# Patient Record
Sex: MALE | Race: WHITE | NOT HISPANIC OR LATINO | Employment: OTHER | URBAN - METROPOLITAN AREA
[De-identification: names, ages, dates, MRNs, and addresses within clinical notes are randomized per-mention and may not be internally consistent; named-entity substitution may affect disease eponyms.]

---

## 2018-01-10 NOTE — MISCELLANEOUS
History of Present Illness  COPD Hospital Discharge Initial Follow-Up Call: There was no answer/no voicemail available  The patient is being contacted for follow-up after hospitalization  The date of discharge is 3/24/16  Signatures   Electronically signed by :  RT Ailyn; Mar 25 2016 12:28PM EST                       (Author)

## 2018-05-08 ENCOUNTER — APPOINTMENT (INPATIENT)
Dept: RADIOLOGY | Facility: HOSPITAL | Age: 68
DRG: 208 | End: 2018-05-08
Payer: MEDICARE

## 2018-05-08 ENCOUNTER — HOSPITAL ENCOUNTER (INPATIENT)
Facility: HOSPITAL | Age: 68
LOS: 3 days | Discharge: HOME | DRG: 208 | End: 2018-05-11
Attending: EMERGENCY MEDICINE | Admitting: EMERGENCY MEDICINE
Payer: MEDICARE

## 2018-05-08 ENCOUNTER — APPOINTMENT (EMERGENCY)
Dept: RADIOLOGY | Facility: HOSPITAL | Age: 68
DRG: 208 | End: 2018-05-08
Payer: MEDICARE

## 2018-05-08 DIAGNOSIS — T78.3XXA ANGIOEDEMA: Primary | ICD-10-CM

## 2018-05-08 PROBLEM — R22.0 SEVERE TONGUE SWELLING: Status: ACTIVE | Noted: 2018-05-08

## 2018-05-08 PROBLEM — J44.9 COPD (CHRONIC OBSTRUCTIVE PULMONARY DISEASE) (HCC): Status: ACTIVE | Noted: 2018-05-08

## 2018-05-08 PROBLEM — R22.0 RIGHT FACIAL SWELLING: Status: ACTIVE | Noted: 2018-05-08

## 2018-05-08 PROBLEM — R00.1 BRADYCARDIA: Status: ACTIVE | Noted: 2018-05-08

## 2018-05-08 PROBLEM — J96.91 RESPIRATORY FAILURE WITH HYPOXIA (HCC): Status: ACTIVE | Noted: 2018-05-08

## 2018-05-08 LAB
ALBUMIN SERPL BCP-MCNC: 3.3 G/DL (ref 3.5–5)
ALP SERPL-CCNC: 128 U/L (ref 46–116)
ALT SERPL W P-5'-P-CCNC: 21 U/L (ref 12–78)
ANION GAP SERPL CALCULATED.3IONS-SCNC: 8 MMOL/L (ref 4–13)
APTT PPP: 28 SECONDS (ref 23–35)
AST SERPL W P-5'-P-CCNC: 22 U/L (ref 5–45)
ATRIAL RATE: 52 BPM
ATRIAL RATE: 88 BPM
ATRIAL RATE: 88 BPM
BASOPHILS # BLD AUTO: 0.01 THOUSANDS/ΜL (ref 0–0.1)
BASOPHILS NFR BLD AUTO: 0 % (ref 0–1)
BILIRUB SERPL-MCNC: 0.72 MG/DL (ref 0.2–1)
BUN SERPL-MCNC: 18 MG/DL (ref 5–25)
C3 SERPL-MCNC: 136 MG/DL (ref 90–180)
C4 SERPL-MCNC: 43 MG/DL (ref 10–40)
CALCIUM SERPL-MCNC: 8.4 MG/DL (ref 8.3–10.1)
CHLORIDE SERPL-SCNC: 106 MMOL/L (ref 100–108)
CO2 SERPL-SCNC: 24 MMOL/L (ref 21–32)
CREAT SERPL-MCNC: 0.99 MG/DL (ref 0.6–1.3)
CRP SERPL QL: 6.2 MG/L
EOSINOPHIL # BLD AUTO: 0.15 THOUSAND/ΜL (ref 0–0.61)
EOSINOPHIL NFR BLD AUTO: 2 % (ref 0–6)
ERYTHROCYTE [DISTWIDTH] IN BLOOD BY AUTOMATED COUNT: 13.6 % (ref 11.6–15.1)
ERYTHROCYTE [SEDIMENTATION RATE] IN BLOOD: 11 MM/HOUR (ref 0–10)
GFR SERPL CREATININE-BSD FRML MDRD: 77 ML/MIN/1.73SQ M
GLUCOSE SERPL-MCNC: 118 MG/DL (ref 65–140)
HCT VFR BLD AUTO: 48.2 % (ref 36.5–49.3)
HGB BLD-MCNC: 16.1 G/DL (ref 12–17)
INR PPP: 1.03 (ref 0.86–1.16)
LACTATE SERPL-SCNC: 2.3 MMOL/L (ref 0.5–2)
LYMPHOCYTES # BLD AUTO: 3.64 THOUSANDS/ΜL (ref 0.6–4.47)
LYMPHOCYTES NFR BLD AUTO: 37 % (ref 14–44)
MCH RBC QN AUTO: 31.7 PG (ref 26.8–34.3)
MCHC RBC AUTO-ENTMCNC: 33.4 G/DL (ref 31.4–37.4)
MCV RBC AUTO: 95 FL (ref 82–98)
MONOCYTES # BLD AUTO: 1.08 THOUSAND/ΜL (ref 0.17–1.22)
MONOCYTES NFR BLD AUTO: 11 % (ref 4–12)
NEUTROPHILS # BLD AUTO: 4.91 THOUSANDS/ΜL (ref 1.85–7.62)
NEUTS SEG NFR BLD AUTO: 50 % (ref 43–75)
NRBC BLD AUTO-RTO: 0 /100 WBCS
NT-PROBNP SERPL-MCNC: 168 PG/ML
P AXIS: 72 DEGREES
P AXIS: 76 DEGREES
P AXIS: 79 DEGREES
PLATELET # BLD AUTO: 302 THOUSANDS/UL (ref 149–390)
PMV BLD AUTO: 10.2 FL (ref 8.9–12.7)
POTASSIUM SERPL-SCNC: 3.8 MMOL/L (ref 3.5–5.3)
PR INTERVAL: 158 MS
PR INTERVAL: 164 MS
PR INTERVAL: 183 MS
PROT SERPL-MCNC: 7.3 G/DL (ref 6.4–8.2)
PROTHROMBIN TIME: 13.5 SECONDS (ref 12.1–14.4)
QRS AXIS: -15 DEGREES
QRS AXIS: 82 DEGREES
QRS AXIS: 95 DEGREES
QRSD INTERVAL: 102 MS
QRSD INTERVAL: 108 MS
QRSD INTERVAL: 113 MS
QT INTERVAL: 366 MS
QT INTERVAL: 400 MS
QT INTERVAL: 454 MS
QTC INTERVAL: 423 MS
QTC INTERVAL: 442 MS
QTC INTERVAL: 484 MS
RBC # BLD AUTO: 5.08 MILLION/UL (ref 3.88–5.62)
SODIUM SERPL-SCNC: 138 MMOL/L (ref 136–145)
T WAVE AXIS: 40 DEGREES
T WAVE AXIS: 64 DEGREES
T WAVE AXIS: 75 DEGREES
TROPONIN I SERPL-MCNC: <0.02 NG/ML
VENTRICULAR RATE: 52 BPM
VENTRICULAR RATE: 88 BPM
VENTRICULAR RATE: 88 BPM
WBC # BLD AUTO: 9.81 THOUSAND/UL (ref 4.31–10.16)

## 2018-05-08 PROCEDURE — 36415 COLL VENOUS BLD VENIPUNCTURE: CPT | Performed by: EMERGENCY MEDICINE

## 2018-05-08 PROCEDURE — 80053 COMPREHEN METABOLIC PANEL: CPT | Performed by: EMERGENCY MEDICINE

## 2018-05-08 PROCEDURE — 86160 COMPLEMENT ANTIGEN: CPT | Performed by: EMERGENCY MEDICINE

## 2018-05-08 PROCEDURE — 71045 X-RAY EXAM CHEST 1 VIEW: CPT

## 2018-05-08 PROCEDURE — 70491 CT SOFT TISSUE NECK W/DYE: CPT

## 2018-05-08 PROCEDURE — 86140 C-REACTIVE PROTEIN: CPT | Performed by: EMERGENCY MEDICINE

## 2018-05-08 PROCEDURE — 94760 N-INVAS EAR/PLS OXIMETRY 1: CPT

## 2018-05-08 PROCEDURE — 96374 THER/PROPH/DIAG INJ IV PUSH: CPT

## 2018-05-08 PROCEDURE — 85652 RBC SED RATE AUTOMATED: CPT | Performed by: EMERGENCY MEDICINE

## 2018-05-08 PROCEDURE — 0BH17EZ INSERTION OF ENDOTRACHEAL AIRWAY INTO TRACHEA, VIA NATURAL OR ARTIFICIAL OPENING: ICD-10-PCS | Performed by: INTERNAL MEDICINE

## 2018-05-08 PROCEDURE — 93010 ELECTROCARDIOGRAM REPORT: CPT | Performed by: INTERNAL MEDICINE

## 2018-05-08 PROCEDURE — 3E033XZ INTRODUCTION OF VASOPRESSOR INTO PERIPHERAL VEIN, PERCUTANEOUS APPROACH: ICD-10-PCS | Performed by: INTERNAL MEDICINE

## 2018-05-08 PROCEDURE — 83605 ASSAY OF LACTIC ACID: CPT | Performed by: EMERGENCY MEDICINE

## 2018-05-08 PROCEDURE — 85610 PROTHROMBIN TIME: CPT | Performed by: EMERGENCY MEDICINE

## 2018-05-08 PROCEDURE — 93005 ELECTROCARDIOGRAM TRACING: CPT

## 2018-05-08 PROCEDURE — C9113 INJ PANTOPRAZOLE SODIUM, VIA: HCPCS | Performed by: EMERGENCY MEDICINE

## 2018-05-08 PROCEDURE — 83880 ASSAY OF NATRIURETIC PEPTIDE: CPT | Performed by: EMERGENCY MEDICINE

## 2018-05-08 PROCEDURE — 87040 BLOOD CULTURE FOR BACTERIA: CPT | Performed by: EMERGENCY MEDICINE

## 2018-05-08 PROCEDURE — 99291 CRITICAL CARE FIRST HOUR: CPT

## 2018-05-08 PROCEDURE — 85025 COMPLETE CBC W/AUTO DIFF WBC: CPT | Performed by: EMERGENCY MEDICINE

## 2018-05-08 PROCEDURE — 85730 THROMBOPLASTIN TIME PARTIAL: CPT | Performed by: EMERGENCY MEDICINE

## 2018-05-08 PROCEDURE — 94002 VENT MGMT INPAT INIT DAY: CPT

## 2018-05-08 PROCEDURE — 84484 ASSAY OF TROPONIN QUANT: CPT | Performed by: EMERGENCY MEDICINE

## 2018-05-08 PROCEDURE — 5A1945Z RESPIRATORY VENTILATION, 24-96 CONSECUTIVE HOURS: ICD-10-PCS | Performed by: INTERNAL MEDICINE

## 2018-05-08 PROCEDURE — 99223 1ST HOSP IP/OBS HIGH 75: CPT | Performed by: EMERGENCY MEDICINE

## 2018-05-08 RX ORDER — PROPOFOL 10 MG/ML
5-50 INJECTION, EMULSION INTRAVENOUS
Status: DISCONTINUED | OUTPATIENT
Start: 2018-05-08 | End: 2018-05-08

## 2018-05-08 RX ORDER — HEPARIN SODIUM 5000 [USP'U]/ML
7500 INJECTION, SOLUTION INTRAVENOUS; SUBCUTANEOUS EVERY 8 HOURS SCHEDULED
Status: DISCONTINUED | OUTPATIENT
Start: 2018-05-08 | End: 2018-05-08

## 2018-05-08 RX ORDER — ALBUTEROL SULFATE 2.5 MG/3ML
2.5 SOLUTION RESPIRATORY (INHALATION) EVERY 4 HOURS PRN
Status: DISCONTINUED | OUTPATIENT
Start: 2018-05-08 | End: 2018-05-09

## 2018-05-08 RX ORDER — PROPOFOL 10 MG/ML
50 INJECTION, EMULSION INTRAVENOUS ONCE
Status: COMPLETED | OUTPATIENT
Start: 2018-05-08 | End: 2018-05-08

## 2018-05-08 RX ORDER — PROPOFOL 10 MG/ML
INJECTION, EMULSION INTRAVENOUS
Status: COMPLETED
Start: 2018-05-08 | End: 2018-05-08

## 2018-05-08 RX ORDER — LORAZEPAM 2 MG/ML
1 INJECTION INTRAMUSCULAR ONCE
Status: COMPLETED | OUTPATIENT
Start: 2018-05-08 | End: 2018-05-08

## 2018-05-08 RX ORDER — CHLORHEXIDINE GLUCONATE 0.12 MG/ML
15 RINSE ORAL EVERY 12 HOURS SCHEDULED
Status: DISCONTINUED | OUTPATIENT
Start: 2018-05-08 | End: 2018-05-08 | Stop reason: SDUPTHER

## 2018-05-08 RX ORDER — PROPOFOL 10 MG/ML
45 INJECTION, EMULSION INTRAVENOUS ONCE
Status: COMPLETED | OUTPATIENT
Start: 2018-05-08 | End: 2018-05-08

## 2018-05-08 RX ORDER — FENTANYL CITRATE 50 UG/ML
INJECTION, SOLUTION INTRAMUSCULAR; INTRAVENOUS
Status: COMPLETED
Start: 2018-05-08 | End: 2018-05-08

## 2018-05-08 RX ORDER — FENTANYL CITRATE 50 UG/ML
100 INJECTION, SOLUTION INTRAMUSCULAR; INTRAVENOUS ONCE
Status: COMPLETED | OUTPATIENT
Start: 2018-05-08 | End: 2018-05-08

## 2018-05-08 RX ORDER — FAMOTIDINE 40 MG/5ML
20 POWDER, FOR SUSPENSION ORAL DAILY
Status: DISCONTINUED | OUTPATIENT
Start: 2018-05-08 | End: 2018-05-08

## 2018-05-08 RX ORDER — HEPARIN SODIUM 5000 [USP'U]/ML
5000 INJECTION, SOLUTION INTRAVENOUS; SUBCUTANEOUS EVERY 8 HOURS SCHEDULED
Status: DISCONTINUED | OUTPATIENT
Start: 2018-05-08 | End: 2018-05-11 | Stop reason: HOSPADM

## 2018-05-08 RX ORDER — SODIUM CHLORIDE, SODIUM GLUCONATE, SODIUM ACETATE, POTASSIUM CHLORIDE, MAGNESIUM CHLORIDE, SODIUM PHOSPHATE, DIBASIC, AND POTASSIUM PHOSPHATE .53; .5; .37; .037; .03; .012; .00082 G/100ML; G/100ML; G/100ML; G/100ML; G/100ML; G/100ML; G/100ML
500 INJECTION, SOLUTION INTRAVENOUS ONCE
Status: COMPLETED | OUTPATIENT
Start: 2018-05-08 | End: 2018-05-08

## 2018-05-08 RX ORDER — SODIUM CHLORIDE, SODIUM GLUCONATE, SODIUM ACETATE, POTASSIUM CHLORIDE, MAGNESIUM CHLORIDE, SODIUM PHOSPHATE, DIBASIC, AND POTASSIUM PHOSPHATE .53; .5; .37; .037; .03; .012; .00082 G/100ML; G/100ML; G/100ML; G/100ML; G/100ML; G/100ML; G/100ML
75 INJECTION, SOLUTION INTRAVENOUS CONTINUOUS
Status: DISCONTINUED | OUTPATIENT
Start: 2018-05-08 | End: 2018-05-09

## 2018-05-08 RX ORDER — KETAMINE HYDROCHLORIDE 50 MG/ML
INJECTION, SOLUTION, CONCENTRATE INTRAMUSCULAR; INTRAVENOUS
Status: DISCONTINUED
Start: 2018-05-08 | End: 2018-05-08 | Stop reason: WASHOUT

## 2018-05-08 RX ORDER — PANTOPRAZOLE SODIUM 40 MG/1
40 INJECTION, POWDER, FOR SOLUTION INTRAVENOUS DAILY
Status: DISCONTINUED | OUTPATIENT
Start: 2018-05-08 | End: 2018-05-08

## 2018-05-08 RX ORDER — FAMOTIDINE 40 MG/5ML
20 POWDER, FOR SUSPENSION ORAL 2 TIMES DAILY
Status: DISCONTINUED | OUTPATIENT
Start: 2018-05-08 | End: 2018-05-10

## 2018-05-08 RX ORDER — ETOMIDATE 2 MG/ML
20 INJECTION INTRAVENOUS ONCE
Status: COMPLETED | OUTPATIENT
Start: 2018-05-08 | End: 2018-05-08

## 2018-05-08 RX ORDER — VECURONIUM BROMIDE 1 MG/ML
10 INJECTION, POWDER, LYOPHILIZED, FOR SOLUTION INTRAVENOUS ONCE
Status: COMPLETED | OUTPATIENT
Start: 2018-05-08 | End: 2018-05-08

## 2018-05-08 RX ORDER — CHLORHEXIDINE GLUCONATE 0.12 MG/ML
15 RINSE ORAL EVERY 12 HOURS SCHEDULED
Status: DISCONTINUED | OUTPATIENT
Start: 2018-05-08 | End: 2018-05-10

## 2018-05-08 RX ADMIN — FENTANYL CITRATE 100 MCG: 50 INJECTION, SOLUTION INTRAMUSCULAR; INTRAVENOUS at 05:20

## 2018-05-08 RX ADMIN — PROPOFOL 45 MG: 10 INJECTION, EMULSION INTRAVENOUS at 05:05

## 2018-05-08 RX ADMIN — FENTANYL CITRATE 100 MCG: 50 INJECTION, SOLUTION INTRAMUSCULAR; INTRAVENOUS at 04:43

## 2018-05-08 RX ADMIN — FAMOTIDINE 20 MG: 40 POWDER, FOR SUSPENSION ORAL at 17:29

## 2018-05-08 RX ADMIN — PROPOFOL 50 MCG/KG/MIN: 10 INJECTION, EMULSION INTRAVENOUS at 06:52

## 2018-05-08 RX ADMIN — PROPOFOL 30.24 MCG/KG/MIN: 10 INJECTION, EMULSION INTRAVENOUS at 04:54

## 2018-05-08 RX ADMIN — SODIUM CHLORIDE, SODIUM GLUCONATE, SODIUM ACETATE, POTASSIUM CHLORIDE, MAGNESIUM CHLORIDE, SODIUM PHOSPHATE, DIBASIC, AND POTASSIUM PHOSPHATE 125 ML/HR: .53; .5; .37; .037; .03; .012; .00082 INJECTION, SOLUTION INTRAVENOUS at 08:54

## 2018-05-08 RX ADMIN — SODIUM CHLORIDE 1000 ML: 0.9 INJECTION, SOLUTION INTRAVENOUS at 06:29

## 2018-05-08 RX ADMIN — PROPOFOL 50 MG: 10 INJECTION, EMULSION INTRAVENOUS at 06:25

## 2018-05-08 RX ADMIN — EPINEPHRINE 0.5 MG: 0.1 INJECTION INTRACARDIAC; INTRAVENOUS at 07:47

## 2018-05-08 RX ADMIN — HEPARIN SODIUM 5000 UNITS: 5000 INJECTION, SOLUTION INTRAVENOUS; SUBCUTANEOUS at 22:00

## 2018-05-08 RX ADMIN — CHLORHEXIDINE GLUCONATE 15 ML: 1.2 RINSE ORAL at 20:23

## 2018-05-08 RX ADMIN — FENTANYL CITRATE 100 MCG/HR: 50 INJECTION INTRAVENOUS at 17:04

## 2018-05-08 RX ADMIN — HEPARIN SODIUM 5000 UNITS: 5000 INJECTION, SOLUTION INTRAVENOUS; SUBCUTANEOUS at 13:50

## 2018-05-08 RX ADMIN — HEPARIN SODIUM 7500 UNITS: 5000 INJECTION, SOLUTION INTRAVENOUS; SUBCUTANEOUS at 06:48

## 2018-05-08 RX ADMIN — SODIUM CHLORIDE, SODIUM GLUCONATE, SODIUM ACETATE, POTASSIUM CHLORIDE, MAGNESIUM CHLORIDE, SODIUM PHOSPHATE, DIBASIC, AND POTASSIUM PHOSPHATE 125 ML/HR: .53; .5; .37; .037; .03; .012; .00082 INJECTION, SOLUTION INTRAVENOUS at 17:01

## 2018-05-08 RX ADMIN — ETOMIDATE 20 MG: 2 INJECTION, SOLUTION INTRAVENOUS at 04:42

## 2018-05-08 RX ADMIN — FENTANYL CITRATE 150 MCG/HR: 50 INJECTION INTRAVENOUS at 05:42

## 2018-05-08 RX ADMIN — PROPOFOL 50 MG: 10 INJECTION, EMULSION INTRAVENOUS at 05:20

## 2018-05-08 RX ADMIN — SODIUM CHLORIDE 1000 ML: 0.9 INJECTION, SOLUTION INTRAVENOUS at 05:05

## 2018-05-08 RX ADMIN — PANTOPRAZOLE SODIUM 40 MG: 40 INJECTION, POWDER, FOR SOLUTION INTRAVENOUS at 12:42

## 2018-05-08 RX ADMIN — IOHEXOL 100 ML: 350 INJECTION, SOLUTION INTRAVENOUS at 06:30

## 2018-05-08 RX ADMIN — VECURONIUM BROMIDE 10 MG: 1 INJECTION, POWDER, LYOPHILIZED, FOR SOLUTION INTRAVENOUS at 06:25

## 2018-05-08 RX ADMIN — LORAZEPAM 1 MG: 2 INJECTION INTRAMUSCULAR; INTRAVENOUS at 05:23

## 2018-05-08 RX ADMIN — SODIUM CHLORIDE, SODIUM GLUCONATE, SODIUM ACETATE, POTASSIUM CHLORIDE, MAGNESIUM CHLORIDE, SODIUM PHOSPHATE, DIBASIC, AND POTASSIUM PHOSPHATE 500 ML: .53; .5; .37; .037; .03; .012; .00082 INJECTION, SOLUTION INTRAVENOUS at 06:40

## 2018-05-08 RX ADMIN — CHLORHEXIDINE GLUCONATE 15 ML: 1.2 RINSE ORAL at 12:42

## 2018-05-08 RX ADMIN — Medication 114 MG: at 04:35

## 2018-05-08 NOTE — RESPIRATORY THERAPY NOTE
RT Ventilator Management Note  Keyonna Moncada 70 y o  male MRN: [id number]  Unit/Bed#: [facility] 01 Encounter: [id number]      Daily Screen       5/8/2018 0732             Patient safety screen outcome[de-identified] Failed    Not Ready for Weaning due to[de-identified] Underline problem not resolved            Physical Exam:   Assessment Type: (P) Assess only  General Appearance: (P) Sedated  Respiratory Pattern: (P) Assisted  Chest Assessment: (P) Chest expansion symmetrical  Bilateral Breath Sounds: (P) Clear, Diminished  O2 Device: vent      Resp Comments: (P) lpt arrived from ER on a ventilator, pt appears comfortable will continue to monitor

## 2018-05-08 NOTE — NUTRITION
If unable to extubate and safely advance PO diet in next 24 hrs then recommend  start Jevity 1 2 @ 10 ml/hr and incr by 10 ml q 4 hrs as jurgen to goal rate of 90 ml/hr to = qd: 2160 ml,2592 Kcal,120 gm PRO,366 gm CHO,85 gm Fat,1743 ml Free H2O,2 0 mg CHO/kg/min

## 2018-05-08 NOTE — SOCIAL WORK
Pt intubated but awake  CM reviewed role with dcp and care coordination  Pt resides a lone in a 2nd floor apartment with a flight of stairs to enter  Pt does not have elevator access  Pt independent with ADLs and ambulation PTA  Pt does not drive and is retired  Pt uses the bus or walks for transportation  Pt has no hx of HHC  Pt reports hx of [facility] Acute rehab  Pt denies hx of MH or drug/alcohol abuse  Pt reports he does not have prescription coverage  Pt reports no pharmacy  Pt reports no POA or LW  CM to follow up with pt on emergency contact info and number when extubated  CM to follow for dcp  CM reviewed d/c planning process including the following: identifying help at home, patient preference for d/c planning needs, Discharge Lounge, [facility] Meds to Bed program, availability of treatment team to discuss questions or concerns patient and/or family may have regarding understanding medications and recognizing signs and symptoms once discharged  CM also encouraged patient to follow up with all recommended appointments after discharge  Patient advised of importance for patient and family to participate in managing patients medical well being

## 2018-05-08 NOTE — RESPIRATORY THERAPY NOTE
RT Protocol Note  Cleavon So 70 y o  male MRN: [id number]  Unit/Bed#: [facility] 01 Encounter: [id number]    Assessment    Principal Problem:    Angioedema  Active Problems:    Severe tongue swelling    Right facial swelling    COPD (chronic obstructive pulmonary disease) (HCC)    Respiratory failure with hypoxia (HCC)      Home Pulmonary Medications:  na  Home Devices/Therapy: (P)  (none)    Past Medical History:   Diagnosis Date    COPD (chronic obstructive pulmonary disease) ([facility] [address] )      Social History     Social History    Marital status: Single     Spouse name: N/A    Number of children: N/A    Years of education: N/A     Social History Main Topics    Smoking status: Current Every Day Smoker    Smokeless tobacco: Not on file    Alcohol use No    Drug use: No    Sexual activity: Not on file     Other Topics Concern    Not on file     Social History Narrative    No narrative on file       Subjective         Objective    Physical Exam:   Assessment Type: Assess only  General Appearance: Sedated  Respiratory Pattern: Assisted  Chest Assessment: Chest expansion symmetrical  Bilateral Breath Sounds: Clear, Diminished  O2 Device: vent    Vitals:  Blood pressure 117/68, pulse 86, temperature 98 6 °F (37 °C), temperature source Oral, resp  rate (!) 37, height 6' 3" (1 905 m), weight 130 kg (285 lb 15 oz), SpO2 (!) 88 %  Imaging and other studies: I have personally reviewed pertinent reports        O2 Device: vent     Plan    Respiratory Plan: (P) Vent/NIV/HFNC        Resp Comments: (P) per chart, no resp meds at home, pmhx of copd, cxr shows improved congestive changes, breath sounds clear and diminished, will continue to monitor, pt to be order on udn prn for sob/wheezing

## 2018-05-08 NOTE — ED ATTENDING ATTESTATION
I, [address] [place] [address], DO, saw and evaluated the patient  I have discussed the patient with the resident/non-physician practitioner and agree with the resident's/non-physician practitioner's findings, Plan of Care, and MDM as documented in the resident's/non-physician practitioner's note, except where noted  All available labs and Radiology studies were reviewed  At this point I agree with the current assessment done in the Emergency Department  I have conducted an independent evaluation of this patient a history and physical is as follows:    68yo male presents via ems for facial swelling  Pt reports it started tonight and ems report it has gotten significantly worse during transport  Pt having difficulty speaking due to tongue swelling and appears to have more swelling on right side of face  Pt denies taking any meds, does not see a doctor, has h/o COPD, denies h/o similar symptoms  No family h/o angioedema  Was given solumedrol, epi and benadryl prehospital without any relief  On exam - in distress with obvious swelling right side of face, tongues swelling and neck swelling, soft under tongue but slightly tender, no obvious bites or infection, heart reg, lungs clear, voice muffled  Plan - intubate for airway protection, ct soft tissue neck, C4/C4, CRP, ESR, basic labs    Critical Care Time  The patient presented with a condition in which there was a high probability of imminent or life-threatening deterioration, and critical care services (excluding separately billable procedures) totalled 30-74 minutes          CriticalCare Time  Performed by: Maria Dolores Kim  Authorized by: Maria Dolores Kim     Critical care provider statement:     Critical care time (minutes):  35    Critical care time was exclusive of:  Separately billable procedures and treating other patients and teaching time    Critical care was necessary to treat or prevent imminent or life-threatening deterioration of the following conditions:  Respiratory failure    Critical care was time spent personally by me on the following activities:  Discussions with consultants, evaluation of patient's response to treatment, examination of patient, ordering and review of radiographic studies, re-evaluation of patient's condition and review of old charts    I assumed direction of critical care for this patient from another provider in my specialty: no

## 2018-05-08 NOTE — ED PROVIDER NOTES
History  Chief Complaint   Patient presents with    Facial Swelling     Pt experiencing tongue and right sided facial swelling tonight  NO allergies  This is a 69-year-old male that presents with facial swelling  According to paramedics they were called due to tongue swelling  On arrival no obvious swelling appreciated  They stated via transfer patient has significant swelling of his tongue angioedema along with swelling on the right side of his face  Patient started having voice changes and difficulty breathing  On arrival patient has significant angioedema of the tongue with some dysphonia  He states no prior history of angioedema  Denies any drug use  States he does not have any medical problems and does not take any medications on a regular basis only medical problem is COPD  Patient denies any tick bites or rashes  No fevers or chills  A decision was made to intubate the patient since patient started having difficulty with swallowing and significant worsening of his angioedema  Intubation successfully with glide scope with 8 0 tube  Unsure cause of angioedema  Upon researching of previous medical records no other admissions besides COPD  Patient on exam does have a previous surgical scar from tracheostomy unsure reason for trach  No obvious signs of trauma  Will admit patient to ICU  Will send off C3-C4 complement is are CRP  Will discuss with critical care regarding FFP  Will get a CT of neck soft tissue                None       Past Medical History:   Diagnosis Date    COPD (chronic obstructive pulmonary disease) ([facility] [address] )        No past surgical history on file  No family history on file  I have reviewed and agree with the history as documented      Social History   Substance Use Topics    Smoking status: Current Every Day Smoker    Smokeless tobacco: Not on file    Alcohol use No        Review of Systems   Unable to perform ROS: Acuity of condition       Physical Exam  ED Triage Vitals   Temperature Pulse Respirations Blood Pressure SpO2   05/08/18 0513 05/08/18 0430 05/08/18 0430 05/08/18 0430 05/08/18 0430   99 °F (37 2 °C) 94 18 160/93 95 %      Temp Source Heart Rate Source Patient Position - Orthostatic VS BP Location FiO2 (%)   05/08/18 0513 05/08/18 0430 05/08/18 0430 05/08/18 0430 05/08/18 0500   Probe Monitor Lying Right arm 50      Pain Score       05/08/18 0430       No Pain           Orthostatic Vital Signs  Vitals:    05/08/18 2014 05/08/18 2114 05/08/18 2214 05/08/18 2314   BP: 153/88 139/69 140/90 140/68   Pulse: (!) 46 60 56 58   Patient Position - Orthostatic VS: Lying          Physical Exam   Constitutional: He is oriented to person, place, and time  He appears well-nourished  In distress with difficulty braething     HENT:   Significant angioedema of the tongue  Dysphonia  Swelling on the right side of the face  No signs of Jayden's angina  No signs of trauma  Difficulty with swallowing  Previous scar from tracheostomy incision   Eyes: Conjunctivae and EOM are normal  Pupils are equal, round, and reactive to light  Neck: Normal range of motion  Neck supple  Cardiovascular: Normal rate, regular rhythm and normal heart sounds  No murmur heard  Pulmonary/Chest: Effort normal and breath sounds normal  No respiratory distress  He has no wheezes  Abdominal: Soft  Bowel sounds are normal  He exhibits distension  There is no tenderness  Musculoskeletal: Normal range of motion  He exhibits no edema, tenderness or deformity  Neurological: He is alert and oriented to person, place, and time  Oriented x3   Skin: Skin is warm  Capillary refill takes less than 2 seconds  No pallor  Psychiatric: He has a normal mood and affect         ED Medications  Medications    EMS REPLENISHMENT MED (not administered)   fentaNYL 1250 mcg in sodium chloride 0 9% 125mL drip (100 mcg/hr Intravenous New Bag 5/9/18 0120)   chlorhexidine (PERIDEX) 0 12 % oral rinse 15 mL (15 mL Swish & Spit Given 5/8/18 2023)   multi-electrolyte (ISOLYTE-S PH 7 4 equivalent) IV solution (125 mL/hr Intravenous New Bag 5/9/18 0120)   albuterol inhalation solution 2 5 mg (not administered)   heparin (porcine) subcutaneous injection 5,000 Units (5,000 Units Subcutaneous Given 5/8/18 2200)   famotidine (PEPCID) oral suspension 20 mg (20 mg Oral Given 5/8/18 1729)   propofol (DIPRIVAN) 1000 mg in 100 mL infusion (premix) (not administered)   ketamine (KETALAR) 10 mg/mL IV use 114 mg (114 mg Intravenous Given 5/8/18 0435)   sodium chloride 0 9 % bolus 1,000 mL (0 mL Intravenous Stopped 5/8/18 0629)   fentanyl citrate (PF) 100 MCG/2ML 100 mcg (100 mcg Intravenous Given 5/8/18 0443)   etomidate (AMIDATE) 2 mg/mL injection 20 mg (20 mg Intravenous Given 5/8/18 0442)   LORazepam (ATIVAN) 2 mg/mL injection 1 mg (1 mg Intravenous Given 5/8/18 0523)   propofol (DIPRIVAN) 200 MG/20ML bolus injection 50 mg (50 mg Intravenous Given 5/8/18 0520)   propofol (DIPRIVAN) 200 MG/20ML bolus injection 45 mg (45 mg Intravenous Given 5/8/18 0505)   fentanyl citrate (PF) 100 MCG/2ML 100 mcg (100 mcg Intravenous Given 5/8/18 0520)   sodium chloride 0 9 % bolus 1,000 mL (0 mL Intravenous Stopped 5/8/18 0717)   multi-electrolyte (ISOLYTE-S PH 7 4) bolus 500 mL (0 mL Intravenous Stopped 5/8/18 0854)   iohexol (OMNIPAQUE) 350 MG/ML injection (MULTI-DOSE) 100 mL (100 mL Intravenous Given 5/8/18 0630)   vecuronium (NORCURON) injection 10 mg (10 mg Intravenous Given 5/8/18 0625)   propofol (DIPRIVAN) 200 MG/20ML bolus injection 50 mg (50 mg Intravenous Given 5/8/18 0625)   EPINEPHrine (ADRENALIN) 0 1 mg/mL injection **AcuDose Override Pull** (0 5 mg  Given 5/8/18 0747)       Diagnostic Studies  Results Reviewed     Procedure Component Value Units Date/Time    Sedimentation rate, automated [[id number]]  (Abnormal) Collected:  05/08/18 0501    Lab Status:  Final result Specimen:  Blood from Arm, Right Updated:  05/08/18 0705     Sed Rate 11 (H) mm/hour     Lactic acid, plasma [[id number]]  (Abnormal) Collected:  05/08/18 0501    Lab Status:  Final result Specimen:  Blood from Arm, Right Updated:  05/08/18 0549     LACTIC ACID 2 3 (HH) mmol/L     Narrative:         Result may be elevated if tourniquet was used during collection  Comprehensive metabolic panel [[id number]]  (Abnormal) Collected:  05/08/18 0501    Lab Status:  Final result Specimen:  Blood from Arm, Right Updated:  05/08/18 0532     Sodium 138 mmol/L      Potassium 3 8 mmol/L      Chloride 106 mmol/L      CO2 24 mmol/L      Anion Gap 8 mmol/L      BUN 18 mg/dL      Creatinine 0 99 mg/dL      Glucose 118 mg/dL      Calcium 8 4 mg/dL      AST 22 U/L      ALT 21 U/L      Alkaline Phosphatase 128 (H) U/L      Total Protein 7 3 g/dL      Albumin 3 3 (L) g/dL      Total Bilirubin 0 72 mg/dL      eGFR 77 ml/min/1 73sq m     Narrative:         National Kidney Disease Education Program recommendations are as follows:  GFR calculation is accurate only with a steady state creatinine  Chronic Kidney disease less than 60 ml/min/1 73 sq  meters  Kidney failure less than 15 ml/min/1 73 sq  meters  B-type natriuretic peptide [[id number]]  (Abnormal) Collected:  05/08/18 0501    Lab Status:  Final result Specimen:  Blood from Arm, Right Updated:  05/08/18 0532     NT-proBNP 168 (H) pg/mL     Troponin I [[id number]]  (Normal) Collected:  05/08/18 0501    Lab Status:  Final result Specimen:  Blood from Arm, Right Updated:  05/08/18 0531     Troponin I <0 02 ng/mL     Narrative:         [employer] Chemistry analyzer 99% cutoff is > 0 04 ng/mL in network labs    o cTnI 99% cutoff is useful only when applied to patients in the clinical setting of myocardial ischemia  o cTnI 99% cutoff should be interpreted in the context of clinical history, ECG findings and possibly cardiac imaging to establish correct diagnosis    o cTnI 99% cutoff may be suggestive but clearly not indicative of a coronary event without the clinical setting of myocardial ischemia  C4 complement [id number]  (Abnormal) Collected:  05/08/18 0501    Lab Status:  Final result Specimen:  Blood from Arm, Right Updated:  05/08/18 0527     C4, COMPLEMENT 43 0 (H) mg/dL     C3 complement [[id number]]  (Normal) Collected:  05/08/18 0501    Lab Status:  Final result Specimen:  Blood from Arm, Right Updated:  05/08/18 0526     C3 Complement 136 0 mg/dL     C-reactive protein [[id number]]  (Abnormal) Collected:  05/08/18 0501    Lab Status:  Final result Specimen:  Blood from Arm, Right Updated:  05/08/18 0525     CRP 6 2 (H) mg/L     Protime-INR [[id number]]  (Normal) Collected:  05/08/18 0501    Lab Status:  Final result Specimen:  Blood from Arm, Right Updated:  05/08/18 0521     Protime 13 5 seconds      INR 1 03    APTT [[id number]]  (Normal) Collected:  05/08/18 0501    Lab Status:  Final result Specimen:  Blood from Arm, Right Updated:  05/08/18 0521     PTT 28 seconds     CBC and differential [[id number]] Collected:  05/08/18 0501    Lab Status:  Final result Specimen:  Blood from Arm, Right Updated:  05/08/18 0517     WBC 9 81 Thousand/uL      RBC 5 08 Million/uL      Hemoglobin 16 1 g/dL      Hematocrit 48 2 %      MCV 95 fL      MCH 31 7 pg      MCHC 33 4 g/dL      RDW 13 6 %      MPV 10 2 fL      Platelets 359 Thousands/uL      nRBC 0 /100 WBCs      Neutrophils Relative 50 %      Lymphocytes Relative 37 %      Monocytes Relative 11 %      Eosinophils Relative 2 %      Basophils Relative 0 %      Neutrophils Absolute 4 91 Thousands/µL      Lymphocytes Absolute 3 64 Thousands/µL      Monocytes Absolute 1 08 Thousand/µL      Eosinophils Absolute 0 15 Thousand/µL      Basophils Absolute 0 01 Thousands/µL     Blood culture #2 [[id number]] Collected:  05/08/18 0501    Lab Status: In process Specimen:  Blood from Arm, Right Updated:  05/08/18 0507    Blood culture #1 [[id number]] Collected:  05/08/18 0501    Lab Status:   In process Specimen:  Blood from Arm, Right Updated: 05/08/18 0507                 XR chest portable   Final Result by Herman Ferreira MD (05/08 2144)      1  Improved congestive changes  2   Satisfactory endotracheal tube placement  Workstation performed: [id number]         XR chest portable   Final Result by Herman Ferreira MD (05/08 3855)      1  Endotracheal tube is above the thoracic inlet projecting 9 cm above the arsenio  Advancement is recommended  2   Congestive changes  Workstation performed: [id number]         CT soft tissue neck with contrast   Final Result by Sharyle Holts, MD (05/08 9740)      Bilateral, right greater than left submandibular and sublingual space edema  Discrete mass is not evident  There is no indication of abscess, venous obstruction or pathologic adenopathy  Workstation performed: [id number]               Procedures  Intubation  Date/Time: 5/8/2018 5:24 AM  Performed by: Tammi Hammer by: Delmy Ulrich     Patient location:  ED  Consent:     Consent obtained:  Emergent situation    Risks discussed:  Aspiration    Alternatives discussed:  No treatment  Universal protocol:     Patient identity confirmed:  Arm band  Pre-procedure details:     Patient status:  Awake    Mallampati score:  4    Pretreatment medications:  Ketamine    Paralytics:  None  Indications:     Indications for intubation: other (comment)    Procedure details:     Preoxygenation:  Nasal cannula    CPR in progress: no      Intubation method:  Oral    Oral intubation technique:  Glidescope    Laryngoscope blade:   Mac 3    Tube size (mm):  8 0    Tube type:  Cuffed    Number of attempts:  1    Ventilation between attempts: no      Cricoid pressure: no      Tube visualized through cords: yes    Placement assessment:     ETT to lip:  27    Tube secured with:  ETT serrano    Breath sounds:  Equal and absent over the epigastrium    Placement verification: chest rise, condensation, CXR verification, direct visualization, ETCO2 detector, tube exhalation and capnography      CXR findings:  ETT in proper place  Post-procedure details:     Patient tolerance of procedure: Tolerated well, no immediate complications  Comments:      Intubated for angioedema, tube advanced 3cm  currently 27 at the lips           Phone Consults  ED Phone Contact    ED Course  ED Course as of May 09 0139   Tue May 08, 2018   [id number] Paged critical care                                MDM  CritCare Time    Disposition  Final diagnoses:   Angioedema     Time reflects when diagnosis was documented in both MDM as applicable and the Disposition within this note     Time User Action Codes Description Comment    5/8/2018  5:10 AM Ras Read U  8  Beatriz Blauvelt  3XXA] Angioedema       ED Disposition     ED Disposition Condition Comment    Admit  Case was discussed with critical care and the patient's admission status was agreed to be Admission Status: inpatient status to the service of Dr Azeem Payne   Follow-up Information    None       There are no discharge medications for this patient  No discharge procedures on file  ED Provider  Attending physically available and evaluated Maria Doherty I managed the patient along with the ED Attending      Electronically Signed by         Jenny Molina MD  05/09/18 8339

## 2018-05-08 NOTE — ED NOTES
100 mcg of fentanyl IVP     Willie Collier, [facility]  05/08/18 6280

## 2018-05-08 NOTE — H&P
History and Physical - Critical Care  Rupali Blair 70 y o  male MRN: [id number]  Unit/Bed#: ED 05 Encounter: [id number]     Reason for Admission / Chief Complaint: NA patient intubated    Patient Active Problem List   Diagnosis    Tobacco use    Polycythemia    Weight loss    Angioedema    Severe tongue swelling    Right facial swelling    COPD (chronic obstructive pulmonary disease) (HCC)    Respiratory failure with hypoxia (HCC)          History of Present Illness:  Rupali Blair is a 70 y o  male who presents from home  Patient has a history of COPD, patient was admitted in 2016 and states he has stop smoking at that time  Patient called EMS as he began to experience throat tightness  Patient rapidly progressed during EMS transport to the emergency department  According to reports by emergency department providers, patient had tongue swelling and was unable to handle his secretions  Patient was intubated for airway protection  Patient has swelling of his right side of his face  On my evaluation patient is intubated with copious secretions  Patient moves all extremities symmetrically  Patient has a swollen tongue and right-sided facial swelling  Patient received multiple rounds of epinephrine by EMS with no improvement  Patient received Solu-Medrol by EMS  Of note patient had facial swelling and tongue swelling without wheezing no rashes no reports of nausea with vomiting  On review of records, patient was admitted in 2016 for acute bronchitis with COPD  On review of records patient does not have any home medications  History is limited secondary to patient's intubation secondary to angioedema  Review of systems unobtainable  On review of records patient does not have allergies  Primary care physician [facility]     History obtained from chart review and unobtainable from patient due to intubation       Past Medical History:  Past Medical History:   Diagnosis Date    COPD (chronic obstructive pulmonary disease) (HCC)         Past Surgical History:  No past surgical history on file  Past Family History:  No family history on file       Social History:  History   Smoking Status    Current Every Day Smoker   Smokeless Tobacco    Not on file     History   Alcohol Use No     History   Drug Use No     Marital Status: Single  Exercise History: N/A     Medications:  Current Facility-Administered Medications   Medication Dose Route Frequency     EMS REPLENISHMENT MED   Does not apply Once    chlorhexidine (PERIDEX) 0 12 % oral rinse 15 mL  15 mL Swish & Spit Q12H [address]    fentaNYL 1250 mcg in sodium chloride 0 9% 125mL drip  150 mcg/hr Intravenous Continuous    heparin (porcine) subcutaneous injection 7,500 Units  7,500 Units Subcutaneous Q8H [address]    multi-electrolyte (ISOLYTE-S PH 7 4 equivalent) IV solution  125 mL/hr Intravenous Continuous    multi-electrolyte (ISOLYTE-S PH 7 4) bolus 500 mL  500 mL Intravenous Once    pantoprazole (PROTONIX) injection 40 mg  40 mg Intravenous Daily    propofol (DIPRIVAN) 1000 mg in 100 mL infusion (premix)  5-50 mcg/kg/min Intravenous Titrated    propofol (DIPRIVAN) 200 MG/20ML bolus injection 50 mg  50 mg Intravenous Once    sodium chloride 0 9 % bolus 1,000 mL  1,000 mL Intravenous Once    vecuronium (NORCURON) injection 10 mg  10 mg Intravenous Once     Home medications:  Prior to Admission medications    Not on File     Allergies:  No Known Allergies     ROS:   Review of Systems   Unable to perform ROS: Other (Intubated secondary to angioedema)        Vitals:  Vitals:    18 0449 18 0513 18 0515 18 0520   BP: (!) 196/120  113/57 141/65   BP Location: Right arm   Right arm   Pulse: 92  84 73   Resp: 18  13 (!) 9   Temp:  99 °F (37 2 °C) 99 °F (37 2 °C)    TempSrc:  Probe     SpO2: 99%  97% 96%   Weight:       Height:         Temperature:   Temp (24hrs), Av °F (37 2 °C), Min:99 °F (37 2 °C), Max:99 °F (37 2 °C)    Current: Temperature: 99 °F (37 2 °C)     Weights:   IBW: 84 5 kg  Body mass index is 31 25 kg/m²  Hemodynamic Monitoring:  N/A     Non-Invasive/Invasive Ventilation Settings:  Respiratory    Lab Data (Last 4 hours)    None         O2/Vent Data (Last 4 hours)      05/08 0500           Vent Mode AC/VC       Resp Rate (BPM) (BPM) 15       Vt (mL) (mL) 500       FIO2 (%) (%) 50       PEEP (cmH2O) (cmH2O) 5       MV 8 27                 No results found for: PHART, SWG0COU, PO2ART, WNP9YDH, Y7ZDMGMC, BEART, SOURCE  SpO2: SpO2: 96 %     Physical Exam:  Physical Exam   Constitutional: He appears well-developed and well-nourished  No distress  HENT:   Head: Normocephalic and atraumatic  Head is without raccoon's eyes, without Davenport's sign, without abrasion, without contusion, without right periorbital erythema and without left periorbital erythema  Right Ear: External ear normal    Left Ear: External ear normal    Mouth/Throat: Oropharynx is clear and moist  No oropharyngeal exudate  Eyes: EOM are normal  Pupils are equal, round, and reactive to light  Right eye exhibits no discharge  Left eye exhibits no discharge  No scleral icterus  Pupils 1 mm and sluggish, currently on fentanyl   Neck: Trachea normal and phonation normal  Neck supple  No JVD present  No tracheal tenderness present  No tracheal deviation present  No thyromegaly present  Cardiovascular: Normal rate and regular rhythm  Exam reveals no gallop and no friction rub  No murmur heard  Pulmonary/Chest: No stridor  No respiratory distress  He has no wheezes  He has no rales  He exhibits no tenderness  Abdominal: Soft  Bowel sounds are normal  He exhibits no distension  There is no tenderness  There is no rebound  Obese abdomen   Lymphadenopathy:     He has no cervical adenopathy  Neurological: GCS eye subscore is 4  GCS verbal subscore is 1  GCS motor subscore is 6     Reflex Scores:       Tricep reflexes are 2+ on the right side and 2+ on the left side  Bicep reflexes are 2+ on the right side and 2+ on the left side  Patellar reflexes are 2+ on the right side and 2+ on the left side  Achilles reflexes are 2+ on the right side and 2+ on the left side  On sedation break:  Moves all extremities symmetrically, follows commands in all 4 extremities, opens eyes spontaneously, corneal and gag reflex intact   Skin: He is not diaphoretic  Nursing note and vitals reviewed  Labs:    Results from last 7 days  Lab Units 05/08/18  0501   WBC Thousand/uL 9 81   HEMOGLOBIN g/dL 16 1   HEMATOCRIT % 48 2   PLATELETS Thousands/uL 302   NEUTROS PCT % 50   MONOS PCT % 11        Results from last 7 days  Lab Units 05/08/18  0501   SODIUM mmol/L 138   POTASSIUM mmol/L 3 8   CHLORIDE mmol/L 106   CO2 mmol/L 24   BUN mg/dL 18   CREATININE mg/dL 0 99   CALCIUM mg/dL 8 4   TOTAL PROTEIN g/dL 7 3   BILIRUBIN TOTAL mg/dL 0 72   ALK PHOS U/L 128*   ALT U/L 21   AST U/L 22   GLUCOSE RANDOM mg/dL 118                Results from last 7 days  Lab Units 05/08/18  0501   INR  1 03   PTT seconds 28       Results from last 7 days  Lab Units 05/08/18  0501   LACTIC ACID mmol/L 2 3*       0  Lab Value Date/Time   TROPONINI <0 02 05/08/2018 0501        Imaging:      I have personally reviewed pertinent reports  EKG: This was personally reviewed by myself  Micro:  No results found for: Lucita Stade, SPUTUMCULTUR    Assessment:  70year-old with angioedema intubated for upper airway swelling  Plan:      Neuro:  GCS 11, nonfocal exam, fentanyl 150 mics per hour as patient was agitated in the ED with along with propofol  Targeting RASS of -1 to 0 continue with frequent sedation breaks    Q 2 hours neuro checks       CV:  Telemetry monitoring, troponin negative, ekg Sinus rhythm with Fusion complexes and Possible Premature atrial complexes with Aberrant conduction  Otherwise normal ECG  When compared with ECG of 24-MAR-2016 11:39,  Fusion complexes are now Present  Questionable change in QRS axis    HEENT:  Follow-up CT neck, acute angioedema with swelling of tongue and face causing upper airway obstruction    Lung:    Acute hypoxic respiratory failure secondary to angioedema upper airway obstruction, continue to trend swelling and wean to extubate  Chest x-ray showed ET tube above arsenio, advance, follow up on chest x-ray  History of COPD unclear prior admission, appears to not follow up with pulmonology  Will start patient on respiratory protocol     GI:  Patient NPO, Protonix for peptic ulcer prophylaxis as patient is intubated     FEN:  Isolate bolus for lactic 2 3, isolate at 125 cc/hr  while patient is NPO  Patient NPO  Replace potassium as needed  :  Creatinine within normal limits no indication for Sorensen continue trach I&O     ID:  Continue monitor fever curve no infectious etiology at this time    Heme:  Angioedema, follow-up compliment studies, heparin for DVT prophylaxis  Polycythemia has resolved from prior admission  No history of allergies on review of records  Endo:  Point of care glucoses q 4 hours while patient is NPO no other history of endocrine abnormalities     Msk/Skin:  PT OT, frequent turning monitor for skin breakdown     Disposition:  ICU management for angioedema     VTE Pharmacologic Prophylaxis: Heparin  VTE Mechanical Prophylaxis: sequential compression device     Invasive lines and devices:   Invasive Devices     Peripheral Intravenous Line            Peripheral IV 05/08/18 Left Antecubital less than 1 day    Peripheral IV 05/08/18 Right Antecubital less than 1 day    Peripheral IV 05/08/18 Right Hand less than 1 day          Drain            NG/OG/Enteral Tube Orogastric 18 Fr Right mouth less than 1 day    Urethral Catheter Temperature probe 16 Fr  less than 1 day          Airway            ETT  Cuffed 8 mm less than 1 day                 Code Status: Level 1 - Full Code  POA: POLST:       Given critical illness, patient length of stay will require greater than two midnights  Counseling / Coordination of Care  Total Critical Care time spent 55 minutes excluding procedures, teaching and family updates  Portions of the record may have been created with voice recognition software  Occasional wrong word or "sound a like" substitutions may have occurred due to the inherent limitations of voice recognition software  Read the chart carefully and recognize, using context, where substitutions have occurred          Jodie Black DO

## 2018-05-08 NOTE — PROCEDURES
Central Line Insertion  Date/Time: 5/8/2018 1:58 PM  Performed by: Rey Alex  Authorized by: Rey Alex     Patient location: Test

## 2018-05-08 NOTE — RESPIRATORY THERAPY NOTE
RT Ventilator Management Note  Saw Jimenes 70 y o  male MRN: [id number]  Unit/Bed#: [facility] 01 Encounter: [id number]      Daily Screen       5/8/2018 0732             Patient safety screen outcome[de-identified] Failed    Not Ready for Weaning due to[de-identified] Underline problem not resolved            Physical Exam:   Assessment Type: Assess only  General Appearance: Sedated  Respiratory Pattern: Assisted  Chest Assessment: Chest expansion symmetrical  Bilateral Breath Sounds: Clear, Diminished  O2 Device: vent      Resp Comments: (P) pt appears comfortable on current settings, will continue to monitor

## 2018-05-09 PROCEDURE — 94640 AIRWAY INHALATION TREATMENT: CPT | Performed by: SOCIAL WORKER

## 2018-05-09 PROCEDURE — 94640 AIRWAY INHALATION TREATMENT: CPT

## 2018-05-09 PROCEDURE — 94760 N-INVAS EAR/PLS OXIMETRY 1: CPT | Performed by: SOCIAL WORKER

## 2018-05-09 PROCEDURE — 92610 EVALUATE SWALLOWING FUNCTION: CPT

## 2018-05-09 PROCEDURE — 99291 CRITICAL CARE FIRST HOUR: CPT | Performed by: INTERNAL MEDICINE

## 2018-05-09 PROCEDURE — 94003 VENT MGMT INPAT SUBQ DAY: CPT | Performed by: SOCIAL WORKER

## 2018-05-09 PROCEDURE — 94760 N-INVAS EAR/PLS OXIMETRY 1: CPT

## 2018-05-09 RX ORDER — METHYLPREDNISOLONE SODIUM SUCCINATE 40 MG/ML
40 INJECTION, POWDER, LYOPHILIZED, FOR SOLUTION INTRAMUSCULAR; INTRAVENOUS DAILY
Status: DISCONTINUED | OUTPATIENT
Start: 2018-05-10 | End: 2018-05-10

## 2018-05-09 RX ORDER — LEVALBUTEROL 1.25 MG/.5ML
1.25 SOLUTION, CONCENTRATE RESPIRATORY (INHALATION)
Status: DISCONTINUED | OUTPATIENT
Start: 2018-05-09 | End: 2018-05-10

## 2018-05-09 RX ORDER — LEVALBUTEROL 1.25 MG/.5ML
1.25 SOLUTION, CONCENTRATE RESPIRATORY (INHALATION)
Status: DISCONTINUED | OUTPATIENT
Start: 2018-05-09 | End: 2018-05-09

## 2018-05-09 RX ORDER — SENNOSIDES 8.8 MG/5ML
8.8 LIQUID ORAL
Status: DISCONTINUED | OUTPATIENT
Start: 2018-05-09 | End: 2018-05-10

## 2018-05-09 RX ORDER — METHYLPREDNISOLONE SODIUM SUCCINATE 125 MG/2ML
60 INJECTION, POWDER, LYOPHILIZED, FOR SOLUTION INTRAMUSCULAR; INTRAVENOUS ONCE
Status: COMPLETED | OUTPATIENT
Start: 2018-05-09 | End: 2018-05-09

## 2018-05-09 RX ORDER — PROPOFOL 10 MG/ML
5-50 INJECTION, EMULSION INTRAVENOUS
Status: DISCONTINUED | OUTPATIENT
Start: 2018-05-09 | End: 2018-05-09

## 2018-05-09 RX ADMIN — SODIUM CHLORIDE, SODIUM GLUCONATE, SODIUM ACETATE, POTASSIUM CHLORIDE, MAGNESIUM CHLORIDE, SODIUM PHOSPHATE, DIBASIC, AND POTASSIUM PHOSPHATE 125 ML/HR: .53; .5; .37; .037; .03; .012; .00082 INJECTION, SOLUTION INTRAVENOUS at 01:20

## 2018-05-09 RX ADMIN — SODIUM CHLORIDE, SODIUM GLUCONATE, SODIUM ACETATE, POTASSIUM CHLORIDE, MAGNESIUM CHLORIDE, SODIUM PHOSPHATE, DIBASIC, AND POTASSIUM PHOSPHATE 75 ML/HR: .53; .5; .37; .037; .03; .012; .00082 INJECTION, SOLUTION INTRAVENOUS at 10:01

## 2018-05-09 RX ADMIN — IPRATROPIUM BROMIDE 0.5 MG: 0.5 SOLUTION RESPIRATORY (INHALATION) at 19:19

## 2018-05-09 RX ADMIN — CHLORHEXIDINE GLUCONATE 15 ML: 1.2 RINSE ORAL at 21:03

## 2018-05-09 RX ADMIN — IPRATROPIUM BROMIDE 0.5 MG: 0.5 SOLUTION RESPIRATORY (INHALATION) at 13:04

## 2018-05-09 RX ADMIN — HEPARIN SODIUM 5000 UNITS: 5000 INJECTION, SOLUTION INTRAVENOUS; SUBCUTANEOUS at 21:03

## 2018-05-09 RX ADMIN — HEPARIN SODIUM 5000 UNITS: 5000 INJECTION, SOLUTION INTRAVENOUS; SUBCUTANEOUS at 05:42

## 2018-05-09 RX ADMIN — METHYLPREDNISOLONE SODIUM SUCCINATE 60 MG: 125 INJECTION, POWDER, FOR SOLUTION INTRAMUSCULAR; INTRAVENOUS at 10:55

## 2018-05-09 RX ADMIN — FAMOTIDINE 20 MG: 40 POWDER, FOR SUSPENSION ORAL at 08:27

## 2018-05-09 RX ADMIN — FENTANYL CITRATE 100 MCG/HR: 50 INJECTION INTRAVENOUS at 01:20

## 2018-05-09 RX ADMIN — LEVALBUTEROL HYDROCHLORIDE 1.25 MG: 1.25 SOLUTION, CONCENTRATE RESPIRATORY (INHALATION) at 19:19

## 2018-05-09 RX ADMIN — LEVALBUTEROL HYDROCHLORIDE 1.25 MG: 1.25 SOLUTION, CONCENTRATE RESPIRATORY (INHALATION) at 13:04

## 2018-05-09 RX ADMIN — PROPOFOL 15 MCG/KG/MIN: 10 INJECTION, EMULSION INTRAVENOUS at 01:38

## 2018-05-09 RX ADMIN — CHLORHEXIDINE GLUCONATE 15 ML: 1.2 RINSE ORAL at 08:27

## 2018-05-09 RX ADMIN — HEPARIN SODIUM 5000 UNITS: 5000 INJECTION, SOLUTION INTRAVENOUS; SUBCUTANEOUS at 13:40

## 2018-05-09 NOTE — PLAN OF CARE
Problem: SLP ADULT - SWALLOWING, IMPAIRED  Goal: Initial SLP swallow eval performed  Outcome: Completed Date Met: 05/09/18

## 2018-05-09 NOTE — PLAN OF CARE
CARDIOVASCULAR - ADULT     Maintains optimal cardiac output and hemodynamic stability Progressing     Absence of cardiac dysrhythmias or at baseline rhythm Progressing        DISCHARGE PLANNING - CARE MANAGEMENT     Discharge to post-acute care or home with appropriate resources Progressing        Nutrition/Hydration-ADULT     Nutrient/Hydration intake appropriate for improving, restoring or maintaining nutritional needs Progressing        Potential for Falls     Patient will remain free of falls Progressing        Prexisting or High Potential for Compromised Skin Integrity     Skin integrity is maintained or improved Progressing        RESPIRATORY - ADULT     Achieves optimal ventilation and oxygenation Progressing        SAFETY,RESTRAINT: NV/NON-SELF DESTRUCTIVE BEHAVIOR     Remains free of harm/injury (restraint for non violent/non self-detsructive behavior) Progressing     Returns to optimal restraint-free functioning Progressing

## 2018-05-09 NOTE — RESPIRATORY THERAPY NOTE
RT Ventilator Management Note  Nadir Louis 70 y o  male MRN: [id number]  Unit/Bed#: [facility] 01 Encounter: [id number]      Daily Screen       5/8/2018 0732             Patient safety screen outcome[de-identified] Failed    Not Ready for Weaning due to[de-identified] Underline problem not resolved            Physical Exam:   Assessment Type: Assess only  Bilateral Breath Sounds: Clear      Resp Comments: Pt  doing well on A/C  No changes throughout the night

## 2018-05-09 NOTE — PROGRESS NOTES
Progress Note - Critical Care   Phuc Black 70 y o  male MRN: [id number]  Unit/Bed#: [facility] 01 Encounter: [id number]          ______________________________________________________________________  Assessment  Patient Active Problem List   Diagnosis    Tobacco use    Polycythemia    Weight loss    Angioedema    Severe tongue swelling    Right facial swelling    COPD (chronic obstructive pulmonary disease) ([facility] [address] )    Respiratory failure with hypoxia (HCC)    Bradycardia       Plan:     Neuro: GCS 15, alert and oriented  1  Sedation:  Propofol at 10  2  Pain:  Fentanyl at 100    CV:   1  Bradycardia:  Possibly secondary to sedation and pain medications  He also could be having vagal responses from being mechanically ventilated  Plan to continue telemetry monitoring after extubation for at least 24 hours  Pulm:   1  Vent:  Tubecomp  2  Acute hypoxic respiratory failure:  Secondary to angioedema which appears to be resolved this morning  The patient is ventilating well on minimal vent settings  He has a small cuff leak but does not loose volume when his cuff is deflated  Repeat steroid doses today and plan to extubate  3  COPD:  Patient has wheezing on exam this morning  Continue albuterol nebulizer treatments as needed  GI:   1  Peptic ulcer prophylaxis:  Not indicated, however the patient is on Pepcid b i d  for angioedema   2  Bowel Regimen:  Senna p r n  : Urine output appropriate    F/E/N:   1  Diet:  NPO, bedside swallow evaluation once extubated then initiate regular diet  2  IVF:  Patient has positive 3 5 L since admission, reduce maintenance fluids to 75 an hour  3  Electrolytes:  Normal like lytes on CMP yesterday    ID:  No acute issues    Heme:  No acute issues  1  DVT ppx:  Subcutaneous heparin    Endo:  No acute issues     Msk/Skin: Routine skin care  1  Angioedema:  Continue Pepcid b i d  and steroids  Swelling appears to be resolved at this time    Plan to extubate today     Disposition:  Transfer to [facility] after extubation  Code Status: Level 1 - Full Code    ______________________________________________________________________    Chief Complaint:  Angioedema    24 Hour Events:  Patient was agitated overnight requiring initiation of propofol at 10  His pressure is dropped but remained within normal range after initiation of propofol  He has had some episodes of bradycardia and is baseline in the 50s  Patient denies any acute issues this morning, but states that he wants the tube removed  ______________________________________________________________________    Physical Exam:     Physical Exam   Constitutional: He appears well-developed and well-nourished  HENT:   Head: Normocephalic and atraumatic  Mouth/Throat: Oropharynx is clear and moist    No swelling noted of the tongue or right side of the face  Trachea is midline  On deflation of the patient's ET tube cuff there is a bubbling sound coming from her mouth  With the cuff deflated he is still pulling normal tidal volumes on the ventilator  Eyes: EOM are normal  Pupils are equal, round, and reactive to light  Neck: Normal range of motion  Neck supple  Cardiovascular: Regular rhythm, normal heart sounds and intact distal pulses  Pulmonary/Chest: Effort normal  He has wheezes  Abdominal: Soft  Bowel sounds are normal  There is no tenderness  Musculoskeletal:   Normal  strength in the hands  Patient is moving his legs  Neurological: He is alert  Patient is awake and alert  He is answering questions appropriately with nodding and shaking his head  He is following commands  Skin: Skin is warm and dry  Capillary refill takes less than 2 seconds  Nursing note and vitals reviewed      ______________________________________________________________________  Vitals:    05/09/18 0319 05/09/18 0414 05/09/18 0514 05/09/18 0614   BP:  113/57 113/60 149/73   Pulse:  (!) 46 (!) 50 (!) 46 Resp:  15 14 14   Temp:   98 5 °F (36 9 °C)    TempSrc:   Oral    SpO2: 96% 96% 96% 97%   Weight:       Height:                  Temperature:   Temp (24hrs), Av 5 °F (36 9 °C), Min:98 2 °F (36 8 °C), Max:98 7 °F (37 1 °C)    Current Temperature: 98 5 °F (36 9 °C)  Weights:   IBW: 84 5 kg    Body mass index is 35 74 kg/m²  Weight (last 2 days)     Date/Time   Weight    18 0717  130 (285 94)    18 0430  113 (250)            Hemodynamic Monitoring:  N/A     Non-Invasive/Invasive Ventilation Settings:  Respiratory    Lab Data (Last 4 hours)    None         O2/Vent Data (Last 4 hours)       031 0628         Vent Mode AC/VC Tube Compensation      Resp Rate (BPM) (BPM) 15       Vt (mL) (mL) 500       FIO2 (%) (%) 40       PEEP (cmH2O) (cmH2O) 5       MV 8 8                 No results found for: PHART, JSD9QYX, PO2ART, EPP3CVD, U6GIBBNV, BEART, SOURCE  SpO2: SpO2: 97 %  Intake and Outputs:  I/O       701 -  07 -  0700    I V  (mL/kg)  3977 4 (30 6)    IV Piggyback 1000 1000    Total Intake(mL/kg) 1000 (8 8) 4977 4 (38 3)    Urine (mL/kg/hr)  2180 (0 7)    Emesis/NG output  250 (0 1)    Total Output   2430    Net +1000 +2547 4                Nutrition:        Diet Orders            Start     Ordered    18 0518  Diet NPO  Diet effective now     Question Answer Comment   Diet Type NPO    RD to adjust diet per protocol?  No        18 05          Labs:     Results from last 7 days  Lab Units 18  0501   WBC Thousand/uL 9 81   HEMOGLOBIN g/dL 16 1   HEMATOCRIT % 48 2   PLATELETS Thousands/uL 302   NEUTROS PCT % 50   MONOS PCT % 11       Results from last 7 days  Lab Units 18  0501   SODIUM mmol/L 138   POTASSIUM mmol/L 3 8   CHLORIDE mmol/L 106   CO2 mmol/L 24   BUN mg/dL 18   CREATININE mg/dL 0 99   CALCIUM mg/dL 8 4   TOTAL PROTEIN g/dL 7 3   BILIRUBIN TOTAL mg/dL 0 72   ALK PHOS U/L 128*   ALT U/L 21   AST U/L 22   GLUCOSE RANDOM mg/dL 118         No results found for: PHOS     Results from last 7 days  Lab Units 05/08/18  0501   INR  1 03   PTT seconds 28       0  Lab Value Date/Time   TROPONINI <0 02 05/08/2018 0501       Results from last 7 days  Lab Units 05/08/18  0501   LACTIC ACID mmol/L 2 3*     ABG:No results found for: PHART, QPV8TYX, PO2ART, GJW8RCR, C8ZKLSUH, BEART, SOURCE    Micro:  No results found for: [place], WOUNDCULT, SPUTUMCULTUR  Allergies: No Known Allergies  Medications:   Scheduled Meds:  Current Facility-Administered Medications:  EMS replenish medication  Does not apply Once [employer], DO    albuterol 2 5 mg Nebulization Q4H PRN Mikel Cook MD    chlorhexidine 15 mL Swish & Spit Q12H [address] Kar Padgett DO    famotidine 20 mg Oral BID Lavinia Elias MD    fentaNYL 100 mcg/hr Intravenous Continuous Lavinia Elias MD Last Rate: 100 mcg/hr (05/09/18 0634)   heparin (porcine) 5,000 Units Subcutaneous Q8H [address] Lavinia Elias MD    multi-electrolyte 125 mL/hr Intravenous Continuous Kar DO Dayron Last Rate: 125 mL/hr (05/09/18 0120)   propofol 5-50 mcg/kg/min Intravenous Titrated Charlene Valerie, DO Last Rate: Stopped (05/09/18 7308)     Continuous Infusions:  fentaNYL 100 mcg/hr Last Rate: 100 mcg/hr (05/09/18 0634)   multi-electrolyte 125 mL/hr Last Rate: 125 mL/hr (05/09/18 0120)   propofol 5-50 mcg/kg/min Last Rate: Stopped (05/09/18 0625)     PRN Meds:    albuterol 2 5 mg Q4H PRN     VTE Pharmacologic Prophylaxis: Sequential compression device (Venodyne)  and Heparin  VTE Mechanical Prophylaxis: sequential compression device  Invasive lines and devices:   Invasive Devices     Peripheral Intravenous Line            Peripheral IV 05/08/18 Right Antecubital 1 day    Peripheral IV 05/08/18 Right Hand 1 day          Drain            NG/OG/Enteral Tube Orogastric 18 Fr Right mouth 1 day    Urethral Catheter Temperature probe 16 Fr  1 day          Airway            ETT  Cuffed 8 mm 1 day Portions of the record may have been created with voice recognition software  Occasional wrong word or "sound a like" substitutions may have occurred due to the inherent limitations of voice recognition software  Read the chart carefully and recognize, using context, where substitutions have occurred      Alok Cabrera MD

## 2018-05-09 NOTE — CASE MANAGEMENT
Initial Clinical Review    Admission: Date/Time/Statement: 5/8/18 @ 0511     Orders Placed This Encounter   Procedures    Inpatient Admission (expected length of stay for this patient is greater than two midnights)     Standing Status:   Standing     Number of Occurrences:   1     Order Specific Question:   Admitting Physician     Answer:   Vanessa Drew     Order Specific Question:   Level of Care     Answer:   Critical Care [15]     Order Specific Question:   Estimated length of stay     Answer:   More than 2 Midnights     Order Specific Question:   Certification     Answer:   I certify that inpatient services are medically necessary for this patient for a duration of greater than two midnights  See H&P and MD Progress Notes for additional information about the patient's course of treatment  ED: Date/Time/Mode of Arrival:   ED Arrival Information     Expected Arrival Acuity Means of Arrival Escorted By Service Admission Type    5/8/2018 04:25 5/8/2018 04:25 Immediate Ambulance [facility] EMS Critical Care/ICU Emergency    Arrival Complaint    Tongue Swelling          Chief Complaint:   Chief Complaint   Patient presents with    Facial Swelling     Pt experiencing tongue and right sided facial swelling tonight  NO allergies  History of Illness: This is a 56-year-old male that presents with facial swelling  According to paramedics they were called due to tongue swelling  On arrival no obvious swelling appreciated  They stated via transfer patient has significant swelling of his tongue angioedema along with swelling on the right side of his face  Patient started having voice changes and difficulty breathing  On arrival patient has significant angioedema of the tongue with some dysphonia  He states no prior history of angioedema  Denies any drug use  States he does not have any medical problems and does not take any medications on a regular basis only medical problem is COPD  Patient denies any tick bites or rashes  No fevers or chills  A decision was made to intubate the patient since patient started having difficulty with swallowing and significant worsening of his angioedema  Intubation successfully with glide scope with 8 0 tube  Unsure cause of angioedema  Upon researching of previous medical records no other admissions besides COPD  Patient on exam does have a previous surgical scar from tracheostomy unsure reason for trach  ED Vital Signs:   ED Triage Vitals   Temperature Pulse Respirations Blood Pressure SpO2   05/08/18 0513 05/08/18 0430 05/08/18 0430 05/08/18 0430 05/08/18 0430   99 °F (37 2 °C) 94 18 160/93 95 %      Temp Source Heart Rate Source Patient Position - Orthostatic VS BP Location FiO2 (%)   05/08/18 0513 05/08/18 0430 05/08/18 0430 05/08/18 0430 05/08/18 0500   Probe Monitor Lying Right arm 50      Pain Score       05/08/18 0430       No Pain        Wt Readings from Last 1 Encounters:   05/08/18 130 kg (285 lb 15 oz)       Vital Signs (abnormal):   Date and Time Temp Pulse SpO2 Resp BP   05/08/18 0645 98 2 °F (36 8 °C) 84 97 % 17 158/73   05/08/18 0520 -- 73 96 %  9 141/65   05/08/18 0515 99 °F (37 2 °C) 84 97 % 13 113/57   05/08/18 0449 -- 92 99 % 18  196/120         Abnormal Labs/Diagnostic Test Results: Sed Rate 11, Lactic Acid 2 3, Alk Phos 128, NT-proBNP 168, C4 43 0, CRP 6 2    CT Soft Neck Tissue: Bilateral, right greater than left submandibular and sublingual space edema   Discrete mass is not evident   There is no indication of abscess, venous obstruction or pathologic adenopathy  CXR:      1   Improved congestive changes  2   Satisfactory endotracheal tube placement  EKG: Sinus rhythm with Premature ventricular complexes      ED Treatment:   Medication Administration from 05/08/2018 0425 to 05/08/2018 0701       Date/Time Order Dose Route Action Action by Comments     05/08/2018 0435 ketamine (KETALAR) 10 mg/mL IV use 114 mg 114 mg Intravenous Given Alveta Panhandle, RN      05/08/2018 5829 sodium chloride 0 9 % bolus 1,000 mL 0 mL Intravenous Stopped Jenene Blocker, RN      05/08/2018 0505 sodium chloride 0 9 % bolus 1,000 mL 1,000 mL Intravenous [address] Jenene Blocker, RN      05/08/2018 6302 propofol (DIPRIVAN) 1000 mg in 100 mL infusion (premix) 50 mcg/kg/min Intravenous [address] Jenene Blocker, RN      05/08/2018 0520 propofol (DIPRIVAN) 1000 mg in 100 mL infusion (premix) 50 mcg/kg/min Intravenous Rate/Dose Change Jenene Blocker, RN      05/08/2018 0506 propofol (DIPRIVAN) 1000 mg in 100 mL infusion (premix) 40 mcg/kg/min Intravenous Rate/Dose Change Jenene Blocker, RN      05/08/2018 0454 propofol (DIPRIVAN) 1000 mg in 100 mL infusion (premix) 30 236 mcg/kg/min Intravenous [address] [facility]      05/08/2018 0443 fentanyl citrate (PF) 100 MCG/2ML 100 mcg 100 mcg Intravenous Given Jenene Blocker, RN      05/08/2018 0442 etomidate (AMIDATE) 2 mg/mL injection 20 mg 20 mg Intravenous Given Jenene Blocker, RN      05/08/2018 0542 fentaNYL 1250 mcg in sodium chloride 0 9% 125mL drip 150 mcg/hr Intravenous [address] Jenene Blocker, RN      05/08/2018 0523 LORazepam (ATIVAN) 2 mg/mL injection 1 mg 1 mg Intravenous Given Jenene Blocker, RN      05/08/2018 1448 heparin (porcine) subcutaneous injection 7,500 Units 7,500 Units Subcutaneous Given Jenene Blocker, RN      05/08/2018 0520 propofol (DIPRIVAN) 200 MG/20ML bolus injection 50 mg 50 mg Intravenous Given Jenene Blocker, RN      05/08/2018 0505 propofol (DIPRIVAN) 200 MG/20ML bolus injection 45 mg 45 mg Intravenous Given Jenene Blocker, RN      05/08/2018 0520 fentanyl citrate (PF) 100 MCG/2ML 100 mcg 100 mcg Intravenous Given Jenene Blocker, RN      05/08/2018 1051 sodium chloride 0 9 % bolus 1,000 mL 1,000 mL Intravenous [address] Huma Goldberg, RN      05/08/2018 9506 multi-electrolyte (ISOLYTE-S PH 7 4) bolus 500 mL 500 mL Intravenous New Bag Simba Heath RN      05/08/2018 0630 iohexol (OMNIPAQUE) 350 MG/ML injection (MULTI-DOSE) 100 mL 100 mL Intravenous Given Galindo Kappa      05/08/2018 0625 vecuronium (NORCURON) injection 10 mg 10 mg Intravenous Given Simba Heath RN      05/08/2018 1863 propofol (DIPRIVAN) 200 MG/20ML bolus injection 50 mg 50 mg Intravenous Given Simba Heath RN         Physical Exam   Constitutional: He is oriented to person, place, and time  He appears well-nourished  In distress with difficulty braething     HENT:   Significant angioedema of the tongue  Dysphonia  Swelling on the right side of the face  No signs of Jayden's angina  No signs of trauma  Difficulty with swallowing  Previous scar from tracheostomy incision   Cardiovascular: Normal rate, regular rhythm and normal heart sounds  No murmur heard  Pulmonary/Chest: Effort normal and breath sounds normal  No respiratory distress  He has no wheezes  Abdominal: Soft  Bowel sounds are normal  He exhibits distension  There is no tenderness  Neurological: He is alert and oriented to person, place, and time  Oriented x3     Past Medical/Surgical History: Active Ambulatory Problems     Diagnosis Date Noted    Tobacco use 03/23/2016    Polycythemia 03/23/2016    Weight loss 03/23/2016     Resolved Ambulatory Problems     Diagnosis Date Noted    Shortness of breath 03/23/2016    Acute bronchitis 03/23/2016     Past Medical History:   Diagnosis Date    COPD [facility]chronic obstructive pulmonary disease) ([facility] [address] )        Admitting Diagnosis: Angioedema [T78  3XXA]  Tongue swelling [R22 0]    Age/Sex: 70 y o  male    Assessment/Plan:     Assessment:  57-year-old with angioedema intubated for upper airway swelling         Plan:      Neuro:  GCS 11, nonfocal exam, fentanyl 150 mics per hour as patient was agitated in the ED with along with propofol  Targeting RASS of -1 to 0 continue with frequent sedation breaks    Q 2 hours neuro checks     CV:  Telemetry monitoring, troponin negative, ekg Sinus rhythm with Fusion complexes and Possible Premature atrial complexes with Aberrant conduction  Otherwise normal ECG  When compared with ECG of 24-MAR-2016 11:39,  Fusion complexes are now Present  Questionable change in QRS axis     HEENT:  Follow-up CT neck, acute angioedema with swelling of tongue and face causing upper airway obstruction     Lung:    Acute hypoxic respiratory failure secondary to angioedema upper airway obstruction, continue to trend swelling and wean to extubate  Chest x-ray showed ET tube above arsenio, advance, follow up on chest x-ray  History of COPD unclear prior admission, appears to not follow up with pulmonology  Will start patient on respiratory protocol     GI:  Patient NPO, Protonix for peptic ulcer prophylaxis as patient is intubated     FEN:  Isolate bolus for lactic 2 3, isolate at 125 cc/hr  while patient is NPO  Patient NPO  Replace potassium as needed      :  Creatinine within normal limits no indication for Sorensen continue trach I&O     ID:  Continue monitor fever curve no infectious etiology at this time     Heme:  Angioedema, follow-up compliment studies, heparin for DVT prophylaxis  Polycythemia has resolved from prior admission    No history of allergies on review of records      Endo:  Point of care glucoses q 4 hours while patient is NPO no other history of endocrine abnormalities     Msk/Skin:  PT OT, frequent turning monitor for skin breakdown     Disposition:  ICU management for angioedema     VTE Pharmacologic Prophylaxis: Heparin  VTE Mechanical Prophylaxis: sequential compression device    Given critical illness, patient length of stay will require greater than two midnights        Admission Orders:  Inpatient/Critical Care  Continuous Cardiac Monitoring  Serial Cardiac Enzymes q3h x 3  Bilateral Sequential Compression Device  Intubated with Vent Settings : , FiO2 50%, Peep 5, AC 15  NPO  Isolyte-S pH 7 4 @ 75  IV Propofol Drip to Titration  IV Fentanyl Drip to Titration  Scheduled Meds:   Current Facility-Administered Medications:  chlorhexidine 15 mL Swish & Spit Q12H [address]   famotidine 20 mg Oral BID   heparin (porcine) 5,000 Units Subcutaneous Q8H [address]   ipratropium 0 5 mg Nebulization Q6H   levalbuterol 1 25 mg Nebulization Q6H   [START ON 5/10/2018] methylPREDNISolone sodium succinate 40 mg Intravenous Daily   senna 8 8 mg Oral HS

## 2018-05-09 NOTE — RESPIRATORY THERAPY NOTE
RT Ventilator Management Note  Saw Jimenes 70 y o  male MRN: [id number]  [facility] En[facility]: [id number]      Daily Screen       5/9/2018 0841 5/9/2018 1057          Patient safety screen outcome[de-identified] Passed Passed      Spont breathing trial % for 30 min: Yes -      Spont breathing trial outcome[de-identified] Passed -      Name of Medical Team Notified[de-identified] - [place]      Preparing to extubate/ Notify Nurse: - Yes      Extubation order obtained: - Yes      Consider Cuff Test: - Yes      Patient extubated: - Yes      RSBI: 35 -              Physical Exam:   Assessment Type: Assess only  Bilateral Breath Sounds: Clear      Resp Comments: pt extubated to nc without incident  No stridor   UDN tx's ordered per Dr Yobany Walker

## 2018-05-09 NOTE — RESPIRATORY THERAPY NOTE
RT Ventilator Management Note  Marty Montoya 70 y o  male MRN: [id number]  Unit/Bed#: [facility] 01 Encounter: [id number]      Daily Screen       5/8/2018 0732 5/9/2018 0841          Patient safety screen outcome[de-identified] Failed Passed      Not Ready for Weaning due to[de-identified] Underline problem not resolved -      Spont breathing trial % for 30 min: - Yes      Spont breathing trial outcome[de-identified] - Passed      RSBI: - 35              Physical Exam:   Assessment Type: Assess only  Bilateral Breath Sounds: Clear      Resp Comments: Pt appears comfortable on tube comp   BS clear diminished

## 2018-05-09 NOTE — SPEECH THERAPY NOTE
Speech Language/Pathology  Speech/Language Pathology  Assessment    Patient Name: Rupali Blair  YSQQR'S Date: 5/9/2018     Problem List  Patient Active Problem List   Diagnosis    Tobacco use    Polycythemia    Weight loss    Angioedema    Severe tongue swelling    Right facial swelling    [facility]chronic obstructive pulmonary disease) ([facility] [address] [facility]    Respiratory failure with hypoxia (HCC)    Bradycardia     Past Medical History  Past Medical History:   Diagnosis Date    COPD (chronic obstructive pulmonary disease) ([facility] [address] )      Past Surgical History  No past surgical history on file  Rupali Blair is a 70 y o  male who presents from home  Patient has a history of COPD, patient was admitted in 2016 and states he has stop smoking at that time  Patient called EMS as he began to experience throat tightness  Patient rapidly progressed during EMS transport to the emergency department  According to reports by emergency department providers, patient had tongue swelling and was unable to handle his secretions  Patient was intubated for airway protection  Patient has swelling of his right side of his face  On my evaluation patient is intubated with copious secretions  Patient moves all extremities symmetrically  Patient has a swollen tongue and right-sided facial swelling  Patient received multiple rounds of epinephrine by EMS with no improvement  Patient received Solu-Medrol by EMS  Of note patient had facial swelling and tongue swelling without wheezing no rashes no reports of nausea with vomiting  On review of records, patient was admitted in 2016 for acute bronchitis with COPD  On review of records patient does not have any home medications  History is limited secondary to patient's intubation secondary to angioedema  Review of systems unobtainable  On review of records patient does not have allergies      Pt failed RN dysphagia screen due to hoarse vocal quality s/p extubation    Summary:  Pt presents w/ mild pharyngeal dysphagia characterized by overt coughing c thin liquids  Pt tolerated trials of puree and hard solid rice krispie with adequate mastication/manipulation c good bolus control and clearance  Pt tolerated sips of NTl via straw sips c prompt AP transfer and swallow initiation  When trialing thin water, pt noted to have persistent cough response despite attempts at small single sips  Educated pt on recommended NTL, pt expressed understanding and agreeable  Recommendations:  Diet: regular  Liquid: NTL  Meds: whole in applesauce  Supervision: set up, assist as needed  Positioning:Upright  Strategies: Pt to take PO/Meds only when fully alert and upright  Oral care: encourage twice daily brushing  Aspiration precautions    Therapy Prognosis: good  Prognosis considerations: prior level of function, comorbidities   Frequency: 2-3x/week    Consider consult w/:  Nutrition      Reason for consult:  Determine safest and least restrictive diet  Failed nursing dysphagia assessment    Current diet:  NPO  Premorbid diet[de-identified]  Reg/thin  Previous VBS:  No  O2 requirement:  4L NC  Voice/Speech:  Mildly hoarse/speech WNL  Social:  Lives at home  Follows commands:    yes                      Cognitive Status:  intact  Oral mech exam:  Upper plate  Lower dentition  Full symmetry    Items administered:  Puree, hard solid, nectar thick liquid, thin liquids  Liquids were taken by straw       Oral stage:  Lip closure: complete  Mastication: adequate  Bolus formation: adequate  Bolus control: adequate  Transfer: prompt  Oral residue: no  Pocketing: no    Pharyngeal stage:  Swallow promptness: appeared prompt  Laryngeal rise: adequate  Wet voice: no  Throat clear: no  Cough: yes,c  Thin liquid  Secondary swallows: no  Audible swallows: no  No s/s aspiration: puree, hard solid, NTL, ice chips    Esophageal stage:  No s/s reported    Aspiration precautions posted    Results d/w:  Pt, nursing    Goal(s):  Pt will tolerate least restrictive diet w/out s/s aspiration or oral/pharyngeal difficulties

## 2018-05-10 PROBLEM — R22.0 SEVERE TONGUE SWELLING: Status: RESOLVED | Noted: 2018-05-08 | Resolved: 2018-05-10

## 2018-05-10 PROBLEM — J96.91 RESPIRATORY FAILURE WITH HYPOXIA (HCC): Status: RESOLVED | Noted: 2018-05-08 | Resolved: 2018-05-10

## 2018-05-10 PROBLEM — R22.0 RIGHT FACIAL SWELLING: Status: RESOLVED | Noted: 2018-05-08 | Resolved: 2018-05-10

## 2018-05-10 PROCEDURE — 94640 AIRWAY INHALATION TREATMENT: CPT | Performed by: SOCIAL WORKER

## 2018-05-10 PROCEDURE — 94760 N-INVAS EAR/PLS OXIMETRY 1: CPT

## 2018-05-10 PROCEDURE — 94640 AIRWAY INHALATION TREATMENT: CPT

## 2018-05-10 PROCEDURE — 92526 ORAL FUNCTION THERAPY: CPT

## 2018-05-10 PROCEDURE — 99233 SBSQ HOSP IP/OBS HIGH 50: CPT | Performed by: INTERNAL MEDICINE

## 2018-05-10 RX ORDER — LEVALBUTEROL 1.25 MG/.5ML
1.25 SOLUTION, CONCENTRATE RESPIRATORY (INHALATION)
Status: DISCONTINUED | OUTPATIENT
Start: 2018-05-10 | End: 2018-05-11 | Stop reason: HOSPADM

## 2018-05-10 RX ORDER — PREDNISONE 20 MG/1
20 TABLET ORAL DAILY
Status: DISCONTINUED | OUTPATIENT
Start: 2018-05-10 | End: 2018-05-11 | Stop reason: HOSPADM

## 2018-05-10 RX ORDER — POLYETHYLENE GLYCOL 3350 17 G/17G
17 POWDER, FOR SOLUTION ORAL DAILY PRN
Status: DISCONTINUED | OUTPATIENT
Start: 2018-05-10 | End: 2018-05-11 | Stop reason: HOSPADM

## 2018-05-10 RX ORDER — FAMOTIDINE 20 MG/1
20 TABLET, FILM COATED ORAL 2 TIMES DAILY
Status: DISCONTINUED | OUTPATIENT
Start: 2018-05-10 | End: 2018-05-10

## 2018-05-10 RX ADMIN — LEVALBUTEROL HYDROCHLORIDE 1.25 MG: 1.25 SOLUTION, CONCENTRATE RESPIRATORY (INHALATION) at 00:42

## 2018-05-10 RX ADMIN — LEVALBUTEROL HYDROCHLORIDE 1.25 MG: 1.25 SOLUTION, CONCENTRATE RESPIRATORY (INHALATION) at 19:12

## 2018-05-10 RX ADMIN — HEPARIN SODIUM 5000 UNITS: 5000 INJECTION, SOLUTION INTRAVENOUS; SUBCUTANEOUS at 13:34

## 2018-05-10 RX ADMIN — FAMOTIDINE 20 MG: 20 TABLET ORAL at 09:51

## 2018-05-10 RX ADMIN — IPRATROPIUM BROMIDE 0.5 MG: 0.5 SOLUTION RESPIRATORY (INHALATION) at 07:09

## 2018-05-10 RX ADMIN — LEVALBUTEROL HYDROCHLORIDE 1.25 MG: 1.25 SOLUTION, CONCENTRATE RESPIRATORY (INHALATION) at 13:45

## 2018-05-10 RX ADMIN — IPRATROPIUM BROMIDE 0.5 MG: 0.5 SOLUTION RESPIRATORY (INHALATION) at 00:42

## 2018-05-10 RX ADMIN — CHLORHEXIDINE GLUCONATE 15 ML: 1.2 RINSE ORAL at 21:31

## 2018-05-10 RX ADMIN — IPRATROPIUM BROMIDE 0.5 MG: 0.5 SOLUTION RESPIRATORY (INHALATION) at 19:12

## 2018-05-10 RX ADMIN — HEPARIN SODIUM 5000 UNITS: 5000 INJECTION, SOLUTION INTRAVENOUS; SUBCUTANEOUS at 07:33

## 2018-05-10 RX ADMIN — HEPARIN SODIUM 5000 UNITS: 5000 INJECTION, SOLUTION INTRAVENOUS; SUBCUTANEOUS at 21:31

## 2018-05-10 RX ADMIN — IPRATROPIUM BROMIDE 0.5 MG: 0.5 SOLUTION RESPIRATORY (INHALATION) at 13:45

## 2018-05-10 RX ADMIN — LEVALBUTEROL HYDROCHLORIDE 1.25 MG: 1.25 SOLUTION, CONCENTRATE RESPIRATORY (INHALATION) at 07:09

## 2018-05-10 NOTE — PLAN OF CARE
Problem: SLP ADULT - SWALLOWING, IMPAIRED  Goal: Advance to least restrictive diet without signs or symptoms of aspiration for planned discharge setting  See evaluation for individualized goals    Outcome: Completed Date Met: 05/10/18

## 2018-05-10 NOTE — PROGRESS NOTES
Progress Note - Critical Care   Savanah Cool 70 y o  male MRN: [id number]  Unit/Bed#: MICU 02 Encounter: [id number]          ______________________________________________________________________  Assessment  Patient Active Problem List   Diagnosis    Tobacco use    Polycythemia    Weight loss    Angioedema    Severe tongue swelling    Right facial swelling    COPD (chronic obstructive pulmonary disease) ([address] )    Respiratory failure with hypoxia (HCC)    Bradycardia       Plan:     Neuro:  Alert and oriented x3    CV:   1  Bradycardia:  Patient dropped as low as 43 while sleeping last night  Blood pressure has remained stable throughout this time  Pulm:   1  COPD:  Mild wheezing on exam this morning  Patient was diagnosed with COPD 2 years ago and has quit smoking since that time  Continue nebulizer treatments  Patient will need a pulmonology consult before discharge to establish follow-up care  GI:   1  Peptic ulcer prophylaxis:  Not indicated, patient is on twice daily for mode in for his angioedema  2  Bowel Regimen:  P r n  MiraLax    : Urine output appropriate, no acute issues    F/E/N:   1  Diet:  Patient evaluated by speech therapy yesterday for mild dysphagia  They recommend a regular diet with nectar thickened liquids  2  IVF:  None    ID:  No acute issues    Heme:  No acute issues  1  DVT ppx:  Subcutaneous heparin    Endo:  No acute issues     Msk/Skin: Routine skin care  1  Angioedema:  Patient denies any use of any medications or any history of angioedema in the past   He denies any new foods  He denies any history of allergies or anaphylaxis  He was a woken in the middle the night with swelling and pain of his tongue  He denies any wheezing or chest tightness at this time  He did not have any rashes  He did not have any GI upset  From his history given it is difficult to identify any inciting factors to cause angioedema    His lab work is relatively unremarkable and not suggestive of hereditary angioedema  His causes still unclear  Due to this we will continue him on a prednisone taper  Disposition:  Transfer to [facility] [place]    Code Status: Level 1 - Full Code    ______________________________________________________________________    Chief Complaint:  Angioedema    24 Hour Events:  Patient was extubated yesterday without issue  He failed a swallowing evaluation by his nurse due to persistent cough with attempts at drinking  Speech and swallow evaluated him and noted mild dysphagia with thin liquids  They recommend a regular diet with nectar thickened liquids at this time  He had 1 episode of bradycardia as low as 43 beats per minute while he was sleeping overnight but no other acute issues  This morning the patient denies any complaints  He provided a more thorough HPI and said that he was a woken from sleep with pain in his and swelling of his tongue  He says he felt the swelling in his throat as well  He denied any chest pain, chest tightness, or wheezing  He did not have any rashes  He did not have any GI upset or nausea  He has no previous history of similar symptoms, denies any allergies, denies any history of anaphylaxis, and does not take any medications on a regular basis  He has not had any new exposures over the previous day or new foods  He is unaware of any family history of angioedema       ______________________________________________________________________    Physical Exam:     Physical Exam   Constitutional: He is oriented to person, place, and time  He appears well-developed and well-nourished  HENT:   Head: Normocephalic and atraumatic  Eyes: EOM are normal  Pupils are equal, round, and reactive to light  Neck: Normal range of motion  Neck supple  Cardiovascular: Normal rate, regular rhythm, normal heart sounds and intact distal pulses  Pulmonary/Chest: Effort normal and breath sounds normal    Abdominal: Soft   Bowel sounds are normal    Musculoskeletal: Normal range of motion  He exhibits no edema  Neurological: He is alert and oriented to person, place, and time  No cranial nerve deficit  Skin: Skin is warm and dry  Capillary refill takes less than 2 seconds  Nursing note and vitals reviewed  ______________________________________________________________________  Vitals:    05/10/18 0373 05/10/18 0654 05/10/18 0709 05/10/18 0733   BP: (!) 121/45 144/65     BP Location:       Pulse: 72 56 77    Resp:   19    Temp:    97 7 °F (36 5 °C)   TempSrc:    Oral   SpO2: 90% 90% 93%    Weight:       Height:                  Temperature:   Temp (24hrs), Av 2 °F (36 8 °C), Min:97 7 °F (36 5 °C), Max:98 9 °F (37 2 °C)    Current Temperature: 97 7 °F (36 5 °C)  Weights:   IBW: 84 5 kg    Body mass index is 35 74 kg/m²  Weight (last 2 days)     Date/Time   Weight    18 0717  130 (285 94)    18 0430  113 (250)            Hemodynamic Monitoring:  N/A     Non-Invasive/Invasive Ventilation Settings:  Respiratory    Lab Data (Last 4 hours)    None         O2/Vent Data (Last 4 hours)    None              No results found for: PHART, PZL6ECB, PO2ART, HRT8UGO, V5XBIMOD, BEART, SOURCE  SpO2: SpO2: 93 %  Intake and Outputs:  I/O        07 -  0700  07 - 05/10 0700 05/10 07 -  0700    P  O   960     I V  (mL/kg) 3977 4 (30 6) 1069 1 (8 2)     IV Piggyback 1000      Total Intake(mL/kg) 4977 4 (38 3) 2029 1 (15 6)     Urine (mL/kg/hr) 2180 (0 7) 2585 (0 8)     Emesis/NG output 250 (0 1)      Stool  0 (0)     Total Output 2430 2585      Net +2547 4 -555 9             Unmeasured Stool Occurrence  1 x           Nutrition:        Diet Orders            Start     Ordered    18 1541  Diet Regular; Regular House; Nectar Thick Liquid  Diet effective now     Question Answer Comment   Diet Type Regular    Regular Regular House    Other Restriction(s): Nectar Thick Liquid    RD to adjust diet per protocol?  Yes 05/09/18 1540          Labs:     Results from last 7 days  Lab Units 05/08/18  0501   WBC Thousand/uL 9 81   HEMOGLOBIN g/dL 16 1   HEMATOCRIT % 48 2   PLATELETS Thousands/uL 302   NEUTROS PCT % 50   MONOS PCT % 11       Results from last 7 days  Lab Units 05/08/18  0501   SODIUM mmol/L 138   POTASSIUM mmol/L 3 8   CHLORIDE mmol/L 106   CO2 mmol/L 24   BUN mg/dL 18   CREATININE mg/dL 0 99   CALCIUM mg/dL 8 4   TOTAL PROTEIN g/dL 7 3   BILIRUBIN TOTAL mg/dL 0 72   ALK PHOS U/L 128*   ALT U/L 21   AST U/L 22   GLUCOSE RANDOM mg/dL 118         No results found for: PHOS     Results from last 7 days  Lab Units 05/08/18  0501   INR  1 03   PTT seconds 28       0  Lab Value Date/Time   TROPONINI <0 02 05/08/2018 0501       Results from last 7 days  Lab Units 05/08/18  0501   LACTIC ACID mmol/L 2 3*     ABG:No results found for: PHART, NBJ4ULB, PO2ART, MET4TML, Q9UQTPMY, BEART, SOURCE    Micro:  Lab Results   Component Value Date    BLOODCX No Growth at 24 hrs  05/08/2018    BLOODCX No Growth at 24 hrs  05/08/2018     Allergies: No Known Allergies  Medications:   Scheduled Meds:  Current Facility-Administered Medications:  EMS replenish medication  Does not apply Once [employer], DO   chlorhexidine 15 mL Swish & Spit Q12H [address] Kar Padgett, DO   famotidine 20 mg Oral BID Omega Vann MD   heparin (porcine) 5,000 Units Subcutaneous Q8H [address] Omega Vann MD   ipratropium 0 5 mg Nebulization Q6H Yue Castillo MD   levalbuterol 1 25 mg Nebulization Q6H Yue Castillo MD   polyethylene glycol 17 g Oral Daily PRN Omega Vann MD   predniSONE 20 mg Oral Daily Omega Vann MD     Continuous Infusions:   PRN Meds:    polyethylene glycol 17 g Daily PRN     VTE Pharmacologic Prophylaxis: Sequential compression device (Venodyne)  and Heparin  VTE Mechanical Prophylaxis: sequential compression device  Invasive lines and devices:   Invasive Devices     Peripheral Intravenous Line            Peripheral IV 05/08/18 Right Antecubital 2 days    Peripheral IV 05/08/18 Right Hand 2 days                     Portions of the record may have been created with voice recognition software  Occasional wrong word or "sound a like" substitutions may have occurred due to the inherent limitations of voice recognition software  Read the chart carefully and recognize, using context, where substitutions have occurred      Parish Valle MD

## 2018-05-10 NOTE — PROGRESS NOTES
Progress Note - ICU Transfer to Step down/med  surg  Zenobia Pedraza Longenour 70 y o  male MRN: [id number]    Unit/Bed#: MICU 02 Encounter: [id number]      Code Status: Level 1 - Full Code    Date of ICU admission: 5/8/18     Reason for ICU adm: Angioedema    Active problems:   Patient Active Problem List   Diagnosis    Tobacco use    Polycythemia    Weight loss    Angioedema    COPD (chronic obstructive pulmonary disease) (HCC)    Bradycardia       Summary of clinical course: 68yoM with presumed COPD who woke from sleep on in the morning on 5/8 with tongue pain and swelling  He says he felt the swelling in his throat as well  He denied any chest pain, chest tightness, or wheezing  He did not have any rashes  He did not have any GI upset or nausea  He has no previous history of similar symptoms, denies any allergies, denies any history of anaphylaxis, and does not take any medications on a regular basis  He has not had any new exposures over the previous day or new foods  He is unaware of any family history of angioedema  Intubated in the ED for airway compromise  Treated with steroids and famotidine  Swelling resolved within 24hrs dn he was extubated on 5/9  Of note he has been persistently bradycardic in the 50ssince admission with stable BP  Today his pulse has increased while awake to the 60-80s  Recent or scheduled procedures: Extubated 5/9    Outstanding/pending diagnostics: none    Hospital discharge planning: Will need pulmonology follow-up  Will need case management to help with discharge planning  He says he is unable to attend f/u appointments due to lack of transportation  Needs an EpiPen on d/c

## 2018-05-10 NOTE — SPEECH THERAPY NOTE
Speech Language/Pathology    Speech/Language Pathology Progress Note    Patient Name: Agustín RANKIN'S Date: 5/10/2018       Subjective:  "I told them I needed to drink the thick liquids at lunch "    Objective:  Pt seen for f/u dysphagia tx at lunch  Current diet:  Regular with nectar thick    Hoarseness improved  Pt observed eating an opened face turkey sandwich, green beans, cheesecake, and drinking thin liquids x 8 oz  Adequate mastication and transfer  Swallow was prompt  No overt s/s aspiration  Assessment:  Oropharyngeal swallow in WNLs  No s/s aspiration observed  Plan/Recommendations:  Upgrade diet to regular with thin liquids  D/c speech therapy

## 2018-05-11 VITALS
BODY MASS INDEX: 35.77 KG/M2 | SYSTOLIC BLOOD PRESSURE: 167 MMHG | OXYGEN SATURATION: 94 % | RESPIRATION RATE: 16 BRPM | DIASTOLIC BLOOD PRESSURE: 83 MMHG | HEART RATE: 68 BPM | WEIGHT: 287.7 LBS | TEMPERATURE: 97.8 F | HEIGHT: 75 IN

## 2018-05-11 PROCEDURE — 94760 N-INVAS EAR/PLS OXIMETRY 1: CPT

## 2018-05-11 PROCEDURE — 94640 AIRWAY INHALATION TREATMENT: CPT

## 2018-05-11 PROCEDURE — 99238 HOSP IP/OBS DSCHRG MGMT 30/<: CPT | Performed by: INTERNAL MEDICINE

## 2018-05-11 RX ORDER — EPINEPHRINE 0.3 MG/.3ML
0.3 INJECTION SUBCUTANEOUS ONCE AS NEEDED
Qty: 0.3 ML | Refills: 0 | Status: SHIPPED | OUTPATIENT
Start: 2018-05-11

## 2018-05-11 RX ORDER — PREDNISONE 20 MG/1
20 TABLET ORAL DAILY
Qty: 7 TABLET | Refills: 0 | Status: SHIPPED | OUTPATIENT
Start: 2018-05-11 | End: 2018-05-18

## 2018-05-11 RX ORDER — CETIRIZINE HYDROCHLORIDE 10 MG/1
10 TABLET, CHEWABLE ORAL 2 TIMES DAILY
Qty: 60 TABLET | Refills: 0 | Status: SHIPPED | OUTPATIENT
Start: 2018-05-18 | End: 2018-06-17

## 2018-05-11 RX ORDER — DESLORATADINE 5 MG/1
5 TABLET ORAL 2 TIMES DAILY
Qty: 60 TABLET | Refills: 0 | Status: SHIPPED | OUTPATIENT
Start: 2018-05-11 | End: 2018-05-11

## 2018-05-11 RX ORDER — CETIRIZINE HYDROCHLORIDE 10 MG/1
10 TABLET, CHEWABLE ORAL 2 TIMES DAILY
Qty: 60 TABLET | Refills: 0 | Status: SHIPPED | OUTPATIENT
Start: 2018-05-11 | End: 2018-05-11

## 2018-05-11 RX ORDER — DESLORATADINE 5 MG/1
5 TABLET ORAL 2 TIMES DAILY
Qty: 14 TABLET | Refills: 0 | Status: SHIPPED | OUTPATIENT
Start: 2018-05-11 | End: 2018-05-18

## 2018-05-11 RX ADMIN — PREDNISONE 20 MG: 20 TABLET ORAL at 09:01

## 2018-05-11 RX ADMIN — IPRATROPIUM BROMIDE 0.5 MG: 0.5 SOLUTION RESPIRATORY (INHALATION) at 06:56

## 2018-05-11 RX ADMIN — HEPARIN SODIUM 5000 UNITS: 5000 INJECTION, SOLUTION INTRAVENOUS; SUBCUTANEOUS at 05:29

## 2018-05-11 RX ADMIN — LEVALBUTEROL HYDROCHLORIDE 1.25 MG: 1.25 SOLUTION, CONCENTRATE RESPIRATORY (INHALATION) at 06:56

## 2018-05-11 NOTE — PLAN OF CARE

## 2018-05-11 NOTE — PROGRESS NOTES
[facility] Transfer Note  Unit/Bed # @DBLINK ([facility],20860)@ Encounter: [id number]  SOD Team A          Raphael Evans 70 y o  male [id number]      Active Hospital Problems: Principal Problem:    Angioedema  Active Problems:    COPD (chronic obstructive pulmonary disease) (HCC)    Bradycardia      Angioedema  Patient is status post extubation on 05/09 for angioedema, intubated secondary to failure to protect airway  Unclear cause of his angioedema, labs do not suggest hereditary component  Patient denies ever having angioedema in the past   Patient denied any unusual food intake at that time  He reports he noticed it when he is lying in bed at home  He is not on any medications besides an inhaler for his COPD for which she is unaware of which medication it is  At this time patient's swelling has resolved and he is stable     -will continue to monitor  -continue prednisone taper currently 20 mg daily  -continue nectar thick diet    COPD  No PFTs on record, patient denies having a PCP has not seen a doctor in years, patient smokes 6 cigars daily for 50 years he quit 2 years ago  Unclear which inhaler he takes at home as a home medication, patient currently stable without exacerbation, satting well on room air, no wheezes on exam   -continue Xopenex and Atrovent  -will require outpatient follow-up and PFTs    Asymptomatic bradycardia  Patient found to be bradycardic mostly well as sleep down to the 50s, he is asymptomatic at that time his blood pressure remained stable  Patient not beta blocked at home  -  Will continue to monitor on telemetry            VTE Pharmacologic Prophylaxis: Heparin  VTE Mechanical Prophylaxis: sequential compression device    Disposition: Patient requires Med/Surg with Telemetry    Hospital Course:    Patient is 70 year male with history of COPD and asymptomatic bradycardia who was admitted for throat tightness    Patient reported tongue swelling and he was unable to protect his airway are handle his secretions, patient was intubated upon arrival to the ED  At that time patient had right-sided swelling on his face  Patient received Solu-Medrol in route by EMS  Patient had no rashes or wheezing at that time  Patient was admitted to the ICU for acute hypoxic respiratory failure secondary to angioedema of unknown etiology Patient's lab work inconsistent with hereditary angioedema  In the ICU patient was maintained on Solu-Medrol IV  Patient has a history of COPD for which he reports quitting smoking 2 years ago  Patient stabilize in was extubated May 9, 2018  Was continued on prednisone taper, patient found to be bradycardic during his ICU stay, when discussing this with patient he was asymptomatic and reports slow heart rate at home, his blood pressure was stable during this time, bradycardia occurs mostly when patient asleep, when awake pulse 60-80  After extubation, patient is stable for transfer to [facility]  Patient interview today, denies any fever, chills, dysphagia, swelling, numbness, tingling, chest pain, shortness of breath, lightheadedness, fatigue, weakness  Patient reports a history of COPD for which she takes 1 inhaler daily, he is unaware of the name of the inhaler  Patient reports a 50 pack year history of smoking for which he smokes 6 cigars daily during that time  He does report quitting 2 years ago  Patient denies any other past medical history including hypertension, diabetes, CAD  Patient never experiences chest pain at home  Patient reports being able to walk roughly 1 block and climb 1 flight of stairs after which he experiences dyspnea secondary to his COPD  Patient denies any allergies to any medications, denies any past surgeries, reports social alcohol use and denies any significant family history that he is aware  Subjective: At this time patient is stable, he denies any chest pain, dyspnea, swelling, trouble breathing, fever, chills  Patient's acute symptoms have resolved  Objective:    Vitals:    05/10/18 1200 05/10/18 1500 05/10/18 1918 05/10/18 1930   BP: 144/69 (!) 176/90 149/82    BP Location: Left arm Left arm Left arm    Pulse: 82 84  72   Resp: 18 18 18 16   Temp: 98 7 °F (37 1 °C) 98 4 °F (36 9 °C) 97 9 °F (36 6 °C)    TempSrc: Oral Oral Oral    SpO2: 92% 93% 91% 96%   Weight:       Height:         I/O last 24 hours:   In: 1425 [P O :1425]  Out: 2210 [Urine:2210]    /82 (BP Location: Left arm)   Pulse 72   Temp 97 9 °F (36 6 °C) (Oral)   Resp 16   Ht 6' 3" (1 905 m)   Wt 130 kg (285 lb 15 oz)   SpO2 96%   BMI 35 74 kg/m²   General appearance: alert and oriented, in no acute distress  Lungs: clear to auscultation bilaterally  Chest wall: no tenderness  Heart: regular rate and rhythm, S1, S2 normal, no murmur, click, rub or gallop  Extremities: no edema, redness or tenderness in the calves or thighs  Neurologic: Grossly normal      Ermias Kiran MD

## 2018-05-11 NOTE — SOCIAL WORK
Informed by Dr Magnolia Felix that pt will be discharged to home and that pt stated that he does not have insurance to cover the cost of the medications  Prescription received and sent to [facility] to have pt's copay cost checked  Received a phone call from [facility]  GEMMA from [address] [facility] who stated that the total cost of the medications with a discount card is $533 71  The breakdown of the total cost is:  EpiPen - $291 75  Clarinex - $236 33 for 30 days and $57 39 for 7 days  Prednisone - $5 63 for 7 days  Cm researched discount coupons and found coupons for Clarinex for $27 28 at [facility] and Prednisone for $4 85 at [facility] - both on [facility]  CM met with pt to discuss above costs  Explained total cost for medications to be filled at [facility] and also to the cost at [facility]  Pt stated that he does not have any money to pay for his medications today  He stated that he gets paid next week and can get the medications then  Informed him of the need to get medications today for his discharge  CM contacted CM  Marcelino Cloud to discuss the pt not being able to afford his medications  She approved the EpiPen, 7 days of Clarinex and Prednisone through the Indigent program   Financial Assistance form completed and signed by JESSICA Rhodes  Completed form faxed to [facility] and requested medications be delivered to pt's bedside  Cm spoke with Dr Michael Guardado to inform of above  Informed him that pt will only be receiving a 7 day supply of the medications prescribed at d/c  Requested Dr Michael Guardado arrange pt's follow up appt at the [address] [place] [address] to be seen within 1 week of d/c  He stated that he will contact the clinic to arrange for pt's follow up appt  Cm informed LILIA Santana at [facility] of pt and the need to follow up with pt to ensure that he is able to afford his medications      CM provided pt with Clarinex prescription and Coupon for [facility] to fill once he gets paid  [facility] discount card provided to pt

## 2018-05-11 NOTE — PLAN OF CARE
CARDIOVASCULAR - ADULT     Maintains optimal cardiac output and hemodynamic stability Progressing     Absence of cardiac dysrhythmias or at baseline rhythm Progressing        DISCHARGE PLANNING - CARE MANAGEMENT     Discharge to post-acute care or home with appropriate resources Progressing        Nutrition/Hydration-ADULT     Nutrient/Hydration intake appropriate for improving, restoring or maintaining nutritional needs Progressing        Potential for Falls     Patient will remain free of falls Progressing        Prexisting or High Potential for Compromised Skin Integrity     Skin integrity is maintained or improved Progressing        RESPIRATORY - ADULT     Achieves optimal ventilation and oxygenation Progressing

## 2018-05-11 NOTE — RESTORATIVE TECHNICIAN NOTE
Restorative Specialist Mobility Note       Activity: Ambulate in fink, Ambulate in room, Bathroom privileges, Chair, Stand at bedside (Educated/encouraged pt to ambulate with assistance 3-4 x's/day   Pt callbell, phone/tray within reach )     Assistive Device: None       Nicolasa BARTH, Restorative Technician, [employer]

## 2018-05-11 NOTE — DISCHARGE INSTRUCTIONS
Insturctions:  - if you have any airway difficulty or your throat is closing up as he did previously, call 911 and use her EpiPen  - for now take 7 days of clarinex as we can provide that for free, then you will need to buy certrizine (I have given you a prescription for that but it is over the counter)  - take your prednisone 20mg daily for 7 days  - please follow up with us on your appointment date May 21, 9am at [facility]:   Angioedema  is sudden swelling caused by fluid that collects in deep layers of the skin  Swelling occurs most often on the face, lips, tongue, or throat, but it can happen anywhere in the body  Common signs and symptoms:  Skin swelling may be the only symptom  Swelling may be on one or both sides of the affected area  You may also have any of the following:  · Pain and burning in the swollen area     · Hives or an itchy rash    · A cough, wheezing, and shortness of breath    · Irritated eyes and nose    · Abdominal pain  Call 911 for signs or symptoms of anaphylaxis,  such as trouble breathing, swelling in your mouth or throat, or wheezing  You may also have itching, a rash, hives, or feel like you are going to faint  Seek care immediately if:   · You have sudden behavior changes or irritability  · You are dizzy and your heart is beating faster than usual   Contact your healthcare provider if:   · Your swelling does not improve, even after you take your medicines  · You have questions or concerns about your condition or care  Steps to take for signs or symptoms of anaphylaxis:   · Immediately  give 1 shot of epinephrine only into the outer thigh muscle  · Leave the shot in place  as directed  Your healthcare provider may recommend you leave it in place for up to 10 seconds before you remove it  This helps make sure all of the epinephrine is delivered       · Call 911 and go to the emergency department,  even if the shot improved symptoms  Do not drive yourself  Bring the used epinephrine shot with you  Treatment:  Angioedema usually goes away within 3 days without treatment, but it may come back  You may need any of the following:  · Antihistamines  decrease symptoms such as itching or a rash  · Epinephrine  is medicine used to treat severe allergic reactions such as anaphylaxis  · Steroids: This medicine may be given to decrease inflammation  Safety precautions to take if you are at risk for anaphylaxis:   · Keep 2 shots of epinephrine with you at all times  You may need a second shot, because epinephrine only works for about 20 minutes and symptoms may return  Your healthcare provider can show you and family members how to give the shot  Check the expiration date every month and replace it before it expires  · Create an action plan  Your healthcare provider can help you create a written plan that explains the allergy and an emergency plan to treat a reaction  The plan explains when to give a second epinephrine shot if symptoms return or do not improve after the first  Give copies of the action plan and emergency instructions to family members, work and school staff, and  providers  Show them how to give a shot of epinephrine  · Be careful when you exercise  If you have had exercise-induced anaphylaxis, do not exercise right after you eat  Stop exercising right away if you start to develop any signs or symptoms of anaphylaxis  You may first feel tired, warm, or have itchy skin  Hives, swelling, and severe breathing problems may develop if you continue to exercise  · Carry medical alert identification  Wear medical alert jewelry or carry a card that explains the allergy  Ask your healthcare provider where to get these items  · Keep a symptom diary  Include information on how often symptoms occur, how long they last, and if they are mild or severe   Also keep information on what you ate, what happened, or which medicines you took before the swelling started  · Avoid triggers  Triggers include foods, medicines, and other items that you know cause symptoms  You may need to see a specialist, such as an allergist or dietitian, to learn what to avoid  Follow up with your healthcare provider as directed:  Write down your questions so you remember to ask them during your visits  © 2017 [employer]  Information is for End User's use only and may not be sold, redistributed or otherwise used for commercial purposes  All illustrations and images included in CareNotes® are the copyrighted property of [employer] , [employer]  or Dhaval Brooke  The above information is an  only  It is not intended as medical advice for individual conditions or treatments  Talk to your doctor, nurse or pharmacist before following any medical regimen to see if it is safe and effective for you

## 2018-05-11 NOTE — SOCIAL WORK
MCG Guide Used for Initial Round: Systemic or Infectious Condition GRG T78  3XXA  Angioneurotic edema, initial encounter 1 (DS)  Optimal GLOS: 1  Hospital Day: 3 days  DC Readiness:   · Activity level acceptable  · Floor to discharge  · Complete discharge planning  · Hemodynamic stability  · Respiratory status acceptable  · Neurologic status acceptable  · Temperature status acceptable  · No infection, or status acceptable  · No neutropenia, or status acceptable  · Special infection or injury situations absent  · Electrolyte status acceptable  · General Discharge Criteria met  · Intake acceptable  · No inpatient interventions needed    Identified Barriers: Plan for pt to be discharged to home today  Discussion Date (Time): 05/11/18 with Dr Roman Junior

## 2018-05-11 NOTE — DISCHARGE SUMMARY
[facility] Discharge Summary - Medical Saw Jimenes 70 y o  male MRN: [id number]    [facility]  Room / Bed: [facility] 711/[facility] 250-42 Encounter: [id number]    BRIEF OVERVIEW    Admitting Provider: Kathleen Viera MD  Discharge Provider: No att  providers found  Primary Care Physician at Discharge:   [address]     Discharge To:   Home    Admission Date: 5/8/2018     Discharge Date: 5/11/2018  1:58 PM    Primary Discharge Diagnosis  Principal Problem:    Angioedema  Active Problems:    COPD (chronic obstructive pulmonary disease) (HCC)    Bradycardia  Resolved Problems:    Severe tongue swelling    Right facial swelling    Respiratory failure with hypoxia ([address] )      Other Problems Addressed:  None    Consulting Providers   none    Therapeutic Operative Procedures Performed  Epinephrine, intubation    Diagnostic Procedures Performed  CBC, troponin, rapid influenza, BMP, C3 complement, C4 complement, C reactive protein, at ESR, blood cultures x2    Discharge Disposition: Home/Self Care  Discharged With Lines: no    Test Results Pending at Discharge:  None    Outpatient Follow-Up  yes      Follow up:  [address] [facility]  Date and time: May 21, 9AM  Location:  [address]    Follow up with consulting providers  none required   Active Issues Requiring Follow-up   none    Code Status: Level 1 - Full Code    Medications   Your Medications   Your Medications     Morning Afternoon Evening Bedtime As Needed    cetirizine 10 MG chewable tablet   Commonly known as: ZyrTEC   Start taking on: 5/18/2018   Chew 1 tablet (10 mg total) 2 (two) times a day for 30 days   Refills: 0   Next Dose Due: 5/11/18            desloratadine 5 MG tablet   Commonly known as: CLARINEX   Take 1 tablet (5 mg total) by mouth 2 (two) times a day for 7 days   Refills: 0   Next Dose Due: 5/11/18            EPINEPHrine 0 3 mg/0 3 mL Soaj Commonly known as: EPIPEN   Inject 0 3 mL (0 3 mg total) into the shoulder, thigh, or buttocks once as needed for anaphylaxis (call 911 when you use your epipen) for up to 1 dose   Refills: 0           predniSONE 20 mg tablet   Take 1 tablet (20 mg total) by mouth daily for 7 days   Refills: 0   Next Dose Due: 5/12/18             Allergies  No Known Allergies  Discharge Diet: regular diet  Activity restrictions: none    [facility] Course  The patient is a 78-year-old male with a past medical history of COPD who was admitted for throat tightness  Patient reported tongue swelling and unable to protect his airway or handle his secretions  Patient was intubated upon arrival to the ED  Patient had right-sided swelling of his face  Patient received Solu-Medrol by EMS as well as epinephrine  Patient was admitted to the ICU for acute hypoxic respiratory failure secondary to angioedema of unknown etiology  Patient's C3 and C4 were within normal limits  Patient CRP was elevated  Patient's ESR was mildly largeyl wnl  There were no etiology found for the patient's angioedema  The patient was not taking any NSAIDs, aspirin, Ace inhibitor  Patient denies any allergies to any medications or allergies to foods  Patient reports social alcohol use  Patient denies any family history of angioedema  Patient does have a long history of smoking however has never had any age-appropriate cancer screening, including colonoscopy  Patient reports quitting smoking 2 years ago  At the time of discharge patient denied respiratory symptoms or difficulty breathing or throat tightness  Patient's COPD has not had any recent COPD exacerbations and has baseline ability to walk is approximately 1 block and his ability to climb flights is approximately 1 flight of stairs      Patient was advised on the importance of using his epinephrine and calling 911 as well as continuing his prednisone for 7 days as well as antihistamines as prescribed  Patient is medically stable at the time of discharge and was comfortable with this plan  He was advised on the importance of following up at [address] [place] [address] on his appointment day  He appreciated all this information and agreed follow-up  Presenting Problem/History of Present Illness  Principal Problem:    Angioedema  Active Problems:    COPD (chronic obstructive pulmonary disease) ([facility])    Bradycardia  Resolved Problems:    Severe tongue swelling    Right facial swelling    Respiratory failure with hypoxia ([facility])    Angioedema  Patient is status post extubation on 05/09 for angioedema, intubated secondary to failure to protect airway  Unclear cause of his angioedema, labs do not suggest hereditary component  Patient denies ever having angioedema in the past   Patient denied any unusual food intake at that time  He reports he noticed it when he is lying in bed at home  He is not on any medications besides an inhaler for his COPD for which she is unaware of which medication it is  At this time patient's swelling has resolved and he is stable  - C4 largely within normal limits, C3 largely within normal limits  No need at this moment for further her adhere acquired C1 inhibitor deficiency assessment  - 5/8 LFTs - largely within normal limits, slightly elevated alk-phos    -5/8 ESR 11, CRP 6 2  -5/8 blood cultures -72 hours plus  -continue prednisone taper currently 20 mg daily  -continue nectar thick diet  - need age-appropriate screening including a low-dose CT for lung pathology given long history of smoking and possible contribution to angioedema  - avoid NSAIDs, aspirins, Ace inhibitors, estrogen  - discharge patient with EpiPen, prednisone taper +/- second gen H1 (I e   certrizien 61AQ BID or desloratidien 5mg BID for at least 1 month) +/- H2 blockers (but may not need as this is unlikely allergic angioedema)     COPD  No PFTs on record, patient denies having a PCP has not seen a doctor in years, patient smokes 6 cigars daily for 50 years he quit 2 years ago  Unclear which inhaler he takes at home as a home medication, patient currently stable without exacerbation, satting well on room air, no wheezes on exam   -continue Xopenex and Atrovent  -will require outpatient follow-up and PFTs     Asymptomatic bradycardia - resolved  Patient found to be bradycardic mostly well as sleep down to the 50s, he is asymptomatic at that time his blood pressure remained stable  Patient not beta blocked at home  -  Will continue to monitor on telemetry    Other Pertinent Test Results:  None    Discharge Condition: stable      Discharge  Statement   I spent 30 minutes minutes discharging the patient  This time was spent on the day of discharge  I had direct contact with the patient on the day of discharge  Additional documentation is required if more than 30 minutes were spent on discharge

## 2018-05-11 NOTE — PROGRESS NOTES
Residency Progress Note   Unit/Bed#: [facility] 711-01 Encounter: [id number]  SOD Team A      Henna Boothe Longenour 70 y o  male [id number]    Hospital Stay Days: 3      Assessment/Plan:    Principal Problem:    Angioedema  Active Problems:    COPD (chronic obstructive pulmonary disease) (HCC)    Bradycardia    Angioedema  Patient is status post extubation on 05/09 for angioedema, intubated secondary to failure to protect airway  Unclear cause of his angioedema, labs do not suggest hereditary component  Patient denies ever having angioedema in the past   Patient denied any unusual food intake at that time  He reports he noticed it when he is lying in bed at home  He is not on any medications besides an inhaler for his COPD for which she is unaware of which medication it is  At this time patient's swelling has resolved and he is stable  - C4 largely within normal limits, C3 largely within normal limits  No need at this moment for further her adhere acquired C1 inhibitor deficiency assessment  - 5/8 LFTs - largely within normal limits, slightly elevated alk-phos  -5/8 ESR 11, CRP 6 2  -5/8 blood cultures -72 hours plus  -continue prednisone taper currently 20 mg daily  -continue nectar thick diet  - need age-appropriate screening including a low-dose CT for lung pathology given long history of smoking and possible contribution to angioedema  - avoid NSAIDs, aspirins, Ace inhibitors, estrogen  - discharge patient with EpiPen, prednisone taper +/- second gen H1 (I e   certrizien 70WK BID or desloratidien 5mg BID for at least 1 month) +/- H2 blockers (but may not need as this is unlikely allergic angioedema)     COPD  No PFTs on record, patient denies having a PCP has not seen a doctor in years, patient smokes 6 cigars daily for 50 years he quit 2 years ago    Unclear which inhaler he takes at home as a home medication, patient currently stable without exacerbation, satting well on room air, no wheezes on exam   -continue Xopenex and Atrovent  -will require outpatient follow-up and PFTs     Asymptomatic bradycardia - resolved  Patient found to be bradycardic mostly well as sleep down to the 50s, he is asymptomatic at that time his blood pressure remained stable  Patient not beta blocked at home  -  Will continue to monitor on telemetry    Disposition: discharge to home today    Subjective:   Patient no complaints this morning  Patient denies fevers, chills, nausea, vomiting, headache, chest pain, shortness of breath, abdominal pain, difficulty urinating or stooling  Patient is ready to go home today  However he does not have money on him for prescriptions  Vitals: Temp (24hrs), Av °F (36 7 °C), Min:97 5 °F (36 4 °C), Max:98 7 °F (37 1 °C)  Current: Temperature: 97 5 °F (36 4 °C)  Vitals:    05/10/18 2357 18 0600 18 0657 18 0730   BP: 123/58   142/70   BP Location: Left arm   Right arm   Pulse: 78   78   Resp: 16   16   Temp: 97 5 °F (36 4 °C)   97 5 °F (36 4 °C)   TempSrc: Oral   Tympanic   SpO2: 93%  93% 93%   Weight:  130 kg (287 lb 11 2 oz)     Height:        Body mass index is 35 96 kg/m²  I/O last 24 hours: In: 945 [P O :945]  Out: 400 [Urine:400]      Physical Exam:  Physical Exam   Constitutional: He is oriented to person, place, and time  He appears well-nourished  No distress  Obese male sitting comfortably  HENT:   Head: Normocephalic and atraumatic  Eyes: EOM are normal  Pupils are equal, round, and reactive to light  Cardiovascular: Normal rate, regular rhythm and normal heart sounds  Exam reveals no friction rub  No murmur heard  Pulmonary/Chest: Effort normal and breath sounds normal  No respiratory distress  He has no wheezes  He has no rales  Had no difficulty breathing on my evaluation  Abdominal: Soft  Bowel sounds are normal  He exhibits no distension and no mass  There is no tenderness  There is no guarding     Musculoskeletal: He exhibits no edema  Neurological: He is alert and oriented to person, place, and time  He displays normal reflexes  No cranial nerve deficit  Coordination normal    Skin: Skin is warm  Capillary refill takes less than 2 seconds  No erythema  Psychiatric: He has a normal mood and affect  His behavior is normal    Vitals reviewed  Invasive Devices     Peripheral Intravenous Line            Peripheral IV 05/08/18 Right Antecubital 3 days    Peripheral IV 05/08/18 Right Hand 3 days                          Labs: No results found for this or any previous visit (from the past 24 hour(s))  Radiology Results: I have personally reviewed pertinent reports  Other Diagnostic Testing:   I have personally reviewed pertinent reports          Active Meds:   Current Facility-Administered Medications   Medication Dose Route Frequency    heparin (porcine) subcutaneous injection 5,000 Units  5,000 Units Subcutaneous Q8H [facility]    ipratropium (ATROVENT) 0 02 % inhalation solution 0 5 mg  0 5 mg Nebulization TID    levalbuterol (XOPENEX) inhalation solution 1 25 mg  1 25 mg Nebulization TID    polyethylene glycol (MIRALAX) packet 17 g  17 g Oral Daily PRN    predniSONE tablet 20 mg  20 mg Oral Daily         VTE Pharmacologic Prophylaxis: Heparin  VTE Mechanical Prophylaxis: sequential compression device    Ancelmo Mays MD

## 2018-05-13 LAB
BACTERIA BLD CULT: NORMAL
BACTERIA BLD CULT: NORMAL

## 2018-05-14 ENCOUNTER — PATIENT OUTREACH (OUTPATIENT)
Dept: INTERNAL MEDICINE CLINIC | Facility: CLINIC | Age: 68
End: 2018-05-14

## 2018-05-29 ENCOUNTER — PATIENT OUTREACH (OUTPATIENT)
Dept: INTERNAL MEDICINE CLINIC | Facility: CLINIC | Age: 68
End: 2018-05-29

## 2023-07-06 ENCOUNTER — APPOINTMENT (EMERGENCY)
Dept: CT IMAGING | Facility: HOSPITAL | Age: 73
DRG: 853 | End: 2023-07-06
Payer: MEDICARE

## 2023-07-06 ENCOUNTER — ANESTHESIA (INPATIENT)
Dept: PERIOP | Facility: HOSPITAL | Age: 73
End: 2023-07-06
Payer: MEDICARE

## 2023-07-06 ENCOUNTER — ANESTHESIA EVENT (INPATIENT)
Dept: PERIOP | Facility: HOSPITAL | Age: 73
End: 2023-07-06
Payer: MEDICARE

## 2023-07-06 ENCOUNTER — APPOINTMENT (EMERGENCY)
Dept: RADIOLOGY | Facility: HOSPITAL | Age: 73
DRG: 853 | End: 2023-07-06
Payer: MEDICARE

## 2023-07-06 ENCOUNTER — HOSPITAL ENCOUNTER (INPATIENT)
Facility: HOSPITAL | Age: 73
LOS: 57 days | Discharge: NURSING, REHAB OR LONG-TERM CARE | DRG: 853 | End: 2023-09-01
Attending: EMERGENCY MEDICINE | Admitting: STUDENT IN AN ORGANIZED HEALTH CARE EDUCATION/TRAINING PROGRAM
Payer: MEDICARE

## 2023-07-06 ENCOUNTER — APPOINTMENT (INPATIENT)
Dept: RADIOLOGY | Facility: HOSPITAL | Age: 73
DRG: 853 | End: 2023-07-06
Payer: MEDICARE

## 2023-07-06 DIAGNOSIS — Z96.0 URETERAL STENT PRESENT: ICD-10-CM

## 2023-07-06 DIAGNOSIS — A41.9 SEPSIS SECONDARY TO UTI (HCC): ICD-10-CM

## 2023-07-06 DIAGNOSIS — F10.939 ALCOHOL WITHDRAWAL SYNDROME WITH COMPLICATION (HCC): ICD-10-CM

## 2023-07-06 DIAGNOSIS — N20.1 URETEROLITHIASIS: Primary | ICD-10-CM

## 2023-07-06 DIAGNOSIS — N39.0 SEPSIS SECONDARY TO UTI (HCC): ICD-10-CM

## 2023-07-06 DIAGNOSIS — R77.8 ELEVATED TROPONIN: ICD-10-CM

## 2023-07-06 DIAGNOSIS — I48.0 PAROXYSMAL ATRIAL FIBRILLATION (HCC): ICD-10-CM

## 2023-07-06 DIAGNOSIS — Z01.89 ENCOUNTER FOR COMPETENCY EVALUATION: ICD-10-CM

## 2023-07-06 DIAGNOSIS — N12 PYELONEPHRITIS: ICD-10-CM

## 2023-07-06 DIAGNOSIS — J44.9 CHRONIC OBSTRUCTIVE PULMONARY DISEASE, UNSPECIFIED COPD TYPE (HCC): ICD-10-CM

## 2023-07-06 DIAGNOSIS — B35.1 MYCOTIC TOENAILS: ICD-10-CM

## 2023-07-06 DIAGNOSIS — J18.9 PNEUMONIA: ICD-10-CM

## 2023-07-06 DIAGNOSIS — N20.1 RIGHT URETERAL STONE: ICD-10-CM

## 2023-07-06 DIAGNOSIS — K22.9 ESOPHAGEAL ABNORMALITY: ICD-10-CM

## 2023-07-06 DIAGNOSIS — N39.0 UTI (URINARY TRACT INFECTION): ICD-10-CM

## 2023-07-06 DIAGNOSIS — N13.2 URETERAL STONE WITH HYDRONEPHROSIS: ICD-10-CM

## 2023-07-06 DIAGNOSIS — K92.1 BLOOD IN STOOL: ICD-10-CM

## 2023-07-06 DIAGNOSIS — R31.9 HEMATURIA: ICD-10-CM

## 2023-07-06 DIAGNOSIS — R78.81 BACTEREMIA: ICD-10-CM

## 2023-07-06 PROBLEM — R79.89 ELEVATED D-DIMER: Status: ACTIVE | Noted: 2023-07-06

## 2023-07-06 PROBLEM — R65.20 SEVERE SEPSIS (HCC): Status: ACTIVE | Noted: 2023-07-06

## 2023-07-06 PROBLEM — R79.89 ELEVATED TROPONIN: Status: ACTIVE | Noted: 2023-07-06

## 2023-07-06 PROBLEM — J44.1 CHRONIC OBSTRUCTIVE PULMONARY DISEASE WITH ACUTE EXACERBATION (HCC): Status: ACTIVE | Noted: 2018-05-08

## 2023-07-06 PROBLEM — N17.9 ACUTE KIDNEY INJURY (HCC): Status: ACTIVE | Noted: 2023-07-06

## 2023-07-06 LAB
2HR DELTA HS TROPONIN: -145 NG/L
4HR DELTA HS TROPONIN: -237 NG/L
ALBUMIN SERPL BCP-MCNC: 3.5 G/DL (ref 3.5–5)
ALP SERPL-CCNC: 76 U/L (ref 34–104)
ALT SERPL W P-5'-P-CCNC: 9 U/L (ref 7–52)
ANION GAP SERPL CALCULATED.3IONS-SCNC: 8 MMOL/L
APTT PPP: 29 SECONDS (ref 23–37)
AST SERPL W P-5'-P-CCNC: 22 U/L (ref 13–39)
ATRIAL RATE: 100 BPM
ATRIAL RATE: 114 BPM
ATRIAL RATE: 91 BPM
BACTERIA UR QL AUTO: ABNORMAL /HPF
BASOPHILS # BLD MANUAL: 0 THOUSAND/UL (ref 0–0.1)
BASOPHILS NFR MAR MANUAL: 0 % (ref 0–1)
BILIRUB SERPL-MCNC: 0.92 MG/DL (ref 0.2–1)
BILIRUB UR QL STRIP: NEGATIVE
BNP SERPL-MCNC: 502 PG/ML (ref 0–100)
BUN SERPL-MCNC: 29 MG/DL (ref 5–25)
CALCIUM SERPL-MCNC: 8.7 MG/DL (ref 8.4–10.2)
CARDIAC TROPONIN I PNL SERPL HS: 1933 NG/L
CARDIAC TROPONIN I PNL SERPL HS: 2025 NG/L
CARDIAC TROPONIN I PNL SERPL HS: 2170 NG/L
CHLORIDE SERPL-SCNC: 103 MMOL/L (ref 96–108)
CLARITY UR: ABNORMAL
CO2 SERPL-SCNC: 23 MMOL/L (ref 21–32)
COLOR UR: YELLOW
CREAT SERPL-MCNC: 1.51 MG/DL (ref 0.6–1.3)
D DIMER PPP FEU-MCNC: 2.14 UG/ML FEU
EOSINOPHIL # BLD MANUAL: 0 THOUSAND/UL (ref 0–0.4)
EOSINOPHIL NFR BLD MANUAL: 0 % (ref 0–6)
ERYTHROCYTE [DISTWIDTH] IN BLOOD BY AUTOMATED COUNT: 14.6 % (ref 11.6–15.1)
FLUAV RNA RESP QL NAA+PROBE: NEGATIVE
FLUBV RNA RESP QL NAA+PROBE: NEGATIVE
GFR SERPL CREATININE-BSD FRML MDRD: 44 ML/MIN/1.73SQ M
GLUCOSE SERPL-MCNC: 136 MG/DL (ref 65–140)
GLUCOSE SERPL-MCNC: 139 MG/DL (ref 65–140)
GLUCOSE UR STRIP-MCNC: NEGATIVE MG/DL
HCT VFR BLD AUTO: 45.2 % (ref 36.5–49.3)
HGB BLD-MCNC: 15.1 G/DL (ref 12–17)
HGB UR QL STRIP.AUTO: ABNORMAL
INR PPP: 1.12 (ref 0.84–1.19)
KETONES UR STRIP-MCNC: ABNORMAL MG/DL
LACTATE SERPL-SCNC: 1.9 MMOL/L (ref 0.5–2)
LEUKOCYTE ESTERASE UR QL STRIP: ABNORMAL
LYMPHOCYTES # BLD AUTO: 0.44 THOUSAND/UL (ref 0.6–4.47)
LYMPHOCYTES # BLD AUTO: 2 % (ref 14–44)
MCH RBC QN AUTO: 33.1 PG (ref 26.8–34.3)
MCHC RBC AUTO-ENTMCNC: 33.4 G/DL (ref 31.4–37.4)
MCV RBC AUTO: 99 FL (ref 82–98)
MONOCYTES # BLD AUTO: 1.11 THOUSAND/UL (ref 0–1.22)
MONOCYTES NFR BLD: 5 % (ref 4–12)
MUCOUS THREADS UR QL AUTO: ABNORMAL
NEUTROPHILS # BLD MANUAL: 19.95 THOUSAND/UL (ref 1.85–7.62)
NEUTS BAND NFR BLD MANUAL: 5 % (ref 0–8)
NEUTS SEG NFR BLD AUTO: 85 % (ref 43–75)
NITRITE UR QL STRIP: NEGATIVE
NON-SQ EPI CELLS URNS QL MICRO: ABNORMAL /HPF
P AXIS: 81 DEGREES
P AXIS: 85 DEGREES
P AXIS: 91 DEGREES
PH UR STRIP.AUTO: 8 [PH]
PLATELET # BLD AUTO: 232 THOUSANDS/UL (ref 149–390)
PLATELET BLD QL SMEAR: ADEQUATE
PMV BLD AUTO: 9.5 FL (ref 8.9–12.7)
POTASSIUM SERPL-SCNC: 3.9 MMOL/L (ref 3.5–5.3)
PR INTERVAL: 166 MS
PR INTERVAL: 168 MS
PR INTERVAL: 174 MS
PROCALCITONIN SERPL-MCNC: 38.23 NG/ML
PROT SERPL-MCNC: 7 G/DL (ref 6.4–8.4)
PROT UR STRIP-MCNC: ABNORMAL MG/DL
PROTHROMBIN TIME: 14.4 SECONDS (ref 11.6–14.5)
QRS AXIS: 50 DEGREES
QRS AXIS: 70 DEGREES
QRS AXIS: 71 DEGREES
QRSD INTERVAL: 102 MS
QRSD INTERVAL: 104 MS
QRSD INTERVAL: 94 MS
QT INTERVAL: 344 MS
QT INTERVAL: 374 MS
QT INTERVAL: 396 MS
QTC INTERVAL: 474 MS
QTC INTERVAL: 482 MS
QTC INTERVAL: 487 MS
RBC # BLD AUTO: 4.56 MILLION/UL (ref 3.88–5.62)
RBC #/AREA URNS AUTO: ABNORMAL /HPF
RBC MORPH BLD: NORMAL
RSV RNA RESP QL NAA+PROBE: NEGATIVE
SARS-COV-2 RNA RESP QL NAA+PROBE: NEGATIVE
SODIUM SERPL-SCNC: 134 MMOL/L (ref 135–147)
SP GR UR STRIP.AUTO: 1.01 (ref 1–1.03)
T WAVE AXIS: 84 DEGREES
T WAVE AXIS: 84 DEGREES
T WAVE AXIS: 85 DEGREES
TRI-PHOS CRY URNS QL MICRO: ABNORMAL /HPF
TSH SERPL DL<=0.05 MIU/L-ACNC: 1.74 UIU/ML (ref 0.45–4.5)
UROBILINOGEN UR QL STRIP.AUTO: 0.2 E.U./DL
VARIANT LYMPHS # BLD AUTO: 3 %
VENTRICULAR RATE: 100 BPM
VENTRICULAR RATE: 114 BPM
VENTRICULAR RATE: 91 BPM
WBC # BLD AUTO: 22.17 THOUSAND/UL (ref 4.31–10.16)
WBC #/AREA URNS AUTO: ABNORMAL /HPF

## 2023-07-06 PROCEDURE — 85027 COMPLETE CBC AUTOMATED: CPT | Performed by: EMERGENCY MEDICINE

## 2023-07-06 PROCEDURE — 87154 CUL TYP ID BLD PTHGN 6+ TRGT: CPT | Performed by: EMERGENCY MEDICINE

## 2023-07-06 PROCEDURE — G1004 CDSM NDSC: HCPCS

## 2023-07-06 PROCEDURE — 81001 URINALYSIS AUTO W/SCOPE: CPT | Performed by: EMERGENCY MEDICINE

## 2023-07-06 PROCEDURE — C1769 GUIDE WIRE: HCPCS | Performed by: STUDENT IN AN ORGANIZED HEALTH CARE EDUCATION/TRAINING PROGRAM

## 2023-07-06 PROCEDURE — 0T778DZ DILATION OF LEFT URETER WITH INTRALUMINAL DEVICE, VIA NATURAL OR ARTIFICIAL OPENING ENDOSCOPIC: ICD-10-PCS | Performed by: STUDENT IN AN ORGANIZED HEALTH CARE EDUCATION/TRAINING PROGRAM

## 2023-07-06 PROCEDURE — C1758 CATHETER, URETERAL: HCPCS | Performed by: STUDENT IN AN ORGANIZED HEALTH CARE EDUCATION/TRAINING PROGRAM

## 2023-07-06 PROCEDURE — 0241U HB NFCT DS VIR RESP RNA 4 TRGT: CPT | Performed by: EMERGENCY MEDICINE

## 2023-07-06 PROCEDURE — 87147 CULTURE TYPE IMMUNOLOGIC: CPT | Performed by: EMERGENCY MEDICINE

## 2023-07-06 PROCEDURE — 71275 CT ANGIOGRAPHY CHEST: CPT

## 2023-07-06 PROCEDURE — 80053 COMPREHEN METABOLIC PANEL: CPT | Performed by: EMERGENCY MEDICINE

## 2023-07-06 PROCEDURE — 99223 1ST HOSP IP/OBS HIGH 75: CPT | Performed by: NURSE PRACTITIONER

## 2023-07-06 PROCEDURE — 85007 BL SMEAR W/DIFF WBC COUNT: CPT | Performed by: EMERGENCY MEDICINE

## 2023-07-06 PROCEDURE — 96375 TX/PRO/DX INJ NEW DRUG ADDON: CPT

## 2023-07-06 PROCEDURE — 83880 ASSAY OF NATRIURETIC PEPTIDE: CPT | Performed by: EMERGENCY MEDICINE

## 2023-07-06 PROCEDURE — 85379 FIBRIN DEGRADATION QUANT: CPT | Performed by: EMERGENCY MEDICINE

## 2023-07-06 PROCEDURE — 87077 CULTURE AEROBIC IDENTIFY: CPT | Performed by: EMERGENCY MEDICINE

## 2023-07-06 PROCEDURE — 74420 UROGRAPHY RTRGR +-KUB: CPT

## 2023-07-06 PROCEDURE — 87040 BLOOD CULTURE FOR BACTERIA: CPT | Performed by: EMERGENCY MEDICINE

## 2023-07-06 PROCEDURE — 84484 ASSAY OF TROPONIN QUANT: CPT | Performed by: EMERGENCY MEDICINE

## 2023-07-06 PROCEDURE — 87186 SC STD MICRODIL/AGAR DIL: CPT | Performed by: INTERNAL MEDICINE

## 2023-07-06 PROCEDURE — 93010 ELECTROCARDIOGRAM REPORT: CPT | Performed by: INTERNAL MEDICINE

## 2023-07-06 PROCEDURE — 99285 EMERGENCY DEPT VISIT HI MDM: CPT | Performed by: EMERGENCY MEDICINE

## 2023-07-06 PROCEDURE — 84145 PROCALCITONIN (PCT): CPT | Performed by: EMERGENCY MEDICINE

## 2023-07-06 PROCEDURE — 96365 THER/PROPH/DIAG IV INF INIT: CPT

## 2023-07-06 PROCEDURE — 93010 ELECTROCARDIOGRAM REPORT: CPT

## 2023-07-06 PROCEDURE — 87086 URINE CULTURE/COLONY COUNT: CPT | Performed by: EMERGENCY MEDICINE

## 2023-07-06 PROCEDURE — BT171ZZ FLUOROSCOPY OF LEFT URETER USING LOW OSMOLAR CONTRAST: ICD-10-PCS | Performed by: STUDENT IN AN ORGANIZED HEALTH CARE EDUCATION/TRAINING PROGRAM

## 2023-07-06 PROCEDURE — 85730 THROMBOPLASTIN TIME PARTIAL: CPT | Performed by: EMERGENCY MEDICINE

## 2023-07-06 PROCEDURE — 93005 ELECTROCARDIOGRAM TRACING: CPT

## 2023-07-06 PROCEDURE — 99255 IP/OBS CONSLTJ NEW/EST HI 80: CPT | Performed by: STUDENT IN AN ORGANIZED HEALTH CARE EDUCATION/TRAINING PROGRAM

## 2023-07-06 PROCEDURE — 96361 HYDRATE IV INFUSION ADD-ON: CPT

## 2023-07-06 PROCEDURE — 82948 REAGENT STRIP/BLOOD GLUCOSE: CPT

## 2023-07-06 PROCEDURE — 36415 COLL VENOUS BLD VENIPUNCTURE: CPT | Performed by: EMERGENCY MEDICINE

## 2023-07-06 PROCEDURE — 74176 CT ABD & PELVIS W/O CONTRAST: CPT

## 2023-07-06 PROCEDURE — 71046 X-RAY EXAM CHEST 2 VIEWS: CPT

## 2023-07-06 PROCEDURE — 84443 ASSAY THYROID STIM HORMONE: CPT | Performed by: NURSE PRACTITIONER

## 2023-07-06 PROCEDURE — 87076 CULTURE ANAEROBE IDENT EACH: CPT | Performed by: EMERGENCY MEDICINE

## 2023-07-06 PROCEDURE — 52332 CYSTOSCOPY AND TREATMENT: CPT | Performed by: STUDENT IN AN ORGANIZED HEALTH CARE EDUCATION/TRAINING PROGRAM

## 2023-07-06 PROCEDURE — C2617 STENT, NON-COR, TEM W/O DEL: HCPCS | Performed by: STUDENT IN AN ORGANIZED HEALTH CARE EDUCATION/TRAINING PROGRAM

## 2023-07-06 PROCEDURE — 87077 CULTURE AEROBIC IDENTIFY: CPT | Performed by: STUDENT IN AN ORGANIZED HEALTH CARE EDUCATION/TRAINING PROGRAM

## 2023-07-06 PROCEDURE — 85610 PROTHROMBIN TIME: CPT | Performed by: EMERGENCY MEDICINE

## 2023-07-06 PROCEDURE — 83605 ASSAY OF LACTIC ACID: CPT | Performed by: EMERGENCY MEDICINE

## 2023-07-06 PROCEDURE — 87086 URINE CULTURE/COLONY COUNT: CPT | Performed by: STUDENT IN AN ORGANIZED HEALTH CARE EDUCATION/TRAINING PROGRAM

## 2023-07-06 PROCEDURE — 99285 EMERGENCY DEPT VISIT HI MDM: CPT

## 2023-07-06 DEVICE — INLAY OPTIMA URETERAL STENT W/O GUIDEWIRE
Type: IMPLANTABLE DEVICE | Site: URETER | Status: NON-FUNCTIONAL
Brand: BARD® INLAY OPTIMA® URETERAL STENT
Removed: 2023-08-03

## 2023-07-06 RX ORDER — IPRATROPIUM BROMIDE AND ALBUTEROL SULFATE 2.5; .5 MG/3ML; MG/3ML
3 SOLUTION RESPIRATORY (INHALATION) 3 TIMES DAILY
Status: DISCONTINUED | OUTPATIENT
Start: 2023-07-06 | End: 2023-07-13

## 2023-07-06 RX ORDER — ONDANSETRON 2 MG/ML
4 INJECTION INTRAMUSCULAR; INTRAVENOUS ONCE AS NEEDED
Status: DISCONTINUED | OUTPATIENT
Start: 2023-07-06 | End: 2023-07-07 | Stop reason: HOSPADM

## 2023-07-06 RX ORDER — CEFTRIAXONE 2 G/50ML
2000 INJECTION, SOLUTION INTRAVENOUS ONCE
Status: COMPLETED | OUTPATIENT
Start: 2023-07-06 | End: 2023-07-06

## 2023-07-06 RX ORDER — SODIUM CHLORIDE 9 MG/ML
125 INJECTION, SOLUTION INTRAVENOUS CONTINUOUS
Status: DISCONTINUED | OUTPATIENT
Start: 2023-07-06 | End: 2023-07-07

## 2023-07-06 RX ORDER — MAGNESIUM HYDROXIDE/ALUMINUM HYDROXICE/SIMETHICONE 120; 1200; 1200 MG/30ML; MG/30ML; MG/30ML
30 SUSPENSION ORAL EVERY 6 HOURS PRN
Status: DISCONTINUED | OUTPATIENT
Start: 2023-07-06 | End: 2023-09-01 | Stop reason: HOSPADM

## 2023-07-06 RX ORDER — ONDANSETRON 2 MG/ML
4 INJECTION INTRAMUSCULAR; INTRAVENOUS EVERY 6 HOURS PRN
Status: DISCONTINUED | OUTPATIENT
Start: 2023-07-06 | End: 2023-07-30

## 2023-07-06 RX ORDER — HYDROMORPHONE HCL IN WATER/PF 6 MG/30 ML
0.2 PATIENT CONTROLLED ANALGESIA SYRINGE INTRAVENOUS EVERY 4 HOURS PRN
Status: DISCONTINUED | OUTPATIENT
Start: 2023-07-06 | End: 2023-07-24

## 2023-07-06 RX ORDER — SIMETHICONE 80 MG
80 TABLET,CHEWABLE ORAL 4 TIMES DAILY PRN
Status: DISCONTINUED | OUTPATIENT
Start: 2023-07-06 | End: 2023-07-30

## 2023-07-06 RX ORDER — GUAIFENESIN/DEXTROMETHORPHAN 100-10MG/5
10 SYRUP ORAL EVERY 4 HOURS PRN
Status: DISCONTINUED | OUTPATIENT
Start: 2023-07-06 | End: 2023-07-17

## 2023-07-06 RX ORDER — DEXAMETHASONE SODIUM PHOSPHATE 4 MG/ML
INJECTION, SOLUTION INTRA-ARTICULAR; INTRALESIONAL; INTRAMUSCULAR; INTRAVENOUS; SOFT TISSUE AS NEEDED
Status: DISCONTINUED | OUTPATIENT
Start: 2023-07-06 | End: 2023-07-06

## 2023-07-06 RX ORDER — ACETAMINOPHEN 325 MG/1
650 TABLET ORAL EVERY 6 HOURS PRN
Status: DISCONTINUED | OUTPATIENT
Start: 2023-07-06 | End: 2023-08-03

## 2023-07-06 RX ORDER — NICOTINE 21 MG/24HR
1 PATCH, TRANSDERMAL 24 HOURS TRANSDERMAL DAILY
Status: DISCONTINUED | OUTPATIENT
Start: 2023-07-07 | End: 2023-08-10

## 2023-07-06 RX ORDER — ALBUTEROL SULFATE 2.5 MG/3ML
1 SOLUTION RESPIRATORY (INHALATION) ONCE
Status: COMPLETED | OUTPATIENT
Start: 2023-07-06 | End: 2023-07-06

## 2023-07-06 RX ORDER — IPRATROPIUM BROMIDE AND ALBUTEROL SULFATE .5; 3 MG/3ML; MG/3ML
1 SOLUTION RESPIRATORY (INHALATION) ONCE
Status: COMPLETED | OUTPATIENT
Start: 2023-07-06 | End: 2023-07-06

## 2023-07-06 RX ORDER — PANTOPRAZOLE SODIUM 40 MG/1
40 TABLET, DELAYED RELEASE ORAL
Status: DISCONTINUED | OUTPATIENT
Start: 2023-07-07 | End: 2023-09-01 | Stop reason: HOSPADM

## 2023-07-06 RX ORDER — SODIUM CHLORIDE 9 MG/ML
100 INJECTION, SOLUTION INTRAVENOUS CONTINUOUS
Status: DISPENSED | OUTPATIENT
Start: 2023-07-06 | End: 2023-07-07

## 2023-07-06 RX ORDER — CEFTRIAXONE 1 G/50ML
1000 INJECTION, SOLUTION INTRAVENOUS EVERY 24 HOURS
Status: DISCONTINUED | OUTPATIENT
Start: 2023-07-07 | End: 2023-07-09

## 2023-07-06 RX ORDER — HEPARIN SODIUM 5000 [USP'U]/ML
5000 INJECTION, SOLUTION INTRAVENOUS; SUBCUTANEOUS EVERY 8 HOURS SCHEDULED
Status: DISCONTINUED | OUTPATIENT
Start: 2023-07-06 | End: 2023-07-18

## 2023-07-06 RX ORDER — FENTANYL CITRATE 50 UG/ML
INJECTION, SOLUTION INTRAMUSCULAR; INTRAVENOUS AS NEEDED
Status: DISCONTINUED | OUTPATIENT
Start: 2023-07-06 | End: 2023-07-06

## 2023-07-06 RX ORDER — ALBUTEROL SULFATE 2.5 MG/3ML
2.5 SOLUTION RESPIRATORY (INHALATION) EVERY 6 HOURS PRN
Status: DISCONTINUED | OUTPATIENT
Start: 2023-07-06 | End: 2023-09-01 | Stop reason: HOSPADM

## 2023-07-06 RX ORDER — OXYCODONE HYDROCHLORIDE 5 MG/1
5 TABLET ORAL 4 TIMES DAILY PRN
Status: DISCONTINUED | OUTPATIENT
Start: 2023-07-06 | End: 2023-07-24

## 2023-07-06 RX ORDER — METHYLPREDNISOLONE SOD SUCC 125 MG
1 VIAL (EA) INJECTION ONCE
Status: COMPLETED | OUTPATIENT
Start: 2023-07-06 | End: 2023-07-06

## 2023-07-06 RX ORDER — FENTANYL CITRATE/PF 50 MCG/ML
25 SYRINGE (ML) INJECTION
Status: DISCONTINUED | OUTPATIENT
Start: 2023-07-06 | End: 2023-07-07 | Stop reason: HOSPADM

## 2023-07-06 RX ORDER — ASPIRIN 81 MG/1
324 TABLET, CHEWABLE ORAL ONCE
Status: COMPLETED | OUTPATIENT
Start: 2023-07-06 | End: 2023-07-07

## 2023-07-06 RX ORDER — FLUTICASONE FUROATE AND VILANTEROL 200; 25 UG/1; UG/1
1 POWDER RESPIRATORY (INHALATION) DAILY
Status: DISCONTINUED | OUTPATIENT
Start: 2023-07-07 | End: 2023-09-01 | Stop reason: HOSPADM

## 2023-07-06 RX ORDER — PROPOFOL 10 MG/ML
INJECTION, EMULSION INTRAVENOUS AS NEEDED
Status: DISCONTINUED | OUTPATIENT
Start: 2023-07-06 | End: 2023-07-06

## 2023-07-06 RX ADMIN — FENTANYL CITRATE 50 MCG: 50 INJECTION INTRAMUSCULAR; INTRAVENOUS at 22:16

## 2023-07-06 RX ADMIN — PROPOFOL 200 MG: 10 INJECTION, EMULSION INTRAVENOUS at 22:16

## 2023-07-06 RX ADMIN — AZITHROMYCIN 500 MG: 500 INJECTION, POWDER, LYOPHILIZED, FOR SOLUTION INTRAVENOUS at 20:35

## 2023-07-06 RX ADMIN — FENTANYL CITRATE 25 MCG: 50 INJECTION INTRAMUSCULAR; INTRAVENOUS at 22:53

## 2023-07-06 RX ADMIN — SODIUM CHLORIDE 125 ML/HR: 0.9 INJECTION, SOLUTION INTRAVENOUS at 20:36

## 2023-07-06 RX ADMIN — FENTANYL CITRATE 25 MCG: 50 INJECTION INTRAMUSCULAR; INTRAVENOUS at 23:03

## 2023-07-06 RX ADMIN — FENTANYL CITRATE 25 MCG: 50 INJECTION INTRAMUSCULAR; INTRAVENOUS at 22:31

## 2023-07-06 RX ADMIN — FENTANYL CITRATE 25 MCG: 50 INJECTION INTRAMUSCULAR; INTRAVENOUS at 22:22

## 2023-07-06 RX ADMIN — FENTANYL CITRATE 25 MCG: 50 INJECTION INTRAMUSCULAR; INTRAVENOUS at 22:25

## 2023-07-06 RX ADMIN — FENTANYL CITRATE 25 MCG: 50 INJECTION INTRAMUSCULAR; INTRAVENOUS at 22:48

## 2023-07-06 RX ADMIN — SODIUM CHLORIDE 1000 ML: 0.9 INJECTION, SOLUTION INTRAVENOUS at 18:19

## 2023-07-06 RX ADMIN — IOHEXOL 85 ML: 350 INJECTION, SOLUTION INTRAVENOUS at 19:06

## 2023-07-06 RX ADMIN — SODIUM CHLORIDE 100 ML/HR: 0.9 INJECTION, SOLUTION INTRAVENOUS at 23:18

## 2023-07-06 RX ADMIN — CEFTRIAXONE 2000 MG: 2 INJECTION, SOLUTION INTRAVENOUS at 18:19

## 2023-07-06 RX ADMIN — DEXAMETHASONE SODIUM PHOSPHATE 5 MG: 4 INJECTION INTRA-ARTICULAR; INTRALESIONAL; INTRAMUSCULAR; INTRAVENOUS; SOFT TISSUE at 22:23

## 2023-07-06 RX ADMIN — SODIUM CHLORIDE 500 ML: 0.9 INJECTION, SOLUTION INTRAVENOUS at 17:34

## 2023-07-06 RX ADMIN — SODIUM CHLORIDE: 0.9 INJECTION, SOLUTION INTRAVENOUS at 23:03

## 2023-07-06 NOTE — ED CARE HANDOFF
Emergency Department Sign Out Note        Sign out and transfer of care from Dr. Tashi Crawford. See Separate Emergency Department note. The patient, Thad Rasheed, was evaluated by the previous provider for SOB, tremors, cough      Workup Completed:  Labs, CXR    ED Course / Workup Pending (followup):    Pending ddimer  Already being treated/covered for sepsis/suspected respiratory source with Ceftriaxone    Called by CT tech that there was stranding around kidney on CT chest, therefore added CT A/P, however unable to give 2nd dye load so added without contrast      HEART Risk Score    Flowsheet Row Most Recent Value   Heart Score Risk Calculator    History 0 Filed at: 07/06/2023 1813   ECG 0 Filed at: 07/06/2023 1813   Age 2 Filed at: 07/06/2023 1813   Risk Factors 0 Filed at: 07/06/2023 1813   Troponin 2 Filed at: 07/06/2023 1813   HEART Score 4 Filed at: 07/06/2023 1813                                  ED Course as of 07/07/23 0123   Thu Jul 06, 2023   1901 D-Dimer, Quant(!): 2.14  Elevated, will get CTA   1922 CT scan concerning for R sided stone with hydro/perinephric stranding, pending radiologist read. Pt updated and given urinal to provide urine sample. He states no h/o kidney stones, but does report some R flank/side pain and mild dysuria.     1938 Repeat EKG: sinus @ 100 bpm, normal axis, normal intervals, qtc 482, no sig st changes, twi avL   2003 TT sent to Urology   2022 Per urology, keep NPO and he will get stent tonight, can admit to [facility]     Procedures  Medical Decision Making  Pt found to have sepsis 2/2 PNA and Urine infection with obstructing ureteral stone/hydro- given IVF/Ceftriaxone, discussed with Urology and to go to [address] [place] [address] for stent placement, admitted to [facility] for further management    Elevated troponin: acute illness or injury  Pneumonia: acute illness or injury  Pyelonephritis: acute illness or injury  Ureteral stone with hydronephrosis: acute illness or injury  Amount and/or Complexity of Data Reviewed  External Data Reviewed: labs, radiology, ECG and notes. Labs: ordered. Decision-making details documented in ED Course. Radiology: ordered and independent interpretation performed. Decision-making details documented in ED Course. ECG/medicine tests: ordered and independent interpretation performed. Decision-making details documented in ED Course. Risk  Prescription drug management. Decision regarding hospitalization.               Disposition  Final diagnoses:   Ureteral stone with hydronephrosis   Pneumonia   Elevated troponin   Pyelonephritis     Time reflects when diagnosis was documented in both MDM as applicable and the Disposition within this note     Time User Action Codes Description Comment    7/6/2023  8:22 PM Marsha Basil Add [N20.1] Ureterolithiasis     7/6/2023  8:24 PM Harley Apa A Add [N13.2] Ureteral stone with hydronephrosis     7/6/2023  8:25 PM Harley Apa A Add [J18.9] Pneumonia     7/6/2023  8:26 PM Harley Apa A Add [R77.8] Elevated troponin     7/6/2023  8:26 PM Wells Fam Add [N12] Pyelonephritis     7/6/2023 10:50 PM Maday Congress 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 Park Edmondson    None       Current Discharge Medication List      CONTINUE these medications which have NOT CHANGED    Details   EPINEPHrine (EPIPEN) 0.3 mg/0.3 mL SOAJ Inject 0.3 mL (0.3 mg total) into the shoulder, thigh, or buttocks once as needed for anaphylaxis (call 911 when you use your epipen) for up to 1 dose  Qty: 0.3 mL, Refills: 0    Associated Diagnoses: Angioedema No discharge procedures on file.        ED Provider  Electronically Signed by     Daniel Mcclelland DO  07/07/23 [id number]

## 2023-07-06 NOTE — ED PROVIDER NOTES
History  Chief Complaint   Patient presents with   • Shortness of Breath     SOB & tremors post smoking cigarette yesterday. Hx of COPD     A 71-year-old male with questionable past history of COPD; presents with tremors, shortness of breath and cough that began yesterday. Patient is overall a poor historian, and limited in his history. He states the cough is productive of mucus. He denies known fevers and associated chest pain. Patient has not taken anything for his symptoms prior to calling EMS. Patient otherwise denies abdominal pain, nausea, vomiting, diarrhea, peripheral edema and rashes. Patient was given a DuoNeb and Solu-Medrol in route with EMS. On review of records patient has not seen a PCP since 2018, he has 1 prior admission for COPD. He denies any other known medical problems, he continues to smoke cigarettes. Assessment and plan: Dyspnea, associated with a cough. Patient in no acute respiratory distress. Diminished air entry, although lungs are clear to auscultation. Patient noted to be tachycardic and borderline hypotensive on arrival, he is afebrile. Symptoms possibly related to COPD exacerbation, although he has clear lungs. Possible infectious etiology, will obtain sepsis work-up. We will also obtain dimer given dyspnea and tachycardia. Will image accordingly. History provided by:  Patient, medical records and EMS personnel      Prior to Admission Medications   Prescriptions Last Dose Informant Patient Reported? Taking?    EPINEPHrine (EPIPEN) 0.3 mg/0.3 mL SOAJ Not Taking  No No   Sig: Inject 0.3 mL (0.3 mg total) into the shoulder, thigh, or buttocks once as needed for anaphylaxis (call 911 when you use your epipen) for up to 1 dose   Patient not taking: Reported on 7/6/2023      Facility-Administered Medications: None       Past Medical History:   Diagnosis Date   • COPD (chronic obstructive pulmonary disease) ([address])        Past Surgical History:   Procedure Laterality Date • FL RETROGRADE PYELOGRAM  7/6/2023   • [place] CYSTO BLADDER W/URETERAL CATHETERIZATION Left 7/6/2023    Procedure: CYSTOSCOPY RETROGRADE PYELOGRAM WITH INSERTION STENT URETERAL;  Surgeon: Hulen Cooks, MD;  Location: [facility];  Service: Urology       History reviewed. No pertinent family history. I have reviewed and agree with the history as documented. E-Cigarette/Vaping   • E-Cigarette Use Never User      E-Cigarette/Vaping Substances     Social History     Tobacco Use   • Smoking status: Every Day     Packs/day: 0.50     Types: Cigarettes   Vaping Use   • Vaping Use: Never used   Substance Use Topics   • Alcohol use: No   • Drug use: No       Review of Systems   Respiratory: Positive for cough and shortness of breath. Neurological: Positive for tremors. All other systems reviewed and are negative. Physical Exam  Physical Exam  General Appearance: alert and oriented, nad, non toxic appearing  Skin:  Warm, dry, intact. No cyanosis  HEENT: Atraumatic, normocephalic. No eye drainage. Normal hearing. Moist mucous membranes. Neck: Supple, trachea midline  Cardiac: Regular rhythm, tachycardic to the 110's; no murmurs, rub, gallops. No pedal edema, 2+ pulses  Pulmonary: Diminished air entry, lungs otherwise CTAB; no wheezes, rales, rhonchi  Gastrointestinal: abdomen soft, nontender, nondistended; no guarding or rebound tenderness; good bowel sounds, no mass or bruits  Extremities:  No deformities.   No calf tenderness, no clubbing  Neuro:  no focal motor or sensory deficits, CN 2-12 grossly intact  Psych:  Normal mood and affect, normal judgement and insight      Vital Signs  ED Triage Vitals   Temperature Pulse Respirations Blood Pressure SpO2   07/06/23 1659 07/06/23 1650 07/06/23 1650 07/06/23 1650 07/06/23 1650   99 °F (37.2 °C) (!) 112 22 95/50 98 %      Temp Source Heart Rate Source Patient Position - Orthostatic VS BP Location FiO2 (%)   07/06/23 2315 07/06/23 1700 07/06/23 2315 07/06/23 3259 --   Temporal Monitor Lying Right arm       Pain Score       07/06/23 2150       No Pain           Vitals:    07/07/23 0347 07/07/23 0705 07/07/23 0926 07/07/23 1122   BP: 108/67 104/63 104/63 107/62   Pulse: 72 72 72 68   Patient Position - Orthostatic VS: Lying Lying  Lying         Visual Acuity      ED Medications  Medications   sodium chloride 0.9 % infusion (0 mL/hr Intravenous Stopped 7/7/23 1246)   acetaminophen (TYLENOL) tablet 650 mg (has no administration in time range)   oxyCODONE (ROXICODONE) IR tablet 5 mg (has no administration in time range)   HYDROmorphone HCl (DILAUDID) injection 0.2 mg (has no administration in time range)   ondansetron (ZOFRAN) injection 4 mg (has no administration in time range)   aluminum-magnesium hydroxide-simethicone (MYLANTA) oral suspension 30 mL (has no administration in time range)   simethicone (MYLICON) chewable tablet 80 mg (has no administration in time range)   nicotine (NICODERM CQ) 14 mg/24hr TD 24 hr patch 1 patch (1 patch Transdermal Not Given 7/7/23 0916)   heparin (porcine) subcutaneous injection 5,000 Units (5,000 Units Subcutaneous Given 7/7/23 1342)   fluticasone-vilanterol 200-25 mcg/actuation 1 puff (1 puff Inhalation Given 7/7/23 0912)   ipratropium-albuterol (DUO-NEB) 0.5-2.5 mg/3 mL inhalation solution 3 mL (3 mL Nebulization Given 7/7/23 1343)   cefTRIAXone (ROCEPHIN) IVPB (premix in dextrose) 1,000 mg 50 mL (has no administration in time range)   dextromethorphan-guaiFENesin (ROBITUSSIN DM) oral syrup 10 mL (10 mL Oral Given 7/7/23 0123)   pantoprazole (PROTONIX) EC tablet 40 mg (40 mg Oral Given 7/7/23 0557)   albuterol inhalation solution 2.5 mg (has no administration in time range)   hydrOXYzine HCL (ATARAX) tablet 50 mg (50 mg Oral Given 7/7/23 0152)   melatonin tablet 6 mg (has no administration in time range)   albuterol (FOR EMS ONLY) (2.5 mg/3 mL) 0.083 % inhalation solution 2.5 mg (0 mg Does not apply Given to EMS 7/6/23 1700) ipratropium-albuterol (FOR EMS ONLY) (DUO-NEB) 0.5-2.5 mg/3 mL inhalation solution 3 mL (0 mL Does not apply Given to EMS 7/6/23 1700)   methylPREDNISolone sodium succinate (FOR EMS ONLY) (Solu-MEDROL) 125 MG injection 125 mg (0 mg Does not apply Given to EMS 7/6/23 1700)   sodium chloride 0.9 % bolus 500 mL (0 mL Intravenous Stopped 7/6/23 1816)   sodium chloride 0.9 % bolus 1,000 mL (1,000 mL Intravenous New Bag 7/6/23 1819)   cefTRIAXone (ROCEPHIN) IVPB (premix in dextrose) 2,000 mg 50 mL (0 mg Intravenous Stopped 7/6/23 1915)   iohexol (OMNIPAQUE) 350 MG/ML injection (SINGLE-DOSE) 85 mL (85 mL Intravenous Given 7/6/23 1906)   azithromycin (ZITHROMAX) 500 mg in sodium chloride 0.9 % 250 mL IVPB (0 mg Intravenous Stopped 7/7/23 0728)   aspirin chewable tablet 324 mg ( Oral MAR Unhold 7/7/23 0116)       Diagnostic Studies  Results Reviewed     Procedure Component Value Units Date/Time    TSH, 3rd generation with Free T4 reflex [[id number]]  (Normal) Collected: 07/06/23 1729    Lab Status: Final result Specimen: Blood from Arm, Left Updated: 07/06/23 2347     TSH 3RD GENERATON 1.741 uIU/mL     Blood culture #1 [[id number]] Collected: 07/06/23 1745    Lab Status: Preliminary result Specimen: Blood from Arm, Left Updated: 07/06/23 2301     Blood Culture Received in Microbiology Lab. Culture in Progress. Blood culture #2 [[id number]] Collected: 07/06/23 1817    Lab Status: Preliminary result Specimen: Blood from Hand, Right Updated: 07/06/23 2301     Blood Culture Received in Microbiology Lab. Culture in Progress.     HS Troponin I 4hr [[id number]]  (Abnormal) Collected: 07/06/23 2140    Lab Status: Final result Specimen: Blood from Line, Venous Updated: 07/06/23 2224     hs TnI 4hr 1,933 ng/L      Delta 4hr hsTnI -237 ng/L     FLU/RSV/COVID - if FLU/RSV clinically relevant [[id number]]  (Normal) Collected: 07/06/23 0201    Lab Status: Final result Specimen: Nares from Nose Updated: 07/06/23 2138     SARS-CoV-2 Negative INFLUENZA A PCR Negative     INFLUENZA B PCR Negative     RSV PCR Negative    Narrative:      FOR PEDIATRIC PATIENTS - copy/paste COVID Guidelines URL to browser: [email or web address]    SARS-CoV-2 assay is a Nucleic Acid Amplification assay intended for the  qualitative detection of nucleic acid from SARS-CoV-2 in nasopharyngeal  swabs. Results are for the presumptive identification of SARS-CoV-2 RNA. Positive results are indicative of infection with SARS-CoV-2, the virus  causing COVID-19, but do not rule out bacterial infection or co-infection  with other viruses. Laboratories within the [facility] and its  territories are required to report all positive results to the appropriate  public health authorities. Negative results do not preclude SARS-CoV-2  infection and should not be used as the sole basis for treatment or other  patient management decisions. Negative results must be combined with  clinical observations, patient history, and epidemiological information. This test has not been FDA cleared or approved. This test has been authorized by FDA under an Emergency Use Authorization  (EUA). This test is only authorized for the duration of time the  declaration that circumstances exist justifying the authorization of the  emergency use of an in vitro diagnostic tests for detection of SARS-CoV-2  virus and/or diagnosis of COVID-19 infection under section 564(b)(1) of  the Act, 21 U. S.C. 149BPF-3(Q)(9), unless the authorization is terminated  or revoked sooner. The test has been validated but independent review by FDA  and CLIA is pending. Test performed using [employer] GeneXpert: This RT-PCR assay targets N2,  a region unique to SARS-CoV-2. A conserved region in the E-gene was chosen  for pan-Sarbecovirus detection which includes SARS-CoV-2. According to CMS-2020-01-R, this platform meets the definition of high-throughput technology.     Urine Microscopic [[id number]]  (Abnormal) Collected: 07/06/23 1930    Lab Status: Final result Specimen: Urine, Clean Catch Updated: 07/06/23 2027     RBC, UA 20-30 /hpf      WBC, UA 10-20 /hpf      Epithelial Cells Moderate /hpf      Bacteria, UA Moderate /hpf      MUCUS THREADS Occasional     Triplep Phos Yani, UA Occasional /hpf     Urine culture [[id number]] Collected: 07/06/23 1930    Lab Status:  In process Specimen: Urine, Clean Catch Updated: 07/06/23 2027    HS Troponin I 2hr [[id number]]  (Abnormal) Collected: 07/06/23 1930    Lab Status: Final result Specimen: Blood from Line, Venous Updated: 07/06/23 2005     hs TnI 2hr 2,025 ng/L      Delta 2hr hsTnI -145 ng/L     UA w Reflex to Microscopic w Reflex to Culture [[id number]]  (Abnormal) Collected: 07/06/23 1930    Lab Status: Final result Specimen: Urine, Clean Catch Updated: 07/06/23 1947     Color, UA Yellow     Clarity, UA Turbid     Specific Gravity, UA 1.010     pH, UA 8.0     Leukocytes, UA Large     Nitrite, UA Negative     Protein,  (2+) mg/dl      Glucose, UA Negative mg/dl      Ketones, UA Trace mg/dl      Urobilinogen, UA 0.2 E.U./dl      Bilirubin, UA Negative     Occult Blood, UA Large    Manual Differential(PHLEBS Do Not Order) [[id number]]  (Abnormal) Collected: 07/06/23 1729    Lab Status: Final result Specimen: Blood from Arm, Left Updated: 07/06/23 1856     Segmented % 85 %      Bands % 5 %      Lymphocytes % 2 %      Monocytes % 5 %      Eosinophils, % 0 %      Basophils % 0 %      Atypical Lymphocytes % 3 %      Absolute Neutrophils 19.95 Thousand/uL      Lymphocytes Absolute 0.44 Thousand/uL      Monocytes Absolute 1.11 Thousand/uL      Eosinophils Absolute 0.00 Thousand/uL      Basophils Absolute 0.00 Thousand/uL      Total Counted --     RBC Morphology Normal     Platelet Estimate Adequate    D-Dimer [[id number]]  (Abnormal) Collected: 07/06/23 1745    Lab Status: Final result Specimen: Blood from Arm, Left Updated: 07/06/23 1849 D-Dimer, Quant 2.14 ug/ml FEU     Narrative: In the evaluation for possible pulmonary embolism, in the appropriate (Well's Score of 4 or less) patient, the age adjusted d-dimer cutoff for this patient can be calculated as:    Age x 0.01 (in ug/mL) for Age-adjusted D-dimer exclusion threshold for a patient over 50 years.     Protime-INR [[id number]]  (Normal) Collected: 07/06/23 1745    Lab Status: Final result Specimen: Blood from Arm, Left Updated: 07/06/23 1836     Protime 14.4 seconds      INR 1.12    APTT [[id number]]  (Normal) Collected: 07/06/23 1745    Lab Status: Final result Specimen: Blood from Arm, Left Updated: 07/06/23 1836     PTT 29 seconds     Procalcitonin [[id number]]  (Abnormal) Collected: 07/06/23 1729    Lab Status: Final result Specimen: Blood from Arm, Left Updated: 07/06/23 1806     Procalcitonin 38.23 ng/ml     HS Troponin 0hr (reflex protocol) [[id number]]  (Abnormal) Collected: 07/06/23 1729    Lab Status: Final result Specimen: Blood from Arm, Left Updated: 07/06/23 1804     hs TnI 0hr 2,170 ng/L     B-Type Natriuretic Peptide(BNP) [[id number]]  (Abnormal) Collected: 07/06/23 1729    Lab Status: Final result Specimen: Blood from Arm, Left Updated: 07/06/23 1801      pg/mL     Comprehensive metabolic panel [[id number]]  (Abnormal) Collected: 07/06/23 1729    Lab Status: Final result Specimen: Blood from Arm, Left Updated: 07/06/23 1755     Sodium 134 mmol/L      Potassium 3.9 mmol/L      Chloride 103 mmol/L      CO2 23 mmol/L      ANION GAP 8 mmol/L      BUN 29 mg/dL      Creatinine 1.51 mg/dL      Glucose 139 mg/dL      Calcium 8.7 mg/dL      AST 22 U/L      ALT 9 U/L      Alkaline Phosphatase 76 U/L      Total Protein 7.0 g/dL      Albumin 3.5 g/dL      Total Bilirubin 0.92 mg/dL      eGFR 44 ml/min/1.73sq m     Narrative:      [place] guidelines for Chronic Kidney Disease (CKD):   •  Stage 1 with normal or high GFR (GFR > 90 mL/min/1.73 square meters)  • Stage 2 Mild CKD (GFR = 60-89 mL/min/1.73 square meters)  •  Stage 3A Moderate CKD (GFR = 45-59 mL/min/1.73 square meters)  •  Stage 3B Moderate CKD (GFR = 30-44 mL/min/1.73 square meters)  •  Stage 4 Severe CKD (GFR = 15-29 mL/min/1.73 square meters)  •  Stage 5 End Stage CKD (GFR <15 mL/min/1.73 square meters)  Note: GFR calculation is accurate only with a steady state creatinine    Lactic acid [[id number]]  (Normal) Collected: 07/06/23 1729    Lab Status: Final result Specimen: Blood from Arm, Left Updated: 07/06/23 1754     LACTIC ACID 1.9 mmol/L     Narrative:      Result may be elevated if tourniquet was used during collection. CBC and differential [[id number]]  (Abnormal) Collected: 07/06/23 1729    Lab Status: Final result Specimen: Blood from Arm, Left Updated: 07/06/23 1751     WBC 22.17 Thousand/uL      RBC 4.56 Million/uL      Hemoglobin 15.1 g/dL      Hematocrit 45.2 %      MCV 99 fL      MCH 33.1 pg      MCHC 33.4 g/dL      RDW 14.6 %      MPV 9.5 fL      Platelets 363 Thousands/uL     Fingerstick Glucose (POCT) [[id number]]  (Normal) Collected: 07/06/23 1716    Lab Status: Final result Updated: 07/06/23 1717     POC Glucose 136 mg/dl                  FL retrograde pyelogram   Final Result by Louie Weinstein MD (07/07 3663)      Fluoroscopic guidance provided for procedure guidance. Please refer to the separate procedure notes for additional details. Workstation performed: [id number]         CT abdomen pelvis wo contrast   ED Interpretation by Lars Gaxiola DO (07/06 2003)   IMPRESSION:     1.  Left-sided hydronephrosis secondary to a 1 cm calculus in the proximal left ureter.     2.  Evidence of cystitis with circumferential irregular bladder wall thickening and perivesical stranding, though there is also evidence of underlying chronic outlet obstruction. Correlation with urinalysis recommended.     3.  Large nonobstructing left upper pole calyceal calculus.       Final Result by Enrico Giles Lauryn Jackman MD (07/06 1958)      1. Left-sided hydronephrosis secondary to a 1 cm calculus in the proximal left ureter. 2.  Evidence of cystitis with circumferential irregular bladder wall thickening and perivesical stranding, though there is also evidence of underlying chronic outlet obstruction. Correlation with urinalysis recommended. 3.  Large nonobstructing left upper pole calyceal calculus. The study was marked in [facility] for immediate notification. Workstation performed: [id number]         CTA ED chest PE Study   ED Interpretation by Constance Durham DO (07/06 1933)   IMPRESSION:     No pulmonary embolus.     Mild right lower lobe bronchial wall thickening, endobronchial debris, and mild consolidation in the right lower lobe compatible with bronchitis and pneumonia.     Diffuse esophageal wall thickening which could be due to esophagitis.     Partially imaged perinephric stranding. See abdomen CT. Final Result by Kiel Vogel MD (07/06 1929)      No pulmonary embolus. Mild right lower lobe bronchial wall thickening, endobronchial debris, and mild consolidation in the right lower lobe compatible with bronchitis and pneumonia. Diffuse esophageal wall thickening which could be due to esophagitis. Partially imaged perinephric stranding. See abdomen CT. Workstation performed: [id number]         XR chest 2 views   Final Result by Wendi Ponce MD (07/07 0831)      No acute cardiopulmonary disease.                   Workstation performed: [id number]                    Procedures  Procedures   ECG 12 Lead Documentation  Date/Time: today/date: 7/7/2023  Performed by: Edwar Clemons    ECG reviewed by me, the ED Provider: yes    Patient location:  ED   Previous ECG:  Compared to current, no change   Rate:  114  ECG rate assessment: normal    Rhythm: sinus tachycardia    Ectopy:  none    QRS axis:  Normal  Intervals: normal   Q waves: None   ST segments:  Normal  T waves: normal      Impression: Sinus tachycardia, otherwise normal EKG       ED Course  ED Course as of 07/07/23 1431   Thu Jul 06, 2023   1744 WBC(!): 22.17  Will start IV Abx, given symptoms of dyspnea and cough - will obtain CXR, dimer is still pending. 1758 Creatinine(!): 1.51  No recent labs for comparison   1806 hs TnI 0hr(!): 2,170  Pt denies chest pain, EKG without associated ST changes   1807 Procalcitonin(!): 38.23   1807 BNP(!): 502             HEART Risk Score    Flowsheet Row Most Recent Value   Heart Score Risk Calculator    History 0 Filed at: 07/06/2023 1813   ECG 0 Filed at: 07/06/2023 1813   Age 2 Filed at: 07/06/2023 1813   Risk Factors 0 Filed at: 07/06/2023 1813   Troponin 2 Filed at: 07/06/2023 1813   HEART Score 4 Filed at: 07/06/2023 1813                     Initial Sepsis Screening     Row Name 07/06/23 1813                Is the patient's history suggestive of a new or worsening infection? Yes (Proceed)  -AM        Suspected source of infection pneumonia  -AM        Indicate SIRS criteria Tachycardia > 90 bpm;Tachypnea > 20 resp per min;Leukocytosis (WBC > 54435 IJL) OR Leukopenia (WBC <4000 IJL) OR Bandemia (WBC >10% bands)  -AM        Are two or more of the above signs & symptoms of infection both present and new to the patient? Yes (Proceed)  -AM        Assess for evidence of organ dysfunction: Are any of the below criteria present within 6 hours of suspected infection and SIRS criteria that are NOT considered to be chronic conditions?  --              User Key  (r) = Recorded By, (t) = Taken By, (c) = Cosigned By    [address] [place] [address] Name Provider Type    AM Irvin Braswell DO Physician              Default Flowsheet Data (last 720 hours)     Sepsis Reassess     Row Name 07/06/23 6403                   Repeat Volume Status and Tissue Perfusion Assessment Performed    Date of Reassessment: 07/06/23  -        Time of Reassessment: [id number] -[id number] [facility]        Sepsis Reassessment Note: Click "NEXT" below (NOT "close") to generate sepsis reassessment note. YES (proceed by clicking "NEXT")  -        Repeat Volume Status and Tissue Perfusion Assessment Performed --              User Key  (r) = Recorded By, (t) = Taken By, (c) = Cosigned By    [facility] Name Provider Type    [address] [facility], [address] [place] [address] Nurse Practitioner              SBIRT 20yo+    Flowsheet Row Most Recent Value   Initial Alcohol Screen: US AUDIT-C     1. How often do you have a drink containing alcohol? 1 Filed at: 07/06/2023 1824   2. How many drinks containing alcohol do you have on a typical day you are drinking? 0 Filed at: 07/06/2023 1824   3a. Male UNDER 65: How often do you have five or more drinks on one occasion? 0 Filed at: 07/06/2023 1824   3b. FEMALE Any Age, or MALE 65+: How often do you have 4 or more drinks on one occassion? 0 Filed at: 07/06/2023 1824   Audit-C Score 1 Filed at: 07/06/2023 1824   JAMESON: How many times in the past year have you. .. Used an illegal drug or used a prescription medication for non-medical reasons? Never Filed at: 07/06/2023 1824                    Medical Decision Making  Amount and/or Complexity of Data Reviewed  Labs: ordered. Decision-making details documented in ED Course. Radiology: ordered. Risk  Prescription drug management. Decision regarding hospitalization.           Disposition  Final diagnoses:   Ureteral stone with hydronephrosis   Pneumonia   Elevated troponin   Pyelonephritis     Time reflects when diagnosis was documented in both MDM as applicable and the Disposition within this note     Time User Action Codes Description Comment    7/6/2023  8:22 PM Mahsa Staples Add [N20.1] Ureterolithiasis     7/6/2023  8:24 PM Binta Founds A Add [N13.2] Ureteral stone with hydronephrosis     7/6/2023  8:25 PM Mica Darryn Add [J18.9] Pneumonia     7/6/2023  8:26 PM Binta Founds A Add [R77.8] Elevated troponin     7/6/2023  8:26 PM Mica Darryn Add [N12] Pyelonephritis 7/6/2023 10:50 PM Siddharth Luis 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 Park Nogueira up With Specialties Details Why Contact Info Additional Information    [facility] Follow up follow up as needed for foot/nail care [address] [address]  [address] [place] [address], [address] [place] [address]    [address] [facility] [address]  Follow up You have been accepted by [address] [place] [address] AT [facility] for PT/OT . if you do not hear back from them please call the listed number   Thank you [address]  Address   [address] [facility], [address], [address] [facility]           Current Discharge Medication List      CONTINUE these medications which have NOT CHANGED    Details   EPINEPHrine (EPIPEN) 0.3 mg/0.3 mL SOAJ Inject 0.3 mL (0.3 mg total) into the shoulder, thigh, or buttocks once as needed for anaphylaxis (call 911 when you use your epipen) for up to 1 dose  Qty: 0.3 mL, Refills: 0    Associated Diagnoses: Angioedema             No discharge procedures on file.     PDMP Review       Value Time User    PDMP Reviewed  Yes 7/7/2023  1:35 AM Siddharth Luis, [address] [facility]          ED Provider  Electronically Signed by           Jhoan Broussard DO  07/07/23 0310

## 2023-07-07 ENCOUNTER — APPOINTMENT (INPATIENT)
Dept: NON INVASIVE DIAGNOSTICS | Facility: HOSPITAL | Age: 73
DRG: 853 | End: 2023-07-07
Payer: MEDICARE

## 2023-07-07 ENCOUNTER — TELEPHONE (OUTPATIENT)
Dept: UROLOGY | Facility: MEDICAL CENTER | Age: 73
End: 2023-07-07

## 2023-07-07 PROBLEM — R78.81 POSITIVE BLOOD CULTURE: Status: ACTIVE | Noted: 2023-07-07

## 2023-07-07 LAB
ALBUMIN SERPL BCP-MCNC: 3.1 G/DL (ref 3.5–5)
ALL TARGETS: NOT DETECTED
ALP SERPL-CCNC: 56 U/L (ref 34–104)
ALT SERPL W P-5'-P-CCNC: 9 U/L (ref 7–52)
ANION GAP SERPL CALCULATED.3IONS-SCNC: 7 MMOL/L
AORTIC ROOT: 3.1 CM
AORTIC VALVE MEAN VELOCITY: 12.8 M/S
APICAL FOUR CHAMBER EJECTION FRACTION: 57 %
ASCENDING AORTA: 3.5 CM
AST SERPL W P-5'-P-CCNC: 20 U/L (ref 13–39)
AV AREA BY CONTINUOUS VTI: 2.4 CM2
AV AREA PEAK VELOCITY: 2.3 CM2
AV LVOT MEAN GRADIENT: 3 MMHG
AV LVOT PEAK GRADIENT: 5 MMHG
AV MEAN GRADIENT: 7 MMHG
AV PEAK GRADIENT: 14 MMHG
AV VALVE AREA: 2.39 CM2
AV VELOCITY RATIO: 0.59
BASOPHILS # BLD AUTO: 0.04 THOUSANDS/ÂΜL (ref 0–0.1)
BASOPHILS NFR BLD AUTO: 0 % (ref 0–1)
BILIRUB SERPL-MCNC: 0.41 MG/DL (ref 0.2–1)
BUN SERPL-MCNC: 30 MG/DL (ref 5–25)
CALCIUM ALBUM COR SERPL-MCNC: 8.8 MG/DL (ref 8.3–10.1)
CALCIUM SERPL-MCNC: 8.1 MG/DL (ref 8.4–10.2)
CARDIAC TROPONIN I PNL SERPL HS: 1228 NG/L (ref 8–18)
CHLORIDE SERPL-SCNC: 107 MMOL/L (ref 96–108)
CHOLEST SERPL-MCNC: 142 MG/DL
CO2 SERPL-SCNC: 22 MMOL/L (ref 21–32)
CREAT SERPL-MCNC: 1.2 MG/DL (ref 0.6–1.3)
DOP CALC AO PEAK VEL: 1.84 M/S
DOP CALC AO VTI: 36.09 CM
DOP CALC LVOT AREA: 3.8 CM2
DOP CALC LVOT CARDIAC INDEX: 3.33 L/MIN/M2
DOP CALC LVOT CARDIAC OUTPUT: 7.7 L/MIN
DOP CALC LVOT DIAMETER: 2.2 CM
DOP CALC LVOT PEAK VEL VTI: 22.68 CM
DOP CALC LVOT PEAK VEL: 1.09 M/S
DOP CALC LVOT STROKE INDEX: 39 ML/M2
DOP CALC LVOT STROKE VOLUME: 86.17
E WAVE DECELERATION TIME: 157 MS
EOSINOPHIL # BLD AUTO: 0.05 THOUSAND/ÂΜL (ref 0–0.61)
EOSINOPHIL NFR BLD AUTO: 0 % (ref 0–6)
ERYTHROCYTE [DISTWIDTH] IN BLOOD BY AUTOMATED COUNT: 15.1 % (ref 11.6–15.1)
FRACTIONAL SHORTENING: 30 (ref 28–44)
GFR SERPL CREATININE-BSD FRML MDRD: 59 ML/MIN/1.73SQ M
GLUCOSE SERPL-MCNC: 160 MG/DL (ref 65–140)
HCT VFR BLD AUTO: 42.4 % (ref 36.5–49.3)
HDLC SERPL-MCNC: 49 MG/DL
HGB BLD-MCNC: 13.7 G/DL (ref 12–17)
IMM GRANULOCYTES # BLD AUTO: 0.16 THOUSAND/UL (ref 0–0.2)
IMM GRANULOCYTES NFR BLD AUTO: 1 % (ref 0–2)
INTERVENTRICULAR SEPTUM IN DIASTOLE (PARASTERNAL SHORT AXIS VIEW): 1 CM
INTERVENTRICULAR SEPTUM: 1 CM (ref 0.6–1.1)
L PNEUMO1 AG UR QL IA.RAPID: NEGATIVE
LAAS-AP2: 24.7 CM2
LAAS-AP4: 27.5 CM2
LDLC SERPL CALC-MCNC: 80 MG/DL (ref 0–100)
LEFT ATRIUM SIZE: 4 CM
LEFT ATRIUM VOLUME (MOD BIPLANE): 87 ML
LEFT INTERNAL DIMENSION IN SYSTOLE: 4 CM (ref 2.1–4)
LEFT VENTRICULAR INTERNAL DIMENSION IN DIASTOLE: 5.7 CM (ref 3.5–6)
LEFT VENTRICULAR POSTERIOR WALL IN END DIASTOLE: 1 CM
LEFT VENTRICULAR STROKE VOLUME: 91 ML
LVSV (TEICH): 91 ML
LYMPHOCYTES # BLD AUTO: 0.59 THOUSANDS/ÂΜL (ref 0.6–4.47)
LYMPHOCYTES NFR BLD AUTO: 4 % (ref 14–44)
MCH RBC QN AUTO: 32.3 PG (ref 26.8–34.3)
MCHC RBC AUTO-ENTMCNC: 32.3 G/DL (ref 31.4–37.4)
MCV RBC AUTO: 100 FL (ref 82–98)
MONOCYTES # BLD AUTO: 0.49 THOUSAND/ÂΜL (ref 0.17–1.22)
MONOCYTES NFR BLD AUTO: 3 % (ref 4–12)
MV E'TISSUE VEL-SEP: 7 CM/S
MV PEAK A VEL: 0.78 M/S
MV PEAK E VEL: 63 CM/S
MV STENOSIS PRESSURE HALF TIME: 46 MS
MV VALVE AREA P 1/2 METHOD: 4.78
NEUTROPHILS # BLD AUTO: 15.75 THOUSANDS/ÂΜL (ref 1.85–7.62)
NEUTS SEG NFR BLD AUTO: 92 % (ref 43–75)
NRBC BLD AUTO-RTO: 0 /100 WBCS
PLATELET # BLD AUTO: 191 THOUSANDS/UL (ref 149–390)
PMV BLD AUTO: 10.1 FL (ref 8.9–12.7)
POTASSIUM SERPL-SCNC: 4.1 MMOL/L (ref 3.5–5.3)
PROCALCITONIN SERPL-MCNC: 30.55 NG/ML
PROT SERPL-MCNC: 6.1 G/DL (ref 6.4–8.4)
RA PRESSURE ESTIMATED: 3 MMHG
RBC # BLD AUTO: 4.24 MILLION/UL (ref 3.88–5.62)
RIGHT ATRIUM AREA SYSTOLE A4C: 21.2 CM2
RIGHT VENTRICLE ID DIMENSION: 4.7 CM
RV PSP: 27 MMHG
S PNEUM AG UR QL: NEGATIVE
SL CV LEFT ATRIUM LENGTH A2C: 6.3 CM
SL CV LV EF: 55
SL CV PED ECHO LEFT VENTRICLE DIASTOLIC VOLUME (MOD BIPLANE) 2D: 159 ML
SL CV PED ECHO LEFT VENTRICLE SYSTOLIC VOLUME (MOD BIPLANE) 2D: 68 ML
SODIUM SERPL-SCNC: 136 MMOL/L (ref 135–147)
TR MAX PG: 24 MMHG
TR PEAK VELOCITY: 2.5 M/S
TRICUSPID ANNULAR PLANE SYSTOLIC EXCURSION: 2.9 CM
TRICUSPID VALVE PEAK REGURGITATION VELOCITY: 2.45 M/S
TRIGL SERPL-MCNC: 67 MG/DL
WBC # BLD AUTO: 17.08 THOUSAND/UL (ref 4.31–10.16)

## 2023-07-07 PROCEDURE — 99233 SBSQ HOSP IP/OBS HIGH 50: CPT | Performed by: FAMILY MEDICINE

## 2023-07-07 PROCEDURE — 99232 SBSQ HOSP IP/OBS MODERATE 35: CPT | Performed by: UROLOGY

## 2023-07-07 PROCEDURE — 80053 COMPREHEN METABOLIC PANEL: CPT | Performed by: NURSE PRACTITIONER

## 2023-07-07 PROCEDURE — 97163 PT EVAL HIGH COMPLEX 45 MIN: CPT

## 2023-07-07 PROCEDURE — 80061 LIPID PANEL: CPT | Performed by: NURSE PRACTITIONER

## 2023-07-07 PROCEDURE — 85025 COMPLETE CBC W/AUTO DIFF WBC: CPT | Performed by: NURSE PRACTITIONER

## 2023-07-07 PROCEDURE — 84145 PROCALCITONIN (PCT): CPT | Performed by: NURSE PRACTITIONER

## 2023-07-07 PROCEDURE — 97167 OT EVAL HIGH COMPLEX 60 MIN: CPT

## 2023-07-07 PROCEDURE — 87449 NOS EACH ORGANISM AG IA: CPT | Performed by: NURSE PRACTITIONER

## 2023-07-07 PROCEDURE — 94640 AIRWAY INHALATION TREATMENT: CPT

## 2023-07-07 PROCEDURE — 99254 IP/OBS CNSLTJ NEW/EST MOD 60: CPT

## 2023-07-07 PROCEDURE — 94760 N-INVAS EAR/PLS OXIMETRY 1: CPT

## 2023-07-07 PROCEDURE — 93306 TTE W/DOPPLER COMPLETE: CPT

## 2023-07-07 PROCEDURE — 94664 DEMO&/EVAL PT USE INHALER: CPT

## 2023-07-07 PROCEDURE — 84484 ASSAY OF TROPONIN QUANT: CPT | Performed by: NURSE PRACTITIONER

## 2023-07-07 PROCEDURE — 11721 DEBRIDE NAIL 6 OR MORE: CPT | Performed by: PODIATRIST

## 2023-07-07 PROCEDURE — 83036 HEMOGLOBIN GLYCOSYLATED A1C: CPT | Performed by: NURSE PRACTITIONER

## 2023-07-07 PROCEDURE — 99252 IP/OBS CONSLTJ NEW/EST SF 35: CPT | Performed by: PODIATRIST

## 2023-07-07 RX ORDER — ATORVASTATIN CALCIUM 40 MG/1
40 TABLET, FILM COATED ORAL
Status: DISCONTINUED | OUTPATIENT
Start: 2023-07-07 | End: 2023-09-01 | Stop reason: HOSPADM

## 2023-07-07 RX ORDER — LANOLIN ALCOHOL/MO/W.PET/CERES
6 CREAM (GRAM) TOPICAL
Status: DISCONTINUED | OUTPATIENT
Start: 2023-07-07 | End: 2023-09-01 | Stop reason: HOSPADM

## 2023-07-07 RX ORDER — HYDROXYZINE HYDROCHLORIDE 25 MG/1
50 TABLET, FILM COATED ORAL
Status: DISCONTINUED | OUTPATIENT
Start: 2023-07-07 | End: 2023-09-01 | Stop reason: HOSPADM

## 2023-07-07 RX ORDER — ASPIRIN 81 MG/1
324 TABLET, CHEWABLE ORAL ONCE
Status: DISCONTINUED | OUTPATIENT
Start: 2023-07-07 | End: 2023-07-07

## 2023-07-07 RX ORDER — ASPIRIN 81 MG/1
81 TABLET, CHEWABLE ORAL DAILY
Status: DISCONTINUED | OUTPATIENT
Start: 2023-07-07 | End: 2023-07-29

## 2023-07-07 RX ADMIN — HYDROXYZINE HYDROCHLORIDE 50 MG: 25 TABLET, FILM COATED ORAL at 21:35

## 2023-07-07 RX ADMIN — HYDROXYZINE HYDROCHLORIDE 50 MG: 25 TABLET, FILM COATED ORAL at 01:52

## 2023-07-07 RX ADMIN — HEPARIN SODIUM 5000 UNITS: 5000 INJECTION INTRAVENOUS; SUBCUTANEOUS at 01:14

## 2023-07-07 RX ADMIN — PANTOPRAZOLE SODIUM 40 MG: 40 TABLET, DELAYED RELEASE ORAL at 05:57

## 2023-07-07 RX ADMIN — ASPIRIN 81 MG 81 MG: 81 TABLET ORAL at 16:08

## 2023-07-07 RX ADMIN — VANCOMYCIN HYDROCHLORIDE 2000 MG: 1 INJECTION, POWDER, LYOPHILIZED, FOR SOLUTION INTRAVENOUS at 16:20

## 2023-07-07 RX ADMIN — SODIUM CHLORIDE 100 ML/HR: 0.9 INJECTION, SOLUTION INTRAVENOUS at 07:29

## 2023-07-07 RX ADMIN — GUAIFENESIN AND DEXTROMETHORPHAN 10 ML: 100; 10 SYRUP ORAL at 01:23

## 2023-07-07 RX ADMIN — MELATONIN 6 MG: at 21:35

## 2023-07-07 RX ADMIN — ASPIRIN 81 MG 324 MG: 81 TABLET ORAL at 01:14

## 2023-07-07 RX ADMIN — IPRATROPIUM BROMIDE AND ALBUTEROL SULFATE 3 ML: 2.5; .5 SOLUTION RESPIRATORY (INHALATION) at 13:43

## 2023-07-07 RX ADMIN — IPRATROPIUM BROMIDE AND ALBUTEROL SULFATE 3 ML: 2.5; .5 SOLUTION RESPIRATORY (INHALATION) at 07:51

## 2023-07-07 RX ADMIN — HEPARIN SODIUM 5000 UNITS: 5000 INJECTION INTRAVENOUS; SUBCUTANEOUS at 21:35

## 2023-07-07 RX ADMIN — FLUTICASONE FUROATE AND VILANTEROL TRIFENATATE 1 PUFF: 200; 25 POWDER RESPIRATORY (INHALATION) at 09:12

## 2023-07-07 RX ADMIN — ATORVASTATIN CALCIUM 40 MG: 40 TABLET, FILM COATED ORAL at 16:08

## 2023-07-07 RX ADMIN — HEPARIN SODIUM 5000 UNITS: 5000 INJECTION INTRAVENOUS; SUBCUTANEOUS at 13:42

## 2023-07-07 RX ADMIN — CEFTRIAXONE 1000 MG: 1 INJECTION, SOLUTION INTRAVENOUS at 19:45

## 2023-07-07 RX ADMIN — IPRATROPIUM BROMIDE AND ALBUTEROL SULFATE 3 ML: 2.5; .5 SOLUTION RESPIRATORY (INHALATION) at 20:01

## 2023-07-07 RX ADMIN — HEPARIN SODIUM 5000 UNITS: 5000 INJECTION INTRAVENOUS; SUBCUTANEOUS at 05:57

## 2023-07-07 NOTE — ASSESSMENT & PLAN NOTE
· CT A/P:   · Left-sided hydronephrosis secondary to a 1 cm calculus in the proximal left ureter. · Evidence of cystitis with circumferential irregular bladder wall thickening and perivesical stranding, though there is also evidence of underlying chronic outlet obstruction. · Large nonobstructing left upper pole calyceal calculus  · S/p "POD#1 cystoscopy, left retrograde pyelogram, ureteral stent insertion, michel catheter insertion.  Inadvertent left ureteral perforation at tight proximal ureter, managed with indwelling ureteral stent"  · Urology input appreciated

## 2023-07-07 NOTE — CONSULTS
Podiatry - Consultation    Patient Information:   Willard Stallworth 75 y.o. male MRN: [id number]  Unit/Bed#: E4 -01 Encounter: [id number]  PCP: No primary care provider on file. Date of Admission:  7/6/2023  Date of Consultation: 07/07/23  Requesting Physician: Drake Rodriguez MD      ASSESSMENT:    Willard Stallworth is a 75 y.o. male with:    1. Onychomycosis  2. Tobacco use    PLAN:    · Mycotic toenails x10 debrided of thickness and length with large nail nippers without incident. · Recommend outpatient f/u for ongoing foot care; patient has difficulty cutting his nails himself. · Encouraged smoking cessation. · Rest of care per primary team.  · Podiatry will sign off at this time. Weightbearing status: Weightbearing as tolerated    SUBJECTIVE:    History of Present Illness:    Willard Stallworth is a 75 y.o. male who is originally admitted 7/6/2023 after presenting to the ED with shortness of breath and rigors and meeting criteria for sepsis. Patient is a poor historian with no significant past medical history reported. We are consulted for elongated, thickened toenails. Patient states he is unable to cut his nails adequately at home due to the thickness and just "lets them go." The nails are beginning to curl and press against the neighboring toes and cause discomfort. He reports he does not see a podiatrist outpatient. He reports he smokes 6 cigarettes daily, but has decided to quit during this hospital admission. He denies any pain in the lower extremities otherwise unrelated to the nails. Review of Systems:    Constitutional: Negative. HENT: Negative. Eyes: Negative. Respiratory: Negative. Cardiovascular: Negative. Gastrointestinal: Negative. Musculoskeletal: Negative   Skin: Elongated, thickened, curling toenails B/L. Neurological: Negative   Psych: Negative.      Past Medical and Surgical History:     Past Medical History:   Diagnosis Date   • COPD (chronic obstructive pulmonary disease) [facility])        Past Surgical History:   Procedure Laterality Date   • FL RETROGRADE PYELOGRAM  7/6/2023   • NC CYSTO BLADDER W/URETERAL CATHETERIZATION Left 7/6/2023    Procedure: CYSTOSCOPY RETROGRADE PYELOGRAM WITH INSERTION STENT URETERAL;  Surgeon: Jose Luis Parr MD;  Location: [facility] OR;  Service: Urology       Meds/Allergies:    Medications Prior to Admission   Medication   • EPINEPHrine (EPIPEN) 0.3 mg/0.3 mL SOAJ       No Known Allergies    Social History:     Marital Status: Single    Substance Use History:   Social History     Substance and Sexual Activity   Alcohol Use No     Social History     Tobacco Use   Smoking Status Every Day   • Packs/day: 0.50   • Types: Cigarettes   Smokeless Tobacco Not on file     Social History     Substance and Sexual Activity   Drug Use No       Family History:    History reviewed. No pertinent family history. OBJECTIVE:    Vitals:   Blood Pressure: 104/63 (07/07/23 0705)  Pulse: 72 (07/07/23 0705)  Temperature: (!) 97.4 °F (36.3 °C) (07/07/23 0705)  Temp Source: Temporal (07/07/23 0705)  Respirations: 20 (07/07/23 0705)  Height: 6' 3" (190.5 cm) (07/06/23 1650)  Weight - Scale: 102 kg (224 lb 13.9 oz) (07/07/23 0041)  SpO2: 95 % (07/07/23 0751)    Physical Exam:    General Appearance: Alert, cooperative, no distress. HEENT: Head normocephalic, atraumatic, without obvious abnormality. Heart: Normal rate and rhythm. Lungs: Non-labored breathing. No respiratory distress. Abdomen: Without distension. Psychiatric: AAOx3  Lower Extremity:    Vascular:    DP & PT pulses faintly palpable B/L. Capillary refill time <3 seconds B/L. Skin temperature WNL B/L. Musculoskeletal:  MMT is 5/5 in all muscle compartments bilaterally. ROM at the 1st MPJ and ankle joint are restricted bilaterally with the leg extended. No gross deformities noted. Dermatological:  No open lesions noted B/L.   Toenails 1-5 B/L are severely elongated, thickened, dystrophic, and discolored with subungual debris. The nails are curled and beginning to press on the neighboring toes. Callus L submetatarsal 5 & L plantar medial hallux IPJ & MPJ. Neurological:  Gross sensation is intact. Light touch is intact. Protective sensation is intact. Patient denies numbness and/or paresthesias. Additional data:     Lab Results: I have personally reviewed pertinent labs including:    Results from last 7 days   Lab Units 07/07/23  0600   WBC Thousand/uL 17.08*   HEMOGLOBIN g/dL 13.7   HEMATOCRIT % 42.4   PLATELETS Thousands/uL 191   NEUTROS PCT % 92*   LYMPHS PCT % 4*   MONOS PCT % 3*   EOS PCT % 0     Results from last 7 days   Lab Units 07/07/23  0600   POTASSIUM mmol/L 4.1   CHLORIDE mmol/L 107   CO2 mmol/L 22   BUN mg/dL 30*   CREATININE mg/dL 1.20   CALCIUM mg/dL 8.1*   ALK PHOS U/L 56   ALT U/L 9   AST U/L 20     Results from last 7 days   Lab Units 07/06/23  1745   INR  1.12       Cultures: I have personally reviewed pertinent cultures including:    Results from last 7 days   Lab Units 07/06/23  1817 07/06/23  1745   BLOOD CULTURE  Received in Microbiology Lab. Culture in Progress. Received in Microbiology Lab. Culture in Progress. Imaging: I have personally reviewed pertinent reports in PACS. EKG, Pathology, and Other Studies: I have personally reviewed pertinent reports. Time Spent for Care: 30 minutes. More than 50% of total time spent on counseling and coordination of care as described above. ** Please Note: Portions of the record may have been created with voice recognition software. Occasional wrong word or "sound a like" substitutions may have occurred due to the inherent limitations of voice recognition software. Read the chart carefully and recognize, using context, where substitutions have occurred.  **

## 2023-07-07 NOTE — ASSESSMENT & PLAN NOTE
· Elevated troponin likely demand ischemia in setting of sepsis  · Denies chest pain. No cardiac history although has not been seen by medical practitioner in years  · Troponin >2100.   EKG negative for acute ischemic changes  · Trend troponin and EKG  · Give aspirin bolus  · Check Echo, lipid panel, HgA1c, TSH  · Monitor on telemetry  · Cardiology consulted

## 2023-07-07 NOTE — PLAN OF CARE
Problem: PHYSICAL THERAPY ADULT  Goal: Performs mobility at highest level of function for planned discharge setting. See evaluation for individualized goals. Description: Treatment/Interventions: Functional transfer training, LE strengthening/ROM, Elevations, Therapeutic exercise, Cognitive reorientation, Equipment eval/education, Patient/family training, Bed mobility, Gait training, Compensatory technique education, Continued evaluation, Spoke to nursing, OT, Spoke to case management          See flowsheet documentation for full assessment, interventions and recommendations. Note: Prognosis: Fair  Problem List: Impaired balance, Decreased cognition, Impaired judgement, Decreased safety awareness, Decreased skin integrity, Obesity  Assessment: Willard Stallworth is a 75 y.o. male admitted to [address] [place] [address] on 7/6/2023 for Severe sepsis ([address] [facility]). S/p cytoscopy, L retrograde pyelogram, L ureteral stent placement, michel cath placement. PT was consulted and pt was seen on 7/7/2023 for mobility assessment and d/c planning. Pt presents w high fall risk, aspiration precautions, multiple lines. At baseline is indep for ADLs, IADLs and ambulation without an AD per patient report. Pt is currently functioning at a supervision assistance x1 level for bed mobility, transfers and ambulation without an AD. Pt demonstrated deficits of cognition, balance. Unclear baseline mentation however demonstrates deficits related to attention, memory, orientation, insight, judgement. Reaching out to therapist for support in initial standing however able to indep achieve upright balance when cued. With gait deviations contributing to fall risk however no gross LOB observed. Offered to trial DME including cane vs RW however pt decline both. Currently ambulating community distances without difficulty.  Pt will benefit from continued skilled IP PT to address the above mentioned impairments  in order to maximize recovery and increase functional independence when completing mobility and ADLs. At this time PT recommendations for d/c are home w HHPT. Barriers to Discharge: None     PT Discharge Recommendation: Home with home health rehabilitation    See flowsheet documentation for full assessment.

## 2023-07-07 NOTE — ASSESSMENT & PLAN NOTE
· 1/2 sets gram positive cocci in clusters   · Added vancomycin, continued on ceftriaxone  · Repeat set of blood cultures  · Monitor CBC, vital signs

## 2023-07-07 NOTE — TELEPHONE ENCOUNTER
Pt seen by Dr. Quincy Trejo while on BA call at the [facility]. Pt underwent [address] (Left: Bladder) on 7-6-2023. Pt remains inpatient at this time. Will monitor for discharge to arrange for follow up.      Plan:  • Admit to [facility]   • maintain michel catheter until afebrile 24 hours or at least overnight  • broad spectrum abx, tailor to cultures   • will make arrangements for second stage stone treatment     SIGNATURE: Ignacio Khalil MD  DATE: 7/6/2023  TIME: 11:06 PM

## 2023-07-07 NOTE — H&P (VIEW-ONLY)
UROLOGY INPATIENT CONSULT NOTE  Name: Samanta Montaño  : 1950  MRN: [id number]     Date of Service: 23  Service Requesting Consult: [facility]  Reason for consultation: Left ureteral stone    History of present illness:  Samanta Montaño is a 75 y.o. male with no prior urologic history who presented to the emergency room with sepsis. CT abdomen pelvis was performed demonstrating a proximal left ureteral stone with upstream moderate to severe hydronephrosis as well as additional renal stones. Contrast was noted to be freely draining from the right collecting system all the way down to the bladder and no hydronephrosis or hydroureter was noted. UA appears positive, patient has a leukocytosis up to 22 and procalcitonin of 38.2. Urgent ureteral stent placement advised. Patient denies any prior history of nephrolithiasis and has never seen a urologist.  Patient received Rocephin in the emergency room. Past medical history:  Past Medical History:   Diagnosis Date   • COPD (chronic obstructive pulmonary disease) ([address])        Past surgical history  History reviewed. No pertinent surgical history.     Current medications:  Current Facility-Administered Medications   Medication Dose Route Frequency Provider Last Rate Last Admin   • acetaminophen (TYLENOL) tablet 650 mg  650 mg Oral Q6H PRN KIKI Orourke       • albuterol inhalation solution 2.5 mg  2.5 mg Nebulization Q6H PRN KIKI Orourke       • aluminum-magnesium hydroxide-simethicone (MYLANTA) oral suspension 30 mL  30 mL Oral Q6H PRN KIKI Orourke       • [facility] Hold] aspirin chewable tablet 324 mg  324 mg Oral Once KIKI Orourke       • cefTRIAXone (ROCEPHIN) IVPB (premix in dextrose) 1,000 mg 50 mL  1,000 mg Intravenous Q24H KIKI Orourke       • dextromethorphan-guaiFENesin (ROBITUSSIN DM) oral syrup 10 mL  10 mL Oral Q4H PRN KIKI Orourke       • [START ON 2023] fluticasone-vilanterol 200-25 mcg/actuation 1 puff  1 puff Inhalation Daily KIKI Gunn       • heparin (porcine) subcutaneous injection 5,000 Units  5,000 Units Subcutaneous [facility] KIKI Gunn       • HYDROmorphone (DILAUDID) injection 0.2 mg  0.2 mg Intravenous Q4H PRN KIKI Gunn       • ipratropium-albuterol (DUO-NEB) 0.5-2.5 mg/3 mL inhalation solution 3 mL  3 mL Nebulization TID KIKI Gunn       • [START ON 7/7/2023] nicotine (NICODERM CQ) 14 mg/24hr TD 24 hr patch 1 patch  1 patch Transdermal Daily KIKI Gunn       • ondansetron (ZOFRAN) injection 4 mg  4 mg Intravenous Q6H PRN KIKI Gunn       • oxyCODONE (ROXICODONE) IR tablet 5 mg  5 mg Oral 4x Daily PRN KIKI Gunn       • [START ON 7/7/2023] pantoprazole (PROTONIX) EC tablet 40 mg  40 mg Oral Early Morning KIKI Gunn       • simethicone (MYLICON) chewable tablet 80 mg  80 mg Oral 4x Daily PRN KIKI Gunn       • sodium chloride 0.9 % infusion  125 mL/hr Intravenous Continuous Drena Skates,  mL/hr at 07/06/23 2155 New Bag at 07/06/23 2303   • sodium chloride 0.9 % infusion  100 mL/hr Intravenous Continuous KIKI Gunn           Allergies:  No Known Allergies    Social history:  Social History     Socioeconomic History   • Marital status: Single     Spouse name: None   • Number of children: None   • Years of education: None   • Highest education level: None   Occupational History   • None   Tobacco Use   • Smoking status: Every Day     Packs/day: 0.50     Types: Cigarettes   • Smokeless tobacco: None   Vaping Use   • Vaping Use: Never used   Substance and Sexual Activity   • Alcohol use: No   • Drug use: No   • Sexual activity: None   Other Topics Concern   • None   Social History Narrative   • None     Social Determinants of Health     Financial Resource Strain: Not on file   Food Insecurity: Not on file Transportation Needs: Not on file   Physical Activity: Not on file   Stress: Not on file   Social Connections: Not on file   Intimate Partner Violence: Not on file   Housing Stability: Not on file       Family history:  History reviewed. No pertinent family history. Review of systems:  Review of Systems   Constitutional: Negative for chills and unexpected weight change. HENT: Negative for hearing loss and sore throat. Eyes: Negative for redness and visual disturbance. Respiratory: Negative for cough and shortness of breath. Cardiovascular: Negative for chest pain, palpitations and leg swelling. Gastrointestinal: Negative for blood in stool, constipation, diarrhea, nausea and vomiting. Endocrine: Negative for cold intolerance and heat intolerance. Genitourinary:        Per HPI   Musculoskeletal: Negative for arthralgias, back pain and myalgias. Skin: Negative for color change and rash. Neurological: Negative for dizziness, seizures and headaches. Hematological: Does not bruise/bleed easily. Physical exam:  /70   Pulse 96   Temp 99 °F (37.2 °C)   Resp 20   Ht 6' 3" (1.905 m)   Wt 108 kg (237 lb 10.5 oz)   SpO2 96%   BMI 29.70 kg/m²   General:  Healthy appearing male in no acute distress. Head:  Normocephalic, atraumatic. Eyes: no scleral icterus  ENMT: Nares patent, moist mucous membranes  Cardiovascular:  Regular rate  Respiratory:  Patient has unlabored respirations.    Abdomen:  Abdomen nondistended  Extremities: Normal ROM, no deformities    Labs:  CBC:   Lab Results   Component Value Date    WBC 22.17 (H) 07/06/2023    HGB 15.1 07/06/2023    HCT 45.2 07/06/2023    MCV 99 (H) 07/06/2023     07/06/2023       BMP:   Lab Results   Component Value Date    CALCIUM 8.7 07/06/2023    K 3.9 07/06/2023    CO2 23 07/06/2023     07/06/2023    BUN 29 (H) 07/06/2023    CREATININE 1.51 (H) 07/06/2023       Imaging:  CT abdomen pelvis wo contrast   ED Interpretation IMPRESSION:     1.  Left-sided hydronephrosis secondary to a 1 cm calculus in the proximal left ureter.     2.  Evidence of cystitis with circumferential irregular bladder wall thickening and perivesical stranding, though there is also evidence of underlying chronic outlet obstruction. Correlation with urinalysis recommended.     3.  Large nonobstructing left upper pole calyceal calculus. Final Result      1. Left-sided hydronephrosis secondary to a 1 cm calculus in the proximal left ureter. 2.  Evidence of cystitis with circumferential irregular bladder wall thickening and perivesical stranding, though there is also evidence of underlying chronic outlet obstruction. Correlation with urinalysis recommended. 3.  Large nonobstructing left upper pole calyceal calculus. The study was marked in [facility] for immediate notification. Workstation performed: [id number]         CTA ED chest PE Study   ED Interpretation   IMPRESSION:     No pulmonary embolus.     Mild right lower lobe bronchial wall thickening, endobronchial debris, and mild consolidation in the right lower lobe compatible with bronchitis and pneumonia.     Diffuse esophageal wall thickening which could be due to esophagitis.     Partially imaged perinephric stranding. See abdomen CT. Final Result      No pulmonary embolus. Mild right lower lobe bronchial wall thickening, endobronchial debris, and mild consolidation in the right lower lobe compatible with bronchitis and pneumonia. Diffuse esophageal wall thickening which could be due to esophagitis. Partially imaged perinephric stranding. See abdomen CT. Workstation performed: [id number]         XR chest 2 views    (Results Pending)   FL < 1 hour    (Results Pending)         Assessment:  79-year-old male presenting with sepsis, UTI, and left ureteral stone. Urgent left ureteral stent placement advised.   Informed consent obtained for cystoscopy, left retrograde pyelogram, left ureteral stent placement. It may be necessary to perform right retrograde pyelogram with possible stent placement given the right collecting system completely filled with contrast and unable to demonstrate that there are no stones. Alternatively contrast making it to the bladder implies that there is no obstruction. There is also no hydronephrosis on that side. Patient also consented for right retrograde pyelogram with possible stent. Plan:  • OR for above  • Rocephin already given in ED    Juve Schulte MD  11:13 PM  [id number] [facility]    Portions of the above record have been created with voice recognition software. Occasional wrong word or "sound alike" substitution may have occurred due to the inherent limitations of voice recognition software. Please read the chart carefully and recognize, using context, where substitution may have occurred. For further clarification, please contact me directly.

## 2023-07-07 NOTE — PROGRESS NOTES
[facility]  Progress Note  Name: Rashid Denton I  MRN: [id number]  Unit/Bed#: E4 -01 I Date of Admission: 7/6/2023   Date of Service: 7/7/2023 I Hospital Day: 1    Assessment/Plan   * Severe sepsis ([address])  Assessment & Plan  · Severe sepsis POA with tachycardia, leukocytosis, DENA and respiratory failure 88%  · Presents with complaints of shortness of breath, rigors and right flank pain  · Suspect urinary source:  · CT A/P: Evidence of cystitis with circumferential irregular bladder wall thickening and perivesical stranding, though there is also evidence of underlying chronic outlet obstruction. Correlation with urinalysis recommended. SEE pyelonephritis  · CT chest: Mild right lower lobe bronchial wall thickening, endobronchial debris, and mild consolidation in the right lower lobe compatible with bronchitis and pneumonia. However, no clinical presentation of pneumonia. · IV ceftriaxone  · PCT very elevated, trend  · F/u urine/blood cultures  · Monitor CBC, VS    Pyelonephritis  Assessment & Plan  · CT: Evidence of cystitis with circumferential irregular bladder wall thickening and perivesical stranding, though there is also evidence of underlying chronic outlet obstruction. Correlation with urinalysis recommended  · UA  Large leuks/no nitrites, WBC 10-20  · Appreciate Urology input, "POD#1 cystoscopy, left retrograde pyelogram, ureteral stent insertion, michel catheter insertion. Inadvertent left ureteral perforation at tight proximal ureter, managed with indwelling ureteral stent"  · Continue IV ceftriaxone  · Follow-up urine culture and blood culture    Acute kidney injury [facility])  Assessment & Plan  · Post renal DENA in setting of obstructing calculus  · Creatinine 1.5, last creatinine 0.9 in 2018  · Improved s/p urologic procedure and IVF  · IV hydration.   Monitor I/O  · Avoid nephrotoxins  · Monitor BMP    Hydronephrosis with obstructing calculus  Assessment & Plan  · CT A/P: · Left-sided hydronephrosis secondary to a 1 cm calculus in the proximal left ureter. · Evidence of cystitis with circumferential irregular bladder wall thickening and perivesical stranding, though there is also evidence of underlying chronic outlet obstruction. · Large nonobstructing left upper pole calyceal calculus  · S/p "POD#1 cystoscopy, left retrograde pyelogram, ureteral stent insertion, michel catheter insertion. Inadvertent left ureteral perforation at tight proximal ureter, managed with indwelling ureteral stent"  · Urology input appreciated    Esophageal abnormality  Assessment & Plan  · Incidental finding on CTA study: Diffuse esophageal wall thickening which could be due to esophagitis  · Start PPI  · Outpatient follow-up with GI for EGD. Consider inpatient consult due to poor follow-up    Elevated d-dimer  Assessment & Plan  · CTA neg for PE  · No obvious signs of DVT  · Suspect due to sepsis    Elevated troponin  Assessment & Plan  · Elevated troponin likely demand ischemia in setting of sepsis  · Denies chest pain. No cardiac history although has not been seen by medical practitioner in years  · Troponin >2100. EKG negative for acute ischemic changes  · Trend troponin and EKG  · Give aspirin bolus  · Check Echo, lipid panel, HgA1c, TSH  · Monitor on telemetry  · Cardiology consulted    Chronic obstructive pulmonary disease with acute exacerbation (HCC)  Assessment & Plan  · Not in exacerbation  · Not on maintenance inhaler; poor outpatient f/u  · Smokes 6 cigarettes daily. Reports desire to quit  · Start Breo Ellipta  · DuoNeb 3 times daily  · Continue as needed albuterol  · Outpatient follow-up with pulmonology  · Tobacco cessation  · Check ambulatory pulse ox    Tobacco use  Assessment & Plan  · Smokes 6 cigarettes daily. Apparently plans to quit.   · Nicotine TD patch  · Tobacco counseling provided            VTE Pharmacologic Prophylaxis: VTE Score: 5 High Risk (Score >/= 5) - Pharmacological DVT Prophylaxis Ordered: heparin. Sequential Compression Devices Ordered. Patient Centered Rounds: I performed bedside rounds with nursing staff today. Discussions with Specialists or Other Care Team Provider: Urology     Education and Discussions with Family / Patient: Patient declined call to . Total Time Spent on Date of Encounter in care of patient: 55 minutes This time was spent on one or more of the following: performing physical exam; counseling and coordination of care; obtaining or reviewing history; documenting in the medical record; reviewing/ordering tests, medications or procedures; communicating with other healthcare professionals and discussing with patient's family/caregivers. Current Length of Stay: 1 day(s)  Current Patient Status: Inpatient   Certification Statement: The patient will continue to require additional inpatient hospital stay due to close monitoring, IV Rx, infectious workup  Discharge Plan: Anticipate discharge in 48-72 hrs to home. Code Status: Level 1 - Full Code    Subjective:   Patient seen and examined. He reports feeling well, denies postoperative pain. Sorensen in place. Objective:     Vitals:   Temp (24hrs), Av °F (36.7 °C), Min:97.2 °F (36.2 °C), Max:99 °F (37.2 °C)    Temp:  [97.2 °F (36.2 °C)-99 °F (37.2 °C)] 97.2 °F (36.2 °C)  HR:  [] 68  Resp:  [18-33] 18  BP: ()/(50-78) 107/62  SpO2:  [88 %-98 %] 95 %  Body mass index is 28 kg/m². Input and Output Summary (last 24 hours): Intake/Output Summary (Last 24 hours) at 2023 1331  Last data filed at 2023 1251  Gross per 24 hour   Intake 1786 ml   Output 1175 ml   Net 611 ml       Physical Exam:   Physical Exam  Constitutional:       General: He is not in acute distress. HENT:      Head: Normocephalic and atraumatic. Cardiovascular:      Rate and Rhythm: Normal rate and regular rhythm. Pulmonary:      Effort: No respiratory distress.    Abdominal: General: There is no distension. Tenderness: There is no abdominal tenderness. There is no guarding. Genitourinary:     Comments: Sorensen catheter  Musculoskeletal:      Right lower leg: No edema. Left lower leg: No edema. Skin:     General: Skin is warm and dry. Neurological:      Mental Status: He is oriented to person, place, and time.    Psychiatric:         Mood and Affect: Mood normal.          Additional Data:     Labs:  Results from last 7 days   Lab Units 07/07/23  0600 07/06/23  1729   WBC Thousand/uL 17.08* 22.17*   HEMOGLOBIN g/dL 13.7 15.1   HEMATOCRIT % 42.4 45.2   PLATELETS Thousands/uL 191 232   BANDS PCT %  --  5   NEUTROS PCT % 92*  --    LYMPHS PCT % 4*  --    LYMPHO PCT %  --  2*   MONOS PCT % 3*  --    MONO PCT %  --  5   EOS PCT % 0 0     Results from last 7 days   Lab Units 07/07/23  0600   SODIUM mmol/L 136   POTASSIUM mmol/L 4.1   CHLORIDE mmol/L 107   CO2 mmol/L 22   BUN mg/dL 30*   CREATININE mg/dL 1.20   ANION GAP mmol/L 7   CALCIUM mg/dL 8.1*   ALBUMIN g/dL 3.1*   TOTAL BILIRUBIN mg/dL 0.41   ALK PHOS U/L 56   ALT U/L 9   AST U/L 20   GLUCOSE RANDOM mg/dL 160*     Results from last 7 days   Lab Units 07/06/23  1745   INR  1.12     Results from last 7 days   Lab Units 07/06/23  1716   POC GLUCOSE mg/dl 136         Results from last 7 days   Lab Units 07/07/23  0600 07/06/23  1729   LACTIC ACID mmol/L  --  1.9   PROCALCITONIN ng/ml 30.55* 38.23*       Lines/Drains:  Invasive Devices     Peripheral Intravenous Line  Duration           Peripheral IV 07/06/23 Left Antecubital 1 day          Drain  Duration           Ureteral Internal Stent Left ureter 6 Fr. <1 day    Urethral Catheter Coude 18 Fr. <1 day              Urinary Catheter:  Goal for removal: Remove after 48 hrs of I/O monitoring           Telemetry:  Telemetry Orders (From admission, onward)             24 Hour Telemetry Monitoring  Continuous x 24 Hours (Telem)        Comments: Elevated troponin   Question:  Reason for 24 Hour Telemetry  Answer:  Metabolic/electrolyte disturbance with high probability of dysrhythmia. K level <3 or >6 OR KCL infusion >10mEq/hr                 Telemetry Reviewed: Normal Sinus Rhythm  Indication for Continued Telemetry Use: No indication for continued use. Will discontinue. Imaging: Reviewed radiology reports from this admission including: chest CT scan and abdominal/pelvic CT and Personally reviewed the following imaging: chest CT scan and abdominal/pelvic CT    Recent Cultures (last 7 days):   Results from last 7 days   Lab Units 07/06/23  1817 07/06/23  1745   BLOOD CULTURE  Received in Microbiology Lab. Culture in Progress. Received in Microbiology Lab. Culture in Progress.        Last 24 Hours Medication List:   Current Facility-Administered Medications   Medication Dose Route Frequency Provider Last Rate   • acetaminophen  650 mg Oral Q6H PRN Deatra Brittle, CRNP     • albuterol  2.5 mg Nebulization Q6H PRN Deatra Brittle, CRNP     • aluminum-magnesium hydroxide-simethicone  30 mL Oral Q6H PRN Deatra Brittle, CRNP     • cefTRIAXone  1,000 mg Intravenous Q24H Deatra Brittle, CRNP     • dextromethorphan-guaiFENesin  10 mL Oral Q4H PRN Deatra Brittle, CRNP     • fluticasone-vilanterol  1 puff Inhalation Daily Deatra Brittle, CRNP     • heparin (porcine)  5,000 Units Subcutaneous [facility] Deatra Brittle, CRNP     • HYDROmorphone  0.2 mg Intravenous Q4H PRN Deatra Brittle, CRNP     • hydrOXYzine HCL  50 mg Oral HS PRN Deatra Brittle, CRNP     • ipratropium-albuterol  3 mL Nebulization TID Deatra Brittle, CRNP     • melatonin  6 mg Oral HS Deatra Brittle, CRNP     • nicotine  1 patch Transdermal Daily Deatra Brittle, CRNP     • ondansetron  4 mg Intravenous Q6H PRN Deatra Brittle, CRNP     • oxyCODONE  5 mg Oral 4x Daily PRN Deatra Brittle, CRNP     • pantoprazole  40 mg Oral Early Morning Deatra Brittle, CRNP     • simethicone  80 mg Oral 4x Daily PRN KIKI Chowdhury          Today, Patient Was Seen By: Russell Nolen MD    **Please Note: This note may have been constructed using a voice recognition system. **

## 2023-07-07 NOTE — ASSESSMENT & PLAN NOTE
· Severe sepsis POA with tachycardia, leukocytosis, DENA and respiratory failure 88%  · Presents with complaints of shortness of breath, rigors and right flank pain  · Pulmonary and urinary source:  · CT chest: Mild right lower lobe bronchial wall thickening, endobronchial debris, and mild consolidation in the right lower lobe compatible with bronchitis and pneumonia. · CT A/P: Evidence of cystitis with circumferential irregular bladder wall thickening and perivesical stranding, though there is also evidence of underlying chronic outlet obstruction. Correlation with urinalysis recommended.  SEE pyelonephritis  · IV ceftriaxone  · PCT very elevated, trend  · Urine antigens ordered  · Supportive care with IV hydration, antitussives and as needed antipyretic
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
## 2023-07-07 NOTE — ASSESSMENT & PLAN NOTE
· Not in exacerbation  · Not on maintenance inhaler; poor outpatient f/u  · Smokes 6 cigarettes daily.   Reports desire to quit  · Start Breo Ellipta  · DuoNeb 3 times daily  · Continue as needed albuterol  · Outpatient follow-up with pulmonology  · Tobacco cessation  · Check ambulatory pulse ox

## 2023-07-07 NOTE — CONSULTS
UROLOGY INPATIENT CONSULT NOTE  Name: Tori Villanueva  : 1950  MRN: [id number]     Date of Service: 23  Service Requesting Consult: [facility]  Reason for consultation: Left ureteral stone    History of present illness:  Tori Villanueva is a 75 y.o. male with no prior urologic history who presented to the emergency room with sepsis. CT abdomen pelvis was performed demonstrating a proximal left ureteral stone with upstream moderate to severe hydronephrosis as well as additional renal stones. Contrast was noted to be freely draining from the right collecting system all the way down to the bladder and no hydronephrosis or hydroureter was noted. UA appears positive, patient has a leukocytosis up to 22 and procalcitonin of 38.2. Urgent ureteral stent placement advised. Patient denies any prior history of nephrolithiasis and has never seen a urologist.  Patient received Rocephin in the emergency room. Past medical history:  Past Medical History:   Diagnosis Date   • COPD (chronic obstructive pulmonary disease) ([address])        Past surgical history  History reviewed. No pertinent surgical history.     Current medications:  Current Facility-Administered Medications   Medication Dose Route Frequency Provider Last Rate Last Admin   • acetaminophen (TYLENOL) tablet 650 mg  650 mg Oral Q6H PRN Brianna Medal, CRNP       • albuterol inhalation solution 2.5 mg  2.5 mg Nebulization Q6H PRN Brianna Medal, CRNP       • aluminum-magnesium hydroxide-simethicone (MYLANTA) oral suspension 30 mL  30 mL Oral Q6H PRN Brianna Medal, CRNP       • [facility] Hold] aspirin chewable tablet 324 mg  324 mg Oral Once Brianna Medal, CRNP       • cefTRIAXone (ROCEPHIN) IVPB (premix in dextrose) 1,000 mg 50 mL  1,000 mg Intravenous Q24H Brianna Medal, CRNP       • dextromethorphan-guaiFENesin (ROBITUSSIN DM) oral syrup 10 mL  10 mL Oral Q4H PRN Brianna Medal, CRNP       • [START ON 2023] fluticasone-vilanterol 200-25 mcg/actuation 1 puff  1 puff Inhalation Daily Brianna Medal, CRNP       • heparin (porcine) subcutaneous injection 5,000 Units  5,000 Units Subcutaneous [facility] Brianna Medal, CRNP       • HYDROmorphone (DILAUDID) injection 0.2 mg  0.2 mg Intravenous Q4H PRN Brianna Medal, CRNP       • ipratropium-albuterol (DUO-NEB) 0.5-2.5 mg/3 mL inhalation solution 3 mL  3 mL Nebulization TID Brianna Medal, CRNP       • [START ON 7/7/2023] nicotine (NICODERM CQ) 14 mg/24hr TD 24 hr patch 1 patch  1 patch Transdermal Daily Brianna Medal, CRNP       • ondansetron (ZOFRAN) injection 4 mg  4 mg Intravenous Q6H PRN Brianna Medal, CRNP       • oxyCODONE (ROXICODONE) IR tablet 5 mg  5 mg Oral 4x Daily PRN Brianna Medal, CRNP       • [START ON 7/7/2023] pantoprazole (PROTONIX) EC tablet 40 mg  40 mg Oral Early Morning Brianna Medal, CRNP       • simethicone (MYLICON) chewable tablet 80 mg  80 mg Oral 4x Daily PRN Brianna Medal, CRNP       • sodium chloride 0.9 % infusion  125 mL/hr Intravenous Continuous Pascual Tiara,  mL/hr at 07/06/23 2155 New Bag at 07/06/23 2303   • sodium chloride 0.9 % infusion  100 mL/hr Intravenous Continuous Brianna Medal, CRNP           Allergies:  No Known Allergies    Social history:  Social History     Socioeconomic History   • Marital status: Single     Spouse name: None   • Number of children: None   • Years of education: None   • Highest education level: None   Occupational History   • None   Tobacco Use   • Smoking status: Every Day     Packs/day: 0.50     Types: Cigarettes   • Smokeless tobacco: None   Vaping Use   • Vaping Use: Never used   Substance and Sexual Activity   • Alcohol use: No   • Drug use: No   • Sexual activity: None   Other Topics Concern   • None   Social History Narrative   • None     Social Determinants of Health     Financial Resource Strain: Not on file   Food Insecurity: Not on file Transportation Needs: Not on file   Physical Activity: Not on file   Stress: Not on file   Social Connections: Not on file   Intimate Partner Violence: Not on file   Housing Stability: Not on file       Family history:  History reviewed. No pertinent family history. Review of systems:  Review of Systems   Constitutional: Negative for chills and unexpected weight change. HENT: Negative for hearing loss and sore throat. Eyes: Negative for redness and visual disturbance. Respiratory: Negative for cough and shortness of breath. Cardiovascular: Negative for chest pain, palpitations and leg swelling. Gastrointestinal: Negative for blood in stool, constipation, diarrhea, nausea and vomiting. Endocrine: Negative for cold intolerance and heat intolerance. Genitourinary:        Per HPI   Musculoskeletal: Negative for arthralgias, back pain and myalgias. Skin: Negative for color change and rash. Neurological: Negative for dizziness, seizures and headaches. Hematological: Does not bruise/bleed easily. Physical exam:  /70   Pulse 96   Temp 99 °F (37.2 °C)   Resp 20   Ht 6' 3" (1.905 m)   Wt 108 kg (237 lb 10.5 oz)   SpO2 96%   BMI 29.70 kg/m²   General:  Healthy appearing male in no acute distress. Head:  Normocephalic, atraumatic. Eyes: no scleral icterus  ENMT: Nares patent, moist mucous membranes  Cardiovascular:  Regular rate  Respiratory:  Patient has unlabored respirations.    Abdomen:  Abdomen nondistended  Extremities: Normal ROM, no deformities    Labs:  CBC:   Lab Results   Component Value Date    WBC 22.17 (H) 07/06/2023    HGB 15.1 07/06/2023    HCT 45.2 07/06/2023    MCV 99 (H) 07/06/2023     07/06/2023       BMP:   Lab Results   Component Value Date    CALCIUM 8.7 07/06/2023    K 3.9 07/06/2023    CO2 23 07/06/2023     07/06/2023    BUN 29 (H) 07/06/2023    CREATININE 1.51 (H) 07/06/2023       Imaging:  CT abdomen pelvis wo contrast   ED Interpretation IMPRESSION:     1.  Left-sided hydronephrosis secondary to a 1 cm calculus in the proximal left ureter.     2.  Evidence of cystitis with circumferential irregular bladder wall thickening and perivesical stranding, though there is also evidence of underlying chronic outlet obstruction. Correlation with urinalysis recommended.     3.  Large nonobstructing left upper pole calyceal calculus. Final Result      1. Left-sided hydronephrosis secondary to a 1 cm calculus in the proximal left ureter. 2.  Evidence of cystitis with circumferential irregular bladder wall thickening and perivesical stranding, though there is also evidence of underlying chronic outlet obstruction. Correlation with urinalysis recommended. 3.  Large nonobstructing left upper pole calyceal calculus. The study was marked in [facility] for immediate notification. Workstation performed: [id number]         CTA ED chest PE Study   ED Interpretation   IMPRESSION:     No pulmonary embolus.     Mild right lower lobe bronchial wall thickening, endobronchial debris, and mild consolidation in the right lower lobe compatible with bronchitis and pneumonia.     Diffuse esophageal wall thickening which could be due to esophagitis.     Partially imaged perinephric stranding. See abdomen CT. Final Result      No pulmonary embolus. Mild right lower lobe bronchial wall thickening, endobronchial debris, and mild consolidation in the right lower lobe compatible with bronchitis and pneumonia. Diffuse esophageal wall thickening which could be due to esophagitis. Partially imaged perinephric stranding. See abdomen CT. Workstation performed: [id number]         XR chest 2 views    (Results Pending)   FL < 1 hour    (Results Pending)         Assessment:  70-year-old male presenting with sepsis, UTI, and left ureteral stone. Urgent left ureteral stent placement advised.   Informed consent obtained for cystoscopy, left retrograde pyelogram, left ureteral stent placement. It may be necessary to perform right retrograde pyelogram with possible stent placement given the right collecting system completely filled with contrast and unable to demonstrate that there are no stones. Alternatively contrast making it to the bladder implies that there is no obstruction. There is also no hydronephrosis on that side. Patient also consented for right retrograde pyelogram with possible stent. Plan:  • OR for above  • Rocephin already given in ED    Loraine Magallanes MD  11:13 PM  [facility]    Portions of the above record have been created with voice recognition software. Occasional wrong word or "sound alike" substitution may have occurred due to the inherent limitations of voice recognition software. Please read the chart carefully and recognize, using context, where substitution may have occurred. For further clarification, please contact me directly.

## 2023-07-07 NOTE — CONSULTS
Consultation Note - Cardiology   Tori Villanueva 75 y.o. male MRN: [id number]  Unit/Bed#: E4 -01 Encounter: [id number]  07/07/23  9:10 AM    Encounter date: 7/7/2023  Patient name: Tori Villanueva 75 y.o. male  Encounter providers/authors:   Medical Student: Linn Escobar, 4th year medical student, [place]   Attending Physician: Gregoria Guerrero DO  Inpatient attending physician: Nasreen Ayers MD  Primary care provider: No primary care provider on file. Date of admission: 7/6/2023  Length of stay: Day 1      Assessment/ Plan:  1. Elevated Troponin   Likely due to sepsis on top of underlying CAD   Troponin downtrending (1228<1933<2025<2170)   EKG NSR   Denies personal or family cardiac hx   40 yr smoking hx (20pk/yr)   TSH, Lipid panel WNL   He did note to me he get dyspnea on exertion that limits how much he can do     Plan   His story is not consistent with acute myocardial infarction, however likely has underlying CAD   Start aspirin 81, Lipitor 40mg   Echo - normal EF   He is having no angina symptoms at this time   Reviewed his CT. Does have coronary artery calcification seen on CT consistent with underlying coronary artery disease   Once he recovers from his acute infectious illness, would plan for outpatient ischemic evaluation likely with a nuclear stress test        -   2. Elevated BNP      CTA showed mild coronary calcifications    Denies CP, leg swelling, orthopnea, PND     Plan   Echo with preserved EF, BNP elevation likely secondary to demand from sepsis/infectious type illness  -  3. Severe sepsis   On IV ropcephin   Blood cultures, urine cultures pending  -   4. Hydronephrosis w/ obstructing calculus    S/p Stent in L ureter  -   5. Pyelonephritis   On IV rocephin  -   6. DENA   Cr improving w/ IV fluids  -   7. COPD  -   8. Elevated d dimer   CTA neg for PE  -  9.  Tobacco use   Counseling prior to d/c    HPI: Tori Villanueva is a 75y.o. year old male with history of possible COPD (no PFTs on file) who presents with SOB, tremors, and flank pain. Pt is a poor historian. He reports sx started night of admission with no prior episodes. Does not follow with outpatient PCP. States he has not seen PCP in many years. Admits to 36 yr smoking hx (20pk/yr) but states he quit last night. In ED pt was found to be septic with pulmonary and urinary sources. D dimer was elevated. CT chest was neg for PE but showed Mild right lower lobe bronchial wall thickening, endobronchial debris, and mild consolidation in the right lower lobe compatible with bronchitis and pneumonia. Procalcitonin was significantly elevated. CT A/P showed Evidence of cystitis with circumferential irregular bladder wall thickening and perivesical stranding and evidence of underlying chronic outlet obstruction. UA was positive and pt was started on IV rocephin. Pt underwent cystoscopy w/ stent placement in the L ureter. Pt was found to have elevated hs troponin and cardiology was consulted. BNP elevated. EKG showed NSR. CTA showed evidence of mild coronary calcifications and lipomatous hypertrophy of the interventricular septum. Echo pending. Denies CP, leg swelling, orthopnea, PND. Endorses improving cough with production of yellow sputum. Review of Systems   Constitutional: Negative for chills and fever. HENT: Negative for ear pain and sore throat. Eyes: Negative for pain and visual disturbance. Respiratory: Positive for cough and shortness of breath. Cardiovascular: Negative for chest pain, palpitations and leg swelling. Gastrointestinal: Negative for abdominal pain and vomiting. Abdominal distention: flank pain. Genitourinary: Positive for flank pain. Negative for dysuria and hematuria. Musculoskeletal: Negative for arthralgias and back pain. Skin: Negative for color change and rash. Neurological: Negative for seizures and syncope.    All other systems reviewed and are negative. Historical Information   Past Medical History:   Diagnosis Date   • COPD (chronic obstructive pulmonary disease) ([address])      History reviewed. No pertinent surgical history. Social History     Substance and Sexual Activity   Alcohol Use No     Social History     Substance and Sexual Activity   Drug Use No     Social History     Tobacco Use   Smoking Status Every Day   • Packs/day: 0.50   • Types: Cigarettes   Smokeless Tobacco Not on file       Family History: History reviewed. No pertinent family history. Meds/Allergies   all current active meds have been reviewed  No Known Allergies    Objective   Vitals: Blood pressure 104/63, pulse 72, temperature (!) 97.4 °F (36.3 °C), temperature source Temporal, resp. rate 20, height 6' 3" (1.905 m), weight 102 kg (224 lb 13.9 oz), SpO2 95 %. , Body mass index is 28.11 kg/m².,   Orthostatic Blood Pressures    Flowsheet Row Most Recent Value   Blood Pressure 104/63 filed at 07/07/2023 0705   Patient Position - Orthostatic VS Lying filed at 07/07/2023 6309          Systolic (04BWY), AEH:070 , Min:95 , OTE:269     Diastolic (63FEC), XNW:57, Min:50, Max:78        Intake/Output Summary (Last 24 hours) at 7/7/2023 0910  Last data filed at 7/7/2023 8369  Gross per 24 hour   Intake 1550 ml   Output 550 ml   Net 1000 ml       Invasive Devices     Peripheral Intravenous Line  Duration           Peripheral IV 07/06/23 Left Antecubital 1 day          Drain  Duration           Ureteral Internal Stent Left ureter 6 Fr. <1 day    Urethral Catheter Coude 18 Fr. <1 day                  Physical Exam:  Physical Exam  Vitals and nursing note reviewed. Constitutional:       General: He is not in acute distress. Appearance: He is well-developed. He is not ill-appearing. HENT:      Head: Normocephalic and atraumatic. Eyes:      Conjunctiva/sclera: Conjunctivae normal.   Cardiovascular:      Rate and Rhythm: Normal rate and regular rhythm.       Heart sounds: No murmur heard.  Pulmonary:      Effort: Pulmonary effort is normal. No respiratory distress. Breath sounds: Wheezing present. Abdominal:      General: Bowel sounds are normal.      Palpations: Abdomen is soft. Tenderness: There is no abdominal tenderness. Musculoskeletal:         General: No swelling. Cervical back: Neck supple. Skin:     General: Skin is warm and dry. Capillary Refill: Capillary refill takes less than 2 seconds. Neurological:      Mental Status: He is alert. Psychiatric:         Mood and Affect: Mood normal.      Constitutional: awake, alert and oriented, in no acute distress, no obvious deformities  Head: Normocephalic, without obvious abnormality, atraumatic  Eyes: conjunctivae clear and moist. Sclera anicteric. No xanthelasmas. Pupils equal bilaterally. Extraocular motions are full. Ear nose mouth and throat: ears are symmetrical bilaterally, hearing appears to be equal bilaterally, no nasal discharge or epistaxis, oropharynx is clear with moist mucous membranes  Neck:  Trachea is midline, neck is supple, no thyromegaly or significant lymphadenopathy, there is full range of motion. Lungs: clear to auscultation bilaterally, no wheezes, no rales, no rhonchi, no accessory muscle use, breathing is nonlabored  Heart: regular rate and rhythm, S1, S2 normal, no murmur, no click, rub or gallop, no lower extremity edema  Abdomen: soft, non-tender; bowel sounds normal; no masses,  no organomegaly  Psychiatric:  Patient is oriented to time, place, person, mood/affect is negative for depression, anxiety, agitation, appears to have appropriate insight  Skin: Skin is warm, dry, intact. No obvious rashes or lesions on exposed extremities. Nail beds are pink with no cyanosis or clubbing. EKG:   Date: 7/6/2023  Interpretation: NSR    Imaging: I have personally reviewed pertinent reports.       Telemetry:   NSR w/ PVCs    ECHO: pending      CXR: no acute cardiopulmonnary disease    CTA chest:   No pulmonary embolus. Mild right lower lobe bronchial wall thickening, endobronchial debris, and mild consolidation in the right lower lobe compatible with bronchitis and pneumonia. Diffuse esophageal wall thickening which could be due to esophagitis. Partially imaged perinephric stranding. See abdomen CT. Mild coronary artery calcification indicating atherosclerotic heart disease. Lipomatous hypertrophy of the interatrial septum. CT A/P:  Left-sided hydronephrosis secondary to a 1 cm calculus in the proximal left ureter. Evidence of cystitis with circumferential irregular bladder wall thickening and perivesical stranding, though there is also evidence of underlying chronic outlet obstruction. Correlation with urinalysis recommended. Large nonobstructing left upper pole calyceal calculus.     Lab Results:     Cardiac Profile:   Results from last 7 days   Lab Units 07/06/23  2140 07/06/23 1930 07/06/23  1729   HS TNI 0HR ng/L  --   --  2,170*   HS TNI 2HR ng/L  --  2,025*  --    HSTNI D2 ng/L  --  -145  --    HS TNI 4HR ng/L 1,933*  --   --    HSTNI D4 ng/L -237  --   --        BNP: 502  Troponin: 1228<1933<2025<2170    CBC with diff:   Results from last 7 days   Lab Units 07/07/23  0600 07/06/23  1729   WBC Thousand/uL 17.08* 22.17*   HEMOGLOBIN g/dL 13.7 15.1   HEMATOCRIT % 42.4 45.2   MCV fL 100* 99*   PLATELETS Thousands/uL 191 232   RBC Million/uL 4.24 4.56   MCH pg 32.3 33.1   MCHC g/dL 32.3 33.4   RDW % 15.1 14.6   MPV fL 10.1 9.5   NRBC AUTO /100 WBCs 0  --        CMP:   Results from last 7 days   Lab Units 07/07/23  0600 07/06/23  1729   POTASSIUM mmol/L 4.1 3.9   CHLORIDE mmol/L 107 103   CO2 mmol/L 22 23   BUN mg/dL 30* 29*   CREATININE mg/dL 1.20 1.51*   CALCIUM mg/dL 8.1* 8.7   AST U/L 20 22   ALT U/L 9 9   ALK PHOS U/L 56 76   EGFR ml/min/1.73sq m 59 44       Note prepared with assistance from [facility] 4[facility]     Hieu Bender Camilo Mcneill, [facility], [facility]  Cardiovascular diseases

## 2023-07-07 NOTE — SEPSIS NOTE
Sepsis Note   Dianelys Oakley 75 y.o. male MRN: [id number]  Unit/Bed#: OR [place] Encounter: [id number]       Initial Sepsis Screening     Row Name 07/06/23 1813                Is the patient's history suggestive of a new or worsening infection? Yes (Proceed)  -AM        Suspected source of infection pneumonia  -AM        Indicate SIRS criteria Tachycardia > 90 bpm;Tachypnea > 20 resp per min;Leukocytosis (WBC > 12986 IJL) OR Leukopenia (WBC <4000 IJL) OR Bandemia (WBC >10% bands)  -AM        Are two or more of the above signs & symptoms of infection both present and new to the patient? Yes (Proceed)  -AM        Assess for evidence of organ dysfunction: Are any of the below criteria present within 6 hours of suspected infection and SIRS criteria that are NOT considered to be chronic conditions? --              User Key  (r) = Recorded By, (t) = Taken By, (c) = Cosigned By    [address] [place] [address] Name Provider Type    AM Mina Mandujano DO Physician                Default Flowsheet Data (last 720 hours)     Sepsis Reassess     Row Name 07/06/23 2248                   Repeat Volume Status and Tissue Perfusion Assessment Performed    Date of Reassessment: 07/06/23  -        Time of Reassessment: 2030 -        Sepsis Reassessment Note: Click "NEXT" below (NOT "close") to generate sepsis reassessment note. YES (proceed by clicking "NEXT")  -        Repeat Volume Status and Tissue Perfusion Assessment Performed --              User Key  (r) = Recorded By, (t) = Taken By, (c) = Cosigned By    [address] [place] [address] Name Provider Type     [address], CRNP Nurse Practitioner                Body mass index is 29.7 kg/m².   Wt Readings from Last 1 Encounters:   07/06/23 108 kg (237 lb 10.5 oz)     IBW (Ideal Body Weight): 84.5 kg    Ideal body weight: 84.5 kg (186 lb 4.6 oz)  Adjusted ideal body weight: 93.8 kg (206 lb 13.4 oz)

## 2023-07-07 NOTE — ED NOTES
Pt taken to OR. ASA held NP made aware. Anesthesiologist at the bedside and made aware. This RN does not have anesthesia consent or OR consent.      Emily Koroma RN  07/06/23 0207

## 2023-07-07 NOTE — PLAN OF CARE
Problem: Potential for Falls  Goal: Patient will remain free of falls  Description: INTERVENTIONS:  - Educate patient/family on patient safety including physical limitations  - Instruct patient to call for assistance with activity   - Consult OT/PT to assist with strengthening/mobility   - Keep Call bell within reach  - Keep bed low and locked with side rails adjusted as appropriate  - Keep care items and personal belongings within reach  - Initiate and maintain comfort rounds  - Make Fall Risk Sign visible to staff  - Offer Toileting every 2 Hours, in advance of need  - Initiate/Maintain bed alarm  - Obtain necessary fall risk management equipment:   - Apply yellow socks and bracelet for high fall risk patients  - Consider moving patient to room near nurses station  7/7/2023 0235 by José Miguel Douglas LPN  Outcome: Progressing  7/7/2023 0235 by José Miguel Douglas LPN  Outcome: Progressing     Problem: MOBILITY - ADULT  Goal: Maintain or return to baseline ADL function  Description: INTERVENTIONS:  -  Assess patient's ability to carry out ADLs; assess patient's baseline for ADL function and identify physical deficits which impact ability to perform ADLs (bathing, care of mouth/teeth, toileting, grooming, dressing, etc.)  - Assess/evaluate cause of self-care deficits   - Assess range of motion  - Assess patient's mobility; develop plan if impaired  - Assess patient's need for assistive devices and provide as appropriate  - Encourage maximum independence but intervene and supervise when necessary  - Involve family in performance of ADLs  - Assess for home care needs following discharge   - Consider OT consult to assist with ADL evaluation and planning for discharge  - Provide patient education as appropriate  7/7/2023 0235 by José Miguel Douglas LPN  Outcome: Progressing  7/7/2023 0235 by José Miguel Douglas LPN  Outcome: Progressing  Goal: Maintains/Returns to pre admission functional level  Description: INTERVENTIONS:  - Perform BMAT or MOVE assessment daily.   - Set and communicate daily mobility goal to care team and patient/family/caregiver. - Collaborate with rehabilitation services on mobility goals if consulted  - Perform Range of Motion 3 times a day. - Reposition patient every 2 hours.   - Dangle patient 3 times a day  - Stand patient 3 times a day  - Ambulate patient 3 times a day  - Out of bed to chair 3 times a day   - Out of bed for meals 3 times a day  - Out of bed for toileting  - Record patient progress and toleration of activity level   7/7/2023 0235 by Rosette Mccall LPN  Outcome: Progressing  7/7/2023 0235 by Rosette Mccall LPN  Outcome: Progressing     Problem: PAIN - ADULT  Goal: Verbalizes/displays adequate comfort level or baseline comfort level  Description: Interventions:  - Encourage patient to monitor pain and request assistance  - Assess pain using appropriate pain scale  - Administer analgesics based on type and severity of pain and evaluate response  - Implement non-pharmacological measures as appropriate and evaluate response  - Consider cultural and social influences on pain and pain management  - Notify physician/advanced practitioner if interventions unsuccessful or patient reports new pain  Outcome: Progressing     Problem: INFECTION - ADULT  Goal: Absence or prevention of progression during hospitalization  Description: INTERVENTIONS:  - Assess and monitor for signs and symptoms of infection  - Monitor lab/diagnostic results  - Monitor all insertion sites, i.e. indwelling lines, tubes, and drains  - Monitor endotracheal if appropriate and nasal secretions for changes in amount and color  - [place] appropriate cooling/warming therapies per order  - Administer medications as ordered  - Instruct and encourage patient and family to use good hand hygiene technique  - Identify and instruct in appropriate isolation precautions for identified infection/condition  Outcome: Progressing  Goal: Absence of fever/infection during neutropenic period  Description: INTERVENTIONS:  - Monitor WBC    Outcome: Progressing     Problem: SAFETY ADULT  Goal: Patient will remain free of falls  Description: INTERVENTIONS:  - Educate patient/family on patient safety including physical limitations  - Instruct patient to call for assistance with activity   - Consult OT/PT to assist with strengthening/mobility   - Keep Call bell within reach  - Keep bed low and locked with side rails adjusted as appropriate  - Keep care items and personal belongings within reach  - Initiate and maintain comfort rounds  - Make Fall Risk Sign visible to staff  - Offer Toileting every 2 Hours, in advance of need  - Initiate/Maintain bed alarm  - Obtain necessary fall risk management equipment:   - Apply yellow socks and bracelet for high fall risk patients  - Consider moving patient to room near nurses station  7/7/2023 0235 by Asif Hassan LPN  Outcome: Progressing  7/7/2023 0235 by Asif Hassan LPN  Outcome: Progressing  Goal: Maintain or return to baseline ADL function  Description: INTERVENTIONS:  -  Assess patient's ability to carry out ADLs; assess patient's baseline for ADL function and identify physical deficits which impact ability to perform ADLs (bathing, care of mouth/teeth, toileting, grooming, dressing, etc.)  - Assess/evaluate cause of self-care deficits   - Assess range of motion  - Assess patient's mobility; develop plan if impaired  - Assess patient's need for assistive devices and provide as appropriate  - Encourage maximum independence but intervene and supervise when necessary  - Involve family in performance of ADLs  - Assess for home care needs following discharge   - Consider OT consult to assist with ADL evaluation and planning for discharge  - Provide patient education as appropriate  7/7/2023 0235 by Asif Hassan LPN  Outcome: Progressing  7/7/2023 0235 by Asif Hassan LPN  Outcome: Progressing  Goal: Maintains/Returns to pre admission functional level  Description: INTERVENTIONS:  - Perform BMAT or MOVE assessment daily.   - Set and communicate daily mobility goal to care team and patient/family/caregiver. - Collaborate with rehabilitation services on mobility goals if consulted  - Perform Range of Motion 3 times a day. - Reposition patient every 2 hours. - Dangle patient 3 times a day  - Stand patient 3 times a day  - Ambulate patient 3 times a day  - Out of bed to chair 3 times a day   - Out of bed for meals 3 times a day  - Out of bed for toileting  - Record patient progress and toleration of activity level   7/7/2023 0235 by Lisy Estrada LPN  Outcome: Progressing  7/7/2023 0235 by Lisy Estrada LPN  Outcome: Progressing     Problem: DISCHARGE PLANNING  Goal: Discharge to home or other facility with appropriate resources  Description: INTERVENTIONS:  - Identify barriers to discharge w/patient and caregiver  - Arrange for needed discharge resources and transportation as appropriate  - Identify discharge learning needs (meds, wound care, etc.)  - Arrange for interpretive services to assist at discharge as needed  - Refer to Case Management Department for coordinating discharge planning if the patient needs post-hospital services based on physician/advanced practitioner order or complex needs related to functional status, cognitive ability, or social support system  Outcome: Progressing     Problem: Knowledge Deficit  Goal: Patient/family/caregiver demonstrates understanding of disease process, treatment plan, medications, and discharge instructions  Description: Complete learning assessment and assess knowledge base.   Interventions:  - Provide teaching at level of understanding  - Provide teaching via preferred learning methods  Outcome: Progressing

## 2023-07-07 NOTE — ASSESSMENT & PLAN NOTE
· Smokes 6 cigarettes daily.   Motivated to quit, states he quit today  · Nicotine TD patch  · Tobacco counseling provided

## 2023-07-07 NOTE — OP NOTE
OPERATIVE REPORT     Name: Cecilia Campbell     MRN: [id number]  Date: 7/6/2023 Time: 11:06 PM     Location:       [facility]   Date of Operation:  7/6/2023   Service: Urology     Pre-op Diagnosis:  Left ureteral calculus    Post-op Diagnosis:  Same     Operation:     Cystoscopy  Left Retrograde Pyelogram   Left ureteral stent placement (6Fr x 26 cm JJ stent)  Fluoroscopic Guidance  Michel Catheter placement      Surgeon:  Nikkie Monroy MD  Assistants:  None      Anesthesia:  ETT   Drains:  18 Fr coude michel catheter, left 6Fr x 26 cm Bard InLay Optima ureteral stent  Estimated Blood Loss:  Minimal   Urine Output:  N/A   Specimens: Left renal pelvis urine for culture  Apparent Intraoperative Complications:  None   Patient Condition:  Stable   Disposition:  PACU  Antibiotic Prophylaxis:  Rocephin     Indications:     Left ureteral stone  Sepsis    Findings:    • Difficult stent placement with perforation of the ureter while attempting to pass a wire beyond the stone. • Wire was able to be repositioned into the renal pelvis and ultimately stent placement was successful. Operative Report:    The patient was brought to the operating room and general anesthesia was initiated. He was prepped and draped in dorsolithotomy position. A Solo plus wire was used to intubate the left ureteral orifice and was passed into the ureter under fluoroscopic guidance until resistance was met. A retrograde pyelogram was performed demonstrating the wire within the proximal ureter. Multiple attempts were made at passing the wire beyond the stone and the ureter was noted to be tortuous. During these attempts and inadvertent ureteral perforation was sustained demonstrated by extravasation of contrast during retrograde. The 5 [place] open-ended catheter was pulled back into the ureter and a Solo plus wire was then able to be navigated into the renal pelvis.   Retrograde pyelogram demonstrated severe hydronephrosis and a filling defect in the upper pole consistent with known calculi. The wire was replaced and passed up to the kidney. A 6 [place] by 26 cm JJ stent was placed in typical retrograde fashion. Upon removal of the wire an adequate curl was noted in the renal pelvis on fluoroscopy and in the bladder on direct visualization. Contrast was noted to be draining freely from the stent. An 25 [place] coudé catheter was placed and 10 cc was used to inflate the balloon. The patient was awoken from anesthesia and transferred to recovery in stable condition. I was present for the entire procedure.        Plan:  • Admit to SLIM   • maintain michel catheter until afebrile 24 hours or at least overnight  • broad spectrum abx, tailor to cultures   • will make arrangements for second stage stone treatment    SIGNATURE: Jenna Vega MD  DATE: 7/6/2023  TIME: 11:06 PM

## 2023-07-07 NOTE — ASSESSMENT & PLAN NOTE
· Post renal DENA in setting of obstructing calculus  · Creatinine 1.5, last creatinine 0.9 in 2018  · IV hydration.   Monitor I/O  · Avoid nephrotoxins  · Monitor BMP

## 2023-07-07 NOTE — PROGRESS NOTES
Progress Note - Urology  Eulis Mention 1950, 75 y.o. male MRN: [id number]    Unit/Bed#: E4 -01 Encounter: [id number]      Assessment & Plan  Severe sepsis secondary to large left proximal ureteral calculus and pyelonephritis  Now POD#1 cystoscopy, left retrograde pyelogram, ureteral stent insertion, michel catheter insertion  Inadvertent left ureteral perforation at tight proximal ureter, managed with indwelling ureteral stent as above  Clinically improving, SIRS resolved. Will need period of antibiotics and ureteral rest/dilation with stent  Return in a few weeks for outpatient ureteroscopic stone lithotripsy. This has been requested/arranged through our office surgical coordinator. Michel catheter to be removed tomorrow morning at 0500. Monitor voiding and PVR tomorrow. Urology will sign off but remain available for any further inpatient needs. Please feel free to contact the provider currently covering the Urology [facility] role for this campus with questions or concerns. Subjective: Feels well today. No fevers or chills. Urine clear. He enjoyed having the catheter so that he could sleep through the night, had had some dysuria and flow issue at onset of stone symptoms likely the UTI. Discussed catheter removal for void trial he would like to try tomorrow instead of today    Review of Systems   Constitutional: Negative. Respiratory: Negative. Cardiovascular: Negative. Genitourinary: Negative for decreased urine volume, difficulty urinating, dysuria, flank pain, frequency, hematuria, penile pain, testicular pain and urgency. Musculoskeletal: Negative. Objective:  Vitals: Blood pressure 107/62, pulse 68, temperature (!) 97.2 °F (36.2 °C), temperature source Temporal, resp. rate 18, height 6' 3" (1.905 m), weight 102 kg (224 lb), SpO2 95 %. ,Body mass index is 28 kg/m².     Intake/Output Summary (Last 24 hours) at 7/7/2023 1309  Last data filed at 7/7/2023 1251  Gross per 24 hour   Intake 1786 ml   Output 1175 ml   Net 611 ml     Invasive Devices     Peripheral Intravenous Line  Duration           Peripheral IV 07/06/23 Left Antecubital 1 day          Drain  Duration           Ureteral Internal Stent Left ureter 6 Fr. <1 day    Urethral Catheter Coude 18 Fr. <1 day                Physical Exam  Vitals and nursing note reviewed. Constitutional:       Appearance: Normal appearance. He is well-developed. He is not ill-appearing. HENT:      Head: Normocephalic and atraumatic. Cardiovascular:      Rate and Rhythm: Normal rate and regular rhythm. Heart sounds: Normal heart sounds. No murmur heard. Pulmonary:      Effort: Pulmonary effort is normal.      Breath sounds: Normal breath sounds. Abdominal:      General: Bowel sounds are normal.      Palpations: Abdomen is soft. Tenderness: There is no abdominal tenderness. There is no right CVA tenderness or left CVA tenderness. Genitourinary:     Penis: Normal.       Testes: Normal.      Comments: Sorensen per urethra draining clear yellow urine  Musculoskeletal:         General: Normal range of motion. Skin:     General: Skin is warm and dry. Capillary Refill: Capillary refill takes less than 2 seconds. Coloration: Skin is not pale. Neurological:      Mental Status: He is alert and oriented to person, place, and time.          Labs:  Recent Labs     07/06/23  1729 07/07/23  0600   WBC 22.17* 17.08*     Recent Labs     07/06/23  1729 07/07/23  0600   HGB 15.1 13.7       Recent Labs     07/06/23  1729 07/07/23  0600   CREATININE 1.51* 1.20       History:    Past Medical History:   Diagnosis Date   • COPD (chronic obstructive pulmonary disease) ([address])      Past Surgical History:   Procedure Laterality Date   • FL RETROGRADE PYELOGRAM  7/6/2023   • CT CYSTO BLADDER W/URETERAL CATHETERIZATION Left 7/6/2023    Procedure: CYSTOSCOPY RETROGRADE PYELOGRAM WITH INSERTION STENT URETERAL;  Surgeon: Tan Meza MD; Location: [facility] OR;  Service: Urology     History reviewed. No pertinent family history.   Social History     Socioeconomic History   • Marital status: Single     Spouse name: None   • Number of children: None   • Years of education: None   • Highest education level: None   Occupational History   • None   Tobacco Use   • Smoking status: Every Day     Packs/day: 0.50     Types: Cigarettes   • Smokeless tobacco: None   Vaping Use   • Vaping Use: Never used   Substance and Sexual Activity   • Alcohol use: No   • Drug use: No   • Sexual activity: None   Other Topics Concern   • None   Social History Narrative   • None     Social Determinants of Health     Financial Resource Strain: Not on file   Food Insecurity: Not on file   Transportation Needs: Not on file   Physical Activity: Not on file   Stress: Not on file   Social Connections: Not on file   Intimate Partner Violence: Not on file   Housing Stability: Not on file         Elham Estrada  Date: 7/7/2023 Time: 1:09 PM

## 2023-07-07 NOTE — ANESTHESIA PREPROCEDURE EVALUATION
Procedure:  CYSTOSCOPY RETROGRADE PYELOGRAM WITH INSERTION STENT URETERAL (Left: Bladder)    Relevant Problems   ANESTHESIA (within normal limits)      CARDIO (within normal limits)  elevated troponins ekg wnl      ENDO (within normal limits)      GI/HEPATIC (within normal limits)      /RENAL   (+) Hydronephrosis with obstructing calculus      GYN (within normal limits)      HEMATOLOGY (within normal limits)      MUSCULOSKELETAL (within normal limits)      NEURO/PSYCH (within normal limits)      PULMONARY   (+) COPD (chronic obstructive pulmonary disease) (HCC)        Physical Exam    Airway      TM Distance: >3 FB  Neck ROM: limited     Dental   Comment: Upper denture    Remaining teeth in very poor condition, upper dentures,     Cardiovascular  Rhythm: regular, Rate: normal, Cardiovascular exam normal    Pulmonary  Pulmonary exam normal Breath sounds clear to auscultation,     Other Findings        Anesthesia Plan  ASA Score- 3     Anesthesia Type- general with ASA Monitors. Additional Monitors:   Airway Plan: LMA. Comment: Patient is a very poor historian. Plan Factors-Exercise tolerance (METS): >4 METS. Chart reviewed. EKG reviewed. Existing labs reviewed. Patient summary reviewed. Patient is a current smoker. Induction- intravenous. Postoperative Plan- Plan for postoperative opioid use. Informed Consent- Anesthetic plan and risks discussed with patient.
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
## 2023-07-07 NOTE — ASSESSMENT & PLAN NOTE
· Incidental finding on CTA study: Diffuse esophageal wall thickening which could be due to esophagitis  · Start PPI  · Outpatient follow-up with GI for EGD.   Consider inpatient consult due to poor follow-up

## 2023-07-07 NOTE — ASSESSMENT & PLAN NOTE
· Severe sepsis POA with tachycardia, leukocytosis, DENA and respiratory failure 88%  · Presents with complaints of shortness of breath, rigors and right flank pain  · Suspect urinary source:  · CT A/P: Evidence of cystitis with circumferential irregular bladder wall thickening and perivesical stranding, though there is also evidence of underlying chronic outlet obstruction. Correlation with urinalysis recommended. SEE pyelonephritis  · CT chest: Mild right lower lobe bronchial wall thickening, endobronchial debris, and mild consolidation in the right lower lobe compatible with bronchitis and pneumonia. However, no clinical presentation of pneumonia.   · IV ceftriaxone  · PCT very elevated, trend  · F/u urine/blood cultures  · Monitor CBC, VS

## 2023-07-07 NOTE — ASSESSMENT & PLAN NOTE
· CT A/P:   · Left-sided hydronephrosis secondary to a 1 cm calculus in the proximal left ureter. · Evidence of cystitis with circumferential irregular bladder wall thickening and perivesical stranding, though there is also evidence of underlying chronic outlet obstruction.    · Large nonobstructing left upper pole calyceal calculus  · Plan for cystoscopy tonight 7/06  · Urology consult
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
## 2023-07-07 NOTE — PLAN OF CARE

## 2023-07-07 NOTE — ASSESSMENT & PLAN NOTE
· CT: Evidence of cystitis with circumferential irregular bladder wall thickening and perivesical stranding, though there is also evidence of underlying chronic outlet obstruction.  Correlation with urinalysis recommended  · UA  Large leuks/no nitrites, WBC 10-20  · Treat with IV ceftriaxone  · PCT very elevated, trend  · Follow-up urine culture and blood culture

## 2023-07-07 NOTE — ASSESSMENT & PLAN NOTE
· Smokes 6 cigarettes daily. Apparently plans to quit.   · Nicotine TD patch  · Tobacco counseling provided

## 2023-07-07 NOTE — H&P
[facility]  H&P  Name: Bernarda Otero 75 y.o. male I MRN: [id number]  Unit/Bed#: OR POOL I Date of Admission: 7/6/2023   Date of Service: 7/6/2023 I Hospital Day: 0      Assessment/Plan   * Severe sepsis ([address])  Assessment & Plan  · Severe sepsis POA with tachycardia, leukocytosis, DENA and respiratory failure 88%  · Presents with complaints of shortness of breath, rigors and right flank pain  · Pulmonary and urinary source:  · CT chest: Mild right lower lobe bronchial wall thickening, endobronchial debris, and mild consolidation in the right lower lobe compatible with bronchitis and pneumonia. · CT A/P: Evidence of cystitis with circumferential irregular bladder wall thickening and perivesical stranding, though there is also evidence of underlying chronic outlet obstruction. Correlation with urinalysis recommended. SEE pyelonephritis  · IV ceftriaxone  · PCT very elevated, trend  · Urine antigens ordered  · Supportive care with IV hydration, antitussives and as needed antipyretic    Hydronephrosis with obstructing calculus  Assessment & Plan  · CT A/P:   · Left-sided hydronephrosis secondary to a 1 cm calculus in the proximal left ureter. · Evidence of cystitis with circumferential irregular bladder wall thickening and perivesical stranding, though there is also evidence of underlying chronic outlet obstruction. · Large nonobstructing left upper pole calyceal calculus  · Plan for cystoscopy tonight 7/06  · Urology consult    Pyelonephritis  Assessment & Plan  · CT: Evidence of cystitis with circumferential irregular bladder wall thickening and perivesical stranding, though there is also evidence of underlying chronic outlet obstruction.  Correlation with urinalysis recommended  · UA  Large leuks/no nitrites, WBC 10-20  · Treat with IV ceftriaxone  · PCT very elevated, trend  · Follow-up urine culture and blood culture    Elevated troponin  Assessment & Plan  · Elevated troponin likely demand ischemia in setting of sepsis  · Denies chest pain. No cardiac history although has not been seen by medical practitioner in years  · Troponin >2100. EKG negative for acute ischemic changes  · Trend troponin and EKG  · Give aspirin bolus  · Check Echo, lipid panel, HgA1c, TSH  · Monitor on telemetry  · Cardiology consult    Acute kidney injury [facility])  Assessment & Plan  · Post renal DENA in setting of obstructing calculus  · Creatinine 1.5, last creatinine 0.9 in 2018  · IV hydration. Monitor I/O  · Avoid nephrotoxins  · Monitor BMP    Chronic obstructive pulmonary disease with acute exacerbation (HCC)  Assessment & Plan  · Not on maintenance inhaler; poor outpatient f/u  · Smokes 6 cigarettes daily. Reports desire to quit  · Start Breo Ellipta  · DuoNeb 3 times daily  · Add as needed albuterol  · Outpatient follow-up with pulmonology  · Tobacco cessation  · Check ambulatory pulse ox    Mycotic toenails  Assessment & Plan  · Very elongated mycotic toenails. Will consult podiatry    Esophageal abnormality  Assessment & Plan  · Incidental finding on CTA study: Diffuse esophageal wall thickening which could be due to esophagitis  · Start PPI  · Outpatient follow-up with GI for EGD. Consider inpatient consult due to poor follow-up    Elevated d-dimer  Assessment & Plan  · CTA neg for PE  · No obvious signs of DVT    Tobacco use  Assessment & Plan  · Smokes 6 cigarettes daily. Motivated to quit, states he quit today  · Nicotine TD patch  · Tobacco counseling provided        VTE Prophylaxis: Heparin  / sequential compression device   Code Status: FC  POLST: POLST is not applicable to this patient  Discussion with family: Patient has no kids, has 1 sister but no contact    Anticipated Length of Stay:  Patient will be admitted on an Inpatient basis with an anticipated length of stay of  > 2 midnights.    Justification for Hospital Stay: Sepsis due to pyelonephritis and pneumonia, respiratory failure, hydronephrosis with obstructing calculus, DENA, elevated troponin    Total Time for Visit, including Counseling / Coordination of Care: 60 minutes. Greater than 50% of this total time spent on direct patient counseling and coordination of care. Chief Complaint:   "Could not breathe, was shaking"    History of Present Illness:    Mandie Vital is a 75 y.o. male who presents with c/o shortness of breath and rigors. Poor historian, alert and oriented to person and place, disoriented to time. Poor outpatient follow-up, states he has not been seen by a physician in many years; not on daily medications. Smokes 6 cigarettes daily, states he quit today. Reports right flank pain and dysuria, denies hematuria or fever. Elevated troponin, denies chest pain or exertional dyspnea. ROS negative for abdominal pain, nausea, emesis or diarrhea, had normal BM yesterday. Patient appears to have mild cognitive deficit. Resides in an apartment with a roommate who is a friend. Parents are , he has no children; has 1 sister but no contact. Worked as a . Review of Systems:    Review of Systems   Unable to perform ROS: Other   Constitutional: Positive for chills. Negative for fever. HENT: Negative. Respiratory: Positive for shortness of breath. Cardiovascular: Negative. Gastrointestinal: Negative. Genitourinary: Positive for dysuria and flank pain. Negative for hematuria. Skin: Negative. Neurological: Negative. Psychiatric/Behavioral: Negative. Past Medical and Surgical History:     Past Medical History:   Diagnosis Date   • COPD (chronic obstructive pulmonary disease) ([address])        History reviewed. No pertinent surgical history. Meds/Allergies:    Prior to Admission medications    Medication Sig Start Date End Date Taking?  Authorizing Provider   EPINEPHrine (EPIPEN) 0.3 mg/0.3 mL SOAJ Inject 0.3 mL (0.3 mg total) into the shoulder, thigh, or buttocks once as needed for anaphylaxis (call 911 when you use your epipen) for up to 1 dose  Patient not taking: Reported on 7/6/2023 5/11/18   Cathy Abarca MD   cetirizine (ZyrTEC) 10 MG chewable tablet Chew 1 tablet (10 mg total) 2 (two) times a day for 30 days 5/18/18 7/6/23  Cathy Abarca MD   desloratadine (CLARINEX) 5 MG tablet Take 1 tablet (5 mg total) by mouth 2 (two) times a day for 7 days 5/11/18 7/6/23  Cathy Abarca MD     I have reviewed home medications with patient personally. Allergies: No Known Allergies    Social History:     Marital Status: Single   Occupation: retired, worked as a   Patient Pre-hospital Living Situation: resides with friend/roommate  Patient Pre-hospital Level of Mobility: ambulatory  Patient Pre-hospital Diet Restrictions:   Substance Use History:   Social History     Substance and Sexual Activity   Alcohol Use No     Social History     Tobacco Use   Smoking Status Every Day   • Packs/day: 0.50   • Types: Cigarettes   Smokeless Tobacco Not on file     Social History     Substance and Sexual Activity   Drug Use No       Family History:    History reviewed. No pertinent family history. Physical Exam:     Vitals:   Blood Pressure: 133/70 (07/06/23 1930)  Pulse: 96 (07/06/23 1930)  Temperature: 99 °F (37.2 °C) (07/06/23 1659)  Respirations: 20 (07/06/23 1930)  Height: 6' 3" (190.5 cm) (07/06/23 1650)  Weight - Scale: 108 kg (237 lb 10.5 oz) (07/06/23 1650)  SpO2: 96 % (07/06/23 1930)    Physical Exam  Constitutional:       General: He is not in acute distress. Appearance: Normal appearance. He is normal weight. He is not ill-appearing, toxic-appearing or diaphoretic. HENT:      Head: Normocephalic and atraumatic. Nose: No rhinorrhea. Mouth/Throat:      Mouth: Mucous membranes are dry. Comments: Poor dentition, missing teeth  Eyes:      Conjunctiva/sclera: Conjunctivae normal.   Cardiovascular:      Rate and Rhythm: Normal rate and regular rhythm.       Heart sounds: Normal heart sounds. Pulmonary:      Effort: Pulmonary effort is normal.      Breath sounds: Wheezing present. Abdominal:      General: Bowel sounds are normal. There is no distension. Palpations: Abdomen is soft. Tenderness: There is no abdominal tenderness. There is no guarding. Genitourinary:     Comments: Right CVA tenderness  Musculoskeletal:      Right lower leg: No edema. Left lower leg: No edema. Skin:     General: Skin is warm and dry. Findings: No lesion. Comments: Mycotic elongated toenails   Neurological:      Mental Status: He is alert. He is disoriented. Comments: Oriented to person and place   Psychiatric:      Comments: Mild cognitive deficit         Additional Data:     Lab Results: I have personally reviewed pertinent reports. Results from last 7 days   Lab Units 07/06/23  1729   WBC Thousand/uL 22.17*   HEMOGLOBIN g/dL 15.1   HEMATOCRIT % 45.2   PLATELETS Thousands/uL 232   BANDS PCT % 5   LYMPHO PCT % 2*   MONO PCT % 5   EOS PCT % 0     Results from last 7 days   Lab Units 07/06/23  1729   SODIUM mmol/L 134*   POTASSIUM mmol/L 3.9   CHLORIDE mmol/L 103   CO2 mmol/L 23   BUN mg/dL 29*   CREATININE mg/dL 1.51*   ANION GAP mmol/L 8   CALCIUM mg/dL 8.7   ALBUMIN g/dL 3.5   TOTAL BILIRUBIN mg/dL 0.92   ALK PHOS U/L 76   ALT U/L 9   AST U/L 22   GLUCOSE RANDOM mg/dL 139     Results from last 7 days   Lab Units 07/06/23  1745   INR  1.12     Results from last 7 days   Lab Units 07/06/23  1716   POC GLUCOSE mg/dl 136         Results from last 7 days   Lab Units 07/06/23  1729   LACTIC ACID mmol/L 1.9   PROCALCITONIN ng/ml 38.23*       Imaging: I have personally reviewed pertinent reports.       CT abdomen pelvis wo contrast   ED Interpretation by Gail Arita DO (07/06 2003)   IMPRESSION:     1.  Left-sided hydronephrosis secondary to a 1 cm calculus in the proximal left ureter.     2.  Evidence of cystitis with circumferential irregular bladder wall thickening and perivesical stranding, though there is also evidence of underlying chronic outlet obstruction. Correlation with urinalysis recommended.     3.  Large nonobstructing left upper pole calyceal calculus. Final Result by Megan Uriarte MD (07/06 1958)      1. Left-sided hydronephrosis secondary to a 1 cm calculus in the proximal left ureter. 2.  Evidence of cystitis with circumferential irregular bladder wall thickening and perivesical stranding, though there is also evidence of underlying chronic outlet obstruction. Correlation with urinalysis recommended. 3.  Large nonobstructing left upper pole calyceal calculus. The study was marked in [facility] for immediate notification. Workstation performed: [id number]         CTA ED chest PE Study   ED Interpretation by Gail Arita DO (07/06 1933)   IMPRESSION:     No pulmonary embolus.     Mild right lower lobe bronchial wall thickening, endobronchial debris, and mild consolidation in the right lower lobe compatible with bronchitis and pneumonia.     Diffuse esophageal wall thickening which could be due to esophagitis.     Partially imaged perinephric stranding. See abdomen CT. Final Result by Sulema Rendon MD (07/06 1929)      No pulmonary embolus. Mild right lower lobe bronchial wall thickening, endobronchial debris, and mild consolidation in the right lower lobe compatible with bronchitis and pneumonia. Diffuse esophageal wall thickening which could be due to esophagitis. Partially imaged perinephric stranding. See abdomen CT. Workstation performed: [id number]         XR chest 2 views    (Results Pending)   FL < 1 hour    (Results Pending)       EKG, Pathology, and Other Studies Reviewed on Admission:   · EKG: stac 114 ct    Allscripts / Epic Records Reviewed: Yes     ** Please Note: This note has been constructed using a voice recognition system.  **

## 2023-07-07 NOTE — ASSESSMENT & PLAN NOTE
· Post renal DENA in setting of obstructing calculus  · Creatinine 1.5, last creatinine 0.9 in 2018  · Improved s/p urologic procedure and IVF  · IV hydration.   Monitor I/O  · Avoid nephrotoxins  · Monitor BMP

## 2023-07-07 NOTE — PROGRESS NOTES
Surseh Espinal is a 75 y.o. male who is currently ordered Vancomycin IV with management by the Pharmacy Consult service. Relevant clinical data and objective / subjective history reviewed. Vancomycin Assessment:  Indication and Goal AUC/Trough: Bacteremia (goal -600, trough >10)  Clinical Status:  new  Micro:     Renal Function:  SCr: 1.2 mg/dL  CrCl: 69.9 mL/min  Renal replacement: Not on dialysis  Days of Therapy: 1  Current Dose: 2000 mg IV loading dose   Vancomycin Plan:  New Dosin mg IV q24h   Estimated AUC: 469 mcg*hr/mL  Estimated Trough: 13.9 mcg/mL  Next Level: 7/10 with AM labs  Renal Function Monitoring: Daily BMP and [place] will continue to follow closely for s/sx of nephrotoxicity, infusion reactions and appropriateness of therapy. BMP and CBC will be ordered per protocol. We will continue to follow the patient’s culture results and clinical progress daily.     Pietro Solomon, Pharmacist

## 2023-07-07 NOTE — ASSESSMENT & PLAN NOTE
· Not on maintenance inhaler; poor outpatient f/u  · Smokes 6 cigarettes daily.   Reports desire to quit  · Start Breo Ellipta  · DuoNeb 3 times daily  · Add as needed albuterol  · Outpatient follow-up with pulmonology  · Tobacco cessation  · Check ambulatory pulse ox

## 2023-07-07 NOTE — ANESTHESIA POSTPROCEDURE EVALUATION
Post-Op Assessment Note    CV Status:  Stable    Pain management: adequate     Mental Status:  Alert and awake   Hydration Status:  Euvolemic   PONV Controlled:  Controlled   Airway Patency:  Patent   There is a medical reason for not screening for obstructive sleep apnea and/or for not using two or more mitigation strategies   Post Op Vitals Reviewed: Yes      Staff: Anesthesiologist, CRNA         No notable events documented.     BP      Temp      Pulse     Resp      SpO2      /65 (BP Location: Right arm)   Pulse 70   Temp 98.5 °F (36.9 °C) (Temporal)   Resp 18   Ht 6' 3" (1.905 m)   Wt 108 kg (237 lb 10.5 oz)   SpO2 91%   BMI 29.70 kg/m²

## 2023-07-07 NOTE — ASSESSMENT & PLAN NOTE
· CT: Evidence of cystitis with circumferential irregular bladder wall thickening and perivesical stranding, though there is also evidence of underlying chronic outlet obstruction. Correlation with urinalysis recommended  · UA  Large leuks/no nitrites, WBC 10-20  · Appreciate Urology input, "POD#1 cystoscopy, left retrograde pyelogram, ureteral stent insertion, michel catheter insertion.  Inadvertent left ureteral perforation at tight proximal ureter, managed with indwelling ureteral stent"  · Continue IV ceftriaxone  · Follow-up urine culture and blood culture

## 2023-07-07 NOTE — ASSESSMENT & PLAN NOTE
· Elevated troponin likely demand ischemia in setting of sepsis  · Denies chest pain. No cardiac history although has not been seen by medical practitioner in years  · Troponin >2100.   EKG negative for acute ischemic changes  · Trend troponin and EKG  · Give aspirin bolus  · Check Echo, lipid panel, HgA1c, TSH  · Monitor on telemetry  · Cardiology consult

## 2023-07-08 LAB
ALBUMIN SERPL BCP-MCNC: 2.9 G/DL (ref 3.5–5)
ALP SERPL-CCNC: 52 U/L (ref 34–104)
ALT SERPL W P-5'-P-CCNC: 8 U/L (ref 7–52)
ANION GAP SERPL CALCULATED.3IONS-SCNC: 5 MMOL/L
AST SERPL W P-5'-P-CCNC: 20 U/L (ref 13–39)
BASOPHILS # BLD AUTO: 0.02 THOUSANDS/ÂΜL (ref 0–0.1)
BASOPHILS NFR BLD AUTO: 0 % (ref 0–1)
BILIRUB SERPL-MCNC: 0.33 MG/DL (ref 0.2–1)
BUN SERPL-MCNC: 26 MG/DL (ref 5–25)
CALCIUM ALBUM COR SERPL-MCNC: 9 MG/DL (ref 8.3–10.1)
CALCIUM SERPL-MCNC: 8.1 MG/DL (ref 8.4–10.2)
CHLORIDE SERPL-SCNC: 107 MMOL/L (ref 96–108)
CO2 SERPL-SCNC: 24 MMOL/L (ref 21–32)
CREAT SERPL-MCNC: 0.86 MG/DL (ref 0.6–1.3)
EOSINOPHIL # BLD AUTO: 0 THOUSAND/ÂΜL (ref 0–0.61)
EOSINOPHIL NFR BLD AUTO: 0 % (ref 0–6)
ERYTHROCYTE [DISTWIDTH] IN BLOOD BY AUTOMATED COUNT: 15 % (ref 11.6–15.1)
GFR SERPL CREATININE-BSD FRML MDRD: 85 ML/MIN/1.73SQ M
GLUCOSE SERPL-MCNC: 113 MG/DL (ref 65–140)
HCT VFR BLD AUTO: 42.3 % (ref 36.5–49.3)
HGB BLD-MCNC: 13.3 G/DL (ref 12–17)
IMM GRANULOCYTES # BLD AUTO: 0.12 THOUSAND/UL (ref 0–0.2)
IMM GRANULOCYTES NFR BLD AUTO: 1 % (ref 0–2)
LYMPHOCYTES # BLD AUTO: 1.01 THOUSANDS/ÂΜL (ref 0.6–4.47)
LYMPHOCYTES NFR BLD AUTO: 5 % (ref 14–44)
MCH RBC QN AUTO: 32.4 PG (ref 26.8–34.3)
MCHC RBC AUTO-ENTMCNC: 31.4 G/DL (ref 31.4–37.4)
MCV RBC AUTO: 103 FL (ref 82–98)
MONOCYTES # BLD AUTO: 0.52 THOUSAND/ÂΜL (ref 0.17–1.22)
MONOCYTES NFR BLD AUTO: 3 % (ref 4–12)
NEUTROPHILS # BLD AUTO: 16.87 THOUSANDS/ÂΜL (ref 1.85–7.62)
NEUTS SEG NFR BLD AUTO: 91 % (ref 43–75)
NRBC BLD AUTO-RTO: 0 /100 WBCS
PLATELET # BLD AUTO: 193 THOUSANDS/UL (ref 149–390)
PMV BLD AUTO: 11.1 FL (ref 8.9–12.7)
POTASSIUM SERPL-SCNC: 4.6 MMOL/L (ref 3.5–5.3)
PROCALCITONIN SERPL-MCNC: 18.35 NG/ML
PROT SERPL-MCNC: 5.9 G/DL (ref 6.4–8.4)
RBC # BLD AUTO: 4.1 MILLION/UL (ref 3.88–5.62)
SODIUM SERPL-SCNC: 136 MMOL/L (ref 135–147)
WBC # BLD AUTO: 18.54 THOUSAND/UL (ref 4.31–10.16)

## 2023-07-08 PROCEDURE — 85027 COMPLETE CBC AUTOMATED: CPT | Performed by: FAMILY MEDICINE

## 2023-07-08 PROCEDURE — 87040 BLOOD CULTURE FOR BACTERIA: CPT | Performed by: FAMILY MEDICINE

## 2023-07-08 PROCEDURE — 94760 N-INVAS EAR/PLS OXIMETRY 1: CPT

## 2023-07-08 PROCEDURE — 80053 COMPREHEN METABOLIC PANEL: CPT | Performed by: FAMILY MEDICINE

## 2023-07-08 PROCEDURE — 99232 SBSQ HOSP IP/OBS MODERATE 35: CPT | Performed by: FAMILY MEDICINE

## 2023-07-08 PROCEDURE — 84145 PROCALCITONIN (PCT): CPT | Performed by: FAMILY MEDICINE

## 2023-07-08 PROCEDURE — 94640 AIRWAY INHALATION TREATMENT: CPT

## 2023-07-08 RX ORDER — VANCOMYCIN HYDROCHLORIDE 1 G/200ML
1000 INJECTION, SOLUTION INTRAVENOUS EVERY 12 HOURS
Status: DISCONTINUED | OUTPATIENT
Start: 2023-07-08 | End: 2023-07-09

## 2023-07-08 RX ADMIN — IPRATROPIUM BROMIDE AND ALBUTEROL SULFATE 3 ML: 2.5; .5 SOLUTION RESPIRATORY (INHALATION) at 20:06

## 2023-07-08 RX ADMIN — HEPARIN SODIUM 5000 UNITS: 5000 INJECTION INTRAVENOUS; SUBCUTANEOUS at 22:14

## 2023-07-08 RX ADMIN — HEPARIN SODIUM 5000 UNITS: 5000 INJECTION INTRAVENOUS; SUBCUTANEOUS at 05:51

## 2023-07-08 RX ADMIN — HYDROXYZINE HYDROCHLORIDE 50 MG: 25 TABLET, FILM COATED ORAL at 22:08

## 2023-07-08 RX ADMIN — FLUTICASONE FUROATE AND VILANTEROL TRIFENATATE 1 PUFF: 200; 25 POWDER RESPIRATORY (INHALATION) at 09:58

## 2023-07-08 RX ADMIN — VANCOMYCIN HYDROCHLORIDE 1000 MG: 1 INJECTION, SOLUTION INTRAVENOUS at 12:38

## 2023-07-08 RX ADMIN — MELATONIN 6 MG: at 22:08

## 2023-07-08 RX ADMIN — ASPIRIN 81 MG 81 MG: 81 TABLET ORAL at 09:58

## 2023-07-08 RX ADMIN — IPRATROPIUM BROMIDE AND ALBUTEROL SULFATE 3 ML: 2.5; .5 SOLUTION RESPIRATORY (INHALATION) at 14:00

## 2023-07-08 RX ADMIN — CEFTRIAXONE 1000 MG: 1 INJECTION, SOLUTION INTRAVENOUS at 18:22

## 2023-07-08 RX ADMIN — HEPARIN SODIUM 5000 UNITS: 5000 INJECTION INTRAVENOUS; SUBCUTANEOUS at 13:58

## 2023-07-08 RX ADMIN — IPRATROPIUM BROMIDE AND ALBUTEROL SULFATE 3 ML: 2.5; .5 SOLUTION RESPIRATORY (INHALATION) at 07:05

## 2023-07-08 RX ADMIN — VANCOMYCIN HYDROCHLORIDE 1000 MG: 1 INJECTION, SOLUTION INTRAVENOUS at 22:07

## 2023-07-08 RX ADMIN — PANTOPRAZOLE SODIUM 40 MG: 40 TABLET, DELAYED RELEASE ORAL at 05:51

## 2023-07-08 RX ADMIN — ATORVASTATIN CALCIUM 40 MG: 40 TABLET, FILM COATED ORAL at 17:06

## 2023-07-08 NOTE — PROGRESS NOTES
[address] [facility]  Progress Note  Name: Kimberly Caro I  MRN: [id number]  Unit/Bed#: E4 -01 I Date of Admission: 7/6/2023   Date of Service: 7/8/2023 I Hospital Day: 2    Assessment/Plan   * Severe sepsis ([address])  Assessment & Plan  · Severe sepsis POA with tachycardia, leukocytosis, DENA and respiratory failure 88%  · Presents with complaints of shortness of breath, rigors and right flank pain  · Now with blood cultures growing gram positive cocci in clusters, 1/2 sets  · Sepsis initially suspected from urinary source, also undergoing PNA workup due to the following findings:  · CT A/P: Evidence of cystitis with circumferential irregular bladder wall thickening and perivesical stranding, though there is also evidence of underlying chronic outlet obstruction. Correlation with urinalysis recommended. SEE pyelonephritis  · CT chest: Mild right lower lobe bronchial wall thickening, endobronchial debris, and mild consolidation in the right lower lobe compatible with bronchitis and pneumonia. However, no clinical presentation of pneumonia. · IV ceftriaxone, IV vancomycin added due to positive blood cutlures  · PCT very elevated, trending down, continue to monitor  · F/u urine/blood cultures/repeat blood cultures  · Monitor CBC, VS    Pyelonephritis  Assessment & Plan  · CT: Evidence of cystitis with circumferential irregular bladder wall thickening and perivesical stranding, though there is also evidence of underlying chronic outlet obstruction. Correlation with urinalysis recommended  · UA  Large leuks/no nitrites, WBC 10-20  · Appreciate Urology input, POD#2 cystoscopy, left retrograde pyelogram, ureteral stent insertion, michel catheter insertion. Inadvertent left ureteral perforation at tight proximal ureter, managed with indwelling ureteral stent.  Undergoing voiding trial  · Continue IV ceftriaxone/vancomycin  · Follow-up urine culture and blood culture    Positive blood culture  Assessment & Plan  · 1/2 sets gram positive cocci in clusters   · Added vancomycin, continued on ceftriaxone  · Repeat set of blood cultures  · Monitor CBC, vital signs    Acute kidney injury [facility])  Assessment & Plan  · Post renal DENA in setting of obstructing calculus  · Creatinine 1.5, last creatinine 0.9 in 2018  · Resolveded s/p urologic procedure and IVF  · IV hydration. Monitor I/O  · Avoid nephrotoxins  · Monitor BMP    Hydronephrosis with obstructing calculus  Assessment & Plan  · CT A/P:   · Left-sided hydronephrosis secondary to a 1 cm calculus in the proximal left ureter. · Evidence of cystitis with circumferential irregular bladder wall thickening and perivesical stranding, though there is also evidence of underlying chronic outlet obstruction. · Large nonobstructing left upper pole calyceal calculus  · S/p "POD#2 cystoscopy, left retrograde pyelogram, ureteral stent insertion, michel catheter insertion. Inadvertent left ureteral perforation at tight proximal ureter, managed with indwelling ureteral stent"  · Michel catheter removed, undergoing voiding trial  · Urology input appreciated    Esophageal abnormality  Assessment & Plan  · Incidental finding on CTA study: Diffuse esophageal wall thickening which could be due to esophagitis  · Started PPI  · Outpatient follow-up with GI for EGD. Elevated troponin  Assessment & Plan  · Elevated troponin likely demand ischemia in setting of sepsis  · Denies chest pain. No cardiac history although has not been seen by medical practitioner in years  · Troponin >2100. EKG negative for acute ischemic changes  · Trend troponin and EKG  · Give aspirin bolus  · Check Echo, lipid panel, HgA1c, TSH  · Monitor on telemetry  · Cardiology consulted, unlikely ACS, however, suspected underlying coronary artery disease and will need ischemic work-up outpatient. No plans for inpatient cardiac interventions at this time.     Chronic obstructive pulmonary disease with acute exacerbation ([address])  Assessment & Plan  · Not in exacerbation  · Not on maintenance inhaler; poor outpatient f/u  · Smokes 6 cigarettes daily. Reports desire to quit  · Started Breo Ellipta  · DuoNeb 3 times daily  · Continue as needed albuterol  · Outpatient follow-up with pulmonology  · Tobacco cessation  · Check ambulatory pulse ox    Tobacco use  Assessment & Plan  · Smokes 6 cigarettes daily. Apparently plans to quit. · Nicotine TD patch  · Tobacco counseling provided            VTE Pharmacologic Prophylaxis: VTE Score: 5 High Risk (Score >/= 5) - Pharmacological DVT Prophylaxis Ordered: heparin. Sequential Compression Devices Ordered. Patient Centered Rounds: I performed bedside rounds with nursing staff today. Discussions with Specialists or Other Care Team Provider: CM    Education and Discussions with Family / Patient: Patient declined call to . Total Time Spent on Date of Encounter in care of patient: 55 minutes This time was spent on one or more of the following: performing physical exam; counseling and coordination of care; obtaining or reviewing history; documenting in the medical record; reviewing/ordering tests, medications or procedures; communicating with other healthcare professionals and discussing with patient's family/caregivers. Current Length of Stay: 2 day(s)  Current Patient Status: Inpatient   Certification Statement: The patient will continue to require additional inpatient hospital stay due to close monitoring, IV Abx, infectious workup  Discharge Plan: Anticipate discharge in 48-72 hrs to home. Code Status: Level 1 - Full Code    Subjective:   Patient is c/o cough. States that he is able to urinate. No other complaints. On room air, afebrile.     Objective:     Vitals:   Temp (24hrs), Av.4 °F (36.3 °C), Min:97 °F (36.1 °C), Max:97.7 °F (36.5 °C)    Temp:  [97 °F (36.1 °C)-97.7 °F (36.5 °C)] 97.7 °F (36.5 °C)  HR:  [] 82  Resp:  [18] 18  BP: (114-147)/(66-80) 114/76  SpO2:  [93 %-96 %] 93 %  Body mass index is 28 kg/m². Input and Output Summary (last 24 hours): Intake/Output Summary (Last 24 hours) at 7/8/2023 1128  Last data filed at 7/8/2023 9618  Gross per 24 hour   Intake 536 ml   Output 2175 ml   Net -1639 ml       Physical Exam:   Physical Exam  Constitutional:       General: He is not in acute distress. HENT:      Head: Normocephalic and atraumatic. Eyes:      Conjunctiva/sclera: Conjunctivae normal.   Cardiovascular:      Rate and Rhythm: Normal rate and regular rhythm. Pulmonary:      Effort: No respiratory distress. Breath sounds: Rhonchi present. Abdominal:      General: There is no distension. Tenderness: There is no abdominal tenderness. There is no guarding. Musculoskeletal:      Right lower leg: No edema. Left lower leg: No edema. Skin:     General: Skin is warm and dry. Neurological:      Mental Status: He is oriented to person, place, and time. Psychiatric:         Mood and Affect: Mood normal.          Additional Data:     Labs:  Results from last 7 days   Lab Units 07/08/23  0535 07/07/23  0600 07/06/23  1729   WBC Thousand/uL 18.54*   < > 22.17*   HEMOGLOBIN g/dL 13.3   < > 15.1   HEMATOCRIT % 42.3   < > 45.2   PLATELETS Thousands/uL 193   < > 232   BANDS PCT %  --   --  5   NEUTROS PCT % 91*   < >  --    LYMPHS PCT % 5*   < >  --    LYMPHO PCT %  --   --  2*   MONOS PCT % 3*   < >  --    MONO PCT %  --   --  5   EOS PCT % 0   < > 0    < > = values in this interval not displayed.      Results from last 7 days   Lab Units 07/08/23  0535   SODIUM mmol/L 136   POTASSIUM mmol/L 4.6   CHLORIDE mmol/L 107   CO2 mmol/L 24   BUN mg/dL 26*   CREATININE mg/dL 0.86   ANION GAP mmol/L 5   CALCIUM mg/dL 8.1*   ALBUMIN g/dL 2.9*   TOTAL BILIRUBIN mg/dL 0.33   ALK PHOS U/L 52   ALT U/L 8   AST U/L 20   GLUCOSE RANDOM mg/dL 113     Results from last 7 days   Lab Units 07/06/23  1745   INR  1.12 Results from last 7 days   Lab Units 07/06/23  1716   POC GLUCOSE mg/dl 136         Results from last 7 days   Lab Units 07/08/23  0535 07/07/23  0600 07/06/23  1729   LACTIC ACID mmol/L  --   --  1.9   PROCALCITONIN ng/ml 18.35* 30.55* 38.23*       Lines/Drains:  Invasive Devices     Peripheral Intravenous Line  Duration           Peripheral IV 07/08/23 Right;Ventral (anterior) Wrist <1 day          Drain  Duration           Ureteral Internal Stent Left ureter 6 Fr. 1 day                  Imaging: no new    Recent Cultures (last 7 days):   Results from last 7 days   Lab Units 07/07/23  0600 07/06/23  2247 07/06/23  1930 07/06/23  1817 07/06/23  1745   BLOOD CULTURE   --   --   --  Gram Positive Cocci* No Growth at 24 hrs.    GRAM STAIN RESULT   --   --   --  Gram positive cocci in clusters*  --    URINE CULTURE   --  No Growth <100 cfu/mL Culture too young- will reincubate  --   --    LEGIONELLA URINARY ANTIGEN  Negative  --   --   --   --        Last 24 Hours Medication List:   Current Facility-Administered Medications   Medication Dose Route Frequency Provider Last Rate   • acetaminophen  650 mg Oral Q6H PRN KIKI Michael     • albuterol  2.5 mg Nebulization Q6H PRN KIKI Michael     • aluminum-magnesium hydroxide-simethicone  30 mL Oral Q6H PRN KIKI Michael     • aspirin  81 mg Oral Daily Bethany Logan, DO     • atorvastatin  40 mg Oral Daily With Rebeca Guillaume DO     • cefTRIAXone  1,000 mg Intravenous Q24H KIKI Michael 1,000 mg (07/07/23 1945)   • dextromethorphan-guaiFENesin  10 mL Oral Q4H PRN KIKI Michael     • fluticasone-vilanterol  1 puff Inhalation Daily KIKI Michael     • heparin (porcine)  5,000 Units Subcutaneous [facility] KIKI Michael     • HYDROmorphone  0.2 mg Intravenous Q4H PRN KIKI Michael     • hydrOXYzine HCL  50 mg Oral HS PRN KIKI Michael     • ipratropium-albuterol  3 mL Nebulization TID KIKI Garcia     • melatonin  6 mg Oral HS KIKI Garcia     • nicotine  1 patch Transdermal Daily KIKI Garcia     • ondansetron  4 mg Intravenous Q6H PRN KIKI Garcia     • oxyCODONE  5 mg Oral 4x Daily PRN KIKI Garcia     • pantoprazole  40 mg Oral Early Morning KIKI Garcia     • simethicone  80 mg Oral 4x Daily PRN KIKI Garcia     • vancomycin  1,000 mg Intravenous Q12H Zarina Parker MD          Today, Patient Was Seen By: Zarina Parker MD    **Please Note: This note may have been constructed using a voice recognition system. **

## 2023-07-08 NOTE — ASSESSMENT & PLAN NOTE
· Post renal DENA in setting of obstructing calculus  · Creatinine 1.5, last creatinine 0.9 in 2018  · Resolveded s/p urologic procedure and IVF  · IV hydration.   Monitor I/O  · Avoid nephrotoxins  · Monitor BMP

## 2023-07-08 NOTE — ASSESSMENT & PLAN NOTE
· Elevated troponin likely demand ischemia in setting of sepsis  · Denies chest pain. No cardiac history although has not been seen by medical practitioner in years  · Troponin >2100. EKG negative for acute ischemic changes  · Trend troponin and EKG  · Give aspirin bolus  · Check Echo, lipid panel, HgA1c, TSH  · Monitor on telemetry  · Cardiology consulted, unlikely ACS, however, suspected underlying coronary artery disease and will need ischemic work-up outpatient. No plans for inpatient cardiac interventions at this time.

## 2023-07-08 NOTE — ASSESSMENT & PLAN NOTE
· Severe sepsis POA with tachycardia, leukocytosis, DENA and respiratory failure 88%  · Presents with complaints of shortness of breath, rigors and right flank pain  · Now with blood cultures growing gram positive cocci in clusters, 1/2 sets  · Sepsis initially suspected from urinary source, also undergoing PNA workup due to the following findings:  · CT A/P: Evidence of cystitis with circumferential irregular bladder wall thickening and perivesical stranding, though there is also evidence of underlying chronic outlet obstruction. Correlation with urinalysis recommended. SEE pyelonephritis  · CT chest: Mild right lower lobe bronchial wall thickening, endobronchial debris, and mild consolidation in the right lower lobe compatible with bronchitis and pneumonia. However, no clinical presentation of pneumonia.   · IV ceftriaxone, IV vancomycin added due to positive blood cutlures  · PCT very elevated, trending down, continue to monitor  · F/u urine/blood cultures/repeat blood cultures  · Monitor CBC, VS

## 2023-07-08 NOTE — ASSESSMENT & PLAN NOTE
· CT: Evidence of cystitis with circumferential irregular bladder wall thickening and perivesical stranding, though there is also evidence of underlying chronic outlet obstruction. Correlation with urinalysis recommended  · UA  Large leuks/no nitrites, WBC 10-20  · Appreciate Urology input, POD#2 cystoscopy, left retrograde pyelogram, ureteral stent insertion, michel catheter insertion. Inadvertent left ureteral perforation at tight proximal ureter, managed with indwelling ureteral stent.  Undergoing voiding trial  · Continue IV ceftriaxone/vancomycin  · Follow-up urine culture and blood culture

## 2023-07-08 NOTE — PLAN OF CARE
Problem: INFECTION - ADULT  Goal: Absence or prevention of progression during hospitalization  Description: INTERVENTIONS:  - Assess and monitor for signs and symptoms of infection  - Monitor lab/diagnostic results  - Monitor all insertion sites, i.e. indwelling lines, tubes, and drains  - Monitor endotracheal if appropriate and nasal secretions for changes in amount and color  - [place] appropriate cooling/warming therapies per order  - Administer medications as ordered  - Instruct and encourage patient and family to use good hand hygiene technique  - Identify and instruct in appropriate isolation precautions for identified infection/condition  Outcome: Progressing     Problem: INFECTION - ADULT  Goal: Absence of fever/infection during neutropenic period  Description: INTERVENTIONS:  - Monitor WBC    Outcome: Progressing

## 2023-07-08 NOTE — PROGRESS NOTES
Mandie Vital is a 75 y.o. male who is currently ordered Vancomycin IV with management by the Pharmacy Consult service. Relevant clinical data and objective / subjective history reviewed. Vancomycin Assessment:  Indication and Goal AUC/Trough: Bacteremia (goal -600, trough >10)  Clinical Status: stable  Micro:     Renal Function:  SCr: 0.86 mg/dL  CrCl: 97.5 mL/min  Renal replacement: Not on dialysis  Days of Therapy: 2  Current Dose: 1500 mg IV q24h   Vancomycin Plan:  New Dosin mg IV q12h   Estimated AUC: 475 mcg*hr/mL  Estimated Trough: 15.6 mcg/mL  Next Level:  with AM labs  Renal Function Monitoring: Daily BMP and [place] will continue to follow closely for s/sx of nephrotoxicity, infusion reactions and appropriateness of therapy. BMP and CBC will be ordered per protocol. We will continue to follow the patient’s culture results and clinical progress daily.     Naomi Sweet, Pharmacist

## 2023-07-08 NOTE — ASSESSMENT & PLAN NOTE
· Incidental finding on CTA study: Diffuse esophageal wall thickening which could be due to esophagitis  · Started PPI  · Outpatient follow-up with GI for EGD.

## 2023-07-08 NOTE — ASSESSMENT & PLAN NOTE
· CT A/P:   · Left-sided hydronephrosis secondary to a 1 cm calculus in the proximal left ureter. · Evidence of cystitis with circumferential irregular bladder wall thickening and perivesical stranding, though there is also evidence of underlying chronic outlet obstruction. · Large nonobstructing left upper pole calyceal calculus  · S/p "POD#2 cystoscopy, left retrograde pyelogram, ureteral stent insertion, michel catheter insertion.  Inadvertent left ureteral perforation at tight proximal ureter, managed with indwelling ureteral stent"  · Michel catheter removed, undergoing voiding trial  · Urology input appreciated

## 2023-07-08 NOTE — ASSESSMENT & PLAN NOTE
· Not in exacerbation  · Not on maintenance inhaler; poor outpatient f/u  · Smokes 6 cigarettes daily.   Reports desire to quit  · Started Breo Ellipta  · DuoNeb 3 times daily  · Continue as needed albuterol  · Outpatient follow-up with pulmonology  · Tobacco cessation  · Check ambulatory pulse ox

## 2023-07-09 PROBLEM — F10.939 ALCOHOL WITHDRAWAL (HCC): Status: ACTIVE | Noted: 2023-07-09

## 2023-07-09 LAB
ALL TARGETS: NOT DETECTED
ANION GAP SERPL CALCULATED.3IONS-SCNC: 5 MMOL/L
BACTERIA UR CULT: ABNORMAL
BASOPHILS # BLD AUTO: 0.02 THOUSANDS/ÂΜL (ref 0–0.1)
BASOPHILS NFR BLD AUTO: 0 % (ref 0–1)
BUN SERPL-MCNC: 21 MG/DL (ref 5–25)
CALCIUM SERPL-MCNC: 8.3 MG/DL (ref 8.4–10.2)
CHLORIDE SERPL-SCNC: 106 MMOL/L (ref 96–108)
CO2 SERPL-SCNC: 24 MMOL/L (ref 21–32)
CREAT SERPL-MCNC: 0.91 MG/DL (ref 0.6–1.3)
EOSINOPHIL # BLD AUTO: 0.02 THOUSAND/ÂΜL (ref 0–0.61)
EOSINOPHIL NFR BLD AUTO: 0 % (ref 0–6)
ERYTHROCYTE [DISTWIDTH] IN BLOOD BY AUTOMATED COUNT: 14.5 % (ref 11.6–15.1)
EST. AVERAGE GLUCOSE BLD GHB EST-MCNC: 111 MG/DL
GFR SERPL CREATININE-BSD FRML MDRD: 82 ML/MIN/1.73SQ M
GLUCOSE SERPL-MCNC: 89 MG/DL (ref 65–140)
HBA1C MFR BLD: 5.5 %
HCT VFR BLD AUTO: 44.6 % (ref 36.5–49.3)
HGB BLD-MCNC: 14.6 G/DL (ref 12–17)
IMM GRANULOCYTES # BLD AUTO: 0.07 THOUSAND/UL (ref 0–0.2)
IMM GRANULOCYTES NFR BLD AUTO: 1 % (ref 0–2)
LYMPHOCYTES # BLD AUTO: 1.24 THOUSANDS/ÂΜL (ref 0.6–4.47)
LYMPHOCYTES NFR BLD AUTO: 9 % (ref 14–44)
MCH RBC QN AUTO: 32.9 PG (ref 26.8–34.3)
MCHC RBC AUTO-ENTMCNC: 32.7 G/DL (ref 31.4–37.4)
MCV RBC AUTO: 101 FL (ref 82–98)
MONOCYTES # BLD AUTO: 0.84 THOUSAND/ÂΜL (ref 0.17–1.22)
MONOCYTES NFR BLD AUTO: 6 % (ref 4–12)
NEUTROPHILS # BLD AUTO: 12.01 THOUSANDS/ÂΜL (ref 1.85–7.62)
NEUTS SEG NFR BLD AUTO: 84 % (ref 43–75)
NRBC BLD AUTO-RTO: 0 /100 WBCS
PLATELET # BLD AUTO: 195 THOUSANDS/UL (ref 149–390)
PMV BLD AUTO: 9.9 FL (ref 8.9–12.7)
POTASSIUM SERPL-SCNC: 4.1 MMOL/L (ref 3.5–5.3)
PROCALCITONIN SERPL-MCNC: 7.86 NG/ML
RBC # BLD AUTO: 4.44 MILLION/UL (ref 3.88–5.62)
SODIUM SERPL-SCNC: 135 MMOL/L (ref 135–147)
VANCOMYCIN SERPL-MCNC: 13.3 UG/ML (ref 10–20)
WBC # BLD AUTO: 14.2 THOUSAND/UL (ref 4.31–10.16)

## 2023-07-09 PROCEDURE — 94760 N-INVAS EAR/PLS OXIMETRY 1: CPT

## 2023-07-09 PROCEDURE — 94762 N-INVAS EAR/PLS OXIMTRY CONT: CPT

## 2023-07-09 PROCEDURE — 99223 1ST HOSP IP/OBS HIGH 75: CPT | Performed by: INTERNAL MEDICINE

## 2023-07-09 PROCEDURE — 80048 BASIC METABOLIC PNL TOTAL CA: CPT | Performed by: FAMILY MEDICINE

## 2023-07-09 PROCEDURE — 99232 SBSQ HOSP IP/OBS MODERATE 35: CPT | Performed by: FAMILY MEDICINE

## 2023-07-09 PROCEDURE — 85025 COMPLETE CBC W/AUTO DIFF WBC: CPT | Performed by: FAMILY MEDICINE

## 2023-07-09 PROCEDURE — 84145 PROCALCITONIN (PCT): CPT | Performed by: FAMILY MEDICINE

## 2023-07-09 PROCEDURE — 80202 ASSAY OF VANCOMYCIN: CPT | Performed by: FAMILY MEDICINE

## 2023-07-09 PROCEDURE — 94640 AIRWAY INHALATION TREATMENT: CPT

## 2023-07-09 RX ORDER — LANOLIN ALCOHOL/MO/W.PET/CERES
100 CREAM (GRAM) TOPICAL DAILY
Status: DISCONTINUED | OUTPATIENT
Start: 2023-07-09 | End: 2023-09-01 | Stop reason: HOSPADM

## 2023-07-09 RX ORDER — CEFTRIAXONE 2 G/50ML
2000 INJECTION, SOLUTION INTRAVENOUS EVERY 24 HOURS
Status: DISCONTINUED | OUTPATIENT
Start: 2023-07-09 | End: 2023-07-11

## 2023-07-09 RX ORDER — LORAZEPAM 1 MG/1
2 TABLET ORAL ONCE
Status: COMPLETED | OUTPATIENT
Start: 2023-07-09 | End: 2023-07-09

## 2023-07-09 RX ORDER — FOLIC ACID 1 MG/1
1 TABLET ORAL DAILY
Status: DISCONTINUED | OUTPATIENT
Start: 2023-07-09 | End: 2023-09-01 | Stop reason: HOSPADM

## 2023-07-09 RX ADMIN — HYDROXYZINE HYDROCHLORIDE 50 MG: 25 TABLET, FILM COATED ORAL at 23:01

## 2023-07-09 RX ADMIN — IPRATROPIUM BROMIDE AND ALBUTEROL SULFATE 3 ML: 2.5; .5 SOLUTION RESPIRATORY (INHALATION) at 19:25

## 2023-07-09 RX ADMIN — LORAZEPAM 2 MG: 1 TABLET ORAL at 07:22

## 2023-07-09 RX ADMIN — PANTOPRAZOLE SODIUM 40 MG: 40 TABLET, DELAYED RELEASE ORAL at 07:22

## 2023-07-09 RX ADMIN — HEPARIN SODIUM 5000 UNITS: 5000 INJECTION INTRAVENOUS; SUBCUTANEOUS at 21:49

## 2023-07-09 RX ADMIN — IPRATROPIUM BROMIDE AND ALBUTEROL SULFATE 3 ML: 2.5; .5 SOLUTION RESPIRATORY (INHALATION) at 07:16

## 2023-07-09 RX ADMIN — THIAMINE HCL TAB 100 MG 100 MG: 100 TAB at 09:23

## 2023-07-09 RX ADMIN — VANCOMYCIN HYDROCHLORIDE 1000 MG: 1 INJECTION, SOLUTION INTRAVENOUS at 09:23

## 2023-07-09 RX ADMIN — MELATONIN 6 MG: at 21:49

## 2023-07-09 RX ADMIN — FLUTICASONE FUROATE AND VILANTEROL TRIFENATATE 1 PUFF: 200; 25 POWDER RESPIRATORY (INHALATION) at 09:23

## 2023-07-09 RX ADMIN — LORAZEPAM 2 MG: 1 TABLET ORAL at 02:38

## 2023-07-09 RX ADMIN — Medication 1 TABLET: at 09:23

## 2023-07-09 RX ADMIN — ASPIRIN 81 MG 81 MG: 81 TABLET ORAL at 09:23

## 2023-07-09 RX ADMIN — CEFTRIAXONE 2000 MG: 2 INJECTION, SOLUTION INTRAVENOUS at 15:21

## 2023-07-09 RX ADMIN — FOLIC ACID 1 MG: 1 TABLET ORAL at 09:23

## 2023-07-09 RX ADMIN — ATORVASTATIN CALCIUM 40 MG: 40 TABLET, FILM COATED ORAL at 17:04

## 2023-07-09 NOTE — PROGRESS NOTES
[facility]  Progress Note  Name: Nestor Lamb I  MRN: [id number]  Unit/Bed#: E4 -01 I Date of Admission: 7/6/2023   Date of Service: 7/9/2023 I Hospital Day: 3    Assessment/Plan   * Severe sepsis ([address])  Assessment & Plan  · Severe sepsis POA with tachycardia, leukocytosis, DENA and respiratory failure 88%  · Presents with complaints of shortness of breath, rigors and right flank pain  · Blood cultures growing Aerococcus urinae  2/2 sets, consistent with urine cultures  · Sepsis from urinary source, was undergoing PNA workup with the following findings:  · CT A/P: Evidence of cystitis with circumferential irregular bladder wall thickening and perivesical stranding, though there is also evidence of underlying chronic outlet obstruction. Correlation with urinalysis recommended. SEE pyelonephritis  · CT chest: Mild right lower lobe bronchial wall thickening, endobronchial debris, and mild consolidation in the right lower lobe compatible with bronchitis and pneumonia. However, no clinical presentation of pneumonia. · IV ceftriaxone, IV vancomycin was added due to positive blood cultures  · Sensitivities reviewed. Discontinue IV vancomycin, continue ceftriaxone alone. Requested ID consult, await input  · PCT very elevated, trending down, continue to monitor  · Monitor CBC, VS    Pyelonephritis  Assessment & Plan  · CT: Evidence of cystitis with circumferential irregular bladder wall thickening and perivesical stranding, though there is also evidence of underlying chronic outlet obstruction. Correlation with urinalysis recommended  · UA  Large leuks/no nitrites, WBC 10-20  · Appreciate Urology input, POD#3 cystoscopy, left retrograde pyelogram, ureteral stent insertion, michel catheter insertion. Inadvertent left ureteral perforation at tight proximal ureter, managed with indwelling ureteral stent.  Undergoing voiding trial  · Continue IV ceftriaxone; discontinue vancomycin  · As above, urine culture and blood cultures with Enterococcus urinae    Alcohol withdrawal ([address])  Assessment & Plan  Patient states he drinks a couple of beers daily  Patient developed overnight 7/9/2023  Initiated on CIWA protocol    Positive blood culture  Assessment & Plan  · 2/2 sets Aerococcus urinae due to pyelonephritis  · Discontinue vancomycin, continued on ceftriaxone  · Repeat set of blood cultures pending  · Monitor CBC, vital signs  · ID consult requested    Acute kidney injury [facility])  Assessment & Plan  · Post renal DENA in setting of obstructing calculus  · Creatinine 1.5, last creatinine 0.9 in 2018  · Resolveded s/p urologic procedure and IVF  · Avoid nephrotoxins  · Monitor BMP    Hydronephrosis with obstructing calculus  Assessment & Plan  · CT A/P:   · Left-sided hydronephrosis secondary to a 1 cm calculus in the proximal left ureter. · Evidence of cystitis with circumferential irregular bladder wall thickening and perivesical stranding, though there is also evidence of underlying chronic outlet obstruction. · Large nonobstructing left upper pole calyceal calculus  · S/p "POD#3 cystoscopy, left retrograde pyelogram, ureteral stent insertion, michel catheter insertion. Inadvertent left ureteral perforation at tight proximal ureter, managed with indwelling ureteral stent"  · Michel catheter removed, undergoing voiding trial  · Urology input appreciated    Esophageal abnormality  Assessment & Plan  · Incidental finding on CTA study: Diffuse esophageal wall thickening which could be due to esophagitis  · Started PPI  · Outpatient follow-up with GI for EGD. Elevated troponin  Assessment & Plan  · Elevated troponin likely demand ischemia in setting of sepsis  · Denies chest pain. No cardiac history although has not been seen by medical practitioner in years  · Troponin >2100.   EKG negative for acute ischemic changes  · Trend troponin and EKG  · Give aspirin bolus  · Check Echo, lipid panel, HgA1c, TSH  · Monitor on telemetry  · Cardiology consulted, unlikely ACS, however, suspected underlying coronary artery disease and will need ischemic work-up outpatient. No plans for inpatient cardiac interventions at this time. Chronic obstructive pulmonary disease with acute exacerbation (HCC)  Assessment & Plan  · Not in exacerbation  · Not on maintenance inhaler; poor outpatient f/u  · Smokes 6 cigarettes daily. Reports desire to quit  · Started Breo Ellipta  · DuoNeb 3 times daily  · Continue as needed albuterol  · Outpatient follow-up with pulmonology  · Tobacco cessation  · Check ambulatory pulse ox    Tobacco use  Assessment & Plan  · Smokes 6 cigarettes daily. Apparently plans to quit. · Nicotine TD patch  · Tobacco counseling provided            VTE Pharmacologic Prophylaxis: VTE Score: 5 High Risk (Score >/= 5) - Pharmacological DVT Prophylaxis Ordered: heparin. Sequential Compression Devices Ordered. Patient Centered Rounds: I performed bedside rounds with nursing staff today. Discussions with Specialists or Other Care Team Provider: will discuss with ID    Education and Discussions with Family / Patient: Patient declined call to . Total Time Spent on Date of Encounter in care of patient: 55 minutes This time was spent on one or more of the following: performing physical exam; counseling and coordination of care; obtaining or reviewing history; documenting in the medical record; reviewing/ordering tests, medications or procedures; communicating with other healthcare professionals and discussing with patient's family/caregivers. Current Length of Stay: 3 day(s)  Current Patient Status: Inpatient   Certification Statement: The patient will continue to require additional inpatient hospital stay due to IV Rx, close monitoring  Discharge Plan: Anticipate discharge in 48 hrs to home. Code Status: Level 1 - Full Code    Subjective:   Patient seen and examined.   Currently he is voicing no complaints. Overnight he became agitated with concern for alcohol withdrawal.  He did admit that he was drinking a couple of beers every day. Objective:     Vitals:   Temp (24hrs), Av °F (36.7 °C), Min:97.3 °F (36.3 °C), Max:98.8 °F (37.1 °C)    Temp:  [97.3 °F (36.3 °C)-98.8 °F (37.1 °C)] 97.3 °F (36.3 °C)  HR:  [] 85  Resp:  [18-26] 26  BP: (115-152)/(65-96) 115/86  SpO2:  [90 %-96 %] 96 %  Body mass index is 28 kg/m². Input and Output Summary (last 24 hours): Intake/Output Summary (Last 24 hours) at 2023 1214  Last data filed at 2023 1100  Gross per 24 hour   Intake 360 ml   Output 450 ml   Net -90 ml       Physical Exam:   Physical Exam  Constitutional:       General: He is not in acute distress. HENT:      Head: Normocephalic and atraumatic. Mouth/Throat:      Pharynx: Oropharynx is clear. Cardiovascular:      Rate and Rhythm: Normal rate and regular rhythm. Heart sounds: No murmur heard. Pulmonary:      Effort: No respiratory distress. Breath sounds: No wheezing or rales. Abdominal:      General: There is no distension. Tenderness: There is no abdominal tenderness. There is no guarding. Musculoskeletal:      Right lower leg: No edema. Left lower leg: No edema. Skin:     General: Skin is warm and dry. Neurological:      Mental Status: He is oriented to person, place, and time. Psychiatric:         Mood and Affect: Mood normal.          Additional Data:     Labs:  Results from last 7 days   Lab Units 23  0751 23  0600 23  1729   WBC Thousand/uL 14.20*   < > 22.17*   HEMOGLOBIN g/dL 14.6   < > 15.1   HEMATOCRIT % 44.6   < > 45.2   PLATELETS Thousands/uL 195   < > 232   BANDS PCT %  --   --  5   NEUTROS PCT % 84*   < >  --    LYMPHS PCT % 9*   < >  --    LYMPHO PCT %  --   --  2*   MONOS PCT % 6   < >  --    MONO PCT %  --   --  5   EOS PCT % 0   < > 0    < > = values in this interval not displayed.      Results from last 7 days   Lab Units 07/09/23  0751 07/08/23  0535   SODIUM mmol/L 135 136   POTASSIUM mmol/L 4.1 4.6   CHLORIDE mmol/L 106 107   CO2 mmol/L 24 24   BUN mg/dL 21 26*   CREATININE mg/dL 0.91 0.86   ANION GAP mmol/L 5 5   CALCIUM mg/dL 8.3* 8.1*   ALBUMIN g/dL  --  2.9*   TOTAL BILIRUBIN mg/dL  --  0.33   ALK PHOS U/L  --  52   ALT U/L  --  8   AST U/L  --  20   GLUCOSE RANDOM mg/dL 89 113     Results from last 7 days   Lab Units 07/06/23  1745   INR  1.12     Results from last 7 days   Lab Units 07/06/23  1716   POC GLUCOSE mg/dl 136         Results from last 7 days   Lab Units 07/09/23  0751 07/08/23  0535 07/07/23  0600 07/06/23  1729   LACTIC ACID mmol/L  --   --   --  1.9   PROCALCITONIN ng/ml 7.86* 18.35* 30.55* 38.23*       Lines/Drains:  Invasive Devices     Peripheral Intravenous Line  Duration           Peripheral IV 07/09/23 Right Forearm <1 day          Drain  Duration           Ureteral Internal Stent Left ureter 6 Fr. 2 days                  Imaging: no new    Recent Cultures (last 7 days):   Results from last 7 days   Lab Units 07/08/23  0529 07/07/23  0600 07/06/23  2247 07/06/23  1930 07/06/23  1817 07/06/23  1745   BLOOD CULTURE  Received in Microbiology Lab. Culture in Progress.   --   --   --  Aerococcus urinae*  --    GRAM STAIN RESULT   --   --   --   --  Gram positive cocci in clusters* Gram positive cocci in clusters*  Gram Positive Rods Resembling Diphtheroids*   URINE CULTURE   --   --  40,000-49,000 cfu/ml Aerococcus urinae* Culture too young- will reincubate  --   --    LEGIONELLA URINARY ANTIGEN   --  Negative  --   --   --   --        Last 24 Hours Medication List:   Current Facility-Administered Medications   Medication Dose Route Frequency Provider Last Rate   • acetaminophen  650 mg Oral Q6H PRN Nabila Rehman, CRNP     • albuterol  2.5 mg Nebulization Q6H PRN Nabila Rehman, CRNP     • aluminum-magnesium hydroxide-simethicone  30 mL Oral Q6H PRN KIKI Caro • aspirin  81 mg Oral Daily Arlis Soda, DO     • atorvastatin  40 mg Oral Daily With Sigmund Estimable, DO     • cefTRIAXone  1,000 mg Intravenous Q24H Dawson Petite, CRNP 1,000 mg (07/08/23 1822)   • dextromethorphan-guaiFENesin  10 mL Oral Q4H PRN Michael Petite, CRNP     • fluticasone-vilanterol  1 puff Inhalation Daily Michael Petite, CRNP     • folic acid  1 mg Oral Daily Nubia Estes PA-C     • heparin (porcine)  5,000 Units Subcutaneous [facility] Petite, CRNP     • HYDROmorphone  0.2 mg Intravenous Q4H PRN Michael Petite, CRNP     • hydrOXYzine HCL  50 mg Oral HS PRN Dawson Petite, CRNP     • ipratropium-albuterol  3 mL Nebulization TID Dawson Petite, CRNP     • melatonin  6 mg Oral HS Dawson Petite, CRNP     • multivitamin-minerals  1 tablet Oral Daily Nubia Estes PA-C     • nicotine  1 patch Transdermal Daily Dawson Petite, CRNP     • ondansetron  4 mg Intravenous Q6H PRN Dawson Petite, CRNP     • oxyCODONE  5 mg Oral 4x Daily PRN Dawson Petite, CRNP     • pantoprazole  40 mg Oral Early Morning Dawson Petite, CRNP     • simethicone  80 mg Oral 4x Daily PRN Dawson Petite, CRNP     • thiamine  100 mg Oral Daily Nubia Estes PA-C          Today, Patient Was Seen By: Drake Rodriguez MD    **Please Note: This note may have been constructed using a voice recognition system. **

## 2023-07-09 NOTE — ASSESSMENT & PLAN NOTE
· CT A/P:   · Left-sided hydronephrosis secondary to a 1 cm calculus in the proximal left ureter. · Evidence of cystitis with circumferential irregular bladder wall thickening and perivesical stranding, though there is also evidence of underlying chronic outlet obstruction. · Large nonobstructing left upper pole calyceal calculus  · S/p "POD#3 cystoscopy, left retrograde pyelogram, ureteral stent insertion, michel catheter insertion.  Inadvertent left ureteral perforation at tight proximal ureter, managed with indwelling ureteral stent"  · Michel catheter removed, undergoing voiding trial  · Urology input appreciated

## 2023-07-09 NOTE — ASSESSMENT & PLAN NOTE
· CT: Evidence of cystitis with circumferential irregular bladder wall thickening and perivesical stranding, though there is also evidence of underlying chronic outlet obstruction. Correlation with urinalysis recommended  · UA  Large leuks/no nitrites, WBC 10-20  · Appreciate Urology input, POD#3 cystoscopy, left retrograde pyelogram, ureteral stent insertion, michel catheter insertion. Inadvertent left ureteral perforation at tight proximal ureter, managed with indwelling ureteral stent.  Undergoing voiding trial  · Continue IV ceftriaxone; discontinue vancomycin  · As above, urine culture and blood cultures with Enterococcus urinae

## 2023-07-09 NOTE — PLAN OF CARE
Problem: INFECTION - ADULT  Goal: Absence or prevention of progression during hospitalization  Description: INTERVENTIONS:  - Assess and monitor for signs and symptoms of infection  - Monitor lab/diagnostic results  - Monitor all insertion sites, i.e. indwelling lines, tubes, and drains  - Monitor endotracheal if appropriate and nasal secretions for changes in amount and color  - [place] appropriate cooling/warming therapies per order  - Administer medications as ordered  - Instruct and encourage patient and family to use good hand hygiene technique  - Identify and instruct in appropriate isolation precautions for identified infection/condition  Outcome: Progressing     Problem: SAFETY ADULT  Goal: Patient will remain free of falls  Description: INTERVENTIONS:  - Educate patient/family on patient safety including physical limitations  - Instruct patient to call for assistance with activity   - Consult OT/PT to assist with strengthening/mobility   - Keep Call bell within reach  - Keep bed low and locked with side rails adjusted as appropriate  - Keep care items and personal belongings within reach  - Initiate and maintain comfort rounds  - Make Fall Risk Sign visible to staff  - Offer Toileting every 4 Hours, in advance of need  - Initiate/Maintain bed alarm  - Obtain necessary fall risk management equipment: Walker  - Apply yellow socks and bracelet for high fall risk patients  - Consider moving patient to room near nurses station  Outcome: Progressing

## 2023-07-09 NOTE — ASSESSMENT & PLAN NOTE
Patient states he drinks a couple of beers daily  Patient developed overnight 7/9/2023  Initiated on CIWA protocol

## 2023-07-09 NOTE — PROGRESS NOTES
Michelle Engle is a 75 y.o. male who is currently ordered Vancomycin IV with management by the Pharmacy Consult service. Relevant clinical data and objective / subjective history reviewed. Vancomycin Assessment:  Indication and Goal AUC/Trough: Bacteremia (goal -600, trough >10)  Clinical Status: stable  Micro:     Renal Function:  SCr: 0.91 mg/dL  CrCl: 92.2 mL/min  Renal replacement: Not on dialysis  Days of Therapy: 3  Current Dose: 1000 mg IV q12h   Vancomycin Plan:  New Dosin mg IV q12h   Estimated AUC: 473 mcg*hr/mL  Estimated Trough: 15.6 mcg/mL  Next Level:  with AM labs  Renal Function Monitoring: Daily BMP and [place] will continue to follow closely for s/sx of nephrotoxicity, infusion reactions and appropriateness of therapy. BMP and CBC will be ordered per protocol. We will continue to follow the patient’s culture results and clinical progress daily.     Fawn Gaucher, Pharmacist

## 2023-07-09 NOTE — CONSULTS
Consultation - Infectious Disease   Catha Bank 75 y.o. male MRN: [id number]  Unit/Bed#: E4 -01 Encounter: [id number]      IMPRESSION & RECOMMENDATIONS:   Impression/Recommendations: This is a 75 y.o. male, with poor medical follow-up, presented to ER on 7/6 with fever/chills and flank pain. CT showed obstructing left ureteral stone. Patient is status post placement of left ureteral stent. He now has Aerococcus bacteremia. 1.  Sepsis, present on admission, presenting with tachycardia and leukocytosis. Source of sepsis is most likely pyonephritis, complicated by bacteremia. Patient is clinically much improved. Tachycardia has resolved. WBC decreasing. Despite sepsis, he has remained hemodynamically stable, without hypotension. Antibiotic plan as seen below. Monitor temperature/WBC. Monitor hemodynamics. 2.  Aerococcus bacteremia. Source is most likely urinary, especially given obstructed ureteral stone and growth of Aerococcus in urine culture also. Patient is clinically much improved. Repeat blood culture obtained on 7/8 have no growth thus far. 2D echo without vegetation. Continue IV ceftriaxone but increase dose. Monitor temperature/WBC. Follow-up on repeat blood cultures. 3. Left-sided obstructive pyelonephritis, secondary to obstructing left ureteral stone. Patient is status postplacement of ureteral stent. He is clinically improved. Flank pain has resolved. Urine culture with growth of Aerococcus also. Antibiotic plan as in above. Monitor for recurrent flank pain. 4.  Obstructing left ureteral stone. Patient is status post placement of left ureteral stent. Urology follow-up for eventual stone extraction. 5.  DENA, present on admission, likely a combination of ureteral obstruction and sepsis. Creatinine is normalized. Antibiotic at full dose. Monitor creatinine. Hospitalization records reviewed in detail.   Discussed with patient in detail regarding the above plan. Discussed with Dr. Parish Golden from [facility] service. Thank you for this consultation. We will follow along with you. HISTORY OF PRESENT ILLNESS:  Reason for Consult: Bacteremia. HPI: Eriberto Jason is a 75 y.o. male, with very poor medical follow-up, presented to ER on 7/6 with fever, shaking chills, dysuria and flank pain with dyspnea. On presentation, patient did not have fever but had leukocytosis and tachycardia. He also was in DENA. Abdomen/pelvis CT showed obstructing left ureteral stone. Patient was admitted and started on IV ceftriaxone. He was taken to the OR urgently to undergo cystoscopy and placement of left ureteral stent. He is clinically much improved. Admission blood cultures now have growth of Aerococcus. For these reasons, we are asked to evaluate the patient. At present, patient is sleepy but easily arousable. When aroused, he is awake and alert but somewhat agitated. He states that he feels very well now. No urinary symptoms. No abdominal or flank pain. No dyspnea. No further chills. Patient denies history of renal stones. He has not seen a healthcare provider for more than 5 years. REVIEW OF SYSTEMS:  A complete system-based review was done. Except for what is noted in HPI above, ROS of systems is otherwise negative. PAST MEDICAL HISTORY:  Past Medical History:   Diagnosis Date   • COPD (chronic obstructive pulmonary disease) ([address])      Past Surgical History:   Procedure Laterality Date   • FL RETROGRADE PYELOGRAM  7/6/2023   • VA CYSTO BLADDER W/URETERAL CATHETERIZATION Left 7/6/2023    Procedure: CYSTOSCOPY RETROGRADE PYELOGRAM WITH INSERTION STENT URETERAL;  Surgeon: Loraine Magallanes MD;  Location: [facility];  Service: Urology     Problem list reviewed.     FAMILY HISTORY:  Non-contributory    SOCIAL HISTORY:  Social History     Substance and Sexual Activity   Alcohol Use No     Social History     Substance and Sexual Activity   Drug Use No Social History     Tobacco Use   Smoking Status Every Day   • Packs/day: 0.50   • Types: Cigarettes   Smokeless Tobacco Not on file       ALLERGIES:  No Known Allergies    MEDICATIONS:  All current active medications have been reviewed. Patient is currently on IV ceftriaxone. PHYSICAL EXAM:  Vitals:  Temp:  [97.3 °F (36.3 °C)-98.8 °F (37.1 °C)] 97.3 °F (36.3 °C)  HR:  [] 85  Resp:  [18-26] 26  BP: (115-152)/(65-96) 115/86  SpO2:  [90 %-96 %] 96 %  Temp (24hrs), Av °F (36.7 °C), Min:97.3 °F (36.3 °C), Max:98.8 °F (37.1 °C)  Current: Temperature: (!) 97.3 °F (36.3 °C)     Physical Exam:  General:  Well-nourished, well-developed, in no acute distress. Awake, alert and oriented x 3. Eyes:  Conjunctive clear with no hemorrhages or effusions  Oropharynx:  No ulcers, no lesions, pharynx benign, no tonsillitis  Neck:  Supple, no lymphadenopathy, no mass, nontender  Lungs:  Expansion symmetric, no rales, no wheezing, no accessory muscle use  Cardiac:  Regular rate and rhythm, normal S1, normal S2, no murmurs  Abdomen:  Soft, nondistended, non-tender, no HSM  Extremities:  No edema, no erythema, nontender. No ulcers  Skin:  No rashes, no ulcers  Neurological:  Moves all four extremities spontaneously, sensation grossly intact    LABS, IMAGING, & OTHER STUDIES:  Lab Results:  I have personally reviewed pertinent labs.   Results from last 7 days   Lab Units 23  0751 23  0535 23  0600 23  1729   POTASSIUM mmol/L 4.1 4.6 4.1 3.9   CHLORIDE mmol/L 106 107 107 103   CO2 mmol/L 24 24 22 23   BUN mg/dL 21 26* 30* 29*   CREATININE mg/dL 0.91 0.86 1.20 1.51*   EGFR ml/min/1.73sq m 82 85 59 44   CALCIUM mg/dL 8.3* 8.1* 8.1* 8.7   AST U/L  --  20 20 22   ALT U/L  --  8 9 9   ALK PHOS U/L  --  52 56 76     Results from last 7 days   Lab Units 23  0751 23  0535 23  0600   WBC Thousand/uL 14.20* 18.54* 17.08*   HEMOGLOBIN g/dL 14.6 13.3 13.7   PLATELETS Thousands/uL 195 193 191 Results from last 7 days   Lab Units 07/08/23  0529 07/07/23  0600 07/06/23  2247 07/06/23  1930 07/06/23  1817 07/06/23  1745   BLOOD CULTURE  Received in Microbiology Lab. Culture in Progress. --   --   --  Aerococcus urinae*  --    GRAM STAIN RESULT   --   --   --   --  Gram positive cocci in clusters* Gram positive cocci in clusters*  Gram Positive Rods Resembling Diphtheroids*   URINE CULTURE   --   --  40,000-49,000 cfu/ml Aerococcus urinae* Culture too young- will reincubate  --   --    LEGIONELLA URINARY ANTIGEN   --  Negative  --   --   --   --        Imaging Studies:   I have personally reviewed pertinent imaging study reports and images in PACS. CXR reviewed personally. No infiltrates. Chest CT reviewed personally. No PE. Right basilar infiltrate versus atelectasis. Abdomen/pelvis CT reviewed personally. Obstructing left ureteral stone with left-sided hydronephrosis. Bladder wall thickening. EKG, Pathology, and Other Studies:   I have personally reviewed pertinent reports.

## 2023-07-09 NOTE — ASSESSMENT & PLAN NOTE
· Severe sepsis POA with tachycardia, leukocytosis, DENA and respiratory failure 88%  · Presents with complaints of shortness of breath, rigors and right flank pain  · Blood cultures growing Aerococcus urinae  2/2 sets, consistent with urine cultures  · Sepsis from urinary source, was undergoing PNA workup with the following findings:  · CT A/P: Evidence of cystitis with circumferential irregular bladder wall thickening and perivesical stranding, though there is also evidence of underlying chronic outlet obstruction. Correlation with urinalysis recommended. SEE pyelonephritis  · CT chest: Mild right lower lobe bronchial wall thickening, endobronchial debris, and mild consolidation in the right lower lobe compatible with bronchitis and pneumonia. However, no clinical presentation of pneumonia. · IV ceftriaxone, IV vancomycin was added due to positive blood cultures  · Sensitivities reviewed. Discontinue IV vancomycin, continue ceftriaxone alone.   Requested ID consult, await input  · PCT very elevated, trending down, continue to monitor  · Monitor CBC, VS

## 2023-07-09 NOTE — ASSESSMENT & PLAN NOTE
· 2/2 sets Aerococcus urinae due to pyelonephritis  · Discontinue vancomycin, continued on ceftriaxone  · Repeat set of blood cultures pending  · Monitor CBC, vital signs  · ID consult requested

## 2023-07-09 NOTE — NURSING NOTE
Pt noted to have increased agitation with tremors. Attempting to put phone on table that was not there. C/o feeling hot, not able to hold still or sleep. Bed alarm on. Covering Provider, Sondra Vasquez notified. Oriented x3. Pt states "couple days" since drinking alcohol . Score according to CIWA scale 11. Start protocol per Provider. Medicated with Ativan po x1. . Pt stated feeling better after one hour. Continuous Pox on 90-94. On RA.

## 2023-07-09 NOTE — ASSESSMENT & PLAN NOTE
· Post renal DENA in setting of obstructing calculus  · Creatinine 1.5, last creatinine 0.9 in 2018  · Resolveded s/p urologic procedure and IVF  · Avoid nephrotoxins  · Monitor BMP

## 2023-07-09 NOTE — CONSULTS
Vancomycin IV Pharmacy-to-Dose Consultation    Lety Uribe is a 75 y.o. male who was receiving Vancomycin IV with management by the Pharmacy Consult service for treatment of Bacteremia (goal -600, trough >10). The patient’s Vancomycin therapy has been discontinued. Thank you for allowing us to take part in this patient's care. Pharmacy will sign-off now; please call or re-consult if there are any questions.     Bassam Ferrera, TobiasD

## 2023-07-09 NOTE — QUICK NOTE
Overnight: 07/09/23:    RN paged regarding generalized restlessness, anxiety. Patient reported alcohol use to RN. CIWA scored at 6.      Plan:  • CIWA added, thiamine/folic acid replacement as well  • Monitor with ASHTYN

## 2023-07-10 LAB
ANION GAP SERPL CALCULATED.3IONS-SCNC: 4 MMOL/L
BASOPHILS # BLD AUTO: 0.04 THOUSANDS/ÂΜL (ref 0–0.1)
BASOPHILS NFR BLD AUTO: 0 % (ref 0–1)
BUN SERPL-MCNC: 22 MG/DL (ref 5–25)
CALCIUM SERPL-MCNC: 8.5 MG/DL (ref 8.4–10.2)
CHLORIDE SERPL-SCNC: 104 MMOL/L (ref 96–108)
CO2 SERPL-SCNC: 29 MMOL/L (ref 21–32)
CREAT SERPL-MCNC: 1.14 MG/DL (ref 0.6–1.3)
EOSINOPHIL # BLD AUTO: 0.07 THOUSAND/ÂΜL (ref 0–0.61)
EOSINOPHIL NFR BLD AUTO: 1 % (ref 0–6)
ERYTHROCYTE [DISTWIDTH] IN BLOOD BY AUTOMATED COUNT: 14.4 % (ref 11.6–15.1)
GFR SERPL CREATININE-BSD FRML MDRD: 63 ML/MIN/1.73SQ M
GLUCOSE SERPL-MCNC: 105 MG/DL (ref 65–140)
HCT VFR BLD AUTO: 47.4 % (ref 36.5–49.3)
HGB BLD-MCNC: 15.2 G/DL (ref 12–17)
IMM GRANULOCYTES # BLD AUTO: 0.05 THOUSAND/UL (ref 0–0.2)
IMM GRANULOCYTES NFR BLD AUTO: 1 % (ref 0–2)
LYMPHOCYTES # BLD AUTO: 2.39 THOUSANDS/ÂΜL (ref 0.6–4.47)
LYMPHOCYTES NFR BLD AUTO: 22 % (ref 14–44)
MCH RBC QN AUTO: 32.5 PG (ref 26.8–34.3)
MCHC RBC AUTO-ENTMCNC: 32.1 G/DL (ref 31.4–37.4)
MCV RBC AUTO: 101 FL (ref 82–98)
MONOCYTES # BLD AUTO: 0.86 THOUSAND/ÂΜL (ref 0.17–1.22)
MONOCYTES NFR BLD AUTO: 8 % (ref 4–12)
NEUTROPHILS # BLD AUTO: 7.45 THOUSANDS/ÂΜL (ref 1.85–7.62)
NEUTS SEG NFR BLD AUTO: 68 % (ref 43–75)
NRBC BLD AUTO-RTO: 0 /100 WBCS
PLATELET # BLD AUTO: 234 THOUSANDS/UL (ref 149–390)
PMV BLD AUTO: 10.5 FL (ref 8.9–12.7)
POTASSIUM SERPL-SCNC: 4 MMOL/L (ref 3.5–5.3)
PROCALCITONIN SERPL-MCNC: 4.18 NG/ML
RBC # BLD AUTO: 4.68 MILLION/UL (ref 3.88–5.62)
SODIUM SERPL-SCNC: 137 MMOL/L (ref 135–147)
WBC # BLD AUTO: 10.86 THOUSAND/UL (ref 4.31–10.16)

## 2023-07-10 PROCEDURE — 99232 SBSQ HOSP IP/OBS MODERATE 35: CPT | Performed by: INTERNAL MEDICINE

## 2023-07-10 PROCEDURE — 94762 N-INVAS EAR/PLS OXIMTRY CONT: CPT

## 2023-07-10 PROCEDURE — 99232 SBSQ HOSP IP/OBS MODERATE 35: CPT | Performed by: STUDENT IN AN ORGANIZED HEALTH CARE EDUCATION/TRAINING PROGRAM

## 2023-07-10 PROCEDURE — 84145 PROCALCITONIN (PCT): CPT | Performed by: FAMILY MEDICINE

## 2023-07-10 PROCEDURE — 94760 N-INVAS EAR/PLS OXIMETRY 1: CPT

## 2023-07-10 PROCEDURE — 85025 COMPLETE CBC W/AUTO DIFF WBC: CPT | Performed by: FAMILY MEDICINE

## 2023-07-10 PROCEDURE — 80048 BASIC METABOLIC PNL TOTAL CA: CPT | Performed by: FAMILY MEDICINE

## 2023-07-10 PROCEDURE — 94640 AIRWAY INHALATION TREATMENT: CPT

## 2023-07-10 RX ADMIN — Medication 1 PATCH: at 08:41

## 2023-07-10 RX ADMIN — IPRATROPIUM BROMIDE AND ALBUTEROL SULFATE 3 ML: 2.5; .5 SOLUTION RESPIRATORY (INHALATION) at 19:44

## 2023-07-10 RX ADMIN — Medication 1 TABLET: at 08:41

## 2023-07-10 RX ADMIN — FOLIC ACID 1 MG: 1 TABLET ORAL at 08:41

## 2023-07-10 RX ADMIN — IPRATROPIUM BROMIDE AND ALBUTEROL SULFATE 3 ML: 2.5; .5 SOLUTION RESPIRATORY (INHALATION) at 13:59

## 2023-07-10 RX ADMIN — IPRATROPIUM BROMIDE AND ALBUTEROL SULFATE 3 ML: 2.5; .5 SOLUTION RESPIRATORY (INHALATION) at 07:50

## 2023-07-10 RX ADMIN — ATORVASTATIN CALCIUM 40 MG: 40 TABLET, FILM COATED ORAL at 16:50

## 2023-07-10 RX ADMIN — THIAMINE HCL TAB 100 MG 100 MG: 100 TAB at 08:41

## 2023-07-10 RX ADMIN — HYDROXYZINE HYDROCHLORIDE 50 MG: 25 TABLET, FILM COATED ORAL at 22:38

## 2023-07-10 RX ADMIN — FLUTICASONE FUROATE AND VILANTEROL TRIFENATATE 1 PUFF: 200; 25 POWDER RESPIRATORY (INHALATION) at 08:41

## 2023-07-10 RX ADMIN — PANTOPRAZOLE SODIUM 40 MG: 40 TABLET, DELAYED RELEASE ORAL at 05:48

## 2023-07-10 RX ADMIN — MELATONIN 6 MG: at 21:28

## 2023-07-10 RX ADMIN — CEFTRIAXONE 2000 MG: 2 INJECTION, SOLUTION INTRAVENOUS at 16:49

## 2023-07-10 RX ADMIN — ASPIRIN 81 MG 81 MG: 81 TABLET ORAL at 08:41

## 2023-07-10 NOTE — ASSESSMENT & PLAN NOTE
Patient states he drinks a couple of beers daily.  No evidence of withdrawal at this time  [facility] protocol

## 2023-07-10 NOTE — ASSESSMENT & PLAN NOTE
· CT A/P:   · Left-sided hydronephrosis secondary to a 1 cm calculus in the proximal left ureter. · Evidence of cystitis with circumferential irregular bladder wall thickening and perivesical stranding, though there is also evidence of underlying chronic outlet obstruction. · Large nonobstructing left upper pole calyceal calculus  · S/p "POD#3 cystoscopy, left retrograde pyelogram, ureteral stent insertion, michel catheter insertion. Inadvertent left ureteral perforation at tight proximal ureter, managed with indwelling ureteral stent"  · Urology recommendations appreciated. They will coordinate his outpatient follow-up for his next step in management.

## 2023-07-10 NOTE — ASSESSMENT & PLAN NOTE
70-year-old male was admitted due to severe sepsis secondary to complicated UTI. Patient was noted to be bacteremic to Aerococcus urinae. Etiology likely secondary to his Left-sided hydronephrosis secondary to a 1 cm calculus in the proximal left ureter. · Discussed case with infectious disease.   Likely transition to oral antibiotics within the next 24 hours

## 2023-07-10 NOTE — ASSESSMENT & PLAN NOTE
Incidental finding on CTA study: Diffuse esophageal wall thickening which could be due to esophagitis  · Continue PPI  · Outpatient follow-up with GI for EGD.

## 2023-07-10 NOTE — PLAN OF CARE
Problem: Potential for Falls  Goal: Patient will remain free of falls  Description: INTERVENTIONS:  - Educate patient/family on patient safety including physical limitations  - Instruct patient to call for assistance with activity   - Consult OT/PT to assist with strengthening/mobility   - Keep Call bell within reach  - Keep bed low and locked with side rails adjusted as appropriate  - Keep care items and personal belongings within reach  - Initiate and maintain comfort rounds  - Make Fall Risk Sign visible to staff  - Offer Toileting every 2 Hours, in advance of need

## 2023-07-10 NOTE — PROGRESS NOTES
Progress Note - Infectious Disease   Mandie Vital 75 y.o. male MRN: [id number]  Unit/Bed#: E4 -01 Encounter: [id number]      Impression/Plan:  75 y.o. male, with poor medical follow-up, presented to ER on 7/6 with fever/chills and flank pain. CT showed obstructing left ureteral stone. Patient is status post placement of left ureteral stent. He now has Aerococcus bacteremia.     1. Sepsis, present on admission, presenting with tachycardia and leukocytosis. Source of sepsis is most likely pyonephritis, complicated by bacteremia. Patient is clinically much improved. Tachycardia has resolved. WBC decreasing. Despite sepsis, he has remained hemodynamically stable, without hypotension. Antibiotic plan as seen below. Monitor temperature/WBC. Monitor hemodynamics.     2. Aerococcus bacteremia. Source is most likely urinary, especially given obstructed ureteral stone and growth of Aerococcus in urine culture also. Patient is clinically much improved. Repeat blood culture obtained on 7/8 have no growth thus far. 2D echo 7/7, adequate study, without vegetation. He has no cardiac devices or endovascular hardware. Continue IV ceftriaxone 2g daily  Monitor temperature/WBC. Follow-up on repeat blood cultures 7/8; negative to date  If repeat blood cultures remain negative at 72 hours and he continues to improve clinically, hope to transition to PO Amoxicillin and complete 14 day course from stent placement.      3. Left-sided obstructive pyelonephritis, secondary to obstructing left ureteral stone. Patient is status postplacement of ureteral stent on 7/6. He is clinically improved. Flank pain has resolved. Urine culture with growth of Aerococcus also. Antibiotic plan as in above. Monitor for recurrent flank pain. Outpatient Urology follow up for stent management     4. Obstructing left ureteral stone. Patient is status post placement of left ureteral stent.   Urology follow-up for eventual stone extraction.     5. DENA, present on admission, likely a combination of ureteral obstruction and sepsis. Creatinine is normalized. Antibiotic at full dose. Monitor creatinine. Plan and recommendations were discussed with primary team.    Antibiotics:  Ceftriaxone: 5    Subjective:  Remains afebrile. WBC still down trending, 10.8 this AM. Blood cultures  negative to date. Denies nausea, diarrhea, flank pain, abdominal pain, fever, chills. Objective:  Vitals:  Temp:  [97.3 °F (36.3 °C)-99 °F (37.2 °C)] (P) 97.5 °F (36.4 °C)  HR:  [] (P) 76  Resp:  [22-26] 22  BP: ()/(59-86) 128/77  SpO2:  [92 %-96 %] 93 %  Temp (24hrs), Av °F (36.7 °C), Min:97.3 °F (36.3 °C), Max:99 °F (37.2 °C)  Current: Temperature: (P) 97.5 °F (36.4 °C)    Physical Exam:   General Appearance:  Alert, interactive, nontoxic, no acute distress. Throat: Oropharynx moist without lesions. Lungs:   Clear to auscultation bilaterally; no wheezes, rhonchi or rales; respirations unlabored   Heart:  RRR; no murmur, rub or gallop   Abdomen:   Soft, non-tender, non-distended, positive bowel sounds. Extremities: No clubbing, cyanosis or edema   Skin: No new rashes or lesions. No draining wounds noted. Labs:    All pertinent labs and imaging studies were personally reviewed  Results from last 7 days   Lab Units 07/10/23  0528 07/09/23  0751 07/08/23  0535   WBC Thousand/uL 10.86* 14.20* 18.54*   HEMOGLOBIN g/dL 15.2 14.6 13.3   PLATELETS Thousands/uL 234 195 193     Results from last 7 days   Lab Units 07/10/23  0528 07/09/23  07523  0535 23  0600 23  1729   SODIUM mmol/L 137 135 136 136 134*   POTASSIUM mmol/L 4.0 4.1 4.6 4.1 3.9   CHLORIDE mmol/L 104 106 107 107 103   CO2 mmol/L 29 24 24 22 23   BUN mg/dL 22 21 26* 30* 29*   CREATININE mg/dL 1.14 0.91 0.86 1.20 1.51*   EGFR ml/min/1.73sq m 63 82 85 59 44   CALCIUM mg/dL 8.5 8.3* 8.1* 8.1* 8.7   AST U/L  --   --  20 20 22   ALT U/L  --   --  8 9 9   ALK PHOS U/L  --   --  52 56 76     Results from last 7 days   Lab Units 07/10/23  0528 07/09/23  0751 07/08/23  0535 07/07/23  0600 07/06/23  1729   PROCALCITONIN ng/ml 4.18* 7.86* 18.35* 30.55* 38.23*             Results from last 7 days   Lab Units 07/06/23  1745   D-DIMER QUANTITATIVE ug/ml FEU 2.14*       Micro:  Results from last 7 days   Lab Units 07/08/23  0529 07/07/23  0600 07/06/23  2247 07/06/23  1930 07/06/23  1817 07/06/23  1745   BLOOD CULTURE  No Growth at 24 hrs.  --   --   --  Aerococcus urinae* Aerococcus urinae*   GRAM STAIN RESULT   --   --   --   --  Gram positive cocci in clusters* Gram positive cocci in clusters*  Gram Positive Rods Resembling Diphtheroids*   URINE CULTURE   --   --  40,000-49,000 cfu/ml Aerococcus urinae* >100,000 cfu/ml Aerococcus urinae*  10,000-19,000 cfu/ml  --   --    LEGIONELLA URINARY ANTIGEN   --  Negative  --   --   --   --        Imaging:          Pascual Mireles MD  [facility]

## 2023-07-10 NOTE — PLAN OF CARE
Problem: PAIN - ADULT  Goal: Verbalizes/displays adequate comfort level or baseline comfort level  Description: Interventions:  - Encourage patient to monitor pain and request assistance  - Assess pain using appropriate pain scale  - Administer analgesics based on type and severity of pain and evaluate response  - Implement non-pharmacological measures as appropriate and evaluate response  - Consider cultural and social influences on pain and pain management  - Notify physician/advanced practitioner if interventions unsuccessful or patient reports new pain  Outcome: Progressing     Problem: INFECTION - ADULT  Goal: Absence or prevention of progression during hospitalization  Description: INTERVENTIONS:  - Assess and monitor for signs and symptoms of infection  - Monitor lab/diagnostic results  - Monitor all insertion sites, i.e. indwelling lines, tubes, and drains  - Monitor endotracheal if appropriate and nasal secretions for changes in amount and color  - [place] appropriate cooling/warming therapies per order  - Administer medications as ordered  - Instruct and encourage patient and family to use good hand hygiene technique  - Identify and instruct in appropriate isolation precautions for identified infection/condition  Outcome: Progressing  Goal: Absence of fever/infection during neutropenic period  Description: INTERVENTIONS:  - Monitor WBC    Outcome: Progressing

## 2023-07-10 NOTE — ASSESSMENT & PLAN NOTE
Post renal acute kidney injury in the setting of an obstructing calculus.   · Resolved after urologic intervention and IV hydration    Recent Labs     07/08/23  0535 07/09/23  0751 07/10/23  0528   BUN 26* 21 22   CREATININE 0.86 0.91 1.14   EGFR 85 82 63       Results from last 7 days   Lab Units 07/06/23  1930   BLOOD UA  Large*   PROTEIN UA mg/dl 100 (2+)*

## 2023-07-10 NOTE — TELEPHONE ENCOUNTER
Pt remains inpatient at this time.  Will monitor for discharge to arrange for urological follow up.,

## 2023-07-10 NOTE — ASSESSMENT & PLAN NOTE
Elevated troponin likely demand ischemia in setting of sepsis  · Cardiology recommendations appreciated.   Once patient recovers from his acute infectious illness, they recommend outpatient ischemic evaluation likely with a nuclear stress test.

## 2023-07-10 NOTE — PROGRESS NOTES
[facility]  Progress Note  Name: Clari Agustin I  MRN: [id number]  Unit/Bed#: E4 -01 I Date of Admission: 7/6/2023   Date of Service: 7/10/2023 I Hospital Day: 4    Assessment/Plan   * Sepsis secondary to UTI [facility])  Assessment & Plan  65-year-old male was admitted due to severe sepsis secondary to complicated UTI. Patient was noted to be bacteremic to Aerococcus urinae. Etiology likely secondary to his Left-sided hydronephrosis secondary to a 1 cm calculus in the proximal left ureter. · Discussed case with infectious disease. Likely transition to oral antibiotics within the next 24 hours    Alcohol withdrawal [facility])  Assessment & Plan  Patient states he drinks a couple of beers daily. No evidence of withdrawal at this time  [facility] protocol    Positive blood culture  Assessment & Plan  2/2 sets Aerococcus urinae due to pyelonephritis  · Management per infectious disease    Acute kidney injury [facility])  Assessment & Plan  Post renal acute kidney injury in the setting of an obstructing calculus. · Resolved after urologic intervention and IV hydration    Recent Labs     07/08/23  0535 07/09/23  0751 07/10/23  0528   BUN 26* 21 22   CREATININE 0.86 0.91 1.14   EGFR 85 82 63       Results from last 7 days   Lab Units 07/06/23  1930   BLOOD UA  Large*   PROTEIN UA mg/dl 100 (2+)*         Mycotic toenails  Assessment & Plan  · Very elongated mycotic toenails. S/p podiatry debridement. Hydronephrosis with obstructing calculus  Assessment & Plan  · CT A/P:   · Left-sided hydronephrosis secondary to a 1 cm calculus in the proximal left ureter. · Evidence of cystitis with circumferential irregular bladder wall thickening and perivesical stranding, though there is also evidence of underlying chronic outlet obstruction. · Large nonobstructing left upper pole calyceal calculus  · S/p "POD#3 cystoscopy, left retrograde pyelogram, ureteral stent insertion, michel catheter insertion. Inadvertent left ureteral perforation at tight proximal ureter, managed with indwelling ureteral stent"  · Urology recommendations appreciated. They will coordinate his outpatient follow-up for his next step in management. Esophageal abnormality  Assessment & Plan  Incidental finding on CTA study: Diffuse esophageal wall thickening which could be due to esophagitis  · Continue PPI  · Outpatient follow-up with GI for EGD. Elevated d-dimer  Assessment & Plan  CTA negative for pulmonary embolism    Elevated troponin  Assessment & Plan  Elevated troponin likely demand ischemia in setting of sepsis  · Cardiology recommendations appreciated. Once patient recovers from his acute infectious illness, they recommend outpatient ischemic evaluation likely with a nuclear stress test.    COPD (chronic obstructive pulmonary disease) ([address])  Assessment & Plan  Not in exacerbation. Encouraged cessation. · Smokes 6 cigarettes daily. Reports desire to quit  · Breo, prn albuterol. Outpatient follow-up with pulmonology  · Tobacco cessation  · Check ambulatory pulse ox on discharge           VTE Pharmacologic Prophylaxis:   Pharmacologic: Heparin  Mechanical VTE Prophylaxis in Place: Yes    Discussions with Specialists or Other Care Team Provider: nursing    Education and Discussions with Family / Patient: patient, offered to speak to family. Patient refused    Current Length of Stay: 4 day(s)    Current Patient Status: Inpatient   Certification Statement: The patient will continue to require additional inpatient hospital stay due to iv antibiotics    Discharge Plan: 24 hours    Code Status: Level 1 - Full Code      Subjective:   Patient seen and examined at bedside. He has no new issues or complaints overnight.   He was hoping to get out of here at the soonest time possible    Objective:     Vitals:   Temp (24hrs), Av °F (36.7 °C), Min:97.3 °F (36.3 °C), Max:99 °F (37.2 °C)    Temp:  [97.3 °F (36.3 °C)-99 °F (37.2 °C)] 97.5 °F (36.4 °C)  HR:  [] 76  Resp:  [22-26] 24  BP: ()/(59-86) 121/76  SpO2:  [92 %-96 %] 96 %  Body mass index is 28 kg/m². Input and Output Summary (last 24 hours): Intake/Output Summary (Last 24 hours) at 7/10/2023 1000  Last data filed at 7/10/2023 0601  Gross per 24 hour   Intake 370 ml   Output 250 ml   Net 120 ml       Physical Exam:     Physical Exam  Vitals reviewed. Constitutional:       General: He is not in acute distress. HENT:      Head: Normocephalic. Nose: Nose normal.      Mouth/Throat:      Mouth: Mucous membranes are moist.   Eyes:      General: No scleral icterus. Cardiovascular:      Rate and Rhythm: Normal rate and regular rhythm. Pulmonary:      Effort: Pulmonary effort is normal. No respiratory distress. Breath sounds: No wheezing or rales. Abdominal:      General: There is no distension. Palpations: Abdomen is soft. Tenderness: There is no abdominal tenderness. Skin:     General: Skin is warm. Neurological:      Mental Status: He is alert and oriented to person, place, and time. Psychiatric:         Mood and Affect: Mood normal.         Behavior: Behavior normal.       Additional Data:     Labs:    Results from last 7 days   Lab Units 07/10/23  0528 07/07/23  0600 07/06/23  1729   WBC Thousand/uL 10.86*   < > 22.17*   HEMOGLOBIN g/dL 15.2   < > 15.1   HEMATOCRIT % 47.4   < > 45.2   PLATELETS Thousands/uL 234   < > 232   BANDS PCT %  --   --  5   NEUTROS PCT % 68   < >  --    LYMPHS PCT % 22   < >  --    LYMPHO PCT %  --   --  2*   MONOS PCT % 8   < >  --    MONO PCT %  --   --  5   EOS PCT % 1   < > 0    < > = values in this interval not displayed.      Results from last 7 days   Lab Units 07/10/23  0528 07/09/23  0751 07/08/23  0535   SODIUM mmol/L 137   < > 136   POTASSIUM mmol/L 4.0   < > 4.6   CHLORIDE mmol/L 104   < > 107   CO2 mmol/L 29   < > 24   BUN mg/dL 22   < > 26*   CREATININE mg/dL 1.14   < > 0.86   ANION GAP mmol/L 4   < > 5   CALCIUM mg/dL 8.5   < > 8.1*   ALBUMIN g/dL  --   --  2.9*   TOTAL BILIRUBIN mg/dL  --   --  0.33   ALK PHOS U/L  --   --  52   ALT U/L  --   --  8   AST U/L  --   --  20   GLUCOSE RANDOM mg/dL 105   < > 113    < > = values in this interval not displayed. Results from last 7 days   Lab Units 07/06/23  1745   INR  1.12     Results from last 7 days   Lab Units 07/06/23  1716   POC GLUCOSE mg/dl 136     Results from last 7 days   Lab Units 07/07/23  0600   HEMOGLOBIN A1C % 5.5     Results from last 7 days   Lab Units 07/10/23  0528 07/09/23  0751 07/08/23  0535 07/07/23  0600 07/06/23  1729   LACTIC ACID mmol/L  --   --   --   --  1.9   PROCALCITONIN ng/ml 4.18* 7.86* 18.35* 30.55* 38.23*           * I Have Reviewed All Lab Data Listed Above. * Additional Pertinent Lab Tests Reviewed:  [facility] Admission Reviewed      Lines:   Invasive Devices     Peripheral Intravenous Line  Duration           Peripheral IV 07/09/23 Left Forearm <1 day          Drain  Duration           Ureteral Internal Stent Left ureter 6 Fr. 3 days                   Imaging:    Imaging Reports Reviewed Today Include: imaging since admission    Recent Cultures (last 7 days):     Results from last 7 days   Lab Units 07/08/23  0529 07/07/23  0600 07/06/23  2247 07/06/23  1930 07/06/23  1817 07/06/23  1745   BLOOD CULTURE  No Growth at 24 hrs.  --   --   --  Aerococcus urinae* Aerococcus urinae*   GRAM STAIN RESULT   --   --   --   --  Gram positive cocci in clusters* Gram positive cocci in clusters*  Gram Positive Rods Resembling Diphtheroids*   URINE CULTURE   --   --  40,000-49,000 cfu/ml Aerococcus urinae* >100,000 cfu/ml Aerococcus urinae*  10,000-19,000 cfu/ml  --   --    LEGIONELLA URINARY ANTIGEN   --  Negative  --   --   --   --        Last 24 Hours Medication List:   Current Facility-Administered Medications   Medication Dose Route Frequency Provider Last Rate   • acetaminophen  650 mg Oral Q6H HUBERT Mosley Max Coleman     • albuterol  2.5 mg Nebulization Q6H PRN KIKI Chowdhury     • aluminum-magnesium hydroxide-simethicone  30 mL Oral Q6H PRN KIKI Chowdhury     • aspirin  81 mg Oral Daily Lizet Mauricio DO     • atorvastatin  40 mg Oral Daily With Gloria Bazzi DO     • cefTRIAXone  2,000 mg Intravenous Q24H Rhodia Favre, MD Stopped (07/09/23 3883)   • dextromethorphan-guaiFENesin  10 mL Oral Q4H PRN KIKI Chowdhury     • fluticasone-vilanterol  1 puff Inhalation Daily KIKI Chowdhury     • folic acid  1 mg Oral Daily Sandy Hopkins PA-C     • heparin (porcine)  5,000 Units Subcutaneous [facility] KIKI Chowdhury     • HYDROmorphone  0.2 mg Intravenous Q4H PRN KIKI Chowdhury     • hydrOXYzine HCL  50 mg Oral HS PRN KIKI Chowdhury     • ipratropium-albuterol  3 mL Nebulization TID KIKI Chowdhury     • melatonin  6 mg Oral HS KIKI Chowdhury     • multivitamin-minerals  1 tablet Oral Daily Sandy Hopkins PA-C     • nicotine  1 patch Transdermal Daily KIKI Chowdhury     • ondansetron  4 mg Intravenous Q6H PRN KIKI Chowdhury     • oxyCODONE  5 mg Oral 4x Daily PRN KIKI Chowdhury     • pantoprazole  40 mg Oral Early Morning KIKI Chowdhury     • simethicone  80 mg Oral 4x Daily PRN KIKI Chowdhury     • thiamine  100 mg Oral Daily Sandy Hopkins PA-C          Today, Patient Was Seen By: Bobo Romero MD    ** Please Note: Dictation voice to text software may have been used in the creation of this document.  **

## 2023-07-10 NOTE — ASSESSMENT & PLAN NOTE
Not in exacerbation. Encouraged cessation. · Smokes 6 cigarettes daily. Reports desire to quit  · Breo, prn albuterol.  Outpatient follow-up with pulmonology  · Tobacco cessation  · Check ambulatory pulse ox on discharge

## 2023-07-11 LAB
ALL TARGETS: NOT DETECTED
ALL TARGETS: NOT DETECTED
ANION GAP SERPL CALCULATED.3IONS-SCNC: 8 MMOL/L
BACTERIA BLD CULT: ABNORMAL
BASOPHILS # BLD MANUAL: 0.12 THOUSAND/UL (ref 0–0.1)
BASOPHILS NFR MAR MANUAL: 1 % (ref 0–1)
BUN SERPL-MCNC: 25 MG/DL (ref 5–25)
CALCIUM SERPL-MCNC: 8.4 MG/DL (ref 8.4–10.2)
CHLORIDE SERPL-SCNC: 105 MMOL/L (ref 96–108)
CO2 SERPL-SCNC: 24 MMOL/L (ref 21–32)
CREAT SERPL-MCNC: 1.06 MG/DL (ref 0.6–1.3)
EOSINOPHIL # BLD MANUAL: 0 THOUSAND/UL (ref 0–0.4)
EOSINOPHIL NFR BLD MANUAL: 0 % (ref 0–6)
ERYTHROCYTE [DISTWIDTH] IN BLOOD BY AUTOMATED COUNT: 14.6 % (ref 11.6–15.1)
GFR SERPL CREATININE-BSD FRML MDRD: 68 ML/MIN/1.73SQ M
GLUCOSE SERPL-MCNC: 90 MG/DL (ref 65–140)
GRAM STN SPEC: ABNORMAL
HCT VFR BLD AUTO: 47.3 % (ref 36.5–49.3)
HGB BLD-MCNC: 15.5 G/DL (ref 12–17)
LYMPHOCYTES # BLD AUTO: 19 % (ref 14–44)
LYMPHOCYTES # BLD AUTO: 2.37 THOUSAND/UL (ref 0.6–4.47)
MAGNESIUM SERPL-MCNC: 2.1 MG/DL (ref 1.9–2.7)
MCH RBC QN AUTO: 33.4 PG (ref 26.8–34.3)
MCHC RBC AUTO-ENTMCNC: 32.8 G/DL (ref 31.4–37.4)
MCV RBC AUTO: 102 FL (ref 82–98)
MONOCYTES # BLD AUTO: 1.25 THOUSAND/UL (ref 0–1.22)
MONOCYTES NFR BLD: 10 % (ref 4–12)
NEUTROPHILS # BLD MANUAL: 8.72 THOUSAND/UL (ref 1.85–7.62)
NEUTS BAND NFR BLD MANUAL: 3 % (ref 0–8)
NEUTS SEG NFR BLD AUTO: 67 % (ref 43–75)
PLATELET # BLD AUTO: 245 THOUSANDS/UL (ref 149–390)
PLATELET BLD QL SMEAR: ADEQUATE
PMV BLD AUTO: 10.2 FL (ref 8.9–12.7)
POTASSIUM SERPL-SCNC: 4.8 MMOL/L (ref 3.5–5.3)
RBC # BLD AUTO: 4.64 MILLION/UL (ref 3.88–5.62)
RBC MORPH BLD: NORMAL
SODIUM SERPL-SCNC: 137 MMOL/L (ref 135–147)
WBC # BLD AUTO: 12.46 THOUSAND/UL (ref 4.31–10.16)

## 2023-07-11 PROCEDURE — 99232 SBSQ HOSP IP/OBS MODERATE 35: CPT | Performed by: STUDENT IN AN ORGANIZED HEALTH CARE EDUCATION/TRAINING PROGRAM

## 2023-07-11 PROCEDURE — 85007 BL SMEAR W/DIFF WBC COUNT: CPT | Performed by: STUDENT IN AN ORGANIZED HEALTH CARE EDUCATION/TRAINING PROGRAM

## 2023-07-11 PROCEDURE — 80048 BASIC METABOLIC PNL TOTAL CA: CPT | Performed by: STUDENT IN AN ORGANIZED HEALTH CARE EDUCATION/TRAINING PROGRAM

## 2023-07-11 PROCEDURE — 97530 THERAPEUTIC ACTIVITIES: CPT

## 2023-07-11 PROCEDURE — 97110 THERAPEUTIC EXERCISES: CPT

## 2023-07-11 PROCEDURE — 94760 N-INVAS EAR/PLS OXIMETRY 1: CPT

## 2023-07-11 PROCEDURE — 99232 SBSQ HOSP IP/OBS MODERATE 35: CPT | Performed by: INTERNAL MEDICINE

## 2023-07-11 PROCEDURE — 97116 GAIT TRAINING THERAPY: CPT

## 2023-07-11 PROCEDURE — 83735 ASSAY OF MAGNESIUM: CPT | Performed by: STUDENT IN AN ORGANIZED HEALTH CARE EDUCATION/TRAINING PROGRAM

## 2023-07-11 PROCEDURE — 85027 COMPLETE CBC AUTOMATED: CPT | Performed by: STUDENT IN AN ORGANIZED HEALTH CARE EDUCATION/TRAINING PROGRAM

## 2023-07-11 PROCEDURE — 94640 AIRWAY INHALATION TREATMENT: CPT

## 2023-07-11 PROCEDURE — 97535 SELF CARE MNGMENT TRAINING: CPT

## 2023-07-11 RX ORDER — AMOXICILLIN 500 MG/1
1000 CAPSULE ORAL EVERY 8 HOURS SCHEDULED
Status: DISCONTINUED | OUTPATIENT
Start: 2023-07-11 | End: 2023-07-11

## 2023-07-11 RX ORDER — AMOXICILLIN 500 MG/1
1000 CAPSULE ORAL EVERY 8 HOURS SCHEDULED
Status: COMPLETED | OUTPATIENT
Start: 2023-07-11 | End: 2023-07-20

## 2023-07-11 RX ADMIN — HEPARIN SODIUM 5000 UNITS: 5000 INJECTION INTRAVENOUS; SUBCUTANEOUS at 21:43

## 2023-07-11 RX ADMIN — ATORVASTATIN CALCIUM 40 MG: 40 TABLET, FILM COATED ORAL at 15:54

## 2023-07-11 RX ADMIN — Medication 1 TABLET: at 09:26

## 2023-07-11 RX ADMIN — FLUTICASONE FUROATE AND VILANTEROL TRIFENATATE 1 PUFF: 200; 25 POWDER RESPIRATORY (INHALATION) at 09:28

## 2023-07-11 RX ADMIN — IPRATROPIUM BROMIDE AND ALBUTEROL SULFATE 3 ML: 2.5; .5 SOLUTION RESPIRATORY (INHALATION) at 14:07

## 2023-07-11 RX ADMIN — HEPARIN SODIUM 5000 UNITS: 5000 INJECTION INTRAVENOUS; SUBCUTANEOUS at 13:10

## 2023-07-11 RX ADMIN — THIAMINE HCL TAB 100 MG 100 MG: 100 TAB at 09:26

## 2023-07-11 RX ADMIN — Medication 1 PATCH: at 09:26

## 2023-07-11 RX ADMIN — PANTOPRAZOLE SODIUM 40 MG: 40 TABLET, DELAYED RELEASE ORAL at 06:37

## 2023-07-11 RX ADMIN — IPRATROPIUM BROMIDE AND ALBUTEROL SULFATE 3 ML: 2.5; .5 SOLUTION RESPIRATORY (INHALATION) at 19:46

## 2023-07-11 RX ADMIN — FOLIC ACID 1 MG: 1 TABLET ORAL at 09:26

## 2023-07-11 RX ADMIN — AMOXICILLIN 1000 MG: 500 CAPSULE ORAL at 15:53

## 2023-07-11 RX ADMIN — IPRATROPIUM BROMIDE AND ALBUTEROL SULFATE 3 ML: 2.5; .5 SOLUTION RESPIRATORY (INHALATION) at 07:55

## 2023-07-11 RX ADMIN — AMOXICILLIN 1000 MG: 500 CAPSULE ORAL at 21:43

## 2023-07-11 RX ADMIN — ASPIRIN 81 MG 81 MG: 81 TABLET ORAL at 09:26

## 2023-07-11 NOTE — PROGRESS NOTES
Progress Note - Infectious Disease   Ilean Lia 75 y.o. male MRN: [id number]  Unit/Bed#: E4 -01 Encounter: [id number]      Impression/Plan:  75 y.o. male, with poor medical follow-up, presented to ER on 7/6 with fever/chills and flank pain. CT showed obstructing left ureteral stone. Patient is status post placement of left ureteral stent 7/8. Blood and urine cultures 7/6  both grew Aerococcus urinae. Repeat blood culture 7/8 negative.      1. Sepsis, present on admission, presenting with tachycardia and leukocytosis. Source of sepsis is most likely pyonephritis, complicated by bacteremia. Patient is clinically much improved. Tachycardia has resolved, he has remained hemodynamically stable, without hypotension.  -Antibiotic plan as seen below. -Monitor temperature/WBC. -Monitor hemodynamics.     2. Aerococcus bacteremia. Source is most likely urinary, especially given obstructed ureteral stone and growth of Aerococcus in urine culture. Patient is clinically much improved. Repeat blood culture obtained on 7/8 have no growth thus far. 2D echo 7/7, adequate study, without vegetation. He has no cardiac devices or endovascular hardware. - will switch to PO Amoxicillin 1g every 8 hours  - recommend 14 day course of antibiotics from stent placement, through 7/20  - repeat CBC + diff in AM to trend WBC     3. Left-sided obstructive pyelonephritis, secondary to obstructing left ureteral stone. Patient is status postplacement of ureteral stent on 7/6. He is clinically improved. Flank pain has resolved. Urine culture with growth of Aerococcus also. -Antibiotic plan as in above. -Monitor for recurrent flank pain.  -Outpatient Urology follow up for stent management     4. Obstructing left ureteral stone. Patient is status post placement of left ureteral stent.  -Urology follow-up for eventual stone extraction.     5.   DENA, present on admission, likely a combination of ureteral obstruction and sepsis. Creatinine is normalized. -Antibiotic at full dose. -Monitor creatinine. Plan and recommendations were discussed with primary team.    Antibiotics:  Ceftriaxone    Subjective:  Remains afebrile. WBC slightly increased to 12 today. Blood cultures  negative to date. Denies nausea, diarrhea, flank pain, abdominal pain, fever, chills. Objective:  Vitals:  Temp:  [96.8 °F (36 °C)-98.4 °F (36.9 °C)] 96.8 °F (36 °C)  HR:  [] 115  Resp:  [18-20] 18  BP: ()/(59-88) 149/88  SpO2:  [92 %-99 %] 99 %  Temp (24hrs), Av.7 °F (36.5 °C), Min:96.8 °F (36 °C), Max:98.4 °F (36.9 °C)  Current: Temperature: (!) 96.8 °F (36 °C)    Physical Exam:   General Appearance:  Alert, interactive, nontoxic, no acute distress. Throat: Oropharynx moist without lesions. Lungs:   Clear to auscultation bilaterally; no wheezes, rhonchi or rales; respirations unlabored   Heart:  RRR; no murmur, rub or gallop   Abdomen:   Soft, non-tender, non-distended, positive bowel sounds. Extremities: No clubbing, cyanosis or edema   Skin: No new rashes or lesions. No draining wounds noted. Labs:    All pertinent labs and imaging studies were personally reviewed  Results from last 7 days   Lab Units 07/11/23  0520 07/10/23  0528 07/09/23  0751   WBC Thousand/uL 12.46* 10.86* 14.20*   HEMOGLOBIN g/dL 15.5 15.2 14.6   PLATELETS Thousands/uL 245 234 195     Results from last 7 days   Lab Units 23  0520 07/10/23  0528 23  0751 23  0535 23  0600 23  1729   SODIUM mmol/L 137 137 135 136 136 134*   POTASSIUM mmol/L 4.8 4.0 4.1 4.6 4.1 3.9   CHLORIDE mmol/L 105 104 106 107 107 103   CO2 mmol/L 24 29 24 24 22 23   BUN mg/dL 25 22 21 26* 30* 29*   CREATININE mg/dL 1.06 1.14 0.91 0.86 1.20 1.51*   EGFR ml/min/1.73sq m 68 63 82 85 59 44   CALCIUM mg/dL 8.4 8.5 8.3* 8.1* 8.1* 8.7   AST U/L  --   --   --  20 20 22   ALT U/L  --   --   --  8 9 9   ALK PHOS U/L  --   --   --  52 56 76     Results from last 7 days   Lab Units 07/10/23  0528 07/09/23  0751 07/08/23  0535 07/07/23  0600 07/06/23  1729   PROCALCITONIN ng/ml 4.18* 7.86* 18.35* 30.55* 38.23*             Results from last 7 days   Lab Units 07/06/23  1745   D-DIMER QUANTITATIVE ug/ml FEU 2.14*       Micro:  Results from last 7 days   Lab Units 07/08/23  0529 07/07/23  0600 07/06/23  2247 07/06/23  1930 07/06/23  1817 07/06/23  1745   BLOOD CULTURE  No Growth at 48 hrs.  --   --   --  Aerococcus urinae* Aerococcus urinae*   GRAM STAIN RESULT   --   --   --   --  Gram positive cocci in clusters* Gram positive cocci in clusters*  Gram Positive Rods Resembling Diphtheroids*   URINE CULTURE   --   --  40,000-49,000 cfu/ml Aerococcus urinae* >100,000 cfu/ml Aerococcus urinae*  10,000-19,000 cfu/ml  --   --    LEGIONELLA URINARY ANTIGEN   --  Negative  --   --   --   --        Imaging:          Bren Duran MD  [facility]

## 2023-07-11 NOTE — PROGRESS NOTES
[facility]  Progress Note  Name: Eriberto Jason I  MRN: [id number]  Unit/Bed#: E4 -01 I Date of Admission: 7/6/2023   Date of Service: 7/11/2023 I Hospital Day: 5    Assessment/Plan   * Sepsis secondary to UTI [facility])  Assessment & Plan  50-year-old male was admitted due to severe sepsis secondary to complicated UTI. Patient was noted to be bacteremic to Aerococcus urinae. Etiology likely secondary to his Left-sided hydronephrosis secondary to a 1 cm calculus in the proximal left ureter. · Appreciate ID recommendations  · Hopefully transition to po antibiotics guzman    Alcohol withdrawal [facility])  Assessment & Plan  Patient states he drinks a couple of beers daily. No evidence of withdrawal at this time  WA protocol    Positive blood culture  Assessment & Plan  2/2 sets Aerococcus urinae due to stone which resulted in pyelonephritis  · Management per infectious disease    Acute kidney injury [facility])  Assessment & Plan  Post renal acute kidney injury in the setting of an obstructing calculus. · Resolved after urologic intervention and IV hydration    Recent Labs     07/09/23  0751 07/10/23  0528 07/11/23  0520   BUN 21 22 25   CREATININE 0.91 1.14 1.06   EGFR 82 63 68       Results from last 7 days   Lab Units 07/06/23  1930   BLOOD UA  Large*   PROTEIN UA mg/dl 100 (2+)*         Hydronephrosis with obstructing calculus  Assessment & Plan  · CT A/P:   · Left-sided hydronephrosis secondary to a 1 cm calculus in the proximal left ureter. · Evidence of cystitis with circumferential irregular bladder wall thickening and perivesical stranding, though there is also evidence of underlying chronic outlet obstruction. · Large nonobstructing left upper pole calyceal calculus  · S/p "POD#3 cystoscopy, left retrograde pyelogram, ureteral stent insertion, michel catheter insertion.  Inadvertent left ureteral perforation at tight proximal ureter, managed with indwelling ureteral stent"  · Urology recommendations appreciated. They will coordinate his outpatient follow-up for his next step in management. Esophageal abnormality  Assessment & Plan  Incidental finding on CTA study: Diffuse esophageal wall thickening which could be due to esophagitis  · Continue PPI  · Outpatient follow-up with GI for EGD. Elevated d-dimer  Assessment & Plan  CTA negative for pulmonary embolism    COPD (chronic obstructive pulmonary disease) ([facility])  Assessment & Plan  Not in exacerbation. Encouraged cessation. · Smokes 6 cigarettes daily. Reports desire to quit  · Breo, prn albuterol. Outpatient follow-up with pulmonology  · Tobacco cessation  · Check ambulatory pulse ox on discharge         VTE Pharmacologic Prophylaxis:   Pharmacologic: Heparin  Mechanical VTE Prophylaxis in Place: Yes    Discussions with Specialists or Other Care Team Provider: nursing    Education and Discussions with Family / Patient: patient    Current Length of Stay: 5 day(s)    Current Patient Status: Inpatient   Certification Statement: The patient will continue to require additional inpatient hospital stay due to iv antibiotics, await cultures    Discharge Plan: active    Code Status: Level 1 - Full Code      Subjective:   Patient seen and examined at bedside. Reports no new issues overnight. Objective:     Vitals:   Temp (24hrs), Av.7 °F (36.5 °C), Min:96.8 °F (36 °C), Max:98.4 °F (36.9 °C)    Temp:  [96.8 °F (36 °C)-98.4 °F (36.9 °C)] 96.8 °F (36 °C)  HR:  [] 115  Resp:  [18-20] 18  BP: ()/(59-88) 149/88  SpO2:  [92 %-99 %] 99 %  Body mass index is 28 kg/m². Input and Output Summary (last 24 hours):     No intake or output data in the 24 hours ending 23 6426    Physical Exam:     Physical Exam  Vitals reviewed. Constitutional:       General: He is not in acute distress. HENT:      Head: Normocephalic.       Nose: Nose normal.      Mouth/Throat:      Mouth: Mucous membranes are moist.   Eyes:      General: No scleral icterus. Cardiovascular:      Rate and Rhythm: Normal rate and regular rhythm. Pulmonary:      Effort: Pulmonary effort is normal. No respiratory distress. Abdominal:      General: There is no distension. Palpations: Abdomen is soft. Tenderness: There is no abdominal tenderness. Skin:     General: Skin is warm. Neurological:      Mental Status: He is alert. Mental status is at baseline. Psychiatric:         Mood and Affect: Mood normal.         Behavior: Behavior normal.       Additional Data:     Labs:    Results from last 7 days   Lab Units 07/11/23  0520 07/10/23  0528   WBC Thousand/uL 12.46* 10.86*   HEMOGLOBIN g/dL 15.5 15.2   HEMATOCRIT % 47.3 47.4   PLATELETS Thousands/uL 245 234   BANDS PCT % 3  --    NEUTROS PCT %  --  68   LYMPHS PCT %  --  22   LYMPHO PCT % 19  --    MONOS PCT %  --  8   MONO PCT % 10  --    EOS PCT % 0 1     Results from last 7 days   Lab Units 07/11/23  0520 07/09/23 0751 07/08/23  0535   SODIUM mmol/L 137   < > 136   POTASSIUM mmol/L 4.8   < > 4.6   CHLORIDE mmol/L 105   < > 107   CO2 mmol/L 24   < > 24   BUN mg/dL 25   < > 26*   CREATININE mg/dL 1.06   < > 0.86   ANION GAP mmol/L 8   < > 5   CALCIUM mg/dL 8.4   < > 8.1*   ALBUMIN g/dL  --   --  2.9*   TOTAL BILIRUBIN mg/dL  --   --  0.33   ALK PHOS U/L  --   --  52   ALT U/L  --   --  8   AST U/L  --   --  20   GLUCOSE RANDOM mg/dL 90   < > 113    < > = values in this interval not displayed. Results from last 7 days   Lab Units 07/06/23  1745   INR  1.12     Results from last 7 days   Lab Units 07/06/23  1716   POC GLUCOSE mg/dl 136     Results from last 7 days   Lab Units 07/07/23  0600   HEMOGLOBIN A1C % 5.5     Results from last 7 days   Lab Units 07/10/23  0528 07/09/23  0751 07/08/23  0535 07/07/23  0600 07/06/23  1729   LACTIC ACID mmol/L  --   --   --   --  1.9   PROCALCITONIN ng/ml 4.18* 7.86* 18.35* 30.55* 38.23*           * I Have Reviewed All Lab Data Listed Above.   * Additional Pertinent Lab Tests Reviewed: [facility] Admission Reviewed      Lines:   Invasive Devices     Peripheral Intravenous Line  Duration           Peripheral IV 07/11/23 Dorsal (posterior); Left Wrist <1 day          Drain  Duration           Ureteral Internal Stent Left ureter 6 Fr. 4 days                   Imaging:    Imaging Reports Reviewed Today Include: no new imaging    Recent Cultures (last 7 days):     Results from last 7 days   Lab Units 07/08/23  0529 07/07/23  0600 07/06/23  2247 07/06/23  1930 07/06/23  1817 07/06/23  1745   BLOOD CULTURE  No Growth at 48 hrs.  --   --   --  Aerococcus urinae*  Streptococcus salivarius* Aerococcus urinae*  Gram-positive veronica*  Gram-Positive Rods Anaerobic (Group)*   GRAM STAIN RESULT   --   --   --   --  Gram positive cocci in clusters* Gram positive cocci in clusters*  Gram Positive Rods Resembling Diphtheroids*   URINE CULTURE   --   --  40,000-49,000 cfu/ml Aerococcus urinae* >100,000 cfu/ml Aerococcus urinae*  10,000-19,000 cfu/ml  --   --    LEGIONELLA URINARY ANTIGEN   --  Negative  --   --   --   --        Last 24 Hours Medication List:   Current Facility-Administered Medications   Medication Dose Route Frequency Provider Last Rate   • acetaminophen  650 mg Oral Q6H PRN KIKI Siu     • albuterol  2.5 mg Nebulization Q6H PRN KIKI Siu     • aluminum-magnesium hydroxide-simethicone  30 mL Oral Q6H PRN KIKI Siu     • aspirin  81 mg Oral Daily Abraham Farrar DO     • atorvastatin  40 mg Oral Daily With Shamir Salmon DO     • cefTRIAXone  2,000 mg Intravenous Q24H Reid Uriostegui MD 2,000 mg (07/10/23 4289)   • dextromethorphan-guaiFENesin  10 mL Oral Q4H PRN KIKI Siu     • fluticasone-vilanterol  1 puff Inhalation Daily KIKI Siu     • folic acid  1 mg Oral Daily Yuliet Blake PA-C     • heparin (porcine)  5,000 Units Subcutaneous [facility] KIKI Siu     • HYDROmorphone  0.2 mg Intravenous Q4H PRN Lizbet Shoe, CRNP     • hydrOXYzine HCL  50 mg Oral HS PRN Lizbet Shoe, CRNP     • ipratropium-albuterol  3 mL Nebulization TID Lizbet Shoe, CRNP     • melatonin  6 mg Oral HS Lizbet Shoe, CRNP     • multivitamin-minerals  1 tablet Oral Daily Cooper Meyer PA-C     • nicotine  1 patch Transdermal Daily Lizbet Carrolle, CRNP     • ondansetron  4 mg Intravenous Q6H PRN Lizbet Carlye, CRNP     • oxyCODONE  5 mg Oral 4x Daily PRN Lizbet Carrolle, CRNP     • pantoprazole  40 mg Oral Early Morning Lizbet Alee, CRNP     • simethicone  80 mg Oral 4x Daily PRN Lizbet Carrolle, CRNP     • thiamine  100 mg Oral Daily Cooper Meyer PA-C          Today, Patient Was Seen By: Anushka Ashley MD    ** Please Note: Dictation voice to text software may have been used in the creation of this document.  **

## 2023-07-11 NOTE — PLAN OF CARE
Problem: PHYSICAL THERAPY ADULT  Goal: Performs mobility at highest level of function for planned discharge setting. See evaluation for individualized goals. Description: Treatment/Interventions: Functional transfer training, LE strengthening/ROM, Elevations, Therapeutic exercise, Cognitive reorientation, Equipment eval/education, Patient/family training, Bed mobility, Gait training, Compensatory technique education, Continued evaluation, Spoke to nursing, OT, Spoke to case management          See flowsheet documentation for full assessment, interventions and recommendations. Outcome: Progressing  Note: Prognosis: Fair  Problem List: Decreased strength, Decreased range of motion, Decreased endurance, Impaired balance, Decreased mobility, Decreased cognition, Impaired judgement, Decreased safety awareness  Assessment: Seen for progression of PT and initiation of stair training. Anxious about stair navigation reporting tiresome for him at baseline and with SOB. Hx of fall on steps exhibits reluctance. Pt requesting 2nd person for standby support. Education provided on stair management strategies to optimize safety, pacing and technique. Cues for step to pattern ascending and descending as well as use of BUE on rail to optimize stability. Able to complete with S/CG and cues. RA O2 sat with ambulation 94%. Following stairs and increased distances fatigue observed with BOLANOS. O2 sat 89% and . Improved within 2-3 minutes EOB sitting to 97%, HR 84.  Gait without overt LOB but deviations noted. 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 Educated on trial of cane and benefits of improved gait stability however declines. Will continue to benefit from skilled PT in order to optimize strength, balance and endurance. The patient's AM-PAC Basic Mobility Inpatient Short Form Raw Score is 21.  A Raw score of greater than 16 suggests the patient may benefit from discharge to home. Please also refer to the recommendation of the Physical Therapist for safe discharge planning. Plan for HHPT at d/c. Notes difficulty with accessing resources at times, including groceries. May benefit from additional resources at home following d/c. PT will continue to follow per POC. Barriers to Discharge: Inaccessible home environment  Barriers to Discharge Comments: flight [place] to enter. Has roommate but alone days. PT Discharge Recommendation: Home with home health rehabilitation    See flowsheet documentation for full assessment.

## 2023-07-11 NOTE — OCCUPATIONAL THERAPY NOTE
Occupational Therapy Progress Note     Patient Name: Dianelys Oakley  YXVLT'P Date: 7/11/2023  Problem List  Principal Problem:    Sepsis secondary to UTI [facility])  Active Problems:    Tobacco use    COPD (chronic obstructive pulmonary disease) (HCC)    Elevated troponin    Elevated d-dimer    Pyelonephritis    Esophageal abnormality    Hydronephrosis with obstructing calculus    Mycotic toenails    Acute kidney injury ([address])    Positive blood culture    Alcohol withdrawal ([address])        07/11/23 1135   OT Last Visit   OT Visit Date 07/11/23   Note Type   Note Type Treatment   Pain Assessment   Pain Assessment Tool 0-10   Pain Score No Pain   Restrictions/Precautions   Weight Bearing Precautions Per Order No   RLE Weight Bearing Per Order WBAT   LLE Weight Bearing Per Order WBAT   Other Precautions Cognitive; Chair Alarm; Bed Alarm; Fall Risk   ADL   Where Assessed Edge of bed   Eating Assistance 6  Modified independent   LB Dressing Assistance 5  Supervision/Setup   LB Dressing Deficit Setup;Supervision/safety; Increased time to complete; Don/doff R sock; Thread RLE into pants; Don/doff L sock; Thread LLE into pants   Toileting Assistance  5  Supervision/Setup   Toileting Deficit Setup;Verbal cueing;Supervison/safety; Increased time to complete   Functional Standing Tolerance   Time 2-3 min   Activity mobility, self care tasks. Bed Mobility   Supine to Sit 5  Supervision   Additional items Increased time required;Verbal cues   Sit to Supine 5  Supervision   Additional items Increased time required   Transfers   Sit to Stand 5  Supervision   Additional items Increased time required;Verbal cues   Stand to Sit 5  Supervision   Additional items Increased time required;Verbal cues   Toilet transfer 5  Supervision   Additional items Increased time required;Standard toilet   Additional Comments cues for safety and best tech.    Functional Mobility   Functional Mobility 5  Supervision   Additional Comments functional distances in room with no device. Therapeutic Excerise-Strength   UE Strength Yes   Right Upper Extremity- Strength   R Shoulder Flexion;ABduction;Horizontal ABduction; Extension   R Elbow Elbow flexion;Elbow extension   R Position Seated   R Weight/Reps/Sets 3x10   Left Upper Extremity-Strength   L Shoulder Flexion;ABduction; Extension;Horizontal ABduction   L Elbow Elbow flexion;Elbow extension   L Position Seated   L Weights/Reps/Sets 3 x10   Cognition   Overall Cognitive Status Impaired   Arousal/Participation Cooperative   Attention Attends with cues to redirect   Orientation Level Oriented to person;Oriented to place; Disoriented to time   Memory Decreased recall of recent events;Decreased recall of precautions   Following Commands Follows one step commands with increased time or repetition   Activity Tolerance   Activity Tolerance Patient tolerated treatment well;Patient limited by fatigue   Medical Staff Made Aware RN Anahy   Assessment   Assessment Pt seen for skilled OT tx this date. Tx focused on improving strength, activity tolerance and balance, safety awareness to increase independence with self care tasks. Pt tolerated session well. Pt was limited by weakness. Greeted in supine and agreeable to skilled OT session. Pt performed LB dressing supervision, Toileting supervision,  bed mobility supervision, transfers supervision, mobility with no AD supervision, functional distances in the room. Pt demonstrated fair+ sitting balance during functional tasks, no LOB noted. Pt required moderate verbal cuing during session to safely complete tasks. Pt completed 3x10 of various BUE exercises to increase strength. Performed while seated EOB unsupported. Tolerated well. Current OT DC recommendations for pt is home with home health services. Plan   Treatment Interventions ADL retraining;Functional transfer training;UE strengthening/ROM; Endurance training;Cognitive reorientation;Patient/family training;Equipment evaluation/education; Compensatory technique education;Continued evaluation   Goal Expiration Date 07/21/23   OT Treatment Day 1   OT Frequency 2-3x/wk   Recommendation   OT Discharge Recommendation Home with home health rehabilitation   Additional Comments  The patient's raw score on the AM-PAC Daily Activity Inpatient Short Form is 21. A raw score of greater than or equal to 19 suggests the patient may benefit from discharge to home. Please refer to the recommendation of the Occupational Therapist for safe discharge planning.    AM-PAC Daily Activity Inpatient   Lower Body Dressing 3   Bathing 3   Toileting 3   Upper Body Dressing 4   Grooming 4   Eating 4   Daily Activity Raw Score 21   Daily Activity Standardized Score (Calc for Raw Score >=11) 44.27   AM-PAC Applied Cognition Inpatient   Following a Speech/Presentation 3   Understanding Ordinary Conversation 3   Taking Medications 3   Remembering Where Things Are Placed or Put Away 3   Remembering List of 4-5 Errands 3   Taking Care of Complicated Tasks 3   Applied Cognition Raw Score 18   Applied Cognition Standardized Score 38.07   Damion Vital, OT

## 2023-07-11 NOTE — RESTORATIVE TECHNICIAN NOTE
Restorative Technician Note      Patient Name: Kimberly Caro     [facility] Tech Visit Date: 07/11/23  Note Type: Mobility  Patient Position Upon Consult: Supine  Activity Performed: Ambulated  Patient Position at End of Consult: Supine;  All needs within reach; Bed/Chair alarm activated

## 2023-07-11 NOTE — ASSESSMENT & PLAN NOTE
2/2 sets Aerococcus urinae due to stone which resulted in pyelonephritis  · Management per infectious disease

## 2023-07-11 NOTE — PLAN OF CARE
Problem: INFECTION - ADULT  Goal: Absence or prevention of progression during hospitalization  Description: INTERVENTIONS:  - Assess and monitor for signs and symptoms of infection  - Monitor lab/diagnostic results  - Monitor all insertion sites, i.e. indwelling lines, tubes, and drains  - Monitor endotracheal if appropriate and nasal secretions for changes in amount and color  - Witten appropriate cooling/warming therapies per order  - Administer medications as ordered  - Instruct and encourage patient and family to use good hand hygiene technique  - Identify and instruct in appropriate isolation precautions for identified infection/condition  Outcome: Progressing  Goal: Absence of fever/infection during neutropenic period  Description: INTERVENTIONS:  - Monitor WBC    Outcome: Progressing     Problem: PAIN - ADULT  Goal: Verbalizes/displays adequate comfort level or baseline comfort level  Description: Interventions:  - Encourage patient to monitor pain and request assistance  - Assess pain using appropriate pain scale  - Administer analgesics based on type and severity of pain and evaluate response  - Implement non-pharmacological measures as appropriate and evaluate response  - Consider cultural and social influences on pain and pain management  - Notify physician/advanced practitioner if interventions unsuccessful or patient reports new pain  Outcome: Progressing

## 2023-07-11 NOTE — PLAN OF CARE
Problem: OCCUPATIONAL THERAPY ADULT  Goal: Performs self-care activities at highest level of function for planned discharge setting. See evaluation for individualized goals. Description: Treatment Interventions: ADL retraining, Functional transfer training, UE strengthening/ROM, Endurance training, Cognitive reorientation, Patient/family training, Equipment evaluation/education, Compensatory technique education, Continued evaluation  Equipment Recommended:  (RW vs SPC)       See flowsheet documentation for full assessment, interventions and recommendations. Outcome: Progressing  Note: Limitation: Decreased ADL status, Decreased UE strength, Decreased Safe judgement during ADL, Decreased cognition, Decreased endurance, Decreased high-level ADLs  Prognosis: Fair  Assessment: Pt seen for skilled OT tx this date. Tx focused on improving strength, activity tolerance and balance, safety awareness to increase independence with self care tasks. Pt tolerated session well. Pt was limited by weakness. Greeted in supine and agreeable to skilled OT session. Pt performed LB dressing supervision, Toileting supervision,  bed mobility supervision, transfers supervision, mobility with no AD supervision, functional distances in the room. Pt demonstrated fair+ sitting balance during functional tasks, no LOB noted. Pt required moderate verbal cuing during session to safely complete tasks. Pt completed 3x10 of various BUE exercises to increase strength. Performed while seated EOB unsupported. Tolerated well. Current OT DC recommendations for pt is home with home health services.      OT Discharge Recommendation: Home with home health rehabilitation

## 2023-07-11 NOTE — RESTORATIVE TECHNICIAN NOTE
Restorative Technician Note      Patient Name: Yandy Mccoy     Restorative Tech Visit Date: 07/11/23  Note Type: Mobility  Patient Position Upon Consult: Seated edge of bed  Mobility / Activity Provided: assisted Thu, PT in ambulating pt as well as stairs  Activity Performed: Ambulated  Patient Position at End of Consult: Seated edge of bed;  All needs within reach

## 2023-07-11 NOTE — ASSESSMENT & PLAN NOTE
Post renal acute kidney injury in the setting of an obstructing calculus.   · Resolved after urologic intervention and IV hydration    Recent Labs     07/09/23  0751 07/10/23  0528 07/11/23  0520   BUN 21 22 25   CREATININE 0.91 1.14 1.06   EGFR 82 63 68       Results from last 7 days   Lab Units 07/06/23  1930   BLOOD UA  Large*   PROTEIN UA mg/dl 100 (2+)*

## 2023-07-11 NOTE — ASSESSMENT & PLAN NOTE
Patient states he drinks a couple of beers daily.  No evidence of withdrawal at this time  [facility] protocol

## 2023-07-11 NOTE — PLAN OF CARE
Problem: Potential for Falls  Goal: Patient will remain free of falls  Description: INTERVENTIONS:  - Educate patient/family on patient safety including physical limitations  - Instruct patient to call for assistance with activity   - Consult OT/PT to assist with strengthening/mobility   - Keep Call bell within reach  - Keep bed low and locked with side rails adjusted as appropriate  - Keep care items and personal belongings within reach  - Initiate and maintain comfort rounds  - Make Fall Risk Sign visible to staff  - Offer Toileting every 2   Problem: PAIN - ADULT  Goal: Verbalizes/displays adequate comfort level or baseline comfort level  Description: Interventions:  - Encourage patient to monitor pain and request assistance  - Assess pain using appropriate pain scale  - Administer analgesics based on type and severity of pain and evaluate response  - Implement non-pharmacological measures as appropriate and evaluate response  - Consider cultural and social influences on pain and pain management  - Notify physician/advanced practitioner if interventions unsuccessful or patient reports new pain  Outcome: Progressing     Problem: Knowledge Deficit  Goal: Patient/family/caregiver demonstrates understanding of disease process, treatment plan, medications, and discharge instructions  Description: Complete learning assessment and assess knowledge base.   Interventions:  - Provide teaching at level of understanding  - Provide teaching via preferred learning methods  Outcome: Progressing   Hours, in advance of need  - Initiate/Maintain alarm  - Obtain necessary fall risk management equipment:   - Apply yellow socks and bracelet for high fall risk patients  - Consider moving patient to room near nurses station  Outcome: Progressing

## 2023-07-11 NOTE — ASSESSMENT & PLAN NOTE
50-year-old male was admitted due to severe sepsis secondary to complicated UTI. Patient was noted to be bacteremic to Aerococcus urinae. Etiology likely secondary to his Left-sided hydronephrosis secondary to a 1 cm calculus in the proximal left ureter.   · Appreciate ID recommendations  · Hopefully transition to po antibiotics guzman

## 2023-07-11 NOTE — PHYSICAL THERAPY NOTE
PHYSICAL THERAPY NOTE          Patient Name: Lety Uribe  FMECR'P Date: 7/11/2023  14:25-15:05       07/11/23 1505   PT Last Visit   PT Visit Date 07/11/23   Note Type   Note Type Treatment   Pain Assessment   Pain Score No Pain   Restrictions/Precautions   RLE Weight Bearing Per Order WBAT   LLE Weight Bearing Per Order WBAT   Other Precautions Cognitive; Chair Alarm; Bed Alarm; Fall Risk   General   Chart Reviewed Yes   Response to Previous Treatment Patient reporting fatigue but able to participate. Family/Caregiver Present No   Cognition   Overall Cognitive Status Impaired   Arousal/Participation Alert   Attention Attends with cues to redirect   Orientation Level Oriented X4   Following Commands Follows one step commands without difficulty   Subjective   Subjective Admits to having concerns about doing the steps. "Can you bring a strong chivo with us?"   Bed Mobility   Supine to Sit 5  Supervision   Additional items Increased time required;HOB elevated; Bedrails   Sit to Supine 5  Supervision   Additional items Increased time required; Bedrails;HOB elevated   Additional Comments Increased timet o complete transitions. Transfers   Sit to Stand 5  Supervision   Additional items Increased time required;Verbal cues;Armrests   Stand to Sit 5  Supervision   Additional items Increased time required;Verbal cues;Armrests   Additional Comments cues for hand placement   Ambulation/Elevation   Gait pattern Improper Weight shift;Decreased foot clearance;Decreased L stance; Wide CRISTINE  (L foot inversion, WB to lateral aspect of foot observed.   Decreased LUE arm swing, increased arm swing RUE)   Gait Assistance 5  Supervision  (declines trial of cane to optimize lateral stability/balance.)   Additional items Assist x 1;Verbal cues   Assistive Device None   Distance Amb without 'x1, seated rest, then 100'x1, seated rest, then performed stairs, with ambulation back to room additional 200'x1. RA O2 sat 89%,  p ambulation distances. Improved to 97% upon seated rest.  Following gait amb to and from bathroom 15'x1, 5'x1, 15'x1. Stair Management Assistance 5  Supervision   Additional items Verbal cues   Stair Management Technique Two rails; One rail L;Alternating pattern; Step to pattern; Foreward; Sideways  (cues for step to pattern for pacing/energy conservation/safety)   Number of Stairs 4  (ascencing B/L rails, descending single rail on L with BUE descending sideways.)   Balance   Static Sitting Good   Dynamic Sitting Fair +   Static Standing Fair   Dynamic Standing Fair -   Ambulatory Fair -   Endurance Deficit   Endurance Deficit Yes   Endurance Deficit Description fatigue, BOLANOS. Activity Tolerance   Activity Tolerance Patient tolerated treatment well;Patient limited by fatigue   Medical Staff Made Aware Nurse, Magdi Garland. [facility]   Nurse Made Aware yes   Assessment   Prognosis Fair   Problem List Decreased strength;Decreased range of motion;Decreased endurance; Impaired balance;Decreased mobility; Decreased cognition; Impaired judgement;Decreased safety awareness   Assessment Seen for progression of PT and initiation of stair training. Anxious about stair navigation reporting tiresome for him at baseline and with SOB. Hx of fall on steps exhibits reluctance. Pt requesting 2nd person for standby support. Education provided on stair management strategies to optimize safety, pacing and technique. Cues for step to pattern ascending and descending as well as use of BUE on rail to optimize stability. Able to complete with S/CG and cues. RA O2 sat with ambulation 94%. Following stairs and increased distances fatigue observed with BOLANOS. O2 sat 89% and . Improved within 2-3 minutes EOB sitting to 97%, HR 84.  Gait without overt LOB but deviations noted.   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 Educated on trial of cane and benefits of improved gait stability however declines. Will continue to benefit from skilled PT in order to optimize strength, balance and endurance. The patient's AM-PAC Basic Mobility Inpatient Short Form Raw Score is 21. A Raw score of greater than 16 suggests the patient may benefit from discharge to home. Please also refer to the recommendation of the Physical Therapist for safe discharge planning. Plan for HHPT at d/c. Notes difficulty with accessing resources at times, including groceries. May benefit from additional resources at home following d/c. PT will continue to follow per POC. Barriers to Discharge Inaccessible home environment   Barriers to Discharge Comments flight [place] to enter. Has roommate but alone days. Goals   Patient Goals go home   STG Expiration Date 07/17/23   PT Treatment Day 1   Plan   Treatment/Interventions Functional transfer training;LE strengthening/ROM; Elevations; Therapeutic exercise; Endurance training;Patient/family training;Equipment eval/education; Bed mobility;Gait training; Compensatory technique education;Continued evaluation;Spoke to nursing   Progress Progressing toward goals   PT Frequency 2-3x/wk   Recommendation   PT Discharge Recommendation Home with home health rehabilitation   Equipment Recommended Other (Comment)  (declines cane or walker trial)   AM-PAC Basic Mobility Inpatient   Turning in Flat Bed Without Bedrails 3   Lying on Back to Sitting on Edge of Flat Bed Without Bedrails 3   Moving Bed to Chair 4   Standing Up From Chair Using Arms 4   Walk in Room 4   Climb 3-5 Stairs With Railing 3   Basic Mobility Inpatient Raw Score 21   Basic Mobility Standardized Score 45.55   Highest Level Of Mobility   JH-HLM Goal 6: Walk 10 steps or more   JH-HLM Achieved 8: Walk 250 feet ot more   Education   Education Provided Mobility training;Assistive device; Other  (energy conservation/pacing/stair management.)   Patient Demonstrates acceptance/verbal understanding;Reinforcement needed   End of Consult   Patient Position at End of Consult Supine;Bed/Chair alarm activated; All needs within reach     Jodi Quintana, PT

## 2023-07-12 PROCEDURE — 99232 SBSQ HOSP IP/OBS MODERATE 35: CPT | Performed by: STUDENT IN AN ORGANIZED HEALTH CARE EDUCATION/TRAINING PROGRAM

## 2023-07-12 PROCEDURE — 94640 AIRWAY INHALATION TREATMENT: CPT

## 2023-07-12 PROCEDURE — 94760 N-INVAS EAR/PLS OXIMETRY 1: CPT

## 2023-07-12 RX ORDER — AMOXICILLIN 500 MG/1
1000 CAPSULE ORAL EVERY 8 HOURS SCHEDULED
Qty: 50 CAPSULE | Refills: 0 | Status: CANCELLED | OUTPATIENT
Start: 2023-07-12 | End: 2023-07-21

## 2023-07-12 RX ADMIN — ATORVASTATIN CALCIUM 40 MG: 40 TABLET, FILM COATED ORAL at 17:00

## 2023-07-12 RX ADMIN — IPRATROPIUM BROMIDE AND ALBUTEROL SULFATE 3 ML: 2.5; .5 SOLUTION RESPIRATORY (INHALATION) at 19:42

## 2023-07-12 RX ADMIN — Medication 1 PATCH: at 08:38

## 2023-07-12 RX ADMIN — FLUTICASONE FUROATE AND VILANTEROL TRIFENATATE 1 PUFF: 200; 25 POWDER RESPIRATORY (INHALATION) at 08:40

## 2023-07-12 RX ADMIN — PANTOPRAZOLE SODIUM 40 MG: 40 TABLET, DELAYED RELEASE ORAL at 05:18

## 2023-07-12 RX ADMIN — AMOXICILLIN 1000 MG: 500 CAPSULE ORAL at 14:00

## 2023-07-12 RX ADMIN — AMOXICILLIN 1000 MG: 500 CAPSULE ORAL at 05:18

## 2023-07-12 RX ADMIN — HEPARIN SODIUM 5000 UNITS: 5000 INJECTION INTRAVENOUS; SUBCUTANEOUS at 14:01

## 2023-07-12 RX ADMIN — ASPIRIN 81 MG 81 MG: 81 TABLET ORAL at 08:39

## 2023-07-12 RX ADMIN — IPRATROPIUM BROMIDE AND ALBUTEROL SULFATE 3 ML: 2.5; .5 SOLUTION RESPIRATORY (INHALATION) at 14:59

## 2023-07-12 RX ADMIN — HEPARIN SODIUM 5000 UNITS: 5000 INJECTION INTRAVENOUS; SUBCUTANEOUS at 05:18

## 2023-07-12 RX ADMIN — THIAMINE HCL TAB 100 MG 100 MG: 100 TAB at 08:39

## 2023-07-12 RX ADMIN — Medication 1 TABLET: at 08:39

## 2023-07-12 RX ADMIN — HEPARIN SODIUM 5000 UNITS: 5000 INJECTION INTRAVENOUS; SUBCUTANEOUS at 21:10

## 2023-07-12 RX ADMIN — FOLIC ACID 1 MG: 1 TABLET ORAL at 08:39

## 2023-07-12 RX ADMIN — AMOXICILLIN 1000 MG: 500 CAPSULE ORAL at 21:10

## 2023-07-12 RX ADMIN — ALBUTEROL SULFATE 2.5 MG: 2.5 SOLUTION RESPIRATORY (INHALATION) at 00:27

## 2023-07-12 RX ADMIN — IPRATROPIUM BROMIDE AND ALBUTEROL SULFATE 3 ML: 2.5; .5 SOLUTION RESPIRATORY (INHALATION) at 07:12

## 2023-07-12 NOTE — PROGRESS NOTES
[facility]  Progress Note  Name: Elisa Ervin I  MRN: [id number]  Unit/Bed#: E4 -01 I Date of Admission: 7/6/2023   Date of Service: 7/12/2023 I Hospital Day: 6    Assessment/Plan   * Sepsis secondary to UTI [facility])  Assessment & Plan  68-year-old male was admitted due to severe sepsis secondary to complicated UTI. Patient was noted to be bacteremic to Aerococcus urinae. Etiology likely secondary to his Left-sided hydronephrosis secondary to a 1 cm calculus in the proximal left ureter. · Appreciate ID recommendations. Transitioned to po amoxicillin  · Awaiting safe dispo plan from case management    Alcohol withdrawal [facility])  Assessment & Plan  Patient states he drinks a couple of beers daily. No evidence of withdrawal at this time  [facility] protocol    Positive blood culture  Assessment & Plan  2/2 sets Aerococcus urinae due to stone which resulted in pyelonephritis  · Management per infectious disease    Acute kidney injury [facility])  Assessment & Plan  Post renal acute kidney injury in the setting of an obstructing calculus. · Resolved after urologic intervention and IV hydration    Recent Labs     07/10/23  0528 07/11/23  0520   BUN 22 25   CREATININE 1.14 1.06   EGFR 63 68       Results from last 7 days   Lab Units 07/06/23  1930   BLOOD UA  Large*   PROTEIN UA mg/dl 100 (2+)*         Esophageal abnormality  Assessment & Plan  Incidental finding on CTA study: Diffuse esophageal wall thickening which could be due to esophagitis  · Continue PPI  · Outpatient follow-up with GI for EGD. COPD (chronic obstructive pulmonary disease) ([address])  Assessment & Plan  Not in exacerbation. Encouraged cessation. · Smokes 6 cigarettes daily. Reports desire to quit  · Breo, prn albuterol.  Outpatient follow-up with pulmonology  · Tobacco cessation  · Check ambulatory pulse ox on discharge           VTE Pharmacologic Prophylaxis:   Pharmacologic: Heparin  Mechanical VTE Prophylaxis in Place: Yes    Discussions with Specialists or Other Care Team Provider: nursing    Education and Discussions with Family / Patient: patient    Current Length of Stay: 6 day(s)    Current Patient Status: Inpatient   Certification Statement: The patient will continue to require additional inpatient hospital stay due to Unsafe discharge at this time    Discharge Plan: 24 hours    Code Status: Level 1 - Full Code      Subjective:   Patient seen and examined at bedside. Case management would like to further assess if he would be a safe discharge. Objective:     Vitals:   Temp (24hrs), Av.6 °F (36.4 °C), Min:97.4 °F (36.3 °C), Max:97.8 °F (36.6 °C)    Temp:  [97.4 °F (36.3 °C)-97.8 °F (36.6 °C)] 97.4 °F (36.3 °C)  HR:  [65-80] 72  Resp:  [18] 18  BP: (101-130)/(79-96) 130/96  SpO2:  [93 %-95 %] 95 %  Body mass index is 28 kg/m². Input and Output Summary (last 24 hours): Intake/Output Summary (Last 24 hours) at 2023 1601  Last data filed at 2023 0601  Gross per 24 hour   Intake 340 ml   Output --   Net 340 ml       Physical Exam:     Physical Exam  Vitals reviewed. Constitutional:       General: He is not in acute distress. HENT:      Head: Normocephalic. Nose: Nose normal.      Mouth/Throat:      Mouth: Mucous membranes are moist.   Eyes:      General: No scleral icterus. Cardiovascular:      Rate and Rhythm: Normal rate. Pulmonary:      Effort: Pulmonary effort is normal. No respiratory distress. Abdominal:      Palpations: Abdomen is soft. Tenderness: There is no abdominal tenderness. Skin:     General: Skin is warm. Neurological:      Mental Status: He is alert. Mental status is at baseline.    Psychiatric:         Mood and Affect: Mood normal.         Behavior: Behavior normal.       Additional Data:     Labs:    Results from last 7 days   Lab Units 23  0520 07/10/23  0528   WBC Thousand/uL 12.46* 10.86*   HEMOGLOBIN g/dL 15.5 15.2   HEMATOCRIT % 47.3 47.4   PLATELETS Thousands/uL 245 234   BANDS PCT % 3  --    NEUTROS PCT %  --  68   LYMPHS PCT %  --  22   LYMPHO PCT % 19  --    MONOS PCT %  --  8   MONO PCT % 10  --    EOS PCT % 0 1     Results from last 7 days   Lab Units 07/11/23  0520 07/09/23  0751 07/08/23  0535   SODIUM mmol/L 137   < > 136   POTASSIUM mmol/L 4.8   < > 4.6   CHLORIDE mmol/L 105   < > 107   CO2 mmol/L 24   < > 24   BUN mg/dL 25   < > 26*   CREATININE mg/dL 1.06   < > 0.86   ANION GAP mmol/L 8   < > 5   CALCIUM mg/dL 8.4   < > 8.1*   ALBUMIN g/dL  --   --  2.9*   TOTAL BILIRUBIN mg/dL  --   --  0.33   ALK PHOS U/L  --   --  52   ALT U/L  --   --  8   AST U/L  --   --  20   GLUCOSE RANDOM mg/dL 90   < > 113    < > = values in this interval not displayed. Results from last 7 days   Lab Units 07/06/23  1745   INR  1.12     Results from last 7 days   Lab Units 07/06/23  1716   POC GLUCOSE mg/dl 136     Results from last 7 days   Lab Units 07/07/23  0600   HEMOGLOBIN A1C % 5.5     Results from last 7 days   Lab Units 07/10/23  0528 07/09/23  0751 07/08/23  0535 07/07/23  0600 07/06/23  1729   LACTIC ACID mmol/L  --   --   --   --  1.9   PROCALCITONIN ng/ml 4.18* 7.86* 18.35* 30.55* 38.23*           * I Have Reviewed All Lab Data Listed Above. * Additional Pertinent Lab Tests Reviewed: [facility] Admission Reviewed      Lines:   Invasive Devices     Peripheral Intravenous Line  Duration           Peripheral IV 07/11/23 Dorsal (posterior); Left Wrist 1 day          Drain  Duration           Ureteral Internal Stent Left ureter 6 Fr. 5 days                   Imaging:    Imaging Reports Reviewed Today Include: no new imaging    Recent Cultures (last 7 days):     Results from last 7 days   Lab Units 07/08/23  0529 07/07/23  0600 07/06/23  2247 07/06/23  1930 07/06/23  1817 07/06/23  1745   BLOOD CULTURE  No Growth at 72 hrs.  --   --   --  Aerococcus urinae*  Streptococcus salivarius* Aerococcus urinae*  Gram-positive veronica*  Gram-Positive Rods Anaerobic (Group)*   GRAM STAIN RESULT   --   --   --   --  Gram positive cocci in clusters* Gram positive cocci in clusters*  Gram Positive Rods Resembling Diphtheroids*   URINE CULTURE   --   --  40,000-49,000 cfu/ml Aerococcus urinae* >100,000 cfu/ml Aerococcus urinae*  10,000-19,000 cfu/ml  --   --    LEGIONELLA URINARY ANTIGEN   --  Negative  --   --   --   --        Last 24 Hours Medication List:   Current Facility-Administered Medications   Medication Dose Route Frequency Provider Last Rate   • acetaminophen  650 mg Oral Q6H PRN Skippy Tana, CRNP     • albuterol  2.5 mg Nebulization Q6H PRN Skialessioy Tana, CRNP     • aluminum-magnesium hydroxide-simethicone  30 mL Oral Q6H PRN Skippy Tana, CRNP     • amoxicillin  1,000 mg Oral Q8H [address] Pam Causey MD     • aspirin  81 mg Oral Daily Jose Elias Doshi DO     • atorvastatin  40 mg Oral Daily With Kb Amaya DO     • dextromethorphan-guaiFENesin  10 mL Oral Q4H PRN Skippy Tana, CRNP     • fluticasone-vilanterol  1 puff Inhalation Daily SkiKIKI Joshi     • folic acid  1 mg Oral Daily Sayra Lafleur PA-C     • heparin (porcine)  5,000 Units Subcutaneous [facility] Fajardo, LUISITONP     • HYDROmorphone  0.2 mg Intravenous Q4H PRN Skialessioy Tana, CRNP     • hydrOXYzine HCL  50 mg Oral HS PRN Skialessioy Tana, CRNP     • ipratropium-albuterol  3 mL Nebulization TID Skisulma Fajardo, CRMABEL     • melatonin  6 mg Oral HS Skippy Tana, CRNP     • multivitamin-minerals  1 tablet Oral Daily Sayra Lafleur PA-C     • nicotine  1 patch Transdermal Daily KIKI Patel     • ondansetron  4 mg Intravenous Q6H PRN Skialessioy Tana, CRNP     • oxyCODONE  5 mg Oral 4x Daily PRN Skippy Tana, CRNP     • pantoprazole  40 mg Oral Early Morning Skippy Tana, CRNP     • simethicone  80 mg Oral 4x Daily PRN Skialessioy Sampson, CRNP     • thiamine  100 mg Oral Daily Sayra Lafleur PA-C Today, Patient Was Seen By: Doris Jolley MD    ** Please Note: Dictation voice to text software may have been used in the creation of this document.  **

## 2023-07-12 NOTE — RESTORATIVE TECHNICIAN NOTE
Restorative Technician Note      Patient Name: Eleazar Carter     [facility] Tech Visit Date: 07/12/23  Note Type: Mobility  Patient Position Upon Consult: Supine  Activity Performed: Ambulated  Patient Position at End of Consult: Supine;  All needs within reach

## 2023-07-12 NOTE — ASSESSMENT & PLAN NOTE
Patient states he drinks a couple of beers daily.  No evidence of withdrawal at this time  [facility] protocol

## 2023-07-12 NOTE — PLAN OF CARE

## 2023-07-12 NOTE — ASSESSMENT & PLAN NOTE
41-year-old male was admitted due to severe sepsis secondary to complicated UTI. Patient was noted to be bacteremic to Aerococcus urinae. Etiology likely secondary to his Left-sided hydronephrosis secondary to a 1 cm calculus in the proximal left ureter. · Appreciate ID recommendations.  Transitioned to po amoxicillin  · Awaiting safe dispo plan from case management

## 2023-07-12 NOTE — ASSESSMENT & PLAN NOTE
Post renal acute kidney injury in the setting of an obstructing calculus.   · Resolved after urologic intervention and IV hydration    Recent Labs     07/10/23  0528 07/11/23  0520   BUN 22 25   CREATININE 1.14 1.06   EGFR 63 68       Results from last 7 days   Lab Units 07/06/23  1930   BLOOD UA  Large*   PROTEIN UA mg/dl 100 (2+)*

## 2023-07-13 LAB — BACTERIA BLD CULT: NORMAL

## 2023-07-13 PROCEDURE — 94760 N-INVAS EAR/PLS OXIMETRY 1: CPT

## 2023-07-13 PROCEDURE — 97110 THERAPEUTIC EXERCISES: CPT

## 2023-07-13 PROCEDURE — 94640 AIRWAY INHALATION TREATMENT: CPT

## 2023-07-13 PROCEDURE — 97116 GAIT TRAINING THERAPY: CPT

## 2023-07-13 PROCEDURE — 99232 SBSQ HOSP IP/OBS MODERATE 35: CPT | Performed by: STUDENT IN AN ORGANIZED HEALTH CARE EDUCATION/TRAINING PROGRAM

## 2023-07-13 RX ORDER — BISACODYL 5 MG/1
10 TABLET, DELAYED RELEASE ORAL DAILY PRN
Status: DISCONTINUED | OUTPATIENT
Start: 2023-07-13 | End: 2023-08-20

## 2023-07-13 RX ORDER — SENNOSIDES 8.6 MG
2 TABLET ORAL
Status: DISCONTINUED | OUTPATIENT
Start: 2023-07-13 | End: 2023-08-20

## 2023-07-13 RX ADMIN — AMOXICILLIN 1000 MG: 500 CAPSULE ORAL at 22:03

## 2023-07-13 RX ADMIN — Medication 1 PATCH: at 08:43

## 2023-07-13 RX ADMIN — AMOXICILLIN 1000 MG: 500 CAPSULE ORAL at 14:38

## 2023-07-13 RX ADMIN — Medication 1 TABLET: at 08:44

## 2023-07-13 RX ADMIN — SENNOSIDES 17.2 MG: 8.6 TABLET, FILM COATED ORAL at 22:03

## 2023-07-13 RX ADMIN — HEPARIN SODIUM 5000 UNITS: 5000 INJECTION INTRAVENOUS; SUBCUTANEOUS at 05:32

## 2023-07-13 RX ADMIN — THIAMINE HCL TAB 100 MG 100 MG: 100 TAB at 08:44

## 2023-07-13 RX ADMIN — ASPIRIN 81 MG 81 MG: 81 TABLET ORAL at 08:44

## 2023-07-13 RX ADMIN — IPRATROPIUM BROMIDE AND ALBUTEROL SULFATE 3 ML: 2.5; .5 SOLUTION RESPIRATORY (INHALATION) at 07:44

## 2023-07-13 RX ADMIN — ATORVASTATIN CALCIUM 40 MG: 40 TABLET, FILM COATED ORAL at 17:13

## 2023-07-13 RX ADMIN — MELATONIN 6 MG: at 22:03

## 2023-07-13 RX ADMIN — FLUTICASONE FUROATE AND VILANTEROL TRIFENATATE 1 PUFF: 200; 25 POWDER RESPIRATORY (INHALATION) at 08:45

## 2023-07-13 RX ADMIN — AMOXICILLIN 1000 MG: 500 CAPSULE ORAL at 05:31

## 2023-07-13 RX ADMIN — HYDROXYZINE HYDROCHLORIDE 50 MG: 25 TABLET, FILM COATED ORAL at 22:03

## 2023-07-13 RX ADMIN — FOLIC ACID 1 MG: 1 TABLET ORAL at 08:44

## 2023-07-13 RX ADMIN — HEPARIN SODIUM 5000 UNITS: 5000 INJECTION INTRAVENOUS; SUBCUTANEOUS at 14:38

## 2023-07-13 RX ADMIN — PANTOPRAZOLE SODIUM 40 MG: 40 TABLET, DELAYED RELEASE ORAL at 05:32

## 2023-07-13 RX ADMIN — HEPARIN SODIUM 5000 UNITS: 5000 INJECTION INTRAVENOUS; SUBCUTANEOUS at 22:04

## 2023-07-13 NOTE — PROGRESS NOTES
[facility]  Progress Note  Name: Missy Macario I  MRN: [id number]  Unit/Bed#: E4 -01 I Date of Admission: 7/6/2023   Date of Service: 7/13/2023 I Hospital Day: 7    Assessment/Plan   * Sepsis secondary to UTI [facility])  Assessment & Plan  48-year-old male was admitted due to severe sepsis secondary to complicated UTI. Patient was noted to be bacteremic to Aerococcus urinae. Etiology likely secondary to his Left-sided hydronephrosis secondary to a 1 cm calculus in the proximal left ureter. · Appreciate ID recommendations. Transitioned to po amoxicillin  · Awaiting safe dispo plan from case management. Acute kidney injury [facility])  Assessment & Plan  Post renal acute kidney injury in the setting of an obstructing calculus. · Resolved after urologic intervention and IV hydration    Recent Labs     07/11/23  0520   BUN 25   CREATININE 1.06   EGFR 68       Results from last 7 days   Lab Units 07/06/23  1930   BLOOD UA  Large*   PROTEIN UA mg/dl 100 (2+)*         COPD (chronic obstructive pulmonary disease) ([facility])  Assessment & Plan  Not in exacerbation. Encouraged cessation. · Smokes 6 cigarettes daily. Reports desire to quit  · Breo, prn albuterol. Outpatient follow-up with pulmonology  · Tobacco cessation  · Check ambulatory pulse ox on discharge           VTE Pharmacologic Prophylaxis:   Pharmacologic: Heparin  Mechanical VTE Prophylaxis in Place: Yes    Discussions with Specialists or Other Care Team Provider: nursing    Education and Discussions with Family / Patient: patient    Current Length of Stay: 7 day(s)    Current Patient Status: Inpatient   Certification Statement: The patient will continue to require additional inpatient hospital stay due to Unsafe discharge, awaiting cm clearance    Discharge Plan: 24 hours    Code Status: Level 1 - Full Code      Subjective:   Patient seen and examined at bedside. He is comfortable and without any new complaints.     Objective: Vitals:   Temp (24hrs), Av.8 °F (36.6 °C), Min:97.8 °F (36.6 °C), Max:97.8 °F (36.6 °C)    Temp:  [97.8 °F (36.6 °C)] 97.8 °F (36.6 °C)  HR:  [77-87] 87  Resp:  [18] 18  BP: (108-137)/(80-85) 137/85  SpO2:  [94 %-100 %] 94 %  Body mass index is 28 kg/m². Input and Output Summary (last 24 hours): Intake/Output Summary (Last 24 hours) at 2023 1641  Last data filed at 2023 0501  Gross per 24 hour   Intake 130 ml   Output --   Net 130 ml       Physical Exam:     Physical Exam  Vitals reviewed. Constitutional:       General: He is not in acute distress. HENT:      Head: Normocephalic. Nose: Nose normal.      Mouth/Throat:      Mouth: Mucous membranes are moist.   Eyes:      General: No scleral icterus. Cardiovascular:      Rate and Rhythm: Normal rate and regular rhythm. Pulmonary:      Effort: Pulmonary effort is normal. No respiratory distress. Abdominal:      General: There is no distension. Palpations: Abdomen is soft. Tenderness: There is no abdominal tenderness. Skin:     General: Skin is warm. Neurological:      Mental Status: He is alert. Mental status is at baseline.    Psychiatric:         Mood and Affect: Mood normal.         Behavior: Behavior normal.       Additional Data:     Labs:    Results from last 7 days   Lab Units 23  0520 07/10/23  0528   WBC Thousand/uL 12.46* 10.86*   HEMOGLOBIN g/dL 15.5 15.2   HEMATOCRIT % 47.3 47.4   PLATELETS Thousands/uL 245 234   BANDS PCT % 3  --    NEUTROS PCT %  --  68   LYMPHS PCT %  --  22   LYMPHO PCT % 19  --    MONOS PCT %  --  8   MONO PCT % 10  --    EOS PCT % 0 1     Results from last 7 days   Lab Units 23  0520 23  0751 23  0535   SODIUM mmol/L 137   < > 136   POTASSIUM mmol/L 4.8   < > 4.6   CHLORIDE mmol/L 105   < > 107   CO2 mmol/L 24   < > 24   BUN mg/dL 25   < > 26*   CREATININE mg/dL 1.06   < > 0.86   ANION GAP mmol/L 8   < > 5   CALCIUM mg/dL 8.4   < > 8.1*   ALBUMIN g/dL  -- --  2.9*   TOTAL BILIRUBIN mg/dL  --   --  0.33   ALK PHOS U/L  --   --  52   ALT U/L  --   --  8   AST U/L  --   --  20   GLUCOSE RANDOM mg/dL 90   < > 113    < > = values in this interval not displayed. Results from last 7 days   Lab Units 07/06/23  1745   INR  1.12     Results from last 7 days   Lab Units 07/06/23  1716   POC GLUCOSE mg/dl 136     Results from last 7 days   Lab Units 07/07/23  0600   HEMOGLOBIN A1C % 5.5     Results from last 7 days   Lab Units 07/10/23  0528 07/09/23  0751 07/08/23  0535 07/07/23  0600 07/06/23  1729   LACTIC ACID mmol/L  --   --   --   --  1.9   PROCALCITONIN ng/ml 4.18* 7.86* 18.35* 30.55* 38.23*           * I Have Reviewed All Lab Data Listed Above. * Additional Pertinent Lab Tests Reviewed: [facility] Admission Reviewed      Lines:   Invasive Devices     Peripheral Intravenous Line  Duration           Peripheral IV 07/11/23 Dorsal (posterior); Left Wrist 2 days          Drain  Duration           Ureteral Internal Stent Left ureter 6 Fr. 6 days                   Imaging:    Imaging Reports Reviewed Today Include: no new imaging    Recent Cultures (last 7 days):     Results from last 7 days   Lab Units 07/08/23  0529 07/07/23  0600 07/06/23  2247 07/06/23  1930 07/06/23  1817 07/06/23  1745   BLOOD CULTURE  No Growth After 5 Days.   --   --   --  Aerococcus urinae*  Streptococcus salivarius* Aerococcus urinae*  Gram-positive veronica*  Gram-Positive Rods Anaerobic (Group)*   GRAM STAIN RESULT   --   --   --   --  Gram positive cocci in clusters* Gram positive cocci in clusters*  Gram Positive Rods Resembling Diphtheroids*   URINE CULTURE   --   --  40,000-49,000 cfu/ml Aerococcus urinae* >100,000 cfu/ml Aerococcus urinae*  10,000-19,000 cfu/ml  --   --    LEGIONELLA URINARY ANTIGEN   --  Negative  --   --   --   --        Last 24 Hours Medication List:   Current Facility-Administered Medications   Medication Dose Route Frequency Provider Last Rate   • acetaminophen  650 mg Oral Q6H PRN Deatra Brittle, CRNP     • albuterol  2.5 mg Nebulization Q6H PRN Deatra Brittle, CRNP     • aluminum-magnesium hydroxide-simethicone  30 mL Oral Q6H PRN Deatra Brittle, CRNP     • amoxicillin  1,000 mg Oral Q8H [address]  Stefan Roger MD     • aspirin  81 mg Oral Daily Erin Minnie, DO     • atorvastatin  40 mg Oral Daily With Dorina Louis, DO     • dextromethorphan-guaiFENesin  10 mL Oral Q4H PRN Deatra Brittle, CRNP     • fluticasone-vilanterol  1 puff Inhalation Daily Deatra Brittle, CRNP     • folic acid  1 mg Oral Daily Jamarcus Bear PA-C     • heparin (porcine)  5,000 Units Subcutaneous [facility] Deatra Brittle, CRNP     • HYDROmorphone  0.2 mg Intravenous Q4H PRN Deatra Brittle, CRNP     • hydrOXYzine HCL  50 mg Oral HS PRN Deatra Brittle, CRNP     • melatonin  6 mg Oral HS Deatra Brittle, CRNP     • multivitamin-minerals  1 tablet Oral Daily Jamarcus Bear PA-C     • nicotine  1 patch Transdermal Daily Deatra Brittle, CRNP     • ondansetron  4 mg Intravenous Q6H PRN Deatra Brittle, CRNP     • oxyCODONE  5 mg Oral 4x Daily PRN Deatra Brittle, CRNP     • pantoprazole  40 mg Oral Early Morning Deatra Brittle, CRNP     • simethicone  80 mg Oral 4x Daily PRN Deatra Brittle, CRNP     • thiamine  100 mg Oral Daily Jamarcus Bear PA-C          Today, Patient Was Seen By: Jose Antonio Macario MD    ** Please Note: Dictation voice to text software may have been used in the creation of this document.  **

## 2023-07-13 NOTE — ASSESSMENT & PLAN NOTE
62-year-old male was admitted due to severe sepsis secondary to complicated UTI. Patient was noted to be bacteremic to Aerococcus urinae. Etiology likely secondary to his Left-sided hydronephrosis secondary to a 1 cm calculus in the proximal left ureter. · Appreciate ID recommendations. Transitioned to po amoxicillin  · Awaiting safe dispo plan from case management.

## 2023-07-13 NOTE — ASSESSMENT & PLAN NOTE
Post renal acute kidney injury in the setting of an obstructing calculus.   · Resolved after urologic intervention and IV hydration    Recent Labs     07/11/23  0520   BUN 25   CREATININE 1.06   EGFR 68       Results from last 7 days   Lab Units 07/06/23  1930   BLOOD UA  Large*   PROTEIN UA mg/dl 100 (2+)*

## 2023-07-13 NOTE — PHYSICAL THERAPY NOTE
PHYSICAL THERAPY NOTE          Patient Name: Kimberly Caro  QOSYA'B Date: 7/13/2023 07/13/23 1416   Note Type   Note Type Treatment   Pain Assessment   Pain Assessment Tool 0-10   Pain Score No Pain   Restrictions/Precautions   RLE Weight Bearing Per Order WBAT   LLE Weight Bearing Per Order WBAT   Other Precautions Bed Alarm; Chair Alarm; Fall Risk   General   Chart Reviewed Yes   Family/Caregiver Present No   Cognition   Overall Cognitive Status Impaired   Arousal/Participation Alert   Attention Attends with cues to redirect   Orientation Level Oriented to person;Oriented to place   Memory Decreased recall of precautions;Decreased short term memory   Following Commands Follows one step commands without difficulty   Subjective   Subjective Are you going to give me my medicine. Bed Mobility   Supine to Sit 6  Modified independent   Additional items Assist x 1;HOB elevated; Bedrails; Increased time required   Transfers   Sit to Stand 5  Supervision   Additional items Assist x 1;Bedrails; Increased time required   Stand to Sit 5  Supervision   Additional items Assist x 1; Increased time required; Bedrails   Ambulation/Elevation   Gait pattern Improper Weight shift; Steppage   Gait Assistance 5  Supervision   Additional items Assist x 1;Verbal cues   Assistive Device None   Distance 205'x2 one seated rest break   Stair Management Assistance 5  Supervision   Additional items Assist x 1;Verbal cues; Increased time required   Stair Management Technique Two rails; Foreward;Nonreciprocal   Number of Stairs 4   Balance   Static Sitting Normal   Dynamic Sitting Good   Static Standing Fair +   Dynamic Standing Fair   Ambulatory Fair   Exercises   Quad Sets Supine;10 reps;Bilateral;AROM   Heelslides Supine;10 reps;AROM; Bilateral   Hip Flexion Supine;10 reps;AROM   Hip Abduction Supine;10 reps;AROM; Bilateral   Hip Adduction Supine;10 reps;AROM; Bilateral   Knee AROM Short Arc Quad Supine;10 reps;AROM; Bilateral   Ankle Pumps Supine;10 reps;AROM; Bilateral   Assessment   Prognosis Fair   Problem List Decreased endurance; Impaired balance;Decreased strength;Decreased cognition; Impaired judgement;Decreased safety awareness;Decreased range of motion   Assessment Pt seen for PT treatment session this date with interventions consisting of bed mobility, transfer training, gait training, stair training and HEP, and education provided as needed for safety and direction to improve functional mobility, safety awareness, and activity tolerance. Pt agreeable to PT treatment session upon arrival, pt found supine in bed . At end of session, pt left supine in bed with all needs in reach. In comparison to previous session, pt with improvement in ambulation distances, AM- pac score and functional mobility. Pt  progressed with ambulation distances to 1' x2 with close supervision and stair climbing with close supervision with use of b/l rails to ascend and descend. No gross lob noted. Pt  required one seated rest break due to fatigue. Pt demonstrates safe stair climbing techniques. Pt  performs supine b/l le arom exercises x 5 -10 with rest breaks PRN for fatigue and or c/o mild pain in le's. Pt declined handout on HEP at this time. Continue to recommend Home PT with support of roommate at time of d/c in order to maximize pt's functional independence and safety w/ mobility. Pt continues to be functioning below baseline level. PT will continue to see pt while here in order to address the deficits listed above and provide interventions consistent w/ POC in effort to achieve STGs. The patient's AM-PAC Basic Mobility Inpatient Short Form Raw Score is 23. A raw score greater than 16 suggests the patient may benefit from discharge to home. Please also refer to the recommendation of the Physical Therapist for safe discharge planning.    Goals   Patient Goals to get back home and do the same suff I did before. STG Expiration Date 07/17/23   PT Treatment Day 2   Plan   Treatment/Interventions Functional transfer training;LE strengthening/ROM; Therapeutic exercise; Endurance training;Cognitive reorientation;Patient/family training;Bed mobility;Gait training;Spoke to nursing   Progress Progressing toward goals   PT Frequency 2-3x/wk   Recommendation   PT Discharge Recommendation Home with home health rehabilitation   AM-PAC Basic Mobility Inpatient   Turning in Flat Bed Without Bedrails 4   Lying on Back to Sitting on Edge of Flat Bed Without Bedrails 3   Moving Bed to Chair 4   Standing Up From Chair Using Arms 4   Walk in Room 4   Climb 3-5 Stairs With Railing 4   Basic Mobility Inpatient Raw Score 23   Basic Mobility Standardized Score 50.88   Highest Level Of Mobility   JH-HLM Goal 7: Walk 25 feet or more   JH-HLM Achieved 7: Walk 25 feet or more   Education   Education Provided Mobility training;Home exercise program   Patient Demonstrates acceptance/verbal understanding   End of Consult   Patient Position at End of Consult Supine;Bed/Chair alarm activated; All needs within reach      07/13/23 1416   Note Type   Note Type Treatment   Pain Assessment   Pain Assessment Tool 0-10   Pain Score No Pain   Restrictions/Precautions   RLE Weight Bearing Per Order WBAT   LLE Weight Bearing Per Order WBAT   Other Precautions Bed Alarm; Chair Alarm; Fall Risk   General   Chart Reviewed Yes   Family/Caregiver Present No   Cognition   Overall Cognitive Status Impaired   Arousal/Participation Alert   Attention Attends with cues to redirect   Orientation Level Oriented to person;Oriented to place   Memory Decreased recall of precautions;Decreased short term memory   Following Commands Follows one step commands without difficulty   Subjective   Subjective Are you going to give me my medicine.    Bed Mobility   Supine to Sit 6  Modified independent   Additional items Assist x 1;HOB elevated; Bedrails; Increased time required   Transfers   Sit to Stand 5  Supervision   Additional items Assist x 1;Bedrails; Increased time required   Stand to Sit 5  Supervision   Additional items Assist x 1; Increased time required; Bedrails   Ambulation/Elevation   Gait pattern Improper Weight shift; Steppage   Gait Assistance 5  Supervision   Additional items Assist x 1;Verbal cues   Assistive Device None   Distance 205'x2 one seated rest break   Stair Management Assistance 5  Supervision   Additional items Assist x 1;Verbal cues; Increased time required   Stair Management Technique Two rails; Foreward;Nonreciprocal   Number of Stairs 4   Balance   Static Sitting Normal   Dynamic Sitting Good   Static Standing Fair +   Dynamic Standing Fair   Ambulatory Fair   Exercises   Quad Sets Supine;10 reps;Bilateral;AROM   Heelslides Supine;10 reps;AROM; Bilateral   Hip Flexion Supine;10 reps;AROM   Hip Abduction Supine;10 reps;AROM; Bilateral   Hip Adduction Supine;10 reps;AROM; Bilateral   Knee AROM Short Arc Quad Supine;10 reps;AROM; Bilateral   Ankle Pumps Supine;10 reps;AROM; Bilateral   Assessment   Prognosis Fair   Problem List Decreased endurance; Impaired balance;Decreased strength;Decreased cognition; Impaired judgement;Decreased safety awareness;Decreased range of motion   Assessment Pt seen for PT treatment session this date with interventions consisting of bed mobility, transfer training, gait training, stair training and HEP, and education provided as needed for safety and direction to improve functional mobility, safety awareness, and activity tolerance. Pt agreeable to PT treatment session upon arrival, pt found supine in bed . At end of session, pt left supine in bed with all needs in reach. In comparison to previous session, pt with improvement in ambulation distances, AM- pac score and functional mobility.  Pt  progressed with ambulation distances to 1' x2 with close supervision and stair climbing with close supervision with use of b/l rails to ascend and descend. No gross lob noted. Pt  required one seated rest break due to fatigue. Pt demonstrates safe stair climbing techniques. Pt  performs supine b/l le arom exercises x 5 -10 with rest breaks PRN for fatigue and or c/o mild pain in le's. Pt declined handout on HEP at this time. Continue to recommend Home PT with support of roommate at time of d/c in order to maximize pt's functional independence and safety w/ mobility. Pt continues to be functioning below baseline level. PT will continue to see pt while here in order to address the deficits listed above and provide interventions consistent w/ POC in effort to achieve STGs. The patient's [facility] Basic Mobility Inpatient Short Form Raw Score is 23. A raw score greater than 16 suggests the patient may benefit from discharge to home. Please also refer to the recommendation of the Physical Therapist for safe discharge planning. Goals   Patient Goals to get back home and do the same suff I did before. STG Expiration Date 07/17/23   PT Treatment Day 2   Plan   Treatment/Interventions Functional transfer training;LE strengthening/ROM; Therapeutic exercise; Endurance training;Cognitive reorientation;Patient/family training;Bed mobility;Gait training;Spoke to nursing   Progress Progressing toward goals   PT Frequency 2-3x/wk   Recommendation   PT Discharge Recommendation Home with home health rehabilitation   [facility] Basic Mobility Inpatient   Turning in Flat Bed Without Bedrails 4   Lying on Back to Sitting on Edge of Flat Bed Without Bedrails 3   Moving Bed to Chair 4   Standing Up From Chair Using Arms 4   Walk in Room 4   Climb 3-5 Stairs With Railing 4   Basic Mobility Inpatient Raw Score 23   Basic Mobility Standardized Score 50.88   Highest Level Of Mobility   JH-HLM Goal 7: Walk 25 feet or more   JH-HLM Achieved 7: Walk 25 feet or more   Education   Education Provided Mobility training;Home exercise program Patient Demonstrates acceptance/verbal understanding   End of Consult   Patient Position at End of Consult Supine;Bed/Chair alarm activated; All needs within reach   Elham Delgado

## 2023-07-13 NOTE — PLAN OF CARE
Problem: PHYSICAL THERAPY ADULT  Goal: Performs mobility at highest level of function for planned discharge setting. See evaluation for individualized goals. Description: Treatment/Interventions: Functional transfer training, LE strengthening/ROM, Elevations, Therapeutic exercise, Cognitive reorientation, Equipment eval/education, Patient/family training, Bed mobility, Gait training, Compensatory technique education, Continued evaluation, Spoke to nursing, OT, Spoke to case management          See flowsheet documentation for full assessment, interventions and recommendations. Outcome: Progressing  Note: Prognosis: Fair  Problem List: Decreased endurance, Impaired balance, Decreased strength, Decreased cognition, Impaired judgement, Decreased safety awareness, Decreased range of motion  Assessment: Pt seen for PT treatment session this date with interventions consisting of bed mobility, transfer training, gait training, stair training and HEP, and education provided as needed for safety and direction to improve functional mobility, safety awareness, and activity tolerance. Pt agreeable to PT treatment session upon arrival, pt found supine in bed . At end of session, pt left supine in bed with all needs in reach. In comparison to previous session, pt with improvement in ambulation distances, AM- pac score and functional mobility. Pt  progressed with ambulation distances to 1' x2 with close supervision and stair climbing with close supervision with use of b/l rails to ascend and descend. No gross lob noted. Pt  required one seated rest break due to fatigue. Pt demonstrates safe stair climbing techniques. Pt  performs supine b/l le arom exercises x 5 -10 with rest breaks PRN for fatigue and or c/o mild pain in le's. Pt declined handout on HEP at this time. Continue to recommend Home PT with support of roommate at time of d/c in order to maximize pt's functional independence and safety w/ mobility.  Pt continues to be functioning below baseline level. PT will continue to see pt while here in order to address the deficits listed above and provide interventions consistent w/ POC in effort to achieve STGs. The patient's AM-PAC Basic Mobility Inpatient Short Form Raw Score is 23. A raw score greater than 16 suggests the patient may benefit from discharge to home. Please also refer to the recommendation of the Physical Therapist for safe discharge planning. Barriers to Discharge: Inaccessible home environment  Barriers to Discharge Comments: flight [place] to [place]. Has roommate but alone days. PT Discharge Recommendation: Home with home health rehabilitation    See flowsheet documentation for full assessment.

## 2023-07-13 NOTE — PLAN OF CARE

## 2023-07-14 ENCOUNTER — TELEPHONE (OUTPATIENT)
Dept: FAMILY MEDICINE CLINIC | Facility: CLINIC | Age: 73
End: 2023-07-14

## 2023-07-14 PROBLEM — Z01.89 ENCOUNTER FOR COMPETENCY EVALUATION: Status: ACTIVE | Noted: 2023-07-14

## 2023-07-14 LAB
ANION GAP SERPL CALCULATED.3IONS-SCNC: 7 MMOL/L
BUN SERPL-MCNC: 19 MG/DL (ref 5–25)
CALCIUM SERPL-MCNC: 9.3 MG/DL (ref 8.4–10.2)
CHLORIDE SERPL-SCNC: 106 MMOL/L (ref 96–108)
CO2 SERPL-SCNC: 25 MMOL/L (ref 21–32)
CREAT SERPL-MCNC: 1.1 MG/DL (ref 0.6–1.3)
ERYTHROCYTE [DISTWIDTH] IN BLOOD BY AUTOMATED COUNT: 14.5 % (ref 11.6–15.1)
GFR SERPL CREATININE-BSD FRML MDRD: 65 ML/MIN/1.73SQ M
GLUCOSE SERPL-MCNC: 98 MG/DL (ref 65–140)
HCT VFR BLD AUTO: 47.3 % (ref 36.5–49.3)
HGB BLD-MCNC: 15.6 G/DL (ref 12–17)
MAGNESIUM SERPL-MCNC: 2 MG/DL (ref 1.9–2.7)
MCH RBC QN AUTO: 33.1 PG (ref 26.8–34.3)
MCHC RBC AUTO-ENTMCNC: 33 G/DL (ref 31.4–37.4)
MCV RBC AUTO: 100 FL (ref 82–98)
PLATELET # BLD AUTO: 335 THOUSANDS/UL (ref 149–390)
PMV BLD AUTO: 10 FL (ref 8.9–12.7)
POTASSIUM SERPL-SCNC: 4.4 MMOL/L (ref 3.5–5.3)
RBC # BLD AUTO: 4.71 MILLION/UL (ref 3.88–5.62)
SODIUM SERPL-SCNC: 138 MMOL/L (ref 135–147)
WBC # BLD AUTO: 10.26 THOUSAND/UL (ref 4.31–10.16)

## 2023-07-14 PROCEDURE — 80048 BASIC METABOLIC PNL TOTAL CA: CPT | Performed by: STUDENT IN AN ORGANIZED HEALTH CARE EDUCATION/TRAINING PROGRAM

## 2023-07-14 PROCEDURE — 83735 ASSAY OF MAGNESIUM: CPT | Performed by: STUDENT IN AN ORGANIZED HEALTH CARE EDUCATION/TRAINING PROGRAM

## 2023-07-14 PROCEDURE — 99233 SBSQ HOSP IP/OBS HIGH 50: CPT | Performed by: STUDENT IN AN ORGANIZED HEALTH CARE EDUCATION/TRAINING PROGRAM

## 2023-07-14 PROCEDURE — 85027 COMPLETE CBC AUTOMATED: CPT | Performed by: STUDENT IN AN ORGANIZED HEALTH CARE EDUCATION/TRAINING PROGRAM

## 2023-07-14 RX ORDER — MULTIVITAMIN
1 TABLET ORAL DAILY
Qty: 30 TABLET | Refills: 0 | Status: SHIPPED | OUTPATIENT
Start: 2023-07-14 | End: 2023-08-13

## 2023-07-14 RX ORDER — ATORVASTATIN CALCIUM 40 MG/1
40 TABLET, FILM COATED ORAL
Qty: 30 TABLET | Refills: 0 | Status: SHIPPED | OUTPATIENT
Start: 2023-07-14 | End: 2023-08-13

## 2023-07-14 RX ORDER — FLUTICASONE FUROATE AND VILANTEROL 200; 25 UG/1; UG/1
1 POWDER RESPIRATORY (INHALATION) DAILY
Qty: 60 BLISTER | Refills: 0 | Status: SHIPPED | OUTPATIENT
Start: 2023-07-15

## 2023-07-14 RX ORDER — PANTOPRAZOLE SODIUM 40 MG/1
40 TABLET, DELAYED RELEASE ORAL
Qty: 30 TABLET | Refills: 0 | Status: SHIPPED | OUTPATIENT
Start: 2023-07-15 | End: 2023-08-14

## 2023-07-14 RX ORDER — ALBUTEROL SULFATE 90 UG/1
2 AEROSOL, METERED RESPIRATORY (INHALATION) EVERY 6 HOURS PRN
Qty: 8.5 G | Refills: 0 | Status: SHIPPED | OUTPATIENT
Start: 2023-07-14

## 2023-07-14 RX ORDER — AMOXICILLIN 500 MG/1
1000 CAPSULE ORAL EVERY 8 HOURS SCHEDULED
Qty: 40 CAPSULE | Refills: 0 | Status: SHIPPED | OUTPATIENT
Start: 2023-07-14 | End: 2023-09-01

## 2023-07-14 RX ORDER — ASPIRIN 81 MG/1
81 TABLET, CHEWABLE ORAL DAILY
Qty: 30 TABLET | Refills: 0 | Status: SHIPPED | OUTPATIENT
Start: 2023-07-15 | End: 2023-09-01 | Stop reason: SDUPTHER

## 2023-07-14 RX ADMIN — HEPARIN SODIUM 5000 UNITS: 5000 INJECTION INTRAVENOUS; SUBCUTANEOUS at 05:58

## 2023-07-14 RX ADMIN — ATORVASTATIN CALCIUM 40 MG: 40 TABLET, FILM COATED ORAL at 17:57

## 2023-07-14 RX ADMIN — AMOXICILLIN 1000 MG: 500 CAPSULE ORAL at 05:57

## 2023-07-14 RX ADMIN — HEPARIN SODIUM 5000 UNITS: 5000 INJECTION INTRAVENOUS; SUBCUTANEOUS at 13:59

## 2023-07-14 RX ADMIN — PANTOPRAZOLE SODIUM 40 MG: 40 TABLET, DELAYED RELEASE ORAL at 05:57

## 2023-07-14 RX ADMIN — FOLIC ACID 1 MG: 1 TABLET ORAL at 08:35

## 2023-07-14 RX ADMIN — HEPARIN SODIUM 5000 UNITS: 5000 INJECTION INTRAVENOUS; SUBCUTANEOUS at 21:30

## 2023-07-14 RX ADMIN — MELATONIN 6 MG: at 21:29

## 2023-07-14 RX ADMIN — Medication 1 TABLET: at 08:35

## 2023-07-14 RX ADMIN — THIAMINE HCL TAB 100 MG 100 MG: 100 TAB at 08:35

## 2023-07-14 RX ADMIN — ASPIRIN 81 MG 81 MG: 81 TABLET ORAL at 08:35

## 2023-07-14 RX ADMIN — FLUTICASONE FUROATE AND VILANTEROL TRIFENATATE 1 PUFF: 200; 25 POWDER RESPIRATORY (INHALATION) at 08:35

## 2023-07-14 RX ADMIN — AMOXICILLIN 1000 MG: 500 CAPSULE ORAL at 13:58

## 2023-07-14 RX ADMIN — AMOXICILLIN 1000 MG: 500 CAPSULE ORAL at 21:29

## 2023-07-14 NOTE — ASSESSMENT & PLAN NOTE
Not in exacerbation. Encouraged cessation. · Smokes 6 cigarettes daily.   Reports desire to quit  · Patient currently on room air  · Discharged with [facility] and as needed albuterol

## 2023-07-14 NOTE — CASE MANAGEMENT
Case Management Discharge Planning Note    Patient name Yandy Mccoy  Location East  /E4 95 Jessica Pozo-* MRN [id number]  : 1950 Date 2023       Current Admission Date: 2023  Current Admission Diagnosis:Sepsis secondary to UTI [facility])   Patient Active Problem List    Diagnosis Date Noted   • Alcohol withdrawal ([address]) 2023   • Positive blood culture 2023   • Sepsis secondary to UTI ([address]) 2023   • Elevated troponin 2023   • Elevated d-dimer 2023   • Pyelonephritis 2023   • Esophageal abnormality 2023   • Hydronephrosis with obstructing calculus 2023   • Mycotic toenails 2023   • Acute kidney injury ([address]) 2023   • Angioedema 2018   • COPD (chronic obstructive pulmonary disease) ([address]) 2018   • Bradycardia 2018   • Tobacco use 2016   • Polycythemia 2016   • Weight loss 2016      LOS (days): 8  Geometric Mean LOS (GMLOS) (days): 9.60  Days to GMLOS:2     OBJECTIVE:  Risk of Unplanned Readmission Score: 10.4         Current admission status: Inpatient   Preferred Pharmacy:   PATIENT/FAMILY REPORTS NO PREFERRED PHARMACY  No address on file      [address] [place] [address]  Phone: [phone] Fax: [phone]    Primary Care Provider: No primary care provider on file.     Primary Insurance: MEDICARE  Secondary Insurance:     DISCHARGE DETAILS:    Discharge planning discussed with[de-identified] Patient  Freedom of Choice: Yes  Comments - Freedom of Choice: Pt agreeable to go home w/ HH  CM contacted family/caregiver?: No- see comments (no EC)  Were Treatment Team discharge recommendations reviewed with patient/caregiver?: Yes  Did patient/caregiver verbalize understanding of patient care needs?: Yes  Were patient/caregiver advised of the risks associated with not following Treatment Team discharge recommendations?: Yes    Contacts  Patient Contacts: Patient  Relationship to Patient[de-identified] Family  Contact Method: In Person  Reason/Outcome: Continuity of Care, Discharge [address]         Is the patient interested in [address] [facility] at discharge?: Yes  [facility] requested[de-identified] Nursing, Physical Therapy, Occupational [address] Name[de-identified] [facility] External Referral Reason (only applicable if external HHA name selected): Services not provided in network or near patient location  [address] [facility] Provider[de-identified] Referring Provider  Home Health Services Needed[de-identified] Strengthening/Theraputic Exercises to Improve Function, Evaluate Functional Status and Safety, Administration of IV, IM or SC Medications  Homebound Criteria Met[de-identified] Requires the Assistance of Another Person for Safe Ambulation or to Leave the Home  Supporting Clincal Findings[de-identified] Fatigues Easliy in [employer]    DME Referral Provided  Referral made for DME?: No    Other Referral/Resources/Interventions Provided:  Financial Resources Provided: Indigent Medication         Treatment Team Recommendation: Home with [address] [facility]  Discharge Destination Plan[de-identified] Home with [address] [facility] at Discharge : [place] by Deepika and Unit #): Lyft  ETA of Transport (Date): 07/14/23  ETA of Transport (Time): 1600              IMM Given (Date):: 07/14/23 (signed)  IMM Given to[de-identified] Patient     Additional Comments: CM met w/ pt at bedside to discuss d/c planning. Pt agreeable to d/c home today w/ [facility]. Pt also agreeable to go home via [id number] ride. Pt denied having any questions or concerns at this time. Since pt does not have an established PCP, CM sent an in-basket message to [employer] in [facility] to reach out to pt to make an appointment. CM to continue to follow pt care & d/c.

## 2023-07-14 NOTE — ASSESSMENT & PLAN NOTE
Discharge canceled 7/14/2023. Patient does not know where he lives. We informed him that the address on his chart says [place]ANDER. He states that he lives in [place] near [address]. He could not explicitly say where his address is. He does not know any phone numbers of any friends which may help us corroborate his statement. · Competency currently in question. Neuropsych evaluation requested.

## 2023-07-14 NOTE — PROGRESS NOTES
[facility]  Progress Note  Name: Phoebe Greene I  MRN: [id number]  Unit/Bed#: E4 -01 I Date of Admission: 7/6/2023   Date of Service: 7/14/2023 I Hospital Day: 8    Assessment/Plan   * Sepsis secondary to UTI [facility])  Assessment & Plan  43-year-old male was admitted due to severe sepsis secondary to complicated UTI. Patient was noted to be bacteremic to Aerococcus urinae. Etiology likely secondary to his Left-sided hydronephrosis secondary to a 1 cm calculus in the proximal left ureter. · Appreciate ID recommendations. Transitioned to po amoxicillin. Cleared by infectious disease for discharge. Encounter for competency evaluation  Assessment & Plan  Discharge canceled 7/14/2023. Patient does not know where he lives. We informed him that the address on his chart says [place]. He states that he lives in [place] [facility] [facility] [place] near [place] [address]. He could not explicitly say where his address is. He does not know any phone numbers of any friends which may help us corroborate his statement. · Competency currently in question. Neuropsych evaluation requested. Alcohol withdrawal ([address])  Assessment & Plan  Patient states he drinks a couple of beers daily. No evidence of withdrawal at this time  [place] protocol    Positive blood culture  Assessment & Plan  2/2 sets Aerococcus urinae due to stone which resulted in pyelonephritis  · Management per infectious disease    Acute kidney injury [facility])  Assessment & Plan  Post renal acute kidney injury in the setting of an obstructing calculus. · Resolved after urologic intervention and IV hydration    Recent Labs     07/14/23  0632   BUN 19   CREATININE 1.10   EGFR 65               Mycotic toenails  Assessment & Plan  · Very elongated mycotic toenails. S/p podiatry debridement. Hydronephrosis with obstructing calculus  Assessment & Plan  · CT A/P:   · Left-sided hydronephrosis secondary to a 1 cm calculus in the proximal left ureter.   · Evidence of cystitis with circumferential irregular bladder wall thickening and perivesical stranding, though there is also evidence of underlying chronic outlet obstruction. · Large nonobstructing left upper pole calyceal calculus  · S/p "POD#3 cystoscopy, left retrograde pyelogram, ureteral stent insertion, michel catheter insertion. Inadvertent left ureteral perforation at tight proximal ureter, managed with indwelling ureteral stent"  · Urology recommendations appreciated. They will coordinate his outpatient follow-up for his next step in management. Esophageal abnormality  Assessment & Plan  Incidental finding on CTA study: Diffuse esophageal wall thickening which could be due to esophagitis  · Continue PPI  · Outpatient follow-up with GI for EGD. Elevated d-dimer  Assessment & Plan  CTA negative for pulmonary embolism    Elevated troponin  Assessment & Plan  Elevated troponin likely demand ischemia in setting of sepsis  · Cardiology recommendations appreciated. Once patient recovers from his acute infectious illness, they recommend outpatient ischemic evaluation likely with a nuclear stress test.  · Ambulatory referral to cardiology sent  · Continue aspirin and Lipitor on discharge    COPD (chronic obstructive pulmonary disease) ([address])  Assessment & Plan  Not in exacerbation. Encouraged cessation. · Smokes 6 cigarettes daily.   Reports desire to quit  · Patient currently on room air  · Discharged with Breo and as needed albuterol           VTE Pharmacologic Prophylaxis:   Pharmacologic: Heparin  Mechanical VTE Prophylaxis in Place: Yes    Discussions with Specialists or Other Care Team Provider: nursing    Education and Discussions with Family / Patient: patient    Current Length of Stay: 8 day(s)    Current Patient Status: Inpatient   Certification Statement: The patient will continue to require additional inpatient hospital stay due to neuropsych eval    Discharge Plan: not today    Code Status: Level 1 - Full Code      Subjective:   Patient seen and examined at bedside. Unfortunately, we were planning to discharge him today. Patient does not know where he lives. He does not have any contact information of any relatives or friends. He states that he lives in [place], but address in our sheets states that he lives in [place] [facility]. Objective:     Vitals:   Temp (24hrs), Av.4 °F (36.3 °C), Min:97.3 °F (36.3 °C), Max:97.5 °F (36.4 °C)    Temp:  [97.3 °F (36.3 °C)-97.5 °F (36.4 °C)] 97.3 °F (36.3 °C)  HR:  [] 151  Resp:  [16-18] 16  BP: (120-123)/(76-87) 123/87  SpO2:  [94 %-95 %] 95 %  Body mass index is 28 kg/m². Input and Output Summary (last 24 hours):     No intake or output data in the 24 hours ending 23 1342    Physical Exam:     Physical Exam  Vitals reviewed. Constitutional:       General: He is not in acute distress. HENT:      Head: Normocephalic. Nose: Nose normal.      Mouth/Throat:      Mouth: Mucous membranes are moist.   Eyes:      General: No scleral icterus. Cardiovascular:      Rate and Rhythm: Normal rate. Pulmonary:      Effort: Pulmonary effort is normal. No respiratory distress. Abdominal:      General: There is no distension. Palpations: Abdomen is soft. Tenderness: There is no abdominal tenderness. Skin:     General: Skin is warm. Neurological:      Mental Status: He is alert. Mental status is at baseline.    Psychiatric:         Mood and Affect: Mood normal.         Behavior: Behavior normal.       Additional Data:     Labs:    Results from last 7 days   Lab Units 23  0632 23  0520 07/10/23  0528   WBC Thousand/uL 10.26* 12.46* 10.86*   HEMOGLOBIN g/dL 15.6 15.5 15.2   HEMATOCRIT % 47.3 47.3 47.4   PLATELETS Thousands/uL 335 245 234   BANDS PCT %  --  3  --    NEUTROS PCT %  --   --  68   LYMPHS PCT %  --   --  22   LYMPHO PCT %  --  19  --    MONOS PCT %  --   --  8   MONO PCT %  --  10  --    EOS PCT %  --  0 1     Results from last 7 days   Lab Units 07/14/23  0632 07/09/23  0751 07/08/23  0535   SODIUM mmol/L 138   < > 136   POTASSIUM mmol/L 4.4   < > 4.6   CHLORIDE mmol/L 106   < > 107   CO2 mmol/L 25   < > 24   BUN mg/dL 19   < > 26*   CREATININE mg/dL 1.10   < > 0.86   ANION GAP mmol/L 7   < > 5   CALCIUM mg/dL 9.3   < > 8.1*   ALBUMIN g/dL  --   --  2.9*   TOTAL BILIRUBIN mg/dL  --   --  0.33   ALK PHOS U/L  --   --  52   ALT U/L  --   --  8   AST U/L  --   --  20   GLUCOSE RANDOM mg/dL 98   < > 113    < > = values in this interval not displayed. Results from last 7 days   Lab Units 07/10/23  0528 07/09/23  0751 07/08/23  0535   PROCALCITONIN ng/ml 4.18* 7.86* 18.35*           * I Have Reviewed All Lab Data Listed Above. * Additional Pertinent Lab Tests Reviewed: [address] Admission Reviewed      Lines:   Invasive Devices     Peripheral Intravenous Line  Duration           Peripheral IV 07/11/23 Dorsal (posterior); Left Wrist 3 days          Drain  Duration           Ureteral Internal Stent Left ureter 6 Fr. 7 days                   Imaging:    Imaging Reports Reviewed Today Include: no new imaging    Recent Cultures (last 7 days):     Results from last 7 days   Lab Units 07/08/23  0529   BLOOD CULTURE  No Growth After 5 Days.        Last 24 Hours Medication List:   Current Facility-Administered Medications   Medication Dose Route Frequency Provider Last Rate   • acetaminophen  650 mg Oral Q6H PRN KIKI Pete     • albuterol  2.5 mg Nebulization Q6H PRN KIKI Pete     • aluminum-magnesium hydroxide-simethicone  30 mL Oral Q6H PRN KIKI Pete     • amoxicillin  1,000 mg Oral [facility] Huan Dubois MD     • aspirin  81 mg Oral Daily Heath Augustine DO     • atorvastatin  40 mg Oral Daily With Josafat Quinteros DO     • bisacodyl  10 mg Oral Daily PRN KIKI Pete     • dextromethorphan-guaiFENesin  10 mL Oral Q4H PRN Hermina Point, CRNP     • fluticasone-vilanterol  1 puff Inhalation Daily Nahed Jetty, CRNP     • folic acid  1 mg Oral Daily Leonor Adame PA-C     • heparin (porcine)  5,000 Units Subcutaneous [facility] Nahed Jetty, CRNP     • HYDROmorphone  0.2 mg Intravenous Q4H PRN Nahed Jetty, CRNP     • hydrOXYzine HCL  50 mg Oral HS PRN Nahed Jetty, CRNP     • melatonin  6 mg Oral HS Nahed Jetty, CRNP     • multivitamin-minerals  1 tablet Oral Daily Leonor Adame PA-C     • nicotine  1 patch Transdermal Daily Nahed Jetty, CRNP     • ondansetron  4 mg Intravenous Q6H PRN Nahed Jetty, CRNP     • oxyCODONE  5 mg Oral 4x Daily PRN Nahed Jetty, CRNP     • pantoprazole  40 mg Oral Early Morning Nahed Jetty, CRNP     • senna  2 tablet Oral HS Nahed Jetty, CRNP     • simethicone  80 mg Oral 4x Daily PRN Nahed Jetty, CRNP     • thiamine  100 mg Oral Daily Leonor Adame PA-C          Today, Patient Was Seen By: Radha Thompson MD    ** Please Note: Dictation voice to text software may have been used in the creation of this document.  **

## 2023-07-14 NOTE — CONSULTS
Consultation - Neuropsychology/Psychology Department  Nestor Lamb 75 y.o. male MRN: [id number]  Unit/Bed#: E4 -01 Encounter: [id number]        Reason for Consultation:  Nestor Lamb is a 75y.o. year old male who was referred for a Neuropsychological Exam to assess cognitive functioning and comment on capacity to make informed medica decisions. History of Present Illness  Shortness of breath, rigors    Physician Requesting Consult: Viviana Zaldivar MD    PROBLEM LIST:  Patient Active Problem List   Diagnosis   • Tobacco use   • Polycythemia   • Weight loss   • Angioedema   • COPD (chronic obstructive pulmonary disease) ([facility])   • Bradycardia   • Sepsis secondary to UTI [facility])   • Elevated troponin   • Elevated d-dimer   • Pyelonephritis   • Esophageal abnormality   • Hydronephrosis with obstructing calculus   • Mycotic toenails   • Acute kidney injury ([address])   • Positive blood culture   • Alcohol withdrawal ([address])   • Encounter for competency evaluation         Historical Information   Past Medical History:   Diagnosis Date   • COPD (chronic obstructive pulmonary disease) ([address])      Past Surgical History:   Procedure Laterality Date   • FL RETROGRADE PYELOGRAM  7/6/2023   • [place] CYSTO BLADDER W/URETERAL CATHETERIZATION Left 7/6/2023    Procedure: CYSTOSCOPY RETROGRADE PYELOGRAM WITH INSERTION STENT URETERAL;  Surgeon: Mazin Christianson MD;  Location: [facility];  Service: Urology     Social History   Social History     Substance and Sexual Activity   Alcohol Use No     Social History     Substance and Sexual Activity   Drug Use No     Social History     Tobacco Use   Smoking Status Every Day   • Packs/day: 0.50   • Types: Cigarettes   Smokeless Tobacco Not on file     Family History: History reviewed. No pertinent family history.     Meds/Allergies   current meds:   Current Facility-Administered Medications   Medication Dose Route Frequency   • acetaminophen (TYLENOL) tablet 650 mg  650 mg Oral Q6H PRN • albuterol inhalation solution 2.5 mg  2.5 mg Nebulization Q6H PRN   • aluminum-magnesium hydroxide-simethicone (MYLANTA) oral suspension 30 mL  30 mL Oral Q6H PRN   • amoxicillin (AMOXIL) capsule 1,000 mg  1,000 mg Oral Q8H [address]   • aspirin chewable tablet 81 mg  81 mg Oral Daily   • atorvastatin (LIPITOR) tablet 40 mg  40 mg Oral Daily With Dinner   • bisacodyl (DULCOLAX) EC tablet 10 mg  10 mg Oral Daily PRN   • dextromethorphan-guaiFENesin (ROBITUSSIN DM) oral syrup 10 mL  10 mL Oral Q4H PRN   • fluticasone-vilanterol 200-25 mcg/actuation 1 puff  1 puff Inhalation Daily   • folic acid (FOLVITE) tablet 1 mg  1 mg Oral Daily   • heparin (porcine) subcutaneous injection 5,000 Units  5,000 Units Subcutaneous Q8H [address]   • HYDROmorphone HCl (DILAUDID) injection 0.2 mg  0.2 mg Intravenous Q4H PRN   • hydrOXYzine HCL (ATARAX) tablet 50 mg  50 mg Oral HS PRN   • melatonin tablet 6 mg  6 mg Oral HS   • multivitamin-minerals (CENTRUM) tablet 1 tablet  1 tablet Oral Daily   • nicotine (NICODERM CQ) 14 mg/24hr TD 24 hr patch 1 patch  1 patch Transdermal Daily   • ondansetron (ZOFRAN) injection 4 mg  4 mg Intravenous Q6H PRN   • oxyCODONE (ROXICODONE) IR tablet 5 mg  5 mg Oral 4x Daily PRN   • pantoprazole (PROTONIX) EC tablet 40 mg  40 mg Oral Early Morning   • senna (SENOKOT) tablet 17.2 mg  2 tablet Oral HS   • simethicone (MYLICON) chewable tablet 80 mg  80 mg Oral 4x Daily PRN   • thiamine tablet 100 mg  100 mg Oral Daily       No Known Allergies      Family and Social Support:   Discharge planning discussed with[de-identified] Patient  Freedom of Choice: Yes  IMM Given (Date):: 07/14/23 (signed)  IMM Given to[de-identified] Patient  ETA of Transport (Date): 07/14/23  ETA of Transport (Time): 1600      Behavioral Observations: Alert, UNABLE to accurately state the year, season, month, day/week, day/month and state; affect appeared pleasant and patient denied depressed mood and admitted to anxiety; patient was unable to provide medical history ("nothing"), unable to provide his home address. Cognitive Examination    General Cognitive Functioning MMSE = Impaired 12/28; Attention/Concentration Auditory Selective Attention = Impaired; Auditory Vigilance = Within Normal Limits; Information Processing Speed = Within Normal Limits    Frontal Systems/Executive Functioning Mental Flexibility/Cognitive Control = Impaired; Working Memory = Impaired Abstract Reasoning = Impaired; Generative Ability = Impaired, Commonsense Reasoning and Judgement = Impaired    Language Functioning Confrontation naming = Within Normal Limits, Phonemic Fluency = Impaired; Semantic Retrieval = Impaired; Comprehension of Complex Ideational Material = Impaired; Praxis = Within Normal Limits; Repetition = Within Normal Limits; Basic Reading = Impaired; Following Commands = Impaired    Memory Functioning Narrative Recall - Short Delay = Impaired; Long Delay Narrative Recall = Impaired; Three word recall = Impaired    Visuo-Spatial Abilities Not Assessed    Functional Knowledge  Health & Safety Knowledge = Impaired;     Summary/Impression:  Results of Neuropsychological Exam revealed diffuse cognitive dysfunction and on a measure assessing awareness of personal health status and ability to evaluate health problems, handle medical emergencies and take safety precautions, patient performed in the IMPAIRED range of functioning. During this encounter, patient does not appear to have capacity to make informed medial decisions.   Unspecified Dementia

## 2023-07-14 NOTE — ASSESSMENT & PLAN NOTE
Post renal acute kidney injury in the setting of an obstructing calculus.   · Resolved after urologic intervention and IV hydration    Recent Labs     07/14/23  0632   BUN 19   CREATININE 1.10   EGFR 65

## 2023-07-14 NOTE — PLAN OF CARE
Problem: PAIN - ADULT  Goal: Verbalizes/displays adequate comfort level or baseline comfort level  Description: Interventions:  - Encourage patient to monitor pain and request assistance  - Assess pain using appropriate pain scale  - Administer analgesics based on type and severity of pain and evaluate response  - Implement non-pharmacological measures as appropriate and evaluate response  - Consider cultural and social influences on pain and pain management  - Notify physician/advanced practitioner if interventions unsuccessful or patient reports new pain  Outcome: Progressing     Problem: INFECTION - ADULT  Goal: Absence or prevention of progression during hospitalization  Description: INTERVENTIONS:  - Assess and monitor for signs and symptoms of infection  - Monitor lab/diagnostic results  - Monitor all insertion sites, i.e. indwelling lines, tubes, and drains  - Monitor endotracheal if appropriate and nasal secretions for changes in amount and color  - [place] appropriate cooling/warming therapies per order  - Administer medications as ordered  - Instruct and encourage patient and family to use good hand hygiene technique  - Identify and instruct in appropriate isolation precautions for identified infection/condition  Outcome: Progressing  Goal: Absence of fever/infection during neutropenic period  Description: INTERVENTIONS:  - Monitor WBC    Outcome: Progressing

## 2023-07-14 NOTE — ASSESSMENT & PLAN NOTE
Patient states he drinks a couple of beers daily.  No evidence of withdrawal at this time  [facility] protocol

## 2023-07-14 NOTE — ASSESSMENT & PLAN NOTE
71-year-old male was admitted due to severe sepsis secondary to complicated UTI. Patient was noted to be bacteremic to Aerococcus urinae. Etiology likely secondary to his Left-sided hydronephrosis secondary to a 1 cm calculus in the proximal left ureter. · Appreciate ID recommendations. Transitioned to po amoxicillin. Cleared by infectious disease for discharge.

## 2023-07-14 NOTE — RESTORATIVE TECHNICIAN NOTE
Restorative Technician Note      Patient Name: Shivani Chao     Restorative Tech Visit Date: 07/14/23  Note Type: Mobility  Patient Position Upon Consult: Supine  Activity Performed: Ambulated  Patient Position at End of Consult: Supine;  All needs within reach; Bed/Chair alarm activated

## 2023-07-14 NOTE — ASSESSMENT & PLAN NOTE
Elevated troponin likely demand ischemia in setting of sepsis  · Cardiology recommendations appreciated.   Once patient recovers from his acute infectious illness, they recommend outpatient ischemic evaluation likely with a nuclear stress test.  · Ambulatory referral to cardiology sent  · Continue aspirin and Lipitor on discharge

## 2023-07-14 NOTE — RESTORATIVE TECHNICIAN NOTE
Restorative Technician Note      Patient Name: Dianelys Oakley     Restorative Tech Visit Date: 07/14/23  Note Type: Mobility  Patient Position Upon Consult: Supine  Activity Performed: Ambulated  Patient Position at End of Consult: Seated edge of bed;  All needs within reach

## 2023-07-15 PROCEDURE — 99232 SBSQ HOSP IP/OBS MODERATE 35: CPT | Performed by: STUDENT IN AN ORGANIZED HEALTH CARE EDUCATION/TRAINING PROGRAM

## 2023-07-15 RX ADMIN — ATORVASTATIN CALCIUM 40 MG: 40 TABLET, FILM COATED ORAL at 17:24

## 2023-07-15 RX ADMIN — MELATONIN 6 MG: at 21:33

## 2023-07-15 RX ADMIN — HEPARIN SODIUM 5000 UNITS: 5000 INJECTION INTRAVENOUS; SUBCUTANEOUS at 06:41

## 2023-07-15 RX ADMIN — AMOXICILLIN 1000 MG: 500 CAPSULE ORAL at 06:41

## 2023-07-15 RX ADMIN — Medication 1 TABLET: at 08:59

## 2023-07-15 RX ADMIN — AMOXICILLIN 1000 MG: 500 CAPSULE ORAL at 13:16

## 2023-07-15 RX ADMIN — AMOXICILLIN 1000 MG: 500 CAPSULE ORAL at 21:33

## 2023-07-15 RX ADMIN — THIAMINE HCL TAB 100 MG 100 MG: 100 TAB at 08:59

## 2023-07-15 RX ADMIN — FLUTICASONE FUROATE AND VILANTEROL TRIFENATATE 1 PUFF: 200; 25 POWDER RESPIRATORY (INHALATION) at 08:59

## 2023-07-15 RX ADMIN — PANTOPRAZOLE SODIUM 40 MG: 40 TABLET, DELAYED RELEASE ORAL at 06:41

## 2023-07-15 RX ADMIN — FOLIC ACID 1 MG: 1 TABLET ORAL at 08:59

## 2023-07-15 RX ADMIN — ASPIRIN 81 MG 81 MG: 81 TABLET ORAL at 08:59

## 2023-07-15 RX ADMIN — HEPARIN SODIUM 5000 UNITS: 5000 INJECTION INTRAVENOUS; SUBCUTANEOUS at 13:16

## 2023-07-15 RX ADMIN — HEPARIN SODIUM 5000 UNITS: 5000 INJECTION INTRAVENOUS; SUBCUTANEOUS at 21:34

## 2023-07-15 NOTE — PROGRESS NOTES
[address] [place] [address]  Progress Note  Name: Phoebe Greene I  MRN: [id number]  Unit/Bed#: E4 -01 I Date of Admission: 7/6/2023   Date of Service: 7/15/2023 I Hospital Day: 9    Assessment/Plan   * Sepsis secondary to UTI [facility])  Assessment & Plan  76-year-old male was admitted due to severe sepsis secondary to complicated UTI. Patient was noted to be bacteremic to Aerococcus urinae. Etiology likely secondary to his Left-sided hydronephrosis secondary to a 1 cm calculus in the proximal left ureter. · Appreciate ID recommendations. Transitioned to po amoxicillin. Complete through 7/20/2023. Cleared by infectious disease for discharge. Encounter for competency evaluation  Assessment & Plan  Discharge canceled 7/14/2023. Patient does not know where he lives. We informed him that the address on his chart says [place]. He states that he lives in [facility] near [address]. He could not explicitly say where his address is. He does not know any phone numbers of any friends which may help us corroborate his statement. · Neuropsych evaluation: Patient performed in the IMPAIRED range of functioning. Alcohol withdrawal ([address])  Assessment & Plan  Patient states he drinks a couple of beers daily. No evidence of withdrawal at this time  [facility] protocol    Acute kidney injury [facility])  Assessment & Plan  Post renal acute kidney injury in the setting of an obstructing calculus. · Resolved after urologic intervention and IV hydration    Recent Labs     07/14/23  0632   BUN 19   CREATININE 1.10   EGFR 65               Esophageal abnormality  Assessment & Plan  Incidental finding on CTA study: Diffuse esophageal wall thickening which could be due to esophagitis  · Continue PPI  · Outpatient follow-up with GI for EGD. COPD (chronic obstructive pulmonary disease) ([address])  Assessment & Plan  Not in exacerbation. Encouraged cessation. · Smokes 6 cigarettes daily.   Reports desire to quit  · Patient currently on room air  · Discharged with [facility] and as needed albuterol           VTE Pharmacologic Prophylaxis:   Pharmacologic: Heparin  Mechanical VTE Prophylaxis in Place: Yes    Discussions with Specialists or Other Care Team Provider: nursing, discussed with cardiology    Education and Discussions with Family / Patient: patient, discussed with cardiology    Current Length of Stay: 9 day(s)    Current Patient Status: Inpatient   Certification Statement: The patient will continue to require additional inpatient hospital stay due to unsafe discharge    Discharge Plan: active    Code Status: Level 1 - Full Code      Subjective:   Patient seen and examined at bedside. Denies any issues overnight. Reports some improvement with swelling. Objective:     Vitals:   Temp (24hrs), Av.9 °F (36.6 °C), Min:97.9 °F (36.6 °C), Max:98 °F (36.7 °C)    Temp:  [97.9 °F (36.6 °C)-98 °F (36.7 °C)] 98 °F (36.7 °C)  HR:  [] 79  Resp:  [18-20] 20  BP: (114-126)/() 117/87  SpO2:  [94 %-95 %] 94 %  Body mass index is 28 kg/m². Input and Output Summary (last 24 hours):     No intake or output data in the 24 hours ending 07/15/23 1119    Physical Exam:     Physical Exam  Vitals reviewed. HENT:      Head: Normocephalic. Nose: Nose normal.      Mouth/Throat:      Mouth: Mucous membranes are moist.   Eyes:      General: No scleral icterus. Cardiovascular:      Rate and Rhythm: Normal rate. Pulmonary:      Effort: Pulmonary effort is normal. No respiratory distress. Abdominal:      General: There is no distension. Palpations: Abdomen is soft. Tenderness: There is no abdominal tenderness. Musculoskeletal:      Right lower leg: Edema present. Left lower leg: Edema present. Skin:     General: Skin is warm. Neurological:      Mental Status: He is alert. Mental status is at baseline.    Psychiatric:         Mood and Affect: Mood normal.         Behavior: Behavior normal.       Additional Data: Labs:    Results from last 7 days   Lab Units 07/14/23  0632 07/11/23  0520 07/10/23  0528   WBC Thousand/uL 10.26* 12.46* 10.86*   HEMOGLOBIN g/dL 15.6 15.5 15.2   HEMATOCRIT % 47.3 47.3 47.4   PLATELETS Thousands/uL 335 245 234   BANDS PCT %  --  3  --    NEUTROS PCT %  --   --  68   LYMPHS PCT %  --   --  22   LYMPHO PCT %  --  19  --    MONOS PCT %  --   --  8   MONO PCT %  --  10  --    EOS PCT %  --  0 1     Results from last 7 days   Lab Units 07/14/23  0632   SODIUM mmol/L 138   POTASSIUM mmol/L 4.4   CHLORIDE mmol/L 106   CO2 mmol/L 25   BUN mg/dL 19   CREATININE mg/dL 1.10   ANION GAP mmol/L 7   CALCIUM mg/dL 9.3   GLUCOSE RANDOM mg/dL 98                 Results from last 7 days   Lab Units 07/10/23  0528 07/09/23  0751   PROCALCITONIN ng/ml 4.18* 7.86*           * I Have Reviewed All Lab Data Listed Above. * Additional Pertinent Lab Tests Reviewed:  [address] Admission Reviewed      Lines:   Invasive Devices     Drain  Duration           Ureteral Internal Stent Left ureter 6 Fr. 8 days                   Imaging:    Imaging Reports Reviewed Today Include: no new imaging    Recent Cultures (last 7 days):           Last 24 Hours Medication List:   Current Facility-Administered Medications   Medication Dose Route Frequency Provider Last Rate   • acetaminophen  650 mg Oral Q6H PRN KIKI Robles     • albuterol  2.5 mg Nebulization Q6H PRN KIKI Robles     • aluminum-magnesium hydroxide-simethicone  30 mL Oral Q6H PRN KIKI Robles     • amoxicillin  1,000 mg Oral Q8H [facility] Damien Phillips MD     • aspirin  81 mg Oral Daily Tasia Acosta DO     • atorvastatin  40 mg Oral Daily With Mgaali Bunch DO     • bisacodyl  10 mg Oral Daily PRN KIKI Robles     • dextromethorphan-guaiFENesin  10 mL Oral Q4H PRN KIKI Robles     • fluticasone-vilanterol  1 puff Inhalation Daily KIKI Robles     • folic acid  1 mg Oral Daily Drew Romo PA-C     • heparin (porcine)  5,000 Units Subcutaneous [facility] Jacqualyn Span, CRNP     • HYDROmorphone  0.2 mg Intravenous Q4H PRN Jacqualyn Span, CRNP     • hydrOXYzine HCL  50 mg Oral HS PRN Jacqualyn Span, CRNP     • melatonin  6 mg Oral HS Jacqualyn Span, CRNP     • multivitamin-minerals  1 tablet Oral Daily Drew Romo PA-C     • nicotine  1 patch Transdermal Daily Jacqualyn Span, CRNP     • ondansetron  4 mg Intravenous Q6H PRN Jacqualyn Span, CRNP     • oxyCODONE  5 mg Oral 4x Daily PRN Jacqualyn Span, CRNP     • pantoprazole  40 mg Oral Early Morning Jacqualyn Span, CRNP     • senna  2 tablet Oral HS Jacqualyn Span, CRNP     • simethicone  80 mg Oral 4x Daily PRN Jacqualyn Span, CRNP     • thiamine  100 mg Oral Daily Drew Romo PA-C          Today, Patient Was Seen By: Molly Briseno MD    ** Please Note: Dictation voice to text software may have been used in the creation of this document.  **

## 2023-07-15 NOTE — PLAN OF CARE

## 2023-07-15 NOTE — ASSESSMENT & PLAN NOTE
Not in exacerbation. Encouraged cessation. · Smokes 6 cigarettes daily.   Reports desire to quit  · Patient currently on room air  · Discharged with [facility] and as needed albuterol

## 2023-07-15 NOTE — ASSESSMENT & PLAN NOTE
Patient states he drinks a couple of beers daily.  No evidence of withdrawal at this time  [facility] protocol

## 2023-07-15 NOTE — ASSESSMENT & PLAN NOTE
Discharge canceled 7/14/2023. Patient does not know where he lives. We informed him that the address on his chart says [address]. He states that he lives in [facility] near [address]. He could not explicitly say where his address is. He does not know any phone numbers of any friends which may help us corroborate his statement. · Neuropsych evaluation: Patient performed in the IMPAIRED range of functioning.

## 2023-07-15 NOTE — ASSESSMENT & PLAN NOTE
69-year-old male was admitted due to severe sepsis secondary to complicated UTI. Patient was noted to be bacteremic to Aerococcus urinae. Etiology likely secondary to his Left-sided hydronephrosis secondary to a 1 cm calculus in the proximal left ureter. · Appreciate ID recommendations. Transitioned to po amoxicillin. Complete through 7/20/2023. Cleared by infectious disease for discharge.

## 2023-07-16 PROCEDURE — 99232 SBSQ HOSP IP/OBS MODERATE 35: CPT | Performed by: STUDENT IN AN ORGANIZED HEALTH CARE EDUCATION/TRAINING PROGRAM

## 2023-07-16 RX ADMIN — PANTOPRAZOLE SODIUM 40 MG: 40 TABLET, DELAYED RELEASE ORAL at 06:16

## 2023-07-16 RX ADMIN — HEPARIN SODIUM 5000 UNITS: 5000 INJECTION INTRAVENOUS; SUBCUTANEOUS at 14:08

## 2023-07-16 RX ADMIN — ATORVASTATIN CALCIUM 40 MG: 40 TABLET, FILM COATED ORAL at 17:25

## 2023-07-16 RX ADMIN — MELATONIN 6 MG: at 22:19

## 2023-07-16 RX ADMIN — ASPIRIN 81 MG 81 MG: 81 TABLET ORAL at 08:22

## 2023-07-16 RX ADMIN — AMOXICILLIN 1000 MG: 500 CAPSULE ORAL at 22:19

## 2023-07-16 RX ADMIN — Medication 1 TABLET: at 08:22

## 2023-07-16 RX ADMIN — HEPARIN SODIUM 5000 UNITS: 5000 INJECTION INTRAVENOUS; SUBCUTANEOUS at 22:20

## 2023-07-16 RX ADMIN — FOLIC ACID 1 MG: 1 TABLET ORAL at 08:22

## 2023-07-16 RX ADMIN — HEPARIN SODIUM 5000 UNITS: 5000 INJECTION INTRAVENOUS; SUBCUTANEOUS at 06:16

## 2023-07-16 RX ADMIN — ACETAMINOPHEN 325MG 650 MG: 325 TABLET ORAL at 08:26

## 2023-07-16 RX ADMIN — FLUTICASONE FUROATE AND VILANTEROL TRIFENATATE 1 PUFF: 200; 25 POWDER RESPIRATORY (INHALATION) at 08:23

## 2023-07-16 RX ADMIN — AMOXICILLIN 1000 MG: 500 CAPSULE ORAL at 06:16

## 2023-07-16 RX ADMIN — THIAMINE HCL TAB 100 MG 100 MG: 100 TAB at 08:22

## 2023-07-16 RX ADMIN — AMOXICILLIN 1000 MG: 500 CAPSULE ORAL at 14:08

## 2023-07-16 NOTE — ASSESSMENT & PLAN NOTE
Not in exacerbation. Encouraged cessation. · Smokes 6 cigarettes daily.   Reports desire to quit  · Patient currently on room air  · Discharge with [facility] [place] and as needed albuterol

## 2023-07-16 NOTE — PROGRESS NOTES
[address] [facility]  Progress Note  Name: Mandie Vital I  MRN: [id number]  Unit/Bed#: E4 -01 I Date of Admission: 7/6/2023   Date of Service: 7/16/2023 I Hospital Day: 10    Assessment/Plan   * Sepsis secondary to UTI [facility])  Assessment & Plan  76-year-old male was admitted due to severe sepsis secondary to complicated UTI. Patient was noted to be bacteremic to Aerococcus urinae. Etiology likely secondary to his Left-sided hydronephrosis secondary to a 1 cm calculus in the proximal left ureter. · Appreciate ID recommendations. Transitioned to po amoxicillin. Complete through 7/20/2023. · Cleared by infectious disease for discharge. · Dispo currently on hold due to him being an unsafe discharge    Encounter for competency evaluation  Assessment & Plan  Discharge canceled 7/14/2023. Patient does not know where he lives. We informed him that the address on his chart says [facility]. He states that he lives in [facility] near  [address], but does not know his address. He could not explicitly say where his address is. He does not know any phone numbers of any friends which may help us corroborate his statement. · Neuropsych evaluation: Patient performed in the IMPAIRED range of functioning. Alcohol withdrawal ([address])  Assessment & Plan  Patient states he drinks a couple of beers daily. No evidence of withdrawal at this time  [facility] protocol    Positive blood culture  Assessment & Plan  2/2 sets Aerococcus urinae due to stone which resulted in pyelonephritis  · Management per infectious disease    Acute kidney injury [facility])  Assessment & Plan  Post renal acute kidney injury in the setting of an obstructing calculus. · Resolved after urologic intervention and IV hydration    Recent Labs     07/14/23  0632   BUN 19   CREATININE 1.10   EGFR 65       Mycotic toenails  Assessment & Plan  · Very elongated mycotic toenails. S/p podiatry debridement.     Hydronephrosis with obstructing calculus  Assessment & Plan  · CT A/P:   · Left-sided hydronephrosis secondary to a 1 cm calculus in the proximal left ureter. · Evidence of cystitis with circumferential irregular bladder wall thickening and perivesical stranding, though there is also evidence of underlying chronic outlet obstruction. · Large nonobstructing left upper pole calyceal calculus  · S/p "cystoscopy, left retrograde pyelogram, ureteral stent insertion, michel catheter insertion. Inadvertent left ureteral perforation at tight proximal ureter, managed with indwelling ureteral stent"  · Urology recommendations appreciated. They will coordinate his outpatient follow-up for his next step in management. Depending on dispo timing, might have to be reevaluated here if he continues to have a prolonged hospital course due to his competency. Esophageal abnormality  Assessment & Plan  Incidental finding on CTA study: Diffuse esophageal wall thickening which could be due to esophagitis  · Continue PPI  · Outpatient follow-up with GI for EGD. Elevated d-dimer  Assessment & Plan  CTA negative for pulmonary embolism    Elevated troponin  Assessment & Plan  Elevated troponin likely demand ischemia in setting of sepsis  · Cardiology recommendations appreciated. Once patient recovers from his acute infectious illness, they recommend outpatient ischemic evaluation likely with a nuclear stress test.  · Ambulatory referral to cardiology sent  · Continue aspirin and Lipitor on discharge    COPD (chronic obstructive pulmonary disease) ([address])  Assessment & Plan  Not in exacerbation. Encouraged cessation. · Smokes 6 cigarettes daily.   Reports desire to quit  · Patient currently on room air  · Discharge with [facility] and as needed albuterol         VTE Pharmacologic Prophylaxis:   Pharmacologic: Heparin  Mechanical VTE Prophylaxis in Place: Yes    Discussions with Specialists or Other Care Team Provider: nursing    Education and Discussions with Family / Patient: patient    Current Length of Stay: 10 day(s)    Current Patient Status: Inpatient   Certification Statement: The patient will continue to require additional inpatient hospital stay due to unsafe discharge    Discharge Plan: 24-48 hours    Code Status: Level 1 - Full Code      Subjective:   Patient seen and examined at bedside. States that he remembered his address this morning, but forgot again after I asked him earlier    Objective:     Vitals:   Temp (24hrs), Av.1 °F (36.7 °C), Min:97.4 °F (36.3 °C), Max:99 °F (37.2 °C)    Temp:  [97.4 °F (36.3 °C)-99 °F (37.2 °C)] 97.8 °F (36.6 °C)  HR:  [61-82] 82  Resp:  [18-20] 18  BP: (100-121)/(60-90) 100/60  SpO2:  [93 %-95 %] 93 %  Body mass index is 28 kg/m². Input and Output Summary (last 24 hours): Intake/Output Summary (Last 24 hours) at 2023 0906  Last data filed at 2023 0830  Gross per 24 hour   Intake 360 ml   Output --   Net 360 ml       Physical Exam:     Physical Exam  Vitals reviewed. Constitutional:       General: He is not in acute distress. HENT:      Head: Normocephalic. Nose: Nose normal.      Mouth/Throat:      Mouth: Mucous membranes are moist.   Eyes:      General: No scleral icterus. Cardiovascular:      Rate and Rhythm: Normal rate. Pulmonary:      Effort: Pulmonary effort is normal. No respiratory distress. Abdominal:      General: There is no distension. Palpations: Abdomen is soft. Tenderness: There is no abdominal tenderness. Skin:     General: Skin is warm. Neurological:      Mental Status: He is alert. Mental status is at baseline.    Psychiatric:         Mood and Affect: Mood normal.         Behavior: Behavior normal.       Additional Data:     Labs:    Results from last 7 days   Lab Units 23  0632 23  0520 07/10/23  0528   WBC Thousand/uL 10.26* 12.46* 10.86*   HEMOGLOBIN g/dL 15.6 15.5 15.2   HEMATOCRIT % 47.3 47.3 47.4   PLATELETS Thousands/uL 335 245 234   BANDS PCT %  -- 3  --    NEUTROS PCT %  --   --  68   LYMPHS PCT %  --   --  22   LYMPHO PCT %  --  19  --    MONOS PCT %  --   --  8   MONO PCT %  --  10  --    EOS PCT %  --  0 1     Results from last 7 days   Lab Units 07/14/23  0632   SODIUM mmol/L 138   POTASSIUM mmol/L 4.4   CHLORIDE mmol/L 106   CO2 mmol/L 25   BUN mg/dL 19   CREATININE mg/dL 1.10   ANION GAP mmol/L 7   CALCIUM mg/dL 9.3   GLUCOSE RANDOM mg/dL 98                 Results from last 7 days   Lab Units 07/10/23  0528   PROCALCITONIN ng/ml 4.18*           * I Have Reviewed All Lab Data Listed Above. * Additional Pertinent Lab Tests Reviewed:  [address] Admission Reviewed      Lines:   Invasive Devices     Drain  Duration           Ureteral Internal Stent Left ureter 6 Fr. 9 days                   Imaging:    Imaging Reports Reviewed Today Include: no new imaging    Recent Cultures (last 7 days):           Last 24 Hours Medication List:   Current Facility-Administered Medications   Medication Dose Route Frequency Provider Last Rate   • acetaminophen  650 mg Oral Q6H PRN Delta Gavel, CRNP     • albuterol  2.5 mg Nebulization Q6H PRN Delta Gavel, CRNP     • aluminum-magnesium hydroxide-simethicone  30 mL Oral Q6H PRN Delta Gavel, CRNP     • amoxicillin  1,000 mg Oral Q8H Angelito Rueda MD     • aspirin  81 mg Oral Daily Emiliana Hardin DO     • atorvastatin  40 mg Oral Daily With Huan Machado DO     • bisacodyl  10 mg Oral Daily PRN Delta Gavel, CRNP     • dextromethorphan-guaiFENesin  10 mL Oral Q4H PRN Delta Gavel, CRNP     • fluticasone-vilanterol  1 puff Inhalation Daily Delta Gavel, CRNP     • folic acid  1 mg Oral Daily Dianna Nevarez PA-C     • heparin (porcine)  5,000 Units Subcutaneous [facility] Delta Gavel, CRNP     • HYDROmorphone  0.2 mg Intravenous Q4H PRN Delta Gavel, CRNP     • hydrOXYzine HCL  50 mg Oral HS PRN Delta Gavel, CRNP     • melatonin  6 mg Oral HS Nahed Latonya, CRNP     • multivitamin-minerals  1 tablet Oral Daily Leonor Adame PA-C     • nicotine  1 patch Transdermal Daily Nahed Latonya, CRNP     • ondansetron  4 mg Intravenous Q6H PRN Nahed Jetty, CRNP     • oxyCODONE  5 mg Oral 4x Daily PRN Nahed Jetty, CRNP     • pantoprazole  40 mg Oral Early Morning Nahed Jetty, CRNP     • senna  2 tablet Oral HS Nahed Hanks, CRNP     • simethicone  80 mg Oral 4x Daily PRN Nahed Hanks, CRNP     • thiamine  100 mg Oral Daily Leonor Adame PA-C          Today, Patient Was Seen By: Radha Thompson MD    ** Please Note: Dictation voice to text software may have been used in the creation of this document.  **

## 2023-07-16 NOTE — ASSESSMENT & PLAN NOTE
Patient states he drinks a couple of beers daily.  No evidence of withdrawal at this time  [facility] protocol

## 2023-07-16 NOTE — ASSESSMENT & PLAN NOTE
· CT A/P:   · Left-sided hydronephrosis secondary to a 1 cm calculus in the proximal left ureter. · Evidence of cystitis with circumferential irregular bladder wall thickening and perivesical stranding, though there is also evidence of underlying chronic outlet obstruction. · Large nonobstructing left upper pole calyceal calculus  · S/p "cystoscopy, left retrograde pyelogram, ureteral stent insertion, michel catheter insertion. Inadvertent left ureteral perforation at tight proximal ureter, managed with indwelling ureteral stent"  · Urology recommendations appreciated. They will coordinate his outpatient follow-up for his next step in management. Depending on dispo timing, might have to be reevaluated here if he continues to have a prolonged hospital course due to his competency.

## 2023-07-16 NOTE — ASSESSMENT & PLAN NOTE
78-year-old male was admitted due to severe sepsis secondary to complicated UTI. Patient was noted to be bacteremic to Aerococcus urinae. Etiology likely secondary to his Left-sided hydronephrosis secondary to a 1 cm calculus in the proximal left ureter. · Appreciate ID recommendations. Transitioned to po amoxicillin. Complete through 7/20/2023. · Cleared by infectious disease for discharge.   · Dispo currently on hold due to him being an unsafe discharge

## 2023-07-16 NOTE — ASSESSMENT & PLAN NOTE
Discharge canceled 7/14/2023. Patient does not know where he lives. We informed him that the address on his chart says [place]. He states that he lives in [facility] near [place] [address], but does not know his address. He could not explicitly say where his address is. He does not know any phone numbers of any friends which may help us corroborate his statement. · Neuropsych evaluation: Patient performed in the IMPAIRED range of functioning.

## 2023-07-16 NOTE — PLAN OF CARE

## 2023-07-17 ENCOUNTER — APPOINTMENT (INPATIENT)
Dept: RADIOLOGY | Facility: HOSPITAL | Age: 73
DRG: 853 | End: 2023-07-17
Payer: MEDICARE

## 2023-07-17 LAB
ATRIAL RATE: 143 BPM
ATRIAL RATE: 286 BPM
ATRIAL RATE: 302 BPM
P AXIS: 73 DEGREES
PR INTERVAL: 56 MS
QRS AXIS: 29 DEGREES
QRS AXIS: 30 DEGREES
QRS AXIS: 6 DEGREES
QRSD INTERVAL: 114 MS
QRSD INTERVAL: 120 MS
QRSD INTERVAL: 94 MS
QT INTERVAL: 334 MS
QT INTERVAL: 340 MS
QT INTERVAL: 348 MS
QTC INTERVAL: 524 MS
QTC INTERVAL: 529 MS
QTC INTERVAL: 537 MS
T WAVE AXIS: -64 DEGREES
T WAVE AXIS: -81 DEGREES
T WAVE AXIS: 226 DEGREES
VENTRICULAR RATE: 143 BPM
VENTRICULAR RATE: 143 BPM
VENTRICULAR RATE: 151 BPM

## 2023-07-17 PROCEDURE — 93010 ELECTROCARDIOGRAM REPORT: CPT | Performed by: STUDENT IN AN ORGANIZED HEALTH CARE EDUCATION/TRAINING PROGRAM

## 2023-07-17 PROCEDURE — 97530 THERAPEUTIC ACTIVITIES: CPT

## 2023-07-17 PROCEDURE — 97116 GAIT TRAINING THERAPY: CPT

## 2023-07-17 PROCEDURE — 90677 PCV20 VACCINE IM: CPT | Performed by: INTERNAL MEDICINE

## 2023-07-17 PROCEDURE — 80053 COMPREHEN METABOLIC PANEL: CPT | Performed by: PHYSICIAN ASSISTANT

## 2023-07-17 PROCEDURE — 93005 ELECTROCARDIOGRAM TRACING: CPT

## 2023-07-17 PROCEDURE — 85025 COMPLETE CBC W/AUTO DIFF WBC: CPT | Performed by: PHYSICIAN ASSISTANT

## 2023-07-17 PROCEDURE — 83605 ASSAY OF LACTIC ACID: CPT | Performed by: PHYSICIAN ASSISTANT

## 2023-07-17 PROCEDURE — 99232 SBSQ HOSP IP/OBS MODERATE 35: CPT | Performed by: INTERNAL MEDICINE

## 2023-07-17 RX ORDER — METOPROLOL TARTRATE 5 MG/5ML
5 INJECTION INTRAVENOUS ONCE
Status: COMPLETED | OUTPATIENT
Start: 2023-07-17 | End: 2023-07-17

## 2023-07-17 RX ADMIN — THIAMINE HCL TAB 100 MG 100 MG: 100 TAB at 09:08

## 2023-07-17 RX ADMIN — METOPROLOL TARTRATE 5 MG: 5 INJECTION INTRAVENOUS at 21:38

## 2023-07-17 RX ADMIN — HEPARIN SODIUM 5000 UNITS: 5000 INJECTION INTRAVENOUS; SUBCUTANEOUS at 21:45

## 2023-07-17 RX ADMIN — PANTOPRAZOLE SODIUM 40 MG: 40 TABLET, DELAYED RELEASE ORAL at 06:37

## 2023-07-17 RX ADMIN — AMOXICILLIN 1000 MG: 500 CAPSULE ORAL at 16:21

## 2023-07-17 RX ADMIN — PNEUMOCOCCAL 20-VALENT CONJUGATE VACCINE 0.5 ML
2.2; 2.2; 2.2; 2.2; 2.2; 2.2; 2.2; 2.2; 2.2; 2.2; 2.2; 2.2; 2.2; 2.2; 2.2; 2.2; 4.4; 2.2; 2.2; 2.2 INJECTION, SUSPENSION INTRAMUSCULAR at 10:31

## 2023-07-17 RX ADMIN — AMOXICILLIN 1000 MG: 500 CAPSULE ORAL at 21:46

## 2023-07-17 RX ADMIN — ATORVASTATIN CALCIUM 40 MG: 40 TABLET, FILM COATED ORAL at 16:21

## 2023-07-17 RX ADMIN — HEPARIN SODIUM 5000 UNITS: 5000 INJECTION INTRAVENOUS; SUBCUTANEOUS at 16:21

## 2023-07-17 RX ADMIN — HEPARIN SODIUM 5000 UNITS: 5000 INJECTION INTRAVENOUS; SUBCUTANEOUS at 06:37

## 2023-07-17 RX ADMIN — MELATONIN 6 MG: at 21:46

## 2023-07-17 RX ADMIN — METOPROLOL TARTRATE 5 MG: 1 INJECTION, SOLUTION INTRAVENOUS at 22:58

## 2023-07-17 RX ADMIN — AMOXICILLIN 1000 MG: 500 CAPSULE ORAL at 06:37

## 2023-07-17 RX ADMIN — Medication 1 TABLET: at 09:08

## 2023-07-17 RX ADMIN — HYDROXYZINE HYDROCHLORIDE 50 MG: 25 TABLET, FILM COATED ORAL at 21:45

## 2023-07-17 RX ADMIN — FLUTICASONE FUROATE AND VILANTEROL TRIFENATATE 1 PUFF: 200; 25 POWDER RESPIRATORY (INHALATION) at 09:08

## 2023-07-17 RX ADMIN — FOLIC ACID 1 MG: 1 TABLET ORAL at 09:08

## 2023-07-17 RX ADMIN — ASPIRIN 81 MG 81 MG: 81 TABLET ORAL at 09:08

## 2023-07-17 NOTE — PHYSICAL THERAPY NOTE
PHYSICAL THERAPY NOTE          Patient Name: Mandie Vital  CWKOD'V Date: 7/17/2023 07/17/23 1320   Note Type   Note Type Treatment   Pain Assessment   Pain Assessment Tool 0-10   Pain Score No Pain   Restrictions/Precautions   Other Precautions Chair Alarm; Bed Alarm; Fall Risk   General   Chart Reviewed Yes   Family/Caregiver Present No   Cognition   Overall Cognitive Status Impaired   Arousal/Participation Alert   Attention Attends with cues to redirect   Orientation Level Oriented to person;Oriented to place;Oriented to time;Disoriented to situation  (oriented to month, year not day or date)   Memory Decreased recall of precautions   Following Commands Follows one step commands without difficulty   Subjective   Subjective I can't think of my address. I have no pain. Bed Mobility   Supine to Sit 6  Modified independent   Additional items Assist x 1; Increased time required; Bedrails;HOB elevated   Sit to Supine 7  Independent   Additional items Assist x 1;HOB elevated   Transfers   Sit to Stand 6  Modified independent   Additional items Assist x 1;Bedrails   Stand to Sit 6  Modified independent   Toilet transfer 6  Modified independent   Ambulation/Elevation   Gait pattern Decreased L stance  (L lateral sway, increased weightbearing on L lateral border of foot, l foot supination)   Gait Assistance 5  Supervision   Additional items Assist x 1   Assistive Device None   Distance 205' x1, 400' x1   Stair Management Assistance 5  Supervision   Additional items Assist x 1   Stair Management Technique Two rails; Foreward;Nonreciprocal   Number of Stairs   (4 x2 steps  (8 total))   Balance   Static Sitting Normal   Dynamic Sitting Normal   Static Standing Good   Dynamic Standing Fair   Ambulatory Fair   Endurance Deficit   Endurance Deficit Description spo2 on RA 96%-97% HR  bpm   Activity Tolerance   Activity Tolerance Patient tolerated treatment well   Exercises   Balance training  STS x5 with use of b/l le's.  27.47 sec   Assessment   Prognosis Fair   Problem List Decreased endurance; Impaired balance;Decreased cognition;Decreased range of motion;Decreased safety awareness; Impaired judgement   Assessment Pt seen for PT treatment session this date with interventions consisting of bed mobility, transfer training, gait training and stair training, and education provided as needed for safety and direction to improve functional mobility, safety awareness, and activity tolerance. Pt agreeable to PT treatment session upon arrival, pt found supine in bed. At end of session, pt left seated on toilet in bathroom with call bell in reach with all needs in reach. In comparison to previous session, pt with improvement in activity tolerance, endurance, ambulation distances, ambulatory balance, AM- pac score and functional mobility. Minimal encouragement required for pt to participate in PT this session. Pt continues to show good progress toward PT goals with ability to ambulate farther distances 205' and 400'. No gross LOB noted when ambulating on level surfaces or with quick head or body turns left or right. Pt does demonstrate lateral sway at times due to gait deviations as noted. Pt  performs stair climbing with use of b/l rails with supervision ascending and descending stairs non- reciprocally. Pt  Performs 5x sts in 27. 47 sec. With use of b/l ue's. Pt's time and use of b/l ue's indicates an increased risk for falls. Please refer to endurance section of flowsheet for vitals. Continue to recommend home with support. at time of d/c in order to maximize pt's functional independence and safety w/ mobility. PT has progressed and achieved inpt PT goals, therefore will d/c PT and place pt on retstorative services to maintain current level of mobility while in the hospital. The patient's AM-PAC Basic Mobility Inpatient Short Form Raw Score is 24.  A raw score greater than 16 suggests the patient may benefit from discharge to home. Please also refer to the recommendation of the Physical Therapist for safe discharge planning. Goals   Patient Goals To get out of this hospital.   STG Expiration Date 07/17/23   PT Treatment Day 3   Plan   Treatment/Interventions Functional transfer training;LE strengthening/ROM; Elevations; Therapeutic exercise; Endurance training;Patient/family training;Equipment eval/education; Bed mobility;Gait training;Cognitive reorientation   Progress Progressing toward goals   PT Frequency 2-3x/wk   Recommendation   PT Discharge Recommendation Home with home health rehabilitation   AM-PAC Basic Mobility Inpatient   Turning in Flat Bed Without Bedrails 4   Lying on Back to Sitting on Edge of Flat Bed Without Bedrails 4   Moving Bed to Chair 4   Standing Up From Chair Using Arms 4   Walk in Room 4   Climb 3-5 Stairs With Railing 4   Basic Mobility Inpatient Raw Score 24   Basic Mobility Standardized Score 57.68   Highest Level Of Mobility   JH-HLM Goal 8: Walk 250 feet or more   JH-HLM Achieved 8: Walk 250 feet ot more   Education   Education Provided Mobility training   Patient Demonstrates acceptance/verbal understanding   End of Consult   Patient Position at End of Consult Other (comment)  (in bathroom, pt instructed to pul red cord when finished and to wait for assistance.)        07/17/23 1320   Note Type   Note Type Treatment   Pain Assessment   Pain Assessment Tool 0-10   Pain Score No Pain   Restrictions/Precautions   Other Precautions Chair Alarm; Bed Alarm; Fall Risk   General   Chart Reviewed Yes   Family/Caregiver Present No   Cognition   Overall Cognitive Status Impaired   Arousal/Participation Alert   Attention Attends with cues to redirect   Orientation Level Oriented to person;Oriented to place;Oriented to time;Disoriented to situation  (oriented to month, year not day or date)   Memory Decreased recall of precautions   Following Commands Follows one step commands without difficulty   Subjective   Subjective I can't think of my address. I have no pain. Bed Mobility   Supine to Sit 6  Modified independent   Additional items Assist x 1; Increased time required; Bedrails;HOB elevated   Sit to Supine 7  Independent   Additional items Assist x 1;HOB elevated   Transfers   Sit to Stand 6  Modified independent   Additional items Assist x 1;Bedrails   Stand to Sit 6  Modified independent   Toilet transfer 6  Modified independent   Ambulation/Elevation   Gait pattern Decreased L stance  (L lateral sway, increased weightbearing on L lateral border of foot, l foot supination)   Gait Assistance 5  Supervision   Additional items Assist x 1   Assistive Device None   Distance 205' x1, 400' x1   Stair Management Assistance 5  Supervision   Additional items Assist x 1   Stair Management Technique Two rails; Foreward;Nonreciprocal   Number of Stairs   (4 x2 steps  (8 total))   Balance   Static Sitting Normal   Dynamic Sitting Normal   Static Standing Good   Dynamic Standing Fair   Ambulatory Fair   Endurance Deficit   Endurance Deficit Description spo2 on RA 96%-97% HR  bpm   Activity Tolerance   Activity Tolerance Patient tolerated treatment well   Exercises   Balance training  STS x5 with use of b/l le's.  27.47 sec   Assessment   Prognosis Fair   Problem List Decreased endurance; Impaired balance;Decreased cognition;Decreased range of motion;Decreased safety awareness; Impaired judgement   Assessment Pt seen for PT treatment session this date with interventions consisting of bed mobility, transfer training, gait training and stair training, and education provided as needed for safety and direction to improve functional mobility, safety awareness, and activity tolerance. Pt agreeable to PT treatment session upon arrival, pt found supine in bed. At end of session, pt left seated on toilet in bathroom with call bell in reach with all needs in reach.  In comparison to previous session, pt with improvement in activity tolerance, endurance, ambulation distances, ambulatory balance, AM- pac score and functional mobility. Minimal encouragement required for pt to participate in PT this session. Pt continues to show good progress toward PT goals with ability to ambulate farther distances 205' and 400'. No gross LOB noted when ambulating on level surfaces or with quick head or body turns left or right. Pt does demonstrate lateral sway at times due to gait deviations as noted. Pt  performs stair climbing with use of b/l rails with supervision ascending and descending stairs non- reciprocally. Pt  Performs 5x sts in 27. 47 sec. With use of b/l ue's. Pt's time and use of b/l ue's indicates an increased risk for falls. Please refer to endurance section of flowsheet for vitals. Continue to recommend home with support. at time of d/c in order to maximize pt's functional independence and safety w/ mobility. PT has progressed and achieved inpt PT goals, therefore will d/c PT and place pt on retstorative services to maintain current level of mobility while in the hospital. The patient's AM-[facility] Basic Mobility Inpatient Short Form Raw Score is 24. A raw score greater than 16 suggests the patient may benefit from discharge to home. Please also refer to the recommendation of the Physical Therapist for safe discharge planning. Goals   Patient Goals To get out of this hospital.   STG Expiration Date 07/17/23   PT Treatment Day 3   Plan   Treatment/Interventions Functional transfer training;LE strengthening/ROM; Elevations; Therapeutic exercise; Endurance training;Patient/family training;Equipment eval/education; Bed mobility;Gait training;Cognitive reorientation   Progress Progressing toward goals   PT Frequency 2-3x/wk   Recommendation   PT Discharge Recommendation Home with home health rehabilitation   AM-PAC Basic Mobility Inpatient   Turning in Flat Bed Without Bedrails 4   Lying on Back to Sitting on Edge of Flat Bed Without Bedrails 4   Moving Bed to Chair 4   Standing Up From Chair Using Arms 4   Walk in Room 4   Climb 3-5 Stairs With Railing 4   Basic Mobility Inpatient Raw Score 24   Basic Mobility Standardized Score 57.68   Highest Level Of Mobility   JH-HLM Goal 8: Walk 250 feet or more   JH-HLM Achieved 8: Walk 250 feet ot more   Education   Education Provided Mobility training   Patient Demonstrates acceptance/verbal understanding   End of Consult   Patient Position at End of Consult Other (comment)  (in bathroom, pt instructed to pul red cord when finished and to wait for assistance.)     Nayla Hicks, PTA

## 2023-07-17 NOTE — CASE MANAGEMENT
Case Management Assessment & Discharge Planning Note    Patient name Priscilla Holman  Location [facility] 4 /E4 [address] Pozo-* MRN [id number]  : 1950 Date 2023       Current Admission Date: 2023  Current Admission Diagnosis:Sepsis secondary to UTI [facility])   Patient Active Problem List    Diagnosis Date Noted   • Encounter for competency evaluation 2023   • Alcohol withdrawal ([address]) 2023   • Positive blood culture 2023   • Sepsis secondary to UTI ([address]) 2023   • Elevated troponin 2023   • Elevated d-dimer 2023   • Pyelonephritis 2023   • Esophageal abnormality 2023   • Hydronephrosis with obstructing calculus 2023   • Mycotic toenails 2023   • Acute kidney injury ([address]) 2023   • Angioedema 2018   • COPD (chronic obstructive pulmonary disease) ([address]) 2018   • Bradycardia 2018   • Tobacco use 2016   • Polycythemia 2016   • Weight loss 2016      LOS (days): 11  Geometric Mean LOS (GMLOS) (days): 9.60  Days to GMLOS:-1.1     OBJECTIVE:    Risk of Unplanned Readmission Score: 11.78         Current admission status: Inpatient       Preferred Pharmacy:   PATIENT/FAMILY REPORTS NO PREFERRED PHARMACY  No address on file      [address] [place] [address]  Phone: [phone] Fax: [phone]    Primary Care Provider: No primary care provider on file. Primary Insurance: MEDICARE  Secondary Insurance:     ASSESSMENT:  Active Health Care Proxies    There are no active Health Care Proxies on file.        Advance Directives  Does patient have a Health Care POA?: No  Was patient offered paperwork?: Yes (pt declined)  Does patient currently have a Health Care decision maker?: Yes, please see Health Care Proxy section  Does patient have Advance Directives?: No  Was patient offered paperwork?: Yes (pt declined)  Primary Contact: No contact         Readmission Root Cause  30 Day Readmission: No    Patient Information  Admitted from[de-identified] Home  Mental Status: Alert  During Assessment patient was accompanied by: Not accompanied during assessment  Assessment information provided by[de-identified] Patient  Primary Caregiver: Self  Support Systems: Self, Shamir Melo of Residence: [address] [place] [address] do you live in?: [place] entry access options.  Select all that apply.: Stairs  Number of steps to enter home.: 10  Do the steps have railings?: Yes  Type of Current Residence: Apartment  Floor Level: 2  Upon entering residence, is there a bedroom on the main floor (no further steps)?: Yes  Upon entering residence, is there a bathroom on the main floor (no further steps)?: Yes  In the last 12 months, was there a time when you were not able to pay the mortgage or rent on time?: No  In the last 12 months, how many places have you lived?: 1  In the last 12 months, was there a time when you did not have a steady place to sleep or slept in a shelter (including now)?: No  Homeless/housing insecurity resource given?: N/A  Living Arrangements: Lives w/ Friend  Is patient a ?: No    Activities of Daily Living Prior to Admission  Functional Status: Independent  Completes ADLs independently?: Yes  Ambulates independently?: Yes  Does patient use assisted devices?: No  Does patient currently own DME?: No  Does patient have a history of Outpatient Therapy (PT/OT)?: No  Does the patient have a history of Short-Term Rehab?: No  Does patient have a history of HHC?: No  Does patient currently have [address]?: No         Patient Information Continued  Income Source: Pension/prison  Does patient have prescription coverage?: Yes  Within the past 12 months, you worried that your food would run out before you got the money to buy more.: Never true  Within the past 12 months, the food you bought just didn't last and you didn't have money to get more.: Never true  Food insecurity resource given?: N/A  Does patient receive dialysis treatments?: No  Does patient have a history of substance abuse?: No  Does patient have a history of Mental Health Diagnosis?: No         Means of Transportation  Means of Transport to Appts[de-identified] Public Transportation - Bus  In the past 12 months, has lack of transportation kept you from medical appointments or from getting medications?: No  In the past 12 months, has lack of transportation kept you from meetings, work, or from getting things needed for daily living?: No  Was application for public transport provided?: N/A        DISCHARGE DETAILS:    Discharge planning discussed with[de-identified] Patient  Freedom of Choice: No  Comments - Freedom of Choice: Pt deemed to have no capacity  CM contacted family/caregiver?: No- see comments (no EC)  Were Treatment Team discharge recommendations reviewed with patient/caregiver?: Yes  Did patient/caregiver verbalize understanding of patient care needs?: Yes  Were patient/caregiver advised of the risks associated with not following Treatment Team discharge recommendations?: Yes    Contacts  Patient Contacts: Patient  Relationship to Patient[de-identified] Family  Contact Method: In Person  Reason/Outcome: Continuity of Care, Discharge Planning                                                                     Additional Comments: CM met w/ pt at bedside to complete assessment. Pt states that he currently lives w/ a roommate on a 2nd floor apartment. Pt states he is able to complete his ADL's w/ no use of assisted devices. Pt has a [email or web address] as his EC but he states he has not talked to her for "years" & he does not have her phone number for us to call. Pt also states he does not have his roommates phone number, nor does he have a phone of his own. Pt had a neuropsych eval done over the weekend & he was deemed to have no capacity. Per nurse, pt seems to be more cognitive at this time.  CM let attending know to have pt re-evaluated again to see if capacity has come back. Pt denied having any questions or concerns at this time. CM to continue to follow pt care & d/c.

## 2023-07-17 NOTE — PLAN OF CARE
Problem: Potential for Falls  Goal: Patient will remain free of falls  Description: INTERVENTIONS:  - Educate patient/family on patient safety including physical limitations  - Instruct patient to call for assistance with activity   - Consult OT/PT to assist with strengthening/mobility   - Keep Call bell within reach  - Keep bed low and locked with side rails adjusted as appropriate  - Keep care items and personal belongings within reach  - Initiate and maintain comfort rounds  - Make Fall Risk Sign visible to staff  - Offer Toileting every 2 Hours, in advance of need  - Apply yellow socks and bracelet for high fall risk patients  - Consider moving patient to room near nurses station  Outcome: Progressing     Problem: MOBILITY - ADULT  Goal: Maintain or return to baseline ADL function  Description: INTERVENTIONS:  -  Assess patient's ability to carry out ADLs; assess patient's baseline for ADL function and identify physical deficits which impact ability to perform ADLs (bathing, care of mouth/teeth, toileting, grooming, dressing, etc.)  - Assess/evaluate cause of self-care deficits   - Assess range of motion  - Assess patient's mobility; develop plan if impaired  - Assess patient's need for assistive devices and provide as appropriate  - Encourage maximum independence but intervene and supervise when necessary  - Involve family in performance of ADLs  - Assess for home care needs following discharge   - Consider OT consult to assist with ADL evaluation and planning for discharge  - Provide patient education as appropriate  Outcome: Progressing  Goal: Maintains/Returns to pre admission functional level  Description: INTERVENTIONS:  - Perform BMAT or MOVE assessment daily.   - Set and communicate daily mobility goal to care team and patient/family/caregiver. - Collaborate with rehabilitation services on mobility goals if consulted  - Perform Range of Motion 4 times a day. - Reposition patient every 2 hours.   - Dangle patient 4 times a day  - Stand patient 4 times a day  - Ambulate patient 4 times a day  - Out of bed to chair 4 times a day   - Out of bed for meals 4 times a day  - Out of bed for toileting  - Record patient progress and toleration of activity level   Outcome: Progressing     Problem: PAIN - ADULT  Goal: Verbalizes/displays adequate comfort level or baseline comfort level  Description: Interventions:  - Encourage patient to monitor pain and request assistance  - Assess pain using appropriate pain scale  - Administer analgesics based on type and severity of pain and evaluate response  - Implement non-pharmacological measures as appropriate and evaluate response  - Consider cultural and social influences on pain and pain management  - Notify physician/advanced practitioner if interventions unsuccessful or patient reports new pain  Outcome: Progressing     Problem: INFECTION - ADULT  Goal: Absence or prevention of progression during hospitalization  Description: INTERVENTIONS:  - Assess and monitor for signs and symptoms of infection  - Monitor lab/diagnostic results  - Monitor all insertion sites, i.e. indwelling lines, tubes, and drains  - Monitor endotracheal if appropriate and nasal secretions for changes in amount and color  - [place] appropriate cooling/warming therapies per order  - Administer medications as ordered  - Instruct and encourage patient and family to use good hand hygiene technique  - Identify and instruct in appropriate isolation precautions for identified infection/condition  Outcome: Progressing  Goal: Absence of fever/infection during neutropenic period  Description: INTERVENTIONS:  - Monitor WBC    Outcome: Progressing     Problem: SAFETY ADULT  Goal: Patient will remain free of falls  Description: INTERVENTIONS:  - Educate patient/family on patient safety including physical limitations  - Instruct patient to call for assistance with activity   - Consult OT/PT to assist with strengthening/mobility   - Keep Call bell within reach  - Keep bed low and locked with side rails adjusted as appropriate  - Keep care items and personal belongings within reach  - Initiate and maintain comfort rounds  - Make Fall Risk Sign visible to staff  - Offer Toileting every 2 Hours, in advance of need  - Initiate/Maintain bed alarm  - Apply yellow socks and bracelet for high fall risk patients  - Consider moving patient to room near nurses station  Outcome: Progressing  Goal: Maintain or return to baseline ADL function  Description: INTERVENTIONS:  -  Assess patient's ability to carry out ADLs; assess patient's baseline for ADL function and identify physical deficits which impact ability to perform ADLs (bathing, care of mouth/teeth, toileting, grooming, dressing, etc.)  - Assess/evaluate cause of self-care deficits   - Assess range of motion  - Assess patient's mobility; develop plan if impaired  - Assess patient's need for assistive devices and provide as appropriate  - Encourage maximum independence but intervene and supervise when necessary  - Involve family in performance of ADLs  - Assess for home care needs following discharge   - Consider OT consult to assist with ADL evaluation and planning for discharge  - Provide patient education as appropriate  Outcome: Progressing  Goal: Maintains/Returns to pre admission functional level  Description: INTERVENTIONS:  - Perform BMAT or MOVE assessment daily.   - Set and communicate daily mobility goal to care team and patient/family/caregiver. - Collaborate with rehabilitation services on mobility goals if consulted  - Perform Range of Motion 4 times a day. - Reposition patient every 2 hours.   - Dangle patient 4 times a day  - Stand patient 4 times a day  - Ambulate patient 4 times a day  - Out of bed to chair 4 times a day   - Out of bed for meals 4 times a day  - Out of bed for toileting  - Record patient progress and toleration of activity level Outcome: Progressing     Problem: DISCHARGE PLANNING  Goal: Discharge to home or other facility with appropriate resources  Description: INTERVENTIONS:  - Identify barriers to discharge w/patient and caregiver  - Arrange for needed discharge resources and transportation as appropriate  - Identify discharge learning needs (meds, wound care, etc.)  - Arrange for interpretive services to assist at discharge as needed  - Refer to Case Management Department for coordinating discharge planning if the patient needs post-hospital services based on physician/advanced practitioner order or complex needs related to functional status, cognitive ability, or social support system  Outcome: Progressing     Problem: Knowledge Deficit  Goal: Patient/family/caregiver demonstrates understanding of disease process, treatment plan, medications, and discharge instructions  Description: Complete learning assessment and assess knowledge base.   Interventions:  - Provide teaching at level of understanding  - Provide teaching via preferred learning methods  Outcome: Progressing

## 2023-07-17 NOTE — PLAN OF CARE
Problem: PHYSICAL THERAPY ADULT  Goal: Performs mobility at highest level of function for planned discharge setting. See evaluation for individualized goals. Description: Treatment/Interventions: Functional transfer training, LE strengthening/ROM, Elevations, Therapeutic exercise, Cognitive reorientation, Equipment eval/education, Patient/family training, Bed mobility, Gait training, Compensatory technique education, Continued evaluation, Spoke to nursing, OT, Spoke to case management          See flowsheet documentation for full assessment, interventions and recommendations. 7/17/2023 0149 by Jonah Vasquez PTA  Outcome: Completed  Note: Prognosis: Fair  Problem List: Decreased endurance, Impaired balance, Decreased cognition, Decreased range of motion, Decreased safety awareness, Impaired judgement  Assessment: Pt seen for PT treatment session this date with interventions consisting of bed mobility, transfer training, gait training and stair training, and education provided as needed for safety and direction to improve functional mobility, safety awareness, and activity tolerance. Pt agreeable to PT treatment session upon arrival, pt found supine in bed. At end of session, pt left seated on toilet in bathroom with call bell in reach with all needs in reach. In comparison to previous session, pt with improvement in activity tolerance, endurance, ambulation distances, ambulatory balance, AM- pac score and functional mobility. Minimal encouragement required for pt to participate in PT this session. Pt continues to show good progress toward PT goals with ability to ambulate farther distances 205' and 400'. No gross LOB noted when ambulating on level surfaces or with quick head or body turns left or right. Pt does demonstrate lateral sway at times due to gait deviations as noted. Pt  performs stair climbing with use of b/l rails with supervision ascending and descending stairs non- reciprocally.   Pt  Performs 5x sts in 27. 47 sec. With use of b/l ue's. Pt's time and use of b/l ue's indicates an increased risk for falls. Please refer to endurance section of flowsheet for vitals. Continue to recommend home with support. at time of d/c in order to maximize pt's functional independence and safety w/ mobility. PT has progressed and achieved inpt PT goals, therefore will d/c PT and place pt on retstorative services to maintain current level of mobility while in the hospital. The patient's AM-PAC Basic Mobility Inpatient Short Form Raw Score is 24. A raw score greater than 16 suggests the patient may benefit from discharge to home. Please also refer to the recommendation of the Physical Therapist for safe discharge planning. Barriers to Discharge: Inaccessible home environment  Barriers to Discharge Comments: flight [place] to enter. Has roommate but alone days. PT Discharge Recommendation: Home with home health rehabilitation    See flowsheet documentation for full assessment.

## 2023-07-17 NOTE — PROGRESS NOTES
Progress Note - Naeem Haile 75 y.o. male MRN: [id number]    Unit/Bed#: E4 -01 Encounter: [id number]      Subjective: The patient is feeling reasonably well. He says he slept well. He is eating well. He has no chest pain, shortness of breath, abdominal pain, nausea, or vomiting. Physical Exam:   Temp:  [97.5 °F (36.4 °C)-97.7 °F (36.5 °C)] 97.7 °F (36.5 °C)  HR:  [77-83] 77  Resp:  [18-20] 20  BP: ()/(62-84) 118/84    Gen: Well-developed, well-nourished, in no distress. Neck: Supple. No lymphadenopathy, goiter, or bruit. Heart: Regular rhythm. I heard no murmur, gallop, or rub. Lungs: Clear to auscultation and percussion. No wheezing, rales, or rhonchi. Abd: Soft with active bowel sounds. No mass, tenderness, or organomegaly. Extremities: No clubbing, cyanosis, or edema. No calf tenderness. Neuro: Alert and conversant. No focal sign. Skin: Warm and dry. LABS: No new labs    Assessment/Plan:  1. Urinary tract infection  2. Aerococcus sepsis secondary to #1  3. Left ureteral stone with hydronephrosis, status post stent  4. Acute kidney injury, resolved  5. Elevated troponin, likely demand ischemia from sepsis  6. COPD  7. Cigarette abuse  8. Probable esophagitis noted on CTA  9. Onychomycosis  10. Alcohol use  11. Abnormal mental status    The patient underwent ureteral stenting. He has been treated with antibiotics for Aerococcus sepsis. He is improving. He remains on oral amoxicillin. His kidney function has normalized. The patient had some confusion and impairment of his cognitive processes. He was judged to be incapable of making appropriate medical decisions when evaluated by neuropsychology several days ago. His mental status may be improving over the last 48 hours. We will continue to monitor his progress. Repeat evaluation by neuropsychology may be appropriate in a couple days.     VTE Pharmacologic Prophylaxis: Heparin  VTE Mechanical Prophylaxis: sequential compression device

## 2023-07-17 NOTE — PLAN OF CARE

## 2023-07-18 ENCOUNTER — APPOINTMENT (INPATIENT)
Dept: RADIOLOGY | Facility: HOSPITAL | Age: 73
DRG: 853 | End: 2023-07-18
Payer: MEDICARE

## 2023-07-18 LAB
ALBUMIN SERPL BCP-MCNC: 3.1 G/DL (ref 3.5–5)
ALP SERPL-CCNC: 69 U/L (ref 34–104)
ALT SERPL W P-5'-P-CCNC: 19 U/L (ref 7–52)
ANION GAP SERPL CALCULATED.3IONS-SCNC: 2 MMOL/L
ANION GAP SERPL CALCULATED.3IONS-SCNC: 4 MMOL/L
AST SERPL W P-5'-P-CCNC: 16 U/L (ref 13–39)
ATRIAL RATE: 286 BPM
BACTERIA UR QL AUTO: ABNORMAL /HPF
BASOPHILS # BLD AUTO: 0.05 THOUSANDS/ÂΜL (ref 0–0.1)
BASOPHILS # BLD AUTO: 0.06 THOUSANDS/ÂΜL (ref 0–0.1)
BASOPHILS NFR BLD AUTO: 0 % (ref 0–1)
BASOPHILS NFR BLD AUTO: 1 % (ref 0–1)
BILIRUB SERPL-MCNC: 0.32 MG/DL (ref 0.2–1)
BILIRUB UR QL STRIP: NEGATIVE
BUN SERPL-MCNC: 20 MG/DL (ref 5–25)
BUN SERPL-MCNC: 23 MG/DL (ref 5–25)
CALCIUM ALBUM COR SERPL-MCNC: 9.4 MG/DL (ref 8.3–10.1)
CALCIUM SERPL-MCNC: 8.7 MG/DL (ref 8.4–10.2)
CALCIUM SERPL-MCNC: 8.7 MG/DL (ref 8.4–10.2)
CHLORIDE SERPL-SCNC: 103 MMOL/L (ref 96–108)
CHLORIDE SERPL-SCNC: 104 MMOL/L (ref 96–108)
CLARITY UR: ABNORMAL
CO2 SERPL-SCNC: 27 MMOL/L (ref 21–32)
CO2 SERPL-SCNC: 31 MMOL/L (ref 21–32)
COLOR UR: YELLOW
CREAT SERPL-MCNC: 1.17 MG/DL (ref 0.6–1.3)
CREAT SERPL-MCNC: 1.2 MG/DL (ref 0.6–1.3)
EOSINOPHIL # BLD AUTO: 0.11 THOUSAND/ÂΜL (ref 0–0.61)
EOSINOPHIL # BLD AUTO: 0.15 THOUSAND/ÂΜL (ref 0–0.61)
EOSINOPHIL NFR BLD AUTO: 1 % (ref 0–6)
EOSINOPHIL NFR BLD AUTO: 1 % (ref 0–6)
ERYTHROCYTE [DISTWIDTH] IN BLOOD BY AUTOMATED COUNT: 14.2 % (ref 11.6–15.1)
ERYTHROCYTE [DISTWIDTH] IN BLOOD BY AUTOMATED COUNT: 14.2 % (ref 11.6–15.1)
GFR SERPL CREATININE-BSD FRML MDRD: 59 ML/MIN/1.73SQ M
GFR SERPL CREATININE-BSD FRML MDRD: 61 ML/MIN/1.73SQ M
GLUCOSE SERPL-MCNC: 113 MG/DL (ref 65–140)
GLUCOSE SERPL-MCNC: 93 MG/DL (ref 65–140)
GLUCOSE UR STRIP-MCNC: NEGATIVE MG/DL
HCT VFR BLD AUTO: 40.3 % (ref 36.5–49.3)
HCT VFR BLD AUTO: 42.3 % (ref 36.5–49.3)
HCV AB SER QL: NORMAL
HGB BLD-MCNC: 12.8 G/DL (ref 12–17)
HGB BLD-MCNC: 13.5 G/DL (ref 12–17)
HGB UR QL STRIP.AUTO: ABNORMAL
HIV 1+2 AB+HIV1 P24 AG SERPL QL IA: NORMAL
HIV 2 AB SERPL QL IA: NORMAL
HIV1 AB SERPL QL IA: NORMAL
HIV1 P24 AG SERPL QL IA: NORMAL
HYALINE CASTS #/AREA URNS LPF: ABNORMAL /LPF
IMM GRANULOCYTES # BLD AUTO: 0.06 THOUSAND/UL (ref 0–0.2)
IMM GRANULOCYTES # BLD AUTO: 0.09 THOUSAND/UL (ref 0–0.2)
IMM GRANULOCYTES NFR BLD AUTO: 1 % (ref 0–2)
IMM GRANULOCYTES NFR BLD AUTO: 1 % (ref 0–2)
KETONES UR STRIP-MCNC: NEGATIVE MG/DL
LACTATE SERPL-SCNC: 0.9 MMOL/L (ref 0.5–2)
LEUKOCYTE ESTERASE UR QL STRIP: ABNORMAL
LYMPHOCYTES # BLD AUTO: 1.84 THOUSANDS/ÂΜL (ref 0.6–4.47)
LYMPHOCYTES # BLD AUTO: 1.95 THOUSANDS/ÂΜL (ref 0.6–4.47)
LYMPHOCYTES NFR BLD AUTO: 15 % (ref 14–44)
LYMPHOCYTES NFR BLD AUTO: 15 % (ref 14–44)
MCH RBC QN AUTO: 32.4 PG (ref 26.8–34.3)
MCH RBC QN AUTO: 32.4 PG (ref 26.8–34.3)
MCHC RBC AUTO-ENTMCNC: 31.8 G/DL (ref 31.4–37.4)
MCHC RBC AUTO-ENTMCNC: 31.9 G/DL (ref 31.4–37.4)
MCV RBC AUTO: 101 FL (ref 82–98)
MCV RBC AUTO: 102 FL (ref 82–98)
MONOCYTES # BLD AUTO: 0.86 THOUSAND/ÂΜL (ref 0.17–1.22)
MONOCYTES # BLD AUTO: 0.91 THOUSAND/ÂΜL (ref 0.17–1.22)
MONOCYTES NFR BLD AUTO: 7 % (ref 4–12)
MONOCYTES NFR BLD AUTO: 7 % (ref 4–12)
MUCOUS THREADS UR QL AUTO: ABNORMAL
NEUTROPHILS # BLD AUTO: 9.27 THOUSANDS/ÂΜL (ref 1.85–7.62)
NEUTROPHILS # BLD AUTO: 9.98 THOUSANDS/ÂΜL (ref 1.85–7.62)
NEUTS SEG NFR BLD AUTO: 75 % (ref 43–75)
NEUTS SEG NFR BLD AUTO: 76 % (ref 43–75)
NITRITE UR QL STRIP: NEGATIVE
NON-SQ EPI CELLS URNS QL MICRO: ABNORMAL /HPF
NRBC BLD AUTO-RTO: 0 /100 WBCS
NRBC BLD AUTO-RTO: 0 /100 WBCS
PH UR STRIP.AUTO: 7.5 [PH]
PLATELET # BLD AUTO: 354 THOUSANDS/UL (ref 149–390)
PLATELET # BLD AUTO: 356 THOUSANDS/UL (ref 149–390)
PMV BLD AUTO: 9.5 FL (ref 8.9–12.7)
PMV BLD AUTO: 9.6 FL (ref 8.9–12.7)
POTASSIUM SERPL-SCNC: 4 MMOL/L (ref 3.5–5.3)
POTASSIUM SERPL-SCNC: 4.5 MMOL/L (ref 3.5–5.3)
PROCALCITONIN SERPL-MCNC: 0.11 NG/ML
PROT SERPL-MCNC: 6.6 G/DL (ref 6.4–8.4)
PROT UR STRIP-MCNC: ABNORMAL MG/DL
QRS AXIS: 33 DEGREES
QRSD INTERVAL: 108 MS
QT INTERVAL: 344 MS
QTC INTERVAL: 530 MS
RBC # BLD AUTO: 3.95 MILLION/UL (ref 3.88–5.62)
RBC # BLD AUTO: 4.17 MILLION/UL (ref 3.88–5.62)
RBC #/AREA URNS AUTO: ABNORMAL /HPF
SODIUM SERPL-SCNC: 134 MMOL/L (ref 135–147)
SODIUM SERPL-SCNC: 137 MMOL/L (ref 135–147)
SP GR UR STRIP.AUTO: 1.02 (ref 1–1.03)
T WAVE AXIS: 255 DEGREES
TSH SERPL DL<=0.05 MIU/L-ACNC: 2.42 UIU/ML (ref 0.45–4.5)
UROBILINOGEN UR STRIP-ACNC: <2 MG/DL
VENTRICULAR RATE: 143 BPM
WBC # BLD AUTO: 12.28 THOUSAND/UL (ref 4.31–10.16)
WBC # BLD AUTO: 13.05 THOUSAND/UL (ref 4.31–10.16)
WBC #/AREA URNS AUTO: ABNORMAL /HPF

## 2023-07-18 PROCEDURE — 87389 HIV-1 AG W/HIV-1&-2 AB AG IA: CPT | Performed by: INTERNAL MEDICINE

## 2023-07-18 PROCEDURE — 71045 X-RAY EXAM CHEST 1 VIEW: CPT

## 2023-07-18 PROCEDURE — 87040 BLOOD CULTURE FOR BACTERIA: CPT | Performed by: PHYSICIAN ASSISTANT

## 2023-07-18 PROCEDURE — 99232 SBSQ HOSP IP/OBS MODERATE 35: CPT | Performed by: INTERNAL MEDICINE

## 2023-07-18 PROCEDURE — 81001 URINALYSIS AUTO W/SCOPE: CPT | Performed by: PHYSICIAN ASSISTANT

## 2023-07-18 PROCEDURE — 87077 CULTURE AEROBIC IDENTIFY: CPT | Performed by: PHYSICIAN ASSISTANT

## 2023-07-18 PROCEDURE — 93005 ELECTROCARDIOGRAM TRACING: CPT

## 2023-07-18 PROCEDURE — 87186 SC STD MICRODIL/AGAR DIL: CPT | Performed by: PHYSICIAN ASSISTANT

## 2023-07-18 PROCEDURE — 84145 PROCALCITONIN (PCT): CPT | Performed by: PHYSICIAN ASSISTANT

## 2023-07-18 PROCEDURE — 85025 COMPLETE CBC W/AUTO DIFF WBC: CPT | Performed by: PHYSICIAN ASSISTANT

## 2023-07-18 PROCEDURE — 84443 ASSAY THYROID STIM HORMONE: CPT | Performed by: PHYSICIAN ASSISTANT

## 2023-07-18 PROCEDURE — 86803 HEPATITIS C AB TEST: CPT | Performed by: INTERNAL MEDICINE

## 2023-07-18 PROCEDURE — 83735 ASSAY OF MAGNESIUM: CPT | Performed by: NURSE PRACTITIONER

## 2023-07-18 PROCEDURE — 99232 SBSQ HOSP IP/OBS MODERATE 35: CPT

## 2023-07-18 PROCEDURE — 87086 URINE CULTURE/COLONY COUNT: CPT | Performed by: PHYSICIAN ASSISTANT

## 2023-07-18 PROCEDURE — 80048 BASIC METABOLIC PNL TOTAL CA: CPT | Performed by: PHYSICIAN ASSISTANT

## 2023-07-18 PROCEDURE — 93010 ELECTROCARDIOGRAM REPORT: CPT | Performed by: STUDENT IN AN ORGANIZED HEALTH CARE EDUCATION/TRAINING PROGRAM

## 2023-07-18 RX ORDER — CEFTRIAXONE 1 G/50ML
1000 INJECTION, SOLUTION INTRAVENOUS EVERY 24 HOURS
Status: DISCONTINUED | OUTPATIENT
Start: 2023-07-18 | End: 2023-07-18

## 2023-07-18 RX ORDER — DIGOXIN 0.25 MG/ML
500 INJECTION INTRAMUSCULAR; INTRAVENOUS ONCE
Status: COMPLETED | OUTPATIENT
Start: 2023-07-18 | End: 2023-07-18

## 2023-07-18 RX ORDER — ALBUMIN (HUMAN) 12.5 G/50ML
25 SOLUTION INTRAVENOUS 2 TIMES DAILY
Status: COMPLETED | OUTPATIENT
Start: 2023-07-18 | End: 2023-07-18

## 2023-07-18 RX ORDER — ENOXAPARIN SODIUM 100 MG/ML
1 INJECTION SUBCUTANEOUS EVERY 12 HOURS SCHEDULED
Status: DISCONTINUED | OUTPATIENT
Start: 2023-07-18 | End: 2023-07-25

## 2023-07-18 RX ADMIN — ENOXAPARIN SODIUM 100 MG: 100 INJECTION SUBCUTANEOUS at 11:10

## 2023-07-18 RX ADMIN — FLUTICASONE FUROATE AND VILANTEROL TRIFENATATE 1 PUFF: 200; 25 POWDER RESPIRATORY (INHALATION) at 09:06

## 2023-07-18 RX ADMIN — HYDROXYZINE HYDROCHLORIDE 50 MG: 25 TABLET, FILM COATED ORAL at 21:51

## 2023-07-18 RX ADMIN — ASPIRIN 81 MG 81 MG: 81 TABLET ORAL at 09:06

## 2023-07-18 RX ADMIN — AMOXICILLIN 1000 MG: 500 CAPSULE ORAL at 05:52

## 2023-07-18 RX ADMIN — ALBUMIN (HUMAN) 25 G: 0.25 INJECTION, SOLUTION INTRAVENOUS at 11:11

## 2023-07-18 RX ADMIN — DILTIAZEM HYDROCHLORIDE 5 MG/HR: 5 INJECTION INTRAVENOUS at 01:41

## 2023-07-18 RX ADMIN — DILTIAZEM HYDROCHLORIDE 30 MG: 30 TABLET, FILM COATED ORAL at 11:12

## 2023-07-18 RX ADMIN — MELATONIN 6 MG: at 21:51

## 2023-07-18 RX ADMIN — DILTIAZEM HYDROCHLORIDE 30 MG: 30 TABLET, FILM COATED ORAL at 17:07

## 2023-07-18 RX ADMIN — FOLIC ACID 1 MG: 1 TABLET ORAL at 09:06

## 2023-07-18 RX ADMIN — Medication 1 TABLET: at 09:06

## 2023-07-18 RX ADMIN — ENOXAPARIN SODIUM 100 MG: 100 INJECTION SUBCUTANEOUS at 21:51

## 2023-07-18 RX ADMIN — CEFTRIAXONE 1000 MG: 1 INJECTION, SOLUTION INTRAVENOUS at 01:41

## 2023-07-18 RX ADMIN — HEPARIN SODIUM 5000 UNITS: 5000 INJECTION INTRAVENOUS; SUBCUTANEOUS at 05:52

## 2023-07-18 RX ADMIN — ALBUMIN (HUMAN) 25 G: 0.25 INJECTION, SOLUTION INTRAVENOUS at 17:08

## 2023-07-18 RX ADMIN — AMOXICILLIN 1000 MG: 500 CAPSULE ORAL at 21:51

## 2023-07-18 RX ADMIN — ATORVASTATIN CALCIUM 40 MG: 40 TABLET, FILM COATED ORAL at 17:08

## 2023-07-18 RX ADMIN — DILTIAZEM HYDROCHLORIDE 10 MG/HR: 5 INJECTION INTRAVENOUS at 11:10

## 2023-07-18 RX ADMIN — PANTOPRAZOLE SODIUM 40 MG: 40 TABLET, DELAYED RELEASE ORAL at 05:52

## 2023-07-18 RX ADMIN — THIAMINE HCL TAB 100 MG 100 MG: 100 TAB at 09:06

## 2023-07-18 RX ADMIN — DIGOXIN 500 MCG: 0.25 INJECTION INTRAMUSCULAR; INTRAVENOUS at 09:39

## 2023-07-18 RX ADMIN — AMOXICILLIN 1000 MG: 500 CAPSULE ORAL at 15:27

## 2023-07-18 RX ADMIN — DILTIAZEM HYDROCHLORIDE 30 MG: 30 TABLET, FILM COATED ORAL at 23:12

## 2023-07-18 NOTE — PLAN OF CARE
Problem: Potential for Falls  Goal: Patient will remain free of falls  Description: INTERVENTIONS:  - Educate patient/family on patient safety including physical limitations  - Instruct patient to call for assistance with activity   - Consult OT/PT to assist with strengthening/mobility   - Keep Call bell within reach  - Keep bed low and locked with side rails adjusted as appropriate  - Keep care items and personal belongings within reach  - Initiate and maintain comfort rounds  - Make Fall Risk Sign visible to staff  - Offer Toileting every 2 Hours, in advance of need  - Initiate/Maintain bed alarm  - Obtain necessary fall risk management equipment: bed alarm   - Apply yellow socks and bracelet for high fall risk patients  - Consider moving patient to room near nurses station  Outcome: Progressing     Problem: MOBILITY - ADULT  Goal: Maintain or return to baseline ADL function  Description: INTERVENTIONS:  -  Assess patient's ability to carry out ADLs; assess patient's baseline for ADL function and identify physical deficits which impact ability to perform ADLs (bathing, care of mouth/teeth, toileting, grooming, dressing, etc.)  - Assess/evaluate cause of self-care deficits   - Assess range of motion  - Assess patient's mobility; develop plan if impaired  - Assess patient's need for assistive devices and provide as appropriate  - Encourage maximum independence but intervene and supervise when necessary  - Involve family in performance of ADLs  - Assess for home care needs following discharge   - Consider OT consult to assist with ADL evaluation and planning for discharge  - Provide patient education as appropriate  Outcome: Progressing  Goal: Maintains/Returns to pre admission functional level  Description: INTERVENTIONS:  - Perform BMAT or MOVE assessment daily.   - Set and communicate daily mobility goal to care team and patient/family/caregiver.    - Collaborate with rehabilitation services on mobility goals if consulted  - Perform Range of Motion 4 times a day. - Reposition patient every 2 hours.   - Dangle patient 4 times a day  - Stand patient 4 times a day  - Ambulate patient 4 times a day  - Out of bed to chair 4 times a day   - Out of bed for meals 4 times a day  - Out of bed for toileting  - Record patient progress and toleration of activity level   Outcome: Progressing     Problem: PAIN - ADULT  Goal: Verbalizes/displays adequate comfort level or baseline comfort level  Description: Interventions:  - Encourage patient to monitor pain and request assistance  - Assess pain using appropriate pain scale  - Administer analgesics based on type and severity of pain and evaluate response  - Implement non-pharmacological measures as appropriate and evaluate response  - Consider cultural and social influences on pain and pain management  - Notify physician/advanced practitioner if interventions unsuccessful or patient reports new pain  Outcome: Progressing     Problem: INFECTION - ADULT  Goal: Absence or prevention of progression during hospitalization  Description: INTERVENTIONS:  - Assess and monitor for signs and symptoms of infection  - Monitor lab/diagnostic results  - Monitor all insertion sites, i.e. indwelling lines, tubes, and drains  - Monitor endotracheal if appropriate and nasal secretions for changes in amount and color  - [place] appropriate cooling/warming therapies per order  - Administer medications as ordered  - Instruct and encourage patient and family to use good hand hygiene technique  - Identify and instruct in appropriate isolation precautions for identified infection/condition  Outcome: Progressing  Goal: Absence of fever/infection during neutropenic period  Description: INTERVENTIONS:  - Monitor WBC    Outcome: Progressing     Problem: SAFETY ADULT  Goal: Patient will remain free of falls  Description: INTERVENTIONS:  - Educate patient/family on patient safety including physical limitations  - Instruct patient to call for assistance with activity   - Consult OT/PT to assist with strengthening/mobility   - Keep Call bell within reach  - Keep bed low and locked with side rails adjusted as appropriate  - Keep care items and personal belongings within reach  - Initiate and maintain comfort rounds  - Make Fall Risk Sign visible to staff  - Offer Toileting every 2 Hours, in advance of need  - Initiate/Maintain bed alarm  - Obtain necessary fall risk management equipment: bed alarm   - Apply yellow socks and bracelet for high fall risk patients  - Consider moving patient to room near nurses station  Outcome: Progressing  Goal: Maintain or return to baseline ADL function  Description: INTERVENTIONS:  -  Assess patient's ability to carry out ADLs; assess patient's baseline for ADL function and identify physical deficits which impact ability to perform ADLs (bathing, care of mouth/teeth, toileting, grooming, dressing, etc.)  - Assess/evaluate cause of self-care deficits   - Assess range of motion  - Assess patient's mobility; develop plan if impaired  - Assess patient's need for assistive devices and provide as appropriate  - Encourage maximum independence but intervene and supervise when necessary  - Involve family in performance of ADLs  - Assess for home care needs following discharge   - Consider OT consult to assist with ADL evaluation and planning for discharge  - Provide patient education as appropriate  Outcome: Progressing  Goal: Maintains/Returns to pre admission functional level  Description: INTERVENTIONS:  - Perform BMAT or MOVE assessment daily.   - Set and communicate daily mobility goal to care team and patient/family/caregiver. - Collaborate with rehabilitation services on mobility goals if consulted  - Perform Range of Motion 4 times a day. - Reposition patient every 2 hours.   - Dangle patient 4 times a day  - Stand patient 4 times a day  - Ambulate patient 4 times a day  - Out of bed to chair 4 times a day   - Out of bed for meals 4 times a day  - Out of bed for toileting  - Record patient progress and toleration of activity level   Outcome: Progressing     Problem: DISCHARGE PLANNING  Goal: Discharge to home or other facility with appropriate resources  Description: INTERVENTIONS:  - Identify barriers to discharge w/patient and caregiver  - Arrange for needed discharge resources and transportation as appropriate  - Identify discharge learning needs (meds, wound care, etc.)  - Arrange for interpretive services to assist at discharge as needed  - Refer to Case Management Department for coordinating discharge planning if the patient needs post-hospital services based on physician/advanced practitioner order or complex needs related to functional status, cognitive ability, or social support system  Outcome: Progressing     Problem: Knowledge Deficit  Goal: Patient/family/caregiver demonstrates understanding of disease process, treatment plan, medications, and discharge instructions  Description: Complete learning assessment and assess knowledge base.   Interventions:  - Provide teaching at level of understanding  - Provide teaching via preferred learning methods  Outcome: Progressing

## 2023-07-18 NOTE — RESTORATIVE TECHNICIAN NOTE
Restorative Technician Note      Patient Name: Jovita Alejo     Restorative Tech Visit Date: 07/18/23  Note Type: Mobility  Patient Position Upon Consult: Supine  Mobility / Activity Provided: assisted pt in active ROM as well as walking to the door back  Activity Performed: Ambulated; Range of motion  Range of Motion: Active; Right leg; Left arm  Patient Position at End of Consult: Supine;  All needs within reach; Bed/Chair alarm activated

## 2023-07-18 NOTE — PROGRESS NOTES
Progress Note - Eriberto Jason 75 y.o. male MRN: [id number]    Unit/Bed#: E4 -01 Encounter: [id number]      Subjective: The patient is feeling pretty well. He denies chest pain, shortness of breath, abdominal pain, nausea, or vomiting. The patient developed atrial fibrillation overnight. He did not perceive this himself. He has no palpitations. Physical Exam:   Temp:  [97.2 °F (36.2 °C)-98.1 °F (36.7 °C)] 97.2 °F (36.2 °C)  HR:  [] 94  Resp:  [20-21] 20  BP: ()/(53-88) 121/66    Gen: Well-developed, well-nourished, in no distress. Neck: Supple. No lymphadenopathy, goiter, or bruit. Heart: Irregular rhythm. No murmur, gallop, or rub. Lungs: Clear to auscultation and percussion. No wheezing, rales, or rhonchi. Abd: Soft with active bowel sounds. No mass, tenderness, organomegaly. Extremities: No clubbing, cyanosis, or edema. No calf tenderness. Neuro: Alert and conversant. No focal sign. Skin: Warm and dry.       LABS:   CBC:   Lab Results   Component Value Date    WBC 12.28 (H) 07/18/2023    HGB 12.8 07/18/2023    HCT 40.3 07/18/2023     (H) 07/18/2023     07/18/2023    RBC 3.95 07/18/2023    MCH 32.4 07/18/2023    MCHC 31.8 07/18/2023    RDW 14.2 07/18/2023    MPV 9.6 07/18/2023    NRBC 0 07/18/2023   , CMP:   Lab Results   Component Value Date    SODIUM 137 07/18/2023    K 4.5 07/18/2023     07/18/2023    CO2 31 07/18/2023    BUN 20 07/18/2023    CREATININE 1.17 07/18/2023    CALCIUM 8.7 07/18/2023    AST 16 07/17/2023    ALT 19 07/17/2023    ALKPHOS 69 07/17/2023    EGFR 61 07/18/2023           Patient Active Problem List   Diagnosis   • Tobacco use   • Polycythemia   • Weight loss   • Angioedema   • COPD (chronic obstructive pulmonary disease) (HCC)   • Bradycardia   • Sepsis secondary to UTI [facility])   • Elevated troponin   • Elevated d-dimer   • Pyelonephritis   • Esophageal abnormality   • Hydronephrosis with obstructing calculus   • Mycotic toenails • Acute kidney injury ([address])   • Positive blood culture   • Alcohol withdrawal ([address])   • Encounter for competency evaluation       Assessment/Plan:  1. Urinary tract infection  2. Aerococcus sepsis secondary to #1  3. Left ureteral stone with hydronephrosis, status post stent  4. Acute kidney injury on admission, resolved  5. Atrial fibrillation with rapid ventricular response  6. Non-MI troponin elevation on admission, attributed to sepsis  7. COPD  8. Cigarette abuse  9. Probable esophagitis noted on CTA  10. Onychomycosis  11. Alcohol use  12. Abnormal mental status, seemingly improved    The patient's sepsis has resolved. He is completing his antibiotic therapy for Aerococcus urinary tract infection. The patient was started on intravenous diltiazem for rate control last evening. His blood pressure is on the low side. Digoxin has been added. Cardiology evaluation was requested. Their input is greatly appreciated. VTE Pharmacologic Prophylaxis: Enoxaparin (Lovenox)  VTE Mechanical Prophylaxis: reason for no mechanical VTE prophylaxis Therapeutically anticoagulated.

## 2023-07-18 NOTE — PLAN OF CARE
Problem: Potential for Falls  Goal: Patient will remain free of falls  Description: INTERVENTIONS:  - Educate patient/family on patient safety including physical limitations  - Instruct patient to call for assistance with activity   - Consult OT/PT to assist with strengthening/mobility   - Keep Call bell within reach  - Keep bed low and locked with side rails adjusted as appropriate  - Keep care items and personal belongings within reach  - Initiate and maintain comfort rounds  - Make Fall Risk Sign visible to staff  - Offer Toileting every 2 Hours, in advance of need  - Initiate/Maintain bed alarm  - Apply yellow socks and bracelet for high fall risk patients  - Consider moving patient to room near nurses station  Outcome: Progressing     Problem: MOBILITY - ADULT  Goal: Maintain or return to baseline ADL function  Description: INTERVENTIONS:  -  Assess patient's ability to carry out ADLs; assess patient's baseline for ADL function and identify physical deficits which impact ability to perform ADLs (bathing, care of mouth/teeth, toileting, grooming, dressing, etc.)  - Assess/evaluate cause of self-care deficits   - Assess range of motion  - Assess patient's mobility; develop plan if impaired  - Assess patient's need for assistive devices and provide as appropriate  - Encourage maximum independence but intervene and supervise when necessary  - Involve family in performance of ADLs  - Assess for home care needs following discharge   - Consider OT consult to assist with ADL evaluation and planning for discharge  - Provide patient education as appropriate  Outcome: Progressing  Goal: Maintains/Returns to pre admission functional level  Description: INTERVENTIONS:  - Perform BMAT or MOVE assessment daily.   - Set and communicate daily mobility goal to care team and patient/family/caregiver. - Collaborate with rehabilitation services on mobility goals if consulted  - Perform Range of Motion 4 times a day.   - Reposition patient every 2 hours.   - Dangle patient 4 times a day  - Stand patient 4 times a day  - Ambulate patient 4 times a day  - Out of bed to chair 4 times a day   - Out of bed for meals 4 times a day  - Out of bed for toileting  - Record patient progress and toleration of activity level   Outcome: Progressing     Problem: PAIN - ADULT  Goal: Verbalizes/displays adequate comfort level or baseline comfort level  Description: Interventions:  - Encourage patient to monitor pain and request assistance  - Assess pain using appropriate pain scale  - Administer analgesics based on type and severity of pain and evaluate response  - Implement non-pharmacological measures as appropriate and evaluate response  - Consider cultural and social influences on pain and pain management  - Notify physician/advanced practitioner if interventions unsuccessful or patient reports new pain  Outcome: Progressing     Problem: INFECTION - ADULT  Goal: Absence or prevention of progression during hospitalization  Description: INTERVENTIONS:  - Assess and monitor for signs and symptoms of infection  - Monitor lab/diagnostic results  - Monitor all insertion sites, i.e. indwelling lines, tubes, and drains  - Monitor endotracheal if appropriate and nasal secretions for changes in amount and color  - [place] appropriate cooling/warming therapies per order  - Administer medications as ordered  - Instruct and encourage patient and family to use good hand hygiene technique  - Identify and instruct in appropriate isolation precautions for identified infection/condition  Outcome: Progressing  Goal: Absence of fever/infection during neutropenic period  Description: INTERVENTIONS:  - Monitor WBC    Outcome: Progressing     Problem: SAFETY ADULT  Goal: Patient will remain free of falls  Description: INTERVENTIONS:  - Educate patient/family on patient safety including physical limitations  - Instruct patient to call for assistance with activity   - Consult OT/PT to assist with strengthening/mobility   - Keep Call bell within reach  - Keep bed low and locked with side rails adjusted as appropriate  - Keep care items and personal belongings within reach  - Initiate and maintain comfort rounds  - Make Fall Risk Sign visible to staff  - Offer Toileting every 2 Hours, in advance of need  - Initiate/Maintain bed alarm  - Apply yellow socks and bracelet for high fall risk patients  - Consider moving patient to room near nurses station  Outcome: Progressing  Goal: Maintain or return to baseline ADL function  Description: INTERVENTIONS:  -  Assess patient's ability to carry out ADLs; assess patient's baseline for ADL function and identify physical deficits which impact ability to perform ADLs (bathing, care of mouth/teeth, toileting, grooming, dressing, etc.)  - Assess/evaluate cause of self-care deficits   - Assess range of motion  - Assess patient's mobility; develop plan if impaired  - Assess patient's need for assistive devices and provide as appropriate  - Encourage maximum independence but intervene and supervise when necessary  - Involve family in performance of ADLs  - Assess for home care needs following discharge   - Consider OT consult to assist with ADL evaluation and planning for discharge  - Provide patient education as appropriate  Outcome: Progressing  Goal: Maintains/Returns to pre admission functional level  Description: INTERVENTIONS:  - Perform BMAT or MOVE assessment daily.   - Set and communicate daily mobility goal to care team and patient/family/caregiver. - Collaborate with rehabilitation services on mobility goals if consulted  - Perform Range of Motion 4 times a day. - Reposition patient every  hours.   - Dangle patient 2 times a day  - Stand patient 4 times a day  - Ambulate patient 4 times a day  - Out of bed to chair 4 times a day   - Out of bed for meals 4 times a day  - Out of bed for toileting  - Record patient progress and toleration of activity level   Outcome: Progressing     Problem: DISCHARGE PLANNING  Goal: Discharge to home or other facility with appropriate resources  Description: INTERVENTIONS:  - Identify barriers to discharge w/patient and caregiver  - Arrange for needed discharge resources and transportation as appropriate  - Identify discharge learning needs (meds, wound care, etc.)  - Arrange for interpretive services to assist at discharge as needed  - Refer to Case Management Department for coordinating discharge planning if the patient needs post-hospital services based on physician/advanced practitioner order or complex needs related to functional status, cognitive ability, or social support system  Outcome: Progressing     Problem: Knowledge Deficit  Goal: Patient/family/caregiver demonstrates understanding of disease process, treatment plan, medications, and discharge instructions  Description: Complete learning assessment and assess knowledge base.   Interventions:  - Provide teaching at level of understanding  - Provide teaching via preferred learning methods  Outcome: Progressing

## 2023-07-18 NOTE — RESTORATIVE TECHNICIAN NOTE
Restorative Technician Note      Patient Name: Michelle Robertson Tech Visit Date: 07/18/23  Note Type: Mobility  Patient Position Upon Consult: Supine  Mobility / Activity Provided: due to A Fib I assisted pt in active ROM  Activity Performed: Range of motion  Range of Motion: Left leg; Right leg; Active  Patient Position at End of Consult: Supine;  All needs within reach; Bed/Chair alarm activated

## 2023-07-18 NOTE — PROGRESS NOTES
Progress Note - Cardiology   Lili Riding 75 y.o. male MRN: [id number]  Unit/Bed#: E4 -01 Encounter: [id number]    Assessment:  · Atrial fibrillation with rapid ventricular response   - new onset. - [id number] score of 1 given age. · Non-MI troponin elevation   - suspect in the setting of sepsis with probable underlying CAD. - mild coronary calcifications via CTA. - TTE 7/7/23: LVEF 63%, grade 1 diastolic dysfunction, mild biatrial dilatation, mild TR.`  · Urinary tract infection in the setting of hydronephrosis with obstructing calculi  · Severe sepsis secondary to urinary tract infection  · Acute kidney injury, resolved  · COPD with active tobacco use  · Probable esophagitis noted on CTA  · Active alcohol use  · Abnormal mental status, deemed incompetent by neuropsychology    Plan/ Discussion:  · Currently on Cardizem infusion at 10 mg/hr. · status post lopressor 5 mg IV x 2 overnight. · status post digoxin 500 mcg IV x 1 today. Okay to provide 250 mcg x 1 in 6 hours if warranted for HR control with another 250 mcg IV x 1 in 6 hours to follow if warranted for additional HR control. · will begin oral Cardizem 30 mg every 6 hours with attempt to wean infusion. · recommend to begin [facility] with Lovenox twice daily. · will provide albumin 25 g x 2 doses today. · do not recommend cardioversion given clinical status in addition to elevated troponin upon arrival without ischemic evaluation. · Currently on aspirin 81 mg daily, atorvastatin 40 mg daily. · Monitor volume status closely with strict intake/output, daily weight. · Monitor renal function and electrolytes; maintain potassium > 4, magnesium > 2.  · As noted prior, patient with elevated troponin upon arrival. At that time, plan for stress test upon improvement in clinical status. Subjective:  Patient seen and evaluated at the bedside. Patient endorses shortness of breath with long durations of ambulation.  He denies chest pain, tightness or pressure.     Vitals:  Vitals:    07/07/23 0041 07/07/23 0926   Weight: 102 kg (224 lb 13.9 oz) 102 kg (224 lb)   ,  Vitals:    07/18/23 0222 07/18/23 0256 07/18/23 0500 07/18/23 0722   BP:   105/76 99/53   BP Location:    Right arm   Pulse: (!) 138 (!) 142 (!) 114 (!) 122   Resp:    21   Temp:    97.7 °F (36.5 °C)   TempSrc:    Temporal   SpO2:    94%   Weight:       Height:           Exam:  General: Alert alert and interactive  Heart: Irregularly irregular  Respiratory effort/ Lungs:  Breathing comfortably on room air, diminished breath sounds bilateral bases  Abdominal: Non-tender to palpation, + bowel sounds, soft, no masses or distension  Lower Limbs:  No edema           Telemetry:       Atrial fibrillation with rapid ventricular response    Medications:    Current Facility-Administered Medications:   •  acetaminophen (TYLENOL) tablet 650 mg, 650 mg, Oral, Q6H PRN, Nahed Jetchristine, CRNP, 650 mg at 07/16/23 2319  •  albuterol inhalation solution 2.5 mg, 2.5 mg, Nebulization, Q6H PRN, Nahed Jetty, CRNP, 2.5 mg at 07/12/23 0027  •  aluminum-magnesium hydroxide-simethicone (MYLANTA) oral suspension 30 mL, 30 mL, Oral, Q6H PRN, Nahed Jetty, CRNP  •  amoxicillin (AMOXIL) capsule 1,000 mg, 1,000 mg, Oral, Q8H [facility], Woo Cheung MD, 1,000 mg at 07/18/23 9397  •  aspirin chewable tablet 81 mg, 81 mg, Oral, Daily, Abdirahman Wooten DO, 81 mg at 07/18/23 7899  •  atorvastatin (LIPITOR) tablet 40 mg, 40 mg, Oral, Daily With Maira Hidalgo DO, 40 mg at 07/17/23 1621  •  bisacodyl (DULCOLAX) EC tablet 10 mg, 10 mg, Oral, Daily PRN, Nahed Jetty, CRNP  •  cefTRIAXone (ROCEPHIN) IVPB (premix in dextrose) 1,000 mg 50 mL, 1,000 mg, Intravenous, Q24H, Jud Panda PA-C, Last Rate: 100 mL/hr at 07/18/23 0141, 1,000 mg at 07/18/23 0141  •  diltiazem (CARDIZEM) 125 mg in sodium chloride 0.9 % 125 mL infusion, 1-15 mg/hr, Intravenous, Titrated, Jud Panda PA-C, Last Rate: 12.5 mL/hr at 07/18/23 0255, 12.5 mg/hr at 07/18/23 0255  •  fluticasone-vilanterol 200-25 mcg/actuation 1 puff, 1 puff, Inhalation, Daily, Ulysess Sevin, CRNP, 1 puff at 36/95/74 6749  •  folic acid (FOLVITE) tablet 1 mg, 1 mg, Oral, Daily, Marianne Mackey PA-C, 1 mg at 07/18/23 5116  •  heparin (porcine) subcutaneous injection 5,000 Units, 5,000 Units, Subcutaneous, Q8H [facility], 5,000 Units at 07/18/23 0552 **AND** [CANCELED] Platelet count, , , Once, Ulysess Sevin, CRNP  •  HYDROmorphone HCl (DILAUDID) injection 0.2 mg, 0.2 mg, Intravenous, Q4H PRN, Ulysess Sevin, CRNP  •  hydrOXYzine HCL (ATARAX) tablet 50 mg, 50 mg, Oral, HS PRN, Ulysess Sevin, CRNP, 50 mg at 07/17/23 2145  •  melatonin tablet 6 mg, 6 mg, Oral, HS, Ulysess Sevin, CRNP, 6 mg at 07/17/23 2146  •  multivitamin-minerals (CENTRUM) tablet 1 tablet, 1 tablet, Oral, Daily, Marianne Mackey PA-C, 1 tablet at 07/18/23 0906  •  nicotine (NICODERM CQ) 14 mg/24hr TD 24 hr patch 1 patch, 1 patch, Transdermal, Daily, Ulysess Sevin, CRNP, 1 patch at 07/13/23 0843  •  ondansetron (ZOFRAN) injection 4 mg, 4 mg, Intravenous, Q6H PRN, Ulysess Sevin, CRNP  •  oxyCODONE (ROXICODONE) IR tablet 5 mg, 5 mg, Oral, 4x Daily PRN, Ulysess Sevin, CRNP  •  pantoprazole (PROTONIX) EC tablet 40 mg, 40 mg, Oral, Early Morning, Joanne Beckham, CRNP, 40 mg at 07/18/23 3116  •  senna (SENOKOT) tablet 17.2 mg, 2 tablet, Oral, HS, Ulysess Sevin, CRNP, 17.2 mg at 07/13/23 2203  •  simethicone (MYLICON) chewable tablet 80 mg, 80 mg, Oral, 4x Daily PRN, Ulysess Sevin, CRNP  •  thiamine tablet 100 mg, 100 mg, Oral, Daily, Marianne Mackey PA-C, 100 mg at 07/18/23 9537      Labs/Data:        Results from last 7 days   Lab Units 07/18/23  0551 07/17/23  2358 07/14/23  0632   WBC Thousand/uL 12.28* 13.05* 10.26*   HEMOGLOBIN g/dL 12.8 13.5 15.6   HEMATOCRIT % 40.3 42.3 47.3   PLATELETS Thousands/uL 354 356 335     Results from last 7 days   Lab Units 07/18/23  0551 07/17/23  2358 07/14/23  0632   POTASSIUM mmol/L 4.5 4.0 4.4   CHLORIDE mmol/L 104 103 106   CO2 mmol/L 31 27 25   BUN mg/dL 20 23 19   CREATININE mg/dL 1.17 1.20 1.10

## 2023-07-19 LAB — MAGNESIUM SERPL-MCNC: 2 MG/DL (ref 1.9–2.7)

## 2023-07-19 PROCEDURE — 99232 SBSQ HOSP IP/OBS MODERATE 35: CPT

## 2023-07-19 PROCEDURE — 97530 THERAPEUTIC ACTIVITIES: CPT

## 2023-07-19 PROCEDURE — 99232 SBSQ HOSP IP/OBS MODERATE 35: CPT | Performed by: INTERNAL MEDICINE

## 2023-07-19 PROCEDURE — 11056 PARNG/CUTG B9 HYPRKR LES 2-4: CPT | Performed by: PODIATRIST

## 2023-07-19 RX ORDER — DIGOXIN 0.25 MG/ML
250 INJECTION INTRAMUSCULAR; INTRAVENOUS EVERY 6 HOURS
Status: COMPLETED | OUTPATIENT
Start: 2023-07-19 | End: 2023-07-19

## 2023-07-19 RX ORDER — DIGOXIN 0.25 MG/ML
250 INJECTION INTRAMUSCULAR; INTRAVENOUS ONCE
Status: DISCONTINUED | OUTPATIENT
Start: 2023-07-19 | End: 2023-07-19

## 2023-07-19 RX ADMIN — DIGOXIN 250 MCG: 0.25 INJECTION INTRAMUSCULAR; INTRAVENOUS at 10:40

## 2023-07-19 RX ADMIN — HYDROXYZINE HYDROCHLORIDE 50 MG: 25 TABLET, FILM COATED ORAL at 22:49

## 2023-07-19 RX ADMIN — PANTOPRAZOLE SODIUM 40 MG: 40 TABLET, DELAYED RELEASE ORAL at 06:05

## 2023-07-19 RX ADMIN — AMOXICILLIN 1000 MG: 500 CAPSULE ORAL at 22:49

## 2023-07-19 RX ADMIN — DILTIAZEM HYDROCHLORIDE 30 MG: 30 TABLET, FILM COATED ORAL at 12:18

## 2023-07-19 RX ADMIN — ENOXAPARIN SODIUM 100 MG: 100 INJECTION SUBCUTANEOUS at 22:50

## 2023-07-19 RX ADMIN — THIAMINE HCL TAB 100 MG 100 MG: 100 TAB at 09:15

## 2023-07-19 RX ADMIN — FLUTICASONE FUROATE AND VILANTEROL TRIFENATATE 1 PUFF: 200; 25 POWDER RESPIRATORY (INHALATION) at 09:18

## 2023-07-19 RX ADMIN — FOLIC ACID 1 MG: 1 TABLET ORAL at 09:15

## 2023-07-19 RX ADMIN — AMOXICILLIN 1000 MG: 500 CAPSULE ORAL at 14:44

## 2023-07-19 RX ADMIN — ENOXAPARIN SODIUM 100 MG: 100 INJECTION SUBCUTANEOUS at 09:16

## 2023-07-19 RX ADMIN — AMOXICILLIN 1000 MG: 500 CAPSULE ORAL at 06:05

## 2023-07-19 RX ADMIN — ATORVASTATIN CALCIUM 40 MG: 40 TABLET, FILM COATED ORAL at 16:54

## 2023-07-19 RX ADMIN — ACETAMINOPHEN 325MG 650 MG: 325 TABLET ORAL at 09:20

## 2023-07-19 RX ADMIN — Medication 1 PATCH: at 09:16

## 2023-07-19 RX ADMIN — MELATONIN 6 MG: at 22:49

## 2023-07-19 RX ADMIN — DILTIAZEM HYDROCHLORIDE 30 MG: 30 TABLET, FILM COATED ORAL at 06:05

## 2023-07-19 RX ADMIN — DILTIAZEM HYDROCHLORIDE 30 MG: 30 TABLET, FILM COATED ORAL at 22:49

## 2023-07-19 RX ADMIN — ASPIRIN 81 MG 81 MG: 81 TABLET ORAL at 09:15

## 2023-07-19 RX ADMIN — Medication 1 TABLET: at 09:15

## 2023-07-19 RX ADMIN — DIGOXIN 250 MCG: 0.25 INJECTION INTRAMUSCULAR; INTRAVENOUS at 16:54

## 2023-07-19 RX ADMIN — DILTIAZEM HYDROCHLORIDE 5 MG/HR: 5 INJECTION INTRAVENOUS at 18:02

## 2023-07-19 RX ADMIN — DILTIAZEM HYDROCHLORIDE 30 MG: 30 TABLET, FILM COATED ORAL at 16:57

## 2023-07-19 NOTE — PROGRESS NOTES
Progress Note - Lety Uribe 75 y.o. male MRN: [id number]    Unit/Bed#: E4 -01 Encounter: [id number]      Subjective: The patient feels pretty well. He slept okay. He has no chest pain, shortness of breath, palpitations, abdominal pain, nausea, or vomiting. Physical Exam:   Temp:  [97 °F (36.1 °C)-97.6 °F (36.4 °C)] 97.5 °F (36.4 °C)  HR:  [] 81  Resp:  [16-18] 18  BP: (103-145)/(60-81) 130/71    Gen: Well-developed, well-nourished, in no distress. Neck: Supple. No lymphadenopathy, goiter, or bruit. Heart: Irregular rhythm. I heard no murmur, gallop, or rub. Lungs: Clear to auscultation percussion. No wheezing, rales, or rhonchi. Abd: Soft with active bowel sounds. No mass, tenderness, organomegaly. Extremities: No clubbing, cyanosis, or edema. No calf tenderness. Neuro: Alert and conversant. No focal sign. Skin: Warm and dry. LABS: No new labs. Assessment/Plan:  1. Urinary tract infection  2. Aerococcus sepsis secondary to #1  3. Left ureteral stone with hydronephrosis, status post stent  4. Acute kidney injury on admission, now resolved  5. Atrial fibrillation  6. Non-MI troponin elevation on admission, attributed to sepsis  7. COPD  8. Cigarette abuse  9. Probable esophagitis is noted on CTA  10. Onychomycosis  11. Alcohol use  12. Abnormal mental status, seemingly improved    The patient is completing oral antibiotic therapy for Aerococcus sepsis. His heart rate is better controlled. He is on diltiazem orally and by infusion. He is on digoxin. Hopefully, his diltiazem infusion will be able to be tapered in the near future. Reevaluation by neuropsychology as planned.     VTE Pharmacologic Prophylaxis: Enoxaparin (Lovenox)  VTE Mechanical Prophylaxis: reason for no mechanical VTE prophylaxis Therapeutically anticoagulated

## 2023-07-19 NOTE — CONSULTS
Podiatry - Consultation    Patient Information:   Eriberto Jason 75 y.o. male MRN: [id number]  Unit/Bed#: E4 -01 Encounter: [id number]  PCP: No primary care provider on file. Date of Admission:  2023  Date of Consultation: 23  Requesting Physician: Cydney Apgar, MD      ASSESSMENT:    Eriberto Jason is a 75 y.o. male with:    1. Onychomycosis  2. Callus   3. Tobacco use    PLAN:    · Patient's nails were recently debrided lightly, are a reasonable length and thickness at this time  · Patient reports bilateral foot calluses. Denies pain to the area and no wounds noted. Calluses x3 trimmed. Patient to follow-up in outpatient setting for foot care. Recommend daily application of moisturizing cream to the bilateral lower extremities  · Podiatry to sign off  · Elevation and offloading on green foam wedges or pillows when non-ambulatory. · Rest of care per primary team.  · Will discuss this plan with my attending and update as needed. Weightbearing status: Weightbearing as tolerated    SUBJECTIVE:    History of Present Illness:    Eriberto Jason is a 75 y.o. male who is originally admitted 2023 due to shortness of breath. Patient has a past medical history of sepsis secondary to UTI, tobacco use, COPD, pyelonephritis, mycotic toenails and history of heart abuse. We are consulted for callus. Patient had his nails trimmed approximately 2 weeks ago. Patient reports that he is 3 calluses. Patient denies pain in the area patient denies wounds to the area. Patient denies pain with ambulation. She denies any other pedal complaints    Review of Systems:    Constitutional: Negative. HENT: Negative. Eyes: Negative. Respiratory: Negative. Cardiovascular: Negative. Gastrointestinal: Negative. Musculoskeletal: callus   Skin:callus    Neurological: negative    Psych: Negative.      Past Medical and Surgical History:     Past Medical History:   Diagnosis Date   • COPD (chronic obstructive pulmonary disease) [facility])        Past Surgical History:   Procedure Laterality Date   • FL RETROGRADE PYELOGRAM  7/6/2023   • DC CYSTO BLADDER W/URETERAL CATHETERIZATION Left 7/6/2023    Procedure: CYSTOSCOPY RETROGRADE PYELOGRAM WITH INSERTION STENT URETERAL;  Surgeon: Sheron Patel MD;  [facility];  Service: Urology       Meds/Allergies:    Medications Prior to Admission   Medication   • EPINEPHrine (EPIPEN) 0.3 mg/0.3 mL SOAJ       No Known Allergies    Social History:     Marital Status: Single    Substance Use History:   Social History     Substance and Sexual Activity   Alcohol Use No     Social History     Tobacco Use   Smoking Status Every Day   • Packs/day: 0.50   • Types: Cigarettes   Smokeless Tobacco Not on file     Social History     Substance and Sexual Activity   Drug Use No       Family History:    History reviewed. No pertinent family history. OBJECTIVE:    Vitals:   Blood Pressure: 130/71 (07/19/23 1107)  Pulse: 81 (07/19/23 1107)  Temperature: 97.5 °F (36.4 °C) (07/19/23 1107)  Temp Source: Temporal (07/19/23 1107)  Respirations: 18 (07/19/23 1107)  Height: 6' 3" (190.5 cm) (07/07/23 0926)  Weight - Scale: 102 kg (224 lb) (07/07/23 0926)  SpO2: 95 % (07/19/23 1107)    Physical Exam:    General Appearance: Alert, cooperative, no distress. HEENT: Head normocephalic, atraumatic, without obvious abnormality. Heart: Normal rate and rhythm. Lungs: Non-labored breathing. No respiratory distress. Abdomen: Without distension. Psychiatric: AAOx3  Lower Extremity:    Vascular:   DP: Right: 1+ Left: 1+  PT: Right: 1+ Left: 1+  CRT < 3 seconds at the digits. +0/4 edema noted at bilateral lower extremities. Pedal hair is absent. Skin temperature is WNL bilaterally. Musculoskeletal:  MMT is 5/5 in all muscle compartments bilaterally. ROM at the 1st MPJ and ankle joint are restricted bilaterally with the leg extended.    No gross deformities noted.    Dermatological:  Mycotic nails x10. Currently in appropriate length as they have been recently debrided  Medial pinch callus and submet 1 callus noted to right foot  Left foot submet 5 base callus  Denies pain on palpation all calluses, no wounds appear this time. Neurological:  Gross sensation is intact. Light touch is intact. Protective sensation is intact. Additional data:     Lab Results: I have personally reviewed pertinent labs including:    Results from last 7 days   Lab Units 07/18/23  0551   WBC Thousand/uL 12.28*   HEMOGLOBIN g/dL 12.8   HEMATOCRIT % 40.3   PLATELETS Thousands/uL 354   NEUTROS PCT % 76*   LYMPHS PCT % 15   MONOS PCT % 7   EOS PCT % 1     Results from last 7 days   Lab Units 07/18/23  0551 07/17/23  2358   POTASSIUM mmol/L 4.5 4.0   CHLORIDE mmol/L 104 103   CO2 mmol/L 31 27   BUN mg/dL 20 23   CREATININE mg/dL 1.17 1.20   CALCIUM mg/dL 8.7 8.7   ALK PHOS U/L  --  69   ALT U/L  --  19   AST U/L  --  16           Cultures: I have personally reviewed pertinent cultures including:    Results from last 7 days   Lab Units 07/18/23  0353 07/18/23  0130   BLOOD CULTURE   --  No Growth at 24 hrs. No Growth at 24 hrs. URINE CULTURE  Culture results to follow. --            Imaging: I have personally reviewed pertinent reports in PACS. EKG, Pathology, and Other Studies: I have personally reviewed pertinent reports. Time Spent for Care: 30 minutes. More than 50% of total time spent on counseling and coordination of care as described above. ** Please Note: Portions of the record may have been created with voice recognition software. Occasional wrong word or "sound a like" substitutions may have occurred due to the inherent limitations of voice recognition software. Read the chart carefully and recognize, using context, where substitutions have occurred.  **

## 2023-07-19 NOTE — PLAN OF CARE
Problem: Potential for Falls  Goal: Patient will remain free of falls  Description: INTERVENTIONS:  - Educate patient/family on patient safety including physical limitations  - Instruct patient to call for assistance with activity   - Consult OT/PT to assist with strengthening/mobility   - Keep Call bell within reach  - Keep bed low and locked with side rails adjusted as appropriate  - Keep care items and personal belongings within reach  - Initiate and maintain comfort rounds  - Make Fall Risk Sign visible to staff  - Offer Toileting every   Problem: INFECTION - ADULT  Goal: Absence or prevention of progression during hospitalization  Description: INTERVENTIONS:  - Assess and monitor for signs and symptoms of infection  - Monitor lab/diagnostic results  - Monitor all insertion sites, i.e. indwelling lines, tubes, and drains  - Monitor endotracheal if appropriate and nasal secretions for changes in amount and color  - [place] appropriate cooling/warming therapies per order  - Administer medications as ordered  - Instruct and encourage patient and family to use good hand hygiene technique  - Identify and instruct in appropriate isolation precautions for identified infection/condition  Outcome: Progressing   Hours, in advance of need  - Initiate/Maintain alarm  - Obtain necessary fall risk management equipment:   - Apply yellow socks and bracelet for high fall risk patients  - Consider moving patient to room near nurses station  Outcome: Progressing

## 2023-07-19 NOTE — PHYSICAL THERAPY NOTE
PHYSICAL THERAPY NOTE          Patient Name: Caty Sanderson  SRWRN'X Date: 7/19/2023 07/19/23 2716   Note Type   Note Type Treatment   End of Consult   Patient Position at End of Consult Bed/Chair alarm activated;Supine; All needs within reach   Pain Assessment   Pain Assessment Tool 0-10   Pain Score No Pain   Restrictions/Precautions   Weight Bearing Precautions Per Order Yes   RLE Weight Bearing Per Order WBAT   LLE Weight Bearing Per Order WBAT   Other Precautions Chair Alarm; Bed Alarm; Fall Risk;Multiple lines;Telemetry   General   Chart Reviewed Yes   Response to Previous Treatment Patient with no complaints from previous session. Family/Caregiver Present No   Cognition   Overall Cognitive Status Impaired   Arousal/Participation Cooperative   Attention Attends with cues to redirect   Orientation Level Oriented to person;Oriented to place;Oriented to situation;Oriented to time  (able to self-correct date with cuing, general orientation to situation.)   Memory Decreased recall of precautions   Following Commands Follows one step commands without difficulty   Subjective   Subjective "If I walk I need to sit down for a little."   Bed Mobility   Supine to Sit 6  Modified independent   Additional items HOB elevated; Bedrails   Sit to Supine 6  Modified independent   Additional items HOB elevated; Bedrails   Transfers   Sit to Stand 6  Modified independent   Additional items Armrests   Stand to Sit 6  Modified independent   Additional items Armrests   Ambulation/Elevation   Gait pattern Improper Weight shift; Wide CRISTINE  (WBing on lateral border of L foot, L foot supination)   Gait Assistance 5  Supervision   Additional items Verbal cues  (cues for energy conservation)   Assistive Device None   Distance 250'x2  (steps in between, standing rest prn)   Stair Management Assistance 5  Supervision   Additional items Increased time required   Stair Management Technique Two rails; Alternating pattern   Number of Stairs 4   Balance   Static Sitting Normal   Static Standing Good   Ambulatory Fair   Endurance Deficit   Endurance Deficit Yes   Endurance Deficit Description SpO2 94-95% RA. A fib HR  bpm throughout  (Simultaneous filing. User may not have seen previous data.)   Activity Tolerance   Activity Tolerance Patient limited by fatigue;Treatment limited secondary to medical complications (Comment)  (A fib)   Medical Staff Made Aware  [address] RN   Assessment   Prognosis Good   Problem List Decreased endurance; Impaired balance;Decreased cognition;Decreased range of motion;Decreased safety awareness; Impaired judgement   Assessment Priscilla Holman is a 75 y.o. male admitted to [address] [place] [address] on 7/6/2023 for Sepsis secondary to UTI [facility]). Pt seen today for PT treatment session to improve functional mobility and progress toward stated goals. Pt demonstrates improvements this session, with ability to ambulate increased distances. Ambulated w supervision without AD, and standby for chair follow due to onset A fib. Pt required standing rest breaks over 250' distance for recovery of increased HR. Seated rest taken before and after completion of stairs. Recorded HR 64-150s bpm following each 250' bout of ambulation and stair training. No pt reports of SOB or adverse symptoms. Pt educated on A fib, pacing and energy conservation at end of session. Pt to be d/c from IPPT due to completion of functional goals. Pt to stay on the restorative program for continued mobilization. PT recommendations for d/c now home with OPPT (if need for cardiopulmonary rehab). Barriers to Discharge None   Goals   Patient Goals to go home   PT Treatment Day 4   Plan   Treatment/Interventions Functional transfer training;LE strengthening/ROM; Elevations; Therapeutic exercise; Endurance training;Cognitive reorientation;Patient/family training;Gait training; Bed mobility;Spoke to nursing   Progress Improving as expected   PT Frequency Other (Comment)  (d/c from IPPT, keep restorative)   Recommendation   PT Discharge Recommendation Home with outpatient rehabilitation   Additional Comments OPPT if neede for cardiopulmonary rehab   AM-PAC Basic Mobility Inpatient   Turning in Flat Bed Without Bedrails 4   Lying on Back to Sitting on Edge of Flat Bed Without Bedrails 4   Moving Bed to Chair 4   Standing Up From Chair Using Arms 4   Walk in Room 3   Climb 3-5 Stairs With Railing 3   Basic Mobility Inpatient Raw Score 22  The patient's AM-PAC Basic Mobility Inpatient Short Form Raw Score is 22. A Raw score of greater than or equal to 16 suggests the patient may benefit from discharge to home. Please also refer to the recommendation of the Physical Therapist for safe discharge planning. Basic Mobility Standardized Score 47.4   Highest Level Of Mobility   JH-HLM Goal 7: Walk 25 feet or more   JH-HLM Achieved 8: Walk 250 feet ot more   Education   Education Provided Mobility training  (pacing, HR)   Patient Reinforcement needed   End of Consult   Patient Position at End of Consult Supine;Bed/Chair alarm activated; All needs within reach     [facility]

## 2023-07-19 NOTE — PROGRESS NOTES
Progress Note - Cardiology   Cecilia Campbell 75 y.o. male MRN: [id number]  Unit/Bed#: E4 -01 Encounter: [id number]    Assessment:  • Atrial fibrillation with rapid ventricular response              - new onset. - [id number] score of 1 given age. • Non-MI troponin elevation              - suspect in the setting of sepsis with probable underlying CAD. - mild coronary calcifications via CTA. - TTE 7/7/23: LVEF 33%, grade 1 diastolic dysfunction, mild biatrial dilatation, mild TR.`  • Urinary tract infection in the setting of hydronephrosis with obstructing calculi  • Severe sepsis secondary to urinary tract infection  • Acute kidney injury, resolved  • COPD with active tobacco use  • Probable esophagitis noted on CTA  • Active alcohol use  • Abnormal mental status, deemed incompetent by neuropsychology (plan for re-eval this week)    Plan/ Discussion:  • Status post cardizem infusion. • Status post digoxin 500 mcg IV x 1, 7/18/23. • Will provide digoxin 250 mcg IV x 1 now with plan to provide an additional 6 hours thereafter. • Check digoxin level tomorrow. • Currently on Cardizem 30 mg every 6 hours. • If warranted post digoxin, will further increase calcium channel blocker. • Currently on Lovenox twice daily. • Status post albumin 25 g x 2 doses, 7/18/23. • Currently on aspirin 81 mg daily, atorvastatin 40 mg daily. • Monitor volume status closely with strict intake/output, daily weight. • Monitor renal function and electrolytes; maintain potassium > 4, magnesium > 2.  • As noted, given elevated troponin in the setting of sepsis with suspected underlying CAD, would prefer medical management for new onset atrial fibrillation. Subjective:  Patient seen and evaluated at the bedside. Patient with dyspnea on exertion and palpitations when walking back from the bathroom. He denies chest pain.     Vitals:  Vitals:    07/07/23 0041 07/07/23 0926   Weight: 102 kg (224 lb 13.9 oz) 102 kg (224 lb)   ,  Vitals:    07/19/23 0346 07/19/23 0605 07/19/23 0706 07/19/23 0739   BP: 103/63 130/77  106/64   BP Location: Left arm   Left arm   Pulse: 98   56   Resp: 18  (P) 16 18   Temp: 97.6 °F (36.4 °C)  (P) 97.5 °F (36.4 °C) 97.6 °F (36.4 °C)   TempSrc: Temporal  (P) Temporal Temporal   SpO2: 94%   95%   Weight:       Height:           Exam:  General: alert awake and oriented, no acute distress  Heart: irregularly irregular   Respiratory effort/ Lungs: tachypnea on exam, on room air   Abdominal: non-tender to palpation, + bowel sounds, soft, no masses or distension  Lower Limbs: no edema           Telemetry:       Atrial fibrillation with RVR    Medications:    Current Facility-Administered Medications:   •  acetaminophen (TYLENOL) tablet 650 mg, 650 mg, Oral, Q6H PRN, Meliza Creamer, CRNP, 650 mg at 07/19/23 0920  •  albuterol inhalation solution 2.5 mg, 2.5 mg, Nebulization, Q6H PRN, Meliza Creamer, CRNP, 2.5 mg at 07/12/23 0027  •  aluminum-magnesium hydroxide-simethicone (MYLANTA) oral suspension 30 mL, 30 mL, Oral, Q6H PRN, Meliza Creamer, CRNP  •  amoxicillin (AMOXIL) capsule 1,000 mg, 1,000 mg, Oral, Q8H [address], Trina Nelson MD, 1,000 mg at 07/19/23 0605  •  aspirin chewable tablet 81 mg, 81 mg, Oral, Daily, Leatha Leachet, DO, 81 mg at 07/19/23 0915  •  atorvastatin (LIPITOR) tablet 40 mg, 40 mg, Oral, Daily With Dinner, Leatha Hayleeet, DO, 40 mg at 07/18/23 1708  •  bisacodyl (DULCOLAX) EC tablet 10 mg, 10 mg, Oral, Daily PRN, Meliza Creamer, CRNP  •  diltiazem (CARDIZEM) 125 mg in sodium chloride 0.9 % 125 mL infusion, 1-15 mg/hr, Intravenous, Titrated, Jud Panda PA-C, Stopped at 07/19/23 0903  •  diltiazem (CARDIZEM) tablet 30 mg, 30 mg, Oral, Q6H [facility], KIKI Acevedo, 30 mg at 07/19/23 6092  •  enoxaparin (LOVENOX) subcutaneous injection 100 mg, 1 mg/kg, Subcutaneous, Q12H [address] [facility], KIKI Acevedo, 100 mg at 07/19/23 0916  • fluticasone-vilanterol 200-25 mcg/actuation 1 puff, 1 puff, Inhalation, Daily, Ulysess Sevin, CRNP, 1 puff at 20/18/23 8389  •  folic acid (FOLVITE) tablet 1 mg, 1 mg, Oral, Daily, Marianne Mackey PA-C, 1 mg at 07/19/23 0915  •  HYDROmorphone HCl (DILAUDID) injection 0.2 mg, 0.2 mg, Intravenous, Q4H PRN, Ulysess Sevin, CRNP  •  hydrOXYzine HCL (ATARAX) tablet 50 mg, 50 mg, Oral, HS PRN, Ulysess Sevin, CRNP, 50 mg at 07/18/23 2151  •  melatonin tablet 6 mg, 6 mg, Oral, HS, Ulysess Sevin, CRNP, 6 mg at 07/18/23 2151  •  multivitamin-minerals (CENTRUM) tablet 1 tablet, 1 tablet, Oral, Daily, ZEV Tan-KATY, 1 tablet at 07/19/23 0915  •  nicotine (NICODERM CQ) 14 mg/24hr TD 24 hr patch 1 patch, 1 patch, Transdermal, Daily, Ulysess Sevin, CRNP, 1 patch at 07/19/23 0916  •  ondansetron (ZOFRAN) injection 4 mg, 4 mg, Intravenous, Q6H PRN, Ulysess Sevin, CRNP  •  oxyCODONE (ROXICODONE) IR tablet 5 mg, 5 mg, Oral, 4x Daily PRN, Ulysess Sevin, CRNP  •  pantoprazole (PROTONIX) EC tablet 40 mg, 40 mg, Oral, Early Morning, Ulysess Sevin, CRNP, 40 mg at 07/19/23 0605  •  senna (SENOKOT) tablet 17.2 mg, 2 tablet, Oral, HS, Ulysess Sevin, CRNP, 17.2 mg at 07/13/23 2203  •  simethicone (MYLICON) chewable tablet 80 mg, 80 mg, Oral, 4x Daily PRN, Ulysess Sevin, CRNP  •  thiamine tablet 100 mg, 100 mg, Oral, Daily, ZEV Tan-C, 100 mg at 07/19/23 0915      Labs/Data:        Results from last 7 days   Lab Units 07/18/23  0551 07/17/23  2358 07/14/23  0632   WBC Thousand/uL 12.28* 13.05* 10.26*   HEMOGLOBIN g/dL 12.8 13.5 15.6   HEMATOCRIT % 40.3 42.3 47.3   PLATELETS Thousands/uL 354 356 335     Results from last 7 days   Lab Units 07/18/23  0551 07/17/23 2358 07/14/23  0632   POTASSIUM mmol/L 4.5 4.0 4.4   CHLORIDE mmol/L 104 103 106   CO2 mmol/L 31 27 25   BUN mg/dL 20 23 19   CREATININE mg/dL 1.17 1.20 1.10

## 2023-07-19 NOTE — CASE MANAGEMENT
Case Management Discharge Planning Note    Patient name Kennedy Fitzgerald  Location East 4 /E4 95 Jessica Pozo-* MRN [id number]  : 1950 Date 2023       Current Admission Date: 2023  Current Admission Diagnosis:Sepsis secondary to UTI [facility])   Patient Active Problem List    Diagnosis Date Noted   • Encounter for competency evaluation 2023   • Alcohol withdrawal ([address]) 2023   • Positive blood culture 2023   • Sepsis secondary to UTI ([address]) 2023   • Elevated troponin 2023   • Elevated d-dimer 2023   • Pyelonephritis 2023   • Esophageal abnormality 2023   • Hydronephrosis with obstructing calculus 2023   • Mycotic toenails 2023   • Acute kidney injury ([address]) 2023   • Angioedema 2018   • COPD (chronic obstructive pulmonary disease) ([address]) 2018   • Bradycardia 2018   • Tobacco use 2016   • Polycythemia 2016   • Weight loss 2016      LOS (days): 13  Geometric Mean LOS (GMLOS) (days): 9.60  Days to GMLOS:-3     OBJECTIVE:  Risk of Unplanned Readmission Score: 12.18         Current admission status: Inpatient   Preferred Pharmacy:   PATIENT/FAMILY REPORTS NO PREFERRED PHARMACY  No address on file      [address] [place] [address],  [address] [place] [address],  [address] [place] [address]  Phone: [phone] Fax: [phone]    Primary Care Provider: No primary care provider on file. Primary Insurance: MEDICARE  Secondary Insurance:     DISCHARGE DETAILS:     CM was notified by attending in rounds that pt was in a-fib yesterday & is now NOT medically cleared at this time. Cardio is working pt up. Pt likely cleared in 24-48hrs. Treatment team is also waiting on neuropsych eval to be re-done to see if pt still does not have capacity. CM to continue to follow pt care & d/c.

## 2023-07-19 NOTE — RESTORATIVE TECHNICIAN NOTE
Restorative Technician Note      Patient Name: Missy Macario     Restorative Tech Visit Date: 07/19/23  Note Type: Mobility  Patient Position Upon Consult: Supine  Mobility / Activity Provided: pt ambulated, pt HR was 150 during walk  Activity Performed: Ambulated  Assistive Device: Roller walker  Patient Position at End of Consult: Supine;  All needs within reach; Bed/Chair alarm activated

## 2023-07-20 LAB
ALBUMIN SERPL BCP-MCNC: 3.5 G/DL (ref 3.5–5)
ALP SERPL-CCNC: 72 U/L (ref 34–104)
ALT SERPL W P-5'-P-CCNC: 15 U/L (ref 7–52)
ANION GAP SERPL CALCULATED.3IONS-SCNC: 4 MMOL/L
AST SERPL W P-5'-P-CCNC: 16 U/L (ref 13–39)
BILIRUB SERPL-MCNC: 0.51 MG/DL (ref 0.2–1)
BUN SERPL-MCNC: 19 MG/DL (ref 5–25)
CALCIUM SERPL-MCNC: 9.4 MG/DL (ref 8.4–10.2)
CHLORIDE SERPL-SCNC: 105 MMOL/L (ref 96–108)
CO2 SERPL-SCNC: 29 MMOL/L (ref 21–32)
CREAT SERPL-MCNC: 1.02 MG/DL (ref 0.6–1.3)
DIGOXIN SERPL-MCNC: 1.4 NG/ML (ref 0.8–2)
GFR SERPL CREATININE-BSD FRML MDRD: 72 ML/MIN/1.73SQ M
GLUCOSE SERPL-MCNC: 131 MG/DL (ref 65–140)
POTASSIUM SERPL-SCNC: 4.4 MMOL/L (ref 3.5–5.3)
PROT SERPL-MCNC: 7.2 G/DL (ref 6.4–8.4)
SODIUM SERPL-SCNC: 138 MMOL/L (ref 135–147)

## 2023-07-20 PROCEDURE — 99232 SBSQ HOSP IP/OBS MODERATE 35: CPT | Performed by: STUDENT IN AN ORGANIZED HEALTH CARE EDUCATION/TRAINING PROGRAM

## 2023-07-20 PROCEDURE — 80053 COMPREHEN METABOLIC PANEL: CPT | Performed by: NURSE PRACTITIONER

## 2023-07-20 PROCEDURE — 80162 ASSAY OF DIGOXIN TOTAL: CPT | Performed by: NURSE PRACTITIONER

## 2023-07-20 PROCEDURE — 97110 THERAPEUTIC EXERCISES: CPT

## 2023-07-20 PROCEDURE — 99232 SBSQ HOSP IP/OBS MODERATE 35: CPT | Performed by: INTERNAL MEDICINE

## 2023-07-20 PROCEDURE — 97535 SELF CARE MNGMENT TRAINING: CPT

## 2023-07-20 RX ORDER — DIGOXIN 125 MCG
125 TABLET ORAL DAILY
Status: DISCONTINUED | OUTPATIENT
Start: 2023-07-21 | End: 2023-07-25

## 2023-07-20 RX ORDER — DILTIAZEM HYDROCHLORIDE 60 MG/1
60 TABLET, FILM COATED ORAL EVERY 6 HOURS SCHEDULED
Status: COMPLETED | OUTPATIENT
Start: 2023-07-20 | End: 2023-07-21

## 2023-07-20 RX ADMIN — DILTIAZEM HYDROCHLORIDE 60 MG: 60 TABLET ORAL at 23:27

## 2023-07-20 RX ADMIN — THIAMINE HCL TAB 100 MG 100 MG: 100 TAB at 08:39

## 2023-07-20 RX ADMIN — FOLIC ACID 1 MG: 1 TABLET ORAL at 08:39

## 2023-07-20 RX ADMIN — METOPROLOL TARTRATE 25 MG: 25 TABLET, FILM COATED ORAL at 21:23

## 2023-07-20 RX ADMIN — FLUTICASONE FUROATE AND VILANTEROL TRIFENATATE 1 PUFF: 200; 25 POWDER RESPIRATORY (INHALATION) at 08:40

## 2023-07-20 RX ADMIN — Medication 1 PATCH: at 08:40

## 2023-07-20 RX ADMIN — Medication 1 TABLET: at 08:39

## 2023-07-20 RX ADMIN — DILTIAZEM HYDROCHLORIDE 30 MG: 30 TABLET, FILM COATED ORAL at 06:13

## 2023-07-20 RX ADMIN — METOPROLOL TARTRATE 25 MG: 25 TABLET, FILM COATED ORAL at 14:02

## 2023-07-20 RX ADMIN — DILTIAZEM HYDROCHLORIDE 60 MG: 60 TABLET ORAL at 16:54

## 2023-07-20 RX ADMIN — AMOXICILLIN 1000 MG: 500 CAPSULE ORAL at 14:01

## 2023-07-20 RX ADMIN — MELATONIN 6 MG: at 21:19

## 2023-07-20 RX ADMIN — AMOXICILLIN 1000 MG: 500 CAPSULE ORAL at 21:19

## 2023-07-20 RX ADMIN — PANTOPRAZOLE SODIUM 40 MG: 40 TABLET, DELAYED RELEASE ORAL at 06:13

## 2023-07-20 RX ADMIN — ENOXAPARIN SODIUM 100 MG: 100 INJECTION SUBCUTANEOUS at 21:19

## 2023-07-20 RX ADMIN — SENNOSIDES 17.2 MG: 8.6 TABLET, FILM COATED ORAL at 21:19

## 2023-07-20 RX ADMIN — DILTIAZEM HYDROCHLORIDE 60 MG: 60 TABLET ORAL at 12:11

## 2023-07-20 RX ADMIN — ASPIRIN 81 MG 81 MG: 81 TABLET ORAL at 08:39

## 2023-07-20 RX ADMIN — AMOXICILLIN 1000 MG: 500 CAPSULE ORAL at 06:13

## 2023-07-20 RX ADMIN — ENOXAPARIN SODIUM 100 MG: 100 INJECTION SUBCUTANEOUS at 08:40

## 2023-07-20 RX ADMIN — ATORVASTATIN CALCIUM 40 MG: 40 TABLET, FILM COATED ORAL at 16:53

## 2023-07-20 NOTE — PROGRESS NOTES
Progress Note - Jovita Alejo 75 y.o. male MRN: [id number]    Unit/Bed#: E4 -01 Encounter: [id number]      Subjective: The patient feels pretty well. He slept okay. He has no chest pain, shortness of breath, abdominal pain, nausea, or vomiting. He has had no palpitations or dizziness. Physical Exam:   Temp:  [97.5 °F (36.4 °C)-98.4 °F (36.9 °C)] 97.8 °F (36.6 °C)  HR:  [] 100  Resp:  [18-22] 20  BP: (118-152)/(54-91) 139/86    Gen: Well-developed, well-nourished, in no distress. Neck: Supple. No lymphadenopathy, goiter, or bruit. Heart: Irregular rhythm. I heard no murmur, gallop, or rub. Lungs: Clear to auscultation and percussion. No wheezing, rales, or rhonchi. Abd: Soft with active bowel sounds. No mass, tenderness, or organomegaly. Extremities: No clubbing, cyanosis, or edema. No calf tenderness. Neuro: Alert and oriented at present. No focal sign. Skin: Warm and dry. LABS:   CMP:   Lab Results   Component Value Date    SODIUM 138 07/20/2023    K 4.4 07/20/2023     07/20/2023    CO2 29 07/20/2023    BUN 19 07/20/2023    CREATININE 1.02 07/20/2023    CALCIUM 9.4 07/20/2023    AST 16 07/20/2023    ALT 15 07/20/2023    ALKPHOS 72 07/20/2023    EGFR 72 07/20/2023           Patient Active Problem List   Diagnosis   • Tobacco use   • Polycythemia   • Weight loss   • Angioedema   • COPD (chronic obstructive pulmonary disease) ([facility])   • Bradycardia   • Sepsis secondary to UTI (H[facility])   • Elevated troponin   • Elevated d-dimer   • Pyelonephritis   • Esophageal abnormality   • Hydronephrosis with obstructing calculus   • Mycotic toenails   • Acute kidney injury ([address])   • Positive blood culture   • Alcohol withdrawal ([address])   • Encounter for competency evaluation       Assessment/Plan:  1. Urinary tract infection  2. Aerococcus sepsis secondary to #1  3. Left ureteral stone with hydronephrosis, status post stent  4. Acute kidney injury on admission, resolved  5.   Atrial fibrillation  6. Non-MI troponin elevation, attributed to sepsis  7. COPD  8. Cigarette abuse  9. Esophageal abnormality on CT, presumed esophagitis  10. Onychomycosis  11. Alcohol use  12. Abnormal mental status, improved    The patient's urinary tract infection has been treated. Antibiotics will be stopped after the next dose. The patient's kidney function has normalized. His heart rate is not as well-controlled as it needs to be and his medications have been adjusted. He has been scheduled for nuclear medicine stress test tomorrow. Reevaluation by neuropsychology is pending.     VTE Pharmacologic Prophylaxis: Enoxaparin (Lovenox)  VTE Mechanical Prophylaxis: reason for no mechanical VTE prophylaxis Therapeutically anticoagulated

## 2023-07-20 NOTE — PLAN OF CARE
Problem: MOBILITY - ADULT  Goal: Maintain or return to baseline ADL function  Description: INTERVENTIONS:  -  Assess patient's ability to carry out ADLs; assess patient's baseline for ADL function and identify physical deficits which impact ability to perform ADLs (bathing, care of mouth/teeth, toileting, grooming, dressing, etc.)  - Assess/evaluate cause of self-care deficits   - Assess range of motion  - Assess patient's mobility; develop plan if impaired  - Assess patient's need for assistive devices and provide as appropriate  - Encourage maximum independence but intervene and supervise when necessary  - Involve family in performance of ADLs  - Assess for home care needs following discharge   - Consider OT consult to assist with ADL evaluation and planning for discharge  - Provide patient education as appropriate  Outcome: Progressing     Problem: INFECTION - ADULT  Goal: Absence or prevention of progression during hospitalization  Description: INTERVENTIONS:  - Assess and monitor for signs and symptoms of infection  - Monitor lab/diagnostic results  - Monitor all insertion sites, i.e. indwelling lines, tubes, and drains  - Monitor endotracheal if appropriate and nasal secretions for changes in amount and color  - Rembert appropriate cooling/warming therapies per order  - Administer medications as ordered  - Instruct and encourage patient and family to use good hand hygiene technique  - Identify and instruct in appropriate isolation precautions for identified infection/condition  Outcome: Progressing

## 2023-07-20 NOTE — RESTORATIVE TECHNICIAN NOTE
Restorative Technician Note      Patient Name: Nestor Lamb     Restorative Tech Visit Date: 07/20/23  Note Type: Mobility  Patient Position Upon Consult: Supine  Activity Performed: Ambulated  Patient Position at End of Consult: Supine;  All needs within reach; Bed/Chair alarm activated

## 2023-07-20 NOTE — OCCUPATIONAL THERAPY NOTE
Occupational Therapy Progress Note     Patient Name: Eriberto JOHNSTON Date: 7/20/2023  Problem List  Principal Problem:    Sepsis secondary to UTI [facility])  Active Problems:    Tobacco use    COPD (chronic obstructive pulmonary disease) (HCC)    Elevated troponin    Elevated d-dimer    Pyelonephritis    Esophageal abnormality    Hydronephrosis with obstructing calculus    Mycotic toenails    Acute kidney injury ([address])    Positive blood culture    Alcohol withdrawal ([address])    Encounter for competency evaluation          07/20/23 1125   OT Last Visit   OT Visit Date 07/20/23   Note Type   Note Type Treatment   Pain Assessment   Pain Assessment Tool 0-10   Pain Score No Pain   Restrictions/Precautions   RLE Weight Bearing Per Order WBAT   LLE Weight Bearing Per Order WBAT   Other Precautions Telemetry; Fall Risk   ADL   Where Assessed Edge of bed   LB Dressing Assistance 6  Modified independent   Toileting Assistance  6  Modified independent   Functional Standing Tolerance   Time 2-3 min   Activity toileting, mobility. Comments no device. fair+ balance   Bed Mobility   Supine to Sit 6  Modified independent   Sit to Supine 6  Modified independent   Transfers   Sit to Stand 6  Modified independent   Stand to Sit 6  Modified independent   Functional Mobility   Functional Mobility 5  Supervision   Additional Comments functional distances in room. no device. Therapeutic Excerise-Strength   UE Strength Yes   Right Upper Extremity- Strength   R Shoulder Flexion;ABduction;Horizontal ABduction; Extension   R Elbow Elbow flexion;Elbow extension   R Position Seated   R Weight/Reps/Sets 3x10   Left Upper Extremity-Strength   L Shoulder Flexion;ABduction; Extension;Horizontal ABduction   L Elbow Elbow flexion;Elbow extension   L Position Seated   L Weights/Reps/Sets 3 x10   Subjective   Subjective " I can't remeber my address. "   Cognition   Overall Cognitive Status Impaired   Arousal/Participation Cooperative   Attention Attends with cues to redirect   Orientation Level Oriented to person;Oriented to place;Oriented to time   Memory Decreased recall of recent events;Decreased recall of precautions;Decreased short term memory   Following Commands Follows one step commands without difficulty   Activity Tolerance   Activity Tolerance Patient tolerated treatment well   Medical Staff Made Aware RN Anahy   Assessment   Assessment Pt seen for skilled OT tx this date. Tx focused on improving strength, activity tolerance and balance, safety awareness to increase independence with self care tasks. Pt tolerated session well. Pt was limited by Afib during session. Pt greeted in supine and was agreeable to skilled OT session. Pt performed LB dressing Kenisha, Toileting Kenisha,  bed mobility kenisha, transfers Kenisha, mobility with no device supervision functional distances in the room. Pt demonstrated good sitting balance and fair standing balance during functional tasks, no LOB noted. Pt required minimal verbal cuing during session to safely complete tasks. Pt completed 3x10 of various BUE exercises to increase strength. Performed while supine. Tolerated well. Vitals: pt up and to the bathroom and HR solomon. Tele alarming and RN came and reported that HR was too high and that be should return to bed at this time. Current OT DC recommendations for pt is home with HH Vs OPPT. Pt has reached functional baseline and met goals. DC from skilled OT this date. Please re-consult if there is a functional decline. Pt will remain on restorative therapy services. Plan   Treatment Interventions ADL retraining;Functional transfer training;UE strengthening/ROM; Endurance training;Cognitive reorientation;Patient/family training;Equipment evaluation/education; Compensatory technique education;Continued evaluation   Goal Expiration Date 07/21/23   OT Treatment Day 2   OT Frequency Other (comment)  (DC from skilled OT this date.  maintain on restorative therapy services.) Recommendation   OT Discharge Recommendation Home with home health rehabilitation  (vs OPPT)   Additional Comments  The patient's raw score on the AM-PAC Daily Activity Inpatient Short Form is 23. A raw score of greater than or equal to 19 suggests the patient may benefit from discharge to home. Please refer to the recommendation of the Occupational Therapist for safe discharge planning.    AM-PAC Daily Activity Inpatient   Lower Body Dressing 4   Bathing 3   Toileting 4   Upper Body Dressing 4   Grooming 4   Eating 4   Daily Activity Raw Score 23   Daily Activity Standardized Score (Calc for Raw Score >=11) 51.12   AM-PAC Applied Cognition Inpatient   Following a Speech/Presentation 4   Understanding Ordinary Conversation 3   Taking Medications 3   Remembering Where Things Are Placed or Put Away 3   Remembering List of 4-5 Errands 3   Taking Care of Complicated Tasks 3   Applied Cognition Raw Score 19   Applied Cognition Standardized Score 39.77     Elsie Magallanes, OT

## 2023-07-20 NOTE — PROGRESS NOTES
Cardiology Progress Note - Phoebe Route 75 y.o. male MRN: [id number]    Unit/Bed#: E4 -01 Encounter: [id number]      Assessment & Plan:    New onset A-fib   -Developed A-fib with RVR in setting of sepsis  -Anticoagulated on weight-based Lovenox  - Rate controlled with p.o. diltiazem 30 mg every 6 hours, Coreg 25 mg twice daily and also loaded with digoxin  - Also on diltiazem drip this morning    Sepsis secondary to UTI ([address])  -Blood cultures positive for Aerococcus urinae, likely secondary to left-sided hydronephrosis  -Completing antibiotics on 7/20/2023    Non-MI troponin elevation  - Troponin trend 2170->2025->1933->1228  -TTE 7/7/2023 showed EF 55%, grade 1 DD, mildly dilated RV, mild LA and RA, mild TR  -Troponin elevation likely due to sepsis  - May also have underlying coronary artery disease, will check a nuclear pharmacologic stress test    Pyelonephritis    Acute kidney injury ([address])    Tobacco use    COPD (chronic obstructive pulmonary disease) ([facility])    Alcohol withdrawal ([address])    Hydronephrosis with obstructing calculus    Encounter for competency evaluation    Summary:  - Resting rates are well controlled around 90 to 100 bpm, however having spikes with rates in the 130s to 140s with exertion  - Diltiazem drip discontinued this morning, increased p.o. diltiazem to 60 mg every 6 hours continue with Coreg 25 mg twice daily-> may need to change Coreg to metoprolol if still having difficulty controlling rates or if he becomes hypotensive  - Loaded with digoxin 250 mcg IV x 2 yesterday, therapeutic dig level this morning, plan to continue with digoxin 125 mcg p.o. daily tomorrow  - Since patient requires further inpatient hospitalization to control his atrial fibrillation, will check a nuclear pharmacologic stress test tomorrow to evaluate for obstructive CAD    Subjective:   No significant events overnight. Reports feeling well this morning.   He does acknowledge some dyspnea exertion when he walks around the hallways. Denies chest pain, shortness of breath at rest, abdominal pain, nausea, vomiting, fever, chills, headache, dizziness or palpitations. Objective:     Vitals: Blood pressure 118/80, pulse 81, temperature 97.9 °F (36.6 °C), temperature source Temporal, resp. rate 20, height 6' 3" (1.905 m), weight 102 kg (224 lb), SpO2 96 %. , Body mass index is 28 kg/m².,   Orthostatic Blood Pressures    Flowsheet Row Most Recent Value   Blood Pressure 118/80 filed at 07/20/2023 5319   Patient Position - Orthostatic VS Sitting filed at 07/20/2023 0753            Intake/Output Summary (Last 24 hours) at 7/20/2023 1016  Last data filed at 7/20/2023 0100  Gross per 24 hour   Intake --   Output 200 ml   Net -200 ml           Physical Exam:    GEN: Jovita Alejo appears well, alert and oriented x 2-3, pleasant and cooperative   HEENT: Mucous membranes moist, no scleral icterus, no conjunctival pallor  NECK: No elevated JVD  HEART: Irregularly irregular rhythm, regular rate, 2/6 systolic murmur  LUNGS: clear to auscultation bilaterally; no wheezes, rales, or rhonchi   ABDOMEN: normal bowel sounds, soft, no tenderness, no distention  EXTREMITIES: peripheral pulses normal; no lower extremity edema   NEURO: no focal findings   SKIN: No lesions or rashes on exposed skin        Current Facility-Administered Medications:   •  acetaminophen (TYLENOL) tablet 650 mg, 650 mg, Oral, Q6H PRN, Ulysess Sevin, CRNP, 650 mg at 07/19/23 0920  •  albuterol inhalation solution 2.5 mg, 2.5 mg, Nebulization, Q6H PRN, Ulysess Sevin, CRNP, 2.5 mg at 07/12/23 0027  •  aluminum-magnesium hydroxide-simethicone (MYLANTA) oral suspension 30 mL, 30 mL, Oral, Q6H PRN, Ulysess Sevin, CRNP  •  amoxicillin (AMOXIL) capsule 1,000 mg, 1,000 mg, Oral, Q8H [facility], Juaquin Rice MD, 1,000 mg at 07/20/23 6193  •  aspirin chewable tablet 81 mg, 81 mg, Oral, Daily, Sneha Hodges DO, 81 mg at 07/20/23 8287  •  atorvastatin (LIPITOR) tablet 40 mg, 40 mg, Oral, Daily With Coral Palomino, DO, 40 mg at 07/19/23 1654  •  bisacodyl (DULCOLAX) EC tablet 10 mg, 10 mg, Oral, Daily PRN, Ulysess Sevin, CRNP  •  diltiazem (CARDIZEM) 125 mg in sodium chloride 0.9 % 125 mL infusion, 1-15 mg/hr, Intravenous, Titrated, Jud Panda PA-C, Stopped at 07/20/23 0847  •  diltiazem (CARDIZEM) tablet 30 mg, 30 mg, Oral, Q6H [address], Chintan Munoz MD  •  enoxaparin (LOVENOX) subcutaneous injection 100 mg, 1 mg/kg, Subcutaneous, Q12H [address], Joceline Carrero, LUISITONP, 100 mg at 07/20/23 0840  •  fluticasone-vilanterol 200-25 mcg/actuation 1 puff, 1 puff, Inhalation, Daily, Ulysess Sevin, CRNP, 1 puff at 41/65/58 5996  •  folic acid (FOLVITE) tablet 1 mg, 1 mg, Oral, Daily, Marianne Mackey PA-C, 1 mg at 07/20/23 6740  •  HYDROmorphone HCl (DILAUDID) injection 0.2 mg, 0.2 mg, Intravenous, Q4H PRN, Ulysess Sevin, CRNP  •  hydrOXYzine HCL (ATARAX) tablet 50 mg, 50 mg, Oral, HS PRN, Ulysess Sevin, CRNP, 50 mg at 07/19/23 2249  •  melatonin tablet 6 mg, 6 mg, Oral, HS, Ulysess Sevin, CRNP, 6 mg at 07/19/23 2249  •  multivitamin-minerals (CENTRUM) tablet 1 tablet, 1 tablet, Oral, Daily, Marianne Mackey PA-C, 1 tablet at 07/20/23 4566  •  nicotine (NICODERM CQ) 14 mg/24hr TD 24 hr patch 1 patch, 1 patch, Transdermal, Daily, Ulysess Sevin, CRNP, 1 patch at 07/20/23 0840  •  ondansetron (ZOFRAN) injection 4 mg, 4 mg, Intravenous, Q6H PRN, Ulysess Sevin, CRNP  •  oxyCODONE (ROXICODONE) IR tablet 5 mg, 5 mg, Oral, 4x Daily PRN, Ulysess Sevin, CRNP  •  pantoprazole (PROTONIX) EC tablet 40 mg, 40 mg, Oral, Early Morning, Ulysess Sevin, CRNP, 40 mg at 07/20/23 8898  •  senna (SENOKOT) tablet 17.2 mg, 2 tablet, Oral, HS, Ulysess Sevin, CRNP, 17.2 mg at 07/13/23 2203  •  simethicone (MYLICON) chewable tablet 80 mg, 80 mg, Oral, 4x Daily PRN, Ulysess Sevin, CRNP  •  thiamine tablet 100 mg, 100 mg, Oral, Daily, Adam Hernandez PA-C, 100 mg at 07/20/23 6710    Labs & Results:    Lab Results   Component Value Date    TROPONINI <0.02 05/08/2018       Lab Results   Component Value Date    CALCIUM 9.4 07/20/2023    K 4.4 07/20/2023    CO2 29 07/20/2023     07/20/2023    BUN 19 07/20/2023    CREATININE 1.02 07/20/2023       Lab Results   Component Value Date    WBC 12.28 (H) 07/18/2023    HGB 12.8 07/18/2023    HCT 40.3 07/18/2023     (H) 07/18/2023     07/18/2023           No results found for: "CHOL"  Lab Results   Component Value Date    HDL 49 07/07/2023     Lab Results   Component Value Date    LDLCALC 80 07/07/2023     Lab Results   Component Value Date    TRIG 67 07/07/2023       Lab Results   Component Value Date    ALT 15 07/20/2023    AST 16 07/20/2023    ALKPHOS 72 07/20/2023         EKG personally reviewed by )Oseas Kiran MD. No acute changes   TELE: No significant arrhythmias seen on telemetry review.

## 2023-07-20 NOTE — PLAN OF CARE
Problem: Potential for Falls  Goal: Patient will remain free of falls  Description: INTERVENTIONS:  - Educate patient/family on patient safety including physical limitations  - Instruct patient to call for assistance with activity   - Consult OT/PT to assist with strengthening/mobility   - Keep Call bell within reach  - Keep bed low and locked with side rails adjusted as appropriate  - Keep care items and personal belongings within reach  - Initiate and maintain comfort rounds  - Make Fall Risk Sign visible to staff  - Offer Toileting every 2 Hours, in advance of need  - Initiate/Maintain bed alarm  - Obtain necessary fall risk management equipment: bed alarm   - Apply yellow socks and bracelet for high fall risk patients  - Consider moving patient to room near nurses station  Outcome: Progressing     Problem: MOBILITY - ADULT  Goal: Maintain or return to baseline ADL function  Description: INTERVENTIONS:  -  Assess patient's ability to carry out ADLs; assess patient's baseline for ADL function and identify physical deficits which impact ability to perform ADLs (bathing, care of mouth/teeth, toileting, grooming, dressing, etc.)  - Assess/evaluate cause of self-care deficits   - Assess range of motion  - Assess patient's mobility; develop plan if impaired  - Assess patient's need for assistive devices and provide as appropriate  - Encourage maximum independence but intervene and supervise when necessary  - Involve family in performance of ADLs  - Assess for home care needs following discharge   - Consider OT consult to assist with ADL evaluation and planning for discharge  - Provide patient education as appropriate  Outcome: Progressing  Goal: Maintains/Returns to pre admission functional level  Description: INTERVENTIONS:  - Perform BMAT or MOVE assessment daily.   - Set and communicate daily mobility goal to care team and patient/family/caregiver.    - Collaborate with rehabilitation services on mobility goals if consulted  - Perform Range of Motion 4 times a day. - Reposition patient every 2 hours.   - Dangle patient 4 times a day  - Stand patient 4 times a day  - Ambulate patient 4 times a day  - Out of bed to chair 4 times a day   - Out of bed for meals 4 times a day  - Out of bed for toileting  - Record patient progress and toleration of activity level   Outcome: Progressing     Problem: PAIN - ADULT  Goal: Verbalizes/displays adequate comfort level or baseline comfort level  Description: Interventions:  - Encourage patient to monitor pain and request assistance  - Assess pain using appropriate pain scale  - Administer analgesics based on type and severity of pain and evaluate response  - Implement non-pharmacological measures as appropriate and evaluate response  - Consider cultural and social influences on pain and pain management  - Notify physician/advanced practitioner if interventions unsuccessful or patient reports new pain  Outcome: Progressing     Problem: INFECTION - ADULT  Goal: Absence or prevention of progression during hospitalization  Description: INTERVENTIONS:  - Assess and monitor for signs and symptoms of infection  - Monitor lab/diagnostic results  - Monitor all insertion sites, i.e. indwelling lines, tubes, and drains  - Monitor endotracheal if appropriate and nasal secretions for changes in amount and color  - [place] appropriate cooling/warming therapies per order  - Administer medications as ordered  - Instruct and encourage patient and family to use good hand hygiene technique  - Identify and instruct in appropriate isolation precautions for identified infection/condition  Outcome: Progressing  Goal: Absence of fever/infection during neutropenic period  Description: INTERVENTIONS:  - Monitor WBC    Outcome: Progressing     Problem: SAFETY ADULT  Goal: Patient will remain free of falls  Description: INTERVENTIONS:  - Educate patient/family on patient safety including physical limitations  - Instruct patient to call for assistance with activity   - Consult OT/PT to assist with strengthening/mobility   - Keep Call bell within reach  - Keep bed low and locked with side rails adjusted as appropriate  - Keep care items and personal belongings within reach  - Initiate and maintain comfort rounds  - Make Fall Risk Sign visible to staff  - Offer Toileting every 2 Hours, in advance of need  - Initiate/Maintain bed alarm  - Obtain necessary fall risk management equipment: bed alarm   - Apply yellow socks and bracelet for high fall risk patients  - Consider moving patient to room near nurses station  Outcome: Progressing  Goal: Maintain or return to baseline ADL function  Description: INTERVENTIONS:  -  Assess patient's ability to carry out ADLs; assess patient's baseline for ADL function and identify physical deficits which impact ability to perform ADLs (bathing, care of mouth/teeth, toileting, grooming, dressing, etc.)  - Assess/evaluate cause of self-care deficits   - Assess range of motion  - Assess patient's mobility; develop plan if impaired  - Assess patient's need for assistive devices and provide as appropriate  - Encourage maximum independence but intervene and supervise when necessary  - Involve family in performance of ADLs  - Assess for home care needs following discharge   - Consider OT consult to assist with ADL evaluation and planning for discharge  - Provide patient education as appropriate  Outcome: Progressing  Goal: Maintains/Returns to pre admission functional level  Description: INTERVENTIONS:  - Perform BMAT or MOVE assessment daily.   - Set and communicate daily mobility goal to care team and patient/family/caregiver. - Collaborate with rehabilitation services on mobility goals if consulted  - Perform Range of Motion 4 times a day. - Reposition patient every 2 hours.   - Dangle patient 4 times a day  - Stand patient 4 times a day  - Ambulate patient 4 times a day  - Out of bed to chair 4 times a day   - Out of bed for meals 4 times a day  - Out of bed for toileting  - Record patient progress and toleration of activity level   Outcome: Progressing     Problem: DISCHARGE PLANNING  Goal: Discharge to home or other facility with appropriate resources  Description: INTERVENTIONS:  - Identify barriers to discharge w/patient and caregiver  - Arrange for needed discharge resources and transportation as appropriate  - Identify discharge learning needs (meds, wound care, etc.)  - Arrange for interpretive services to assist at discharge as needed  - Refer to Case Management Department for coordinating discharge planning if the patient needs post-hospital services based on physician/advanced practitioner order or complex needs related to functional status, cognitive ability, or social support system  Outcome: Progressing     Problem: Knowledge Deficit  Goal: Patient/family/caregiver demonstrates understanding of disease process, treatment plan, medications, and discharge instructions  Description: Complete learning assessment and assess knowledge base.   Interventions:  - Provide teaching at level of understanding  - Provide teaching via preferred learning methods  Outcome: Progressing

## 2023-07-20 NOTE — PLAN OF CARE
Problem: OCCUPATIONAL THERAPY ADULT  Goal: Performs self-care activities at highest level of function for planned discharge setting. See evaluation for individualized goals. Description: Treatment Interventions: ADL retraining, Functional transfer training, UE strengthening/ROM, Endurance training, Cognitive reorientation, Patient/family training, Equipment evaluation/education, Compensatory technique education, Continued evaluation  Equipment Recommended:  (RW vs SPC)       See flowsheet documentation for full assessment, interventions and recommendations. Outcome: Adequate for Discharge  Note: Limitation: Decreased ADL status, Decreased UE strength, Decreased Safe judgement during ADL, Decreased cognition, Decreased endurance, Decreased high-level ADLs  Prognosis: Fair  Assessment: Pt seen for skilled OT tx this date. Tx focused on improving strength, activity tolerance and balance, safety awareness to increase independence with self care tasks. Pt tolerated session well. Pt was limited by Afib during session. Pt greeted in supine and was agreeable to skilled OT session. Pt performed LB dressing Kenisha, Toileting Kenisha,  bed mobility kenisha, transfers Kenisha, mobility with no device supervision functional distances in the room. Pt demonstrated good sitting balance and fair standing balance during functional tasks, no LOB noted. Pt required minimal verbal cuing during session to safely complete tasks. Pt completed 3x10 of various BUE exercises to increase strength. Performed while supine. Tolerated well. Vitals: pt up and to the bathroom and HR solomon. Tele alarming and RN came and reported that HR was too high and that be should return to bed at this time. Current OT DC recommendations for pt is home with HH Vs OPPT. Pt has reached functional baseline and met goals. DC from skilled OT this date. Please re-consult if there is a functional decline. Pt will remain on restorative therapy services.      OT Discharge Recommendation: Home with home health rehabilitation (vs OPPT)

## 2023-07-21 ENCOUNTER — APPOINTMENT (INPATIENT)
Dept: NON INVASIVE DIAGNOSTICS | Facility: HOSPITAL | Age: 73
DRG: 853 | End: 2023-07-21
Payer: MEDICARE

## 2023-07-21 ENCOUNTER — APPOINTMENT (INPATIENT)
Dept: NUCLEAR MEDICINE | Facility: HOSPITAL | Age: 73
DRG: 853 | End: 2023-07-21
Payer: MEDICARE

## 2023-07-21 LAB
ALL TARGETS: NOT DETECTED
BACTERIA BLD CULT: ABNORMAL
BACTERIA BLD CULT: ABNORMAL
BACTERIA UR CULT: ABNORMAL
BACTERIA UR CULT: ABNORMAL
CHEST PAIN STATEMENT: NORMAL
GRAM STN SPEC: ABNORMAL
MAX DIASTOLIC BP: 80 MMHG
MAX HEART RATE: 100 BPM
MAX HR PERCENT: 68 %
MAX HR: 100 BPM
MAX PREDICTED HEART RATE: 146 BPM
MAX. SYSTOLIC BP: 128 MMHG
PROTOCOL NAME: NORMAL
RATE PRESSURE PRODUCT: NORMAL
REASON FOR TERMINATION: NORMAL
SL CV REST NUCLEAR ISOTOPE DOSE: 11 MCI
SL CV STRESS NUCLEAR ISOTOPE DOSE: 33 MCI
SL CV STRESS RECOVERY BP: NORMAL MMHG
SL CV STRESS RECOVERY HR: 96 BPM
SL CV STRESS RECOVERY O2 SAT: 95 %
STRESS ANGINA INDEX: 0
STRESS BASELINE BP: NORMAL MMHG
STRESS BASELINE HR: 83 BPM
STRESS O2 SAT REST: 97 %
STRESS PEAK HR: 100 BPM
STRESS POST ESTIMATED WORKLOAD: 1 METS
STRESS POST O2 SAT PEAK: 95 %
STRESS POST PEAK BP: 128 MMHG
STRESS/REST PERFUSION RATIO: 1.04
TARGET HR FORMULA: NORMAL
TEST INDICATION: NORMAL
TIME IN EXERCISE PHASE: NORMAL

## 2023-07-21 PROCEDURE — 93017 CV STRESS TEST TRACING ONLY: CPT

## 2023-07-21 PROCEDURE — 78452 HT MUSCLE IMAGE SPECT MULT: CPT | Performed by: STUDENT IN AN ORGANIZED HEALTH CARE EDUCATION/TRAINING PROGRAM

## 2023-07-21 PROCEDURE — 78452 HT MUSCLE IMAGE SPECT MULT: CPT

## 2023-07-21 PROCEDURE — G1004 CDSM NDSC: HCPCS

## 2023-07-21 PROCEDURE — 99232 SBSQ HOSP IP/OBS MODERATE 35: CPT | Performed by: STUDENT IN AN ORGANIZED HEALTH CARE EDUCATION/TRAINING PROGRAM

## 2023-07-21 PROCEDURE — 93018 CV STRESS TEST I&R ONLY: CPT | Performed by: STUDENT IN AN ORGANIZED HEALTH CARE EDUCATION/TRAINING PROGRAM

## 2023-07-21 PROCEDURE — A9502 TC99M TETROFOSMIN: HCPCS

## 2023-07-21 PROCEDURE — 99232 SBSQ HOSP IP/OBS MODERATE 35: CPT | Performed by: INTERNAL MEDICINE

## 2023-07-21 PROCEDURE — 93016 CV STRESS TEST SUPVJ ONLY: CPT | Performed by: STUDENT IN AN ORGANIZED HEALTH CARE EDUCATION/TRAINING PROGRAM

## 2023-07-21 RX ORDER — METOPROLOL SUCCINATE 50 MG/1
50 TABLET, EXTENDED RELEASE ORAL 2 TIMES DAILY
Status: DISCONTINUED | OUTPATIENT
Start: 2023-07-21 | End: 2023-07-22

## 2023-07-21 RX ORDER — DILTIAZEM HYDROCHLORIDE 120 MG/1
240 CAPSULE, COATED, EXTENDED RELEASE ORAL DAILY
Status: DISCONTINUED | OUTPATIENT
Start: 2023-07-22 | End: 2023-07-26

## 2023-07-21 RX ORDER — REGADENOSON 0.08 MG/ML
0.4 INJECTION, SOLUTION INTRAVENOUS ONCE
Status: COMPLETED | OUTPATIENT
Start: 2023-07-21 | End: 2023-07-21

## 2023-07-21 RX ADMIN — Medication 1 TABLET: at 11:28

## 2023-07-21 RX ADMIN — DILTIAZEM HYDROCHLORIDE 60 MG: 60 TABLET ORAL at 06:07

## 2023-07-21 RX ADMIN — FOLIC ACID 1 MG: 1 TABLET ORAL at 11:28

## 2023-07-21 RX ADMIN — PANTOPRAZOLE SODIUM 40 MG: 40 TABLET, DELAYED RELEASE ORAL at 06:07

## 2023-07-21 RX ADMIN — DILTIAZEM HYDROCHLORIDE 60 MG: 60 TABLET ORAL at 11:33

## 2023-07-21 RX ADMIN — ENOXAPARIN SODIUM 100 MG: 100 INJECTION SUBCUTANEOUS at 11:25

## 2023-07-21 RX ADMIN — DIGOXIN 125 MCG: 125 TABLET ORAL at 11:27

## 2023-07-21 RX ADMIN — ASPIRIN 81 MG 81 MG: 81 TABLET ORAL at 11:28

## 2023-07-21 RX ADMIN — METOPROLOL TARTRATE 25 MG: 25 TABLET, FILM COATED ORAL at 11:33

## 2023-07-21 RX ADMIN — METOPROLOL SUCCINATE 50 MG: 50 TABLET, EXTENDED RELEASE ORAL at 17:55

## 2023-07-21 RX ADMIN — DILTIAZEM HYDROCHLORIDE 60 MG: 60 TABLET ORAL at 23:30

## 2023-07-21 RX ADMIN — REGADENOSON 0.4 MG: 0.08 INJECTION, SOLUTION INTRAVENOUS at 09:50

## 2023-07-21 RX ADMIN — FLUTICASONE FUROATE AND VILANTEROL TRIFENATATE 1 PUFF: 200; 25 POWDER RESPIRATORY (INHALATION) at 11:25

## 2023-07-21 RX ADMIN — MELATONIN 6 MG: at 21:59

## 2023-07-21 RX ADMIN — Medication 1 PATCH: at 11:25

## 2023-07-21 RX ADMIN — THIAMINE HCL TAB 100 MG 100 MG: 100 TAB at 11:29

## 2023-07-21 RX ADMIN — METOPROLOL TARTRATE 25 MG: 25 TABLET, FILM COATED ORAL at 04:53

## 2023-07-21 RX ADMIN — SENNOSIDES 17.2 MG: 8.6 TABLET, FILM COATED ORAL at 22:00

## 2023-07-21 RX ADMIN — ENOXAPARIN SODIUM 100 MG: 100 INJECTION SUBCUTANEOUS at 20:18

## 2023-07-21 RX ADMIN — ATORVASTATIN CALCIUM 40 MG: 40 TABLET, FILM COATED ORAL at 17:56

## 2023-07-21 RX ADMIN — DILTIAZEM HYDROCHLORIDE 60 MG: 60 TABLET ORAL at 17:56

## 2023-07-21 NOTE — RESTORATIVE TECHNICIAN NOTE
Restorative Technician Note      Patient Name: Rashid Robertson [facility] Visit Date: 07/21/23  Note Type: Mobility  Patient Position Upon Consult: Supine  Mobility / Activity Provided: pt refused to walk but said he would walk later on if I can come back  Activity Performed: Ambulated  Patient Position at End of Consult: Bedside chair;  All needs within reach; Bed/Chair alarm activated

## 2023-07-21 NOTE — RESTORATIVE TECHNICIAN NOTE
Restorative Technician Note      Patient Name: Laz Diamond     Restorative Tech Visit Date: 07/21/23  Note Type: Mobility  Patient Position Upon Consult: Supine  Mobility / Activity Provided: pt refused to walk but said he would walk later on if I can come back  Patient Position at End of Consult: Supine;  All needs within reach; Bed/Chair alarm activated

## 2023-07-21 NOTE — PROGRESS NOTES
Progress Note - Derrick Yang 75 y.o. male MRN: [id number]    Unit/Bed#: E4 -01 Encounter: [id number]      Subjective: The patient is feeling pretty well. He has no chest pain, shortness of breath, abdominal pain, nausea, or vomiting. Physical Exam:   Temp:  [96.8 °F (36 °C)-98.1 °F (36.7 °C)] 97.8 °F (36.6 °C)  HR:  [48-80] 79  Resp:  [18-20] 18  BP: (120-138)/(48-74) 138/71    Gen: Well-developed, well-nourished, in no distress. Neck: Supple. No lymphadenopathy, goiter, or bruit. Heart: Irregular rhythm. I heard no murmur, gallop, or rub. Lungs: Clear to auscultation and percussion. No wheezing, rales, or rhonchi. Abd: Soft with active bowel sounds. No mass, tenderness, organomegaly. Extremities: No clubbing, cyanosis, or edema. No calf tenderness. Neuro: Alert and oriented. No focal sign. Skin: Warm and dry. LABS: No new labs. Assessment/Plan:  1. Urinary tract infection  2. Aerococcus sepsis secondary to #1  3. Left ureteral stone with hydronephrosis, status post stent  4. Acute kidney injury on admission, now resolved  5. New onset atrial fibrillation  6. Non-MI troponin elevation, attributed to sepsis  7. COPD  8. Cigarette abuse  9. Esophageal abnormality on CT, presumed esophagitis  10. Onychomycosis  11. Alcohol use  12. Abnormal mental status, improved. The patient is doing pretty well overall. He has completed therapy for Aerococcus sepsis. His kidney function has been stable. His heart rate is well controlled. Nuclear medicine stress test was normal except for suspected diaphragmatic attenuation. Reevaluation by neuropsychology is pending. The patient's mental status does seem to have improved. I am hopeful that he will be able to be discharged home in the near future.     VTE Pharmacologic Prophylaxis: Enoxaparin (Lovenox)  VTE Mechanical Prophylaxis: reason for no mechanical VTE prophylaxis Therapeutically anticoagulated

## 2023-07-21 NOTE — PROGRESS NOTES
Cardiology Progress Note - Bernarda Otero 75 y.o. male MRN: [id number]    Unit/Bed#: E4 -01 Encounter: [id number]      Assessment & Plan:    New onset A-fib   -Developed A-fib with RVR in setting of sepsis  -Anticoagulated on weight-based Lovenox  - Rate controlled with p.o. diltiazem 60 mg every 6 hours, Lopressor 25 mg every 6 hours and and digoxin 125 mcg daily  -Diltiazem drip discontinued    Sepsis secondary to UTI ([address])  -Blood cultures positive for Aerococcus urinae, likely secondary to left-sided hydronephrosis  -Completing antibiotics on 7/20/2023    Non-MI troponin elevation  - Troponin trend 2170->2025->1933->1228  -TTE 7/7/2023 showed EF 55%, grade 1 DD, mildly dilated RV, mild LA and RA, mild TR  -Troponin elevation likely due to sepsis  -Underwent nuclear pharmacologic stress test today, shows artifact with no clear evidence of inducible ischemia or scar    Pyelonephritis    Acute kidney injury ([address])    Tobacco use    COPD (chronic obstructive pulmonary disease) ([facility])    Alcohol withdrawal ([facility])    Hydronephrosis with obstructing calculus    Encounter for competency evaluation    Summary:  -Heart rates better controlled today  - Will change diltiazem to long-acting 240 mg daily starting tomorrow and Lopressor to Toprol-XL 50 mg twice daily starting this evening  - Continue with digoxin 125 mcg daily  - Can change to Eliquis 5 mg twice daily on discharge  -Nuclear pharmacologic stress test today showed artifact with no clear evidence of inducible ischemia or scar  - Otherwise stable from a cardiac standpoint    Subjective:   No significant events overnight. He reports feeling well this evening and has no complaints. Denies chest pain, shortness of breath at rest, abdominal pain, nausea, vomiting, fever, chills, headache, dizziness or palpitations. Objective:     Vitals: Blood pressure 125/72, pulse 72, temperature (!) 96.8 °F (36 °C), temperature source Temporal, resp.  rate 18, height 6' 3" (1.905 m), weight 102 kg (224 lb), SpO2 96 %. , Body mass index is 28 kg/m².,   Orthostatic Blood Pressures    Flowsheet Row Most Recent Value   Blood Pressure 125/72 filed at 07/21/2023 1130   Patient Position - Orthostatic VS Lying filed at 07/21/2023 1130            Intake/Output Summary (Last 24 hours) at 7/21/2023 1400  Last data filed at 7/20/2023 1801  Gross per 24 hour   Intake --   Output 200 ml   Net -200 ml           Physical Exam:    GEN: Priscilla Holman appears well, alert and oriented x 2-3, pleasant and cooperative   HEENT: Mucous membranes moist, no scleral icterus, no conjunctival pallor  NECK: No elevated JVD  HEART: Irregularly irregular rhythm, regular rate, 2/6 systolic murmur  LUNGS: clear to auscultation bilaterally; no wheezes, rales, or rhonchi   ABDOMEN: normal bowel sounds, soft, no tenderness, no distention  EXTREMITIES: peripheral pulses normal; no lower extremity edema   NEURO: no focal findings   SKIN: No lesions or rashes on exposed skin        Current Facility-Administered Medications:   •  acetaminophen (TYLENOL) tablet 650 mg, 650 mg, Oral, Q6H PRN, Webb Diver, CRNP, 650 mg at 07/19/23 0920  •  albuterol inhalation solution 2.5 mg, 2.5 mg, Nebulization, Q6H PRN, Webb Diver, CRNP, 2.5 mg at 07/12/23 0027  •  aluminum-magnesium hydroxide-simethicone (MYLANTA) oral suspension 30 mL, 30 mL, Oral, Q6H PRN, Webb Diver, CRNP  •  aspirin chewable tablet 81 mg, 81 mg, Oral, Daily, Vianey Chew, DO, 81 mg at 07/21/23 1128  •  atorvastatin (LIPITOR) tablet 40 mg, 40 mg, Oral, Daily With Dinner, Vianey Chew, DO, 40 mg at 07/20/23 1653  •  bisacodyl (DULCOLAX) EC tablet 10 mg, 10 mg, Oral, Daily PRN, Webb Diver, CRNP  •  digoxin (LANOXIN) tablet 125 mcg, 125 mcg, Oral, Daily, Saul Dia MD, 125 mcg at 07/21/23 1127  •  diltiazem (CARDIZEM) 125 mg in sodium chloride 0.9 % 125 mL infusion, 1-15 mg/hr, Intravenous, Titrated, Jud LOPEZ PETER Panda, Stopped at 07/20/23 0847  •  diltiazem (CARDIZEM) tablet 60 mg, 60 mg, Oral, Q6H [facility], Fallon Lopez MD, 60 mg at 07/21/23 1133  •  enoxaparin (LOVENOX) subcutaneous injection 100 mg, 1 mg/kg, Subcutaneous, Q12H [facility], KIKI Acevedo, 100 mg at 07/21/23 1125  •  fluticasone-vilanterol 200-25 mcg/actuation 1 puff, 1 puff, Inhalation, Daily, Skippy Prowers, CRNP, 1 puff at 30/56/83 7026  •  folic acid (FOLVITE) tablet 1 mg, 1 mg, Oral, Daily, Sayra Lafleur PA-C, 1 mg at 07/21/23 1128  •  HYDROmorphone HCl (DILAUDID) injection 0.2 mg, 0.2 mg, Intravenous, Q4H PRN, Skippy Prowers, CRNP  •  hydrOXYzine HCL (ATARAX) tablet 50 mg, 50 mg, Oral, HS PRN, Skippy Prowers, CRNP, 50 mg at 07/19/23 2249  •  melatonin tablet 6 mg, 6 mg, Oral, HS, Skippy Prowers, CRNP, 6 mg at 07/20/23 2119  •  metoprolol tartrate (LOPRESSOR) tablet 25 mg, 25 mg, Oral, Q6H, Fallon Lopez MD, 25 mg at 07/21/23 1133  •  multivitamin-minerals (CENTRUM) tablet 1 tablet, 1 tablet, Oral, Daily, Sayra Lafleur PA-C, 1 tablet at 07/21/23 1128  •  nicotine (NICODERM CQ) 14 mg/24hr TD 24 hr patch 1 patch, 1 patch, Transdermal, Daily, Skippy Prowers, CRNP, 1 patch at 07/21/23 1125  •  ondansetron (ZOFRAN) injection 4 mg, 4 mg, Intravenous, Q6H PRN, Skippy Prowers, CRNP  •  oxyCODONE (ROXICODONE) IR tablet 5 mg, 5 mg, Oral, 4x Daily PRN, Skippy Prowers, CRNP  •  pantoprazole (PROTONIX) EC tablet 40 mg, 40 mg, Oral, Early Morning, Skippy Prowers, CRNP, 40 mg at 07/21/23 0607  •  senna (SENOKOT) tablet 17.2 mg, 2 tablet, Oral, HS, Skippy Prowers, CRNP, 17.2 mg at 07/20/23 2119  •  simethicone (MYLICON) chewable tablet 80 mg, 80 mg, Oral, 4x Daily PRN, Skippy Prowers, CRNP  •  thiamine tablet 100 mg, 100 mg, Oral, Daily, Sayra Lafleur PA-C, 100 mg at 07/21/23 1129    Labs & Results:    Lab Results   Component Value Date    TROPONINI <0.02 05/08/2018       Lab Results   Component Value Date    CALCIUM 9.4 07/20/2023    K 4.4 07/20/2023    CO2 29 07/20/2023     07/20/2023    BUN 19 07/20/2023    CREATININE 1.02 07/20/2023       Lab Results   Component Value Date    WBC 12.28 (H) 07/18/2023    HGB 12.8 07/18/2023    HCT 40.3 07/18/2023     (H) 07/18/2023     07/18/2023           No results found for: "CHOL"  Lab Results   Component Value Date    HDL 49 07/07/2023     Lab Results   Component Value Date    LDLCALC 80 07/07/2023     Lab Results   Component Value Date    TRIG 67 07/07/2023       Lab Results   Component Value Date    ALT 15 07/20/2023    AST 16 07/20/2023    ALKPHOS 72 07/20/2023         EKG personally reviewed by )Nayeli Irizarry MD. No acute changes   TELE: No significant arrhythmias seen on telemetry review.

## 2023-07-22 PROBLEM — I48.91 ATRIAL FIBRILLATION (HCC): Status: ACTIVE | Noted: 2023-07-22

## 2023-07-22 PROBLEM — Z78.9 DAILY CONSUMPTION OF ALCOHOL: Status: ACTIVE | Noted: 2023-07-09

## 2023-07-22 PROBLEM — N17.9 ACUTE KIDNEY INJURY (HCC): Status: RESOLVED | Noted: 2023-07-06 | Resolved: 2023-07-22

## 2023-07-22 PROCEDURE — 99232 SBSQ HOSP IP/OBS MODERATE 35: CPT | Performed by: INTERNAL MEDICINE

## 2023-07-22 RX ADMIN — FOLIC ACID 1 MG: 1 TABLET ORAL at 09:20

## 2023-07-22 RX ADMIN — MELATONIN 6 MG: at 22:13

## 2023-07-22 RX ADMIN — ASPIRIN 81 MG 81 MG: 81 TABLET ORAL at 09:20

## 2023-07-22 RX ADMIN — METOPROLOL SUCCINATE 50 MG: 50 TABLET, EXTENDED RELEASE ORAL at 09:20

## 2023-07-22 RX ADMIN — DILTIAZEM HYDROCHLORIDE 240 MG: 120 CAPSULE, COATED, EXTENDED RELEASE ORAL at 09:20

## 2023-07-22 RX ADMIN — METOPROLOL SUCCINATE 75 MG: 25 TABLET, EXTENDED RELEASE ORAL at 17:04

## 2023-07-22 RX ADMIN — THIAMINE HCL TAB 100 MG 100 MG: 100 TAB at 09:20

## 2023-07-22 RX ADMIN — Medication 1 TABLET: at 09:20

## 2023-07-22 RX ADMIN — ENOXAPARIN SODIUM 100 MG: 100 INJECTION SUBCUTANEOUS at 22:13

## 2023-07-22 RX ADMIN — DIGOXIN 125 MCG: 125 TABLET ORAL at 09:20

## 2023-07-22 RX ADMIN — PANTOPRAZOLE SODIUM 40 MG: 40 TABLET, DELAYED RELEASE ORAL at 04:44

## 2023-07-22 RX ADMIN — ATORVASTATIN CALCIUM 40 MG: 40 TABLET, FILM COATED ORAL at 17:04

## 2023-07-22 RX ADMIN — FLUTICASONE FUROATE AND VILANTEROL TRIFENATATE 1 PUFF: 200; 25 POWDER RESPIRATORY (INHALATION) at 09:20

## 2023-07-22 RX ADMIN — ENOXAPARIN SODIUM 100 MG: 100 INJECTION SUBCUTANEOUS at 09:20

## 2023-07-22 NOTE — PROGRESS NOTES
[address] [facility]  Progress Note  Name: Nestor Lamb I  MRN: [id number]  Unit/Bed#: E4 -01 I Date of Admission: 7/6/2023   Date of Service: 7/22/2023 I Hospital Day: 16    Assessment/Plan   * Sepsis secondary to UTI [facility])  Assessment & Plan  · Sepsis secondary to Aerococcus bacteremia and complicated urinary tract infection/pyelonephritis  · Antibiotic course completed  · Stable    Bacteremia  Assessment & Plan  · Aerococcus bacteremia secondary to obstructive  urinary tract infection/pyelonephritis  · Antibiotic course completed  · Stable    Atrial fibrillation (HCC)  Assessment & Plan  · Currently being rate controlled with digoxin 125 mcg daily, Cardizem 240 mg daily and metoprolol 75 mg twice daily  · Fully anticoagulated on Lovenox injections   · We will need to price check NOACs prior to discharge    Hydronephrosis with obstructing calculus  Assessment & Plan  · Admitted due to sepsis from obstructive urinary tract infection  · Status post cystoscopy with difficult stent insertion on 7/6/2023. There was inadvertent left ureteral perforation during the procedure. · Currently stable. Antibiotic course completed   · We will need to arrange for follow-up  with urology regarding further stent management/removal    Elevated troponin  Assessment & Plan  · Non-MI troponin elevation secondary to tachycardia and sepsis  · Continue aspirin and Lipitor    COPD (chronic obstructive pulmonary disease) ([facility])  Assessment & Plan  · Continue Breo 1 puff daily with as needed albuterol  · Continue tobacco cessation    Esophageal abnormality  Assessment & Plan  Stable on Protonix daily  Incidental finding on CTA study: Diffuse esophageal wall thickening which could be due to esophagitis  Outpatient GI follow-up     Daily consumption of alcohol  Assessment & Plan  Patient states he drinks a couple of beers daily.  No evidence of withdrawal at this time  Continue thiamine and folic acid supplementation    Encounter for competency evaluation  Assessment & Plan  Discharge canceled 2023. He  did not know where he lived and had no support. Neuropsych evaluation was done and showed that he had impaired range of functioning. Repeat neuropsych evaluation is pending. Need to make sure that he is able to care for himself especially with these new medications             VTE Pharmacologic Prophylaxis: VTE Score: 5 High Risk (Score >/= 5) - Pharmacological DVT Prophylaxis Ordered: enoxaparin (Lovenox). Sequential Compression Devices Ordered. Patient Centered Rounds: Discussed with his nurse  Discussions with Specialists or Other Care Team Provider: Hospital course reviewed in detail    Education and Discussions with Family / Patient: No contact number to call. Current Length of Stay: 16 day(s)  Current Patient Status: Inpatient   Certification Statement: The patient will continue to require additional inpatient hospital stay due to Competency  Discharge Plan: To be determined    Code Status: Level 1 - Full Code    Subjective:   Seen and examined during rounds  He was watching TV  Denied any pain or discomfort  He could not tell me his address  He said he had a roommate prior to admission but does not know his number    Objective:     Vitals:   Temp (24hrs), Av.9 °F (36.6 °C), Min:97.4 °F (36.3 °C), Max:98.3 °F (36.8 °C)    Temp:  [97.4 °F (36.3 °C)-98.3 °F (36.8 °C)] 97.7 °F (36.5 °C)  HR:  [] 78  Resp:  [16-18] 18  BP: ()/(54-85) 126/72  SpO2:  [94 %-98 %] 95 %  Body mass index is 28 kg/m². Input and Output Summary (last 24 hours):   No intake or output data in the 24 hours ending 23 1712    Physical Exam:   Physical Exam  Vitals reviewed. Constitutional:       Appearance: He is not ill-appearing. HENT:      Head: Normocephalic and atraumatic. Nose: No congestion or rhinorrhea. Eyes:      General: No scleral icterus.   Cardiovascular:      Rate and Rhythm: Rhythm irregular. Pulmonary:      Breath sounds: No wheezing, rhonchi or rales. Abdominal:      General: There is no distension. Palpations: Abdomen is soft. Tenderness: There is no abdominal tenderness. Musculoskeletal:      Cervical back: Neck supple. Right lower leg: No edema. Left lower leg: No edema. Skin:     General: Skin is warm and dry. Coloration: Skin is not jaundiced or pale.    Neurological:      Comments: Awake alert follows,  Dysphoric mood   Psychiatric:         Behavior: Behavior normal.      Comments: Slightly dysphoric mood     Additional Data:     Labs:  Results from last 7 days   Lab Units 07/18/23  0551   WBC Thousand/uL 12.28*   HEMOGLOBIN g/dL 12.8   HEMATOCRIT % 40.3   PLATELETS Thousands/uL 354   NEUTROS PCT % 76*   LYMPHS PCT % 15   MONOS PCT % 7   EOS PCT % 1     Results from last 7 days   Lab Units 07/20/23  0858   SODIUM mmol/L 138   POTASSIUM mmol/L 4.4   CHLORIDE mmol/L 105   CO2 mmol/L 29   BUN mg/dL 19   CREATININE mg/dL 1.02   ANION GAP mmol/L 4   CALCIUM mg/dL 9.4   ALBUMIN g/dL 3.5   TOTAL BILIRUBIN mg/dL 0.51   ALK PHOS U/L 72   ALT U/L 15   AST U/L 16   GLUCOSE RANDOM mg/dL 131                 Results from last 7 days   Lab Units 07/18/23  0551 07/17/23  2358   LACTIC ACID mmol/L  --  0.9   PROCALCITONIN ng/ml 0.11  --        Lines/Drains:  Invasive Devices     Peripheral Intravenous Line  Duration           Peripheral IV 07/22/23 Left Forearm <1 day          Drain  Duration           Ureteral Internal Stent Left ureter 6 Fr. 15 days                  Telemetry:  Telemetry Orders (From admission, onward)             24 Hour Telemetry Monitoring  Continuous x 24 Hours (Telem)        Expiring   Question:  Reason for 24 Hour Telemetry  Answer:  Arrhythmias requiring acute medical intervention / PPM or ICD malfunction                          Imaging: Reviewed radiology reports from this admission including: abdominal/pelvic CT and procedure reports    Recent Cultures (last 7 days):   Results from last 7 days   Lab Units 07/18/23  0353 07/18/23  0130   BLOOD CULTURE   --  No Growth After 4 Days. No Growth After 4 Days.    URINE CULTURE  >100,000 cfu/ml Stenotrophomonas maltophilia*  >100,000 cfu/ml Delftia acidovorans group*  --        Last 24 Hours Medication List:   Current Facility-Administered Medications   Medication Dose Route Frequency Provider Last Rate   • acetaminophen  650 mg Oral Q6H PRN [place] Luis Miguel, CRNP     • albuterol  2.5 mg Nebulization Q6H PRN [place] Luis Miguel, CRNP     • aluminum-magnesium hydroxide-simethicone  30 mL Oral Q6H PRN Michael Luis Miguel, CRNP     • aspirin  81 mg Oral Daily Valentin Zurita DO     • atorvastatin  40 mg Oral Daily With Lorin Novoa DO     • bisacodyl  10 mg Oral Daily PRN [place] Luis Miguel, CRNP     • digoxin  125 mcg Oral Daily Ramon Blair MD     • diltiazem  240 mg Oral Daily Ramon Blair MD     • enoxaparin  1 mg/kg Subcutaneous Q12H [facility] KIKI Acevedo     • fluticasone-vilanterol  1 puff Inhalation Daily Michael Luis Miguel, CRNP     • folic acid  1 mg Oral Daily Mesha Evans PA-C     • HYDROmorphone  0.2 mg Intravenous Q4H PRN Michael Luis Miguel, CRNP     • hydrOXYzine HCL  50 mg Oral HS PRN [place] Luis Miguel, CRNP     • melatonin  6 mg Oral HS [place] Luis Miguel, CRNP     • metoprolol succinate  75 mg Oral BID Danilo Kamara PA-C     • multivitamin-minerals  1 tablet Oral Daily Mesha Evans PA-C     • nicotine  1 patch Transdermal Daily Michael Luis Miguel, CRNP     • ondansetron  4 mg Intravenous Q6H PRN Michael Luis Miguel, CRNP     • oxyCODONE  5 mg Oral 4x Daily PRN [place] Luis Miguel, CRNP     • pantoprazole  40 mg Oral Early Morning Michael Luis Miguel, CRNP     • senna  2 tablet Oral HS Alle[place]y Luis Miguel, CRNP     • simethicone  80 mg Oral 4x Daily PRN Michael Luis Miguel, CRNP     • thiamine  100 mg Oral Daily eMsha Evans PA-C Today, Patient Was Seen By: Cristhian Dorantes MD    **Please Note: This note may have been constructed using a voice recognition system. **

## 2023-07-22 NOTE — ASSESSMENT & PLAN NOTE
· Currently being rate controlled with digoxin 125 mcg daily, Cardizem 240 mg daily and metoprolol 75 mg twice daily  · Fully anticoagulated on Lovenox injections   · We will need to price check NOACs prior to discharge

## 2023-07-22 NOTE — ASSESSMENT & PLAN NOTE
· Admitted due to sepsis from obstructive urinary tract infection  · Status post cystoscopy with difficult stent insertion on 7/6/2023. There was inadvertent left ureteral perforation during the procedure. · Currently stable.   Antibiotic course completed   · We will need to arrange for follow-up  with urology regarding further stent management/removal

## 2023-07-22 NOTE — ASSESSMENT & PLAN NOTE
· Sepsis secondary to Enterococcus bacteremia and complicated urinary tract infection/pyelonephritis  · Antibiotic course completed  · Stable

## 2023-07-22 NOTE — ASSESSMENT & PLAN NOTE
Patient states he drinks a couple of beers daily.  No evidence of withdrawal at this time  Continue thiamine and folic acid supplementation

## 2023-07-22 NOTE — ASSESSMENT & PLAN NOTE
Discharge canceled 7/14/2023. He  did not know where he lived and had no support. Neuropsych evaluation was done and showed that he had impaired range of functioning. Repeat neuropsych evaluation is pending.   Need to make sure that he is able to care for himself especially with these new medications

## 2023-07-22 NOTE — ASSESSMENT & PLAN NOTE
· Aerococcus bacteremia secondary to obstructive  urinary tract infection/pyelonephritis  · Antibiotic course completed  · Stable

## 2023-07-22 NOTE — RESTORATIVE TECHNICIAN NOTE
Restorative Technician Note      Patient Name: [facility] Visit Date: 07/22/23  Note Type: Mobility  Patient Position Upon Consult: Supine  Activity Performed: Ambulated  Patient Position at End of Consult: Supine;  All needs within reach

## 2023-07-22 NOTE — PLAN OF CARE
Problem: MOBILITY - ADULT  Goal: Maintain or return to baseline ADL function  Description: INTERVENTIONS:  - Educate patient/family on patient safety including physical limitations  - Instruct patient to call for assistance with activity   - Consult OT/PT to assist with strengthening/mobility   - Keep Call bell within reach  - Keep bed low and locked with side rails adjusted as appropriate  - Keep care items and personal belongings within reach  - Initiate and maintain comfort rounds  - Make Fall Risk Sign visible to staff  - Initiate/Maintain  bed alarm  - Obtain necessary fall risk management equipment:  walker  - Apply yellow socks and bracelet for high fall risk patients  - Consider moving patient to room near nurses station  Outcome: Progressing

## 2023-07-22 NOTE — PROGRESS NOTES
Progress Note - Cardiology   Phoebe Route 75 y.o. male MRN: [id number]  Unit/Bed#: E4 -01 Encounter: [id number]        Problem List:  Principal Problem:    Sepsis secondary to UTI [facility])  Active Problems:    Tobacco use    COPD (chronic obstructive pulmonary disease) ([facility])    Elevated troponin    Elevated d-dimer    Pyelonephritis    Esophageal abnormality    Hydronephrosis with obstructing calculus    Mycotic toenails    Acute kidney injury ([address])    Positive blood culture    Alcohol withdrawal ([address])    Encounter for competency evaluation      Assessment:    New onset A-fib   -Developed A-fib with RVR in setting of sepsis  -Anticoagulated on weight-based Lovenox  -Currently on digoxin 125, diltiazem 240, toprol XL 50 BID   -Diltiazem drip discontinued    Sepsis secondary to UTI ([address])  -Blood cultures positive for Aerococcus urinae, likely secondary to left-sided hydronephrosis  -Completing antibiotics on 7/20/2023    Non-MI troponin elevation  - Troponin trend 2170->2025->1933->1228  -TTE 7/7/2023 showed EF 55%, grade 1 DD, mildly dilated RV, mild LA and RA, mild TR  -Troponin elevation likely due to sepsis  -Underwent nuclear pharmacologic stress test 7/21/2023, shows artifact with no clear evidence of inducible ischemia or scar    Pyelonephritis    Acute kidney injury ([address])    Tobacco use    COPD (chronic obstructive pulmonary disease) ([address])    Alcohol withdrawal ([address])    Hydronephrosis with obstructing calculus    Encounter for competency evaluation    Plan/ Discussion:  • Was rate controlled overnight but this morning back in 140's w/ ambulation  • HR at rest is around 100-110  • Increase Toprol XL to 75 BID  • Continue Cardizem and Digoxin  • Change Lovenox to Eliquis prior to discharge    Subjective:  Feeling fine. No physical complaints.   Unable to remember his address so he remains in the hospital    Vitals:  Vitals:    07/07/23 0926 07/21/23 0943   Weight: 102 kg (224 lb) 102 kg (224 lb) ,  Vitals:    07/21/23 1958 07/21/23 2330 07/22/23 0032 07/22/23 0705   BP: 132/54 130/60 98/65 138/85   BP Location: Left arm  Left arm Left arm   Pulse:   94 105   Resp: 16  18 18   Temp: 97.7 °F (36.5 °C)  (!) 97.4 °F (36.3 °C) 98.3 °F (36.8 °C)   TempSrc: Temporal  Tympanic Temporal   SpO2:   94% 96%   Weight:       Height:           Exam:  General: Alert awake and oriented, no acute distress  Heart: Irregular, borderline tachycardic, no murmurs, Normal S1, no edema    Respiratory effort/ Lungs:  Breathing comfortably on room air, clear bilaterally without wheezing, rales, crackles   Abdominal: Non-tender to palpation, + bowel sounds, soft, no masses or distension  Lower Limbs:  No edema            Telemetry:       Atrial fibrillation, Heart Rate 100-120    Medications:    Current Facility-Administered Medications:   •  acetaminophen (TYLENOL) tablet 650 mg, 650 mg, Oral, Q6H PRN, KIKI Delgado, 650 mg at 07/19/23 0920  •  albuterol inhalation solution 2.5 mg, 2.5 mg, Nebulization, Q6H PRN, KIKI Delgado, 2.5 mg at 07/12/23 0027  •  aluminum-magnesium hydroxide-simethicone (MYLANTA) oral suspension 30 mL, 30 mL, Oral, Q6H PRN, LUISITO DelgadoNP  •  aspirin chewable tablet 81 mg, 81 mg, Oral, Daily, Tatum Jeffries DO, 81 mg at 07/22/23 0920  •  atorvastatin (LIPITOR) tablet 40 mg, 40 mg, Oral, Daily With Arabella Dotson DO, 40 mg at 07/21/23 1756  •  bisacodyl (DULCOLAX) EC tablet 10 mg, 10 mg, Oral, Daily PRN, KIKI Delgado  •  digoxin (LANOXIN) tablet 125 mcg, 125 mcg, Oral, Daily, Oseas Kiran MD, 125 mcg at 07/22/23 0920  •  diltiazem (CARDIZEM CD) 24 hr capsule 240 mg, 240 mg, Oral, Daily, Oseas Kiran MD, 240 mg at 07/22/23 0920  •  enoxaparin (LOVENOX) subcutaneous injection 100 mg, 1 mg/kg, Subcutaneous, Q12H [facility], KIKI Acevedo, 100 mg at 07/22/23 0920  •  fluticasone-vilanterol 200-25 mcg/actuation 1 puff, 1 puff, Inhalation, Daily, KIKI Delgado, 1 puff at 36/33/51 2686  •  folic acid (FOLVITE) tablet 1 mg, 1 mg, Oral, Daily, Adam Hernandez PA-C, 1 mg at 07/22/23 0920  •  HYDROmorphone HCl (DILAUDID) injection 0.2 mg, 0.2 mg, Intravenous, Q4H PRN, KIKI Delgado  •  hydrOXYzine HCL (ATARAX) tablet 50 mg, 50 mg, Oral, HS PRN, KIKI Delgado, 50 mg at 07/19/23 2249  •  melatonin tablet 6 mg, 6 mg, Oral, HS, KIKI Delgado, 6 mg at 07/21/23 2159  •  metoprolol succinate (TOPROL-XL) 24 hr tablet 50 mg, 50 mg, Oral, BID, Oseas Kiran MD, 50 mg at 07/22/23 0920  •  multivitamin-minerals (CENTRUM) tablet 1 tablet, 1 tablet, Oral, Daily, Adam Hernandez PA-C, 1 tablet at 07/22/23 0920  •  nicotine (NICODERM CQ) 14 mg/24hr TD 24 hr patch 1 patch, 1 patch, Transdermal, Daily, KIKI Delgado, 1 patch at 07/21/23 1125  •  ondansetron (ZOFRAN) injection 4 mg, 4 mg, Intravenous, Q6H PRN, KIKI Delgado  •  oxyCODONE (ROXICODONE) IR tablet 5 mg, 5 mg, Oral, 4x Daily PRN, KIKI Delgado  •  pantoprazole (PROTONIX) EC tablet 40 mg, 40 mg, Oral, Early Morning, KIKI Delgado, 40 mg at 07/22/23 0444  •  senna (SENOKOT) tablet 17.2 mg, 2 tablet, Oral, HS, KIKI Delgado, 17.2 mg at 07/21/23 2200  •  simethicone (MYLICON) chewable tablet 80 mg, 80 mg, Oral, 4x Daily PRN, KIKI Delgado  •  thiamine tablet 100 mg, 100 mg, Oral, Daily, Adam Hernandez PA-C, 100 mg at 07/22/23 0920      Labs/Data:        Results from last 7 days   Lab Units 07/18/23  0551 07/17/23  2358   WBC Thousand/uL 12.28* 13.05*   HEMOGLOBIN g/dL 12.8 13.5   HEMATOCRIT % 40.3 42.3   PLATELETS Thousands/uL 354 356     Results from last 7 days   Lab Units 07/20/23  0858 07/18/23  0551 07/17/23  2358   POTASSIUM mmol/L 4.4 4.5 4.0   CHLORIDE mmol/L 105 104 103   CO2 mmol/L 29 31 27   BUN mg/dL 19 20 23   CREATININE mg/dL 1.02 1.17 1.20

## 2023-07-22 NOTE — ASSESSMENT & PLAN NOTE
Stable on Protonix daily  Incidental finding on CTA study: Diffuse esophageal wall thickening which could be due to esophagitis  Outpatient GI follow-up

## 2023-07-23 LAB
BACTERIA BLD CULT: NORMAL
BACTERIA BLD CULT: NORMAL

## 2023-07-23 PROCEDURE — 99232 SBSQ HOSP IP/OBS MODERATE 35: CPT | Performed by: INTERNAL MEDICINE

## 2023-07-23 RX ADMIN — THIAMINE HCL TAB 100 MG 100 MG: 100 TAB at 08:07

## 2023-07-23 RX ADMIN — ENOXAPARIN SODIUM 100 MG: 100 INJECTION SUBCUTANEOUS at 08:07

## 2023-07-23 RX ADMIN — METOPROLOL SUCCINATE 75 MG: 25 TABLET, EXTENDED RELEASE ORAL at 08:07

## 2023-07-23 RX ADMIN — DIGOXIN 125 MCG: 125 TABLET ORAL at 08:07

## 2023-07-23 RX ADMIN — ENOXAPARIN SODIUM 100 MG: 100 INJECTION SUBCUTANEOUS at 21:59

## 2023-07-23 RX ADMIN — ATORVASTATIN CALCIUM 40 MG: 40 TABLET, FILM COATED ORAL at 17:15

## 2023-07-23 RX ADMIN — MELATONIN 6 MG: at 22:04

## 2023-07-23 RX ADMIN — FOLIC ACID 1 MG: 1 TABLET ORAL at 08:07

## 2023-07-23 RX ADMIN — PANTOPRAZOLE SODIUM 40 MG: 40 TABLET, DELAYED RELEASE ORAL at 07:09

## 2023-07-23 RX ADMIN — DILTIAZEM HYDROCHLORIDE 240 MG: 120 CAPSULE, COATED, EXTENDED RELEASE ORAL at 08:07

## 2023-07-23 RX ADMIN — METOPROLOL SUCCINATE 75 MG: 25 TABLET, EXTENDED RELEASE ORAL at 17:15

## 2023-07-23 RX ADMIN — ASPIRIN 81 MG 81 MG: 81 TABLET ORAL at 08:07

## 2023-07-23 RX ADMIN — FLUTICASONE FUROATE AND VILANTEROL TRIFENATATE 1 PUFF: 200; 25 POWDER RESPIRATORY (INHALATION) at 08:07

## 2023-07-23 RX ADMIN — Medication 1 TABLET: at 08:07

## 2023-07-23 NOTE — PROGRESS NOTES
[facility]  Progress Note  Name: Missy Macario I  MRN: [id number]  Unit/Bed#: E4 -01 I Date of Admission: 7/6/2023   Date of Service: 7/23/2023  Hospital Day: 17    Assessment/Plan   * Sepsis secondary to UTI [facility])  Assessment & Plan  · Sepsis secondary to  Aerococcus bacteremia and complicated urinary tract infection/pyelonephritis  · Antibiotic course completed   · Clinically stable    Bacteremia  Assessment & Plan  · Aerococcus bacteremia secondary to obstructive uropathy/urinary tract infection/pyelonephritis  · Antibiotic course completed  · Medically stable    Atrial fibrillation ([address])  Assessment & Plan  · Currently being rate controlled with digoxin 125 mcg daily, Cardizem 240 mg daily and metoprolol 75 mg twice daily  · Fully anticoagulated on Lovenox injections   ·  assistance to  see if he has coverage for Eliquis / noac    Hydronephrosis with obstructing calculus  Assessment & Plan  · Admitted originally due to sepsis from urinary tract infection with obstruction  · Status post cystoscopy with difficult stent insertion on 7/6/2020.during the procedure he he had inadvertently left ureteral perforation. · Overall stable   · Antibiotic course completed   · Outpatient follow-up with urology regarding stent management.       Elevated troponin  Assessment & Plan  · Non-MI troponin elevation secondary to tachycardia and sepsis  · Continue aspirin and Lipitor    COPD (chronic obstructive pulmonary disease) ([facility])  Assessment & Plan  · Continue Breo 1 puff daily with as needed albuterol  · Continue tobacco cessation    Esophageal abnormality  Assessment & Plan  Stable on Protonix daily  Incidental finding on CTA study: Diffuse esophageal wall thickening which could be due to esophagitis  Outpatient GI follow-up     Daily consumption of alcohol  Assessment & Plan  He stated earlier that he drank beers on a daily basis  Clinically no withdrawal.  Continue thiamine and folic acid supplementation    Encounter for competency evaluation  Assessment & Plan  Discharge canceled 2023. He  did not know where he lived and had no support. Neuropsych evaluation was done and showed that he had impaired range of functioning. Repeat neuropsych evaluation is pending. Need to make sure that he is able to care for himself especially with these new medications  Chart and care everywhere reviewed. Last address on care everywhere was [address] [place] [address],[address] in [facility]. Patient denies living the anymore. he reports living in the [facility] above a [address]. The  listed , Onel Martinez is also not accurate. He mentioned Karoline Huertas as his  but does not have her phone or address. Tobacco use  Assessment & Plan  · Smokes 6 cigarettes daily. Apparently plans to quit. · Nicotine TD patch  · Tobacco counseling provided              VTE Pharmacologic Prophylaxis: VTE Score: 5 High Risk (Score >/= 5) - Pharmacological DVT Prophylaxis Ordered: enoxaparin (Lovenox). Sequential Compression Devices Ordered. Patient Centered Rounds: I performed bedside rounds with nursing staff today. Discussions with Specialists or Other Care Team Provider: Chart and care everywhere reviewed    Education and Discussions with Family / Patient: Patient has no accurate . Current Length of Stay: 17 day(s)  Current Patient Status: Inpatient   Certification Statement: The patient will continue to require additional inpatient hospital stay due to Unsafe discharge  Discharge Plan:  To be determined    Code Status: Level 1 - Full Code    Subjective:   Patient does not know his exact address  Patient does not have a number or address for his  named Karoline Huertas  He does not have any family except for  a sister whom he also does not have any contact information    Objective:     Vitals:   Temp (24hrs), Av.5 °F (36.4 °C), Min:96.9 °F (36.1 °C), Max:97.9 °F (36.6 °C)    Temp: [96.9 °F (36.1 °C)-97.9 °F (36.6 °C)] 97.2 °F (36.2 °C)  HR:  [63-88] 74  Resp:  [16-18] 16  BP: (100-147)/(59-78) 119/67  SpO2:  [92 %-98 %] 98 %  Body mass index is 28 kg/m². Input and Output Summary (last 24 hours):   No intake or output data in the 24 hours ending 07/23/23 1324    Physical Exam:   Physical Exam  Constitutional:       Appearance: He is not ill-appearing. HENT:      Head: Normocephalic and atraumatic. Nose: No congestion or rhinorrhea. Eyes:      General: No scleral icterus. Cardiovascular:      Rate and Rhythm: Normal rate and regular rhythm. Pulmonary:      Breath sounds: No stridor. No wheezing, rhonchi or rales. Abdominal:      General: There is no distension. Palpations: Abdomen is soft. Tenderness: There is no abdominal tenderness. There is no right CVA tenderness or left CVA tenderness. Musculoskeletal:      Cervical back: Neck supple. Right lower leg: No edema. Left lower leg: No edema. Skin:     General: Skin is warm and dry. Coloration: Skin is not jaundiced or pale. Neurological:      Mental Status: He is alert.       Comments: Awake alert follows command   Psychiatric:         Mood and Affect: Mood normal.         Behavior: Behavior normal.      Additional Data:     Labs:  Results from last 7 days   Lab Units 07/18/23  0551   WBC Thousand/uL 12.28*   HEMOGLOBIN g/dL 12.8   HEMATOCRIT % 40.3   PLATELETS Thousands/uL 354   NEUTROS PCT % 76*   LYMPHS PCT % 15   MONOS PCT % 7   EOS PCT % 1     Results from last 7 days   Lab Units 07/20/23  0858   SODIUM mmol/L 138   POTASSIUM mmol/L 4.4   CHLORIDE mmol/L 105   CO2 mmol/L 29   BUN mg/dL 19   CREATININE mg/dL 1.02   ANION GAP mmol/L 4   CALCIUM mg/dL 9.4   ALBUMIN g/dL 3.5   TOTAL BILIRUBIN mg/dL 0.51   ALK PHOS U/L 72   ALT U/L 15   AST U/L 16   GLUCOSE RANDOM mg/dL 131                 Results from last 7 days   Lab Units 07/18/23  0551 07/17/23  2358   LACTIC ACID mmol/L  --  0.9 PROCALCITONIN ng/ml 0.11  --        Lines/Drains:  Invasive Devices     Peripheral Intravenous Line  Duration           Peripheral IV 07/22/23 Left Forearm 1 day          Drain  Duration           Ureteral Internal Stent Left ureter 6 Fr. 16 days                  Telemetry:  Telemetry Orders (From admission, onward)             24 Hour Telemetry Monitoring  Continuous x 24 Hours (Telem)        Expiring   Question:  Reason for 24 Hour Telemetry  Answer:  Arrhythmias requiring acute medical intervention / PPM or ICD malfunction                          Imaging: No pertinent imaging reviewed. Recent Cultures (last 7 days):   Results from last 7 days   Lab Units 07/18/23  0353 07/18/23  0130   BLOOD CULTURE   --  No Growth After 5 Days. No Growth After 5 Days.    URINE CULTURE  >100,000 cfu/ml Stenotrophomonas maltophilia*  >100,000 cfu/ml Delftia acidovorans group*  --        Last 24 Hours Medication List:   Current Facility-Administered Medications   Medication Dose Route Frequency Provider Last Rate   • acetaminophen  650 mg Oral Q6H PRN KIKI Bradley     • albuterol  2.5 mg Nebulization Q6H PRN KIKI Bradley     • aluminum-magnesium hydroxide-simethicone  30 mL Oral Q6H PRN KIKI Bradley     • aspirin  81 mg Oral Daily Ena Roberts DO     • atorvastatin  40 mg Oral Daily With Bandar Cook DO     • bisacodyl  10 mg Oral Daily PRN KIKI Bradley     • digoxin  125 mcg Oral Daily Isidra Vlilalta MD     • diltiazem  240 mg Oral Daily Isidra Villalta MD     • enoxaparin  1 mg/kg Subcutaneous Q12H [facility] KIKI Acevedo     • fluticasone-vilanterol  1 puff Inhalation Daily KIKI Bradley     • folic acid  1 mg Oral Daily Cooper Meyer PA-C     • HYDROmorphone  0.2 mg Intravenous Q4H PRN KIKI Bradley     • hydrOXYzine HCL  50 mg Oral HS PRN KIKI Bradley     • melatonin  6 mg Oral HS Daisha Enrique KIKI Landers     • metoprolol succinate  75 mg Oral BID Stephanie Haile PA-C     • multivitamin-minerals  1 tablet Oral Daily Alvarado Pederson PA-C     • nicotine  1 patch Transdermal Daily KIKI Gardner     • ondansetron  4 mg Intravenous Q6H PRN KIKI Gardner     • oxyCODONE  5 mg Oral 4x Daily PRN KIKI Gardner     • pantoprazole  40 mg Oral Early Morning KIKI Gardner     • senna  2 tablet Oral HS KIKI Gardner     • simethicone  80 mg Oral 4x Daily PRN KIKI Gardner     • thiamine  100 mg Oral Daily Sebastiánist PETER Pederson          Today, Patient Was Seen By: Jackelin Chan MD    **Please Note: This note may have been constructed using a voice recognition system. **

## 2023-07-23 NOTE — ASSESSMENT & PLAN NOTE
He stated earlier that he drank beers on a daily basis  Clinically no withdrawal.  Continue thiamine and folic acid supplementation

## 2023-07-23 NOTE — ASSESSMENT & PLAN NOTE
Discharge canceled 7/14/2023. He  did not know where he lived and had no support. Neuropsych evaluation was done and showed that he had impaired range of functioning. Repeat neuropsych evaluation is pending. Need to make sure that he is able to care for himself especially with these new medications  Chart and care everywhere reviewed. Last address on care everywhere was [address] [place] [address],[address] in [facility]. Patient denies living the anymore. he reports living in the [facility] above a [address]. The  listed , Hortencia Hollowya is also not accurate. He mentioned Kerri Gutierres as his  but does not have her phone or address.

## 2023-07-23 NOTE — ASSESSMENT & PLAN NOTE
· Sepsis secondary to  Aerococcus bacteremia and complicated urinary tract infection/pyelonephritis  · Antibiotic course completed   · Clinically stable yes

## 2023-07-23 NOTE — PLAN OF CARE
Problem: Potential for Falls  Goal: Patient will remain free of falls  Description: INTERVENTIONS:  - Educate patient/family on patient safety including physical limitations  - Instruct patient to call for assistance with activity   - Consult OT/PT to assist with strengthening/mobility   - Keep Call bell within reach  - Keep bed low and locked with side rails adjusted as appropriate  - Keep care items and personal belongings within reach  - Initiate and maintain comfort rounds  - Make Fall Risk Sign visible to staff  - Offer Toileting every 2 Hours, in advance of need  - Initiate/Maintain bedalarm  Problem: PAIN - ADULT  Goal: Verbalizes/displays adequate comfort level or baseline comfort level  Description: Interventions:  - Encourage patient to monitor pain and request assistance  - Assess pain using appropriate pain scale  - Administer analgesics based on type and severity of pain and evaluate response  - Implement non-pharmacological measures as appropriate and evaluate response  - Consider cultural and social influences on pain and pain management  - Notify physician/advanced practitioner if interventions unsuccessful or patient reports new pain  Outcome: Progressing     Problem: INFECTION - ADULT  Goal: Absence or prevention of progression during hospitalization  Description: INTERVENTIONS:  - Assess and monitor for signs and symptoms of infection  - Monitor lab/diagnostic results  - Monitor all insertion sites, i.e. indwelling lines, tubes, and drains  - Monitor endotracheal if appropriate and nasal secretions for changes in amount and color  - [place] appropriate cooling/warming therapies per order  - Administer medications as ordered  - Instruct and encourage patient and family to use good hand hygiene technique  - Identify and instruct in appropriate isolation precautions for identified infection/condition  Outcome: Progressing     Problem: Knowledge Deficit  Goal: Patient/family/caregiver demonstrates understanding of disease process, treatment plan, medications, and discharge instructions  Description: Complete learning assessment and assess knowledge base.   Interventions:  - Provide teaching at level of understanding  - Provide teaching via preferred learning methods  Outcome: Progressing     Problem: DISCHARGE PLANNING  Goal: Discharge to home or other facility with appropriate resources  Description: INTERVENTIONS:  - Identify barriers to discharge w/patient and caregiver  - Arrange for needed discharge resources and transportation as appropriate  - Identify discharge learning needs (meds, wound care, etc.)  - Arrange for interpretive services to assist at discharge as needed  - Refer to Case Management Department for coordinating discharge planning if the patient needs post-hospital services based on physician/advanced practitioner order or complex needs related to functional status, cognitive ability, or social support system  Outcome: Progressing     Problem: Prexisting or High Potential for Compromised Skin Integrity  Goal: Skin integrity is maintained or improved  Description: INTERVENTIONS:  - Identify patients at risk for skin breakdown  - Assess and monitor skin integrity  - Assess and monitor nutrition and hydration status  - Monitor labs   - Assess for incontinence   - Turn and reposition patient  - Assist with mobility/ambulation  - Relieve pressure over bony prominences  - Avoid friction and shearing  - Provide appropriate hygiene as needed including keeping skin clean and dry  - Evaluate need for skin moisturizer/barrier cream  - Collaborate with interdisciplinary team   - Patient/family teaching  - Consider wound care consult   Outcome: Progressing     - Apply yellow socks and bracelet for high fall risk patients  - Consider moving patient to room near nurses station  Outcome: Progressing

## 2023-07-23 NOTE — ASSESSMENT & PLAN NOTE
· Admitted originally due to sepsis from urinary tract infection with obstruction  · Status post cystoscopy with difficult stent insertion on 7/6/2020.during the procedure he he had inadvertently left ureteral perforation. · Overall stable   · Antibiotic course completed   · Outpatient follow-up with urology regarding stent management.

## 2023-07-23 NOTE — CASE MANAGEMENT
Case Management Discharge Planning Note    Patient name Naeem Haile  [facility] 4 /E4 [address]-* MRN [id number]  : 1950 Date 2023       Current Admission Date: 2023  Current Admission Diagnosis:Sepsis secondary to UTI [facility])   Patient Active Problem List    Diagnosis Date Noted   • Atrial fibrillation ([address]) 2023   • Encounter for competency evaluation 2023   • Daily consumption of alcohol 2023   • Bacteremia 2023   • Sepsis secondary to UTI ([address]) 2023   • Elevated troponin 2023   • Elevated d-dimer 2023   • Esophageal abnormality 2023   • Hydronephrosis with obstructing calculus 2023   • Mycotic toenails 2023   • Angioedema 2018   • COPD (chronic obstructive pulmonary disease) ([address]) 2018   • Bradycardia 2018   • Tobacco use 2016   • Polycythemia 2016   • Weight loss 2016      LOS (days): 17  Geometric Mean LOS (GMLOS) (days): 9.60  Days to GMLOS:-7.1     OBJECTIVE:  Risk of Unplanned Readmission Score: 12.53         Current admission status: Inpatient   Preferred Pharmacy:   PATIENT/FAMILY REPORTS NO PREFERRED PHARMACY  No address on file      [address] [place] [address],  [address] [place] [address],  [address] [place] [address]  Phone: [phone] Fax: [phone]    Primary Care Provider: No primary care provider on file. Primary Insurance: MEDICARE  Secondary Insurance:     DISCHARGE DETAILS:     SLIM informed CM that patient will need Eliquis price check. Patient has Medicare A&B but CM unsure if patient has Medicare Part D. [address] is closed at this time. CM met with patient to ask if he uses a pharmacy or if he can use a pharmacy close to home. Patient states that he does not know if any. Patient wants to use [facility] at discharge.  CM asked patient if he has prescription coverage such as Part D and he stated that he is not sure.

## 2023-07-23 NOTE — RESTORATIVE TECHNICIAN NOTE
Restorative Technician Note      Patient Name: Adolfo Wood     Restorative Tech Visit Date: 07/23/23  Note Type: Mobility  Patient Position Upon Consult: Supine  Activity Performed: Ambulated  Patient Position at End of Consult: Supine;  All needs within reach

## 2023-07-23 NOTE — ASSESSMENT & PLAN NOTE
· Aerococcus bacteremia secondary to obstructive uropathy/urinary tract infection/pyelonephritis  · Antibiotic course completed  · Medically stable

## 2023-07-23 NOTE — ASSESSMENT & PLAN NOTE
· Currently being rate controlled with digoxin 125 mcg daily, Cardizem 240 mg daily and metoprolol 75 mg twice daily  · Fully anticoagulated on Lovenox injections   ·  assistance to  see if he has coverage for Eliquis / noac

## 2023-07-24 ENCOUNTER — ANESTHESIA EVENT (INPATIENT)
Dept: NON INVASIVE DIAGNOSTICS | Facility: HOSPITAL | Age: 73
End: 2023-07-24
Payer: MEDICARE

## 2023-07-24 PROBLEM — Z78.9 IMPAIRED DECISION MAKING: Status: ACTIVE | Noted: 2023-07-14

## 2023-07-24 PROCEDURE — 99232 SBSQ HOSP IP/OBS MODERATE 35: CPT | Performed by: STUDENT IN AN ORGANIZED HEALTH CARE EDUCATION/TRAINING PROGRAM

## 2023-07-24 PROCEDURE — 99232 SBSQ HOSP IP/OBS MODERATE 35: CPT | Performed by: INTERNAL MEDICINE

## 2023-07-24 RX ADMIN — PANTOPRAZOLE SODIUM 40 MG: 40 TABLET, DELAYED RELEASE ORAL at 05:28

## 2023-07-24 RX ADMIN — METOPROLOL SUCCINATE 75 MG: 25 TABLET, EXTENDED RELEASE ORAL at 08:33

## 2023-07-24 RX ADMIN — ENOXAPARIN SODIUM 100 MG: 100 INJECTION SUBCUTANEOUS at 21:58

## 2023-07-24 RX ADMIN — ATORVASTATIN CALCIUM 40 MG: 40 TABLET, FILM COATED ORAL at 17:05

## 2023-07-24 RX ADMIN — DILTIAZEM HYDROCHLORIDE 240 MG: 120 CAPSULE, COATED, EXTENDED RELEASE ORAL at 08:33

## 2023-07-24 RX ADMIN — ENOXAPARIN SODIUM 100 MG: 100 INJECTION SUBCUTANEOUS at 08:33

## 2023-07-24 RX ADMIN — Medication 1 TABLET: at 08:33

## 2023-07-24 RX ADMIN — THIAMINE HCL TAB 100 MG 100 MG: 100 TAB at 08:33

## 2023-07-24 RX ADMIN — DIGOXIN 125 MCG: 125 TABLET ORAL at 08:33

## 2023-07-24 RX ADMIN — FLUTICASONE FUROATE AND VILANTEROL TRIFENATATE 1 PUFF: 200; 25 POWDER RESPIRATORY (INHALATION) at 08:33

## 2023-07-24 RX ADMIN — ASPIRIN 81 MG 81 MG: 81 TABLET ORAL at 08:33

## 2023-07-24 RX ADMIN — FOLIC ACID 1 MG: 1 TABLET ORAL at 08:33

## 2023-07-24 RX ADMIN — MELATONIN 6 MG: at 21:59

## 2023-07-24 NOTE — PLAN OF CARE
Problem: Potential for Falls  Goal: Patient will remain free of falls  Description: INTERVENTIONS:  - Educate patient/family on patient safety including physical limitations  - Instruct patient to call for assistance with activity   - Consult OT/PT to assist with strengthening/mobility   - Keep Call bell within reach  - Keep bed low and locked with side rails adjusted as appropriate  - Keep care items and personal belongings within reach  - Initiate and maintain comfort rounds  - Make Fall Risk Sign visible to staff  - Offer Toileting every 2 Hours, in advance of need  - Initiate/Maintain bed alarm  - Obtain necessary fall risk management equipment  - Apply yellow socks and bracelet for high fall risk patients  - Consider moving patient to room near nurses station  Outcome: Progressing     Problem: MOBILITY - ADULT  Goal: Maintain or return to baseline ADL function  Description: INTERVENTIONS:  -  Assess patient's ability to carry out ADLs; assess patient's baseline for ADL function and identify physical deficits which impact ability to perform ADLs (bathing, care of mouth/teeth, toileting, grooming, dressing, etc.)  - Assess/evaluate cause of self-care deficits   - Assess range of motion  - Assess patient's mobility; develop plan if impaired  - Assess patient's need for assistive devices and provide as appropriate  - Encourage maximum independence but intervene and supervise when necessary  - Involve family in performance of ADLs  - Assess for home care needs following discharge   - Consider OT consult to assist with ADL evaluation and planning for discharge  - Provide patient education as appropriate  Outcome: Progressing  Goal: Maintains/Returns to pre admission functional level  Description: INTERVENTIONS:  - Perform BMAT or MOVE assessment daily.   - Set and communicate daily mobility goal to care team and patient/family/caregiver.    - Collaborate with rehabilitation services on mobility goals if consulted  - Perform Range of Motion 3 times a day. - Reposition patient every 2 hours.   - Dangle patient 3 times a day  - Stand patient 3 times a day  - Ambulate patient 3 times a day  - Out of bed to chair 3 times a day   - Out of bed for meals 3 times a day  - Out of bed for toileting  - Record patient progress and toleration of activity level   Outcome: Progressing     Problem: PAIN - ADULT  Goal: Verbalizes/displays adequate comfort level or baseline comfort level  Description: Interventions:  - Encourage patient to monitor pain and request assistance  - Assess pain using appropriate pain scale  - Administer analgesics based on type and severity of pain and evaluate response  - Implement non-pharmacological measures as appropriate and evaluate response  - Consider cultural and social influences on pain and pain management  - Notify physician/advanced practitioner if interventions unsuccessful or patient reports new pain  Outcome: Progressing     Problem: INFECTION - ADULT  Goal: Absence or prevention of progression during hospitalization  Description: INTERVENTIONS:  - Assess and monitor for signs and symptoms of infection  - Monitor lab/diagnostic results  - Monitor all insertion sites, i.e. indwelling lines, tubes, and drains  - Monitor endotracheal if appropriate and nasal secretions for changes in amount and color  - [place] appropriate cooling/warming therapies per order  - Administer medications as ordered  - Instruct and encourage patient and family to use good hand hygiene technique  - Identify and instruct in appropriate isolation precautions for identified infection/condition  Outcome: Progressing  Goal: Absence of fever/infection during neutropenic period  Description: INTERVENTIONS:  - Monitor WBC    Outcome: Progressing     Problem: SAFETY ADULT  Goal: Patient will remain free of falls  Description: INTERVENTIONS:  - Educate patient/family on patient safety including physical limitations  - Instruct patient to call for assistance with activity   - Consult OT/PT to assist with strengthening/mobility   - Keep Call bell within reach  - Keep bed low and locked with side rails adjusted as appropriate  - Keep care items and personal belongings within reach  - Initiate and maintain comfort rounds  - Make Fall Risk Sign visible to staff  - Offer Toileting every 2 Hours, in advance of need  - Initiate/Maintain bed alarm  - Obtain necessary fall risk management equipment  - Apply yellow socks and bracelet for high fall risk patients  - Consider moving patient to room near nurses station  Outcome: Progressing  Goal: Maintain or return to baseline ADL function  Description: INTERVENTIONS:  -  Assess patient's ability to carry out ADLs; assess patient's baseline for ADL function and identify physical deficits which impact ability to perform ADLs (bathing, care of mouth/teeth, toileting, grooming, dressing, etc.)  - Assess/evaluate cause of self-care deficits   - Assess range of motion  - Assess patient's mobility; develop plan if impaired  - Assess patient's need for assistive devices and provide as appropriate  - Encourage maximum independence but intervene and supervise when necessary  - Involve family in performance of ADLs  - Assess for home care needs following discharge   - Consider OT consult to assist with ADL evaluation and planning for discharge  - Provide patient education as appropriate  Outcome: Progressing  Goal: Maintains/Returns to pre admission functional level  Description: INTERVENTIONS:  - Perform BMAT or MOVE assessment daily.   - Set and communicate daily mobility goal to care team and patient/family/caregiver. - Collaborate with rehabilitation services on mobility goals if consulted  - Perform Range of Motion 3 times a day. - Reposition patient every 2 hours.   - Dangle patient 3 times a day  - Stand patient 3 times a day  - Ambulate patient 3 times a day  - Out of bed to chair 3 times a day   - Out of bed for meals 3 times a day  - Out of bed for toileting  - Record patient progress and toleration of activity level   Outcome: Progressing     Problem: DISCHARGE PLANNING  Goal: Discharge to home or other facility with appropriate resources  Description: INTERVENTIONS:  - Identify barriers to discharge w/patient and caregiver  - Arrange for needed discharge resources and transportation as appropriate  - Identify discharge learning needs (meds, wound care, etc.)  - Arrange for interpretive services to assist at discharge as needed  - Refer to Case Management Department for coordinating discharge planning if the patient needs post-hospital services based on physician/advanced practitioner order or complex needs related to functional status, cognitive ability, or social support system  Outcome: Progressing     Problem: Knowledge Deficit  Goal: Patient/family/caregiver demonstrates understanding of disease process, treatment plan, medications, and discharge instructions  Description: Complete learning assessment and assess knowledge base.   Interventions:  - Provide teaching at level of understanding  - Provide teaching via preferred learning methods  Outcome: Progressing     Problem: Prexisting or High Potential for Compromised Skin Integrity  Goal: Skin integrity is maintained or improved  Description: INTERVENTIONS:  - Identify patients at risk for skin breakdown  - Assess and monitor skin integrity  - Assess and monitor nutrition and hydration status  - Monitor labs   - Assess for incontinence   - Turn and reposition patient  - Assist with mobility/ambulation  - Relieve pressure over bony prominences  - Avoid friction and shearing  - Provide appropriate hygiene as needed including keeping skin clean and dry  - Evaluate need for skin moisturizer/barrier cream  - Collaborate with interdisciplinary team   - Patient/family teaching  - Consider wound care consult   Outcome: Progressing

## 2023-07-24 NOTE — PLAN OF CARE
Problem: Potential for Falls  Goal: Patient will remain free of falls  Description: INTERVENTIONS:  - Educate patient/family on patient safety including physical limitations  - Instruct patient to call for assistance with activity   - Consult OT/PT to assist with strengthening/mobility   - Keep Call bell within reach  - Keep bed low and locked with side rails adjusted as appropriate  - Keep care items and personal belongings within reach  - Initiate and maintain comfort rounds  - Make Fall Risk Sign visible to staff  - Offer Toileting every  Hours, in advance of need  - Initiate/Maintain alarm  - Obtain necessary fall risk management equipment:   - Apply yellow socks and bracelet for high fall risk patients  - Consider moving patient to room near nurses station  Outcome: Progressing     Problem: MOBILITY - ADULT  Goal: Maintain or return to baseline ADL function  Description: INTERVENTIONS:  -  Assess patient's ability to carry out ADLs; assess patient's baseline for ADL function and identify physical deficits which impact ability to perform ADLs (bathing, care of mouth/teeth, toileting, grooming, dressing, etc.)  - Assess/evaluate cause of self-care deficits   - Assess range of motion  - Assess patient's mobility; develop plan if impaired  - Assess patient's need for assistive devices and provide as appropriate  - Encourage maximum independence but intervene and supervise when necessary  - Involve family in performance of ADLs  - Assess for home care needs following discharge   - Consider OT consult to assist with ADL evaluation and planning for discharge  - Provide patient education as appropriate  Outcome: Progressing  Goal: Maintains/Returns to pre admission functional level  Description: INTERVENTIONS:  - Perform BMAT or MOVE assessment daily.   - Set and communicate daily mobility goal to care team and patient/family/caregiver.    - Collaborate with rehabilitation services on mobility goals if consulted  - Perform Range of Motion  times a day. - Reposition patient every  hours.   - Dangle patient  times a day  - Stand patient  times a day  - Ambulate patient  times a day  - Out of bed to chair  times a day   - Out of bed for meals  times a day  - Out of bed for toileting  - Record patient progress and toleration of activity level   Outcome: Progressing     Problem: PAIN - ADULT  Goal: Verbalizes/displays adequate comfort level or baseline comfort level  Description: Interventions:  - Encourage patient to monitor pain and request assistance  - Assess pain using appropriate pain scale  - Administer analgesics based on type and severity of pain and evaluate response  - Implement non-pharmacological measures as appropriate and evaluate response  - Consider cultural and social influences on pain and pain management  - Notify physician/advanced practitioner if interventions unsuccessful or patient reports new pain  Outcome: Progressing     Problem: INFECTION - ADULT  Goal: Absence or prevention of progression during hospitalization  Description: INTERVENTIONS:  - Assess and monitor for signs and symptoms of infection  - Monitor lab/diagnostic results  - Monitor all insertion sites, i.e. indwelling lines, tubes, and drains  - Monitor endotracheal if appropriate and nasal secretions for changes in amount and color  - [place] appropriate cooling/warming therapies per order  - Administer medications as ordered  - Instruct and encourage patient and family to use good hand hygiene technique  - Identify and instruct in appropriate isolation precautions for identified infection/condition  Outcome: Progressing  Goal: Absence of fever/infection during neutropenic period  Description: INTERVENTIONS:  - Monitor WBC    Outcome: Progressing     Problem: SAFETY ADULT  Goal: Patient will remain free of falls  Description: INTERVENTIONS:  - Educate patient/family on patient safety including physical limitations  - Instruct patient to call for assistance with activity   - Consult OT/PT to assist with strengthening/mobility   - Keep Call bell within reach  - Keep bed low and locked with side rails adjusted as appropriate  - Keep care items and personal belongings within reach  - Initiate and maintain comfort rounds  - Make Fall Risk Sign visible to staff  - Offer Toileting every  Hours, in advance of need  - Initiate/Maintain alarm  - Obtain necessary fall risk management equipment:   - Apply yellow socks and bracelet for high fall risk patients  - Consider moving patient to room near nurses station  Outcome: Progressing  Goal: Maintain or return to baseline ADL function  Description: INTERVENTIONS:  -  Assess patient's ability to carry out ADLs; assess patient's baseline for ADL function and identify physical deficits which impact ability to perform ADLs (bathing, care of mouth/teeth, toileting, grooming, dressing, etc.)  - Assess/evaluate cause of self-care deficits   - Assess range of motion  - Assess patient's mobility; develop plan if impaired  - Assess patient's need for assistive devices and provide as appropriate  - Encourage maximum independence but intervene and supervise when necessary  - Involve family in performance of ADLs  - Assess for home care needs following discharge   - Consider OT consult to assist with ADL evaluation and planning for discharge  - Provide patient education as appropriate  Outcome: Progressing  Goal: Maintains/Returns to pre admission functional level  Description: INTERVENTIONS:  - Perform BMAT or MOVE assessment daily.   - Set and communicate daily mobility goal to care team and patient/family/caregiver. - Collaborate with rehabilitation services on mobility goals if consulted  - Perform Range of Motion  times a day. - Reposition patient every  hours.   - Dangle patient  times a day  - Stand patient  times a day  - Ambulate patient  times a day  - Out of bed to chair  times a day   - Out of bed for meals times a day  - Out of bed for toileting  - Record patient progress and toleration of activity level   Outcome: Progressing     Problem: DISCHARGE PLANNING  Goal: Discharge to home or other facility with appropriate resources  Description: INTERVENTIONS:  - Identify barriers to discharge w/patient and caregiver  - Arrange for needed discharge resources and transportation as appropriate  - Identify discharge learning needs (meds, wound care, etc.)  - Arrange for interpretive services to assist at discharge as needed  - Refer to Case Management Department for coordinating discharge planning if the patient needs post-hospital services based on physician/advanced practitioner order or complex needs related to functional status, cognitive ability, or social support system  Outcome: Progressing     Problem: Knowledge Deficit  Goal: Patient/family/caregiver demonstrates understanding of disease process, treatment plan, medications, and discharge instructions  Description: Complete learning assessment and assess knowledge base.   Interventions:  - Provide teaching at level of understanding  - Provide teaching via preferred learning methods  Outcome: Progressing     Problem: Prexisting or High Potential for Compromised Skin Integrity  Goal: Skin integrity is maintained or improved  Description: INTERVENTIONS:  - Identify patients at risk for skin breakdown  - Assess and monitor skin integrity  - Assess and monitor nutrition and hydration status  - Monitor labs   - Assess for incontinence   - Turn and reposition patient  - Assist with mobility/ambulation  - Relieve pressure over bony prominences  - Avoid friction and shearing  - Provide appropriate hygiene as needed including keeping skin clean and dry  - Evaluate need for skin moisturizer/barrier cream  - Collaborate with interdisciplinary team   - Patient/family teaching  - Consider wound care consult   Outcome: Progressing

## 2023-07-24 NOTE — PROGRESS NOTES
07/24/23 1400   Clinical Encounter Type   Visited With Patient   Sacramental Encounters   Communion Given Indicator Yes   Sacrament of Sick-Anointing Anointed

## 2023-07-24 NOTE — ASSESSMENT & PLAN NOTE
· Admitted originally due to sepsis from UTI with obstruction  · Status post cystoscopy with difficult stent insertion on 7/6/2023. Procedure was complicated by inadvertent left ureteral perforation  · Overall stable. · Antibiotic course completed.   · Outpatient  follow-up with urology regarding further stent management

## 2023-07-24 NOTE — CONSULTS
Consultation - Neuropsychology/Psychology Department  Nestor Lamb 75 y.o. male MRN: [id number]  Unit/Bed#: E4 -01 Encounter: [id number]        Reason for Consultation:  Nestor Lamb is a 75y.o. year old male who was referred for a repeat Neuropsychological Exam to assess current cognitive functioning and comment on capacity to make fully informed medical decisions. History of Present Illness  Shortness of breath, rigors    Physician Requesting Consult: Oneil Wiley Pe*    PROBLEM LIST:  Patient Active Problem List   Diagnosis   • Tobacco use   • Polycythemia   • Weight loss   • Angioedema   • COPD (chronic obstructive pulmonary disease) ([facility])   • Bradycardia   • Sepsis secondary to UTI ([facility])   • Elevated troponin   • Elevated d-dimer   • Esophageal abnormality   • Hydronephrosis with obstructing calculus   • Mycotic toenails   • Bacteremia   • Daily consumption of alcohol   • Encounter for competency evaluation   • Atrial fibrillation ([address])         Historical Information   Past Medical History:   Diagnosis Date   • COPD (chronic obstructive pulmonary disease) ([address])      Past Surgical History:   Procedure Laterality Date   • FL RETROGRADE PYELOGRAM  7/6/2023   • [place] CYSTO BLADDER W/URETERAL CATHETERIZATION Left 7/6/2023    Procedure: CYSTOSCOPY RETROGRADE PYELOGRAM WITH INSERTION STENT URETERAL;  Surgeon: Mazin Christianson MD;  Location: [facility] OR;  Service: Urology     Social History   Social History     Substance and Sexual Activity   Alcohol Use No     Social History     Substance and Sexual Activity   Drug Use No     Social History     Tobacco Use   Smoking Status Every Day   • Packs/day: 0.50   • Types: Cigarettes   Smokeless Tobacco Not on file     Family History: History reviewed. No pertinent family history.     Meds/Allergies   current meds:   Current Facility-Administered Medications   Medication Dose Route Frequency   • acetaminophen (TYLENOL) tablet 650 mg  650 mg Oral Q6H PRN   • albuterol inhalation solution 2.5 mg  2.5 mg Nebulization Q6H PRN   • aluminum-magnesium hydroxide-simethicone (MYLANTA) oral suspension 30 mL  30 mL Oral Q6H PRN   • aspirin chewable tablet 81 mg  81 mg Oral Daily   • atorvastatin (LIPITOR) tablet 40 mg  40 mg Oral Daily With Dinner   • bisacodyl (DULCOLAX) EC tablet 10 mg  10 mg Oral Daily PRN   • digoxin (LANOXIN) tablet 125 mcg  125 mcg Oral Daily   • diltiazem (CARDIZEM CD) 24 hr capsule 240 mg  240 mg Oral Daily   • enoxaparin (LOVENOX) subcutaneous injection 100 mg  1 mg/kg Subcutaneous Q12H [address]   • fluticasone-vilanterol 200-25 mcg/actuation 1 puff  1 puff Inhalation Daily   • folic acid (FOLVITE) tablet 1 mg  1 mg Oral Daily   • HYDROmorphone HCl (DILAUDID) injection 0.2 mg  0.2 mg Intravenous Q4H PRN   • hydrOXYzine HCL (ATARAX) tablet 50 mg  50 mg Oral HS PRN   • melatonin tablet 6 mg  6 mg Oral HS   • metoprolol succinate (TOPROL-XL) 24 hr tablet 75 mg  75 mg Oral BID   • multivitamin-minerals (CENTRUM) tablet 1 tablet  1 tablet Oral Daily   • nicotine (NICODERM CQ) 14 mg/24hr TD 24 hr patch 1 patch  1 patch Transdermal Daily   • ondansetron (ZOFRAN) injection 4 mg  4 mg Intravenous Q6H PRN   • oxyCODONE (ROXICODONE) IR tablet 5 mg  5 mg Oral 4x Daily PRN   • pantoprazole (PROTONIX) EC tablet 40 mg  40 mg Oral Early Morning   • senna (SENOKOT) tablet 17.2 mg  2 tablet Oral HS   • simethicone (MYLICON) chewable tablet 80 mg  80 mg Oral 4x Daily PRN   • thiamine tablet 100 mg  100 mg Oral Daily       No Known Allergies      Family and Social Support:   No data recorded    Behavioral Observations: Alert, UNABLE to accurately state the season, month (October), day/month, day/week, and name of city; affect appeared frustrated and patient denied depressed mood and anxiety; patient appeared to have difficulty expressing complete thoughts and stated, "I can't think, I don't think like normal minds".  Patient denied medical history and states he is unsure where he will be going upon discharge. Cognitive Examination    General Cognitive Functioning MMSE = Impaired 15/28; Attention/Concentration Auditory Selective Attention = Impaired; Auditory Vigilance = Impaired; Information Processing Speed = Impaired    Frontal Systems/Executive Functioning Mental Flexibility/Cognitive Control = Impaired; Working Memory = Impaired Abstract Reasoning = Impaired; Generative Ability = Impaired,     Language Functioning Confrontation naming = Within Normal Limits, Phonemic Fluency = Impaired; Semantic Retrieval = Impaired; Comprehension of Complex Ideational Material = Impaired; Praxis = Within Normal Limits; Repetition = Within Normal Limits; Basic Reading = Within Normal Limits; Following Commands = Impaired    Memory Functioning Narrative Recall - Short Delay = Severely Impaired; Long Delay Narrative Recall = Severely Impaired;     Visuo-Spatial Abilities Not Assessed    Functional Knowledge  Health & Safety Knowledge = Impaired;     Summary/Impression:  Results of repeat Neuropsychological Exam evidenced cognitive deficits in orientation, declarative recall, working memory, generative ability, mental flexibility/cognitive control, abstract reasoning ability, semantic retrieval, comprehension of complex ideational material, and on a measure assessing awareness of personal health status and ability to evaluate health problems, handle medical emergencies and take safety precautions, patient performed in the IMPAIRED range of functioning. During this encounter, patient does not appear to have capacity to make fully informed medical decisions.

## 2023-07-24 NOTE — PROGRESS NOTES
Cardiology Progress Note - Phoebe Route 75 y.o. male MRN: [id number]    Unit/Bed#: E4 -01 Encounter: [id number]      Assessment & Plan:    New onset A-fib   -Developed A-fib with RVR in setting of sepsis  -Anticoagulated on weight-based Lovenox   - Rate controlled with p.o. diltiazem 240 mg daily, Toprol-XL 75 mg twice daily  and digoxin 125 mcg daily  -Diltiazem drip discontinued    Sepsis secondary to UTI ([address])  -Blood cultures positive for Aerococcus urinae, likely secondary to left-sided hydronephrosis  -Completing antibiotics on 7/20/2023    Non-MI troponin elevation  - Troponin trend 2170->2025->1933->1228  -TTE 7/7/2023 showed EF 55%, grade 1 DD, mildly dilated RV, mild LA and RA, mild TR  -Troponin elevation likely due to sepsis  -Underwent nuclear pharmacologic stress test today, shows artifact with no clear evidence of inducible ischemia or scar    Pyelonephritis    Acute kidney injury ([address])    Tobacco use    COPD (chronic obstructive pulmonary disease) ([address])    Alcohol withdrawal ([address])    Hydronephrosis with obstructing calculus    Encounter for competency evaluation    Summary:  -Heart rates appear well controlled today  - Will arrange for SOLA cardioversion tomorrow to try and restore sinus rhythm given patient is still having shortness of breath with exertion  - Keep n.p.o. after midnight  - Otherwise continue with diltiazem to 40 mg daily, Toprol-XL 75 mg twice daily and digoxin 125 mcg daily  - Can change to Eliquis 5 mg twice daily on discharge  - Otherwise stable from a cardiac standpoint    Subjective:   No significant events overnight. He reports feeling well this morning and has no complaints. Denies chest pain, shortness of breath at rest, abdominal pain, nausea, vomiting, fever, chills, headache, dizziness or palpitations. Objective:     Vitals: Blood pressure 113/70, pulse 97, temperature (!) 96.5 °F (35.8 °C), temperature source Temporal, resp.  rate 16, height 6' 3" (1.905 m), weight 102 kg (224 lb), SpO2 95 %. , Body mass index is 28 kg/m².,   Orthostatic Blood Pressures    Flowsheet Row Most Recent Value   Blood Pressure 113/70 filed at 07/24/2023 6806   Patient Position - Orthostatic VS Lying filed at 07/24/2023 0719          No intake or output data in the 24 hours ending 07/24/23 1019        Physical Exam:    GEN: Priscilla Holman appears well, alert and oriented x 2-3, pleasant and cooperative   HEENT: Mucous membranes moist, no scleral icterus, no conjunctival pallor  NECK: No elevated JVD  HEART: Irregularly irregular rhythm, regular rate, 2/6 systolic murmur  LUNGS: clear to auscultation bilaterally; no wheezes, rales, or rhonchi   ABDOMEN: normal bowel sounds, soft, no tenderness, no distention  EXTREMITIES: peripheral pulses normal; no lower extremity edema   NEURO: no focal findings   SKIN: No lesions or rashes on exposed skin        Current Facility-Administered Medications:   •  acetaminophen (TYLENOL) tablet 650 mg, 650 mg, Oral, Q6H PRN, Nisland Diver, CRNP, 650 mg at 07/19/23 0920  •  albuterol inhalation solution 2.5 mg, 2.5 mg, Nebulization, Q6H PRN, Nisland Diver, CRNP, 2.5 mg at 07/12/23 0027  •  aluminum-magnesium hydroxide-simethicone (MYLANTA) oral suspension 30 mL, 30 mL, Oral, Q6H PRN, Nisland Diver, CRNP  •  aspirin chewable tablet 81 mg, 81 mg, Oral, Daily, Vianey Chew, DO, 81 mg at 07/24/23 4202  •  atorvastatin (LIPITOR) tablet 40 mg, 40 mg, Oral, Daily With Dinner, Vianey Chew, DO, 40 mg at 07/23/23 1715  •  bisacodyl (DULCOLAX) EC tablet 10 mg, 10 mg, Oral, Daily PRN, Nisland Diver, CRNP  •  digoxin (LANOXIN) tablet 125 mcg, 125 mcg, Oral, Daily, Saul Dia MD, 125 mcg at 07/24/23 4698  •  diltiazem (CARDIZEM CD) 24 hr capsule 240 mg, 240 mg, Oral, Daily, Saul Dia MD, 240 mg at 07/24/23 2241  •  enoxaparin (LOVENOX) subcutaneous injection 100 mg, 1 mg/kg, Subcutaneous, Q12H [address], Joceline Carrero, CRNP, 100 mg at 07/24/23 8861  •  fluticasone-vilanterol 200-25 mcg/actuation 1 puff, 1 puff, Inhalation, Daily, Wtit Chouteau, CRNP, 1 puff at 02/26/22 5214  •  folic acid (FOLVITE) tablet 1 mg, 1 mg, Oral, Daily, Zechariah Zapien PA-C, 1 mg at 07/24/23 4069  •  HYDROmorphone HCl (DILAUDID) injection 0.2 mg, 0.2 mg, Intravenous, Q4H PRN, Witt Chouteau, CRNP  •  hydrOXYzine HCL (ATARAX) tablet 50 mg, 50 mg, Oral, HS PRN, Witt Chouteau, CRNP, 50 mg at 07/19/23 2249  •  melatonin tablet 6 mg, 6 mg, Oral, HS, Witt Chouteau, CRNP, 6 mg at 07/23/23 2204  •  metoprolol succinate (TOPROL-XL) 24 hr tablet 75 mg, 75 mg, Oral, BID, Juana Hernandez PA-C, 75 mg at 07/24/23 4714  •  multivitamin-minerals (CENTRUM) tablet 1 tablet, 1 tablet, Oral, Daily, Zechariah Zapien PA-C, 1 tablet at 07/24/23 2056  •  nicotine (NICODERM CQ) 14 mg/24hr TD 24 hr patch 1 patch, 1 patch, Transdermal, Daily, Witt Chouteau, CRNP, 1 patch at 07/21/23 1125  •  ondansetron (ZOFRAN) injection 4 mg, 4 mg, Intravenous, Q6H PRN, Witt Chouteau, CRNP  •  oxyCODONE (ROXICODONE) IR tablet 5 mg, 5 mg, Oral, 4x Daily PRN, Witt Chouteau, CRNP  •  pantoprazole (PROTONIX) EC tablet 40 mg, 40 mg, Oral, Early Morning, Witt Chouteau, CRNP, 40 mg at 07/24/23 3791  •  senna (SENOKOT) tablet 17.2 mg, 2 tablet, Oral, HS, Witt Chouteau, CRNP, 17.2 mg at 07/21/23 2200  •  simethicone (MYLICON) chewable tablet 80 mg, 80 mg, Oral, 4x Daily PRN, Witt Chouteau, CRNP  •  thiamine tablet 100 mg, 100 mg, Oral, Daily, Zechariah Zapien PA-C, 100 mg at 07/24/23 2104    Labs & Results:    Lab Results   Component Value Date    TROPONINI <0.02 05/08/2018       Lab Results   Component Value Date    CALCIUM 9.4 07/20/2023    K 4.4 07/20/2023    CO2 29 07/20/2023     07/20/2023    BUN 19 07/20/2023    CREATININE 1.02 07/20/2023       Lab Results   Component Value Date    WBC 12.28 (H) 07/18/2023    HGB 12.8 07/18/2023    HCT 40.3 07/18/2023  (H) 07/18/2023     07/18/2023           No results found for: "CHOL"  Lab Results   Component Value Date    HDL 49 07/07/2023     Lab Results   Component Value Date    LDLCALC 80 07/07/2023     Lab Results   Component Value Date    TRIG 67 07/07/2023       Lab Results   Component Value Date    ALT 15 07/20/2023    AST 16 07/20/2023    ALKPHOS 72 07/20/2023         EKG personally reviewed by )Ruby Stephenson MD. No acute changes

## 2023-07-24 NOTE — RESTORATIVE TECHNICIAN NOTE
Restorative Technician Note      Patient Name: Phoebe Greene     Restorative Tech Visit Date: 07/24/23  Note Type: Mobility  Patient Position Upon Consult: Supine  Activity Performed: Ambulated  Patient Position at End of Consult: Supine;  All needs within reach

## 2023-07-24 NOTE — ASSESSMENT & PLAN NOTE
· Discussed with Dr. Angela Trimble, for SOLA cardioversion tomorrow due to persistent RVR  · Fully anticoagulated on Lovenox injections   · We will need case management to help price check Eliquis/noac

## 2023-07-24 NOTE — PROGRESS NOTES
[facility]  Progress Note  Name: Kimberly Caro I  MRN: [id number]  Unit/Bed#: E4 -01 I Date of Admission: 7/6/2023   Date of Service: 7/24/2023 I Hospital Day: 18    Assessment/Plan   * Sepsis secondary to UTI [facility])  Assessment & Plan  · Sepsis secondary to  Aerococcus bacteremia and complicated urinary tract infection/pyelonephritis  · Antibiotic course completed   · Clinically stable    Atrial fibrillation ([address])  Assessment & Plan  · Discussed with Dr. Fracisco Munguia, for OSLA cardioversion tomorrow due to persistent RVR  · Fully anticoagulated on Lovenox injections   · We will need case management to help price check Eliquis/noac    Impaired decision making  Assessment & Plan  Repeat neuropsych evaluation was done today  He continues to have impaired decision-making capacity   Discussed with case management. He will need guardianship process started    Bacteremia  Assessment & Plan  · Aerococcus bacteremia secondary to obstructive/complicated urinary tract infection  · Antibiotic course completed  · Medically stable    Hydronephrosis with obstructing calculus  Assessment & Plan  · Admitted originally due to sepsis from UTI with obstruction  · Status post cystoscopy with difficult stent insertion on 7/6/2023. Procedure was complicated by inadvertent left ureteral perforation  · Overall stable. · Antibiotic course completed.   · Outpatient  follow-up with urology regarding further stent management    Elevated troponin  Assessment & Plan  · Non-MI troponin elevation secondary to tachycardia and sepsis  · Continue aspirin and Lipitor    COPD (chronic obstructive pulmonary disease) (HCC)  Assessment & Plan  · Continue Breo 1 puff daily  · Continue as needed albuterol  · Tobacco cessation    Esophageal abnormality  Assessment & Plan  Stable on Protonix daily  Incidental finding on CTA study: Diffuse esophageal wall thickening which could be due to esophagitis (high risk due to alcohol abuse)  Outpatient GI follow-up     Daily consumption of alcohol  Assessment & Plan  He stated earlier that he drank beers on a daily basis  Clinically no withdrawal.  Continue thiamine and folic acid supplementation             VTE Pharmacologic Prophylaxis: VTE Score: 5 High Risk (Score >/= 5) - Pharmacological DVT Prophylaxis Ordered: enoxaparin (Lovenox). Sequential Compression Devices Ordered. Patient Centered Rounds: Discussed with his nurse  Discussions with Specialists or Other Care Team Provider: Discussed with cardiology    Education and Discussions with Family / Patient: None on file. Current Length of Stay: 18 day(s)  Current Patient Status: Inpatient   Certification Statement: The patient will continue to require additional inpatient hospital stay due to Guardianship  Discharge Plan: Anticipate discharge in >72 hrs to discharge location to be determined pending rehab evaluations. Code Status: Level 1 - Full Code    Subjective:   Seen and examined during rounds  + Palpitations  No fever or chills  No abdominal pain    Objective:     Vitals:   Temp (24hrs), Av.4 °F (36.3 °C), Min:96.5 °F (35.8 °C), Max:98 °F (36.7 °C)    Temp:  [96.5 °F (35.8 °C)-98 °F (36.7 °C)] 97.7 °F (36.5 °C)  HR:  [66-97] 71  Resp:  [16-20] 20  BP: (113-138)/(69-79) 138/79  SpO2:  [95 %] 95 %  Body mass index is 28 kg/m². Input and Output Summary (last 24 hours):   No intake or output data in the 24 hours ending 23 7037    Physical Exam:   Physical Exam  Vitals reviewed. Constitutional:       Appearance: He is not ill-appearing. HENT:      Head: Normocephalic and atraumatic. Nose: No congestion or rhinorrhea. Eyes:      General: No scleral icterus. Cardiovascular:      Rate and Rhythm: Normal rate. Rhythm irregular. Pulmonary:      Breath sounds: No wheezing or rales. Abdominal:      General: There is no distension. Palpations: Abdomen is soft. Tenderness:  There is no abdominal tenderness. There is no guarding. Musculoskeletal:      Cervical back: Neck supple. Right lower leg: No edema. Left lower leg: No edema. Skin:     General: Skin is warm and dry. Coloration: Skin is not jaundiced. Neurological:      Mental Status: He is disoriented. Psychiatric:         Mood and Affect: Mood normal.         Behavior: Behavior normal.         Additional Data:     Labs:  Results from last 7 days   Lab Units 07/18/23  0551   WBC Thousand/uL 12.28*   HEMOGLOBIN g/dL 12.8   HEMATOCRIT % 40.3   PLATELETS Thousands/uL 354   NEUTROS PCT % 76*   LYMPHS PCT % 15   MONOS PCT % 7   EOS PCT % 1     Results from last 7 days   Lab Units 07/20/23  0858   SODIUM mmol/L 138   POTASSIUM mmol/L 4.4   CHLORIDE mmol/L 105   CO2 mmol/L 29   BUN mg/dL 19   CREATININE mg/dL 1.02   ANION GAP mmol/L 4   CALCIUM mg/dL 9.4   ALBUMIN g/dL 3.5   TOTAL BILIRUBIN mg/dL 0.51   ALK PHOS U/L 72   ALT U/L 15   AST U/L 16   GLUCOSE RANDOM mg/dL 131                 Results from last 7 days   Lab Units 07/18/23  0551 07/17/23  2358   LACTIC ACID mmol/L  --  0.9   PROCALCITONIN ng/ml 0.11  --        Lines/Drains:  Invasive Devices     Peripheral Intravenous Line  Duration           Peripheral IV 07/22/23 Left Forearm 2 days          Drain  Duration           Ureteral Internal Stent Left ureter 6 Fr. 17 days                  Telemetry:  Telemetry Orders (From admission, onward)             24 Hour Telemetry Monitoring  Continuous x 24 Hours (Telem)        Question:  Reason for 24 Hour Telemetry  Answer:  Arrhythmias requiring acute medical intervention / PPM or ICD malfunction                            Imaging: No pertinent imaging reviewed. Recent Cultures (last 7 days):   Results from last 7 days   Lab Units 07/18/23  0353 07/18/23  0130   BLOOD CULTURE   --  No Growth After 5 Days. No Growth After 5 Days.    URINE CULTURE  >100,000 cfu/ml Stenotrophomonas maltophilia*  >100,000 cfu/ml Delftia acidovorans group*  --        Last 24 Hours Medication List:   Current Facility-Administered Medications   Medication Dose Route Frequency Provider Last Rate   • acetaminophen  650 mg Oral Q6H PRN KIKI Devine     • albuterol  2.5 mg Nebulization Q6H PRN KIKI Devine     • aluminum-magnesium hydroxide-simethicone  30 mL Oral Q6H PRN LUISITO DevineNP     • aspirin  81 mg Oral Daily Villiscashaji Birch DO     • atorvastatin  40 mg Oral Daily With Daniella Gonzalez DO     • bisacodyl  10 mg Oral Daily PRN KIKI Devine     • digoxin  125 mcg Oral Daily Francois Holter, MD     • diltiazem  240 mg Oral Daily Francois Holter, MD     • enoxaparin  1 mg/kg Subcutaneous Q12H [address]  KIKI Acevedo     • fluticasone-vilanterol  1 puff Inhalation Daily KIKI Devine     • folic acid  1 mg Oral Daily Lalitha Beverly PA-C     • hydrOXYzine HCL  50 mg Oral HS PRN KIKI Devine     • melatonin  6 mg Oral HS KIKI Devine     • metoprolol succinate  75 mg Oral BID Jing Torrez PA-C     • multivitamin-minerals  1 tablet Oral Daily Lalitha Beverly PA-C     • nicotine  1 patch Transdermal Daily KIKI Devine     • ondansetron  4 mg Intravenous Q6H PRN KIKI Devine     • pantoprazole  40 mg Oral Early Morning KIKI Devine     • senna  2 tablet Oral HS KIKI Devine     • simethicone  80 mg Oral 4x Daily PRN KIKI Devine     • thiamine  100 mg Oral Daily Lalitha Beverly PA-C          Today, Patient Was Seen By: Husam Lombardo MD    **Please Note: This note may have been constructed using a voice recognition system. **

## 2023-07-24 NOTE — ANESTHESIA PREPROCEDURE EVALUATION
Procedure:  SOLA    Relevant Problems   ANESTHESIA (within normal limits)      CARDIO  elevated troponins ekg wnl   (+) Atrial fibrillation (HCC)      ENDO (within normal limits)      GI/HEPATIC (within normal limits)      /RENAL   (+) Hydronephrosis with obstructing calculus      GYN (within normal limits)      HEMATOLOGY (within normal limits)      MUSCULOSKELETAL (within normal limits)      NEURO/PSYCH (within normal limits)      PULMONARY   (+) COPD (chronic obstructive pulmonary disease) (HCC)        Physical Exam    Airway      TM Distance: >3 FB  Neck ROM: limited     Dental   Comment: Upper denture    Remaining teeth in very poor condition, upper dentures,     Cardiovascular  Rhythm: regular, Rate: normal, Cardiovascular exam normal    Pulmonary  Pulmonary exam normal Breath sounds clear to auscultation,     Other Findings        Anesthesia Plan  ASA Score- 3     Anesthesia Type- general with ASA Monitors. Additional Monitors:   Airway Plan:     Comment: Patient is a very poor historian. Plan Factors-Exercise tolerance (METS): >4 METS. Chart reviewed. EKG reviewed. Existing labs reviewed. Patient summary reviewed. Patient is a current smoker. Induction- intravenous. Postoperative Plan- Plan for postoperative opioid use. Informed Consent- Anesthetic plan and risks discussed with patient.

## 2023-07-24 NOTE — ASSESSMENT & PLAN NOTE
Repeat neuropsych evaluation was done today  He continues to have impaired decision-making capacity   Discussed with case management.   He will need guardianship process started

## 2023-07-24 NOTE — ASSESSMENT & PLAN NOTE
Stable on Protonix daily  Incidental finding on CTA study: Diffuse esophageal wall thickening which could be due to esophagitis (high risk due to alcohol abuse)  Outpatient GI follow-up

## 2023-07-24 NOTE — ASSESSMENT & PLAN NOTE
· Sepsis secondary to  Aerococcus bacteremia and complicated urinary tract infection/pyelonephritis  · Antibiotic course completed   · Clinically stable

## 2023-07-24 NOTE — RESTORATIVE TECHNICIAN NOTE
Restorative Technician Note      Patient Name: Thad Rasheed     Restorative Tech Visit Date: 07/24/23  Note Type: Mobility  Patient Position Upon Consult: Supine  Activity Performed: Ambulated  Patient Position at End of Consult: Supine;  All needs within reach

## 2023-07-24 NOTE — ASSESSMENT & PLAN NOTE
· Aerococcus bacteremia secondary to obstructive/complicated urinary tract infection  · Antibiotic course completed  · Medically stable No

## 2023-07-25 ENCOUNTER — APPOINTMENT (OUTPATIENT)
Dept: NON INVASIVE DIAGNOSTICS | Facility: HOSPITAL | Age: 73
DRG: 853 | End: 2023-07-25
Attending: STUDENT IN AN ORGANIZED HEALTH CARE EDUCATION/TRAINING PROGRAM
Payer: MEDICARE

## 2023-07-25 ENCOUNTER — ANESTHESIA (INPATIENT)
Dept: NON INVASIVE DIAGNOSTICS | Facility: HOSPITAL | Age: 73
End: 2023-07-25
Payer: MEDICARE

## 2023-07-25 LAB
ANION GAP SERPL CALCULATED.3IONS-SCNC: 6 MMOL/L
BASOPHILS # BLD MANUAL: 0 THOUSAND/UL (ref 0–0.1)
BASOPHILS NFR MAR MANUAL: 0 % (ref 0–1)
BUN SERPL-MCNC: 23 MG/DL (ref 5–25)
CALCIUM SERPL-MCNC: 9 MG/DL (ref 8.4–10.2)
CHLORIDE SERPL-SCNC: 105 MMOL/L (ref 96–108)
CO2 SERPL-SCNC: 25 MMOL/L (ref 21–32)
CREAT SERPL-MCNC: 1.01 MG/DL (ref 0.6–1.3)
EOSINOPHIL # BLD MANUAL: 0.19 THOUSAND/UL (ref 0–0.4)
EOSINOPHIL NFR BLD MANUAL: 2 % (ref 0–6)
ERYTHROCYTE [DISTWIDTH] IN BLOOD BY AUTOMATED COUNT: 13.5 % (ref 11.6–15.1)
GFR SERPL CREATININE-BSD FRML MDRD: 72 ML/MIN/1.73SQ M
GLUCOSE SERPL-MCNC: 87 MG/DL (ref 65–140)
HCT VFR BLD AUTO: 44.6 % (ref 36.5–49.3)
HGB BLD-MCNC: 14.2 G/DL (ref 12–17)
LYMPHOCYTES # BLD AUTO: 3.11 THOUSAND/UL (ref 0.6–4.47)
LYMPHOCYTES # BLD AUTO: 32 % (ref 14–44)
MCH RBC QN AUTO: 32.3 PG (ref 26.8–34.3)
MCHC RBC AUTO-ENTMCNC: 31.8 G/DL (ref 31.4–37.4)
MCV RBC AUTO: 101 FL (ref 82–98)
MONOCYTES # BLD AUTO: 0.87 THOUSAND/UL (ref 0–1.22)
MONOCYTES NFR BLD: 9 % (ref 4–12)
NEUTROPHILS # BLD MANUAL: 5.54 THOUSAND/UL (ref 1.85–7.62)
NEUTS SEG NFR BLD AUTO: 57 % (ref 43–75)
PLATELET # BLD AUTO: 420 THOUSANDS/UL (ref 149–390)
PLATELET BLD QL SMEAR: ABNORMAL
PMV BLD AUTO: 9.6 FL (ref 8.9–12.7)
POTASSIUM SERPL-SCNC: 4.2 MMOL/L (ref 3.5–5.3)
RBC # BLD AUTO: 4.4 MILLION/UL (ref 3.88–5.62)
RBC MORPH BLD: NORMAL
SL CV LV EF: 60
SODIUM SERPL-SCNC: 136 MMOL/L (ref 135–147)
WBC # BLD AUTO: 9.72 THOUSAND/UL (ref 4.31–10.16)

## 2023-07-25 PROCEDURE — 99232 SBSQ HOSP IP/OBS MODERATE 35: CPT | Performed by: STUDENT IN AN ORGANIZED HEALTH CARE EDUCATION/TRAINING PROGRAM

## 2023-07-25 PROCEDURE — 93325 DOPPLER ECHO COLOR FLOW MAPG: CPT | Performed by: STUDENT IN AN ORGANIZED HEALTH CARE EDUCATION/TRAINING PROGRAM

## 2023-07-25 PROCEDURE — 85007 BL SMEAR W/DIFF WBC COUNT: CPT | Performed by: INTERNAL MEDICINE

## 2023-07-25 PROCEDURE — 80048 BASIC METABOLIC PNL TOTAL CA: CPT | Performed by: INTERNAL MEDICINE

## 2023-07-25 PROCEDURE — 92960 CARDIOVERSION ELECTRIC EXT: CPT

## 2023-07-25 PROCEDURE — 85027 COMPLETE CBC AUTOMATED: CPT | Performed by: INTERNAL MEDICINE

## 2023-07-25 PROCEDURE — 93320 DOPPLER ECHO COMPLETE: CPT | Performed by: STUDENT IN AN ORGANIZED HEALTH CARE EDUCATION/TRAINING PROGRAM

## 2023-07-25 PROCEDURE — 99232 SBSQ HOSP IP/OBS MODERATE 35: CPT | Performed by: INTERNAL MEDICINE

## 2023-07-25 PROCEDURE — 93312 ECHO TRANSESOPHAGEAL: CPT

## 2023-07-25 PROCEDURE — 92960 CARDIOVERSION ELECTRIC EXT: CPT | Performed by: STUDENT IN AN ORGANIZED HEALTH CARE EDUCATION/TRAINING PROGRAM

## 2023-07-25 PROCEDURE — 93312 ECHO TRANSESOPHAGEAL: CPT | Performed by: STUDENT IN AN ORGANIZED HEALTH CARE EDUCATION/TRAINING PROGRAM

## 2023-07-25 RX ORDER — MIDAZOLAM HYDROCHLORIDE 2 MG/2ML
INJECTION, SOLUTION INTRAMUSCULAR; INTRAVENOUS AS NEEDED
Status: DISCONTINUED | OUTPATIENT
Start: 2023-07-25 | End: 2023-07-25

## 2023-07-25 RX ORDER — SODIUM CHLORIDE 9 MG/ML
INJECTION, SOLUTION INTRAVENOUS CONTINUOUS PRN
Status: DISCONTINUED | OUTPATIENT
Start: 2023-07-25 | End: 2023-07-25

## 2023-07-25 RX ORDER — PROPOFOL 10 MG/ML
INJECTION, EMULSION INTRAVENOUS AS NEEDED
Status: DISCONTINUED | OUTPATIENT
Start: 2023-07-25 | End: 2023-07-25

## 2023-07-25 RX ORDER — LIDOCAINE HYDROCHLORIDE 20 MG/ML
INJECTION, SOLUTION EPIDURAL; INFILTRATION; INTRACAUDAL; PERINEURAL AS NEEDED
Status: DISCONTINUED | OUTPATIENT
Start: 2023-07-25 | End: 2023-07-25

## 2023-07-25 RX ORDER — PROPOFOL 10 MG/ML
INJECTION, EMULSION INTRAVENOUS CONTINUOUS PRN
Status: DISCONTINUED | OUTPATIENT
Start: 2023-07-25 | End: 2023-07-25

## 2023-07-25 RX ORDER — GLYCOPYRROLATE 0.2 MG/ML
INJECTION INTRAMUSCULAR; INTRAVENOUS AS NEEDED
Status: DISCONTINUED | OUTPATIENT
Start: 2023-07-25 | End: 2023-07-25

## 2023-07-25 RX ORDER — ONDANSETRON 2 MG/ML
4 INJECTION INTRAMUSCULAR; INTRAVENOUS EVERY 6 HOURS PRN
Status: DISCONTINUED | OUTPATIENT
Start: 2023-07-25 | End: 2023-07-25 | Stop reason: HOSPADM

## 2023-07-25 RX ADMIN — DILTIAZEM HYDROCHLORIDE 240 MG: 120 CAPSULE, COATED, EXTENDED RELEASE ORAL at 08:30

## 2023-07-25 RX ADMIN — DIGOXIN 125 MCG: 125 TABLET ORAL at 08:30

## 2023-07-25 RX ADMIN — THIAMINE HCL TAB 100 MG 100 MG: 100 TAB at 08:30

## 2023-07-25 RX ADMIN — PANTOPRAZOLE SODIUM 40 MG: 40 TABLET, DELAYED RELEASE ORAL at 06:09

## 2023-07-25 RX ADMIN — MELATONIN 6 MG: at 21:26

## 2023-07-25 RX ADMIN — PROPOFOL 130 MG: 10 INJECTION, EMULSION INTRAVENOUS at 13:29

## 2023-07-25 RX ADMIN — ENOXAPARIN SODIUM 100 MG: 100 INJECTION SUBCUTANEOUS at 08:30

## 2023-07-25 RX ADMIN — PROPOFOL 170 MCG/KG/MIN: 10 INJECTION, EMULSION INTRAVENOUS at 13:29

## 2023-07-25 RX ADMIN — Medication 1 TABLET: at 08:31

## 2023-07-25 RX ADMIN — APIXABAN 5 MG: 5 TABLET, FILM COATED ORAL at 17:14

## 2023-07-25 RX ADMIN — ATORVASTATIN CALCIUM 40 MG: 40 TABLET, FILM COATED ORAL at 17:14

## 2023-07-25 RX ADMIN — SENNOSIDES 17.2 MG: 8.6 TABLET, FILM COATED ORAL at 21:25

## 2023-07-25 RX ADMIN — GLYCOPYRROLATE 0.1 MCG: 0.2 INJECTION, SOLUTION INTRAMUSCULAR; INTRAVENOUS at 13:52

## 2023-07-25 RX ADMIN — HYDROXYZINE HYDROCHLORIDE 50 MG: 25 TABLET, FILM COATED ORAL at 21:26

## 2023-07-25 RX ADMIN — FLUTICASONE FUROATE AND VILANTEROL TRIFENATATE 1 PUFF: 200; 25 POWDER RESPIRATORY (INHALATION) at 08:31

## 2023-07-25 RX ADMIN — FOLIC ACID 1 MG: 1 TABLET ORAL at 08:31

## 2023-07-25 RX ADMIN — ASPIRIN 81 MG 81 MG: 81 TABLET ORAL at 08:30

## 2023-07-25 RX ADMIN — MIDAZOLAM 2 MG: 1 INJECTION INTRAMUSCULAR; INTRAVENOUS at 13:18

## 2023-07-25 RX ADMIN — LIDOCAINE HYDROCHLORIDE 100 MG: 20 INJECTION, SOLUTION EPIDURAL; INFILTRATION; INTRACAUDAL at 13:29

## 2023-07-25 RX ADMIN — SODIUM CHLORIDE: 0.9 INJECTION, SOLUTION INTRAVENOUS at 13:11

## 2023-07-25 NOTE — CASE MANAGEMENT
Case Management Discharge Planning Note    Patient name Phoebe Route  Location East 4 /E4 [address]-* MRN [id number]  : 1950 Date 2023       Current Admission Date: 2023  Current Admission Diagnosis:Sepsis secondary to UTI [facility])   Patient Active Problem List    Diagnosis Date Noted   • Atrial fibrillation ([address]) 2023   • Impaired decision making 2023   • Daily consumption of alcohol 2023   • Bacteremia 2023   • Sepsis secondary to UTI ([address]) 2023   • Elevated troponin 2023   • Elevated d-dimer 2023   • Esophageal abnormality 2023   • Hydronephrosis with obstructing calculus 2023   • Mycotic toenails 2023   • Angioedema 2018   • COPD (chronic obstructive pulmonary disease) ([address]) 2018   • Bradycardia 2018   • Tobacco use 2016   • Polycythemia 2016   • Weight loss 2016      LOS (days): 19  Geometric Mean LOS (GMLOS) (days): 9.60  Days to GMLOS:-8.9     OBJECTIVE:  Risk of Unplanned Readmission Score: 12.6         Current admission status: Inpatient   Preferred Pharmacy:   PATIENT/FAMILY REPORTS NO PREFERRED PHARMACY  No address on file      [address] [place] [address],  [address] [place] [address],  [address] [place] [address]  Phone: [phone] Fax: [phone]    Primary Care Provider: No primary care provider on file.     Primary Insurance: MEDICARE  Secondary Insurance:     DISCHARGE DETAILS:    Discharge planning discussed with[de-identified] Patient, Dr. Maria Del Rosario Vega & treatment team  Freedom of Choice: Yes  Comments - Freedom of Choice: Pt deemed to have no capacity  CM contacted family/caregiver?: No- see comments (no EC)  Were Treatment Team discharge recommendations reviewed with patient/caregiver?: Yes  Did patient/caregiver verbalize understanding of patient care needs?: N/A- going to facility  Were patient/caregiver advised of the risks associated with not following Treatment Team discharge recommendations?: Yes    Contacts  Patient Contacts: Patient  Relationship to Patient[de-identified] Family  Contact Method: In Person  Reason/Outcome: Continuity of Care, Discharge Planning                        Treatment Team Recommendation: Home with [address]  Discharge Destination Plan[de-identified] SNF                                         Additional Comments: CM was notified by Dr. Antonio Schwab that 2nd neuro-psych eval was performed & pt still has impaired cognitition. Pt still remains to have no capacity & will need to begin guardianship case. CM send [facility] guardianship data form w/ necessary paperwork to start process. CM to continue to follow pt care & d/c.

## 2023-07-25 NOTE — ASSESSMENT & PLAN NOTE
· Admitted originally due to sepsis from UTI with obstruction  · Status post cystoscopy with difficult stent insertion on 7/6/2023  · Procedure was complicated by inadvertent left ureteral perforation  · Antibiotic course completed and he is hemodynamically stable.   · Outpatient follow-up with urology regarding further stent management and removal

## 2023-07-25 NOTE — ASSESSMENT & PLAN NOTE
· Sepsis secondary to Aerococcus bacteremia and complicated UTI  · Antibiotic course completed  · Clinically stable

## 2023-07-25 NOTE — ASSESSMENT & PLAN NOTE
· Aerococcus bacteremia due to complicated UTI  · Antibiotic course completed  · Bacteremia  resolved

## 2023-07-25 NOTE — RESTORATIVE TECHNICIAN NOTE
Restorative Technician Note      Patient Name: Phoebe Greene     Restorative Tech Visit Date: 07/25/23  Note Type: Mobility  Patient Position Upon Consult: Supine  Activity Performed: Ambulated  Assistive Device: Roller walker  Patient Position at End of Consult: Supine;  All needs within reach          ns

## 2023-07-25 NOTE — PLAN OF CARE
Problem: Potential for Falls  Goal: Patient will remain free of falls  Description: INTERVENTIONS:  - Educate patient/family on patient safety including physical limitations  - Instruct patient to call for assistance with activity   - Consult OT/PT to assist with strengthening/mobility   - Keep Call bell within reach  - Keep bed low and locked with side rails adjusted as appropriate  - Keep care items and personal belongings within reach  - Initiate and maintain comfort rounds  - Make Fall Risk Sign visible to staff  - Offer Toileting every 2 Hours, in advance of need  - Initiate/Maintain bed alarm  - Obtain necessary fall risk management equipment  - Apply yellow socks and bracelet for high fall risk patients  - Consider moving patient to room near nurses station  Outcome: Progressing     Problem: MOBILITY - ADULT  Goal: Maintain or return to baseline ADL function  Description: INTERVENTIONS:  -  Assess patient's ability to carry out ADLs; assess patient's baseline for ADL function and identify physical deficits which impact ability to perform ADLs (bathing, care of mouth/teeth, toileting, grooming, dressing, etc.)  - Assess/evaluate cause of self-care deficits   - Assess range of motion  - Assess patient's mobility; develop plan if impaired  - Assess patient's need for assistive devices and provide as appropriate  - Encourage maximum independence but intervene and supervise when necessary  - Involve family in performance of ADLs  - Assess for home care needs following discharge   - Consider OT consult to assist with ADL evaluation and planning for discharge  - Provide patient education as appropriate  Outcome: Progressing  Goal: Maintains/Returns to pre admission functional level  Description: INTERVENTIONS:  - Perform BMAT or MOVE assessment daily.   - Set and communicate daily mobility goal to care team and patient/family/caregiver.    - Collaborate with rehabilitation services on mobility goals if consulted  - Perform Range of Motion 3 times a day. - Reposition patient every 2 hours.   - Dangle patient 3 times a day  - Stand patient 3 times a day  - Ambulate patient 3 times a day  - Out of bed to chair 3 times a day   - Out of bed for meals 3 times a day  - Out of bed for toileting  - Record patient progress and toleration of activity level   Outcome: Progressing     Problem: PAIN - ADULT  Goal: Verbalizes/displays adequate comfort level or baseline comfort level  Description: Interventions:  - Encourage patient to monitor pain and request assistance  - Assess pain using appropriate pain scale  - Administer analgesics based on type and severity of pain and evaluate response  - Implement non-pharmacological measures as appropriate and evaluate response  - Consider cultural and social influences on pain and pain management  - Notify physician/advanced practitioner if interventions unsuccessful or patient reports new pain  Outcome: Progressing     Problem: INFECTION - ADULT  Goal: Absence or prevention of progression during hospitalization  Description: INTERVENTIONS:  - Assess and monitor for signs and symptoms of infection  - Monitor lab/diagnostic results  - Monitor all insertion sites, i.e. indwelling lines, tubes, and drains  - Monitor endotracheal if appropriate and nasal secretions for changes in amount and color  - [place] appropriate cooling/warming therapies per order  - Administer medications as ordered  - Instruct and encourage patient and family to use good hand hygiene technique  - Identify and instruct in appropriate isolation precautions for identified infection/condition  Outcome: Progressing  Goal: Absence of fever/infection during neutropenic period  Description: INTERVENTIONS:  - Monitor WBC    Outcome: Progressing     Problem: SAFETY ADULT  Goal: Patient will remain free of falls  Description: INTERVENTIONS:  - Educate patient/family on patient safety including physical limitations  - Instruct patient to call for assistance with activity   - Consult OT/PT to assist with strengthening/mobility   - Keep Call bell within reach  - Keep bed low and locked with side rails adjusted as appropriate  - Keep care items and personal belongings within reach  - Initiate and maintain comfort rounds  - Make Fall Risk Sign visible to staff  - Offer Toileting every 2 Hours, in advance of need  - Initiate/Maintain bed alarm  - Obtain necessary fall risk management equipment  - Apply yellow socks and bracelet for high fall risk patients  - Consider moving patient to room near nurses station  Outcome: Progressing  Goal: Maintain or return to baseline ADL function  Description: INTERVENTIONS:  -  Assess patient's ability to carry out ADLs; assess patient's baseline for ADL function and identify physical deficits which impact ability to perform ADLs (bathing, care of mouth/teeth, toileting, grooming, dressing, etc.)  - Assess/evaluate cause of self-care deficits   - Assess range of motion  - Assess patient's mobility; develop plan if impaired  - Assess patient's need for assistive devices and provide as appropriate  - Encourage maximum independence but intervene and supervise when necessary  - Involve family in performance of ADLs  - Assess for home care needs following discharge   - Consider OT consult to assist with ADL evaluation and planning for discharge  - Provide patient education as appropriate  Outcome: Progressing  Goal: Maintains/Returns to pre admission functional level  Description: INTERVENTIONS:  - Perform BMAT or MOVE assessment daily.   - Set and communicate daily mobility goal to care team and patient/family/caregiver. - Collaborate with rehabilitation services on mobility goals if consulted  - Perform Range of Motion 3 times a day. - Reposition patient every 2 hours.   - Dangle patient 3 times a day  - Stand patient 3 times a day  - Ambulate patient 3 times a day  - Out of bed to chair 3 times a day   - Out of bed for meals 3 times a day  - Out of bed for toileting  - Record patient progress and toleration of activity level   Outcome: Progressing     Problem: DISCHARGE PLANNING  Goal: Discharge to home or other facility with appropriate resources  Description: INTERVENTIONS:  - Identify barriers to discharge w/patient and caregiver  - Arrange for needed discharge resources and transportation as appropriate  - Identify discharge learning needs (meds, wound care, etc.)  - Arrange for interpretive services to assist at discharge as needed  - Refer to Case Management Department for coordinating discharge planning if the patient needs post-hospital services based on physician/advanced practitioner order or complex needs related to functional status, cognitive ability, or social support system  Outcome: Progressing     Problem: Knowledge Deficit  Goal: Patient/family/caregiver demonstrates understanding of disease process, treatment plan, medications, and discharge instructions  Description: Complete learning assessment and assess knowledge base.   Interventions:  - Provide teaching at level of understanding  - Provide teaching via preferred learning methods  Outcome: Progressing     Problem: Prexisting or High Potential for Compromised Skin Integrity  Goal: Skin integrity is maintained or improved  Description: INTERVENTIONS:  - Identify patients at risk for skin breakdown  - Assess and monitor skin integrity  - Assess and monitor nutrition and hydration status  - Monitor labs   - Assess for incontinence   - Turn and reposition patient  - Assist with mobility/ambulation  - Relieve pressure over bony prominences  - Avoid friction and shearing  - Provide appropriate hygiene as needed including keeping skin clean and dry  - Evaluate need for skin moisturizer/barrier cream  - Collaborate with interdisciplinary team   - Patient/family teaching  - Consider wound care consult   Outcome: Progressing

## 2023-07-25 NOTE — PROGRESS NOTES
[facility]  Progress Note  Name: Kennedy Fitzgerald I  MRN: [id number]  Unit/Bed#: E4 -01 I Date of Admission: 7/6/2023   Date of Service: 7/25/2023 I Hospital Day: 23    Assessment/Plan   * Sepsis secondary to UTI [facility])  Assessment & Plan  · Sepsis secondary to Aerococcus bacteremia and complicated UTI  · Antibiotic course completed  · Clinically stable    Atrial fibrillation ([address])  Assessment & Plan  · With persistent RVR. · For SOLA cardioversion today   · Fully anticoagulated on Lovenox. Impaired decision making  Assessment & Plan  Repeat neuropsych evaluation was done  on 7/24/2023  He continues to have impaired decision-making capacity  Guardianship proce initiated per     Bacteremia  Assessment & Plan  · Aerococcus bacteremia due to complicated UTI  · Antibiotic course completed  · Bacteremia  resolved    Hydronephrosis with obstructing calculus  Assessment & Plan  · Admitted originally due to sepsis from UTI with obstruction  · Status post cystoscopy with difficult stent insertion on 7/6/2023  · Procedure was complicated by inadvertent left ureteral perforation  · Antibiotic course completed and he is hemodynamically stable. · Outpatient follow-up with urology regarding further stent management and removal    COPD (chronic obstructive pulmonary disease) ([address])  Assessment & Plan  · Continue Breo 1 puff daily.   · Albuterol as needed    Esophageal abnormality  Assessment & Plan  Stable on Protonix daily  Incidental finding on CTA study: Diffuse esophageal wall thickening which could be due to esophagitis (high risk due to alcohol abuse)  Outpatient GI follow-up     Daily consumption of alcohol  Assessment & Plan  He stated earlier that he drank beers on a daily basis  Clinically no withdrawal.  Continue thiamine and folic acid supplementation           VTE Pharmacologic Prophylaxis: VTE Score: 5 High Risk (Score >/= 5) - Pharmacological DVT Prophylaxis Ordered: enoxaparin (Lovenox). Sequential Compression Devices Ordered. Patient Centered Rounds: I performed bedside rounds with nursing staff today. Current Length of Stay: 19 day(s)  Current Patient Status: Inpatient   Certification Statement: The patient will continue to require additional inpatient hospital stay due to Guardianship process, SOLA  Discharge Plan: Anticipate discharge in >72 hrs to To be determined    Code Status: Level 1 - Full Code    Subjective:   Seen and examined during rounds. Mood was not good because he is hungry  He wanted SOLA to be in the morning  This is scheduled at 1 PM    Objective:     Vitals:   Temp (24hrs), Av.8 °F (36.6 °C), Min:97.2 °F (36.2 °C), Max:98.2 °F (36.8 °C)    Temp:  [97.2 °F (36.2 °C)-98.2 °F (36.8 °C)] 98.1 °F (36.7 °C)  HR:  [] 104  Resp:  [20-22] 20  BP: ()/(71-88) 116/88  SpO2:  [92 %-95 %] 95 %  Body mass index is 28 kg/m². Input and Output Summary (last 24 hours): Intake/Output Summary (Last 24 hours) at 2023 1021  Last data filed at 2023 2249  Gross per 24 hour   Intake 30 ml   Output --   Net 30 ml       Physical Exam:   Physical Exam  Vitals reviewed. Constitutional:       Appearance: He is not ill-appearing. HENT:      Head: Normocephalic and atraumatic. Nose: No congestion or rhinorrhea. Eyes:      General: No scleral icterus. Cardiovascular:      Rate and Rhythm: Tachycardia present. Rhythm irregular. Pulmonary:      Breath sounds: No wheezing, rhonchi or rales. Abdominal:      General: There is no distension. Palpations: Abdomen is soft. Tenderness: There is no abdominal tenderness. There is no guarding. Musculoskeletal:      Cervical back: Neck supple. Right lower leg: No edema. Left lower leg: No edema. Skin:     General: Skin is warm and dry. Coloration: Skin is not jaundiced or pale.    Neurological:      Comments: Awake alert moves all EXTR   Psychiatric: Comments: Dysphoric mood          Additional Data:     Labs:  Results from last 7 days   Lab Units 07/25/23  0452   WBC Thousand/uL 9.72   HEMOGLOBIN g/dL 14.2   HEMATOCRIT % 44.6   PLATELETS Thousands/uL 420*   LYMPHO PCT % 32   MONO PCT % 9   EOS PCT % 2     Results from last 7 days   Lab Units 07/25/23  0452 07/20/23  0858   SODIUM mmol/L 136 138   POTASSIUM mmol/L 4.2 4.4   CHLORIDE mmol/L 105 105   CO2 mmol/L 25 29   BUN mg/dL 23 19   CREATININE mg/dL 1.01 1.02   ANION GAP mmol/L 6 4   CALCIUM mg/dL 9.0 9.4   ALBUMIN g/dL  --  3.5   TOTAL BILIRUBIN mg/dL  --  0.51   ALK PHOS U/L  --  72   ALT U/L  --  15   AST U/L  --  16   GLUCOSE RANDOM mg/dL 87 131                       Lines/Drains:  Invasive Devices     Peripheral Intravenous Line  Duration           Peripheral IV 07/22/23 Left Forearm 3 days          Drain  Duration           Ureteral Internal Stent Left ureter 6 Fr. 18 days                  Telemetry:  Telemetry Orders (From admission, onward)             24 Hour Telemetry Monitoring  Continuous x 24 Hours (Telem)        Expiring   Question:  Reason for 24 Hour Telemetry  Answer:  Arrhythmias requiring acute medical intervention / PPM or ICD malfunction                 Telemetry Reviewed: Atrial fibrillation             Imaging: No pertinent imaging reviewed.     Recent Cultures (last 7 days):         Last 24 Hours Medication List:   Current Facility-Administered Medications   Medication Dose Route Frequency Provider Last Rate   • acetaminophen  650 mg Oral Q6H PRN KIKI Joy     • albuterol  2.5 mg Nebulization Q6H PRN KIKI Joy     • aluminum-magnesium hydroxide-simethicone  30 mL Oral Q6H PRN KIKI Joy     • aspirin  81 mg Oral Daily Alia Eric, DO     • atorvastatin  40 mg Oral Daily With Enedelia Amezquita DO     • bisacodyl  10 mg Oral Daily PRN KIKI Joy     • digoxin  125 mcg Oral Daily Kenneth Wilkinson MD     • diltiazem  240 mg Oral Daily Josesito Jordan MD     • enoxaparin  1 mg/kg Subcutaneous Q12H [address]  KIKI Acevedo     • fluticasone-vilanterol  1 puff Inhalation Daily KIKI Santa     • folic acid  1 mg Oral Daily Toya Ervin PA-C     • hydrOXYzine HCL  50 mg Oral HS PRN KIKI Santa     • melatonin  6 mg Oral HS KIKI Santa     • multivitamin-minerals  1 tablet Oral Daily Toya Ervin PA-C     • nicotine  1 patch Transdermal Daily KIKI Santa     • ondansetron  4 mg Intravenous Q6H PRN KIKI Santa     • pantoprazole  40 mg Oral Early Morning KIKI Santa     • senna  2 tablet Oral HS KIKI Santa     • simethicone  80 mg Oral 4x Daily PRN KIKI Santa     • thiamine  100 mg Oral Daily Toya Ervin PA-C          Today, Patient Was Seen By: Aishwarya Sylvester MD    **Please Note: This note may have been constructed using a voice recognition system. **

## 2023-07-25 NOTE — ASSESSMENT & PLAN NOTE
Repeat neuropsych evaluation was done  on 7/24/2023  He continues to have impaired decision-making capacity  Guardianship proce initiated per

## 2023-07-25 NOTE — PROGRESS NOTES
Cardiology Progress Note - Tori Villanueva 75 y.o. male MRN: [id number]    Unit/Bed#: E4 -01 Encounter: [id number]      Assessment & Plan:    New onset A-fib   -Developed A-fib with RVR in setting of sepsis  -Anticoagulated on weight-based Lovenox   - Rate controlled with p.o. diltiazem 240 mg daily, Toprol-XL 75 mg twice daily  and digoxin 125 mcg daily  -Diltiazem drip discontinued    Sepsis secondary to UTI ([address])  -Blood cultures positive for Aerococcus urinae, likely secondary to left-sided hydronephrosis  -Completed antibiotics on 7/20/2023    Non-MI troponin elevation  - Troponin trend 2170->2025->1933->1228  -TTE 7/7/2023 showed EF 55%, grade 1 DD, mildly dilated RV, mild LA and RA, mild TR  -Troponin elevation likely due to sepsis  -Underwent nuclear pharmacologic stress test today, shows artifact with no clear evidence of inducible ischemia or scar    Pyelonephritis    Acute kidney injury ([address])    Tobacco use    COPD (chronic obstructive pulmonary disease) ([address])    Alcohol withdrawal ([address])    Hydronephrosis with obstructing calculus    Encounter for competency evaluation    Summary:  -Heart rates are well controlled at rest but spike to the 120s to 140s with exertion   - plan for SOLA cardioversion today  - Keep n.p.o. for procedure    - holding Toprol-XL 75 mg twice daily to prevent bradycardia post cardioversion   - Otherwise continue with diltiazem 240 mg daily, and digoxin 125 mcg daily  - Can change to Eliquis 5 mg twice daily on discharge  - Otherwise stable from a cardiac standpoint    Subjective:   No significant events overnight. He reports feeling well this morning and has no complaints. Denies chest pain, shortness of breath at rest, abdominal pain, nausea, vomiting, fever, chills, headache, dizziness or palpitations. Objective:     Vitals: Blood pressure 116/88, pulse 104, temperature 98.1 °F (36.7 °C), temperature source Temporal, resp.  rate 20, height 6' 3" (1.905 m), weight 102 kg (224 lb), SpO2 95 %. , Body mass index is 28 kg/m².,   Orthostatic Blood Pressures    Flowsheet Row Most Recent Value   Blood Pressure 116/88 filed at 07/25/2023 0809   Patient Position - Orthostatic VS Lying filed at 07/25/2023 0809            Intake/Output Summary (Last 24 hours) at 7/25/2023 0934  Last data filed at 7/24/2023 2249  Gross per 24 hour   Intake 30 ml   Output --   Net 30 ml           Physical Exam:    GEN: Willard Stallworth appears well, alert and oriented x 2-3, pleasant and cooperative   HEENT: Mucous membranes moist, no scleral icterus, no conjunctival pallor  NECK: No elevated JVD  HEART: Irregularly irregular rhythm, regular rate, 2/6 systolic murmur  LUNGS: clear to auscultation bilaterally; no wheezes, rales, or rhonchi   ABDOMEN: normal bowel sounds, soft, no tenderness, no distention  EXTREMITIES: peripheral pulses normal; no lower extremity edema   NEURO: no focal findings   SKIN: No lesions or rashes on exposed skin        Current Facility-Administered Medications:   •  acetaminophen (TYLENOL) tablet 650 mg, 650 mg, Oral, Q6H PRN, Gladstone Petcaden, CRNP, 650 mg at 07/19/23 0920  •  albuterol inhalation solution 2.5 mg, 2.5 mg, Nebulization, Q6H PRN, Gladstone Petcaden, CRNP, 2.5 mg at 07/12/23 0027  •  aluminum-magnesium hydroxide-simethicone (MYLANTA) oral suspension 30 mL, 30 mL, Oral, Q6H PRN, Gladstone Petcaden, CRNP  •  aspirin chewable tablet 81 mg, 81 mg, Oral, Daily, Arlis Soda, DO, 81 mg at 07/25/23 0830  •  atorvastatin (LIPITOR) tablet 40 mg, 40 mg, Oral, Daily With Dinner, Arlis Soda, DO, 40 mg at 07/24/23 1705  •  bisacodyl (DULCOLAX) EC tablet 10 mg, 10 mg, Oral, Daily PRN, Gladstone Petite, CRNP  •  digoxin (LANOXIN) tablet 125 mcg, 125 mcg, Oral, Daily, Jos Jeffrey MD, 125 mcg at 07/25/23 0830  •  diltiazem (CARDIZEM CD) 24 hr capsule 240 mg, 240 mg, Oral, Daily, Jos Jeffrey MD, 240 mg at 07/25/23 0830  •  enoxaparin (LOVENOX) subcutaneous injection 100 mg, 1 mg/kg, Subcutaneous, Q12H [facility], Joceline Carrero, CRNP, 100 mg at 07/25/23 0830  •  fluticasone-vilanterol 200-25 mcg/actuation 1 puff, 1 puff, Inhalation, Daily, Meliza Creamer, CRNP, 1 puff at 69/51/35 4408  •  folic acid (FOLVITE) tablet 1 mg, 1 mg, Oral, Daily, Anola Adrian, PA-C, 1 mg at 07/25/23 0831  •  hydrOXYzine HCL (ATARAX) tablet 50 mg, 50 mg, Oral, HS PRN, Meliza Creamer, CRNP, 50 mg at 07/19/23 2249  •  melatonin tablet 6 mg, 6 mg, Oral, HS, Meliza Creamer, CRNP, 6 mg at 07/24/23 2159  •  multivitamin-minerals (CENTRUM) tablet 1 tablet, 1 tablet, Oral, Daily, Anola Adrian, PA-C, 1 tablet at 07/25/23 0831  •  nicotine (NICODERM CQ) 14 mg/24hr TD 24 hr patch 1 patch, 1 patch, Transdermal, Daily, Meliza Creamer, CRNP, 1 patch at 07/21/23 1125  •  ondansetron (ZOFRAN) injection 4 mg, 4 mg, Intravenous, Q6H PRN, Meliza Creamer, CRNP  •  pantoprazole (PROTONIX) EC tablet 40 mg, 40 mg, Oral, Early Morning, Meliza Creamer, CRNP, 40 mg at 07/25/23 0609  •  senna (SENOKOT) tablet 17.2 mg, 2 tablet, Oral, HS, Meliza Creamer, CRNP, 17.2 mg at 07/21/23 2200  •  simethicone (MYLICON) chewable tablet 80 mg, 80 mg, Oral, 4x Daily PRN, Meliza Creamer, CRNP  •  thiamine tablet 100 mg, 100 mg, Oral, Daily, Anola Adrian, PA-C, 100 mg at 07/25/23 0830    Labs & Results:    Lab Results   Component Value Date    TROPONINI <0.02 05/08/2018       Lab Results   Component Value Date    CALCIUM 9.0 07/25/2023    K 4.2 07/25/2023    CO2 25 07/25/2023     07/25/2023    BUN 23 07/25/2023    CREATININE 1.01 07/25/2023       Lab Results   Component Value Date    WBC 9.72 07/25/2023    HGB 14.2 07/25/2023    HCT 44.6 07/25/2023     (H) 07/25/2023     (H) 07/25/2023           No results found for: "CHOL"  Lab Results   Component Value Date    HDL 49 07/07/2023     Lab Results   Component Value Date    LDLCALC 80 07/07/2023     Lab Results   Component Value Date    TRIG 67 07/07/2023       Lab Results   Component Value Date    ALT 15 07/20/2023    AST 16 07/20/2023    ALKPHOS 72 07/20/2023         EKG personally reviewed by )Kim Briggs MD. No acute changes

## 2023-07-25 NOTE — ANESTHESIA POSTPROCEDURE EVALUATION
Post-Op Assessment Note    CV Status:  Stable    Pain management: adequate     Mental Status:  Alert and awake   Hydration Status:  Euvolemic   PONV Controlled:  Controlled   Airway Patency:  Patent      Post Op Vitals Reviewed: Yes      Staff: Anesthesiologist         No notable events documented.     BP      Temp     Pulse     Resp      SpO2      /52   Pulse 64   Temp (!) 97.3 °F (36.3 °C)   Resp 15   Ht 6' 3" (1.905 m)   Wt 102 kg (224 lb)   SpO2 97%   BMI 28.00 kg/m²

## 2023-07-25 NOTE — PLAN OF CARE
Problem: Potential for Falls  Goal: Patient will remain free of falls  Description: INTERVENTIONS:  - Educate patient/family on patient safety including physical limitations  - Instruct patient to call for assistance with activity   - Consult OT/PT to assist with strengthening/mobility   - Keep Call bell within reach  - Keep bed low and locked with side rails adjusted as appropriate  - Keep care items and personal belongings within reach  - Initiate and maintain comfort rounds  - Make Fall Risk Sign visible to staff  - Offer Toileting every  Hours, in advance of need  - Initiate/Maintain alarm  - Obtain necessary fall risk management equipment:   - Apply yellow socks and bracelet for high fall risk patients  - Consider moving patient to room near nurses station  Outcome: Progressing     Problem: MOBILITY - ADULT  Goal: Maintain or return to baseline ADL function  Description: INTERVENTIONS:  -  Assess patient's ability to carry out ADLs; assess patient's baseline for ADL function and identify physical deficits which impact ability to perform ADLs (bathing, care of mouth/teeth, toileting, grooming, dressing, etc.)  - Assess/evaluate cause of self-care deficits   - Assess range of motion  - Assess patient's mobility; develop plan if impaired  - Assess patient's need for assistive devices and provide as appropriate  - Encourage maximum independence but intervene and supervise when necessary  - Involve family in performance of ADLs  - Assess for home care needs following discharge   - Consider OT consult to assist with ADL evaluation and planning for discharge  - Provide patient education as appropriate  Outcome: Progressing  Goal: Maintains/Returns to pre admission functional level  Description: INTERVENTIONS:  - Perform BMAT or MOVE assessment daily.   - Set and communicate daily mobility goal to care team and patient/family/caregiver.    - Collaborate with rehabilitation services on mobility goals if consulted  - Perform Range of Motion  times a day. - Reposition patient every  hours.   - Dangle patient  times a day  - Stand patient  times a day  - Ambulate patient  times a day  - Out of bed to chair  times a day   - Out of bed for meals  times a day  - Out of bed for toileting  - Record patient progress and toleration of activity level   Outcome: Progressing     Problem: PAIN - ADULT  Goal: Verbalizes/displays adequate comfort level or baseline comfort level  Description: Interventions:  - Encourage patient to monitor pain and request assistance  - Assess pain using appropriate pain scale  - Administer analgesics based on type and severity of pain and evaluate response  - Implement non-pharmacological measures as appropriate and evaluate response  - Consider cultural and social influences on pain and pain management  - Notify physician/advanced practitioner if interventions unsuccessful or patient reports new pain  Outcome: Progressing     Problem: INFECTION - ADULT  Goal: Absence or prevention of progression during hospitalization  Description: INTERVENTIONS:  - Assess and monitor for signs and symptoms of infection  - Monitor lab/diagnostic results  - Monitor all insertion sites, i.e. indwelling lines, tubes, and drains  - Monitor endotracheal if appropriate and nasal secretions for changes in amount and color  - [place] appropriate cooling/warming therapies per order  - Administer medications as ordered  - Instruct and encourage patient and family to use good hand hygiene technique  - Identify and instruct in appropriate isolation precautions for identified infection/condition  Outcome: Progressing  Goal: Absence of fever/infection during neutropenic period  Description: INTERVENTIONS:  - Monitor WBC    Outcome: Progressing     Problem: SAFETY ADULT  Goal: Patient will remain free of falls  Description: INTERVENTIONS:  - Educate patient/family on patient safety including physical limitations  - Instruct patient to call for assistance with activity   - Consult OT/PT to assist with strengthening/mobility   - Keep Call bell within reach  - Keep bed low and locked with side rails adjusted as appropriate  - Keep care items and personal belongings within reach  - Initiate and maintain comfort rounds  - Make Fall Risk Sign visible to staff  - Offer Toileting every  Hours, in advance of need  - Initiate/Maintain alarm  - Obtain necessary fall risk management equipment:   - Apply yellow socks and bracelet for high fall risk patients  - Consider moving patient to room near nurses station  Outcome: Progressing  Goal: Maintain or return to baseline ADL function  Description: INTERVENTIONS:  -  Assess patient's ability to carry out ADLs; assess patient's baseline for ADL function and identify physical deficits which impact ability to perform ADLs (bathing, care of mouth/teeth, toileting, grooming, dressing, etc.)  - Assess/evaluate cause of self-care deficits   - Assess range of motion  - Assess patient's mobility; develop plan if impaired  - Assess patient's need for assistive devices and provide as appropriate  - Encourage maximum independence but intervene and supervise when necessary  - Involve family in performance of ADLs  - Assess for home care needs following discharge   - Consider OT consult to assist with ADL evaluation and planning for discharge  - Provide patient education as appropriate  Outcome: Progressing  Goal: Maintains/Returns to pre admission functional level  Description: INTERVENTIONS:  - Perform BMAT or MOVE assessment daily.   - Set and communicate daily mobility goal to care team and patient/family/caregiver. - Collaborate with rehabilitation services on mobility goals if consulted  - Perform Range of Motion  times a day. - Reposition patient every  hours.   - Dangle patient  times a day  - Stand patient  times a day  - Ambulate patient  times a day  - Out of bed to chair  times a day   - Out of bed for meals times a day  - Out of bed for toileting  - Record patient progress and toleration of activity level   Outcome: Progressing     Problem: DISCHARGE PLANNING  Goal: Discharge to home or other facility with appropriate resources  Description: INTERVENTIONS:  - Identify barriers to discharge w/patient and caregiver  - Arrange for needed discharge resources and transportation as appropriate  - Identify discharge learning needs (meds, wound care, etc.)  - Arrange for interpretive services to assist at discharge as needed  - Refer to Case Management Department for coordinating discharge planning if the patient needs post-hospital services based on physician/advanced practitioner order or complex needs related to functional status, cognitive ability, or social support system  Outcome: Progressing     Problem: Knowledge Deficit  Goal: Patient/family/caregiver demonstrates understanding of disease process, treatment plan, medications, and discharge instructions  Description: Complete learning assessment and assess knowledge base.   Interventions:  - Provide teaching at level of understanding  - Provide teaching via preferred learning methods  Outcome: Progressing     Problem: Prexisting or High Potential for Compromised Skin Integrity  Goal: Skin integrity is maintained or improved  Description: INTERVENTIONS:  - Identify patients at risk for skin breakdown  - Assess and monitor skin integrity  - Assess and monitor nutrition and hydration status  - Monitor labs   - Assess for incontinence   - Turn and reposition patient  - Assist with mobility/ambulation  - Relieve pressure over bony prominences  - Avoid friction and shearing  - Provide appropriate hygiene as needed including keeping skin clean and dry  - Evaluate need for skin moisturizer/barrier cream  - Collaborate with interdisciplinary team   - Patient/family teaching  - Consider wound care consult   Outcome: Progressing

## 2023-07-26 PROCEDURE — 99232 SBSQ HOSP IP/OBS MODERATE 35: CPT | Performed by: INTERNAL MEDICINE

## 2023-07-26 RX ORDER — AMIODARONE HYDROCHLORIDE 200 MG/1
200 TABLET ORAL 2 TIMES DAILY WITH MEALS
Status: COMPLETED | OUTPATIENT
Start: 2023-07-26 | End: 2023-08-05

## 2023-07-26 RX ORDER — DILTIAZEM HYDROCHLORIDE 120 MG/1
120 CAPSULE, COATED, EXTENDED RELEASE ORAL DAILY
Status: DISCONTINUED | OUTPATIENT
Start: 2023-07-27 | End: 2023-09-01 | Stop reason: HOSPADM

## 2023-07-26 RX ADMIN — Medication 1 TABLET: at 08:39

## 2023-07-26 RX ADMIN — HYDROXYZINE HYDROCHLORIDE 50 MG: 25 TABLET, FILM COATED ORAL at 21:19

## 2023-07-26 RX ADMIN — THIAMINE HCL TAB 100 MG 100 MG: 100 TAB at 08:40

## 2023-07-26 RX ADMIN — DILTIAZEM HYDROCHLORIDE 240 MG: 120 CAPSULE, COATED, EXTENDED RELEASE ORAL at 08:41

## 2023-07-26 RX ADMIN — AMIODARONE HYDROCHLORIDE 200 MG: 200 TABLET ORAL at 18:41

## 2023-07-26 RX ADMIN — APIXABAN 5 MG: 5 TABLET, FILM COATED ORAL at 17:26

## 2023-07-26 RX ADMIN — ASPIRIN 81 MG 81 MG: 81 TABLET ORAL at 08:40

## 2023-07-26 RX ADMIN — ATORVASTATIN CALCIUM 40 MG: 40 TABLET, FILM COATED ORAL at 17:26

## 2023-07-26 RX ADMIN — MELATONIN 6 MG: at 21:19

## 2023-07-26 RX ADMIN — FLUTICASONE FUROATE AND VILANTEROL TRIFENATATE 1 PUFF: 200; 25 POWDER RESPIRATORY (INHALATION) at 08:40

## 2023-07-26 RX ADMIN — APIXABAN 5 MG: 5 TABLET, FILM COATED ORAL at 08:40

## 2023-07-26 RX ADMIN — FOLIC ACID 1 MG: 1 TABLET ORAL at 08:39

## 2023-07-26 RX ADMIN — PANTOPRAZOLE SODIUM 40 MG: 40 TABLET, DELAYED RELEASE ORAL at 05:38

## 2023-07-26 NOTE — ASSESSMENT & PLAN NOTE
· Admitted originally due to sepsis from UTI with obstruction  · Status post cystoscopy with difficult stent insertion on 7/6/2023  · Procedure was complicated by inadvertent left ureteral perforation  · Antibiotic course completed and he is hemodynamically stable. · He has dark-colored urine. Watch for worsening hematuria.

## 2023-07-26 NOTE — PLAN OF CARE
Problem: PAIN - ADULT  Goal: Verbalizes/displays adequate comfort level or baseline comfort level  Description: Interventions:  - Encourage patient to monitor pain and request assistance  - Assess pain using appropriate pain scale  - Administer analgesics based on type and severity of pain and evaluate response  - Implement non-pharmacological measures as appropriate and evaluate response  - Consider cultural and social influences on pain and pain management  - Notify physician/advanced practitioner if interventions unsuccessful or patient reports new pain  Outcome: Progressing     Problem: INFECTION - ADULT  Goal: Absence or prevention of progression during hospitalization  Description: INTERVENTIONS:  - Assess and monitor for signs and symptoms of infection  - Monitor lab/diagnostic results  - Monitor all insertion sites, i.e. indwelling lines, tubes, and drains  - Monitor endotracheal if appropriate and nasal secretions for changes in amount and color  - [place] appropriate cooling/warming therapies per order  - Administer medications as ordered  - Instruct and encourage patient and family to use good hand hygiene technique  - Identify and instruct in appropriate isolation precautions for identified infection/condition  Outcome: Progressing  Goal: Absence of fever/infection during neutropenic period  Description: INTERVENTIONS:  - Monitor WBC    Outcome: Progressing

## 2023-07-26 NOTE — ASSESSMENT & PLAN NOTE
· Improved and remains in sinus rhythm  · Status post SOLA cardioversion on 7/25/23  · Fully anticoagulated on Lovenox.

## 2023-07-26 NOTE — PROGRESS NOTES
[facility]  Progress Note  Name: Yandy Mccoy I  MRN: [id number]  Unit/Bed#: E4 -01 I Date of Admission: 7/6/2023   Date of Service: 7/26/2023  Hospital Day: 20    Assessment/Plan   * Sepsis secondary to UTI [facility])  Assessment & Plan  · Sepsis secondary to Aerococcus bacteremia and complicated UTI  · Antibiotic course completed  · Clinically stable    Atrial fibrillation [facility])  Assessment & Plan  · Improved and remains in sinus rhythm  · Status post SOLA cardioversion on 7/25/23  · Fully anticoagulated on Lovenox. Impaired decision making  Assessment & Plan  Repeat neuropsych evaluation was done  on 7/24/2023  He continues to have impaired decision-making capacity  Guardianship process initiated by case management yesterday     Bacteremia  Assessment & Plan  · Aerococcus bacteremia due to complicated UTI  · Antibiotic course completed  · Bacteremia  resolved    Hydronephrosis with obstructing calculus  Assessment & Plan  · Admitted originally due to sepsis from UTI with obstruction  · Status post cystoscopy with difficult stent insertion on 7/6/2023  · Procedure was complicated by inadvertent left ureteral perforation  · Antibiotic course completed and he is hemodynamically stable. · He has dark-colored urine. Watch for worsening hematuria. COPD (chronic obstructive pulmonary disease) ([facility])  Assessment & Plan  · Continue Breo 1 puff daily. · Albuterol as needed    Esophageal abnormality  Assessment & Plan  Stable on Protonix daily  Incidental finding on CTA study: Diffuse esophageal wall thickening which could be due to esophagitis (high risk due to alcohol abuse)  Outpatient GI follow-up     Daily consumption of alcohol  Assessment & Plan  He stated earlier that he drank beers on a daily basis  Clinically no withdrawal.  Continue thiamine and folic acid supplementation    Tobacco use  Assessment & Plan  · Smokes 6 cigarettes daily.  Apparently plans to quit.  · Nicotine TD patch  · Tobacco counseling provided            VTE Pharmacologic Prophylaxis: VTE Score: 5 Moderate Risk (Score 3-4) - Pharmacological DVT Prophylaxis Ordered: apixaban (Eliquis). Patient Centered Rounds: I performed bedside rounds with nursing staff today. Discussions with Specialists or Other Care Team Provider: Communicated that dark urine with urology. Picture sent via [employer] connect    Current Length of Stay: 20 day(s)  Current Patient Status: Inpatient   Certification Statement: The patient will continue to require additional inpatient hospital stay due to Guardianship  Discharge Plan: To be determined    Code Status: Level 1 - Full Code    Subjective:   Seen and examined during rounds  No chest pain or palpitation  He noticed that his urine is more dark with sediment  Denies fever    Objective:     Vitals:   Temp (24hrs), Av.8 °F (36.6 °C), Min:97.5 °F (36.4 °C), Max:98 °F (36.7 °C)    Temp:  [97.5 °F (36.4 °C)-98 °F (36.7 °C)] 97.8 °F (36.6 °C)  HR:  [57-72] 66  Resp:  [18] 18  BP: ()/(50-71) 141/65  SpO2:  [93 %-95 %] 95 %  Body mass index is 28 kg/m². Input and Output Summary (last 24 hours):   No intake or output data in the 24 hours ending 23    Physical Exam:   Physical Exam  Vitals reviewed. Constitutional:       Appearance: He is not ill-appearing. HENT:      Head: Normocephalic and atraumatic. Nose: No congestion or rhinorrhea. Eyes:      General: No scleral icterus. Cardiovascular:      Rate and Rhythm: Normal rate and regular rhythm. Pulmonary:      Breath sounds: No wheezing or rhonchi. Abdominal:      General: There is no distension. Palpations: Abdomen is soft. Tenderness: There is no abdominal tenderness. There is no guarding. Musculoskeletal:      Cervical back: Neck supple. Right lower leg: No edema. Left lower leg: No edema. Skin:     General: Skin is warm and dry.       Coloration: Skin is not jaundiced or pale.    Psychiatric:         Mood and Affect: Mood normal.         Behavior: Behavior normal.     Additional Data:     Labs:  Results from last 7 days   Lab Units 23  0452   WBC Thousand/uL 9.72   HEMOGLOBIN g/dL 14.2   HEMATOCRIT % 44.6   PLATELETS Thousands/uL 420*   LYMPHO PCT % 32   MONO PCT % 9   EOS PCT % 2     Results from last 7 days   Lab Units 23  0452 23  0858   SODIUM mmol/L 136 138   POTASSIUM mmol/L 4.2 4.4   CHLORIDE mmol/L 105 105   CO2 mmol/L 25 29   BUN mg/dL 23 19   CREATININE mg/dL 1.01 1.02   ANION GAP mmol/L 6 4   CALCIUM mg/dL 9.0 9.4   ALBUMIN g/dL  --  3.5   TOTAL BILIRUBIN mg/dL  --  0.51   ALK PHOS U/L  --  72   ALT U/L  --  15   AST U/L  --  16   GLUCOSE RANDOM mg/dL 87 131                       Lines/Drains:  Invasive Devices     Peripheral Intravenous Line  Duration           Peripheral IV 23 Right;Ventral (anterior) Forearm <1 day          Drain  Duration           Ureteral Internal Stent Left ureter 6 Fr. 19 days                  Telemetry:  Telemetry Orders (From admission, onward)             24 Hour Telemetry Monitoring  Continuous x 24 Hours (Telem)           Question:  Reason for 24 Hour Telemetry  Answer:  Arrhythmias requiring acute medical intervention / PPM or ICD malfunction                 Telemetry Reviewed: Normal Sinus Rhythm             Imaging: Reviewed radiology reports from this admission including: ECHO    Recent Cultures (last 7 days):         Last 24 Hours Medication List:   Current Facility-Administered Medications   Medication Dose Route Frequency Provider Last Rate   • acetaminophen  650 mg Oral Q6H PRN KIKI Joy     • albuterol  2.5 mg Nebulization Q6H PRN KIKI Joy     • aluminum-magnesium hydroxide-simethicone  30 mL Oral Q6H PRN KIKI Joy     • apixaban  5 mg Oral BID Kenneth Wilkinson MD     • aspirin  81 mg Oral Daily Alia Eric DO     • atorvastatin  40 mg Oral Daily With Jessie Powell DO     • bisacodyl  10 mg Oral Daily PRN Clark KIKI Mcfarland     • diltiazem  240 mg Oral Daily Sheila Zamora MD     • fluticasone-vilanterol  1 puff Inhalation Daily Clark KIKI Mcfarland     • folic acid  1 mg Oral Daily Karli Durham PA-C     • hydrOXYzine HCL  50 mg Oral HS PRN Clark KIKI Mcfarland     • melatonin  6 mg Oral HS Clark KIKI Mcfarland     • multivitamin-minerals  1 tablet Oral Daily Karli Durham PA-C     • nicotine  1 patch Transdermal Daily Clark KIKI Mcfarland     • ondansetron  4 mg Intravenous Q6H PRN Clark KIKI Mcfarland     • pantoprazole  40 mg Oral Early Morning Clark KIKI Mcfarland     • senna  2 tablet Oral HS Clark KIKI Mcfarland     • simethicone  80 mg Oral 4x Daily PRN Clark KIKI Mcfarland     • thiamine  100 mg Oral Daily Karli Durham PA-C          Today, Patient Was Seen By: Jn Garcia MD    **Please Note: This note may have been constructed using a voice recognition system. **

## 2023-07-26 NOTE — RESTORATIVE TECHNICIAN NOTE
Restorative Technician Note      Patient Name: Guillermo Bridges     Restorative Tech Visit Date: 07/26/23  Note Type: Mobility  Patient Position Upon Consult: Supine  Activity Performed: Ambulated  Patient Position at End of Consult: Supine;  All needs within reach

## 2023-07-26 NOTE — PLAN OF CARE
Problem: Potential for Falls  Goal: Patient will remain free of falls  Description: INTERVENTIONS:  - Educate patient/family on patient safety including physical limitations  - Instruct patient to call for assistance with activity   - Consult OT/PT to assist with strengthening/mobility   - Keep Call bell within reach  - Keep bed low and locked with side rails adjusted as appropriate  - Keep care items and personal belongings within reach  - Initiate and maintain comfort rounds  - Make Fall Risk Sign visible to staff  - Offer Toileting every  Hours, in advance of need  - Initiate/Maintain alarm  - Obtain necessary fall risk management equipment:   - Apply yellow socks and bracelet for high fall risk patients  - Consider moving patient to room near nurses station  Outcome: Progressing     Problem: MOBILITY - ADULT  Goal: Maintain or return to baseline ADL function  Description: INTERVENTIONS:  -  Assess patient's ability to carry out ADLs; assess patient's baseline for ADL function and identify physical deficits which impact ability to perform ADLs (bathing, care of mouth/teeth, toileting, grooming, dressing, etc.)  - Assess/evaluate cause of self-care deficits   - Assess range of motion  - Assess patient's mobility; develop plan if impaired  - Assess patient's need for assistive devices and provide as appropriate  - Encourage maximum independence but intervene and supervise when necessary  - Involve family in performance of ADLs  - Assess for home care needs following discharge   - Consider OT consult to assist with ADL evaluation and planning for discharge  - Provide patient education as appropriate  Outcome: Progressing  Goal: Maintains/Returns to pre admission functional level  Description: INTERVENTIONS:  - Perform BMAT or MOVE assessment daily.   - Set and communicate daily mobility goal to care team and patient/family/caregiver.    - Collaborate with rehabilitation services on mobility goals if consulted  - Perform Range of Motion  times a day. - Reposition patient every  hours.   - Dangle patient  times a day  - Stand patient  times a day  - Ambulate patient  times a day  - Out of bed to chair  times a day   - Out of bed for meals  times a day  - Out of bed for toileting  - Record patient progress and toleration of activity level   Outcome: Progressing     Problem: PAIN - ADULT  Goal: Verbalizes/displays adequate comfort level or baseline comfort level  Description: Interventions:  - Encourage patient to monitor pain and request assistance  - Assess pain using appropriate pain scale  - Administer analgesics based on type and severity of pain and evaluate response  - Implement non-pharmacological measures as appropriate and evaluate response  - Consider cultural and social influences on pain and pain management  - Notify physician/advanced practitioner if interventions unsuccessful or patient reports new pain  Outcome: Progressing     Problem: INFECTION - ADULT  Goal: Absence or prevention of progression during hospitalization  Description: INTERVENTIONS:  - Assess and monitor for signs and symptoms of infection  - Monitor lab/diagnostic results  - Monitor all insertion sites, i.e. indwelling lines, tubes, and drains  - Monitor endotracheal if appropriate and nasal secretions for changes in amount and color  - [place] appropriate cooling/warming therapies per order  - Administer medications as ordered  - Instruct and encourage patient and family to use good hand hygiene technique  - Identify and instruct in appropriate isolation precautions for identified infection/condition  Outcome: Progressing  Goal: Absence of fever/infection during neutropenic period  Description: INTERVENTIONS:  - Monitor WBC    Outcome: Progressing     Problem: SAFETY ADULT  Goal: Patient will remain free of falls  Description: INTERVENTIONS:  - Educate patient/family on patient safety including physical limitations  - Instruct patient to call for assistance with activity   - Consult OT/PT to assist with strengthening/mobility   - Keep Call bell within reach  - Keep bed low and locked with side rails adjusted as appropriate  - Keep care items and personal belongings within reach  - Initiate and maintain comfort rounds  - Make Fall Risk Sign visible to staff  - Offer Toileting every  Hours, in advance of need  - Initiate/Maintain alarm  - Obtain necessary fall risk management equipment:   - Apply yellow socks and bracelet for high fall risk patients  - Consider moving patient to room near nurses station  Outcome: Progressing  Goal: Maintain or return to baseline ADL function  Description: INTERVENTIONS:  -  Assess patient's ability to carry out ADLs; assess patient's baseline for ADL function and identify physical deficits which impact ability to perform ADLs (bathing, care of mouth/teeth, toileting, grooming, dressing, etc.)  - Assess/evaluate cause of self-care deficits   - Assess range of motion  - Assess patient's mobility; develop plan if impaired  - Assess patient's need for assistive devices and provide as appropriate  - Encourage maximum independence but intervene and supervise when necessary  - Involve family in performance of ADLs  - Assess for home care needs following discharge   - Consider OT consult to assist with ADL evaluation and planning for discharge  - Provide patient education as appropriate  Outcome: Progressing  Goal: Maintains/Returns to pre admission functional level  Description: INTERVENTIONS:  - Perform BMAT or MOVE assessment daily.   - Set and communicate daily mobility goal to care team and patient/family/caregiver. - Collaborate with rehabilitation services on mobility goals if consulted  - Perform Range of Motion  times a day. - Reposition patient every  hours.   - Dangle patient  times a day  - Stand patient  times a day  - Ambulate patient  times a day  - Out of bed to chair  times a day   - Out of bed for meals times a day  - Out of bed for toileting  - Record patient progress and toleration of activity level   Outcome: Progressing     Problem: DISCHARGE PLANNING  Goal: Discharge to home or other facility with appropriate resources  Description: INTERVENTIONS:  - Identify barriers to discharge w/patient and caregiver  - Arrange for needed discharge resources and transportation as appropriate  - Identify discharge learning needs (meds, wound care, etc.)  - Arrange for interpretive services to assist at discharge as needed  - Refer to Case Management Department for coordinating discharge planning if the patient needs post-hospital services based on physician/advanced practitioner order or complex needs related to functional status, cognitive ability, or social support system  Outcome: Progressing     Problem: Knowledge Deficit  Goal: Patient/family/caregiver demonstrates understanding of disease process, treatment plan, medications, and discharge instructions  Description: Complete learning assessment and assess knowledge base.   Interventions:  - Provide teaching at level of understanding  - Provide teaching via preferred learning methods  Outcome: Progressing     Problem: Prexisting or High Potential for Compromised Skin Integrity  Goal: Skin integrity is maintained or improved  Description: INTERVENTIONS:  - Identify patients at risk for skin breakdown  - Assess and monitor skin integrity  - Assess and monitor nutrition and hydration status  - Monitor labs   - Assess for incontinence   - Turn and reposition patient  - Assist with mobility/ambulation  - Relieve pressure over bony prominences  - Avoid friction and shearing  - Provide appropriate hygiene as needed including keeping skin clean and dry  - Evaluate need for skin moisturizer/barrier cream  - Collaborate with interdisciplinary team   - Patient/family teaching  - Consider wound care consult   Outcome: Progressing

## 2023-07-26 NOTE — ASSESSMENT & PLAN NOTE
Repeat neuropsych evaluation was done  on 7/24/2023  He continues to have impaired decision-making capacity  Guardianship process initiated by case management yesterday

## 2023-07-26 NOTE — PHYSICIAN ADVISOR
Current patient class: Inpatient  The patient is currently on Hospital Day: 21      The patient was admitted to the hospital at  8:46 PM on 7/6/23 for the following diagnosis:  COPD (chronic obstructive pulmonary disease) ([address]) [J44.9]  Ureterolithiasis [N20.1]  Pneumonia [J18.9]  Pyelonephritis [N12]  Elevated troponin [R77.8]  Ureteral stone with hydronephrosis [N13.2]     CMS OUTLIER STAY REVIEW    After review of the relevant documentation, labs, vital signs and test results, the patient is appropriate for CONTINUED INPATIENT ADMISSION. The patient continues to remain hospitalized receiving acute medical care. The patient has surpassed the expected duration of stay, however given the clinical condition, need for further acute care management, the patient is appropriate to remain in an inpatient status. The patient still being actively managed, and does have unresolved medical issues requiring further hospitalization. This review is conducted at 10 day intervals, to help satisfy the requirements for significant outlier stay review as per CMS. Given the current condition of this patient, the patient satisfies this review was determination for continued inpatient stay. Rationale is as follows: The patient is a 75 yrs old Male who presented to the ED at 7/6/2023  4:44 PM with a chief complaint of Shortness of Breath (SOB & tremors post smoking cigarette yesterday. Hx of COPD) Patient admitted with complicated uti and bacteremia. He has ho copd. He was found to have impaired decision making aand guardianship process is pending. SOLA and cardioversion also done 7/25/23 for afib. He remains inpatien appropriate.     The patient’s vitals on arrival were   ED Triage Vitals   Temperature Pulse Respirations Blood Pressure SpO2   07/06/23 1659 07/06/23 1650 07/06/23 1650 07/06/23 1650 07/06/23 1650   99 °F (37.2 °C) (!) 112 22 95/50 98 %      Temp Source Heart Rate Source Patient Position - Orthostatic VS BP Location FiO2 (%)   07/06/23 2315 07/06/23 1700 07/06/23 2315 07/06/23 2315 --   Temporal Monitor Lying Right arm       Pain Score       07/06/23 2150       No Pain           Past Medical History:   Diagnosis Date   • COPD (chronic obstructive pulmonary disease) ([address])      Past Surgical History:   Procedure Laterality Date   • FL RETROGRADE PYELOGRAM  7/6/2023   • [place] CYSTO BLADDER W/URETERAL CATHETERIZATION Left 7/6/2023    Procedure: CYSTOSCOPY RETROGRADE PYELOGRAM WITH INSERTION STENT URETERAL;  Surgeon: Mazin Christianson MD;  Location: [facility];  Service: Urology           Consults have been placed to:   IP CONSULT TO UROLOGY  IP CONSULT TO CARDIOLOGY  IP CONSULT TO PODIATRY  IP CONSULT TO PHARMACY  IP CONSULT TO INFECTIOUS DISEASES  IP CONSULT TO NEUROPSYCHOLOGY  IP CONSULT TO PODIATRY    Vitals:    07/25/23 1922 07/26/23 0101 07/26/23 0346 07/26/23 0732   BP: 119/66 98/54 128/50 144/71   BP Location: Right arm Right arm Right arm Left arm   Pulse: 67 72 72 57   Resp: 18 18 18 18   Temp:  97.5 °F (36.4 °C) 98 °F (36.7 °C) 98 °F (36.7 °C)   TempSrc:  Temporal Temporal Temporal   SpO2: 94% 93% 93% 95%   Weight:       Height:           Most recent labs:    Recent Labs     07/25/23  0452   WBC 9.72   HGB 14.2   HCT 44.6   *   K 4.2   CALCIUM 9.0   BUN 23   CREATININE 1.01       Scheduled Meds:  Current Facility-Administered Medications   Medication Dose Route Frequency Provider Last Rate   • acetaminophen  650 mg Oral Q6H PRN Nabila Rehman, CRNP     • albuterol  2.5 mg Nebulization Q6H PRN Nabila Rehman, CRNP     • aluminum-magnesium hydroxide-simethicone  30 mL Oral Q6H PRN Nabila Rehman, CRNP     • apixaban  5 mg Oral BID Pedro Chicas MD     • aspirin  81 mg Oral Daily Vandana Muniz DO     • atorvastatin  40 mg Oral Daily With Holt Gaba, DO     • bisacodyl  10 mg Oral Daily PRN Nabila Rehman, CRNP     • diltiazem  240 mg Oral Daily Pedro Chicas MD     • fluticasone-vilanterol  1 puff Inhalation Daily KIKI Garcia     • folic acid  1 mg Oral Daily Carl Ching PA-C     • hydrOXYzine HCL  50 mg Oral HS PRN KIKI Garcia     • melatonin  6 mg Oral HS KIKI Garcia     • multivitamin-minerals  1 tablet Oral Daily Carl Ching PA-C     • nicotine  1 patch Transdermal Daily KIKI Garcia     • ondansetron  4 mg Intravenous Q6H PRN KIKI Garcia     • pantoprazole  40 mg Oral Early Morning KIKI Garcia     • senna  2 tablet Oral HS KIKI Garcia     • simethicone  80 mg Oral 4x Daily PRN KIKI Garcia     • thiamine  100 mg Oral Daily Carl Ching PA-C       Continuous Infusions:   PRN Meds:.•  acetaminophen  •  albuterol  •  aluminum-magnesium hydroxide-simethicone  •  bisacodyl  •  hydrOXYzine HCL  •  ondansetron  •  simethicone    Surgical procedures (if appropriate):  Procedure(s):  CYSTOSCOPY RETROGRADE PYELOGRAM WITH INSERTION STENT URETERAL

## 2023-07-27 LAB
FOLATE SERPL-MCNC: 21.1 NG/ML
VIT B12 SERPL-MCNC: 201 PG/ML (ref 180–914)

## 2023-07-27 PROCEDURE — 99232 SBSQ HOSP IP/OBS MODERATE 35: CPT | Performed by: INTERNAL MEDICINE

## 2023-07-27 PROCEDURE — 99222 1ST HOSP IP/OBS MODERATE 55: CPT | Performed by: INTERNAL MEDICINE

## 2023-07-27 PROCEDURE — 82746 ASSAY OF FOLIC ACID SERUM: CPT | Performed by: STUDENT IN AN ORGANIZED HEALTH CARE EDUCATION/TRAINING PROGRAM

## 2023-07-27 PROCEDURE — 82607 VITAMIN B-12: CPT | Performed by: STUDENT IN AN ORGANIZED HEALTH CARE EDUCATION/TRAINING PROGRAM

## 2023-07-27 RX ORDER — POLYETHYLENE GLYCOL 3350 17 G/17G
17 POWDER, FOR SOLUTION ORAL DAILY
Status: DISCONTINUED | OUTPATIENT
Start: 2023-07-27 | End: 2023-08-20

## 2023-07-27 RX ADMIN — AMIODARONE HYDROCHLORIDE 200 MG: 200 TABLET ORAL at 16:37

## 2023-07-27 RX ADMIN — FLUTICASONE FUROATE AND VILANTEROL TRIFENATATE 1 PUFF: 200; 25 POWDER RESPIRATORY (INHALATION) at 08:02

## 2023-07-27 RX ADMIN — APIXABAN 5 MG: 5 TABLET, FILM COATED ORAL at 08:02

## 2023-07-27 RX ADMIN — AMIODARONE HYDROCHLORIDE 200 MG: 200 TABLET ORAL at 08:02

## 2023-07-27 RX ADMIN — DILTIAZEM HYDROCHLORIDE 120 MG: 120 CAPSULE, COATED, EXTENDED RELEASE ORAL at 08:02

## 2023-07-27 RX ADMIN — POLYETHYLENE GLYCOL 3350 17 G: 17 POWDER, FOR SOLUTION ORAL at 11:27

## 2023-07-27 RX ADMIN — ATORVASTATIN CALCIUM 40 MG: 40 TABLET, FILM COATED ORAL at 16:37

## 2023-07-27 RX ADMIN — APIXABAN 5 MG: 5 TABLET, FILM COATED ORAL at 16:37

## 2023-07-27 RX ADMIN — ASPIRIN 81 MG 81 MG: 81 TABLET ORAL at 08:02

## 2023-07-27 RX ADMIN — FOLIC ACID 1 MG: 1 TABLET ORAL at 08:02

## 2023-07-27 RX ADMIN — HYDROXYZINE HYDROCHLORIDE 50 MG: 25 TABLET, FILM COATED ORAL at 21:44

## 2023-07-27 RX ADMIN — PANTOPRAZOLE SODIUM 40 MG: 40 TABLET, DELAYED RELEASE ORAL at 05:28

## 2023-07-27 RX ADMIN — MELATONIN 6 MG: at 21:44

## 2023-07-27 RX ADMIN — THIAMINE HCL TAB 100 MG 100 MG: 100 TAB at 08:02

## 2023-07-27 RX ADMIN — Medication 1 TABLET: at 08:02

## 2023-07-27 NOTE — PLAN OF CARE
Problem: Potential for Falls  Goal: Patient will remain free of falls  Description: INTERVENTIONS:  - Educate patient/family on patient safety including physical limitations  - Instruct patient to call for assistance with activity   - Consult OT/PT to assist with strengthening/mobility   - Keep Call bell within reach  - Keep bed low and locked with side rails adjusted as appropriate  - Keep care items and personal belongings within reach  - Initiate and maintain comfort rounds  - Make Fall Risk Sign visible to staff  - Offer Toileting every 2 Hours, in advance of need  - Initiate/Maintain bed alarm  - Obtain necessary fall risk management equipment: alarms  - Apply yellow socks and bracelet for high fall risk patients  - Consider moving patient to room near nurses station  Outcome: Progressing     Problem: MOBILITY - ADULT  Goal: Maintain or return to baseline ADL function  Description: INTERVENTIONS:  -  Assess patient's ability to carry out ADLs; assess patient's baseline for ADL function and identify physical deficits which impact ability to perform ADLs (bathing, care of mouth/teeth, toileting, grooming, dressing, etc.)  - Assess/evaluate cause of self-care deficits   - Assess range of motion  - Assess patient's mobility; develop plan if impaired  - Assess patient's need for assistive devices and provide as appropriate  - Encourage maximum independence but intervene and supervise when necessary  - Involve family in performance of ADLs  - Assess for home care needs following discharge   - Consider OT consult to assist with ADL evaluation and planning for discharge  - Provide patient education as appropriate  Outcome: Progressing  Goal: Maintains/Returns to pre admission functional level  Description: INTERVENTIONS:  - Perform BMAT or MOVE assessment daily.   - Set and communicate daily mobility goal to care team and patient/family/caregiver.    - Collaborate with rehabilitation services on mobility goals if consulted  - Perform Range of Motion 3 times a day. - Reposition patient every 2 hours.   - Dangle patient 3 times a day  - Stand patient 3 times a day  - Ambulate patient 3 times a day  - Out of bed to chair 3 times a day   - Out of bed for meals 3 times a day  - Out of bed for toileting  - Record patient progress and toleration of activity level   Outcome: Progressing     Problem: PAIN - ADULT  Goal: Verbalizes/displays adequate comfort level or baseline comfort level  Description: Interventions:  - Encourage patient to monitor pain and request assistance  - Assess pain using appropriate pain scale  - Administer analgesics based on type and severity of pain and evaluate response  - Implement non-pharmacological measures as appropriate and evaluate response  - Consider cultural and social influences on pain and pain management  - Notify physician/advanced practitioner if interventions unsuccessful or patient reports new pain  Outcome: Progressing     Problem: INFECTION - ADULT  Goal: Absence or prevention of progression during hospitalization  Description: INTERVENTIONS:  - Assess and monitor for signs and symptoms of infection  - Monitor lab/diagnostic results  - Monitor all insertion sites, i.e. indwelling lines, tubes, and drains  - Monitor endotracheal if appropriate and nasal secretions for changes in amount and color  - [place] appropriate cooling/warming therapies per order  - Administer medications as ordered  - Instruct and encourage patient and family to use good hand hygiene technique  - Identify and instruct in appropriate isolation precautions for identified infection/condition  Outcome: Progressing  Goal: Absence of fever/infection during neutropenic period  Description: INTERVENTIONS:  - Monitor WBC    Outcome: Progressing     Problem: SAFETY ADULT  Goal: Patient will remain free of falls  Description: INTERVENTIONS:  - Educate patient/family on patient safety including physical limitations  - Instruct patient to call for assistance with activity   - Consult OT/PT to assist with strengthening/mobility   - Keep Call bell within reach  - Keep bed low and locked with side rails adjusted as appropriate  - Keep care items and personal belongings within reach  - Initiate and maintain comfort rounds  - Make Fall Risk Sign visible to staff  - Offer Toileting every 2 Hours, in advance of need  - Initiate/Maintain bed alarm  - Obtain necessary fall risk management equipment: alarms  - Apply yellow socks and bracelet for high fall risk patients  - Consider moving patient to room near nurses station  Outcome: Progressing  Goal: Maintain or return to baseline ADL function  Description: INTERVENTIONS:  -  Assess patient's ability to carry out ADLs; assess patient's baseline for ADL function and identify physical deficits which impact ability to perform ADLs (bathing, care of mouth/teeth, toileting, grooming, dressing, etc.)  - Assess/evaluate cause of self-care deficits   - Assess range of motion  - Assess patient's mobility; develop plan if impaired  - Assess patient's need for assistive devices and provide as appropriate  - Encourage maximum independence but intervene and supervise when necessary  - Involve family in performance of ADLs  - Assess for home care needs following discharge   - Consider OT consult to assist with ADL evaluation and planning for discharge  - Provide patient education as appropriate  Outcome: Progressing  Goal: Maintains/Returns to pre admission functional level  Description: INTERVENTIONS:  - Perform BMAT or MOVE assessment daily.   - Set and communicate daily mobility goal to care team and patient/family/caregiver. - Collaborate with rehabilitation services on mobility goals if consulted  - Perform Range of Motion 3 times a day. - Reposition patient every 2 hours.   - Dangle patient 3 times a day  - Stand patient 3 times a day  - Ambulate patient 3 times a day  - Out of bed to chair 3 times a day   - Out of bed for meals 3 times a day  - Out of bed for toileting  - Record patient progress and toleration of activity level   Outcome: Progressing     Problem: DISCHARGE PLANNING  Goal: Discharge to home or other facility with appropriate resources  Description: INTERVENTIONS:  - Identify barriers to discharge w/patient and caregiver  - Arrange for needed discharge resources and transportation as appropriate  - Identify discharge learning needs (meds, wound care, etc.)  - Arrange for interpretive services to assist at discharge as needed  - Refer to Case Management Department for coordinating discharge planning if the patient needs post-hospital services based on physician/advanced practitioner order or complex needs related to functional status, cognitive ability, or social support system  Outcome: Progressing     Problem: Knowledge Deficit  Goal: Patient/family/caregiver demonstrates understanding of disease process, treatment plan, medications, and discharge instructions  Description: Complete learning assessment and assess knowledge base.   Interventions:  - Provide teaching at level of understanding  - Provide teaching via preferred learning methods  Outcome: Progressing     Problem: Prexisting or High Potential for Compromised Skin Integrity  Goal: Skin integrity is maintained or improved  Description: INTERVENTIONS:  - Identify patients at risk for skin breakdown  - Assess and monitor skin integrity  - Assess and monitor nutrition and hydration status  - Monitor labs   - Assess for incontinence   - Turn and reposition patient  - Assist with mobility/ambulation  - Relieve pressure over bony prominences  - Avoid friction and shearing  - Provide appropriate hygiene as needed including keeping skin clean and dry  - Evaluate need for skin moisturizer/barrier cream  - Collaborate with interdisciplinary team   - Patient/family teaching  - Consider wound care consult   Outcome: Progressing

## 2023-07-27 NOTE — RESTORATIVE TECHNICIAN NOTE
Restorative Technician Note      Patient Name: Santi Galan     Restorative Tech Visit Date: 07/27/23  Note Type: Mobility  Patient Position Upon Consult: Supine  Activity Performed: Ambulated  Patient Position at End of Consult: Supine;  All needs within reach

## 2023-07-27 NOTE — ASSESSMENT & PLAN NOTE
· Sepsis secondary to Aerococcus bacteremia and complicated UTI  · Resolved  · Antibiotic course completed  · Stable

## 2023-07-27 NOTE — PROGRESS NOTES
[facility]  Progress Note  Name: Eriberto Jason I  MRN: [id number]  Unit/Bed#: E4 -01 I Date of Admission: 7/6/2023   Date of Service: 7/27/2023 I Hospital Day: 21    Assessment/Plan   * Sepsis secondary to UTI [facility])  Assessment & Plan  · Sepsis secondary to Aerococcus bacteremia and complicated UTI  · Resolved  · Antibiotic course completed  · Stable    Atrial fibrillation ([address])  Assessment & Plan  · Improved after successful SOLA cardioversion on 7/25/23  · Fully anticoagulated on  Eliquis 5 mg twice daily  · Rhythm controlled on amiodarone 200 mg twice daily and Cardizem 120 mg daily    Impaired decision making  Assessment & Plan  Repeat neuropsych evaluation was done  on 7/24/2023  He continues to have impaired decision-making capacity  Guardianship process initiated by case management yesterday     Bacteremia  Assessment & Plan  · Aerococcus bacteremia due to complicated UTI  · Resolved  · Antibiotic course completed    Hydronephrosis with obstructing calculus  Assessment & Plan  · Admitted originally due to sepsis from UTI with obstruction  · Status post cystoscopy with difficult stent insertion on 7/6/2023  · Procedure was complicated by inadvertent left ureteral perforation  · He reported having dark-colored urine yesterday which is better today. · She was sent to urology  · Monitor for worsening and hematuria. · Low threshold for repeat CT if he develops worsening abdominal pain or fever    COPD (chronic obstructive pulmonary disease) (HCC)  Assessment & Plan  · Stable.   · Continue Breo 1 puff daily  · Continue albuterol as needed    Esophageal abnormality  Assessment & Plan  He had incidental finding on CT with diffuse esophageal thickening could be from esophagitis  Esophagitis probably from alcohol abuse  Continue empiric Protonix  Nonurgent GI follow-up    Daily consumption of alcohol  Assessment & Plan  He stated earlier that he drank beers on a daily basis  Clinically no withdrawal.  Continue thiamine and folic acid supplementation           VTE Pharmacologic Prophylaxis: VTE Score: 5 High Risk (Score >/= 5) - Pharmacological DVT Prophylaxis Ordered: apixaban (Eliquis). Sequential Compression Devices Ordered. Patient Centered Rounds: Discussed with his nurse  Current Length of Stay: 21 day(s)  Current Patient Status: Inpatient   Certification Statement: The patient will continue to require additional inpatient hospital stay due to Guardianship  Discharge Plan: To be determined. he will be here next week due to guardianship    Code Status: Level 1 - Full Code    Subjective:   Seen and examined during rounds  Reports that the dark-colored urine is clearing  He denies dysuria  Denies abdominal pain  No fever    Objective:     Vitals:   Temp (24hrs), Av.8 °F (36.6 °C), Min:97.3 °F (36.3 °C), Max:98.5 °F (36.9 °C)    Temp:  [97.3 °F (36.3 °C)-98.5 °F (36.9 °C)] 97.3 °F (36.3 °C)  HR:  [54-72] 65  Resp:  [18] 18  BP: (106-149)/(60-70) 131/62  SpO2:  [92 %-96 %] 95 %  Body mass index is 28 kg/m². Input and Output Summary (last 24 hours):   No intake or output data in the 24 hours ending [id number]    Physical Exam:   Physical Exam  Vitals reviewed. Constitutional:       Appearance: He is not ill-appearing. HENT:      Head: Normocephalic and atraumatic. Nose: No congestion or rhinorrhea. Eyes:      General: No scleral icterus. Cardiovascular:      Rate and Rhythm: Normal rate and regular rhythm. Pulmonary:      Breath sounds: No wheezing or rales. Abdominal:      General: There is no distension. Palpations: Abdomen is soft. Tenderness: There is no abdominal tenderness. There is no guarding. Musculoskeletal:      Cervical back: Neck supple. Right lower leg: No edema. Left lower leg: No edema. Skin:     General: Skin is warm and dry. Coloration: Skin is not jaundiced or pale.    Neurological:      Comments: Awake alert fluent speech   Psychiatric:      Comments: Dysphoric mood       Additional Data:     Labs:  Results from last 7 days   Lab Units 07/25/23  0452   WBC Thousand/uL 9.72   HEMOGLOBIN g/dL 14.2   HEMATOCRIT % 44.6   PLATELETS Thousands/uL 420*   LYMPHO PCT % 32   MONO PCT % 9   EOS PCT % 2     Results from last 7 days   Lab Units 07/25/23  0452   SODIUM mmol/L 136   POTASSIUM mmol/L 4.2   CHLORIDE mmol/L 105   CO2 mmol/L 25   BUN mg/dL 23   CREATININE mg/dL 1.01   ANION GAP mmol/L 6   CALCIUM mg/dL 9.0   GLUCOSE RANDOM mg/dL 87                       Lines/Drains:  Invasive Devices     Peripheral Intravenous Line  Duration           Peripheral IV 07/26/23 Right;Ventral (anterior) Forearm 1 day          Drain  Duration           Ureteral Internal Stent Left ureter 6 Fr. 20 days                      Imaging: No pertinent imaging reviewed.     Recent Cultures (last 7 days):         Last 24 Hours Medication List:   Current Facility-Administered Medications   Medication Dose Route Frequency Provider Last Rate   • acetaminophen  650 mg Oral Q6H PRN Hammady LUISITO FajardoNP     • albuterol  2.5 mg Nebulization Q6H PRN Hammady Tana, LUISITONP     • aluminum-magnesium hydroxide-simethicone  30 mL Oral Q6H PRN Skialessioy Tana, CRNP     • amiodarone  200 mg Oral BID With Meals Thomas Ramos, DO     • apixaban  5 mg Oral BID Fallon Lopez MD     • aspirin  81 mg Oral Daily Moctezumaalda Doshi, DO     • atorvastatin  40 mg Oral Daily With Louvella Bevel, DO     • bisacodyl  10 mg Oral Daily PRN SkiLUISITO JoshiNP     • diltiazem  120 mg Oral Daily Thomas Ramos, DO     • fluticasone-vilanterol  1 puff Inhalation Daily SkiKIKI Joshi     • folic acid  1 mg Oral Daily Sayra Lafleur PA-C     • hydrOXYzine HCL  50 mg Oral HS PRN LUISITO PatelNP     • melatonin  6 mg Oral HS Skippy LUISITO FajardoNP     • multivitamin-minerals  1 tablet Oral Daily Sayra Lafleur PA-C     • nicotine  1 patch Transdermal Daily Brianna Medal, CRNP     • ondansetron  4 mg Intravenous Q6H PRN Brianna Medal, CRNP     • pantoprazole  40 mg Oral Early Morning Brianna Medal, CRNP     • polyethylene glycol  17 g Oral Daily Rupa Ricci MD     • senna  2 tablet Oral HS Brianna Medal, CRNP     • simethicone  80 mg Oral 4x Daily PRN Brianna Medal, CRNP     • thiamine  100 mg Oral Daily Era Nobles PA-C          Today, Patient Was Seen By: Rain Alfaro MD    **Please Note: This note may have been constructed using a voice recognition system. **

## 2023-07-27 NOTE — PLAN OF CARE
Problem: PAIN - ADULT  Goal: Verbalizes/displays adequate comfort level or baseline comfort level  Description: Interventions:  - Encourage patient to monitor pain and request assistance  - Assess pain using appropriate pain scale  - Administer analgesics based on type and severity of pain and evaluate response  - Implement non-pharmacological measures as appropriate and evaluate response  - Consider cultural and social influences on pain and pain management  - Notify physician/advanced practitioner if interventions unsuccessful or patient reports new pain  Outcome: Progressing     Problem: INFECTION - ADULT  Goal: Absence or prevention of progression during hospitalization  Description: INTERVENTIONS:  - Assess and monitor for signs and symptoms of infection  - Monitor lab/diagnostic results  - Monitor all insertion sites, i.e. indwelling lines, tubes, and drains  - Monitor endotracheal if appropriate and nasal secretions for changes in amount and color  - [place] appropriate cooling/warming therapies per order  - Administer medications as ordered  - Instruct and encourage patient and family to use good hand hygiene technique  - Identify and instruct in appropriate isolation precautions for identified infection/condition  Outcome: Progressing  Goal: Absence of fever/infection during neutropenic period  Description: INTERVENTIONS:  - Monitor WBC    Outcome: Progressing

## 2023-07-27 NOTE — ASSESSMENT & PLAN NOTE
· Admitted originally due to sepsis from UTI with obstruction  · Status post cystoscopy with difficult stent insertion on 7/6/2023  · Procedure was complicated by inadvertent left ureteral perforation  · He reported having dark-colored urine yesterday which is better today. · She was sent to urology  · Monitor for worsening and hematuria.   · Low threshold for repeat CT if he develops worsening abdominal pain or fever

## 2023-07-27 NOTE — RESTORATIVE TECHNICIAN NOTE
Restorative Technician Note      Patient Name: Tori Villanueva     Restorative Tech Visit Date: 07/27/23  Note Type: Mobility  Patient Position Upon Consult: Supine  Mobility / Activity Provided: pt refused stating he will walk later  Activity Performed: Ambulated  Patient Position at End of Consult: Supine;  All needs within reach

## 2023-07-27 NOTE — RESTORATIVE TECHNICIAN NOTE
Restorative Technician Note      Patient Name: Lili Allen     Restorative Tech Visit Date: 07/27/23  Note Type: Mobility  Patient Position Upon Consult: Supine  Mobility / Activity Provided: pt refused stating he will walk later  Activity Performed: Ambulated  Patient Position at End of Consult: Supine;  All needs within reach

## 2023-07-27 NOTE — PROGRESS NOTES
Cardiology Progress Note   MD Jerome Banks MD, Leigh Ann Mayo DO, [facility]  MD Sharmaine Etienne DO, Malgorzata Carbajal DO, [facility]  ----------------------------------------------------------------  [address] [facility]  [facility], [address] [place] [address] 75 y.o. male MRN: [id number]  Unit/Bed#: E4 -01 Encounter: [id number]      ASSESSMENT:   • Paroxysmal atrial fibrillation on Eliquis  o s/p SOLA cardioversion, July 25, 2023  • Sepsis secondary to urinary source  • Nonischemic troponin elevation secondary to atrial fibrillation  o Pharmacologic nuclear stress test appears negative for myocardial ischemia, July 2023  o LVEF 55%, mild LV dilatation, grade 1 diastolic dysfunction, mild RV dilatation, mild biatrial dilatation, mild TR with PASP 27 mmHg, July 2023  • Pyelonephritis  • Acute kidney injury  • COPD  • Alcohol withdrawal  • Hydronephrosis with obstructing renal calculi    PLAN:  Patient remains in sinus rhythm  Continue Eliquis for thromboembolic prophylaxis  GI input appreciated regarding possible lower GI bleed; recommended for outpatient endoscopy and PPI  Continue high intensity statin, diltiazem 120 mg daily and amiodarone 200 mg twice daily for total of these 200 mg daily  Risks, benefits and alternatives to continued amiodarone use have been discussed with the patient including the risk of pulmonary abnormalities including fibrosis, thyroid abnormalities and liver abnormalities. Patient is aware of the need for annual eye exams. Patient understands these risks and wishes to proceed. Outpatient follow-up with Dr. Windle Halsted within 2 weeks of discharge for additional CV testing as clinically indicated  We will see further inpatient as needed; please reconsult with questions    Signed: Hayes Álvarez DO, FACKATY, DANIEL, FACP      History of Present Illness:  Patient seen and examined.   Denies chest pain, pressure, tightness or squeezing. Denies lightheadedness, dizziness or palpitations. Denies lower extremity swelling, orthopnea or paroxysmal nocturnal dyspnea. Sitting up in bed. Review of Systems:  Review of Systems   Constitutional: Negative for decreased appetite, fever, weight gain and weight loss. HENT: Negative for congestion and sore throat. Eyes: Negative for visual disturbance. Cardiovascular: Negative for chest pain, dyspnea on exertion, leg swelling, near-syncope and palpitations. Respiratory: Negative for cough and shortness of breath. Hematologic/Lymphatic: Negative for bleeding problem. Skin: Negative for rash. Musculoskeletal: Negative for myalgias and neck pain. Gastrointestinal: Negative for abdominal pain and nausea. Neurological: Negative for light-headedness and weakness. Psychiatric/Behavioral: Negative for depression. No Known Allergies    No current facility-administered medications on file prior to encounter.      Current Outpatient Medications on File Prior to Encounter   Medication Sig   • EPINEPHrine (EPIPEN) 0.3 mg/0.3 mL SOAJ Inject 0.3 mL (0.3 mg total) into the shoulder, thigh, or buttocks once as needed for anaphylaxis (call 911 when you use your epipen) for up to 1 dose (Patient not taking: Reported on 7/6/2023)        Current Facility-Administered Medications   Medication Dose Route Frequency Provider Last Rate   • acetaminophen  650 mg Oral Q6H PRN Deatra Brittle, CRNP     • albuterol  2.5 mg Nebulization Q6H PRN Deatra Brittle, CRNP     • aluminum-magnesium hydroxide-simethicone  30 mL Oral Q6H PRN Deatra Brittle, CRNP     • amiodarone  200 mg Oral BID With Meals Freddie Netmalika, DO     • apixaban  5 mg Oral BID Shanda Paredes MD     • aspirin  81 mg Oral Daily Erinteddy Hartman, DO     • atorvastatin  40 mg Oral Daily With Dorina Louis, DO     • bisacodyl  10 mg Oral Daily PRN Deatra Brittle, CRNP     • diltiazem  120 mg Oral Daily Sylvia Sanders DO     • fluticasone-vilanterol  1 puff Inhalation Daily KIKI Lopez     • folic acid  1 mg Oral Daily Connor Plunkett PA-C     • hydrOXYzine HCL  50 mg Oral HS PRN KIKI Lopez     • melatonin  6 mg Oral HS KIKI Lopez     • multivitamin-minerals  1 tablet Oral Daily Connor Plunkett PA-C     • nicotine  1 patch Transdermal Daily KIKI Lopez     • ondansetron  4 mg Intravenous Q6H PRN KIKI Lopez     • pantoprazole  40 mg Oral Early Morning KIKI Lopez     • polyethylene glycol  17 g Oral Daily Cheko Palacios MD     • senna  2 tablet Oral HS KIKI Lopez     • simethicone  80 mg Oral 4x Daily PRN KIKI Lopez     • thiamine  100 mg Oral Daily Connor Plunkett PA-C              Vitals:    07/27/23 0227 07/27/23 0710 07/27/23 1104 07/27/23 1528   BP: 126/70 149/67 133/68 131/62   BP Location: Right arm Left arm Left arm Left arm   Pulse: 72 72 69 65   Resp: 18 18 18 18   Temp: 98.5 °F (36.9 °C) 98 °F (36.7 °C) 97.5 °F (36.4 °C) (!) 97.3 °F (36.3 °C)   TempSrc: Temporal Temporal Temporal Temporal   SpO2: 92% 93% 95% 95%   Weight:       Height:         Body mass index is 28 kg/m². No intake or output data in the 24 hours ending 07/27/23 1544    Weight change:     PHYSICAL EXAMINATION:  Gen: Awake, Alert, NAD  Head/eyes: AT/NC, pupils equal and round, Anicteric  ENT: mmm  Neck: Supple, No elevated JVP, trachea midline  Resp: CTA bilaterally no w/r/r  CV: RRR +S1, S2, No m/r/g  Abd: Soft, NT/ND + BS  Ext: no LE edema bilaterally  Neuro:  Follows commands, moves all extermities  Psych: Appropriate affect, happy mood, pleasant attitude, non-combative  Skin: warm; no rash, erythema or venous stasis changes on exposed skin    Lab Results:  Results from last 7 days   Lab Units 07/25/23  0452   WBC Thousand/uL 9.72   HEMOGLOBIN g/dL 14.2   HEMATOCRIT % 44.6   PLATELETS Thousands/uL 420*     Results from last 7 days   Lab Units 07/25/23  0452   POTASSIUM mmol/L 4.2   CHLORIDE mmol/L 105   CO2 mmol/L 25   BUN mg/dL 23   CREATININE mg/dL 1.01   CALCIUM mg/dL 9.0     No results found for: "TROPONINT"                Tele: SR w/ PVCs    This note was completed in part utilizing M-Modal Fluency Direct Software. Grammatical errors, random word insertions, spelling mistakes, and incomplete sentences may be an occasional consequence of this system secondary to software limitations, ambient noise, and hardware issues. If you have any questions or concerns about the content, text, or information contained within the body of this dictation, please contact the provider for clarification.

## 2023-07-27 NOTE — CONSULTS
[facility] - Inpatient Consultation  Guillermo Bridges 75 y.o. male MRN: [id number]  Unit/Bed#: E4 -01 Encounter: [id number]    Reason for Consult / Principal Problem:     Concerns for blood in stool    ASSESSMENT & PLAN:      Concern for lower GI bleed: Patient with no prior history of colonoscopy or EGD, currently on anticoagulation because of atrial fibrillation with concern for lower GI bleed. As per the patient and nursing staff, he has not had any blood in stool. Patient does report of seeing blood in his urine which was also reported by nursing staff. Patient denies having any constipation but nursing staff did inform us that patient has not had a bowel movement for a few days. Patient refusing rectal exam at this time. We will start patient on bowel regimen and monitor for now. -Hemoglobin at 14.2 which appears to be around patient's baseline of 12-13  -May benefit from outpatient, nonurgent GI evaluation including need for colonoscopy for screening purposes  -The need for further endoscopic work-up may be complicated by patient's impaired decision-making and ongoing guardianship process  -Patient denying any GI bleed at this time, refusing rectal exam  -Bowel regimen ordered    Diffuse esophageal wall thickening: Likely in the setting of chronic history of alcohol use. -Can continue on PPI daily  -Would defer to outpatient GI follow-up for this    Please wait for final recommendations from Dr. Natalya Almaraz  ______________________________________________________________________    HISTORY OF PRESENT ILLNESS: Patient is a 50-year-old male with history of paroxysmal atrial fibrillation on anticoagulation, COPD, alcohol use history. Patient was originally admitted for concerns for urosepsis due to hydronephrosis with obstructing renal calculi with concurrent acute kidney injury.   His hospitalization course was complicated because of cystoscopy procedure with difficult stent insertion on 07/06. Patient has completed antibiotic course thereafter. During the course of patient's hospitalization, there was concern for blood in stool and therefore GI was consulted. Upon further discussion with the patient, he reports that he has not had any blood in his stool and denies having any constipation at this time. Patient does state that he has not had a bowel movement for several days. He states that he has been seeing some blood in his urine. He reports of no prior colonoscopy or EGD. Denying any abdominal pain, nausea, vomiting.      REVIEW OF SYSTEMS:    CONSTITUTIONAL: Denies any fever, chills, rigors, and weight loss  HEENT: No earache or tinnitus, denies hearing loss or visual disturbances  CARDIOVASCULAR: No chest pain or palpitations   RESPIRATORY: Denies any cough, hemoptysis, shortness of breath or dyspnea on exertion  GASTROINTESTINAL: As noted in the History of Present Illness   GENITOURINARY: Reporting of hematuria  NEUROLOGIC: No dizziness or vertigo, denies headaches   MUSCULOSKELETAL: Denies any muscle or joint pain   SKIN: Denies skin rashes or itching   ENDOCRINE: Denies excessive thirst, denies intolerance to heat or cold  PSYCHOSOCIAL: Denies depression or anxiety, denies any recent memory loss     Historical Information   Past Medical History:   Diagnosis Date   • COPD (chronic obstructive pulmonary disease) ([address])      Past Surgical History:   Procedure Laterality Date   • FL RETROGRADE PYELOGRAM  7/6/2023   • RI CYSTO BLADDER W/URETERAL CATHETERIZATION Left 7/6/2023    Procedure: CYSTOSCOPY RETROGRADE PYELOGRAM WITH INSERTION STENT URETERAL;  Surgeon: Peter Ma MD;  Location: [facility];  Service: Urology     Social History   Social History     Substance and Sexual Activity   Alcohol Use Never     Social History     Substance and Sexual Activity   Drug Use No     Social History     Tobacco Use   Smoking Status Every Day   • Packs/day: 0.50   • Types: Cigarettes Smokeless Tobacco Not on file     History reviewed. No pertinent family history. Meds/Allergies   Medications Prior to Admission   Medication   • EPINEPHrine (EPIPEN) 0.3 mg/0.3 mL SOAJ     Current Facility-Administered Medications   Medication Dose Route Frequency   • acetaminophen (TYLENOL) tablet 650 mg  650 mg Oral Q6H PRN   • albuterol inhalation solution 2.5 mg  2.5 mg Nebulization Q6H PRN   • aluminum-magnesium hydroxide-simethicone (MYLANTA) oral suspension 30 mL  30 mL Oral Q6H PRN   • amiodarone tablet 200 mg  200 mg Oral BID With Meals   • apixaban (ELIQUIS) tablet 5 mg  5 mg Oral BID   • aspirin chewable tablet 81 mg  81 mg Oral Daily   • atorvastatin (LIPITOR) tablet 40 mg  40 mg Oral Daily With Dinner   • bisacodyl (DULCOLAX) EC tablet 10 mg  10 mg Oral Daily PRN   • diltiazem (CARDIZEM CD) 24 hr capsule 120 mg  120 mg Oral Daily   • fluticasone-vilanterol 200-25 mcg/actuation 1 puff  1 puff Inhalation Daily   • folic acid (FOLVITE) tablet 1 mg  1 mg Oral Daily   • hydrOXYzine HCL (ATARAX) tablet 50 mg  50 mg Oral HS PRN   • melatonin tablet 6 mg  6 mg Oral HS   • multivitamin-minerals (CENTRUM) tablet 1 tablet  1 tablet Oral Daily   • nicotine (NICODERM CQ) 14 mg/24hr TD 24 hr patch 1 patch  1 patch Transdermal Daily   • ondansetron (ZOFRAN) injection 4 mg  4 mg Intravenous Q6H PRN   • pantoprazole (PROTONIX) EC tablet 40 mg  40 mg Oral Early Morning   • senna (SENOKOT) tablet 17.2 mg  2 tablet Oral HS   • simethicone (MYLICON) chewable tablet 80 mg  80 mg Oral 4x Daily PRN   • thiamine tablet 100 mg  100 mg Oral Daily     No Known Allergies    PHYSICAL EXAM:      Objective   Blood pressure 149/67, pulse 72, temperature 98 °F (36.7 °C), temperature source Temporal, resp. rate 18, height 6' 3" (1.905 m), weight 102 kg (224 lb), SpO2 93 %. Body mass index is 28 kg/m².   No intake or output data in the 24 hours ending 07/27/23 0802    General Appearance:   Alert, cooperative, no distress   HEENT: Normocephalic, atraumatic, anicteric     Neck:   Supple, symmetrical, trachea midline   Lungs:   Equal chest rise, respirations unlabored    Heart:   Regular rate and rhythm   Abdomen:   Soft, non-tender, non-distended; normal bowel sounds; no masses, no organomegaly    Rectal:   Patient refusing rectal exam   Extremities:   No cyanosis, clubbing or edema    Neuro: Moves all 4 extremities    Skin:   No jaundice, rashes, or lesions      LAB RESULTS:     No results displayed because visit has over 200 results. RADIOLOGY RESULTS: I have personally reviewed pertinent imaging studies.         Jaime Moulton MD  Internal Medicine Residency PGY-3  [facility]

## 2023-07-27 NOTE — ASSESSMENT & PLAN NOTE
He had incidental finding on CT with diffuse esophageal thickening could be from esophagitis  Esophagitis probably from alcohol abuse  Continue empiric Protonix  Nonurgent GI follow-up

## 2023-07-27 NOTE — ASSESSMENT & PLAN NOTE
· Improved after successful SOLA cardioversion on 7/25/23  · Fully anticoagulated on  Eliquis 5 mg twice daily  · Rhythm controlled on amiodarone 200 mg twice daily and Cardizem 120 mg daily

## 2023-07-28 ENCOUNTER — APPOINTMENT (INPATIENT)
Dept: CT IMAGING | Facility: HOSPITAL | Age: 73
DRG: 853 | End: 2023-07-28
Payer: MEDICARE

## 2023-07-28 PROBLEM — R31.0 GROSS HEMATURIA: Status: ACTIVE | Noted: 2023-07-28

## 2023-07-28 LAB
ANION GAP SERPL CALCULATED.3IONS-SCNC: 4 MMOL/L
BASOPHILS # BLD AUTO: 0.06 THOUSANDS/ÂΜL (ref 0–0.1)
BASOPHILS NFR BLD AUTO: 1 % (ref 0–1)
BUN SERPL-MCNC: 24 MG/DL (ref 5–25)
CALCIUM SERPL-MCNC: 9.2 MG/DL (ref 8.4–10.2)
CHLORIDE SERPL-SCNC: 104 MMOL/L (ref 96–108)
CO2 SERPL-SCNC: 29 MMOL/L (ref 21–32)
CREAT SERPL-MCNC: 1.07 MG/DL (ref 0.6–1.3)
EOSINOPHIL # BLD AUTO: 0.15 THOUSAND/ÂΜL (ref 0–0.61)
EOSINOPHIL NFR BLD AUTO: 1 % (ref 0–6)
ERYTHROCYTE [DISTWIDTH] IN BLOOD BY AUTOMATED COUNT: 13.8 % (ref 11.6–15.1)
GFR SERPL CREATININE-BSD FRML MDRD: 68 ML/MIN/1.73SQ M
GLUCOSE SERPL-MCNC: 95 MG/DL (ref 65–140)
HCT VFR BLD AUTO: 39.3 % (ref 36.5–49.3)
HGB BLD-MCNC: 12.8 G/DL (ref 12–17)
IMM GRANULOCYTES # BLD AUTO: 0.06 THOUSAND/UL (ref 0–0.2)
IMM GRANULOCYTES NFR BLD AUTO: 1 % (ref 0–2)
LYMPHOCYTES # BLD AUTO: 1.78 THOUSANDS/ÂΜL (ref 0.6–4.47)
LYMPHOCYTES NFR BLD AUTO: 16 % (ref 14–44)
MCH RBC QN AUTO: 33 PG (ref 26.8–34.3)
MCHC RBC AUTO-ENTMCNC: 32.6 G/DL (ref 31.4–37.4)
MCV RBC AUTO: 101 FL (ref 82–98)
MONOCYTES # BLD AUTO: 1.14 THOUSAND/ÂΜL (ref 0.17–1.22)
MONOCYTES NFR BLD AUTO: 10 % (ref 4–12)
NEUTROPHILS # BLD AUTO: 7.76 THOUSANDS/ÂΜL (ref 1.85–7.62)
NEUTS SEG NFR BLD AUTO: 71 % (ref 43–75)
NRBC BLD AUTO-RTO: 0 /100 WBCS
PLATELET # BLD AUTO: 325 THOUSANDS/UL (ref 149–390)
PMV BLD AUTO: 9.5 FL (ref 8.9–12.7)
POTASSIUM SERPL-SCNC: 5.1 MMOL/L (ref 3.5–5.3)
RBC # BLD AUTO: 3.88 MILLION/UL (ref 3.88–5.62)
SODIUM SERPL-SCNC: 137 MMOL/L (ref 135–147)
WBC # BLD AUTO: 10.95 THOUSAND/UL (ref 4.31–10.16)

## 2023-07-28 PROCEDURE — 99232 SBSQ HOSP IP/OBS MODERATE 35: CPT | Performed by: INTERNAL MEDICINE

## 2023-07-28 PROCEDURE — 99232 SBSQ HOSP IP/OBS MODERATE 35: CPT | Performed by: UROLOGY

## 2023-07-28 PROCEDURE — G1004 CDSM NDSC: HCPCS

## 2023-07-28 PROCEDURE — 80048 BASIC METABOLIC PNL TOTAL CA: CPT

## 2023-07-28 PROCEDURE — 85025 COMPLETE CBC W/AUTO DIFF WBC: CPT

## 2023-07-28 PROCEDURE — 74177 CT ABD & PELVIS W/CONTRAST: CPT

## 2023-07-28 RX ORDER — ENOXAPARIN SODIUM 100 MG/ML
1 INJECTION SUBCUTANEOUS EVERY 12 HOURS SCHEDULED
Status: DISCONTINUED | OUTPATIENT
Start: 2023-07-28 | End: 2023-07-30

## 2023-07-28 RX ORDER — LIDOCAINE HYDROCHLORIDE 20 MG/ML
1 JELLY TOPICAL ONCE
Status: COMPLETED | OUTPATIENT
Start: 2023-07-28 | End: 2023-07-28

## 2023-07-28 RX ORDER — LIDOCAINE HYDROCHLORIDE 20 MG/ML
JELLY TOPICAL
Status: COMPLETED
Start: 2023-07-28 | End: 2023-07-28

## 2023-07-28 RX ADMIN — Medication 1 TABLET: at 08:34

## 2023-07-28 RX ADMIN — LIDOCAINE HYDROCHLORIDE 1 APPLICATION: 20 JELLY TOPICAL at 12:24

## 2023-07-28 RX ADMIN — ENOXAPARIN SODIUM 100 MG: 100 INJECTION SUBCUTANEOUS at 08:34

## 2023-07-28 RX ADMIN — ASPIRIN 81 MG 81 MG: 81 TABLET ORAL at 08:34

## 2023-07-28 RX ADMIN — DILTIAZEM HYDROCHLORIDE 120 MG: 120 CAPSULE, COATED, EXTENDED RELEASE ORAL at 08:34

## 2023-07-28 RX ADMIN — ENOXAPARIN SODIUM 100 MG: 100 INJECTION SUBCUTANEOUS at 20:42

## 2023-07-28 RX ADMIN — ATORVASTATIN CALCIUM 40 MG: 40 TABLET, FILM COATED ORAL at 16:15

## 2023-07-28 RX ADMIN — FLUTICASONE FUROATE AND VILANTEROL TRIFENATATE 1 PUFF: 200; 25 POWDER RESPIRATORY (INHALATION) at 08:34

## 2023-07-28 RX ADMIN — SENNOSIDES 17.2 MG: 8.6 TABLET, FILM COATED ORAL at 21:18

## 2023-07-28 RX ADMIN — AMIODARONE HYDROCHLORIDE 200 MG: 200 TABLET ORAL at 16:15

## 2023-07-28 RX ADMIN — IOHEXOL 100 ML: 350 INJECTION, SOLUTION INTRAVENOUS at 13:26

## 2023-07-28 RX ADMIN — MELATONIN 6 MG: at 21:18

## 2023-07-28 RX ADMIN — FOLIC ACID 1 MG: 1 TABLET ORAL at 08:34

## 2023-07-28 RX ADMIN — THIAMINE HCL TAB 100 MG 100 MG: 100 TAB at 08:34

## 2023-07-28 RX ADMIN — PANTOPRAZOLE SODIUM 40 MG: 40 TABLET, DELAYED RELEASE ORAL at 06:05

## 2023-07-28 RX ADMIN — AMIODARONE HYDROCHLORIDE 200 MG: 200 TABLET ORAL at 08:34

## 2023-07-28 RX ADMIN — POLYETHYLENE GLYCOL 3350 17 G: 17 POWDER, FOR SOLUTION ORAL at 08:34

## 2023-07-28 NOTE — ASSESSMENT & PLAN NOTE
· Sepsis secondary to Enterococcus bacteremia and complicated UTI  · Resolved  · Antibiotic course complete

## 2023-07-28 NOTE — RESTORATIVE TECHNICIAN NOTE
Restorative Technician Note      Patient Name: Dawn Wallace     Restorative Tech Visit Date: 07/28/23  Note Type: Mobility  Patient Position Upon Consult: Supine  Activity Performed: Ambulated  Patient Position at End of Consult: Supine;  All needs within reach

## 2023-07-28 NOTE — PROGRESS NOTES
Progress Note - Urology  Catha Bank 1950, 75 y.o. male MRN: [id number]    Unit/Bed#: E4 -01 Encounter: [id number]    Gross hematuria  Assessment & Plan  · Acute onset 3 days ago reporting painless gross hematuria, no clots and no difficulty with urination. Denies any abdominal pain, flank pain, or lower urinary tract symptoms. He does have some mild L CVA tenderness on exam.  · Patient originally presented with sepsis secondary to UTI and obstructing proximal left ureteral calculi. He is s/p cystoscopy, left retrograde pyelogram, left ureteral stent insertion on 7/06/2023. Inadvertant left ureteral perforation at tight proximal ureter at time of procedure, managed with indwelling ureteral stent. He has since completed Abx course and no signs of recurrent infection. · He is also s/p SOLA cardioversion on 7/25/2023, rhythm controlled on amiodarone and Cardizem. He was on Lovenox and was recently transition to PO Eliquis 5 mg BID on 7/25/2023. · He is afebrile with stable vital signs. · Labs from 7/25 show negative leukocytosis, normal renal function and stable Hgb at 14.2. Plan:  · Low clinical suspicion for infection as he recently completed Abx for UTI and has remained afebrile, negative leukocytosis, and no voiding complaints. · I suspect hematuria is secondary to anticoagulants as symptoms started around same time as transition from Lovenox to Eliquis BID. · However given recent left ureteral perforation at time of left ureteral stent placement and mild left CVA tenderness on exam I am recommending repeat CT imaging to rule out any new or worsening injury. · Will make NPO for now until results of CT   · Monitor Hgb and transfuse as indicated  · Monitor renal function  · Continue Lovenox as he is high risk for stroke.    · Serial urine collection to assess degree of hematuria  · PVR bladder scan next void  · Urology will continue to follow        Subjective: Patient is lying in bed resting comfortably no acute distress. He reports that he started with painless gross hematuria 3 days ago and denies any passage of clots or difficulty urinating. He denies any lower urinary tract symptoms either and has been afebrile with stable vital signs. HPI:    Patient originally present to the hospital on 7/6 due to sepsis secondary to UTI and obstructing proximal left ureteral stone. Patient underwent cystoscopy, left retrograde pyelogram, left ureteral stent insertion on 7/06/2023. Inadvertant left ureteral perforation at tight proximal ureter at time of procedure, managed with indwelling ureteral stent. Patient also has atrial fibrillation and is s/p SOLA cardioversion on 7/25/2023, rhythm controlled on amiodarone and Cardizem. He was on Lovenox and was recently transition to Eliquis 5 mg BID on 7/25/2023. Around that same time patient started to experience painless gross hematuria and denies any lower urinary tract symptoms. Review of Systems   Constitutional: Negative for chills and fever. HENT: Negative for congestion and sore throat. Respiratory: Negative for cough and shortness of breath. Cardiovascular: Negative for chest pain and leg swelling. Gastrointestinal: Negative for abdominal pain, constipation and diarrhea. Genitourinary: Negative for difficulty urinating, dysuria, flank pain, frequency, hematuria and urgency. Musculoskeletal: Negative for back pain and gait problem. Skin: Negative for wound. Allergic/Immunologic: Negative for immunocompromised state. Neurological: Negative for dizziness, weakness and numbness. Hematological: Does not bruise/bleed easily. Objective:  Nursing Rounds:   Vitals: Blood pressure 135/71, pulse 88, temperature 97.9 °F (36.6 °C), temperature source Temporal, resp. rate 18, height 6' 3" (1.905 m), weight 102 kg (224 lb), SpO2 92 %. ,Body mass index is 28 kg/m². Physical Exam  Vitals reviewed.    Constitutional:       General: He is not in acute distress. Appearance: Normal appearance. He is not ill-appearing or toxic-appearing. HENT:      Head: Normocephalic and atraumatic. Eyes:      General: No scleral icterus. Conjunctiva/sclera: Conjunctivae normal.   Cardiovascular:      Rate and Rhythm: Normal rate and regular rhythm. Pulses: Normal pulses. Heart sounds: Normal heart sounds. Pulmonary:      Effort: Pulmonary effort is normal. No respiratory distress. Abdominal:      Palpations: Abdomen is soft. Tenderness: There is no abdominal tenderness. There is left CVA tenderness. There is no right CVA tenderness. Hernia: No hernia is present. Musculoskeletal:      Cervical back: Normal range of motion. Right lower leg: No edema. Left lower leg: No edema. Skin:     General: Skin is warm and dry. Coloration: Skin is not jaundiced or pale. Neurological:      General: No focal deficit present. Mental Status: He is alert and oriented to person, place, and time. Mental status is at baseline. Gait: Gait normal.   Psychiatric:         Mood and Affect: Mood normal.         Behavior: Behavior normal.         Thought Content: Thought content normal.         Judgment: Judgment normal.         Imaging:       Labs:  No results for input(s): "WBC" in the last 72 hours. No results for input(s): "HGB" in the last 72 hours. No results for input(s): "HCT" in the last 72 hours. No results for input(s): "CREATININE" in the last 72 hours.       History:    Past Medical History:   Diagnosis Date   • COPD (chronic obstructive pulmonary disease) ([facility])      Social History     Socioeconomic History   • Marital status: Single     Spouse name: None   • Number of children: None   • Years of education: None   • Highest education level: None   Occupational History   • None   Tobacco Use   • Smoking status: Every Day     Packs/day: 0.50     Types: Cigarettes   • Smokeless tobacco: None   Vaping Use   • Vaping Use: Never used   Substance and Sexual Activity   • Alcohol use: Never   • Drug use: No   • Sexual activity: None   Other Topics Concern   • None   Social History Narrative   • None     Social Determinants of Health     Financial Resource Strain: Not on file   Food Insecurity: No Food Insecurity (7/17/2023)    Hunger Vital Sign    • Worried About Running Out of Food in the Last Year: Never true    • Ran Out of Food in the Last Year: Never true   Transportation Needs: No Transportation Needs (7/17/2023)    PRAPARE - Transportation    • Lack of Transportation (Medical): No    • Lack of Transportation (Non-Medical): No   Physical Activity: Not on file   Stress: Not on file   Social Connections: Not on file   Intimate Partner Violence: Not on file   Housing Stability: Low Risk  (7/17/2023)    Housing Stability Vital Sign    • Unable to Pay for Housing in the Last Year: No    • Number of Places Lived in the Last Year: 1    • Unstable Housing in the Last Year: No     Past Surgical History:   Procedure Laterality Date   • FL RETROGRADE PYELOGRAM  7/6/2023   • ND CYSTO BLADDER W/URETERAL CATHETERIZATION Left 7/6/2023    Procedure: [address];  Surgeon: Elizabet Mireles MD;  Location: [facility];  Service: Urology     History reviewed. No pertinent family history.     KIKI Garcia  Date: 7/28/2023 Time: 11:23 AM

## 2023-07-28 NOTE — ASSESSMENT & PLAN NOTE
· Postop day 1 cystoscopy, left ureteral stent removal, right distal ureteral basket stone extraction with no new stents placed. He is now stent free and stone free. · Hematuria improving yellow/brown/cranberry, expect continued improvement after operation yesterday.    · No Sorensen catheter needed  · He is okay for Eliquis   · Complete remaining antibiotics for alphahemolytic strep bacteriuria   · Continue finasteride 5 mg daily for BPH

## 2023-07-28 NOTE — ASSESSMENT & PLAN NOTE
· Status post catheter placement. ·  no plan for cystoscopy for now per urology. · Continue monitoring urine output and blood count  · Bladder irrigation per urology  · Opted to continue anticoagulation with Lovenox since he just had a DC cardioversion this week and is high risk for stroke  · Noticed that the gross hematuria occurred when he was switched from Lovenox to Eliquis. So continue Lovenox for now.

## 2023-07-28 NOTE — PROGRESS NOTES
[facility]  Progress Note  Name: Thad Rasheed I  MRN: [id number]  Unit/Bed#: E4 -01 I Date of Admission: 7/6/2023   Date of Service: 7/28/2023 I Hospital Day: 22    Assessment/Plan   * Sepsis secondary to UTI [facility])  Assessment & Plan  · Sepsis secondary to Enterococcus bacteremia and complicated UTI  · Resolved  · Antibiotic course complete    Atrial fibrillation [facility])  Assessment & Plan  · Improved after successful SOLA cardioversion on 7/25/23  · Switched back to Lovenox due to gross hematuria  · Rhythm controlled on amiodarone 200 mg twice daily and Cardizem 120 mg daily    Gross hematuria  Assessment & Plan  · Status post catheter placement. ·  no plan for cystoscopy for now per urology. · Continue monitoring urine output and blood count  · Bladder irrigation per urology  · Opted to continue anticoagulation with Lovenox since he just had a DC cardioversion this week and is high risk for stroke  · Noticed that the gross hematuria occurred when he was switched from Lovenox to Eliquis. So continue Lovenox for now.     Impaired decision making  Assessment & Plan  This week repeat neuropsych evaluation was done  on 7/24/2023  He continues to have impaired decision-making capacity  Guardianship process initiated by case management     Bacteremia  Assessment & Plan  · Aerococcus bacteremia due to complicated UTI   · Resolved   · Antibiotic course completed     Hydronephrosis with obstructing calculus  Assessment & Plan  · Admitted originally due to sepsis from UTI with obstruction  · Status post cystoscopy with difficult stent insertion on 7/6/2023  · Procedure was complicated by inadvertent left ureteral perforation  · Discussions done with urology today regarding gross hematuria  · CT showed previous ureteral calculus is now in the bladder    Elevated troponin  Assessment & Plan  · Non-MI troponin elevation secondary to tachycardia and sepsis  · Continue aspirin and Lipitor    COPD (chronic obstructive pulmonary disease) ([facility])  Assessment & Plan  · Stable. · Continue Breo 1 puff daily  · Continue albuterol as needed    Esophageal abnormality  Assessment & Plan  He had incidental finding on CT with diffuse esophageal thickening could be from esophagitis  Esophagitis probably from alcohol abuse  Continue empiric Protonix  Nonurgent GI follow-up           VTE Pharmacologic Prophylaxis: VTE Score: 5 High Risk (Score >/= 5) - Pharmacological DVT Prophylaxis Ordered: enoxaparin (Lovenox). Sequential Compression Devices Ordered. Patient Centered Rounds: I performed bedside rounds with nursing staff today. Discussions with Specialists or Other Care Team Provider:  urology Sera Chawla  Education and Discussions with Family / Patient: No . Current Length of Stay: 22 day(s)  Current Patient Status: Inpatient   Certification Statement: The patient will continue to require additional inpatient hospital stay due to Gross hematuria  Discharge Plan: To be determined    Code Status: Level 1 - Full Code    Subjective:   + Gross hematuria, painless  No fever or chills    Objective:     Vitals:   Temp (24hrs), Av.9 °F (36.6 °C), Min:97.8 °F (36.6 °C), Max:97.9 °F (36.6 °C)    Temp:  [97.8 °F (36.6 °C)-97.9 °F (36.6 °C)] 97.9 °F (36.6 °C)  HR:  [66-88] 68  Resp:  [18-19] 18  BP: (135-142)/(68-72) 142/72  SpO2:  [92 %-96 %] 95 %  Body mass index is 28 kg/m². Input and Output Summary (last 24 hours): Intake/Output Summary (Last 24 hours) at 2023 1746  Last data filed at 2023 1626  Gross per 24 hour   Intake --   Output 780 ml   Net -780 ml       Physical Exam:   Physical Exam  Vitals reviewed. Constitutional:       Appearance: He is not ill-appearing. HENT:      Head: Normocephalic and atraumatic. Nose: No congestion or rhinorrhea. Eyes:      General: No scleral icterus. Cardiovascular:      Rate and Rhythm: Normal rate and regular rhythm. Pulmonary:      Breath sounds: No wheezing or rhonchi. Abdominal:      General: There is no distension. Tenderness: There is no abdominal tenderness. Genitourinary:     Comments: Sorensen with gross hematuria  Musculoskeletal:      Cervical back: Neck supple. Skin:     General: Skin is warm and dry. Coloration: Skin is not jaundiced.    Psychiatric:         Mood and Affect: Mood normal.         Behavior: Behavior normal.       Additional Data:     Labs:  Results from last 7 days   Lab Units 07/28/23  1143   WBC Thousand/uL 10.95*   HEMOGLOBIN g/dL 12.8   HEMATOCRIT % 39.3   PLATELETS Thousands/uL 325   NEUTROS PCT % 71   LYMPHS PCT % 16   MONOS PCT % 10   EOS PCT % 1     Results from last 7 days   Lab Units 07/28/23  1143   SODIUM mmol/L 137   POTASSIUM mmol/L 5.1   CHLORIDE mmol/L 104   CO2 mmol/L 29   BUN mg/dL 24   CREATININE mg/dL 1.07   ANION GAP mmol/L 4   CALCIUM mg/dL 9.2   GLUCOSE RANDOM mg/dL 95                       Lines/Drains:  Invasive Devices     Peripheral Intravenous Line  Duration           Peripheral IV 07/26/23 Right;Ventral (anterior) Forearm 2 days          Drain  Duration           Ureteral Internal Stent Left ureter 6 Fr. 21 days    Urethral Catheter Three way 22 Fr. <1 day              Urinary Catheter:                 Imaging: Reviewed radiology reports from this admission including: abdominal/pelvic CT    Recent Cultures (last 7 days):         Last 24 Hours Medication List:   Current Facility-Administered Medications   Medication Dose Route Frequency Provider Last Rate   • acetaminophen  650 mg Oral Q6H PRN KIKI Gardner     • albuterol  2.5 mg Nebulization Q6H PRN KIKI Gardner     • aluminum-magnesium hydroxide-simethicone  30 mL Oral Q6H PRN KIKI Gardner     • amiodarone  200 mg Oral BID With Meals Lisa Rich DO     • aspirin  81 mg Oral Daily Syed Espino DO     • atorvastatin  40 mg Oral Daily With Val Menon DO Jess     • bisacodyl  10 mg Oral Daily PRN Maday Ean, CRNP     • diltiazem  120 mg Oral Daily Renard Amato DO     • enoxaparin  1 mg/kg Subcutaneous Q12H Humberto Goodson MD     • fluticasone-vilanterol  1 puff Inhalation Daily Maday Ean, CRNP     • folic acid  1 mg Oral Daily Mio Cheney PA-C     • hydrOXYzine HCL  50 mg Oral HS PRN Maday Ean, CRNP     • melatonin  6 mg Oral HS Maday Ean, CRNP     • multivitamin-minerals  1 tablet Oral Daily Mio Cheney PA-C     • nicotine  1 patch Transdermal Daily Maday Ean, CRNP     • ondansetron  4 mg Intravenous Q6H PRN Maday Ean, CRNP     • pantoprazole  40 mg Oral Early Morning [facility], CRNP     • polyethylene glycol  17 g Oral Daily Grant Weathers MD     • senna  2 tablet Oral HS Maday Ean, CRNP     • simethicone  80 mg Oral 4x Daily PRN Maday Ean, CRNP     • thiamine  100 mg Oral Daily Mio Cheney PA-C          Today, Patient Was Seen By: Sapphire Bailey MD    **Please Note: This note may have been constructed using a voice recognition system. **

## 2023-07-28 NOTE — PLAN OF CARE
Problem: Potential for Falls  Goal: Patient will remain free of falls  Description: INTERVENTIONS:  - Educate patient/family on patient safety including physical limitations  - Instruct patient to call for assistance with activity   - Consult OT/PT to assist with strengthening/mobility   - Keep Call bell within reach  - Keep bed low and locked with side rails adjusted as appropriate  - Keep care items and personal belongings within reach  - Initiate and maintain comfort rounds  - Make Fall Risk Sign visible to staff  - Offer Toileting every 2 Hours, in advance of need  - Initiate/Maintain bed alarm  - Obtain necessary fall risk management equipment: alarms  - Apply yellow socks and bracelet for high fall risk patients  - Consider moving patient to room near nurses station  Outcome: Progressing     Problem: MOBILITY - ADULT  Goal: Maintain or return to baseline ADL function  Description: INTERVENTIONS:  -  Assess patient's ability to carry out ADLs; assess patient's baseline for ADL function and identify physical deficits which impact ability to perform ADLs (bathing, care of mouth/teeth, toileting, grooming, dressing, etc.)  - Assess/evaluate cause of self-care deficits   - Assess range of motion  - Assess patient's mobility; develop plan if impaired  - Assess patient's need for assistive devices and provide as appropriate  - Encourage maximum independence but intervene and supervise when necessary  - Involve family in performance of ADLs  - Assess for home care needs following discharge   - Consider OT consult to assist with ADL evaluation and planning for discharge  - Provide patient education as appropriate  Outcome: Progressing  Goal: Maintains/Returns to pre admission functional level  Description: INTERVENTIONS:  - Perform BMAT or MOVE assessment daily.   - Set and communicate daily mobility goal to care team and patient/family/caregiver.    - Collaborate with rehabilitation services on mobility goals if consulted  - Perform Range of Motion 3 times a day. - Reposition patient every 2 hours.   - Dangle patient 3 times a day  - Stand patient 3 times a day  - Ambulate patient 3 times a day  - Out of bed to chair 3 times a day   - Out of bed for meals 3 times a day  - Out of bed for toileting  - Record patient progress and toleration of activity level   Outcome: Progressing     Problem: PAIN - ADULT  Goal: Verbalizes/displays adequate comfort level or baseline comfort level  Description: Interventions:  - Encourage patient to monitor pain and request assistance  - Assess pain using appropriate pain scale  - Administer analgesics based on type and severity of pain and evaluate response  - Implement non-pharmacological measures as appropriate and evaluate response  - Consider cultural and social influences on pain and pain management  - Notify physician/advanced practitioner if interventions unsuccessful or patient reports new pain  Outcome: Progressing     Problem: INFECTION - ADULT  Goal: Absence or prevention of progression during hospitalization  Description: INTERVENTIONS:  - Assess and monitor for signs and symptoms of infection  - Monitor lab/diagnostic results  - Monitor all insertion sites, i.e. indwelling lines, tubes, and drains  - Monitor endotracheal if appropriate and nasal secretions for changes in amount and color  - [place] appropriate cooling/warming therapies per order  - Administer medications as ordered  - Instruct and encourage patient and family to use good hand hygiene technique  - Identify and instruct in appropriate isolation precautions for identified infection/condition  Outcome: Progressing  Goal: Absence of fever/infection during neutropenic period  Description: INTERVENTIONS:  - Monitor WBC    Outcome: Progressing     Problem: SAFETY ADULT  Goal: Patient will remain free of falls  Description: INTERVENTIONS:  - Educate patient/family on patient safety including physical limitations  - Instruct patient to call for assistance with activity   - Consult OT/PT to assist with strengthening/mobility   - Keep Call bell within reach  - Keep bed low and locked with side rails adjusted as appropriate  - Keep care items and personal belongings within reach  - Initiate and maintain comfort rounds  - Make Fall Risk Sign visible to staff  - Offer Toileting every 2 Hours, in advance of need  - Initiate/Maintain alarm  - Obtain necessary fall risk management equipment:   - Apply yellow socks and bracelet for high fall risk patients  - Consider moving patient to room near nurses station  Outcome: Progressing  Goal: Maintain or return to baseline ADL function  Description: INTERVENTIONS:  -  Assess patient's ability to carry out ADLs; assess patient's baseline for ADL function and identify physical deficits which impact ability to perform ADLs (bathing, care of mouth/teeth, toileting, grooming, dressing, etc.)  - Assess/evaluate cause of self-care deficits   - Assess range of motion  - Assess patient's mobility; develop plan if impaired  - Assess patient's need for assistive devices and provide as appropriate  - Encourage maximum independence but intervene and supervise when necessary  - Involve family in performance of ADLs  - Assess for home care needs following discharge   - Consider OT consult to assist with ADL evaluation and planning for discharge  - Provide patient education as appropriate  Outcome: Progressing  Goal: Maintains/Returns to pre admission functional level  Description: INTERVENTIONS:  - Perform BMAT or MOVE assessment daily.   - Set and communicate daily mobility goal to care team and patient/family/caregiver. - Collaborate with rehabilitation services on mobility goals if consulted  - Perform Range of Motion 3 times a day. - Reposition patient every 2 hours.   - Dangle patient 3 times a day  - Stand patient 3 times a day  - Ambulate patient 3 times a day  - Out of bed to chair 3 times a day - Out of bed for meals 3 times a day  - Out of bed for toileting  - Record patient progress and toleration of activity level   Outcome: Progressing     Problem: DISCHARGE PLANNING  Goal: Discharge to home or other facility with appropriate resources  Description: INTERVENTIONS:  - Identify barriers to discharge w/patient and caregiver  - Arrange for needed discharge resources and transportation as appropriate  - Identify discharge learning needs (meds, wound care, etc.)  - Arrange for interpretive services to assist at discharge as needed  - Refer to Case Management Department for coordinating discharge planning if the patient needs post-hospital services based on physician/advanced practitioner order or complex needs related to functional status, cognitive ability, or social support system  Outcome: Progressing     Problem: Knowledge Deficit  Goal: Patient/family/caregiver demonstrates understanding of disease process, treatment plan, medications, and discharge instructions  Description: Complete learning assessment and assess knowledge base.   Interventions:  - Provide teaching at level of understanding  - Provide teaching via preferred learning methods  Outcome: Progressing     Problem: Prexisting or High Potential for Compromised Skin Integrity  Goal: Skin integrity is maintained or improved  Description: INTERVENTIONS:  - Identify patients at risk for skin breakdown  - Assess and monitor skin integrity  - Assess and monitor nutrition and hydration status  - Monitor labs   - Assess for incontinence   - Turn and reposition patient  - Assist with mobility/ambulation  - Relieve pressure over bony prominences  - Avoid friction and shearing  - Provide appropriate hygiene as needed including keeping skin clean and dry  - Evaluate need for skin moisturizer/barrier cream  - Collaborate with interdisciplinary team   - Patient/family teaching  - Consider wound care consult   Outcome: Progressing

## 2023-07-28 NOTE — ASSESSMENT & PLAN NOTE
This week repeat neuropsych evaluation was done  on 7/24/2023  He continues to have impaired decision-making capacity  Guardianship process initiated by case management

## 2023-07-28 NOTE — QUICK NOTE
Primary RN reached out regarding concern about the degree of patients hematuria after a urine specimen was able to be collected. Patient also with mild PVR of 82 mL. Patient was asymptomatic and denied any difficulty with urination. Urine was noted to be grossly bloody (dark merlot colored). Patient reported that urine was lighter in color this morning and continued to deny any passage of clots. Due to worsening hematuria I decided to place a 3 way michel catheter and performed manual irrigation until clear. BEDSIDE PROCEDURE  Indwelling Catheter PROCEDURE NOTE    Pre-operative Diagnosis: Gross hematuria        Post-operative Diagnosis: Gross hematuria    INDICATION   76 y/o male with worsening gross hematuria for 3 days. Mateusz Sorto TIME OUT: Correct patient identity. PROCEDURE   Consent: Patient was agreeable. Formal  Informed consent however, not applicable. Under sterile conditions the patient was positioned. Betadine solution and sterile drapes were utilized. Non- Petroleum based gel was used to lubricate urethral meatus insertion site. Utilized 1 22FR 3 way michel Catheter. Urine was obtained without any difficulties with immediate return of approximately 400 mL of grossly blood urine. No evidence of injury or trauma noted. Michel balloon was inflated with 30 cc sterile water. Findings  Approximately 400 ml of grossly bloody urine was obtained. Manual irrigation was performed utilizing at 60 cc Georgi syringe and 500 mL of sterile saline until urine was light pink in color. Complications:  None; patient tolerated the procedure well. Condition: stable    Plan  Maintain Michel to straight drainage. Do not remove. Flush Q4H using Georgi syringe and 120 ml NSS. Stat CT imaging ordered. Void trial to be determined at future date when hematuria has improved.          KIKI Thurston

## 2023-07-28 NOTE — QUICK NOTE
He has gross hematuria  This may have started after switching from lovenox to eliquis  He has afib and recent DCCV   Will switch back to lovenox given stable hgb, high stroke risk and non massive hematuria

## 2023-07-28 NOTE — ASSESSMENT & PLAN NOTE
· Improved after successful SOLA cardioversion on 7/25/23  · Switched back to Lovenox due to gross hematuria  · Rhythm controlled on amiodarone 200 mg twice daily and Cardizem 120 mg daily

## 2023-07-28 NOTE — RESTORATIVE TECHNICIAN NOTE
Restorative Technician Note      Patient Name: Wong Cole     Restorative Tech Visit Date: 07/28/23  Note Type: Mobility  Patient Position Upon Consult: Supine  Mobility / Activity Provided: pt refused ambulation due to a new cathedar  Activity Performed: Ambulated  Patient Position at End of Consult: Supine;  All needs within reach

## 2023-07-28 NOTE — ASSESSMENT & PLAN NOTE
· Admitted originally due to sepsis from UTI with obstruction  · Status post cystoscopy with difficult stent insertion on 7/6/2023  · Procedure was complicated by inadvertent left ureteral perforation  · Discussions done with urology today regarding gross hematuria  · CT showed previous ureteral calculus is now in the bladder

## 2023-07-29 LAB
ANION GAP SERPL CALCULATED.3IONS-SCNC: 6 MMOL/L
BASOPHILS # BLD AUTO: 0.04 THOUSANDS/ÂΜL (ref 0–0.1)
BASOPHILS NFR BLD AUTO: 0 % (ref 0–1)
BUN SERPL-MCNC: 22 MG/DL (ref 5–25)
CALCIUM SERPL-MCNC: 9 MG/DL (ref 8.4–10.2)
CHLORIDE SERPL-SCNC: 103 MMOL/L (ref 96–108)
CO2 SERPL-SCNC: 27 MMOL/L (ref 21–32)
CREAT SERPL-MCNC: 1.09 MG/DL (ref 0.6–1.3)
EOSINOPHIL # BLD AUTO: 0.2 THOUSAND/ÂΜL (ref 0–0.61)
EOSINOPHIL NFR BLD AUTO: 2 % (ref 0–6)
ERYTHROCYTE [DISTWIDTH] IN BLOOD BY AUTOMATED COUNT: 13.9 % (ref 11.6–15.1)
GFR SERPL CREATININE-BSD FRML MDRD: 66 ML/MIN/1.73SQ M
GLUCOSE SERPL-MCNC: 157 MG/DL (ref 65–140)
HCT VFR BLD AUTO: 37.5 % (ref 36.5–49.3)
HGB BLD-MCNC: 12.1 G/DL (ref 12–17)
IMM GRANULOCYTES # BLD AUTO: 0.05 THOUSAND/UL (ref 0–0.2)
IMM GRANULOCYTES NFR BLD AUTO: 1 % (ref 0–2)
LYMPHOCYTES # BLD AUTO: 1.82 THOUSANDS/ÂΜL (ref 0.6–4.47)
LYMPHOCYTES NFR BLD AUTO: 18 % (ref 14–44)
MCH RBC QN AUTO: 32.2 PG (ref 26.8–34.3)
MCHC RBC AUTO-ENTMCNC: 32.3 G/DL (ref 31.4–37.4)
MCV RBC AUTO: 100 FL (ref 82–98)
MONOCYTES # BLD AUTO: 1.02 THOUSAND/ÂΜL (ref 0.17–1.22)
MONOCYTES NFR BLD AUTO: 10 % (ref 4–12)
NEUTROPHILS # BLD AUTO: 6.98 THOUSANDS/ÂΜL (ref 1.85–7.62)
NEUTS SEG NFR BLD AUTO: 69 % (ref 43–75)
NRBC BLD AUTO-RTO: 0 /100 WBCS
PLATELET # BLD AUTO: 214 THOUSANDS/UL (ref 149–390)
PMV BLD AUTO: 9.9 FL (ref 8.9–12.7)
POTASSIUM SERPL-SCNC: 4.3 MMOL/L (ref 3.5–5.3)
RBC # BLD AUTO: 3.76 MILLION/UL (ref 3.88–5.62)
SODIUM SERPL-SCNC: 136 MMOL/L (ref 135–147)
WBC # BLD AUTO: 10.11 THOUSAND/UL (ref 4.31–10.16)

## 2023-07-29 PROCEDURE — 99232 SBSQ HOSP IP/OBS MODERATE 35: CPT | Performed by: INTERNAL MEDICINE

## 2023-07-29 PROCEDURE — 99232 SBSQ HOSP IP/OBS MODERATE 35: CPT | Performed by: UROLOGY

## 2023-07-29 PROCEDURE — 80048 BASIC METABOLIC PNL TOTAL CA: CPT | Performed by: INTERNAL MEDICINE

## 2023-07-29 PROCEDURE — 85025 COMPLETE CBC W/AUTO DIFF WBC: CPT | Performed by: INTERNAL MEDICINE

## 2023-07-29 RX ADMIN — ENOXAPARIN SODIUM 100 MG: 100 INJECTION SUBCUTANEOUS at 21:21

## 2023-07-29 RX ADMIN — FOLIC ACID 1 MG: 1 TABLET ORAL at 09:22

## 2023-07-29 RX ADMIN — SENNOSIDES 17.2 MG: 8.6 TABLET, FILM COATED ORAL at 21:21

## 2023-07-29 RX ADMIN — MELATONIN 6 MG: at 21:21

## 2023-07-29 RX ADMIN — Medication 1 TABLET: at 09:21

## 2023-07-29 RX ADMIN — AMIODARONE HYDROCHLORIDE 200 MG: 200 TABLET ORAL at 09:22

## 2023-07-29 RX ADMIN — ATORVASTATIN CALCIUM 40 MG: 40 TABLET, FILM COATED ORAL at 16:55

## 2023-07-29 RX ADMIN — AMIODARONE HYDROCHLORIDE 200 MG: 200 TABLET ORAL at 16:55

## 2023-07-29 RX ADMIN — DILTIAZEM HYDROCHLORIDE 120 MG: 120 CAPSULE, COATED, EXTENDED RELEASE ORAL at 09:22

## 2023-07-29 RX ADMIN — FLUTICASONE FUROATE AND VILANTEROL TRIFENATATE 1 PUFF: 200; 25 POWDER RESPIRATORY (INHALATION) at 09:23

## 2023-07-29 RX ADMIN — ASPIRIN 81 MG 81 MG: 81 TABLET ORAL at 09:21

## 2023-07-29 RX ADMIN — PANTOPRAZOLE SODIUM 40 MG: 40 TABLET, DELAYED RELEASE ORAL at 05:40

## 2023-07-29 RX ADMIN — THIAMINE HCL TAB 100 MG 100 MG: 100 TAB at 09:23

## 2023-07-29 RX ADMIN — ENOXAPARIN SODIUM 100 MG: 100 INJECTION SUBCUTANEOUS at 09:22

## 2023-07-29 NOTE — PLAN OF CARE
Problem: PAIN - ADULT  Goal: Verbalizes/displays adequate comfort level or baseline comfort level  Description: Interventions:  - Encourage patient to monitor pain and request assistance  - Assess pain using appropriate pain scale  - Administer analgesics based on type and severity of pain and evaluate response  - Implement non-pharmacological measures as appropriate and evaluate response  - Consider cultural and social influences on pain and pain management  - Notify physician/advanced practitioner if interventions unsuccessful or patient reports new pain  Outcome: Progressing     Problem: INFECTION - ADULT  Goal: Absence or prevention of progression during hospitalization  Description: INTERVENTIONS:  - Assess and monitor for signs and symptoms of infection  - Monitor lab/diagnostic results  - Monitor all insertion sites, i.e. indwelling lines, tubes, and drains  - Monitor endotracheal if appropriate and nasal secretions for changes in amount and color  - [place] appropriate cooling/warming therapies per order  - Administer medications as ordered  - Instruct and encourage patient and family to use good hand hygiene technique  - Identify and instruct in appropriate isolation precautions for identified infection/condition  Outcome: Progressing

## 2023-07-29 NOTE — PROGRESS NOTES
Progress Note - Wong Cole 75 y.o. male MRN: [id number]    Unit/Bed#: E4 -01 Encounter: [id number]      Assessment:  *Wong Cole is a 67-year-old male with history of nephrolithiasis, recent left proximal ureteral calculus, status post cystoscopy, left retrograde pyelography and insertion of left ureteral stent 7/6 complicated by inadvertent left ureteral perforation and Enterococcus bacteremia--now resolved with completion of antimicrobial course. Unfortunately, patient developed atrial fibrillation now status post successful SOLA cardioversion 4 days ago 7/25. He was placed on Eliquis and this provoked gross hematuria. Our service was contacted for further management recommendations. Recent CT negative for extravasation. Stent remains in situ. Three-way Sorensen catheter was inserted for bladder irrigation manually--blush urine. Patient is afebrile, hemodynamically stable without complaints of flank, abdominal or suprapubic pain. Hemoglobin is 12. Renal function within normal limits. Three-way Sorensen with ongoing manual irrigation throughout the night patent for    Patient is currently on Lovenox and aspirin. Plan:  · Continue medical optimization. · Initiate CBI and continue manual irrigation around-the-clock for 24 hours. · Trend hemoglobin. · No need for emergent  intervention. However patient may benefit from more aggressive irrigation. · Consider transitioning to oral anticoagulant during hospitalization prior to discharge to observe outcome, if patient unable to continue Lovenox. Subjective:   Denies fever, chills, flank, abdominal or suprapubic pain. Objective:     Vitals: Blood pressure 137/76, pulse 70, temperature 98.1 °F (36.7 °C), temperature source Temporal, resp. rate 18, height 6' 3" (1.905 m), weight 102 kg (224 lb), SpO2 94 %. ,Body mass index is 28 kg/m².       Intake/Output Summary (Last 24 hours) at 7/29/2023 0831  Last data filed at 7/29/2023 0732  Gross per 24 hour   Intake --   Output 2305 ml   Net -2305 ml       Physical Exam: General appearance: alert and oriented, in no acute distress, appears stated age, cooperative and no distress  Head: Normocephalic, without obvious abnormality, atraumatic  Neck: no JVD and supple, symmetrical, trachea midline  Lungs: clear to auscultation bilaterally  Heart: regular rate and rhythm, S1, S2 normal, no murmur, click, rub or gallop  Abdomen: soft, non-tender; bowel sounds normal; no masses,  no organomegaly  Extremities: extremities normal, warm and well-perfused; no cyanosis, clubbing, or edema  Pulses: 2+ and symmetric  Neurologic: Grossly normal  Three-way Sorensen--clamped--blush urine. Invasive Devices     Peripheral Intravenous Line  Duration           Peripheral IV 07/26/23 Right;Ventral (anterior) Forearm 2 days          Drain  Duration           Ureteral Internal Stent Left ureter 6 Fr. 22 days    Urethral Catheter Three way 22 Fr. <1 day              Lab Results   Component Value Date    WBC 10.11 07/29/2023    HGB 12.1 07/29/2023    HCT 37.5 07/29/2023     (H) 07/29/2023     07/29/2023     Lab Results   Component Value Date    SODIUM 136 07/29/2023    K 4.3 07/29/2023     07/29/2023    CO2 27 07/29/2023    BUN 22 07/29/2023    CREATININE 1.09 07/29/2023    GLUC 157 (H) 07/29/2023    CALCIUM 9.0 07/29/2023       Lab, Imaging and other studies: I have personally reviewed pertinent reports.

## 2023-07-29 NOTE — PROGRESS NOTES
Progress Note - Missy Macario 75 y.o. male MRN: [id number]    Unit/Bed#: E4 -01 Encounter: [id number]      Subjective: The patient is feeling pretty well. He slept okay. He is eating well. He has no chest pain, shortness of breath, abdominal pain, nausea, or vomiting. Physical Exam:   Temp:  [97.9 °F (36.6 °C)-98.4 °F (36.9 °C)] 98.4 °F (36.9 °C)  HR:  [63-70] 63  Resp:  [18] 18  BP: (113-142)/(60-76) 136/70    Gen: Well-developed, well-nourished, in no distress. Neck: Supple. No lymphadenopathy, goiter, or bruit. Heart: Regular rhythm. I heard no murmur, gallop, or rub. Lungs: Clear to auscultation and percussion. No wheezing, rales, or rhonchi. Abd: Soft with active bowel sounds. No mass, tenderness, or organomegaly. Extremities: No clubbing, cyanosis, or edema. No calf tenderness. Neuro: Alert and conversant. No focal sign. Skin: Warm and dry. LABS:   CBC:   Lab Results   Component Value Date    WBC 10.11 07/29/2023    HGB 12.1 07/29/2023    HCT 37.5 07/29/2023     (H) 07/29/2023     07/29/2023    RBC 3.76 (L) 07/29/2023    MCH 32.2 07/29/2023    MCHC 32.3 07/29/2023    RDW 13.9 07/29/2023    MPV 9.9 07/29/2023    NRBC 0 07/29/2023   , CMP:   Lab Results   Component Value Date    SODIUM 136 07/29/2023    K 4.3 07/29/2023     07/29/2023    CO2 27 07/29/2023    BUN 22 07/29/2023    CREATININE 1.09 07/29/2023    CALCIUM 9.0 07/29/2023    EGFR 66 07/29/2023         Assessment/Plan:  1. Enterococcal sepsis secondary to urinary tract infection  2. Ureteral stone with hydronephrosis, status post stent  3. Atrial fibrillation, now in sinus rhythm following cardioversion  4. Gross hematuria  5. Impaired decision-making  6. Non-MI troponin elevation  7. COPD  8. Esophageal abnormality seen on CT scanning    Fortunately, the patient's hemoglobin has remained stable despite hematuria. The patient is on therapeutic Lovenox because of atrial fibrillation.   He is also on aspirin which will be stopped for the time being. We will continue to monitor the extent of his hematuria and his hemoglobin. The patient has been deemed to be unable to make informed medical decisions. Efforts at posthospital planning are in progress. The patient will need a court appointed guardian.     VTE Pharmacologic Prophylaxis: Enoxaparin (Lovenox)  VTE Mechanical Prophylaxis: reason for no mechanical VTE prophylaxis Therapeutically anticoagulated

## 2023-07-30 LAB
BASOPHILS # BLD AUTO: 0.06 THOUSANDS/ÂΜL (ref 0–0.1)
BASOPHILS NFR BLD AUTO: 1 % (ref 0–1)
EOSINOPHIL # BLD AUTO: 0.29 THOUSAND/ÂΜL (ref 0–0.61)
EOSINOPHIL NFR BLD AUTO: 3 % (ref 0–6)
ERYTHROCYTE [DISTWIDTH] IN BLOOD BY AUTOMATED COUNT: 13.4 % (ref 11.6–15.1)
HCT VFR BLD AUTO: 40.1 % (ref 36.5–49.3)
HGB BLD-MCNC: 13.2 G/DL (ref 12–17)
IMM GRANULOCYTES # BLD AUTO: 0.06 THOUSAND/UL (ref 0–0.2)
IMM GRANULOCYTES NFR BLD AUTO: 1 % (ref 0–2)
LYMPHOCYTES # BLD AUTO: 1.95 THOUSANDS/ÂΜL (ref 0.6–4.47)
LYMPHOCYTES NFR BLD AUTO: 18 % (ref 14–44)
MCH RBC QN AUTO: 32.7 PG (ref 26.8–34.3)
MCHC RBC AUTO-ENTMCNC: 32.9 G/DL (ref 31.4–37.4)
MCV RBC AUTO: 99 FL (ref 82–98)
MONOCYTES # BLD AUTO: 0.99 THOUSAND/ÂΜL (ref 0.17–1.22)
MONOCYTES NFR BLD AUTO: 9 % (ref 4–12)
NEUTROPHILS # BLD AUTO: 7.63 THOUSANDS/ÂΜL (ref 1.85–7.62)
NEUTS SEG NFR BLD AUTO: 68 % (ref 43–75)
NRBC BLD AUTO-RTO: 0 /100 WBCS
PLATELET # BLD AUTO: 344 THOUSANDS/UL (ref 149–390)
PMV BLD AUTO: 10 FL (ref 8.9–12.7)
RBC # BLD AUTO: 4.04 MILLION/UL (ref 3.88–5.62)
WBC # BLD AUTO: 10.98 THOUSAND/UL (ref 4.31–10.16)

## 2023-07-30 PROCEDURE — 99232 SBSQ HOSP IP/OBS MODERATE 35: CPT | Performed by: INTERNAL MEDICINE

## 2023-07-30 PROCEDURE — 99232 SBSQ HOSP IP/OBS MODERATE 35: CPT | Performed by: NURSE PRACTITIONER

## 2023-07-30 PROCEDURE — 85025 COMPLETE CBC W/AUTO DIFF WBC: CPT | Performed by: INTERNAL MEDICINE

## 2023-07-30 RX ORDER — GUAIFENESIN 100 MG/5ML
200 SOLUTION ORAL EVERY 4 HOURS PRN
Status: DISCONTINUED | OUTPATIENT
Start: 2023-07-30 | End: 2023-09-01 | Stop reason: HOSPADM

## 2023-07-30 RX ADMIN — POLYETHYLENE GLYCOL 3350 17 G: 17 POWDER, FOR SOLUTION ORAL at 09:24

## 2023-07-30 RX ADMIN — PANTOPRAZOLE SODIUM 40 MG: 40 TABLET, DELAYED RELEASE ORAL at 06:12

## 2023-07-30 RX ADMIN — FLUTICASONE FUROATE AND VILANTEROL TRIFENATATE 1 PUFF: 200; 25 POWDER RESPIRATORY (INHALATION) at 09:24

## 2023-07-30 RX ADMIN — ENOXAPARIN SODIUM 100 MG: 100 INJECTION SUBCUTANEOUS at 09:23

## 2023-07-30 RX ADMIN — DILTIAZEM HYDROCHLORIDE 120 MG: 120 CAPSULE, COATED, EXTENDED RELEASE ORAL at 09:23

## 2023-07-30 RX ADMIN — ATORVASTATIN CALCIUM 40 MG: 40 TABLET, FILM COATED ORAL at 16:21

## 2023-07-30 RX ADMIN — AMIODARONE HYDROCHLORIDE 200 MG: 200 TABLET ORAL at 16:21

## 2023-07-30 RX ADMIN — FOLIC ACID 1 MG: 1 TABLET ORAL at 09:23

## 2023-07-30 RX ADMIN — AMIODARONE HYDROCHLORIDE 200 MG: 200 TABLET ORAL at 09:23

## 2023-07-30 RX ADMIN — MELATONIN 6 MG: at 22:37

## 2023-07-30 RX ADMIN — GUAIFENESIN 200 MG: 200 SOLUTION ORAL at 17:17

## 2023-07-30 RX ADMIN — Medication 1 TABLET: at 09:23

## 2023-07-30 RX ADMIN — THIAMINE HCL TAB 100 MG 100 MG: 100 TAB at 09:23

## 2023-07-30 RX ADMIN — APIXABAN 5 MG: 5 TABLET, FILM COATED ORAL at 17:17

## 2023-07-30 NOTE — PROGRESS NOTES
Progress Note - Derrick Yang 75 y.o. male MRN: [id number]    Unit/Bed#: E4 -01 Encounter: [id number]      Assessment:  *Derrick Yang is a 62-year-old male with history of nephrolithiasis, recent left proximal ureteral calculus, status post cystoscopy, left retrograde pyelography and insertion of left ureteral stent 7/6 complicated by inadvertent left ureteral perforation and Enterococcus bacteremia--now resolved with completion of antimicrobial course. Unfortunately, patient developed atrial fibrillation now status post successful SOLA cardioversion  7/25. He was placed on Eliquis and this provoked gross hematuria. Our service was contacted for further management recommendations. Recent CT negative for extravasation. Stent remains in situ. Three-way Sorensen catheter was inserted for bladder irrigation manually--blush urine. Patient is afebrile, hemodynamically stable without complaints of flank, abdominal or suprapubic pain. Hemoglobin is greater than 13. Renal function within normal limits. Three-way Sorensen with ongoing manual irrigation throughout the night patent for pale lenora--pale blush. Patient is currently on Lovenox and aspirin. Plan:  · Continue medical optimization. · Continue CBI with manual irrigation an additional 24 hours. · Clamp CBI at 0600 AM.  But do not dispose of bags or tubing. Keep nearby. · If medicine wishes to restart Eliquis should do so while patient is hospitalized and monitored. · We will continue to follow patient progress. Patient is stable and does not require any intervention at this time. Subjective:   Denies fever, chills, flank, abdominal or suprapubic pain. Objective:     Vitals: Blood pressure 137/68, pulse 66, temperature 98.5 °F (36.9 °C), temperature source Temporal, resp. rate 19, height 6' 3" (1.905 m), weight 102 kg (224 lb), SpO2 93 %. ,Body mass index is 28 kg/m².       Intake/Output Summary (Last 24 hours) at 7/30/2023 4257  Last data filed at 7/30/2023 0230  Gross per 24 hour   Intake --   Output 72361 ml   Net -62793 ml       Physical Exam: General appearance: alert and oriented, in no acute distress, appears stated age, cooperative and no distress  Head: Normocephalic, without obvious abnormality, atraumatic  Neck: no JVD and supple, symmetrical, trachea midline  Lungs: diminished breath sounds  Heart: regular rate and rhythm, S1, S2 normal, no murmur, click, rub or gallop  Abdomen: soft, non-tender; bowel sounds normal; no masses,  no organomegaly  Extremities: extremities normal, warm and well-perfused; no cyanosis, clubbing, or edema  Pulses: 2+ and symmetric  Neurologic: Grossly normal  # way michel--CBI dark lenora          Invasive Devices     Peripheral Intravenous Line  Duration           Peripheral IV 07/26/23 Right;Ventral (anterior) Forearm 3 days          Drain  Duration           Ureteral Internal Stent Left ureter 6 Fr. 23 days    Urethral Catheter Three way 22 Fr. 1 day              Lab Results   Component Value Date    WBC 10.98 (H) 07/30/2023    HGB 13.2 07/30/2023    HCT 40.1 07/30/2023    MCV 99 (H) 07/30/2023     07/30/2023     Lab Results   Component Value Date    SODIUM 136 07/29/2023    K 4.3 07/29/2023     07/29/2023    CO2 27 07/29/2023    BUN 22 07/29/2023    CREATININE 1.09 07/29/2023    GLUC 157 (H) 07/29/2023    CALCIUM 9.0 07/29/2023       Lab, Imaging and other studies: I have personally reviewed pertinent reports.

## 2023-07-30 NOTE — PLAN OF CARE
Problem: Potential for Falls  Goal: Patient will remain free of falls  Description: INTERVENTIONS:  - Educate patient/family on patient safety including physical limitations  - Instruct patient to call for assistance with activity   - Consult OT/PT to assist with strengthening/mobility   - Keep Call bell within reach  - Keep bed low and locked with side rails adjusted as appropriate  - Keep care items and personal belongings within reach  - Initiate and maintain comfort rounds  - Make Fall Risk Sign visible to staff  - Offer Toileting every 3 Hours, in advance of need  - Initiate/Maintain bed alarm  - Obtain necessary fall risk management equipment:   - Apply yellow socks and bracelet for high fall risk patients  - Consider moving patient to room near nurses station  Outcome: Progressing     Problem: MOBILITY - ADULT  Goal: Maintain or return to baseline ADL function  Description: INTERVENTIONS:  -  Assess patient's ability to carry out ADLs; assess patient's baseline for ADL function and identify physical deficits which impact ability to perform ADLs (bathing, care of mouth/teeth, toileting, grooming, dressing, etc.)  - Assess/evaluate cause of self-care deficits   - Assess range of motion  - Assess patient's mobility; develop plan if impaired  - Assess patient's need for assistive devices and provide as appropriate  - Encourage maximum independence but intervene and supervise when necessary  - Involve family in performance of ADLs  - Assess for home care needs following discharge   - Consider OT consult to assist with ADL evaluation and planning for discharge  - Provide patient education as appropriate  Outcome: Progressing  Goal: Maintains/Returns to pre admission functional level  Description: INTERVENTIONS:  - Perform BMAT or MOVE assessment daily.   - Set and communicate daily mobility goal to care team and patient/family/caregiver.    - Collaborate with rehabilitation services on mobility goals if consulted  - Perform Range of Motion 3 times a day. - Reposition patient every 2 hours.   - Dangle patient 3 times a day  - Stand patient 3 times a day  - Ambulate patient 3 times a day  - Out of bed to chair 3 times a day   - Out of bed for meals 3 times a day  - Out of bed for toileting  - Record patient progress and toleration of activity level   Outcome: Progressing     Problem: PAIN - ADULT  Goal: Verbalizes/displays adequate comfort level or baseline comfort level  Description: Interventions:  - Encourage patient to monitor pain and request assistance  - Assess pain using appropriate pain scale  - Administer analgesics based on type and severity of pain and evaluate response  - Implement non-pharmacological measures as appropriate and evaluate response  - Consider cultural and social influences on pain and pain management  - Notify physician/advanced practitioner if interventions unsuccessful or patient reports new pain  Outcome: Progressing     Problem: INFECTION - ADULT  Goal: Absence or prevention of progression during hospitalization  Description: INTERVENTIONS:  - Assess and monitor for signs and symptoms of infection  - Monitor lab/diagnostic results  - Monitor all insertion sites, i.e. indwelling lines, tubes, and drains  - Monitor endotracheal if appropriate and nasal secretions for changes in amount and color  - [place] appropriate cooling/warming therapies per order  - Administer medications as ordered  - Instruct and encourage patient and family to use good hand hygiene technique  - Identify and instruct in appropriate isolation precautions for identified infection/condition  Outcome: Progressing  Goal: Absence of fever/infection during neutropenic period  Description: INTERVENTIONS:  - Monitor WBC    Outcome: Progressing     Problem: SAFETY ADULT  Goal: Patient will remain free of falls  Description: INTERVENTIONS:  - Educate patient/family on patient safety including physical limitations  - Instruct patient to call for assistance with activity   - Consult OT/PT to assist with strengthening/mobility   - Keep Call bell within reach  - Keep bed low and locked with side rails adjusted as appropriate  - Keep care items and personal belongings within reach  - Initiate and maintain comfort rounds  - Make Fall Risk Sign visible to staff  - Offer Toileting every 3 Hours, in advance of need  - Initiate/Maintain bed alarm  - Obtain necessary fall risk management equipment:   - Apply yellow socks and bracelet for high fall risk patients  - Consider moving patient to room near nurses station  Outcome: Progressing  Goal: Maintain or return to baseline ADL function  Description: INTERVENTIONS:  -  Assess patient's ability to carry out ADLs; assess patient's baseline for ADL function and identify physical deficits which impact ability to perform ADLs (bathing, care of mouth/teeth, toileting, grooming, dressing, etc.)  - Assess/evaluate cause of self-care deficits   - Assess range of motion  - Assess patient's mobility; develop plan if impaired  - Assess patient's need for assistive devices and provide as appropriate  - Encourage maximum independence but intervene and supervise when necessary  - Involve family in performance of ADLs  - Assess for home care needs following discharge   - Consider OT consult to assist with ADL evaluation and planning for discharge  - Provide patient education as appropriate  Outcome: Progressing  Goal: Maintains/Returns to pre admission functional level  Description: INTERVENTIONS:  - Perform BMAT or MOVE assessment daily.   - Set and communicate daily mobility goal to care team and patient/family/caregiver. - Collaborate with rehabilitation services on mobility goals if consulted  - Perform Range of Motion 3 times a day. - Reposition patient every 2 hours.   - Dangle patient 3 times a day  - Stand patient 3 times a day  - Ambulate patient 3 times a day  - Out of bed to chair 3 times a day   - Out of bed for meals 3 times a day  - Out of bed for toileting  - Record patient progress and toleration of activity level   Outcome: Progressing     Problem: DISCHARGE PLANNING  Goal: Discharge to home or other facility with appropriate resources  Description: INTERVENTIONS:  - Identify barriers to discharge w/patient and caregiver  - Arrange for needed discharge resources and transportation as appropriate  - Identify discharge learning needs (meds, wound care, etc.)  - Arrange for interpretive services to assist at discharge as needed  - Refer to Case Management Department for coordinating discharge planning if the patient needs post-hospital services based on physician/advanced practitioner order or complex needs related to functional status, cognitive ability, or social support system  Outcome: Progressing     Problem: Knowledge Deficit  Goal: Patient/family/caregiver demonstrates understanding of disease process, treatment plan, medications, and discharge instructions  Description: Complete learning assessment and assess knowledge base.   Interventions:  - Provide teaching at level of understanding  - Provide teaching via preferred learning methods  Outcome: Progressing     Problem: Prexisting or High Potential for Compromised Skin Integrity  Goal: Skin integrity is maintained or improved  Description: INTERVENTIONS:  - Identify patients at risk for skin breakdown  - Assess and monitor skin integrity  - Assess and monitor nutrition and hydration status  - Monitor labs   - Assess for incontinence   - Turn and reposition patient  - Assist with mobility/ambulation  - Relieve pressure over bony prominences  - Avoid friction and shearing  - Provide appropriate hygiene as needed including keeping skin clean and dry  - Evaluate need for skin moisturizer/barrier cream  - Collaborate with interdisciplinary team   - Patient/family teaching  - Consider wound care consult   Outcome: Progressing

## 2023-07-30 NOTE — PROGRESS NOTES
Progress Note - Dawn Wallace 75 y.o. male MRN: [id number]    Unit/Bed#: E4 -01 Encounter: [id number]      Subjective: The patient feels pretty well. He denies any chest pain, shortness of breath, abdominal pain, nausea, or vomiting. His hematuria is much improved. Physical Exam:   Temp:  [97.8 °F (36.6 °C)-98.5 °F (36.9 °C)] 98.5 °F (36.9 °C)  HR:  [61-66] 66  Resp:  [18-19] 19  BP: (121-137)/(64-68) 137/68    Gen: Well-developed, well-nourished, in no distress. Neck: Supple. No lymphadenopathy, goiter, or bruit. Heart: Regular rhythm. I heard no murmur, gallop, or rub. Lungs: Clear to auscultation and percussion. No wheezing, rales, or rhonchi. Abd: Soft with active bowel sounds. No mass, tenderness, or organomegaly. Extremities: No clubbing, cyanosis, or edema. No calf tenderness. Neuro: Alert and oriented. No focal sign. Skin: Warm and dry. LABS:   CBC:   Lab Results   Component Value Date    WBC 10.98 (H) 07/30/2023    HGB 13.2 07/30/2023    HCT 40.1 07/30/2023    MCV 99 (H) 07/30/2023     07/30/2023    RBC 4.04 07/30/2023    MCH 32.7 07/30/2023    MCHC 32.9 07/30/2023    RDW 13.4 07/30/2023    MPV 10.0 07/30/2023    NRBC 0 07/30/2023           Patient Active Problem List   Diagnosis   • Tobacco use   • Polycythemia   • Weight loss   • Angioedema   • COPD (chronic obstructive pulmonary disease) ([facility])   • Bradycardia   • Sepsis secondary to UTI (HCC)   • Elevated troponin   • Elevated d-dimer   • Esophageal abnormality   • Hydronephrosis with obstructing calculus   • Mycotic toenails   • Bacteremia   • Daily consumption of alcohol   • Impaired decision making   • Atrial fibrillation (HCC)   • Gross hematuria       Assessment/Plan:  1. Enterococcal sepsis secondary to urinary tract infection  2. Ureteral stone with hydronephrosis, status post stent  3. Atrial fibrillation, in sinus rhythm following cardioversion  4. Gross hematuria, improved  5.   Impaired decision-making  6. Non-MI troponin elevation  7. COPD  8. Esophageal abnormality seen on CT, likely esophagitis    The patient's hematuria has cleared. Lovenox will be stopped and apixaban will be restarted. He remains in sinus rhythm. Arrangements for posthospital care are in progress. Unfortunately, the patient will require a court appointed guardian.     VTE Pharmacologic Prophylaxis: Apixaban  VTE Mechanical Prophylaxis: reason for no mechanical VTE prophylaxis Therapeutically anticoagulated

## 2023-07-31 LAB
BASOPHILS # BLD AUTO: 0.05 THOUSANDS/ÂΜL (ref 0–0.1)
BASOPHILS NFR BLD AUTO: 1 % (ref 0–1)
EOSINOPHIL # BLD AUTO: 0.25 THOUSAND/ÂΜL (ref 0–0.61)
EOSINOPHIL NFR BLD AUTO: 3 % (ref 0–6)
ERYTHROCYTE [DISTWIDTH] IN BLOOD BY AUTOMATED COUNT: 13.3 % (ref 11.6–15.1)
HCT VFR BLD AUTO: 42.2 % (ref 36.5–49.3)
HGB BLD-MCNC: 13.4 G/DL (ref 12–17)
IMM GRANULOCYTES # BLD AUTO: 0.04 THOUSAND/UL (ref 0–0.2)
IMM GRANULOCYTES NFR BLD AUTO: 0 % (ref 0–2)
LYMPHOCYTES # BLD AUTO: 1.52 THOUSANDS/ÂΜL (ref 0.6–4.47)
LYMPHOCYTES NFR BLD AUTO: 16 % (ref 14–44)
MCH RBC QN AUTO: 32 PG (ref 26.8–34.3)
MCHC RBC AUTO-ENTMCNC: 31.8 G/DL (ref 31.4–37.4)
MCV RBC AUTO: 101 FL (ref 82–98)
MONOCYTES # BLD AUTO: 0.84 THOUSAND/ÂΜL (ref 0.17–1.22)
MONOCYTES NFR BLD AUTO: 9 % (ref 4–12)
NEUTROPHILS # BLD AUTO: 6.81 THOUSANDS/ÂΜL (ref 1.85–7.62)
NEUTS SEG NFR BLD AUTO: 71 % (ref 43–75)
NRBC BLD AUTO-RTO: 0 /100 WBCS
PLATELET # BLD AUTO: 322 THOUSANDS/UL (ref 149–390)
PMV BLD AUTO: 9.6 FL (ref 8.9–12.7)
RBC # BLD AUTO: 4.19 MILLION/UL (ref 3.88–5.62)
WBC # BLD AUTO: 9.51 THOUSAND/UL (ref 4.31–10.16)

## 2023-07-31 PROCEDURE — 99232 SBSQ HOSP IP/OBS MODERATE 35: CPT | Performed by: INTERNAL MEDICINE

## 2023-07-31 PROCEDURE — 85025 COMPLETE CBC W/AUTO DIFF WBC: CPT | Performed by: INTERNAL MEDICINE

## 2023-07-31 PROCEDURE — 99232 SBSQ HOSP IP/OBS MODERATE 35: CPT | Performed by: PHYSICIAN ASSISTANT

## 2023-07-31 RX ORDER — LIDOCAINE 50 MG/G
1 PATCH TOPICAL DAILY
Status: DISCONTINUED | OUTPATIENT
Start: 2023-07-31 | End: 2023-09-01 | Stop reason: HOSPADM

## 2023-07-31 RX ORDER — METHOCARBAMOL 500 MG/1
750 TABLET, FILM COATED ORAL EVERY 8 HOURS SCHEDULED
Status: DISCONTINUED | OUTPATIENT
Start: 2023-07-31 | End: 2023-09-01 | Stop reason: HOSPADM

## 2023-07-31 RX ADMIN — METHOCARBAMOL TABLETS 750 MG: 500 TABLET, COATED ORAL at 21:13

## 2023-07-31 RX ADMIN — FLUTICASONE FUROATE AND VILANTEROL TRIFENATATE 1 PUFF: 200; 25 POWDER RESPIRATORY (INHALATION) at 09:03

## 2023-07-31 RX ADMIN — LIDOCAINE 1 PATCH: 50 PATCH CUTANEOUS at 14:44

## 2023-07-31 RX ADMIN — FOLIC ACID 1 MG: 1 TABLET ORAL at 09:01

## 2023-07-31 RX ADMIN — APIXABAN 5 MG: 5 TABLET, FILM COATED ORAL at 09:01

## 2023-07-31 RX ADMIN — DILTIAZEM HYDROCHLORIDE 120 MG: 120 CAPSULE, COATED, EXTENDED RELEASE ORAL at 09:01

## 2023-07-31 RX ADMIN — THIAMINE HCL TAB 100 MG 100 MG: 100 TAB at 09:01

## 2023-07-31 RX ADMIN — APIXABAN 5 MG: 5 TABLET, FILM COATED ORAL at 17:24

## 2023-07-31 RX ADMIN — AMIODARONE HYDROCHLORIDE 200 MG: 200 TABLET ORAL at 09:01

## 2023-07-31 RX ADMIN — AMIODARONE HYDROCHLORIDE 200 MG: 200 TABLET ORAL at 17:24

## 2023-07-31 RX ADMIN — ATORVASTATIN CALCIUM 40 MG: 40 TABLET, FILM COATED ORAL at 17:24

## 2023-07-31 RX ADMIN — PANTOPRAZOLE SODIUM 40 MG: 40 TABLET, DELAYED RELEASE ORAL at 06:45

## 2023-07-31 RX ADMIN — METHOCARBAMOL TABLETS 750 MG: 500 TABLET, COATED ORAL at 14:43

## 2023-07-31 RX ADMIN — Medication 1 TABLET: at 09:01

## 2023-07-31 RX ADMIN — MELATONIN 6 MG: at 21:13

## 2023-07-31 RX ADMIN — BISACODYL 10 MG: 5 TABLET, COATED ORAL at 17:26

## 2023-07-31 NOTE — PLAN OF CARE
Problem: Potential for Falls  Goal: Patient will remain free of falls  Description: INTERVENTIONS:  - Educate patient/family on patient safety including physical limitations  - Instruct patient to call for assistance with activity   - Consult OT/PT to assist with strengthening/mobility   - Keep Call bell within reach  - Keep bed low and locked with side rails adjusted as appropriate  - Keep care items and personal belongings within reach  - Initiate and maintain comfort rounds  - Make Fall Risk Sign visible to staff  - Offer Toileting every  Hours, in advance of need  - Initiate/Maintain alarm  - Obtain necessary fall risk management equipment:   - Apply yellow socks and bracelet for high fall risk patients  - Consider moving patient to room near nurses station  Outcome: Progressing     Problem: MOBILITY - ADULT  Goal: Maintain or return to baseline ADL function  Description: INTERVENTIONS:  -  Assess patient's ability to carry out ADLs; assess patient's baseline for ADL function and identify physical deficits which impact ability to perform ADLs (bathing, care of mouth/teeth, toileting, grooming, dressing, etc.)  - Assess/evaluate cause of self-care deficits   - Assess range of motion  - Assess patient's mobility; develop plan if impaired  - Assess patient's need for assistive devices and provide as appropriate  - Encourage maximum independence but intervene and supervise when necessary  - Involve family in performance of ADLs  - Assess for home care needs following discharge   - Consider OT consult to assist with ADL evaluation and planning for discharge  - Provide patient education as appropriate  Outcome: Progressing  Goal: Maintains/Returns to pre admission functional level  Description: INTERVENTIONS:  - Perform BMAT or MOVE assessment daily.   - Set and communicate daily mobility goal to care team and patient/family/caregiver.    - Collaborate with rehabilitation services on mobility goals if consulted  - Perform Range of Motion  times a day. - Reposition patient every  hours.   - Dangle patient  times a day  - Stand patient  times a day  - Ambulate patient  times a day  - Out of bed to chair  times a day   - Out of bed for meals  times a day  - Out of bed for toileting  - Record patient progress and toleration of activity level   Outcome: Progressing     Problem: PAIN - ADULT  Goal: Verbalizes/displays adequate comfort level or baseline comfort level  Description: Interventions:  - Encourage patient to monitor pain and request assistance  - Assess pain using appropriate pain scale  - Administer analgesics based on type and severity of pain and evaluate response  - Implement non-pharmacological measures as appropriate and evaluate response  - Consider cultural and social influences on pain and pain management  - Notify physician/advanced practitioner if interventions unsuccessful or patient reports new pain  Outcome: Progressing     Problem: INFECTION - ADULT  Goal: Absence or prevention of progression during hospitalization  Description: INTERVENTIONS:  - Assess and monitor for signs and symptoms of infection  - Monitor lab/diagnostic results  - Monitor all insertion sites, i.e. indwelling lines, tubes, and drains  - Monitor endotracheal if appropriate and nasal secretions for changes in amount and color  - [place] appropriate cooling/warming therapies per order  - Administer medications as ordered  - Instruct and encourage patient and family to use good hand hygiene technique  - Identify and instruct in appropriate isolation precautions for identified infection/condition  Outcome: Progressing  Goal: Absence of fever/infection during neutropenic period  Description: INTERVENTIONS:  - Monitor WBC    Outcome: Progressing     Problem: SAFETY ADULT  Goal: Patient will remain free of falls  Description: INTERVENTIONS:  - Educate patient/family on patient safety including physical limitations  - Instruct patient to call for assistance with activity   - Consult OT/PT to assist with strengthening/mobility   - Keep Call bell within reach  - Keep bed low and locked with side rails adjusted as appropriate  - Keep care items and personal belongings within reach  - Initiate and maintain comfort rounds  - Make Fall Risk Sign visible to staff  - Offer Toileting every  Hours, in advance of need  - Initiate/Maintain alarm  - Obtain necessary fall risk management equipment:   - Apply yellow socks and bracelet for high fall risk patients  - Consider moving patient to room near nurses station  Outcome: Progressing  Goal: Maintain or return to baseline ADL function  Description: INTERVENTIONS:  -  Assess patient's ability to carry out ADLs; assess patient's baseline for ADL function and identify physical deficits which impact ability to perform ADLs (bathing, care of mouth/teeth, toileting, grooming, dressing, etc.)  - Assess/evaluate cause of self-care deficits   - Assess range of motion  - Assess patient's mobility; develop plan if impaired  - Assess patient's need for assistive devices and provide as appropriate  - Encourage maximum independence but intervene and supervise when necessary  - Involve family in performance of ADLs  - Assess for home care needs following discharge   - Consider OT consult to assist with ADL evaluation and planning for discharge  - Provide patient education as appropriate  Outcome: Progressing  Goal: Maintains/Returns to pre admission functional level  Description: INTERVENTIONS:  - Perform BMAT or MOVE assessment daily.   - Set and communicate daily mobility goal to care team and patient/family/caregiver. - Collaborate with rehabilitation services on mobility goals if consulted  - Perform Range of [place] times a day. - Reposition patient every  hours.   - Dangle patient  times a day  - Stand patient  times a day  - Ambulate patient  times a day  - Out of bed to chair  times a day   - Out of bed for meals times a day  - Out of bed for toileting  - Record patient progress and toleration of activity level   Outcome: Progressing     Problem: DISCHARGE PLANNING  Goal: Discharge to home or other facility with appropriate resources  Description: INTERVENTIONS:  - Identify barriers to discharge w/patient and caregiver  - Arrange for needed discharge resources and transportation as appropriate  - Identify discharge learning needs (meds, wound care, etc.)  - Arrange for interpretive services to assist at discharge as needed  - Refer to Case Management Department for coordinating discharge planning if the patient needs post-hospital services based on physician/advanced practitioner order or complex needs related to functional status, cognitive ability, or social support system  Outcome: Progressing     Problem: Knowledge Deficit  Goal: Patient/family/caregiver demonstrates understanding of disease process, treatment plan, medications, and discharge instructions  Description: Complete learning assessment and assess knowledge base.   Interventions:  - Provide teaching at level of understanding  - Provide teaching via preferred learning methods  Outcome: Progressing     Problem: Prexisting or High Potential for Compromised Skin Integrity  Goal: Skin integrity is maintained or improved  Description: INTERVENTIONS:  - Identify patients at risk for skin breakdown  - Assess and monitor skin integrity  - Assess and monitor nutrition and hydration status  - Monitor labs   - Assess for incontinence   - Turn and reposition patient  - Assist with mobility/ambulation  - Relieve pressure over bony prominences  - Avoid friction and shearing  - Provide appropriate hygiene as needed including keeping skin clean and dry  - Evaluate need for skin moisturizer/barrier cream  - Collaborate with interdisciplinary team   - Patient/family teaching  - Consider wound care consult   Outcome: Progressing

## 2023-07-31 NOTE — ASSESSMENT & PLAN NOTE
· Improved after successful SOLA cardioversion on 7/25/23  · Currently anticoagulated with Eliquis  · Rhythm controlled on amiodarone 200 mg twice daily and Cardizem 120 mg daily

## 2023-07-31 NOTE — RESTORATIVE TECHNICIAN NOTE
Restorative Technician Note      Patient Name: Lorie Andrew     Restorative Tech Visit Date: 07/31/23  Note Type: Mobility  Patient Position Upon Consult: Supine  Mobility / Activity Provided: pt refused ambulation  Activity Performed: Ambulated  Patient Position at End of Consult: Seated edge of bed;  All needs within reach; Bed/Chair alarm activated

## 2023-07-31 NOTE — PLAN OF CARE
Problem: Potential for Falls  Goal: Patient will remain free of falls  Description: INTERVENTIONS:  - Educate patient/family on patient safety including physical limitations  - Instruct patient to call for assistance with activity   - Consult OT/PT to assist with strengthening/mobility   - Keep Call bell within reach  - Keep bed low and locked with side rails adjusted as appropriate  - Keep care items and personal belongings within reach  - Initiate and maintain comfort rounds  - Make Fall Risk Sign visible to staff  - Offer Toileting every 2 Hours, in advance of need  - Initiate/Maintain bed alarm  - Obtain necessary fall risk management equipment: alarm   - Apply yellow socks and bracelet for high fall risk patients  - Consider moving patient to room near nurses station  Outcome: Progressing     Problem: MOBILITY - ADULT  Goal: Maintain or return to baseline ADL function  Description: INTERVENTIONS:  -  Assess patient's ability to carry out ADLs; assess patient's baseline for ADL function and identify physical deficits which impact ability to perform ADLs (bathing, care of mouth/teeth, toileting, grooming, dressing, etc.)  - Assess/evaluate cause of self-care deficits   - Assess range of motion  - Assess patient's mobility; develop plan if impaired  - Assess patient's need for assistive devices and provide as appropriate  - Encourage maximum independence but intervene and supervise when necessary  - Involve family in performance of ADLs  - Assess for home care needs following discharge   - Consider OT consult to assist with ADL evaluation and planning for discharge  - Provide patient education as appropriate  Outcome: Progressing  Goal: Maintains/Returns to pre admission functional level  Description: INTERVENTIONS:  - Perform BMAT or MOVE assessment daily.   - Set and communicate daily mobility goal to care team and patient/family/caregiver.    - Collaborate with rehabilitation services on mobility goals if consulted  - Perform Range of Motion 3 times a day. - Reposition patient every 2 hours.   - Dangle patient 3 times a day  - Stand patient 3 times a day  - Ambulate patient 3 times a day  - Out of bed to chair 3 times a day   - Out of bed for meals 3 times a day  - Out of bed for toileting  - Record patient progress and toleration of activity level   Outcome: Progressing     Problem: PAIN - ADULT  Goal: Verbalizes/displays adequate comfort level or baseline comfort level  Description: Interventions:  - Encourage patient to monitor pain and request assistance  - Assess pain using appropriate pain scale  - Administer analgesics based on type and severity of pain and evaluate response  - Implement non-pharmacological measures as appropriate and evaluate response  - Consider cultural and social influences on pain and pain management  - Notify physician/advanced practitioner if interventions unsuccessful or patient reports new pain  Outcome: Progressing     Problem: INFECTION - ADULT  Goal: Absence or prevention of progression during hospitalization  Description: INTERVENTIONS:  - Assess and monitor for signs and symptoms of infection  - Monitor lab/diagnostic results  - Monitor all insertion sites, i.e. indwelling lines, tubes, and drains  - Monitor endotracheal if appropriate and nasal secretions for changes in amount and color  - [place] appropriate cooling/warming therapies per order  - Administer medications as ordered  - Instruct and encourage patient and family to use good hand hygiene technique  - Identify and instruct in appropriate isolation precautions for identified infection/condition  Outcome: Progressing  Goal: Absence of fever/infection during neutropenic period  Description: INTERVENTIONS:  - Monitor WBC    Outcome: Progressing     Problem: SAFETY ADULT  Goal: Patient will remain free of falls  Description: INTERVENTIONS:  - Educate patient/family on patient safety including physical limitations  - Instruct patient to call for assistance with activity   - Consult OT/PT to assist with strengthening/mobility   - Keep Call bell within reach  - Keep bed low and locked with side rails adjusted as appropriate  - Keep care items and personal belongings within reach  - Initiate and maintain comfort rounds  - Make Fall Risk Sign visible to staff  - Offer Toileting every 2 Hours, in advance of need  - Initiate/Maintain bed alarm  - Obtain necessary fall risk management equipment: alarm   - Apply yellow socks and bracelet for high fall risk patients  - Consider moving patient to room near nurses station  Outcome: Progressing  Goal: Maintain or return to baseline ADL function  Description: INTERVENTIONS:  -  Assess patient's ability to carry out ADLs; assess patient's baseline for ADL function and identify physical deficits which impact ability to perform ADLs (bathing, care of mouth/teeth, toileting, grooming, dressing, etc.)  - Assess/evaluate cause of self-care deficits   - Assess range of motion  - Assess patient's mobility; develop plan if impaired  - Assess patient's need for assistive devices and provide as appropriate  - Encourage maximum independence but intervene and supervise when necessary  - Involve family in performance of ADLs  - Assess for home care needs following discharge   - Consider OT consult to assist with ADL evaluation and planning for discharge  - Provide patient education as appropriate  Outcome: Progressing  Goal: Maintains/Returns to pre admission functional level  Description: INTERVENTIONS:  - Perform BMAT or MOVE assessment daily.   - Set and communicate daily mobility goal to care team and patient/family/caregiver. - Collaborate with rehabilitation services on mobility goals if consulted  - Perform Range of Motion 3 times a day. - Reposition patient every 2 hours.   - Dangle patient 3 times a day  - Stand patient 3 times a day  - Ambulate patient 3 times a day  - Out of bed to chair 3 times a day   - Out of bed for meals 3 times a day  - Out of bed for toileting  - Record patient progress and toleration of activity level   Outcome: Progressing     Problem: DISCHARGE PLANNING  Goal: Discharge to home or other facility with appropriate resources  Description: INTERVENTIONS:  - Identify barriers to discharge w/patient and caregiver  - Arrange for needed discharge resources and transportation as appropriate  - Identify discharge learning needs (meds, wound care, etc.)  - Arrange for interpretive services to assist at discharge as needed  - Refer to Case Management Department for coordinating discharge planning if the patient needs post-hospital services based on physician/advanced practitioner order or complex needs related to functional status, cognitive ability, or social support system  Outcome: Progressing     Problem: Knowledge Deficit  Goal: Patient/family/caregiver demonstrates understanding of disease process, treatment plan, medications, and discharge instructions  Description: Complete learning assessment and assess knowledge base.   Interventions:  - Provide teaching at level of understanding  - Provide teaching via preferred learning methods  Outcome: Progressing     Problem: Prexisting or High Potential for Compromised Skin Integrity  Goal: Skin integrity is maintained or improved  Description: INTERVENTIONS:  - Identify patients at risk for skin breakdown  - Assess and monitor skin integrity  - Assess and monitor nutrition and hydration status  - Monitor labs   - Assess for incontinence   - Turn and reposition patient  - Assist with mobility/ambulation  - Relieve pressure over bony prominences  - Avoid friction and shearing  - Provide appropriate hygiene as needed including keeping skin clean and dry  - Evaluate need for skin moisturizer/barrier cream  - Collaborate with interdisciplinary team   - Patient/family teaching  - Consider wound care consult   Outcome: Progressing

## 2023-07-31 NOTE — RESTORATIVE TECHNICIAN NOTE
Restorative Technician Note      Patient Name: Wong Cole     Restorative Tech Visit Date: 07/31/23  Note Type: Mobility  Patient Position Upon Consult: Supine  Activity Performed: Ambulated  Patient Position at End of Consult: Seated edge of bed;  All needs within reach; Bed/Chair alarm activated

## 2023-07-31 NOTE — PROGRESS NOTES
Progress Note - Urology  Derrick Yang 1950, 75 y.o. male MRN: [id number]    Unit/Bed#: E4 -01 Encounter: [id number]      Gross hematuria  Assessment & Plan  · Hematuria on/off past 1 week  · Has left ureteral stent from 7/6 insertion at time for uti and ureteral stone. Awaiting phase 2 ureteroscopy and laser lithotripsy. Timing TBD. · proscar 5mg daily added to reduce prostatic bleeding  · eliquis recommend hold due to ongoing bleeding  · Has michel catheter in place draining clear pink/light red urine today, pt reported had been clear colorless/yellow last night and pink again since CBI clamped today  · Will continue will conservative management- manually flush the catheter [id number]. Will assess later today if additional CBI needed to restart. Currently looks OK without      Subjective: urine clear light red/pink today no pain with catheter no pain with stent. Reports some gets winded when walking in the halls but overall feeling OK    Review of Systems   Constitutional: Negative. Respiratory: Positive for shortness of breath. Cardiovascular: Negative. Genitourinary: Positive for hematuria. Negative for decreased urine volume, difficulty urinating, dysuria, flank pain, frequency and urgency. Musculoskeletal: Negative. Objective:  Vitals: Blood pressure 130/79, pulse 63, temperature 97.8 °F (36.6 °C), temperature source Temporal, resp. rate 18, height 6' 3" (1.905 m), weight 102 kg (224 lb), SpO2 90 %. ,Body mass index is 28 kg/m². Intake/Output Summary (Last 24 hours) at 7/31/2023 1112  Last data filed at 7/31/2023 0827  Gross per 24 hour   Intake 480 ml   Output 91826 ml   Net -61390 ml     Invasive Devices     Drain  Duration           Ureteral Internal Stent Left ureter 6 Fr. 24 days    Urethral Catheter Three way 22 Fr. 2 days                Physical Exam  Vitals and nursing note reviewed. Constitutional:       General: He is not in acute distress.      Appearance: He is well-developed. He is not diaphoretic. HENT:      Head: Normocephalic and atraumatic. Pulmonary:      Effort: Pulmonary effort is normal.   Genitourinary:     Comments: Sorensen catheter per urethra draining clear light red/pink urine. CBI is currently clamped. Skin:     General: Skin is warm and dry. Neurological:      Mental Status: He is alert and oriented to person, place, and time. Gait: Gait normal.   Psychiatric:         Speech: Speech normal.         Behavior: Behavior normal.         Labs:  Recent Labs     07/28/23  1143 07/29/23  0547 07/30/23  0624 07/31/23  0956   WBC 10.95* 10.11 10.98* 9.51     Recent Labs     07/28/23  1143 07/29/23  0547 07/30/23  0624 07/31/23  0956   HGB 12.8 12.1 13.2 13.4       Recent Labs     07/28/23  1143 07/29/23  0902   CREATININE 1.07 1.09       History:    Past Medical History:   Diagnosis Date   • COPD (chronic obstructive pulmonary disease) ([address] [facility])      Past Surgical History:   Procedure Laterality Date   • [place] RETROGRADE PYELOGRAM  7/6/2023   • [place] CYSTO BLADDER W/URETERAL CATHETERIZATION Left 7/6/2023    Procedure: CYSTOSCOPY RETROGRADE PYELOGRAM WITH INSERTION STENT URETERAL;  Surgeon: Jenna Vega MD;  Location: [facility];  Service: Urology     History reviewed. No pertinent family history.   Social History     Socioeconomic History   • Marital status: Single     Spouse name: None   • Number of children: None   • Years of education: None   • Highest education level: None   Occupational History   • None   Tobacco Use   • Smoking status: Every Day     Packs/day: 0.50     Types: Cigarettes   • Smokeless tobacco: None   Vaping Use   • Vaping Use: Never used   Substance and Sexual Activity   • Alcohol use: Never   • Drug use: No   • Sexual activity: None   Other Topics Concern   • None   Social History Narrative   • None     Social Determinants of Health     Financial Resource Strain: Not on file   Food Insecurity: No Food Insecurity (7/17/2023)    Hunger Vital Sign    • Worried About Running Out of Food in the Last Year: Never true    • Ran Out of Food in the Last Year: Never true   Transportation Needs: No Transportation Needs (7/17/2023)    PRAPARE - Transportation    • Lack of Transportation (Medical): No    • Lack of Transportation (Non-Medical):  No   Physical Activity: Not on file   Stress: Not on file   Social Connections: Not on file   Intimate Partner Violence: Not on file   Housing Stability: Low Risk  (7/17/2023)    Housing Stability Vital Sign    • Unable to Pay for Housing in the Last Year: No    • Number of Places Lived in the Last Year: 1    • Unstable Housing in the Last Year: No         Lindsey MontañoSt. [facility]  Date: 7/31/2023 Time: 11:12 AM

## 2023-07-31 NOTE — PROGRESS NOTES
[facility]  Progress Note  Name: Derrick Yang I  MRN: [id number]  Unit/Bed#: E4 -01 I Date of Admission: 7/6/2023   Date of Service: 7/31/2023 I Hospital Day: 25    Assessment/Plan   Gross hematuria  Assessment & Plan  · Status post catheter placement. ·  no plan for cystoscopy for now per urology. · Continue monitoring urine output and blood count  · Bladder irrigation per urology  · Opted to continue anticoagulation with Lovenox since he just had a DC cardioversion this week and is high risk for stroke  · Noticed that the gross hematuria occurred when he was switched from Lovenox to Eliquis. So continue Lovenox for now. Atrial fibrillation (HCC)  Assessment & Plan  · Improved after successful SOLA cardioversion on 7/25/23  · Currently anticoagulated with Eliquis  · Rhythm controlled on amiodarone 200 mg twice daily and Cardizem 120 mg daily    Impaired decision making  Assessment & Plan  This week repeat neuropsych evaluation was done  on 7/24/2023  He continues to have impaired decision-making capacity  Guardianship process initiated by case management     Daily consumption of alcohol  Assessment & Plan  He stated earlier that he drank beers on a daily basis  Clinically no withdrawal.  Continue thiamine and folic acid supplementation    Bacteremia  Assessment & Plan  · Aerococcus bacteremia due to complicated UTI   · Resolved   · Antibiotic course completed     Mycotic toenails  Assessment & Plan  · Very elongated mycotic toenails. S/p podiatry debridement.     Hydronephrosis with obstructing calculus  Assessment & Plan  · Admitted originally due to sepsis from UTI with obstruction  · Status post cystoscopy with difficult stent insertion on 7/6/2023  · Procedure was complicated by inadvertent left ureteral perforation  · CT showed previous ureteral calculus is now in the bladder    Esophageal abnormality  Assessment & Plan  He had incidental finding on CT with diffuse esophageal thickening could be from esophagitis  Esophagitis probably from alcohol abuse  Continue empiric Protonix  Nonurgent GI follow-up    Elevated d-dimer  Assessment & Plan  CTA negative for pulmonary embolism    Elevated troponin  Assessment & Plan  · Non-MI troponin elevation secondary to tachycardia and sepsis  · Continue aspirin and Lipitor    COPD (chronic obstructive pulmonary disease) ([facility])  Assessment & Plan  · Stable. · Continue Breo 1 puff daily  · Continue albuterol as needed    Tobacco use  Assessment & Plan  · Smokes 6 cigarettes daily. Apparently plans to quit. · Nicotine TD patch  · Tobacco counseling provided     * Sepsis secondary to UTI [facility])  Assessment & Plan  · Sepsis secondary to Enterococcus bacteremia and complicated UTI  · Resolved  · Antibiotic course complete         VTE Pharmacologic Prophylaxis:   Pharmacologic: Apixaban (Eliquis)  Mechanical VTE Prophylaxis in Place: Yes    Patient Centered Rounds: I have performed bedside rounds with nursing staff today. Discussions with Specialists or Other Care Team Provider:     Education and Discussions with Family / Patient: Care plan discussed with patient who voiced understanding and agrees with recommendations. Time Spent for Care: 45 minutes. More than 50% of total time spent on counseling and coordination of care as described above. Current Length of Stay: 25 day(s)    Current Patient Status: Inpatient   Certification Statement: The patient will continue to require additional inpatient hospital stay due to Awaiting placement    Discharge Plan: To be determined    Code Status: Level 1 - Full Code      Subjective:   Patient seen and examined bedside, no acute distress or discomfort noted. Patient has been here for extended period of time and is awaiting guardian assignment and follow on long-term placement. Antibiotic course complete and patient treated for UTI.   Ureteral stent placed with ureteral perforation earlier this admission; followed by urology. Eliquis restarted for atrial fibrillation and CBI running overnight and clamped this morning. Will await further urology recommendations. In the meantime, patient's only complaint is right lower back pain. Consistent with SI joint inflammation; Robaxin, Lidoderm, and aqua K started. Objective:     Vitals:   Temp (24hrs), Av.9 °F (36.6 °C), Min:97.6 °F (36.4 °C), Max:98.3 °F (36.8 °C)    Temp:  [97.6 °F (36.4 °C)-98.3 °F (36.8 °C)] 98.3 °F (36.8 °C)  HR:  [63-68] 68  Resp:  [18] 18  BP: (114-158)/(54-79) 158/70  SpO2:  [90 %-97 %] 97 %  Body mass index is 28 kg/m². Input and Output Summary (last 24 hours): Intake/Output Summary (Last 24 hours) at 2023 1557  Last data filed at 2023 0827  Gross per 24 hour   Intake 480 ml   Output 38862 ml   Net -10222 ml       Physical Exam:     Physical Exam  Vitals and nursing note reviewed. Constitutional:       General: He is not in acute distress. Appearance: He is well-developed. HENT:      Head: Normocephalic and atraumatic. Eyes:      Conjunctiva/sclera: Conjunctivae normal.   Cardiovascular:      Rate and Rhythm: Normal rate. Heart sounds: No murmur heard. Pulmonary:      Effort: Pulmonary effort is normal. No respiratory distress. Abdominal:      Palpations: Abdomen is soft. Tenderness: There is no abdominal tenderness. Genitourinary:     Comments: Sorensen catheter inserted  Musculoskeletal:         General: Tenderness (Tenderness to palpation right SI joint) present. No swelling. Cervical back: Neck supple. Skin:     General: Skin is warm and dry. Neurological:      Mental Status: He is alert.    Psychiatric:         Mood and Affect: Mood normal.           Additional Data:     Labs:    Results from last 7 days   Lab Units 23  0956   WBC Thousand/uL 9.51   HEMOGLOBIN g/dL 13.4   HEMATOCRIT % 42.2   PLATELETS Thousands/uL 322   NEUTROS PCT % 71   LYMPHS PCT % 16   MONOS PCT % 9 EOS PCT % 3     Results from last 7 days   Lab Units 07/29/23  0902   SODIUM mmol/L 136   POTASSIUM mmol/L 4.3   CHLORIDE mmol/L 103   CO2 mmol/L 27   BUN mg/dL 22   CREATININE mg/dL 1.09   ANION GAP mmol/L 6   CALCIUM mg/dL 9.0   GLUCOSE RANDOM mg/dL 157*                           * I Have Reviewed All Lab Data Listed Above. * Additional Pertinent Lab Tests Reviewed:  All Labs Within Last 24 Hours Reviewed    Imaging:    Imaging Reports Reviewed Today Include:   Imaging Personally Reviewed by Myself Includes:      Recent Cultures (last 7 days):           Last 24 Hours Medication List:   Current Facility-Administered Medications   Medication Dose Route Frequency Provider Last Rate   • acetaminophen  650 mg Oral Q6H PRN KIKI Fisher     • albuterol  2.5 mg Nebulization Q6H PRN KIKI Fisher     • aluminum-magnesium hydroxide-simethicone  30 mL Oral Q6H PRN KIKI Fisher     • amiodarone  200 mg Oral BID With Meals Argentina Redmond DO     • apixaban  5 mg Oral BID Azul Pyle MD     • atorvastatin  40 mg Oral Daily With Maria Eugenia Lane DO     • bisacodyl  10 mg Oral Daily PRN KIKI Fisher     • diltiazem  120 mg Oral Daily Argentina Redmond DO     • fluticasone-vilanterol  1 puff Inhalation Daily KIKI Fisher     • folic acid  1 mg Oral Daily Lena Frias PA-C     • guaiFENesin  200 mg Oral Q4H PRN Azul Pyle MD     • hydrOXYzine HCL  50 mg Oral HS PRN KIKI Fisher     • lidocaine  1 patch Topical Daily Governor Leyla MD     • melatonin  6 mg Oral HS KIKI Fisher     • methocarbamol  750 mg Oral [facility] Governor Leyla MD     • multivitamin-minerals  1 tablet Oral Daily Lena Frias PA-C     • nicotine  1 patch Transdermal Daily KIKI Fisher     • pantoprazole  40 mg Oral Early Morning KIKI Fisher     • polyethylene glycol  17 g Oral Daily Jaime Moulton MD     • senna  2 tablet Oral HS KIKI Carrillo     • thiamine  100 mg Oral Daily Michael Morales PA-C          Today, Patient Was Seen By: Stacy Lincoln MD    ** Please Note: Dictation voice to text software may have been used in the creation of this document.  **

## 2023-07-31 NOTE — RESTORATIVE TECHNICIAN NOTE
Restorative Technician Note      Patient Name: Lili Allen     Restorative Tech Visit Date: 07/31/23  Note Type: Mobility  Patient Position Upon Consult: Supine  Mobility / Activity Provided: pt refused ambulation  Activity Performed: Ambulated  Patient Position at End of Consult: All needs within reach;  Other (comment) (in the restroom)

## 2023-07-31 NOTE — ASSESSMENT & PLAN NOTE
· Admitted originally due to sepsis from UTI with obstruction  · Status post cystoscopy with difficult stent insertion on 7/6/2023  · Procedure was complicated by inadvertent left ureteral perforation  · CT showed previous ureteral calculus is now in the bladder

## 2023-08-01 ENCOUNTER — APPOINTMENT (INPATIENT)
Dept: ULTRASOUND IMAGING | Facility: HOSPITAL | Age: 73
DRG: 853 | End: 2023-08-01
Payer: MEDICARE

## 2023-08-01 LAB
ALBUMIN SERPL BCP-MCNC: 3.2 G/DL (ref 3.5–5)
ALP SERPL-CCNC: 81 U/L (ref 34–104)
ALT SERPL W P-5'-P-CCNC: 14 U/L (ref 7–52)
ANION GAP SERPL CALCULATED.3IONS-SCNC: 4 MMOL/L
AST SERPL W P-5'-P-CCNC: 14 U/L (ref 13–39)
BASOPHILS # BLD AUTO: 0.05 THOUSANDS/ÂΜL (ref 0–0.1)
BASOPHILS NFR BLD AUTO: 1 % (ref 0–1)
BILIRUB SERPL-MCNC: 0.33 MG/DL (ref 0.2–1)
BUN SERPL-MCNC: 23 MG/DL (ref 5–25)
CALCIUM ALBUM COR SERPL-MCNC: 9.4 MG/DL (ref 8.3–10.1)
CALCIUM SERPL-MCNC: 8.8 MG/DL (ref 8.4–10.2)
CHLORIDE SERPL-SCNC: 102 MMOL/L (ref 96–108)
CO2 SERPL-SCNC: 29 MMOL/L (ref 21–32)
CREAT SERPL-MCNC: 0.97 MG/DL (ref 0.6–1.3)
EOSINOPHIL # BLD AUTO: 0.26 THOUSAND/ÂΜL (ref 0–0.61)
EOSINOPHIL NFR BLD AUTO: 3 % (ref 0–6)
ERYTHROCYTE [DISTWIDTH] IN BLOOD BY AUTOMATED COUNT: 13.2 % (ref 11.6–15.1)
GFR SERPL CREATININE-BSD FRML MDRD: 76 ML/MIN/1.73SQ M
GLUCOSE SERPL-MCNC: 90 MG/DL (ref 65–140)
HCT VFR BLD AUTO: 43.2 % (ref 36.5–49.3)
HGB BLD-MCNC: 13.7 G/DL (ref 12–17)
IMM GRANULOCYTES # BLD AUTO: 0.04 THOUSAND/UL (ref 0–0.2)
IMM GRANULOCYTES NFR BLD AUTO: 0 % (ref 0–2)
LYMPHOCYTES # BLD AUTO: 1.51 THOUSANDS/ÂΜL (ref 0.6–4.47)
LYMPHOCYTES NFR BLD AUTO: 15 % (ref 14–44)
MAGNESIUM SERPL-MCNC: 1.8 MG/DL (ref 1.9–2.7)
MCH RBC QN AUTO: 31.8 PG (ref 26.8–34.3)
MCHC RBC AUTO-ENTMCNC: 31.7 G/DL (ref 31.4–37.4)
MCV RBC AUTO: 100 FL (ref 82–98)
MONOCYTES # BLD AUTO: 0.88 THOUSAND/ÂΜL (ref 0.17–1.22)
MONOCYTES NFR BLD AUTO: 9 % (ref 4–12)
NEUTROPHILS # BLD AUTO: 7.36 THOUSANDS/ÂΜL (ref 1.85–7.62)
NEUTS SEG NFR BLD AUTO: 72 % (ref 43–75)
NRBC BLD AUTO-RTO: 0 /100 WBCS
PHOSPHATE SERPL-MCNC: 2.6 MG/DL (ref 2.3–4.1)
PLATELET # BLD AUTO: 330 THOUSANDS/UL (ref 149–390)
PMV BLD AUTO: 9.4 FL (ref 8.9–12.7)
POTASSIUM SERPL-SCNC: 4.3 MMOL/L (ref 3.5–5.3)
PROT SERPL-MCNC: 6.9 G/DL (ref 6.4–8.4)
RBC # BLD AUTO: 4.31 MILLION/UL (ref 3.88–5.62)
SODIUM SERPL-SCNC: 135 MMOL/L (ref 135–147)
WBC # BLD AUTO: 10.1 THOUSAND/UL (ref 4.31–10.16)

## 2023-08-01 PROCEDURE — 99232 SBSQ HOSP IP/OBS MODERATE 35: CPT | Performed by: INTERNAL MEDICINE

## 2023-08-01 PROCEDURE — 80053 COMPREHEN METABOLIC PANEL: CPT | Performed by: INTERNAL MEDICINE

## 2023-08-01 PROCEDURE — 85025 COMPLETE CBC W/AUTO DIFF WBC: CPT | Performed by: INTERNAL MEDICINE

## 2023-08-01 PROCEDURE — 83735 ASSAY OF MAGNESIUM: CPT | Performed by: INTERNAL MEDICINE

## 2023-08-01 PROCEDURE — 76857 US EXAM PELVIC LIMITED: CPT

## 2023-08-01 PROCEDURE — 84100 ASSAY OF PHOSPHORUS: CPT | Performed by: INTERNAL MEDICINE

## 2023-08-01 PROCEDURE — 99232 SBSQ HOSP IP/OBS MODERATE 35: CPT | Performed by: NURSE PRACTITIONER

## 2023-08-01 RX ORDER — LANOLIN ALCOHOL/MO/W.PET/CERES
400 CREAM (GRAM) TOPICAL 2 TIMES DAILY
Status: COMPLETED | OUTPATIENT
Start: 2023-08-01 | End: 2023-08-02

## 2023-08-01 RX ADMIN — METHOCARBAMOL TABLETS 750 MG: 500 TABLET, COATED ORAL at 21:38

## 2023-08-01 RX ADMIN — DILTIAZEM HYDROCHLORIDE 120 MG: 120 CAPSULE, COATED, EXTENDED RELEASE ORAL at 08:16

## 2023-08-01 RX ADMIN — MAGNESIUM OXIDE TAB 400 MG (241.3 MG ELEMENTAL MG) 400 MG: 400 (241.3 MG) TAB at 17:33

## 2023-08-01 RX ADMIN — PANTOPRAZOLE SODIUM 40 MG: 40 TABLET, DELAYED RELEASE ORAL at 05:29

## 2023-08-01 RX ADMIN — METHOCARBAMOL TABLETS 750 MG: 500 TABLET, COATED ORAL at 13:26

## 2023-08-01 RX ADMIN — THIAMINE HCL TAB 100 MG 100 MG: 100 TAB at 08:17

## 2023-08-01 RX ADMIN — AMIODARONE HYDROCHLORIDE 200 MG: 200 TABLET ORAL at 17:33

## 2023-08-01 RX ADMIN — METHOCARBAMOL TABLETS 750 MG: 500 TABLET, COATED ORAL at 05:29

## 2023-08-01 RX ADMIN — MAGNESIUM OXIDE TAB 400 MG (241.3 MG ELEMENTAL MG) 400 MG: 400 (241.3 MG) TAB at 08:50

## 2023-08-01 RX ADMIN — AMIODARONE HYDROCHLORIDE 200 MG: 200 TABLET ORAL at 08:17

## 2023-08-01 RX ADMIN — Medication 1 TABLET: at 08:16

## 2023-08-01 RX ADMIN — FOLIC ACID 1 MG: 1 TABLET ORAL at 08:16

## 2023-08-01 RX ADMIN — MELATONIN 6 MG: at 21:37

## 2023-08-01 RX ADMIN — FLUTICASONE FUROATE AND VILANTEROL TRIFENATATE 1 PUFF: 200; 25 POWDER RESPIRATORY (INHALATION) at 08:19

## 2023-08-01 RX ADMIN — APIXABAN 5 MG: 5 TABLET, FILM COATED ORAL at 08:17

## 2023-08-01 RX ADMIN — ATORVASTATIN CALCIUM 40 MG: 40 TABLET, FILM COATED ORAL at 17:33

## 2023-08-01 NOTE — ASSESSMENT & PLAN NOTE
· Status post catheter placement. · Continue monitoring urine output and blood count  · Bladder irrigation per urology    8/1-patient placed on CBI again overnight; this morning noted to have possible clots. Ultrasound bladder ordered by urology. Pending results of ultrasound bladder, patient may need cystoscopy moving forward.   --- Have held Eliquis at this time; urology following along

## 2023-08-01 NOTE — ASSESSMENT & PLAN NOTE
· Improved after successful SOLA cardioversion on 7/25/23  · Hold anticoagulation for recurrent hematuria  · Rhythm controlled on amiodarone 200 mg twice daily and Cardizem 120 mg daily

## 2023-08-01 NOTE — PROGRESS NOTES
[address] [facility]  Progress Note  Name: Missy Macario I  MRN: [id number]  Unit/Bed#: E4 -01 I Date of Admission: 7/6/2023   Date of Service: 8/1/2023 I Hospital Day: 26    Assessment/Plan   Gross hematuria  Assessment & Plan  · Status post catheter placement. · Continue monitoring urine output and blood count  · Bladder irrigation per urology    8/1-patient placed on CBI again overnight; this morning noted to have possible clots. Ultrasound bladder ordered by urology. Pending results of ultrasound bladder, patient may need cystoscopy moving forward.   --- Have held Eliquis at this time; urology following along    Atrial fibrillation [facility])  Assessment & Plan  · Improved after successful SOLA cardioversion on 7/25/23  · Hold anticoagulation for recurrent hematuria  · Rhythm controlled on amiodarone 200 mg twice daily and Cardizem 120 mg daily    Impaired decision making  Assessment & Plan  This week repeat neuropsych evaluation was done  on 7/24/2023  He continues to have impaired decision-making capacity  Guardianship process initiated by case management     Daily consumption of alcohol  Assessment & Plan  He stated earlier that he drank beers on a daily basis  Clinically no withdrawal.  Continue thiamine and folic acid supplementation    Bacteremia  Assessment & Plan  · Aerococcus bacteremia due to complicated UTI   · Resolved   · Antibiotic course completed     Hydronephrosis with obstructing calculus  Assessment & Plan  · Admitted originally due to sepsis from UTI with obstruction  · Status post cystoscopy with difficult stent insertion on 7/6/2023  · Procedure was complicated by inadvertent left ureteral perforation  · CT showed previous ureteral calculus is now in the bladder    Esophageal abnormality  Assessment & Plan  He had incidental finding on CT with diffuse esophageal thickening could be from esophagitis  Esophagitis probably from alcohol abuse  Continue empiric Protonix  Nonurgent GI follow-up    Elevated troponin  Assessment & Plan  · Non-MI troponin elevation secondary to tachycardia and sepsis  · Continue aspirin and Lipitor    COPD (chronic obstructive pulmonary disease) ([facility])  Assessment & Plan  · Stable. · Continue Breo 1 puff daily  · Continue albuterol as needed    Tobacco use  Assessment & Plan  · Smokes 6 cigarettes daily. Apparently plans to quit. · Nicotine TD patch  · Tobacco counseling provided     * Sepsis secondary to UTI [facility])  Assessment & Plan  · Sepsis secondary to Enterococcus bacteremia and complicated UTI  · Resolved  · Antibiotic course complete           VTE Pharmacologic Prophylaxis:   Pharmacologic: Pharmacologic VTE Prophylaxis contraindicated due to Hematuria  Mechanical VTE Prophylaxis in Place: Yes    Patient Centered Rounds: I have performed bedside rounds with nursing staff today. Discussions with Specialists or Other Care Team Provider: Urology    Education and Discussions with Family / Patient: Care plan discussed with patient who voiced understanding and agrees with recommendations. Time Spent for Care: 45 minutes. More than 50% of total time spent on counseling and coordination of care as described above. Current Length of Stay: 26 day(s)    Current Patient Status: Inpatient   Certification Statement: The patient will continue to require additional inpatient hospital stay due to Treatment of hematuria and awaiting placement    Discharge Plan: To be determined    Code Status: Level 1 - Full Code      Subjective:   Patient seen and examined bedside, no acute distress but does have CBI ongoing. Started last night and today has darkening hematuria. Discussed with urology who is ordering ultrasound bladder to assess for clots. If clots noted, patient may need transfer for urological treatment. Otherwise, patient reports back pain has resolved and generally feels well. Globin remained stable.   Await further urology recommendations. And Eliquis will be held. Magnesium repleted; awaiting guardianship. Objective:     Vitals:   Temp (24hrs), Av.6 °F (36.4 °C), Min:97.4 °F (36.3 °C), Max:97.9 °F (36.6 °C)    Temp:  [97.4 °F (36.3 °C)-97.9 °F (36.6 °C)] 97.4 °F (36.3 °C)  HR:  [65-66] 65  Resp:  [16-18] 18  BP: (125-140)/(69-82) 125/73  SpO2:  [92 %-95 %] 95 %  Body mass index is 28 kg/m². Input and Output Summary (last 24 hours): Intake/Output Summary (Last 24 hours) at 2023 1518  Last data filed at 2023 1503  Gross per 24 hour   Intake --   Output 81669 ml   Net -72948 ml       Physical Exam:     Physical Exam  Vitals and nursing note reviewed. Constitutional:       General: He is not in acute distress. Appearance: He is well-developed. HENT:      Head: Normocephalic and atraumatic. Eyes:      Conjunctiva/sclera: Conjunctivae normal.   Cardiovascular:      Rate and Rhythm: Normal rate. Heart sounds: No murmur heard. Pulmonary:      Effort: Pulmonary effort is normal. No respiratory distress. Abdominal:      Palpations: Abdomen is soft. Tenderness: There is no abdominal tenderness. Genitourinary:     Comments: Sorensen catheter inserted  Musculoskeletal:         General: Tenderness (Tenderness to palpation right SI joint) present. No swelling. Cervical back: Neck supple. Skin:     General: Skin is warm and dry. Neurological:      Mental Status: He is alert.    Psychiatric:         Mood and Affect: Mood normal.           Additional Data:     Labs:    Results from last 7 days   Lab Units 23  0745   WBC Thousand/uL 10.10   HEMOGLOBIN g/dL 13.7   HEMATOCRIT % 43.2   PLATELETS Thousands/uL 330   NEUTROS PCT % 72   LYMPHS PCT % 15   MONOS PCT % 9   EOS PCT % 3     Results from last 7 days   Lab Units 23  0626   SODIUM mmol/L 135   POTASSIUM mmol/L 4.3   CHLORIDE mmol/L 102   CO2 mmol/L 29   BUN mg/dL 23   CREATININE mg/dL 0.97   ANION GAP mmol/L 4   CALCIUM mg/dL 8.8 ALBUMIN g/dL 3.2*   TOTAL BILIRUBIN mg/dL 0.33   ALK PHOS U/L 81   ALT U/L 14   AST U/L 14   GLUCOSE RANDOM mg/dL 90                           * I Have Reviewed All Lab Data Listed Above. * Additional Pertinent Lab Tests Reviewed:  All Labs Within Last 24 Hours Reviewed    Imaging:    Imaging Reports Reviewed Today Include:   Imaging Personally Reviewed by Myself Includes:      Recent Cultures (last 7 days):           Last 24 Hours Medication List:   Current Facility-Administered Medications   Medication Dose Route Frequency Provider Last Rate   • acetaminophen  650 mg Oral Q6H PRN KIKI Garcia     • albuterol  2.5 mg Nebulization Q6H PRN KIKI Garcia     • aluminum-magnesium hydroxide-simethicone  30 mL Oral Q6H PRN KIKI Garcia     • amiodarone  200 mg Oral BID With Meals Melany Flood DO     • atorvastatin  40 mg Oral Daily With Clari [place], DO     • bisacodyl  10 mg Oral Daily PRN KIKI Garcia     • diltiazem  120 mg Oral Daily Melany Flood DO     • fluticasone-vilanterol  1 puff Inhalation Daily KIKI Garcia     • folic acid  1 mg Oral Daily Carl Ching PA-C     • guaiFENesin  200 mg Oral Q4H PRN Kilo Bourne MD     • hydrOXYzine HCL  50 mg Oral HS PRN KIKI Garcia     • lidocaine  1 patch Topical Daily Jerardo Escobar MD     • magnesium Oxide  400 mg Oral BID Jerardo Escobar MD     • melatonin  6 mg Oral HS KIKI Garcia     • methocarbamol  750 mg Oral [facility] Jerardo Escobar MD     • multivitamin-minerals  1 tablet Oral Daily Carl Ching PA-C     • nicotine  1 patch Transdermal Daily KIKI Garcia     • pantoprazole  40 mg Oral Early Morning KIKI Garcia     • polyethylene glycol  17 g Oral Daily Bal Lejia MD     • senna  2 tablet Oral HS KIKI Garcia     • thiamine  100 mg Oral Daily Carl Ching PA-C          Today, Patient Was Seen By: Salvatore Voss, MD    ** Please Note: Dictation voice to text software may have been used in the creation of this document.  **

## 2023-08-01 NOTE — PLAN OF CARE
Problem: Potential for Falls  Goal: Patient will remain free of falls  Description: INTERVENTIONS:  - Educate patient/family on patient safety including physical limitations  - Instruct patient to call for assistance with activity   - Consult OT/PT to assist with strengthening/mobility   - Keep Call bell within reach  - Keep bed low and locked with side rails adjusted as appropriate  - Keep care items and personal belongings within reach  - Initiate and maintain comfort rounds  - Make Fall Risk Sign visible to staff  - Offer Toileting every 2 Hours, in advance of need  - Initiate/Maintain bed alarm  - Obtain necessary fall risk management equipment: alarm   - Apply yellow socks and bracelet for high fall risk patients  - Consider moving patient to room near nurses station  Outcome: Progressing     Problem: MOBILITY - ADULT  Goal: Maintain or return to baseline ADL function  Description: INTERVENTIONS:  -  Assess patient's ability to carry out ADLs; assess patient's baseline for ADL function and identify physical deficits which impact ability to perform ADLs (bathing, care of mouth/teeth, toileting, grooming, dressing, etc.)  - Assess/evaluate cause of self-care deficits   - Assess range of motion  - Assess patient's mobility; develop plan if impaired  - Assess patient's need for assistive devices and provide as appropriate  - Encourage maximum independence but intervene and supervise when necessary  - Involve family in performance of ADLs  - Assess for home care needs following discharge   - Consider OT consult to assist with ADL evaluation and planning for discharge  - Provide patient education as appropriate  Outcome: Progressing  Goal: Maintains/Returns to pre admission functional level  Description: INTERVENTIONS:  - Perform BMAT or MOVE assessment daily.   - Set and communicate daily mobility goal to care team and patient/family/caregiver.    - Collaborate with rehabilitation services on mobility goals if consulted  - Perform Range of Motion 3 times a day. - Reposition patient every 2 hours.   - Dangle patient 3 times a day  - Stand patient 3 times a day  - Ambulate patient 3 times a day  - Out of bed to chair 3 times a day   - Out of bed for meals 3 times a day  - Out of bed for toileting  - Record patient progress and toleration of activity level   Outcome: Progressing     Problem: PAIN - ADULT  Goal: Verbalizes/displays adequate comfort level or baseline comfort level  Description: Interventions:  - Encourage patient to monitor pain and request assistance  - Assess pain using appropriate pain scale  - Administer analgesics based on type and severity of pain and evaluate response  - Implement non-pharmacological measures as appropriate and evaluate response  - Consider cultural and social influences on pain and pain management  - Notify physician/advanced practitioner if interventions unsuccessful or patient reports new pain  Outcome: Progressing     Problem: INFECTION - ADULT  Goal: Absence or prevention of progression during hospitalization  Description: INTERVENTIONS:  - Assess and monitor for signs and symptoms of infection  - Monitor lab/diagnostic results  - Monitor all insertion sites, i.e. indwelling lines, tubes, and drains  - Monitor endotracheal if appropriate and nasal secretions for changes in amount and color  - [place] appropriate cooling/warming therapies per order  - Administer medications as ordered  - Instruct and encourage patient and family to use good hand hygiene technique  - Identify and instruct in appropriate isolation precautions for identified infection/condition  Outcome: Progressing  Goal: Absence of fever/infection during neutropenic period  Description: INTERVENTIONS:  - Monitor WBC    Outcome: Progressing     Problem: SAFETY ADULT  Goal: Patient will remain free of falls  Description: INTERVENTIONS:  - Educate patient/family on patient safety including physical limitations  - Instruct patient to call for assistance with activity   - Consult OT/PT to assist with strengthening/mobility   - Keep Call bell within reach  - Keep bed low and locked with side rails adjusted as appropriate  - Keep care items and personal belongings within reach  - Initiate and maintain comfort rounds  - Make Fall Risk Sign visible to staff  - Offer Toileting every 2 Hours, in advance of need  - Initiate/Maintain bed alarm  - Obtain necessary fall risk management equipment: alarm   - Apply yellow socks and bracelet for high fall risk patients  - Consider moving patient to room near nurses station  Outcome: Progressing  Goal: Maintain or return to baseline ADL function  Description: INTERVENTIONS:  -  Assess patient's ability to carry out ADLs; assess patient's baseline for ADL function and identify physical deficits which impact ability to perform ADLs (bathing, care of mouth/teeth, toileting, grooming, dressing, etc.)  - Assess/evaluate cause of self-care deficits   - Assess range of motion  - Assess patient's mobility; develop plan if impaired  - Assess patient's need for assistive devices and provide as appropriate  - Encourage maximum independence but intervene and supervise when necessary  - Involve family in performance of ADLs  - Assess for home care needs following discharge   - Consider OT consult to assist with ADL evaluation and planning for discharge  - Provide patient education as appropriate  Outcome: Progressing  Goal: Maintains/Returns to pre admission functional level  Description: INTERVENTIONS:  - Perform BMAT or MOVE assessment daily.   - Set and communicate daily mobility goal to care team and patient/family/caregiver. - Collaborate with rehabilitation services on mobility goals if consulted  - Perform Range of Motion 3 times a day. - Reposition patient every 2 hours.   - Dangle patient 3 times a day  - Stand patient 3 times a day  - Ambulate patient 3 times a day  - Out of bed to chair 3 times a day   - Out of bed for meals 3 times a day  - Out of bed for toileting  - Record patient progress and toleration of activity level   Outcome: Progressing     Problem: DISCHARGE PLANNING  Goal: Discharge to home or other facility with appropriate resources  Description: INTERVENTIONS:  - Identify barriers to discharge w/patient and caregiver  - Arrange for needed discharge resources and transportation as appropriate  - Identify discharge learning needs (meds, wound care, etc.)  - Arrange for interpretive services to assist at discharge as needed  - Refer to Case Management Department for coordinating discharge planning if the patient needs post-hospital services based on physician/advanced practitioner order or complex needs related to functional status, cognitive ability, or social support system  Outcome: Progressing     Problem: Knowledge Deficit  Goal: Patient/family/caregiver demonstrates understanding of disease process, treatment plan, medications, and discharge instructions  Description: Complete learning assessment and assess knowledge base.   Interventions:  - Provide teaching at level of understanding  - Provide teaching via preferred learning methods  Outcome: Progressing     Problem: Prexisting or High Potential for Compromised Skin Integrity  Goal: Skin integrity is maintained or improved  Description: INTERVENTIONS:  - Identify patients at risk for skin breakdown  - Assess and monitor skin integrity  - Assess and monitor nutrition and hydration status  - Monitor labs   - Assess for incontinence   - Turn and reposition patient  - Assist with mobility/ambulation  - Relieve pressure over bony prominences  - Avoid friction and shearing  - Provide appropriate hygiene as needed including keeping skin clean and dry  - Evaluate need for skin moisturizer/barrier cream  - Collaborate with interdisciplinary team   - Patient/family teaching  - Consider wound care consult   Outcome: Progressing

## 2023-08-01 NOTE — RESTORATIVE TECHNICIAN NOTE
Restorative Technician Note      Patient Name: Lety Uribe     Restorative Tech Visit Date: 08/01/23  Note Type: Mobility  Patient Position Upon Consult: Supine  Mobility / Activity Provided: ambulated pt around 5 floor, cleaned pt bed sheets  Activity Performed: Ambulated  Patient Position at End of Consult: Supine;  All needs within reach; Bed/Chair alarm activated

## 2023-08-01 NOTE — RESTORATIVE TECHNICIAN NOTE
Restorative Technician Note      Patient Name: Phoebe Greene     Restorative Tech Visit Date: 08/01/23  Note Type: Mobility  Patient Position Upon Consult: Supine  Mobility / Activity Provided: ambulated pt around 5 floor, cleaned pt bed sheets  Activity Performed: Ambulated  Patient Position at End of Consult: Supine;  All needs within reach; Bed/Chair alarm activated

## 2023-08-01 NOTE — PLAN OF CARE
Problem: Potential for Falls  Goal: Patient will remain free of falls  Description: INTERVENTIONS:  - Educate patient/family on patient safety including physical limitations  - Instruct patient to call for assistance with activity   - Consult OT/PT to assist with strengthening/mobility   - Keep Call bell within reach  - Keep bed low and locked with side rails adjusted as appropriate  - Keep care items and personal belongings within reach  - Initiate and maintain comfort rounds  - Make Fall Risk Sign visible to staff  - Offer Toileting every 2 Hours, in advance of need  - Initiate/Maintain bed alarm  - Obtain necessary fall risk management equipment:   - Apply yellow socks and bracelet for high fall risk patients  - Consider moving patient to room near nurses station  Outcome: Progressing     Problem: MOBILITY - ADULT  Goal: Maintain or return to baseline ADL function  Description: INTERVENTIONS:  -  Assess patient's ability to carry out ADLs; assess patient's baseline for ADL function and identify physical deficits which impact ability to perform ADLs (bathing, care of mouth/teeth, toileting, grooming, dressing, etc.)  - Assess/evaluate cause of self-care deficits   - Assess range of motion  - Assess patient's mobility; develop plan if impaired  - Assess patient's need for assistive devices and provide as appropriate  - Encourage maximum independence but intervene and supervise when necessary  - Involve family in performance of ADLs  - Assess for home care needs following discharge   - Consider OT consult to assist with ADL evaluation and planning for discharge  - Provide patient education as appropriate  Outcome: Progressing  Goal: Maintains/Returns to pre admission functional level  Description: INTERVENTIONS:  - Perform BMAT or MOVE assessment daily.   - Set and communicate daily mobility goal to care team and patient/family/caregiver.    - Collaborate with rehabilitation services on mobility goals if consulted  - Perform Range of Motion 3 times a day. - Reposition patient every 2 hours.   - Dangle patient 3 times a day  - Stand patient 3 times a day  - Ambulate patient 3 times a day  - Out of bed to chair 3 times a day   - Out of bed for meals 3 times a day  - Out of bed for toileting  - Record patient progress and toleration of activity level   Outcome: Progressing     Problem: PAIN - ADULT  Goal: Verbalizes/displays adequate comfort level or baseline comfort level  Description: Interventions:  - Encourage patient to monitor pain and request assistance  - Assess pain using appropriate pain scale  - Administer analgesics based on type and severity of pain and evaluate response  - Implement non-pharmacological measures as appropriate and evaluate response  - Consider cultural and social influences on pain and pain management  - Notify physician/advanced practitioner if interventions unsuccessful or patient reports new pain  Outcome: Progressing     Problem: INFECTION - ADULT  Goal: Absence or prevention of progression during hospitalization  Description: INTERVENTIONS:  - Assess and monitor for signs and symptoms of infection  - Monitor lab/diagnostic results  - Monitor all insertion sites, i.e. indwelling lines, tubes, and drains  - Monitor endotracheal if appropriate and nasal secretions for changes in amount and color  - [place] appropriate cooling/warming therapies per order  - Administer medications as ordered  - Instruct and encourage patient and family to use good hand hygiene technique  - Identify and instruct in appropriate isolation precautions for identified infection/condition  Outcome: Progressing  Goal: Absence of fever/infection during neutropenic period  Description: INTERVENTIONS:  - Monitor WBC    Outcome: Progressing

## 2023-08-02 ENCOUNTER — ANESTHESIA EVENT (INPATIENT)
Dept: PERIOP | Facility: HOSPITAL | Age: 73
End: 2023-08-02
Payer: MEDICARE

## 2023-08-02 LAB
ANION GAP SERPL CALCULATED.3IONS-SCNC: 5 MMOL/L
BASOPHILS # BLD AUTO: 0.07 THOUSANDS/ÂΜL (ref 0–0.1)
BASOPHILS NFR BLD AUTO: 1 % (ref 0–1)
BUN SERPL-MCNC: 18 MG/DL (ref 5–25)
CALCIUM SERPL-MCNC: 9 MG/DL (ref 8.4–10.2)
CHLORIDE SERPL-SCNC: 101 MMOL/L (ref 96–108)
CO2 SERPL-SCNC: 28 MMOL/L (ref 21–32)
CREAT SERPL-MCNC: 0.93 MG/DL (ref 0.6–1.3)
EOSINOPHIL # BLD AUTO: 0.37 THOUSAND/ÂΜL (ref 0–0.61)
EOSINOPHIL NFR BLD AUTO: 3 % (ref 0–6)
ERYTHROCYTE [DISTWIDTH] IN BLOOD BY AUTOMATED COUNT: 13.2 % (ref 11.6–15.1)
GFR SERPL CREATININE-BSD FRML MDRD: 80 ML/MIN/1.73SQ M
GLUCOSE SERPL-MCNC: 87 MG/DL (ref 65–140)
HCT VFR BLD AUTO: 40.9 % (ref 36.5–49.3)
HGB BLD-MCNC: 13.3 G/DL (ref 12–17)
IMM GRANULOCYTES # BLD AUTO: 0.06 THOUSAND/UL (ref 0–0.2)
IMM GRANULOCYTES NFR BLD AUTO: 1 % (ref 0–2)
LYMPHOCYTES # BLD AUTO: 1.78 THOUSANDS/ÂΜL (ref 0.6–4.47)
LYMPHOCYTES NFR BLD AUTO: 15 % (ref 14–44)
MCH RBC QN AUTO: 32.1 PG (ref 26.8–34.3)
MCHC RBC AUTO-ENTMCNC: 32.5 G/DL (ref 31.4–37.4)
MCV RBC AUTO: 99 FL (ref 82–98)
MONOCYTES # BLD AUTO: 1.09 THOUSAND/ÂΜL (ref 0.17–1.22)
MONOCYTES NFR BLD AUTO: 9 % (ref 4–12)
NEUTROPHILS # BLD AUTO: 8.42 THOUSANDS/ÂΜL (ref 1.85–7.62)
NEUTS SEG NFR BLD AUTO: 71 % (ref 43–75)
NRBC BLD AUTO-RTO: 0 /100 WBCS
PLATELET # BLD AUTO: 340 THOUSANDS/UL (ref 149–390)
PMV BLD AUTO: 9.4 FL (ref 8.9–12.7)
POTASSIUM SERPL-SCNC: 4.9 MMOL/L (ref 3.5–5.3)
RBC # BLD AUTO: 4.14 MILLION/UL (ref 3.88–5.62)
SODIUM SERPL-SCNC: 134 MMOL/L (ref 135–147)
WBC # BLD AUTO: 11.79 THOUSAND/UL (ref 4.31–10.16)

## 2023-08-02 PROCEDURE — 80048 BASIC METABOLIC PNL TOTAL CA: CPT | Performed by: INTERNAL MEDICINE

## 2023-08-02 PROCEDURE — 99232 SBSQ HOSP IP/OBS MODERATE 35: CPT | Performed by: UROLOGY

## 2023-08-02 PROCEDURE — 99232 SBSQ HOSP IP/OBS MODERATE 35: CPT | Performed by: INTERNAL MEDICINE

## 2023-08-02 PROCEDURE — 85025 COMPLETE CBC W/AUTO DIFF WBC: CPT | Performed by: INTERNAL MEDICINE

## 2023-08-02 RX ADMIN — MELATONIN 6 MG: at 21:46

## 2023-08-02 RX ADMIN — AMIODARONE HYDROCHLORIDE 200 MG: 200 TABLET ORAL at 08:46

## 2023-08-02 RX ADMIN — METHOCARBAMOL TABLETS 750 MG: 500 TABLET, COATED ORAL at 13:25

## 2023-08-02 RX ADMIN — Medication 1 TABLET: at 08:46

## 2023-08-02 RX ADMIN — THIAMINE HCL TAB 100 MG 100 MG: 100 TAB at 08:46

## 2023-08-02 RX ADMIN — MAGNESIUM OXIDE TAB 400 MG (241.3 MG ELEMENTAL MG) 400 MG: 400 (241.3 MG) TAB at 17:33

## 2023-08-02 RX ADMIN — DILTIAZEM HYDROCHLORIDE 120 MG: 120 CAPSULE, COATED, EXTENDED RELEASE ORAL at 08:46

## 2023-08-02 RX ADMIN — FLUTICASONE FUROATE AND VILANTEROL TRIFENATATE 1 PUFF: 200; 25 POWDER RESPIRATORY (INHALATION) at 08:49

## 2023-08-02 RX ADMIN — MAGNESIUM OXIDE TAB 400 MG (241.3 MG ELEMENTAL MG) 400 MG: 400 (241.3 MG) TAB at 08:46

## 2023-08-02 RX ADMIN — FOLIC ACID 1 MG: 1 TABLET ORAL at 08:46

## 2023-08-02 RX ADMIN — AMIODARONE HYDROCHLORIDE 200 MG: 200 TABLET ORAL at 17:33

## 2023-08-02 RX ADMIN — PANTOPRAZOLE SODIUM 40 MG: 40 TABLET, DELAYED RELEASE ORAL at 05:29

## 2023-08-02 RX ADMIN — METHOCARBAMOL TABLETS 750 MG: 500 TABLET, COATED ORAL at 21:46

## 2023-08-02 RX ADMIN — ATORVASTATIN CALCIUM 40 MG: 40 TABLET, FILM COATED ORAL at 17:33

## 2023-08-02 RX ADMIN — METHOCARBAMOL TABLETS 750 MG: 500 TABLET, COATED ORAL at 05:29

## 2023-08-02 NOTE — RESTORATIVE TECHNICIAN NOTE
Restorative Technician Note      Patient Name: Thad Rasheed     Restorative Tech Visit Date: 08/02/23  Note Type: Mobility  Patient Position Upon Consult: Supine  Activity Performed: Ambulated  Assistive Device: Roller walker  Patient Position at End of Consult: Supine;  All needs within reach; Bed/Chair alarm activated

## 2023-08-02 NOTE — ANESTHESIA PREPROCEDURE EVALUATION
Procedure:  CYSTO USCOPE W/  LASER, & STENT (Left: Bladder)    Relevant Problems   CARDIO   (+) Atrial fibrillation (HCC)      /RENAL   (+) Hydronephrosis with obstructing calculus      PULMONARY   (+) COPD (chronic obstructive pulmonary disease) (HCC) (patient denies)      Genitourinary   (+) Gross hematuria        Physical Exam    Airway    Mallampati score: II  TM Distance: >3 FB  Neck ROM: full     Dental   Comment: Partial upper, upper dentures,     Cardiovascular  Cardiovascular exam normal    Pulmonary  Pulmonary exam normal     Other Findings        Anesthesia Plan  ASA Score- 3     Anesthesia Type- general with ASA Monitors. Additional Monitors:   Airway Plan: LMA. Plan Factors-    Chart reviewed. Existing labs reviewed. Patient summary reviewed. Induction- intravenous. Postoperative Plan-     Informed Consent- Anesthetic plan and risks discussed with patient.

## 2023-08-02 NOTE — PROGRESS NOTES
Progress Note - Eriberto Jason 75 y.o. male MRN: [id number]    Unit/Bed#: E4 -01 Encounter: [id number]      Assessment:  *Eriberto Jason is a 42-year-old male with history of nephrolithiasis, recent left proximal ureteral calculus, status post cystoscopy, left retrograde pyelography and insertion of left ureteral stent 7/6 complicated by inadvertent left ureteral perforation and Enterococcus bacteremia--now resolved with completion of antimicrobial course. Unfortunately, patient developed atrial fibrillation now status post successful SOLA cardioversion  7/25. He was placed on Eliquis and this provoked gross hematuria--now held. Our service was contacted for further management recommendations. Recent CT negative for extravasation. Stent remains in situ. Three-way Sorensen catheter was inserted for bladder irrigation ongoing. Sorensen is patent for clear urine with slight tinge of heme. See below. Patient denies flank, abdominal or suprapubic pain. He is afebrile and hemodynamically stable. Plan:  · Continue medical optimization. · Clamp CBI. · Continue manual irrigation. · Ultrasound performed. Bladder was nondistended. · Question role for sooner rather than later ureteroscopy to get patient's stent free as this is likely an exacerbating factor. · Timing to be determined. · For now, resume diet. Subjective:   Denies fever, chills, flank, abdominal or suprapubic pain. Objective:     Vitals: Blood pressure 125/74, pulse 66, temperature (!) 97.2 °F (36.2 °C), temperature source Temporal, resp. rate 20, height 6' 3" (1.905 m), weight 102 kg (224 lb), SpO2 96 %. ,Body mass index is 28 kg/m².       Intake/Output Summary (Last 24 hours) at 8/2/2023 0864  Last data filed at 8/2/2023 0601  Gross per 24 hour   Intake --   Output 77271 ml   Net -11213 ml       Physical Exam: General appearance: alert and oriented, in no acute distress, appears stated age, cooperative and no distress  Head: Normocephalic, without obvious abnormality, atraumatic  Neck: no JVD and supple, symmetrical, trachea midline  Lungs: clear to auscultation bilaterally  Heart: regular rate and rhythm, S1, S2 normal, no murmur, click, rub or gallop  Abdomen: soft, non-tender; bowel sounds normal; no masses,  no organomegaly  Extremities: extremities normal, warm and well-perfused; no cyanosis, clubbing, or edema  Pulses: 2+ and symmetric  Neurologic: Grossly normal  Three-way Sorensen-has CBI--see below                   Invasive Devices     Drain  Duration           Ureteral Internal Stent Left ureter 6 Fr. 26 days    Urethral Catheter Three way 22 Fr. 4 days              Lab Results   Component Value Date    WBC 11.79 (H) 08/02/2023    HGB 13.3 08/02/2023    HCT 40.9 08/02/2023    MCV 99 (H) 08/02/2023     08/02/2023     Lab Results   Component Value Date    SODIUM 134 (L) 08/02/2023    K 4.9 08/02/2023     08/02/2023    CO2 28 08/02/2023    BUN 18 08/02/2023    CREATININE 0.93 08/02/2023    GLUC 87 08/02/2023    CALCIUM 9.0 08/02/2023       Lab, Imaging and other studies: I have personally reviewed pertinent reports.

## 2023-08-02 NOTE — PROGRESS NOTES
[facility]  Progress Note  Name: Nestor Lamb I  MRN: [id number]  Unit/Bed#: E4 -01 I Date of Admission: 7/6/2023   Date of Service: 8/2/2023 I Hospital Day: 27    Assessment/Plan   Gross hematuria  Assessment & Plan  · Status post catheter placement. · Continue monitoring urine output and blood count  · Bladder irrigation per urology    8/1-patient placed on CBI again overnight; this morning noted to have possible clots. Ultrasound bladder ordered by urology. Pending results of ultrasound bladder, patient may need cystoscopy moving forward. --- Have held Eliquis at this time; urology following along    8/2-patient continues to have hematuria; discussed with urology today and likely to move forward with procedure tomorrow. Can you Sorensen catheter and holding anticoagulation; urine somewhat improved and hemoglobin labs stable. At issue is consent; will discuss with case management.     Atrial fibrillation (HCC)  Assessment & Plan  · Improved after successful SOLA cardioversion on 7/25/23  · Hold anticoagulation for recurrent hematuria  · Rhythm controlled on amiodarone 200 mg twice daily and Cardizem 120 mg daily    Impaired decision making  Assessment & Plan  This week repeat neuropsych evaluation was done  on 7/24/2023  He continues to have impaired decision-making capacity  Guardianship process initiated by case management     Daily consumption of alcohol  Assessment & Plan  He stated earlier that he drank beers on a daily basis  Clinically no withdrawal.  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      ureteral perforation  · CT showed previous ureteral calculus is now in the bladder    Esophageal abnormality  Assessment & Plan  He had incidental finding on CT with diffuse esophageal thickening could be from esophagitis  Esophagitis probably from alcohol abuse  Continue empiric Protonix  Nonurgent GI follow-up    Elevated troponin  Assessment & Plan  · Non-MI troponin elevation secondary to tachycardia and sepsis  · Continue aspirin and Lipitor    COPD (chronic obstructive pulmonary disease) ([facility])  Assessment & Plan  · Stable. · Continue Breo 1 puff daily  · Continue albuterol as needed    Tobacco use  Assessment & Plan  · Smokes 6 cigarettes daily. Apparently plans to quit. · Nicotine TD patch  · Tobacco counseling provided     * Sepsis secondary to UTI [facility])  Assessment & Plan  · Sepsis secondary to Enterococcus bacteremia and complicated UTI  · Resolved  · Antibiotic course complete             VTE Pharmacologic Prophylaxis:   Pharmacologic: Pharmacologic VTE Prophylaxis contraindicated due to Hematuria  Mechanical VTE Prophylaxis in Place: Yes    Patient Centered Rounds: I have performed bedside rounds with nursing staff today. Discussions with Specialists or Other Care Team Provider: Urology    Education and Discussions with Family / Patient: Care plan discussed with patient who voiced understanding and agrees with recommendations. Time Spent for Care: 45 minutes. More than 50% of total time spent on counseling and coordination of care as described above. Current Length of Stay: 27 day(s)    Current Patient Status: Inpatient   Certification Statement: The patient will continue to require additional inpatient hospital stay due to Treatment of hematuria and awaiting guardian    Discharge Plan: To be determined    Code Status: Level 1 - Full Code      Subjective:   Patient seen and examined bedside, no acute distress or discomfort noted.   Overall urine appears improved, but still with evidence of hematuria. Appreciate urology recommendations, would like to take patient for repeat cystoscopy tomorrow for definitive treatment. Hemoglobin remained stable and will continue to monitor on morning labs. Concern now is that patient lacks capacity and does not appear to have family to give consent; will discuss with case management as this is semiurgent procedure and may not need consent to stop bleeding. Objective:     Vitals:   Temp (24hrs), Av.5 °F (36.4 °C), Min:97.2 °F (36.2 °C), Max:97.8 °F (36.6 °C)    Temp:  [97.2 °F (36.2 °C)-97.8 °F (36.6 °C)] 97.2 °F (36.2 °C)  HR:  [60-66] 66  Resp:  [18-20] 20  BP: (125-127)/(68-74) 125/74  SpO2:  [95 %-96 %] 96 %  Body mass index is 28 kg/m². Input and Output Summary (last 24 hours): Intake/Output Summary (Last 24 hours) at 2023 1459  Last data filed at 2023 0840  Gross per 24 hour   Intake --   Output 6950 ml   Net -6950 ml       Physical Exam:     Physical Exam  Vitals and nursing note reviewed. Constitutional:       General: He is not in acute distress. Appearance: He is well-developed. HENT:      Head: Normocephalic and atraumatic. Eyes:      Conjunctiva/sclera: Conjunctivae normal.   Cardiovascular:      Rate and Rhythm: Normal rate. Heart sounds: No murmur heard. Pulmonary:      Effort: Pulmonary effort is normal. No respiratory distress. Abdominal:      Palpations: Abdomen is soft. Tenderness: There is no abdominal tenderness. Genitourinary:     Comments: Sorensen catheter inserted  Musculoskeletal:         General: Tenderness (Tenderness to palpation right SI joint) present. No swelling. Cervical back: Neck supple. Skin:     General: Skin is warm and dry. Neurological:      Mental Status: He is alert.    Psychiatric:         Mood and Affect: Mood normal.           Additional Data:     Labs:    Results from last 7 days   Lab Units 23  0655   WBC Thousand/uL 11.79*   HEMOGLOBIN g/dL 13.3   HEMATOCRIT % 40.9   PLATELETS Thousands/uL 340   NEUTROS PCT % 71   LYMPHS PCT % 15   MONOS PCT % 9   EOS PCT % 3     Results from last 7 days   Lab Units 08/02/23  0540 08/01/23  0626   SODIUM mmol/L 134* 135   POTASSIUM mmol/L 4.9 4.3   CHLORIDE mmol/L 101 102   CO2 mmol/L 28 29   BUN mg/dL 18 23   CREATININE mg/dL 0.93 0.97   ANION GAP mmol/L 5 4   CALCIUM mg/dL 9.0 8.8   ALBUMIN g/dL  --  3.2*   TOTAL BILIRUBIN mg/dL  --  0.33   ALK PHOS U/L  --  81   ALT U/L  --  14   AST U/L  --  14   GLUCOSE RANDOM mg/dL 87 90                           * I Have Reviewed All Lab Data Listed Above. * Additional Pertinent Lab Tests Reviewed:  All Labs Within Last 24 Hours Reviewed    Imaging:    Imaging Reports Reviewed Today Include:   Imaging Personally Reviewed by Myself Includes:      Recent Cultures (last 7 days):           Last 24 Hours Medication List:   Current Facility-Administered Medications   Medication Dose Route Frequency Provider Last Rate   • acetaminophen  650 mg Oral Q6H PRN LUISITO ParedesNP     • albuterol  2.5 mg Nebulization Q6H PRN LUISITO ParedesNP     • aluminum-magnesium hydroxide-simethicone  30 mL Oral Q6H PRN KIKI Paredes     • amiodarone  200 mg Oral BID With Meals Grecia Garcia DO     • atorvastatin  40 mg Oral Daily With Maira Chawla DO     • bisacodyl  10 mg Oral Daily PRN LUISITO ParedesNP     • diltiazem  120 mg Oral Daily Grecia Garcia DO     • fluticasone-vilanterol  1 puff Inhalation Daily KIKI Paredes     • folic acid  1 mg Oral Daily Sheri Wadsworth PA-C     • guaiFENesin  200 mg Oral Q4H PRN Ishmael Segovia MD     • hydrOXYzine HCL  50 mg Oral HS PRN KIKI Paredes     • lidocaine  1 patch Topical Daily Joshua Sarabia MD     • magnesium Oxide  400 mg Oral BID Joshua Sarabia MD     • melatonin  6 mg Oral HS KIKI Paredes     • methocarbamol  750 mg Oral [facility] Joshua Sarabia MD     • multivitamin-minerals  1 tablet Oral Daily Lalitha Beverly PA-C     • nicotine  1 patch Transdermal Daily KIKI Devine     • pantoprazole  40 mg Oral Early Morning KIKI Devine     • polyethylene glycol  17 g Oral Daily Tracy Taylor MD     • senna  2 tablet Oral HS KIKI Devine     • thiamine  100 mg Oral Daily Lalitha Beverly PA-C          Today, Patient Was Seen By: Trav Winter MD    ** Please Note: Dictation voice to text software may have been used in the creation of this document.  **

## 2023-08-02 NOTE — ASSESSMENT & PLAN NOTE
· Status post catheter placement. · Continue monitoring urine output and blood count  · Bladder irrigation per urology    8/1-patient placed on CBI again overnight; this morning noted to have possible clots. Ultrasound bladder ordered by urology. Pending results of ultrasound bladder, patient may need cystoscopy moving forward. --- Have held Eliquis at this time; urology following along    8/2-patient continues to have hematuria; discussed with urology today and likely to move forward with procedure tomorrow. Can you Sorensen catheter and holding anticoagulation; urine somewhat improved and hemoglobin labs stable. At issue is consent; will discuss with case management.

## 2023-08-02 NOTE — PLAN OF CARE
Problem: Potential for Falls  Goal: Patient will remain free of falls  Description: INTERVENTIONS:  - Educate patient/family on patient safety including physical limitations  - Instruct patient to call for assistance with activity   - Consult OT/PT to assist with strengthening/mobility   - Keep Call bell within reach  - Keep bed low and locked with side rails adjusted as appropriate  - Keep care items and personal belongings within reach  - Initiate and maintain comfort rounds  - Make Fall Risk Sign visible to staff  - Offer Toileting every 2 Hours, in advance of need  - Initiate/Maintain bed alarm  - Obtain necessary fall risk management equipment: alarm   - Apply yellow socks and bracelet for high fall risk patients  - Consider moving patient to room near nurses station  Outcome: Progressing     Problem: MOBILITY - ADULT  Goal: Maintain or return to baseline ADL function  Description: INTERVENTIONS:  -  Assess patient's ability to carry out ADLs; assess patient's baseline for ADL function and identify physical deficits which impact ability to perform ADLs (bathing, care of mouth/teeth, toileting, grooming, dressing, etc.)  - Assess/evaluate cause of self-care deficits   - Assess range of motion  - Assess patient's mobility; develop plan if impaired  - Assess patient's need for assistive devices and provide as appropriate  - Encourage maximum independence but intervene and supervise when necessary  - Involve family in performance of ADLs  - Assess for home care needs following discharge   - Consider OT consult to assist with ADL evaluation and planning for discharge  - Provide patient education as appropriate  Outcome: Progressing  Goal: Maintains/Returns to pre admission functional level  Description: INTERVENTIONS:  - Perform BMAT or MOVE assessment daily.   - Set and communicate daily mobility goal to care team and patient/family/caregiver.    - Collaborate with rehabilitation services on mobility goals if consulted  - Perform Range of Motion 3 times a day. - Reposition patient every  hours.   - Dangle patient 3 times a day  - Stand patient 3 times a day  - Ambulate patient 3 times a day  - Out of bed to chair 3 times a day   - Out of bed for meals 3 times a day  - Out of bed for toileting  - Record patient progress and toleration of activity level   Outcome: Progressing     Problem: PAIN - ADULT  Goal: Verbalizes/displays adequate comfort level or baseline comfort level  Description: Interventions:  - Encourage patient to monitor pain and request assistance  - Assess pain using appropriate pain scale  - Administer analgesics based on type and severity of pain and evaluate response  - Implement non-pharmacological measures as appropriate and evaluate response  - Consider cultural and social influences on pain and pain management  - Notify physician/advanced practitioner if interventions unsuccessful or patient reports new pain  Outcome: Progressing     Problem: INFECTION - ADULT  Goal: Absence or prevention of progression during hospitalization  Description: INTERVENTIONS:  - Assess and monitor for signs and symptoms of infection  - Monitor lab/diagnostic results  - Monitor all insertion sites, i.e. indwelling lines, tubes, and drains  - Monitor endotracheal if appropriate and nasal secretions for changes in amount and color  - [place] appropriate cooling/warming therapies per order  - Administer medications as ordered  - Instruct and encourage patient and family to use good hand hygiene technique  - Identify and instruct in appropriate isolation precautions for identified infection/condition  Outcome: Progressing  Goal: Absence of fever/infection during neutropenic period  Description: INTERVENTIONS:  - Monitor WBC    Outcome: Progressing     Problem: SAFETY ADULT  Goal: Patient will remain free of falls  Description: INTERVENTIONS:  - Educate patient/family on patient safety including physical limitations  - Instruct patient to call for assistance with activity   - Consult OT/PT to assist with strengthening/mobility   - Keep Call bell within reach  - Keep bed low and locked with side rails adjusted as appropriate  - Keep care items and personal belongings within reach  - Initiate and maintain comfort rounds  - Make Fall Risk Sign visible to staff  - Offer Toileting every 2 Hours, in advance of need  - Initiate/Maintain bed alarm  - Obtain necessary fall risk management equipment: alarm   - Apply yellow socks and bracelet for high fall risk patients  - Consider moving patient to room near nurses station  Outcome: Progressing  Goal: Maintain or return to baseline ADL function  Description: INTERVENTIONS:  -  Assess patient's ability to carry out ADLs; assess patient's baseline for ADL function and identify physical deficits which impact ability to perform ADLs (bathing, care of mouth/teeth, toileting, grooming, dressing, etc.)  - Assess/evaluate cause of self-care deficits   - Assess range of motion  - Assess patient's mobility; develop plan if impaired  - Assess patient's need for assistive devices and provide as appropriate  - Encourage maximum independence but intervene and supervise when necessary  - Involve family in performance of ADLs  - Assess for home care needs following discharge   - Consider OT consult to assist with ADL evaluation and planning for discharge  - Provide patient education as appropriate  Outcome: Progressing  Goal: Maintains/Returns to pre admission functional level  Description: INTERVENTIONS:  - Perform BMAT or MOVE assessment daily.   - Set and communicate daily mobility goal to care team and patient/family/caregiver. - Collaborate with rehabilitation services on mobility goals if consulted  - Perform Range of Motion 3 times a day. - Reposition patient every 2 hours.   - Dangle patient 3 times a day  - Stand patient 3 times a day  - Ambulate patient 3 times a day  - Out of bed to chair 3 times a day   - Out of bed for meals 3 times a day  - Out of bed for toileting  - Record patient progress and toleration of activity level   Outcome: Progressing     Problem: DISCHARGE PLANNING  Goal: Discharge to home or other facility with appropriate resources  Description: INTERVENTIONS:  - Identify barriers to discharge w/patient and caregiver  - Arrange for needed discharge resources and transportation as appropriate  - Identify discharge learning needs (meds, wound care, etc.)  - Arrange for interpretive services to assist at discharge as needed  - Refer to Case Management Department for coordinating discharge planning if the patient needs post-hospital services based on physician/advanced practitioner order or complex needs related to functional status, cognitive ability, or social support system  Outcome: Progressing     Problem: Knowledge Deficit  Goal: Patient/family/caregiver demonstrates understanding of disease process, treatment plan, medications, and discharge instructions  Description: Complete learning assessment and assess knowledge base.   Interventions:  - Provide teaching at level of understanding  - Provide teaching via preferred learning methods  Outcome: Progressing     Problem: Prexisting or High Potential for Compromised Skin Integrity  Goal: Skin integrity is maintained or improved  Description: INTERVENTIONS:  - Identify patients at risk for skin breakdown  - Assess and monitor skin integrity  - Assess and monitor nutrition and hydration status  - Monitor labs   - Assess for incontinence   - Turn and reposition patient  - Assist with mobility/ambulation  - Relieve pressure over bony prominences  - Avoid friction and shearing  - Provide appropriate hygiene as needed including keeping skin clean and dry  - Evaluate need for skin moisturizer/barrier cream  - Collaborate with interdisciplinary team   - Patient/family teaching  - Consider wound care consult   Outcome: Progressing

## 2023-08-02 NOTE — CASE MANAGEMENT
Case Management Discharge Planning Note    Patient name Eleazar Carter  Location East 4 /E4 [address]ozo-* MRN [id number]  : 1950 Date 2023       Current Admission Date: 2023  Current Admission Diagnosis:Sepsis secondary to UTI [facility])   Patient Active Problem List    Diagnosis Date Noted   • Gross hematuria 2023   • Atrial fibrillation ([address]) 2023   • Impaired decision making 2023   • Daily consumption of alcohol 2023   • Bacteremia 2023   • Sepsis secondary to UTI ([address]) 2023   • Elevated troponin 2023   • Elevated d-dimer 2023   • Esophageal abnormality 2023   • Hydronephrosis with obstructing calculus 2023   • Mycotic toenails 2023   • Angioedema 2018   • COPD (chronic obstructive pulmonary disease) ([address]) 2018   • Bradycardia 2018   • Tobacco use 2016   • Polycythemia 2016   • Weight loss 2016      LOS (days): 27  Geometric Mean LOS (GMLOS) (days): 9.60  Days to GMLOS:-16.9     OBJECTIVE:  Risk of Unplanned Readmission Score: 14.59         Current admission status: Inpatient   Preferred Pharmacy:   PATIENT/FAMILY REPORTS NO PREFERRED PHARMACY  No address on file      [address] [place] [address],  [address] [place] [address]  Phone: [phone] Fax: [phone]    Primary Care Provider: No primary care provider on file. Primary Insurance: MEDICARE  Secondary Insurance:     DISCHARGE DETAILS:     CM received notification that [employer] has received pt's guardianship paperwork. Currently we are waiting for Dr. Liana Henry to complete his expert report to move forward with assigning temporary guardian for placement. CM to continue to follow pt care & d/c.

## 2023-08-02 NOTE — RESTORATIVE TECHNICIAN NOTE
Restorative Technician Note      Patient Name: Guillermo Bridges     Restorative Tech Visit Date: 08/02/23  Note Type: Mobility  Patient Position Upon Consult: Supine  Activity Performed: Ambulated  Assistive Device: Roller walker  Patient Position at End of Consult: Supine;  All needs within reach; Bed/Chair alarm activated

## 2023-08-03 ENCOUNTER — APPOINTMENT (INPATIENT)
Dept: RADIOLOGY | Facility: HOSPITAL | Age: 73
DRG: 853 | End: 2023-08-03
Payer: MEDICARE

## 2023-08-03 ENCOUNTER — TELEPHONE (OUTPATIENT)
Dept: UROLOGY | Facility: MEDICAL CENTER | Age: 73
End: 2023-08-03

## 2023-08-03 ENCOUNTER — ANESTHESIA (INPATIENT)
Dept: PERIOP | Facility: HOSPITAL | Age: 73
End: 2023-08-03
Payer: MEDICARE

## 2023-08-03 DIAGNOSIS — N13.2 HYDRONEPHROSIS WITH OBSTRUCTING CALCULUS: Primary | ICD-10-CM

## 2023-08-03 LAB
ERYTHROCYTE [DISTWIDTH] IN BLOOD BY AUTOMATED COUNT: 13.2 % (ref 11.6–15.1)
HCT VFR BLD AUTO: 38.9 % (ref 36.5–49.3)
HGB BLD-MCNC: 12.4 G/DL (ref 12–17)
MCH RBC QN AUTO: 32 PG (ref 26.8–34.3)
MCHC RBC AUTO-ENTMCNC: 31.9 G/DL (ref 31.4–37.4)
MCV RBC AUTO: 100 FL (ref 82–98)
PLATELET # BLD AUTO: 323 THOUSANDS/UL (ref 149–390)
PMV BLD AUTO: 9.4 FL (ref 8.9–12.7)
RBC # BLD AUTO: 3.88 MILLION/UL (ref 3.88–5.62)
WBC # BLD AUTO: 12.49 THOUSAND/UL (ref 4.31–10.16)

## 2023-08-03 PROCEDURE — 99232 SBSQ HOSP IP/OBS MODERATE 35: CPT | Performed by: UROLOGY

## 2023-08-03 PROCEDURE — 82360 CALCULUS ASSAY QUANT: CPT | Performed by: UROLOGY

## 2023-08-03 PROCEDURE — 74420 UROGRAPHY RTRGR +-KUB: CPT

## 2023-08-03 PROCEDURE — 99232 SBSQ HOSP IP/OBS MODERATE 35: CPT | Performed by: INTERNAL MEDICINE

## 2023-08-03 PROCEDURE — 0TC48ZZ EXTIRPATION OF MATTER FROM LEFT KIDNEY PELVIS, VIA NATURAL OR ARTIFICIAL OPENING ENDOSCOPIC: ICD-10-PCS | Performed by: UROLOGY

## 2023-08-03 PROCEDURE — C1769 GUIDE WIRE: HCPCS | Performed by: UROLOGY

## 2023-08-03 PROCEDURE — 0T748DZ DILATION OF LEFT KIDNEY PELVIS WITH INTRALUMINAL DEVICE, VIA NATURAL OR ARTIFICIAL OPENING ENDOSCOPIC: ICD-10-PCS | Performed by: UROLOGY

## 2023-08-03 PROCEDURE — C1894 INTRO/SHEATH, NON-LASER: HCPCS | Performed by: UROLOGY

## 2023-08-03 PROCEDURE — 0TP98DZ REMOVAL OF INTRALUMINAL DEVICE FROM URETER, VIA NATURAL OR ARTIFICIAL OPENING ENDOSCOPIC: ICD-10-PCS | Performed by: UROLOGY

## 2023-08-03 PROCEDURE — C1747 URETEROSCOPE DIGITAL FLEX SNGL USE STD DEFLECTION APTRA: HCPCS | Performed by: UROLOGY

## 2023-08-03 PROCEDURE — C2617 STENT, NON-COR, TEM W/O DEL: HCPCS | Performed by: UROLOGY

## 2023-08-03 PROCEDURE — 87086 URINE CULTURE/COLONY COUNT: CPT | Performed by: UROLOGY

## 2023-08-03 PROCEDURE — 85027 COMPLETE CBC AUTOMATED: CPT | Performed by: INTERNAL MEDICINE

## 2023-08-03 PROCEDURE — 87186 SC STD MICRODIL/AGAR DIL: CPT | Performed by: UROLOGY

## 2023-08-03 PROCEDURE — 0T778DZ DILATION OF LEFT URETER WITH INTRALUMINAL DEVICE, VIA NATURAL OR ARTIFICIAL OPENING ENDOSCOPIC: ICD-10-PCS | Performed by: UROLOGY

## 2023-08-03 PROCEDURE — C1758 CATHETER, URETERAL: HCPCS | Performed by: UROLOGY

## 2023-08-03 PROCEDURE — 52356 CYSTO/URETERO W/LITHOTRIPSY: CPT | Performed by: UROLOGY

## 2023-08-03 PROCEDURE — 87077 CULTURE AEROBIC IDENTIFY: CPT | Performed by: UROLOGY

## 2023-08-03 DEVICE — VARIABLE LENGTH INJECTION STENT SET
Type: IMPLANTABLE DEVICE | Site: URETER | Status: FUNCTIONAL
Brand: CONTOUR VL™ INJECTION STENT SET

## 2023-08-03 RX ORDER — SODIUM CHLORIDE 9 MG/ML
INJECTION, SOLUTION INTRAVENOUS AS NEEDED
Status: DISCONTINUED | OUTPATIENT
Start: 2023-08-03 | End: 2023-08-03 | Stop reason: HOSPADM

## 2023-08-03 RX ORDER — PROPOFOL 10 MG/ML
INJECTION, EMULSION INTRAVENOUS AS NEEDED
Status: DISCONTINUED | OUTPATIENT
Start: 2023-08-03 | End: 2023-08-03

## 2023-08-03 RX ORDER — LIDOCAINE HYDROCHLORIDE 20 MG/ML
INJECTION, SOLUTION EPIDURAL; INFILTRATION; INTRACAUDAL; PERINEURAL AS NEEDED
Status: DISCONTINUED | OUTPATIENT
Start: 2023-08-03 | End: 2023-08-03

## 2023-08-03 RX ORDER — EPHEDRINE SULFATE 50 MG/ML
INJECTION INTRAVENOUS AS NEEDED
Status: DISCONTINUED | OUTPATIENT
Start: 2023-08-03 | End: 2023-08-03

## 2023-08-03 RX ORDER — ONDANSETRON 2 MG/ML
4 INJECTION INTRAMUSCULAR; INTRAVENOUS ONCE AS NEEDED
Status: DISCONTINUED | OUTPATIENT
Start: 2023-08-03 | End: 2023-08-03 | Stop reason: HOSPADM

## 2023-08-03 RX ORDER — DEXAMETHASONE SODIUM PHOSPHATE 10 MG/ML
INJECTION, SOLUTION INTRAMUSCULAR; INTRAVENOUS AS NEEDED
Status: DISCONTINUED | OUTPATIENT
Start: 2023-08-03 | End: 2023-08-03

## 2023-08-03 RX ORDER — CEFTAZIDIME 1 G/1
1000 INJECTION, POWDER, FOR SOLUTION INTRAMUSCULAR; INTRAVENOUS ONCE
Status: DISCONTINUED | OUTPATIENT
Start: 2023-08-03 | End: 2023-08-03 | Stop reason: CLARIF

## 2023-08-03 RX ORDER — FENTANYL CITRATE/PF 50 MCG/ML
25 SYRINGE (ML) INJECTION
Status: DISCONTINUED | OUTPATIENT
Start: 2023-08-03 | End: 2023-08-03 | Stop reason: HOSPADM

## 2023-08-03 RX ORDER — FENTANYL CITRATE 50 UG/ML
INJECTION, SOLUTION INTRAMUSCULAR; INTRAVENOUS AS NEEDED
Status: DISCONTINUED | OUTPATIENT
Start: 2023-08-03 | End: 2023-08-03

## 2023-08-03 RX ORDER — ACETAMINOPHEN 325 MG/1
975 TABLET ORAL EVERY 6 HOURS SCHEDULED
Status: DISCONTINUED | OUTPATIENT
Start: 2023-08-03 | End: 2023-08-30

## 2023-08-03 RX ORDER — ONDANSETRON 2 MG/ML
INJECTION INTRAMUSCULAR; INTRAVENOUS AS NEEDED
Status: DISCONTINUED | OUTPATIENT
Start: 2023-08-03 | End: 2023-08-03

## 2023-08-03 RX ORDER — SODIUM CHLORIDE 9 MG/ML
INJECTION, SOLUTION INTRAVENOUS CONTINUOUS PRN
Status: DISCONTINUED | OUTPATIENT
Start: 2023-08-03 | End: 2023-08-03

## 2023-08-03 RX ADMIN — FENTANYL CITRATE 25 MCG: 50 INJECTION INTRAMUSCULAR; INTRAVENOUS at 13:01

## 2023-08-03 RX ADMIN — FENTANYL CITRATE 25 MCG: 50 INJECTION INTRAMUSCULAR; INTRAVENOUS at 13:42

## 2023-08-03 RX ADMIN — AMIODARONE HYDROCHLORIDE 200 MG: 200 TABLET ORAL at 09:14

## 2023-08-03 RX ADMIN — FOLIC ACID 1 MG: 1 TABLET ORAL at 09:14

## 2023-08-03 RX ADMIN — SODIUM CHLORIDE: 0.9 INJECTION, SOLUTION INTRAVENOUS at 12:10

## 2023-08-03 RX ADMIN — FENTANYL CITRATE 25 MCG: 50 INJECTION INTRAMUSCULAR; INTRAVENOUS at 13:29

## 2023-08-03 RX ADMIN — METHOCARBAMOL TABLETS 750 MG: 500 TABLET, COATED ORAL at 16:06

## 2023-08-03 RX ADMIN — LIDOCAINE HYDROCHLORIDE 80 MG: 20 INJECTION, SOLUTION EPIDURAL; INFILTRATION; INTRACAUDAL; PERINEURAL at 12:29

## 2023-08-03 RX ADMIN — CEFTAZIDIME 1000 MG: 1 INJECTION, POWDER, FOR SOLUTION INTRAMUSCULAR; INTRAVENOUS at 12:26

## 2023-08-03 RX ADMIN — METHOCARBAMOL TABLETS 750 MG: 500 TABLET, COATED ORAL at 05:05

## 2023-08-03 RX ADMIN — FLUTICASONE FUROATE AND VILANTEROL TRIFENATATE 1 PUFF: 200; 25 POWDER RESPIRATORY (INHALATION) at 09:16

## 2023-08-03 RX ADMIN — AMIODARONE HYDROCHLORIDE 200 MG: 200 TABLET ORAL at 16:02

## 2023-08-03 RX ADMIN — PANTOPRAZOLE SODIUM 40 MG: 40 TABLET, DELAYED RELEASE ORAL at 05:05

## 2023-08-03 RX ADMIN — FENTANYL CITRATE 25 MCG: 50 INJECTION INTRAMUSCULAR; INTRAVENOUS at 14:07

## 2023-08-03 RX ADMIN — Medication 1 TABLET: at 09:14

## 2023-08-03 RX ADMIN — THIAMINE HCL TAB 100 MG 100 MG: 100 TAB at 09:14

## 2023-08-03 RX ADMIN — PROPOFOL 200 MG: 10 INJECTION, EMULSION INTRAVENOUS at 12:29

## 2023-08-03 RX ADMIN — FENTANYL CITRATE 25 MCG: 50 INJECTION INTRAMUSCULAR; INTRAVENOUS at 12:47

## 2023-08-03 RX ADMIN — MELATONIN 6 MG: at 21:59

## 2023-08-03 RX ADMIN — CEFTAZIDIME 1000 MG: 1 INJECTION, POWDER, FOR SOLUTION INTRAMUSCULAR; INTRAVENOUS at 22:18

## 2023-08-03 RX ADMIN — DILTIAZEM HYDROCHLORIDE 120 MG: 120 CAPSULE, COATED, EXTENDED RELEASE ORAL at 09:14

## 2023-08-03 RX ADMIN — METHOCARBAMOL TABLETS 750 MG: 500 TABLET, COATED ORAL at 21:59

## 2023-08-03 RX ADMIN — ONDANSETRON 4 MG: 2 INJECTION INTRAMUSCULAR; INTRAVENOUS at 12:24

## 2023-08-03 RX ADMIN — EPHEDRINE SULFATE 5 MG: 50 INJECTION, SOLUTION INTRAVENOUS at 12:43

## 2023-08-03 RX ADMIN — FENTANYL CITRATE 25 MCG: 50 INJECTION INTRAMUSCULAR; INTRAVENOUS at 12:40

## 2023-08-03 RX ADMIN — ATORVASTATIN CALCIUM 40 MG: 40 TABLET, FILM COATED ORAL at 16:02

## 2023-08-03 RX ADMIN — EPHEDRINE SULFATE 5 MG: 50 INJECTION, SOLUTION INTRAVENOUS at 13:03

## 2023-08-03 RX ADMIN — DEXAMETHASONE SODIUM PHOSPHATE 10 MG: 10 INJECTION INTRAMUSCULAR; INTRAVENOUS at 12:31

## 2023-08-03 RX ADMIN — FENTANYL CITRATE 25 MCG: 50 INJECTION INTRAMUSCULAR; INTRAVENOUS at 13:59

## 2023-08-03 RX ADMIN — FENTANYL CITRATE 25 MCG: 50 INJECTION INTRAMUSCULAR; INTRAVENOUS at 12:53

## 2023-08-03 NOTE — RESTORATIVE TECHNICIAN NOTE
Restorative Technician Note      Patient Name: Tori Villanueva     [facility] Tech Visit Date: 08/03/23  Note Type: Mobility  Patient Position Upon Consult: Supine  Activity Performed: Ambulated  Patient Position at End of Consult: Supine;  All needs within reach; Bed/Chair alarm activated

## 2023-08-03 NOTE — TELEPHONE ENCOUNTER
The patient underwent ureteroscopy today for a large left renal stone. Patient will need a CT scan in approximately 2 weeks and then the stent can be removed by cystoscopy after this. Please call to arrange.

## 2023-08-03 NOTE — OP NOTE
OPERATIVE REPORT  PATIENT NAME: Santi Galan    :  1950  MRN: [id number]  Pt Location: [facility] OR ROOM 05    SURGERY DATE: 8/3/2023    Surgeon(s) and Role:     * Kamila Jang MD - Primary     * Rosana Rowland MD    Preop Diagnosis:  Ureterolithiasis [N20.1]    Post-Op Diagnosis Codes:     * Ureterolithiasis [N20.1]     * Left renal stone [N20.0]    Procedure(s):  Left - CYSTO USCOPE W/  LASER. & STENT    Specimen(s):  ID Type Source Tests Collected by Time Destination   A :  Urine Urine, Cystoscopic URINE CULTURE Kamila Jang MD 8/3/2023 1252    B : Left renal calculus Calculus Ureter, Left STONE ANALYSIS Kamila Jang MD 8/3/2023 1401        Estimated Blood Loss:   Minimal    Drains:  Ureteral Internal Stent Left ureter 6 Fr. (Active)   Site Assessment KODI 23 1445   Output (mL) 330 mL 23 1626   Number of days: 28       Ureteral Internal Stent Left ureter 7 Fr. (Active)   Number of days: 0       Anesthesia Type:   General    Operative Indications:  Ureterolithiasis [N20.1]  Left renal stone    Operative Findings:  Normal urethra with bilobar prostatic hypertrophy causing complete outflow obstruction. Prostate estimated at 30 to 40 g. The bladder is heavily trabeculated with cellule and diverticular formation. Bladder stones. The previously placed stent was in good position. This was externalized. Retrograde pyelography revealed a filling defect in the upper ureter as well as in the upper pole calyx. These were both confirmed to be stones on ureteroscopy. The ureteral stone was pushed back into the kidney and treated in the lower pole where it was noted to be soft and and completely dusted. The large 2 cm upper pole stone was then treated. The stone was harder. Was broken up into multiple small fragments. Representative samples were basket extracted. The remaining fragments appeared small enough to pass around the stent.   A 7 [place]ish Contour stent was placed at the end of the procedure. German was cut and we will plan to obtain a CT to ensure there are no remaining significant fragments prior to stent removal.  Complications:   None    Procedure and Technique:  PLAN FOR STENT: Cystoscopic removal in 2 to 3 weeks after CT scan confirms no significant remaining fragments. The patient was brought into the OR, properly identified and positioned on the table. General anesthesia was administered and the patient was placed in lithotomy position and prepped and draped in the usual sterile fashion. Compression boots were employed. Intravenous antibiotic was administered. An appropriate time-out was performed. Cystoscopy was performed with a 25 [place] cystoscope with findings as above. The left ureteral orifice was identified and the patient's stent was externalized. A guidewire was passed up the stent into the kidney under fluoroscopic guidance. Stent was removed. Tiger tail catheter was placed and the guidewire removed and retrograde pyelogram performed with findings as above. The wire was replaced and 12-14F access sheath was placed over the wire. The flexible scope was placed thru a sheath and advanced to stone  in the upper ureter. Maycol Payer was pushed into the kidney and fell into the lower pole. . The Holmium laser was used on various settings to break up stone into small particles and. Care was taken not to laser tissue. Next systematic pyeloscopy performed and the large stone in the upper pole calyx identified. This stone to was treated with the holmium laser on fragmentation and popcorn settings. A Nitinol basket was utilized to [employer] fragments for analysis. All remaining fragment appeared small enough to pass around the stent. The ureteroscope  was removed from the ureter in conjunction with the access sheath. No damage was noted to the ureter nor were there any ureteral stones.   The cystoscope was used to place a 7F Contour VL stent in the ureter, with the upper coils in the renal pelvis, and the distal coil in the bladder. Retrograde pyelogram on that side confirmed good position and no extravasation of contrast.     The patient was awaken from anesthesia and taken to the PACU in good condition. I was present for the entire procedure.     Patient Disposition:  PACU  and hemodynamically stable        SIGNATURE: Kimi Park MD  DATE: August 3, 2023  TIME: 2:56 PM

## 2023-08-03 NOTE — PROGRESS NOTES
UROLOGY PROGRESS NOTE   Patient Identifiers: Elizabeth Aragon (MRN [id number])  Date of Service: 8/3/2023    Assessment:     Shivani Chao is a 27-year-old male with history of nephrolithiasis, recent left proximal ureteral calculus, status post cystoscopy, left retrograde pyelography and insertion of left ureteral stent 7/6 complicated by inadvertent left ureteral perforation and Enterococcus bacteremia--now resolved with completion of antimicrobial course. Unfortunately, patient developed atrial fibrillation now status post successful SOLA cardioversion  7/25. He was placed on Eliquis and this provoked gross hematuria--now held    Plan:   - I reviewed with the patient to plan for cystoscopy, left ureteroscopy, holmium laser, basket extraction, retrograde pyelogram, and left ureteral stent insertion. - risks of procedure reviewed including but not limited to cardiopulmonary complications, VTE, bleeding, infection, damage to nearby structures, ureteral stricture, possible need for nephrostomy or additional surgical intervention. - patient verbalized understanding. Consent signed this morning and placed in chart. - continue NPO in meantime for planned surgical intervention         Subjective:     Patient currently comfortable. Denies any pain.      Objective:     VITALS:    Vitals:    08/03/23 0713   BP: 135/77   Pulse: 69   Resp: 18   Temp: 97.7 °F (36.5 °C)   SpO2: 94%       INS & OUTS:  [unfilled]    LABS:  Lab Results   Component Value Date    HGB 12.4 08/03/2023    HCT 38.9 08/03/2023    WBC 12.49 (H) 08/03/2023     08/03/2023   ]    Lab Results   Component Value Date    K 4.9 08/02/2023     08/02/2023    CO2 28 08/02/2023    BUN 18 08/02/2023    CREATININE 0.93 08/02/2023    CALCIUM 9.0 08/02/2023   ]    INPATIENT MEDS:    Current Facility-Administered Medications:   •  acetaminophen (TYLENOL) tablet 650 mg, 650 mg, Oral, Q6H PRN, KIKI Chowdhury, 650 mg at 07/19/23 0920  • albuterol inhalation solution 2.5 mg, 2.5 mg, Nebulization, Q6H PRN, KIKI Lew, 2.5 mg at 07/12/23 0027  •  aluminum-magnesium hydroxide-simethicone (MYLANTA) oral suspension 30 mL, 30 mL, Oral, Q6H PRN, KIKI Lew  •  amiodarone tablet 200 mg, 200 mg, Oral, BID With Meals, Jennifer Wing DO, 200 mg at 08/03/23 4879  •  atorvastatin (LIPITOR) tablet 40 mg, 40 mg, Oral, Daily With Jessie Powell DO, 40 mg at 08/02/23 1733  •  bisacodyl (DULCOLAX) EC tablet 10 mg, 10 mg, Oral, Daily PRN, KIKI Lew, 10 mg at 07/31/23 1726  •  diltiazem (CARDIZEM CD) 24 hr capsule 120 mg, 120 mg, Oral, Daily, Jennifer Wing DO, 120 mg at 08/03/23 0914  •  fluticasone-vilanterol 200-25 mcg/actuation 1 puff, 1 puff, Inhalation, Daily, KIKI Lew, 1 puff at 64/85/50 7457  •  folic acid (FOLVITE) tablet 1 mg, 1 mg, Oral, Daily, Karli Durham PA-C, 1 mg at 08/03/23 1729  •  guaiFENesin (ROBITUSSIN) oral liquid 200 mg, 200 mg, Oral, Q4H PRN, Shilpi Sykes MD, 200 mg at 07/30/23 1717  •  hydrOXYzine HCL (ATARAX) tablet 50 mg, 50 mg, Oral, HS PRN, KIKI Lew, 50 mg at 07/27/23 2144  •  lidocaine (LIDODERM) 5 % patch 1 patch, 1 patch, Topical, Daily, Zari Rueda MD, 1 patch at 07/31/23 1444  •  melatonin tablet 6 mg, 6 mg, Oral, HS, KIKI Lew, 6 mg at 08/02/23 2146  •  methocarbamol (ROBAXIN) tablet 750 mg, 750 mg, Oral, Q8H [facility], Zari Rueda MD, 750 mg at 08/03/23 0505  •  multivitamin-minerals (CENTRUM) tablet 1 tablet, 1 tablet, Oral, Daily, Karli Durham PA-C, 1 tablet at 08/03/23 0914  •  nicotine (NICODERM CQ) 14 mg/24hr TD 24 hr patch 1 patch, 1 patch, Transdermal, Daily, KIKI Lew, 1 patch at 07/21/23 1125  •  pantoprazole (PROTONIX) EC tablet 40 mg, 40 mg, Oral, Early Morning, KIKI Lew, 40 mg at 08/03/23 0505  •  polyethylene glycol (MIRALAX) packet 17 g, 17 g, Oral, Daily, Arabella Pacheco MD, 91 g at 07/30/23 6002  •  senna (SENOKOT) tablet 17.2 mg, 2 tablet, Oral, HS, Ilah Shown, CRNP, 17.2 mg at 07/29/23 2121  •  thiamine tablet 100 mg, 100 mg, Oral, Daily, Farzana Bragg PA-C, 100 mg at 08/03/23 5945      Physical Exam:   /77 (BP Location: Right arm)   Pulse 69   Temp 97.7 °F (36.5 °C) (Temporal)   Resp 18   Ht 6' 3" (1.905 m)   Wt 102 kg (224 lb)   SpO2 94%   BMI 28.00 kg/m²   GEN: No acute distress  RESP: breathing comfortably with no accessory muscle use. CTABL  CV: no significant peripheral edema. Regular rate and rhythm. No rubs/clicks/murmurs.   ABD: soft, non-tender, non-distended

## 2023-08-03 NOTE — PLAN OF CARE

## 2023-08-03 NOTE — PROGRESS NOTES
[facility]  Progress Note  Name: Nestor Lamb I  MRN: [id number]  Unit/Bed#: E4 -01 I Date of Admission: 7/6/2023   Date of Service: 8/3/2023 I Hospital Day: 28    Assessment/Plan   Gross hematuria  Assessment & Plan  · Status post catheter placement. · Continue monitoring urine output and blood count  · Bladder irrigation per urology    8/1-patient placed on CBI again overnight; this morning noted to have possible clots. Ultrasound bladder ordered by urology. Pending results of ultrasound bladder, patient may need cystoscopy moving forward. --- Have held Eliquis at this time; urology following along    8/2-patient continues to have hematuria; discussed with urology today and likely to move forward with procedure tomorrow. Can you Sorensen catheter and holding anticoagulation; urine somewhat improved and hemoglobin labs stable. At issue is consent; will discuss with case management.     Atrial fibrillation (HCC)  Assessment & Plan  · Improved after successful SOLA cardioversion on 7/25/23  · Hold anticoagulation for recurrent hematuria  · Rhythm controlled on amiodarone 200 mg twice daily and Cardizem 120 mg daily    Impaired decision making  Assessment & Plan  This week repeat neuropsych evaluation was done  on 7/24/2023  He continues to have impaired decision-making capacity  Guardianship process initiated by case management     Daily consumption of alcohol  Assessment & Plan  He stated earlier that he drank beers on a daily basis  Clinically no withdrawal.  Continue thiamine and folic acid supplementation    Bacteremia  Assessment & Plan  · Aerococcus bacteremia due to complicated UTI   · Resolved   · Antibiotic course completed     Hydronephrosis with obstructing calculus  Assessment & Plan  · Admitted originally due to sepsis from UTI with obstruction  · Status post cystoscopy with difficult stent insertion on 7/6/2023  · Procedure was complicated by inadvertent left ureteral perforation  · CT showed previous ureteral calculus is now in the bladder    Esophageal abnormality  Assessment & Plan  He had incidental finding on CT with diffuse esophageal thickening could be from esophagitis  Esophagitis probably from alcohol abuse  Continue empiric Protonix  Nonurgent GI follow-up    Elevated troponin  Assessment & Plan  · Non-MI troponin elevation secondary to tachycardia and sepsis  · Continue aspirin and Lipitor    COPD (chronic obstructive pulmonary disease) ([facility])  Assessment & Plan  · Stable. · Continue Breo 1 puff daily  · Continue albuterol as needed    Tobacco use  Assessment & Plan  · Smokes 6 cigarettes daily. Apparently plans to quit. · Nicotine TD patch  · Tobacco counseling provided     * Sepsis secondary to UTI [facility])  Assessment & Plan  · Sepsis secondary to Enterococcus bacteremia and complicated UTI  · Resolved  · Antibiotic course complete             VTE Pharmacologic Prophylaxis:   Pharmacologic: Pharmacologic VTE Prophylaxis contraindicated due to Hematuria  Mechanical VTE Prophylaxis in Place: Yes    Patient Centered Rounds: I have performed bedside rounds with nursing staff today. Discussions with Specialists or Other Care Team Provider:     Education and Discussions with Family / Patient: Care plan discussed with patient who voiced understanding and agrees with recommendations. Time Spent for Care: 45 minutes. More than 50% of total time spent on counseling and coordination of care as described above. Current Length of Stay: 28 day(s)    Current Patient Status: Inpatient   Certification Statement: The patient will continue to require additional inpatient hospital stay due to Treatment of hematuria    Discharge Plan: To be determined    Code Status: Level 1 - Full Code      Subjective:   Patient seen and examined bedside, no acute distress or discomfort noted. Patient went for definitive cystoscopy and hopefully treat hematuria.   Await further urology recommendations. Also waiting for guardian. Monitor on morning labs. Objective:     Vitals:   Temp (24hrs), Av.5 °F (36.4 °C), Min:97 °F (36.1 °C), Max:98 °F (36.7 °C)    Temp:  [97 °F (36.1 °C)-98 °F (36.7 °C)] 97 °F (36.1 °C)  HR:  [62-72] 65  Resp:  [16-21] 20  BP: (122-188)/(58-77) 128/77  SpO2:  [92 %-96 %] 94 %  Body mass index is 28 kg/m². Input and Output Summary (last 24 hours): Intake/Output Summary (Last 24 hours) at 8/3/2023 1656  Last data filed at 8/3/2023 1442  Gross per 24 hour   Intake 1110 ml   Output 1652 ml   Net -542 ml       Physical Exam:     Physical Exam  Vitals and nursing note reviewed. Constitutional:       General: He is not in acute distress. Appearance: He is well-developed. HENT:      Head: Normocephalic and atraumatic. Eyes:      Conjunctiva/sclera: Conjunctivae normal.   Cardiovascular:      Rate and Rhythm: Normal rate. Heart sounds: No murmur heard. Pulmonary:      Effort: Pulmonary effort is normal. No respiratory distress. Abdominal:      Palpations: Abdomen is soft. Tenderness: There is no abdominal tenderness. Genitourinary:     Comments: Sorensen catheter inserted  Musculoskeletal:         General: Tenderness (Tenderness to palpation right SI joint) present. No swelling. Cervical back: Neck supple. Skin:     General: Skin is warm and dry. Neurological:      Mental Status: He is alert.    Psychiatric:         Mood and Affect: Mood normal.           Additional Data:     Labs:    Results from last 7 days   Lab Units 23  0504 23  0655   WBC Thousand/uL 12.49* 11.79*   HEMOGLOBIN g/dL 12.4 13.3   HEMATOCRIT % 38.9 40.9   PLATELETS Thousands/uL 323 340   NEUTROS PCT %  --  71   LYMPHS PCT %  --  15   MONOS PCT %  --  9   EOS PCT %  --  3     Results from last 7 days   Lab Units 23  0540 23  0626   SODIUM mmol/L 134* 135   POTASSIUM mmol/L 4.9 4.3   CHLORIDE mmol/L 101 102   CO2 mmol/L 28 29   BUN mg/dL 18 23   CREATININE mg/dL 0.93 0.97   ANION GAP mmol/L 5 4   CALCIUM mg/dL 9.0 8.8   ALBUMIN g/dL  --  3.2*   TOTAL BILIRUBIN mg/dL  --  0.33   ALK PHOS U/L  --  81   ALT U/L  --  14   AST U/L  --  14   GLUCOSE RANDOM mg/dL 87 90                           * I Have Reviewed All Lab Data Listed Above. * Additional Pertinent Lab Tests Reviewed:  All Labs Within Last 24 Hours Reviewed    Imaging:    Imaging Reports Reviewed Today Include:   Imaging Personally Reviewed by Myself Includes:      Recent Cultures (last 7 days):           Last 24 Hours Medication List:   Current Facility-Administered Medications   Medication Dose Route Frequency Provider Last Rate   • acetaminophen  975 mg Oral Q6H Rasheeda Cabrera MD     • albuterol  2.5 mg Nebulization Q6H PRN Rosana Rowland MD     • aluminum-magnesium hydroxide-simethicone  30 mL Oral Q6H PRN Rosana Rowland MD     • amiodarone  200 mg Oral BID With Meals Rosana Rowland MD     • atorvastatin  40 mg Oral Daily With Jade Dickson MD     • bisacodyl  10 mg Oral Daily PRN Rosana Rowland MD     • cefTAZidime  1,000 mg Intravenous Q8H Rosana Rowland MD     • diltiazem  120 mg Oral Daily Rosana Rowland MD     • fluticasone-vilanterol  1 puff Inhalation Daily Rosana Rowland MD     • folic acid  1 mg Oral Daily Rosana Rowland MD     • guaiFENesin  200 mg Oral Q4H PRN Rosana Rowland MD     • hydrOXYzine HCL  50 mg Oral HS PRN Rosana Rowland MD     • lidocaine  1 patch Topical Daily Rosana Rowland MD     • melatonin  6 mg Oral HS Rosana Rowland MD     • methocarbamol  750 mg Oral [facility] Rosana Rowland MD     • multivitamin-minerals  1 tablet Oral Daily Rosana Rowland MD     • nicotine  1 patch Transdermal Daily Rosana Rowland MD     • pantoprazole  40 mg Oral Early Morning Rosana Rowland MD     • polyethylene glycol  17 g Oral Daily Rosana Rowland MD     • senna  2 tablet Oral HS Rosana Rowland MD     • thiamine  100 mg Oral Daily Farhan Bailey MD          Today, Patient Was Seen By: Velma Miller MD    ** Please Note: Dictation voice to text software may have been used in the creation of this document.  **

## 2023-08-03 NOTE — INTERVAL H&P NOTE
H&P reviewed. After examining the patient I find no changes in the patients condition since the H&P had been written. Vitals:    08/03/23 0713   BP: 135/77   Pulse: 69   Resp: 18   Temp: 97.7 °F (36.5 °C)   SpO2: 94%   Patient has completed a course of antibiotic. CT is reviewed. The procedure was described to the patient in the holding area. Risks were discussed. Informed consent obtained. He has previously signed a consent. Laterality marked-left.

## 2023-08-03 NOTE — ANESTHESIA POSTPROCEDURE EVALUATION
Post-Op Assessment Note    CV Status:  Stable    Pain management: adequate     Mental Status:  Alert and awake   Hydration Status:  Euvolemic   PONV Controlled:  Controlled   Airway Patency:  Patent      Post Op Vitals Reviewed: Yes            No notable events documented.     /61 (08/03/23 1509)    Temp (!) 97.4 °F (36.3 °C) (08/03/23 1509)    Pulse 62 (08/03/23 1509)   Resp 21 (08/03/23 1509)    SpO2 94 % (08/03/23 1509)

## 2023-08-03 NOTE — PLAN OF CARE
Problem: Potential for Falls  Goal: Patient will remain free of falls  Description: INTERVENTIONS:  - Educate patient/family on patient safety including physical limitations  - Instruct patient to call for assistance with activity   - Consult OT/PT to assist with strengthening/mobility   - Keep Call bell within reach  - Keep bed low and locked with side rails adjusted as appropriate  - Keep care items and personal belongings within reach  - Initiate and maintain comfort rounds  - Make Fall Risk Sign visible to staff  - Offer Toileting every 2 Hours, in advance of need  - Initiate/Maintain bed alarm  - Obtain necessary fall risk management equipment:   - Apply yellow socks and bracelet for high fall risk patients  - Consider moving patient to room near nurses station  Outcome: Progressing     Problem: MOBILITY - ADULT  Goal: Maintain or return to baseline ADL function  Description: INTERVENTIONS:  -  Assess patient's ability to carry out ADLs; assess patient's baseline for ADL function and identify physical deficits which impact ability to perform ADLs (bathing, care of mouth/teeth, toileting, grooming, dressing, etc.)  - Assess/evaluate cause of self-care deficits   - Assess range of motion  - Assess patient's mobility; develop plan if impaired  - Assess patient's need for assistive devices and provide as appropriate  - Encourage maximum independence but intervene and supervise when necessary  - Involve family in performance of ADLs  - Assess for home care needs following discharge   - Consider OT consult to assist with ADL evaluation and planning for discharge  - Provide patient education as appropriate  Outcome: Progressing  Goal: Maintains/Returns to pre admission functional level  Description: INTERVENTIONS:  - Perform BMAT or MOVE assessment daily.   - Set and communicate daily mobility goal to care team and patient/family/caregiver.    - Collaborate with rehabilitation services on mobility goals if consulted  - Perform Range of Motion 3 times a day. - Reposition patient every 3 hours.   - Dangle patient 3 times a day  - Stand patient 3 times a day  - Ambulate patient 3 times a day  - Out of bed to chair 3 times a day   - Out of bed for meals 3 times a day  - Out of bed for toileting  - Record patient progress and toleration of activity level   Outcome: Progressing     Problem: PAIN - ADULT  Goal: Verbalizes/displays adequate comfort level or baseline comfort level  Description: Interventions:  - Encourage patient to monitor pain and request assistance  - Assess pain using appropriate pain scale  - Administer analgesics based on type and severity of pain and evaluate response  - Implement non-pharmacological measures as appropriate and evaluate response  - Consider cultural and social influences on pain and pain management  - Notify physician/advanced practitioner if interventions unsuccessful or patient reports new pain  Outcome: Progressing     Problem: INFECTION - ADULT  Goal: Absence or prevention of progression during hospitalization  Description: INTERVENTIONS:  - Assess and monitor for signs and symptoms of infection  - Monitor lab/diagnostic results  - Monitor all insertion sites, i.e. indwelling lines, tubes, and drains  - Monitor endotracheal if appropriate and nasal secretions for changes in amount and color  - [place] appropriate cooling/warming therapies per order  - Administer medications as ordered  - Instruct and encourage patient and family to use good hand hygiene technique  - Identify and instruct in appropriate isolation precautions for identified infection/condition  Outcome: Progressing  Goal: Absence of fever/infection during neutropenic period  Description: INTERVENTIONS:  - Monitor WBC    Outcome: Progressing     Problem: SAFETY ADULT  Goal: Patient will remain free of falls  Description: INTERVENTIONS:  - Educate patient/family on patient safety including physical limitations  - Instruct patient to call for assistance with activity   - Consult OT/PT to assist with strengthening/mobility   - Keep Call bell within reach  - Keep bed low and locked with side rails adjusted as appropriate  - Keep care items and personal belongings within reach  - Initiate and maintain comfort rounds  - Make Fall Risk Sign visible to staff  - Offer Toileting every 2 Hours, in advance of need  - Initiate/Maintain bed alarm  - Obtain necessary fall risk management equipment:   - Apply yellow socks and bracelet for high fall risk patients  - Consider moving patient to room near nurses station  Outcome: Progressing  Goal: Maintain or return to baseline ADL function  Description: INTERVENTIONS:  -  Assess patient's ability to carry out ADLs; assess patient's baseline for ADL function and identify physical deficits which impact ability to perform ADLs (bathing, care of mouth/teeth, toileting, grooming, dressing, etc.)  - Assess/evaluate cause of self-care deficits   - Assess range of motion  - Assess patient's mobility; develop plan if impaired  - Assess patient's need for assistive devices and provide as appropriate  - Encourage maximum independence but intervene and supervise when necessary  - Involve family in performance of ADLs  - Assess for home care needs following discharge   - Consider OT consult to assist with ADL evaluation and planning for discharge  - Provide patient education as appropriate  Outcome: Progressing  Goal: Maintains/Returns to pre admission functional level  Description: INTERVENTIONS:  - Perform BMAT or MOVE assessment daily.   - Set and communicate daily mobility goal to care team and patient/family/caregiver. - Collaborate with rehabilitation services on mobility goals if consulted  - Perform Range of Motion 3 times a day. - Reposition patient every 3 hours.   - Dangle patient 3 times a day  - Stand patient 3 times a day  - Ambulate patient 3 times a day  - Out of bed to chair 3 times a day   - Out of bed for meals 3 times a day  - Out of bed for toileting  - Record patient progress and toleration of activity level   Outcome: Progressing     Problem: DISCHARGE PLANNING  Goal: Discharge to home or other facility with appropriate resources  Description: INTERVENTIONS:  - Identify barriers to discharge w/patient and caregiver  - Arrange for needed discharge resources and transportation as appropriate  - Identify discharge learning needs (meds, wound care, etc.)  - Arrange for interpretive services to assist at discharge as needed  - Refer to Case Management Department for coordinating discharge planning if the patient needs post-hospital services based on physician/advanced practitioner order or complex needs related to functional status, cognitive ability, or social support system  Outcome: Progressing     Problem: Knowledge Deficit  Goal: Patient/family/caregiver demonstrates understanding of disease process, treatment plan, medications, and discharge instructions  Description: Complete learning assessment and assess knowledge base.   Interventions:  - Provide teaching at level of understanding  - Provide teaching via preferred learning methods  Outcome: Progressing     Problem: Prexisting or High Potential for Compromised Skin Integrity  Goal: Skin integrity is maintained or improved  Description: INTERVENTIONS:  - Identify patients at risk for skin breakdown  - Assess and monitor skin integrity  - Assess and monitor nutrition and hydration status  - Monitor labs   - Assess for incontinence   - Turn and reposition patient  - Assist with mobility/ambulation  - Relieve pressure over bony prominences  - Avoid friction and shearing  - Provide appropriate hygiene as needed including keeping skin clean and dry  - Evaluate need for skin moisturizer/barrier cream  - Collaborate with interdisciplinary team   - Patient/family teaching  - Consider wound care consult   Outcome: Progressing

## 2023-08-04 LAB
ALBUMIN SERPL BCP-MCNC: 3.4 G/DL (ref 3.5–5)
ALP SERPL-CCNC: 85 U/L (ref 34–104)
ALT SERPL W P-5'-P-CCNC: 16 U/L (ref 7–52)
ANION GAP SERPL CALCULATED.3IONS-SCNC: 9 MMOL/L
AST SERPL W P-5'-P-CCNC: 15 U/L (ref 13–39)
BASOPHILS # BLD AUTO: 0.02 THOUSANDS/ÂΜL (ref 0–0.1)
BASOPHILS NFR BLD AUTO: 0 % (ref 0–1)
BILIRUB SERPL-MCNC: 0.28 MG/DL (ref 0.2–1)
BUN SERPL-MCNC: 29 MG/DL (ref 5–25)
CALCIUM ALBUM COR SERPL-MCNC: 9.5 MG/DL (ref 8.3–10.1)
CALCIUM SERPL-MCNC: 9 MG/DL (ref 8.4–10.2)
CHLORIDE SERPL-SCNC: 101 MMOL/L (ref 96–108)
CO2 SERPL-SCNC: 24 MMOL/L (ref 21–32)
CREAT SERPL-MCNC: 1.28 MG/DL (ref 0.6–1.3)
EOSINOPHIL # BLD AUTO: 0 THOUSAND/ÂΜL (ref 0–0.61)
EOSINOPHIL NFR BLD AUTO: 0 % (ref 0–6)
ERYTHROCYTE [DISTWIDTH] IN BLOOD BY AUTOMATED COUNT: 13.1 % (ref 11.6–15.1)
GFR SERPL CREATININE-BSD FRML MDRD: 54 ML/MIN/1.73SQ M
GLUCOSE SERPL-MCNC: 152 MG/DL (ref 65–140)
HCT VFR BLD AUTO: 40.7 % (ref 36.5–49.3)
HGB BLD-MCNC: 12.9 G/DL (ref 12–17)
IMM GRANULOCYTES # BLD AUTO: 0.07 THOUSAND/UL (ref 0–0.2)
IMM GRANULOCYTES NFR BLD AUTO: 1 % (ref 0–2)
LYMPHOCYTES # BLD AUTO: 1.06 THOUSANDS/ÂΜL (ref 0.6–4.47)
LYMPHOCYTES NFR BLD AUTO: 10 % (ref 14–44)
MAGNESIUM SERPL-MCNC: 2 MG/DL (ref 1.9–2.7)
MCH RBC QN AUTO: 31.9 PG (ref 26.8–34.3)
MCHC RBC AUTO-ENTMCNC: 31.7 G/DL (ref 31.4–37.4)
MCV RBC AUTO: 101 FL (ref 82–98)
MONOCYTES # BLD AUTO: 0.51 THOUSAND/ÂΜL (ref 0.17–1.22)
MONOCYTES NFR BLD AUTO: 5 % (ref 4–12)
NEUTROPHILS # BLD AUTO: 9.55 THOUSANDS/ÂΜL (ref 1.85–7.62)
NEUTS SEG NFR BLD AUTO: 84 % (ref 43–75)
NRBC BLD AUTO-RTO: 0 /100 WBCS
PHOSPHATE SERPL-MCNC: 2.6 MG/DL (ref 2.3–4.1)
PLATELET # BLD AUTO: 321 THOUSANDS/UL (ref 149–390)
PMV BLD AUTO: 9.8 FL (ref 8.9–12.7)
POTASSIUM SERPL-SCNC: 5.1 MMOL/L (ref 3.5–5.3)
PROT SERPL-MCNC: 7.3 G/DL (ref 6.4–8.4)
RBC # BLD AUTO: 4.05 MILLION/UL (ref 3.88–5.62)
SODIUM SERPL-SCNC: 134 MMOL/L (ref 135–147)
WBC # BLD AUTO: 11.21 THOUSAND/UL (ref 4.31–10.16)

## 2023-08-04 PROCEDURE — 99232 SBSQ HOSP IP/OBS MODERATE 35: CPT | Performed by: INTERNAL MEDICINE

## 2023-08-04 PROCEDURE — 83735 ASSAY OF MAGNESIUM: CPT | Performed by: INTERNAL MEDICINE

## 2023-08-04 PROCEDURE — 84100 ASSAY OF PHOSPHORUS: CPT | Performed by: INTERNAL MEDICINE

## 2023-08-04 PROCEDURE — 99232 SBSQ HOSP IP/OBS MODERATE 35: CPT | Performed by: NURSE PRACTITIONER

## 2023-08-04 PROCEDURE — 80053 COMPREHEN METABOLIC PANEL: CPT | Performed by: INTERNAL MEDICINE

## 2023-08-04 PROCEDURE — 85025 COMPLETE CBC W/AUTO DIFF WBC: CPT | Performed by: INTERNAL MEDICINE

## 2023-08-04 RX ORDER — AMIODARONE HYDROCHLORIDE 200 MG/1
200 TABLET ORAL
Status: DISCONTINUED | OUTPATIENT
Start: 2023-08-06 | End: 2023-09-01 | Stop reason: HOSPADM

## 2023-08-04 RX ADMIN — METHOCARBAMOL TABLETS 750 MG: 500 TABLET, COATED ORAL at 15:14

## 2023-08-04 RX ADMIN — METHOCARBAMOL TABLETS 750 MG: 500 TABLET, COATED ORAL at 05:40

## 2023-08-04 RX ADMIN — DILTIAZEM HYDROCHLORIDE 120 MG: 120 CAPSULE, COATED, EXTENDED RELEASE ORAL at 08:14

## 2023-08-04 RX ADMIN — CEFTAZIDIME 1000 MG: 1 INJECTION, POWDER, FOR SOLUTION INTRAMUSCULAR; INTRAVENOUS at 05:47

## 2023-08-04 RX ADMIN — FOLIC ACID 1 MG: 1 TABLET ORAL at 08:14

## 2023-08-04 RX ADMIN — ACETAMINOPHEN 325MG 975 MG: 325 TABLET ORAL at 17:04

## 2023-08-04 RX ADMIN — AMIODARONE HYDROCHLORIDE 200 MG: 200 TABLET ORAL at 08:14

## 2023-08-04 RX ADMIN — THIAMINE HCL TAB 100 MG 100 MG: 100 TAB at 08:14

## 2023-08-04 RX ADMIN — PANTOPRAZOLE SODIUM 40 MG: 40 TABLET, DELAYED RELEASE ORAL at 05:41

## 2023-08-04 RX ADMIN — METHOCARBAMOL TABLETS 750 MG: 500 TABLET, COATED ORAL at 21:33

## 2023-08-04 RX ADMIN — FLUTICASONE FUROATE AND VILANTEROL TRIFENATATE 1 PUFF: 200; 25 POWDER RESPIRATORY (INHALATION) at 08:14

## 2023-08-04 RX ADMIN — Medication 1 TABLET: at 08:14

## 2023-08-04 RX ADMIN — AMIODARONE HYDROCHLORIDE 200 MG: 200 TABLET ORAL at 16:19

## 2023-08-04 RX ADMIN — ACETAMINOPHEN 325MG 975 MG: 325 TABLET ORAL at 05:41

## 2023-08-04 RX ADMIN — MELATONIN 6 MG: at 21:33

## 2023-08-04 RX ADMIN — ATORVASTATIN CALCIUM 40 MG: 40 TABLET, FILM COATED ORAL at 16:20

## 2023-08-04 NOTE — PLAN OF CARE
Problem: Potential for Falls  Goal: Patient will remain free of falls  Description: INTERVENTIONS:  - Educate patient/family on patient safety including physical limitations  - Instruct patient to call for assistance with activity   - Consult OT/PT to assist with strengthening/mobility   - Keep Call bell within reach  - Keep bed low and locked with side rails adjusted as appropriate  - Keep care items and personal belongings within reach  - Initiate and maintain comfort rounds  - Make Fall Risk Sign visible to staff  - Offer Toileting every 2 Hours, in advance of need  - Initiate/Maintain bed alarm  - Obtain necessary fall risk management equipment:   - Apply yellow socks and bracelet for high fall risk patients  - Consider moving patient to room near nurses station  Outcome: Progressing     Problem: MOBILITY - ADULT  Goal: Maintain or return to baseline ADL function  Description: INTERVENTIONS:  -  Assess patient's ability to carry out ADLs; assess patient's baseline for ADL function and identify physical deficits which impact ability to perform ADLs (bathing, care of mouth/teeth, toileting, grooming, dressing, etc.)  - Assess/evaluate cause of self-care deficits   - Assess range of motion  - Assess patient's mobility; develop plan if impaired  - Assess patient's need for assistive devices and provide as appropriate  - Encourage maximum independence but intervene and supervise when necessary  - Involve family in performance of ADLs  - Assess for home care needs following discharge   - Consider OT consult to assist with ADL evaluation and planning for discharge  - Provide patient education as appropriate  Outcome: Progressing  Goal: Maintains/Returns to pre admission functional level  Description: INTERVENTIONS:  - Perform BMAT or MOVE assessment daily.   - Set and communicate daily mobility goal to care team and patient/family/caregiver.    - Collaborate with rehabilitation services on mobility goals if consulted  - Perform Range of Motion 3 times a day. - Reposition patient every 3 hours.   - Dangle patient 3 times a day  - Stand patient 3 times a day  - Ambulate patient 3 times a day  - Out of bed to chair 3 times a day   - Out of bed for meals 3 times a day  - Out of bed for toileting  - Record patient progress and toleration of activity level   Outcome: Progressing     Problem: PAIN - ADULT  Goal: Verbalizes/displays adequate comfort level or baseline comfort level  Description: Interventions:  - Encourage patient to monitor pain and request assistance  - Assess pain using appropriate pain scale  - Administer analgesics based on type and severity of pain and evaluate response  - Implement non-pharmacological measures as appropriate and evaluate response  - Consider cultural and social influences on pain and pain management  - Notify physician/advanced practitioner if interventions unsuccessful or patient reports new pain  Outcome: Progressing     Problem: INFECTION - ADULT  Goal: Absence or prevention of progression during hospitalization  Description: INTERVENTIONS:  - Assess and monitor for signs and symptoms of infection  - Monitor lab/diagnostic results  - Monitor all insertion sites, i.e. indwelling lines, tubes, and drains  - Monitor endotracheal if appropriate and nasal secretions for changes in amount and color  - [place] appropriate cooling/warming therapies per order  - Administer medications as ordered  - Instruct and encourage patient and family to use good hand hygiene technique  - Identify and instruct in appropriate isolation precautions for identified infection/condition  Outcome: Progressing  Goal: Absence of fever/infection during neutropenic period  Description: INTERVENTIONS:  - Monitor WBC    Outcome: Progressing     Problem: SAFETY ADULT  Goal: Patient will remain free of falls  Description: INTERVENTIONS:  - Educate patient/family on patient safety including physical limitations  - Instruct patient to call for assistance with activity   - Consult OT/PT to assist with strengthening/mobility   - Keep Call bell within reach  - Keep bed low and locked with side rails adjusted as appropriate  - Keep care items and personal belongings within reach  - Initiate and maintain comfort rounds  - Make Fall Risk Sign visible to staff  - Offer Toileting every 2 Hours, in advance of need  - Initiate/Maintain bed alarm  - Obtain necessary fall risk management equipment:   - Apply yellow socks and bracelet for high fall risk patients  - Consider moving patient to room near nurses station  Outcome: Progressing  Goal: Maintain or return to baseline ADL function  Description: INTERVENTIONS:  -  Assess patient's ability to carry out ADLs; assess patient's baseline for ADL function and identify physical deficits which impact ability to perform ADLs (bathing, care of mouth/teeth, toileting, grooming, dressing, etc.)  - Assess/evaluate cause of self-care deficits   - Assess range of motion  - Assess patient's mobility; develop plan if impaired  - Assess patient's need for assistive devices and provide as appropriate  - Encourage maximum independence but intervene and supervise when necessary  - Involve family in performance of ADLs  - Assess for home care needs following discharge   - Consider OT consult to assist with ADL evaluation and planning for discharge  - Provide patient education as appropriate  Outcome: Progressing  Goal: Maintains/Returns to pre admission functional level  Description: INTERVENTIONS:  - Perform BMAT or MOVE assessment daily.   - Set and communicate daily mobility goal to care team and patient/family/caregiver. - Collaborate with rehabilitation services on mobility goals if consulted  - Perform Range of Motion 3 times a day. - Reposition patient every 3 hours.   - Dangle patient 3 times a day  - Stand patient 3 times a day  - Ambulate patient 3 times a day  - Out of bed to chair 3 times a day   - Out of bed for meals 3 times a day  - Out of bed for toileting  - Record patient progress and toleration of activity level   Outcome: Progressing     Problem: DISCHARGE PLANNING  Goal: Discharge to home or other facility with appropriate resources  Description: INTERVENTIONS:  - Identify barriers to discharge w/patient and caregiver  - Arrange for needed discharge resources and transportation as appropriate  - Identify discharge learning needs (meds, wound care, etc.)  - Arrange for interpretive services to assist at discharge as needed  - Refer to Case Management Department for coordinating discharge planning if the patient needs post-hospital services based on physician/advanced practitioner order or complex needs related to functional status, cognitive ability, or social support system  Outcome: Progressing     Problem: Knowledge Deficit  Goal: Patient/family/caregiver demonstrates understanding of disease process, treatment plan, medications, and discharge instructions  Description: Complete learning assessment and assess knowledge base.   Interventions:  - Provide teaching at level of understanding  - Provide teaching via preferred learning methods  Outcome: Progressing     Problem: Prexisting or High Potential for Compromised Skin Integrity  Goal: Skin integrity is maintained or improved  Description: INTERVENTIONS:  - Identify patients at risk for skin breakdown  - Assess and monitor skin integrity  - Assess and monitor nutrition and hydration status  - Monitor labs   - Assess for incontinence   - Turn and reposition patient  - Assist with mobility/ambulation  - Relieve pressure over bony prominences  - Avoid friction and shearing  - Provide appropriate hygiene as needed including keeping skin clean and dry  - Evaluate need for skin moisturizer/barrier cream  - Collaborate with interdisciplinary team   - Patient/family teaching  - Consider wound care consult   Outcome: Progressing     Problem: GENITOURINARY - ADULT  Goal: Maintains or returns to baseline urinary function  Description: INTERVENTIONS:  - Assess urinary function  - Encourage oral fluids to ensure adequate hydration if ordered  - Administer IV fluids as ordered to ensure adequate hydration  - Administer ordered medications as needed  - Offer frequent toileting  - Follow urinary retention protocol if ordered  Outcome: Progressing     Problem: HEMATOLOGIC - ADULT  Goal: Maintains hematologic stability  Description: INTERVENTIONS  - Assess for signs and symptoms of bleeding or hemorrhage  - Monitor labs  - Administer supportive blood products/factors as ordered and appropriate  Outcome: Progressing

## 2023-08-04 NOTE — ASSESSMENT & PLAN NOTE
· Status post catheter placement. · Continue monitoring urine output and blood count  · Bladder irrigation per urology    8/1-patient placed on CBI again overnight; this morning noted to have possible clots. Ultrasound bladder ordered by urology. Pending results of ultrasound bladder, patient may need cystoscopy moving forward. --- Have held Eliquis at this time; urology following along    8/2-patient continues to have hematuria; discussed with urology today and likely to move forward with procedure tomorrow. Can you Sorensen catheter and holding anticoagulation; urine somewhat improved and hemoglobin labs stable. At issue is consent; will discuss with case management. 8/3-postop day 1 of urological procedure; bright red urine being produced. Instructed patient to notify nursing immediately if he could not urinate. Hold anticoagulation for 48 hours after the urine turns clear. Followed by urology, no further surgery scheduled at this time.

## 2023-08-04 NOTE — TELEPHONE ENCOUNTER
Post Op Note    Hortencia Francis is a 75 y.o. male s/p Left - CYSTO [address] [facility],[address] performed 8/3/2023. Hortencia Francis is a patient of Dr. Alexander Flood and is seen at the [facility]. How would you rate your pain on a scale from 1 to 10, 10 being the worst pain ever? Have you had a fever? Have your bowel movements been regular? Do you have any difficulty urinating? Do you have any other questions or concerns that I can address at this time?     Pt is still admitted will monitor for discharge

## 2023-08-04 NOTE — ASSESSMENT & PLAN NOTE
· Improved after successful SOLA cardioversion on 7/25/23  · Hold anticoagulation for recurrent hematuria  · Rhythm controlled on amiodarone 200 mg twice daily and Cardizem 120 mg daily  · We will transition to amiodarone 200 mg daily on 8/5

## 2023-08-04 NOTE — ASSESSMENT & PLAN NOTE
· Admitted originally due to sepsis from UTI with obstruction  · Status post cystoscopy with difficult stent insertion on 7/6/2023  · Procedure was complicated by inadvertent left ureteral perforation  · CT showed previous ureteral calculus is now in the bladder  · Repeat cystoscopy on 8/3 for definitive treatment, monitor

## 2023-08-04 NOTE — PROGRESS NOTES
UROLOGY PROGRESS NOTE   Patient Identifiers: Cristobal Koehler (MRN [id number])  Date of Service: 8/4/2023    Subjective:     24 HR EVENTS:   no significant events. Patient has  no complaints. Objective:     VITALS:    Vitals:    08/04/23 0702   BP: (!) 171/78   Pulse: 71   Resp: 18   Temp: 97.7 °F (36.5 °C)   SpO2: 96%       INS & OUTS:  I/O last 24 hours:   In: 750 [I.V.:700; IV Piggyback:50]  Out: 995 [Urine:630; Blood:2]    LABS:  Lab Results   Component Value Date    HGB 12.9 08/04/2023    HCT 40.7 08/04/2023    WBC 11.21 (H) 08/04/2023     08/04/2023   ]    Lab Results   Component Value Date    K 5.1 08/04/2023     08/04/2023    CO2 24 08/04/2023    BUN 29 (H) 08/04/2023    CREATININE 1.28 08/04/2023    CALCIUM 9.0 08/04/2023   ]    INPATIENT MEDS:    Current Facility-Administered Medications:   •  acetaminophen (TYLENOL) tablet 975 mg, 975 mg, Oral, Q6H [address], Kate Hernández MD, 975 mg at 08/04/23 0541  •  albuterol inhalation solution 2.5 mg, 2.5 mg, Nebulization, Q6H PRN, Kate Hernández MD, 2.5 mg at 07/12/23 0027  •  aluminum-magnesium hydroxide-simethicone (MYLANTA) oral suspension 30 mL, 30 mL, Oral, Q6H PRN, Kate Hernnádez MD  •  amiodarone tablet 200 mg, 200 mg, Oral, BID With Meals, Kate Hernández MD, 200 mg at 08/03/23 1602  •  atorvastatin (LIPITOR) tablet 40 mg, 40 mg, Oral, Daily With Siria Belle MD, 40 mg at 08/03/23 1602  •  bisacodyl (DULCOLAX) EC tablet 10 mg, 10 mg, Oral, Daily PRN, Kate Hernández MD, 10 mg at 07/31/23 1726  •  diltiazem (CARDIZEM CD) 24 hr capsule 120 mg, 120 mg, Oral, Daily, Kate Hernández MD, 120 mg at 08/03/23 0914  •  fluticasone-vilanterol 200-25 mcg/actuation 1 puff, 1 puff, Inhalation, Daily, Kate Hernández MD, 1 puff at 43/75/72 0866  •  folic acid (FOLVITE) tablet 1 mg, 1 mg, Oral, Daily, Kate Hernández MD, 1 mg at 08/03/23 0914  •  guaiFENesin (ROBITUSSIN) oral liquid 200 mg, 200 mg, Oral, Q4H PRN, Kate Hernández, MD, 200 mg at 07/30/23 1717  •  hydrOXYzine HCL (ATARAX) tablet 50 mg, 50 mg, Oral, HS PRN, Barbra Rueda MD, 50 mg at 07/27/23 2144  •  lidocaine (LIDODERM) 5 % patch 1 patch, 1 patch, Topical, Daily, Barbra Rueda MD, 1 patch at 07/31/23 1444  •  melatonin tablet 6 mg, 6 mg, Oral, HS, Barbra Rueda MD, 6 mg at 08/03/23 2159  •  methocarbamol (ROBAXIN) tablet 750 mg, 750 mg, Oral, Q8H [address], Barbra Rueda MD, 750 mg at 08/04/23 0540  •  multivitamin-minerals (CENTRUM) tablet 1 tablet, 1 tablet, Oral, Daily, Barbra Rueda MD, 1 tablet at 08/03/23 0914  •  nicotine (NICODERM CQ) 14 mg/24hr TD 24 hr patch 1 patch, 1 patch, Transdermal, Daily, Barbra Rueda MD, 1 patch at 07/21/23 1125  •  pantoprazole (PROTONIX) EC tablet 40 mg, 40 mg, Oral, Early Morning, Barbra Rueda MD, 40 mg at 08/04/23 0541  •  polyethylene glycol (MIRALAX) packet 17 g, 17 g, Oral, Daily, Barbra Rueda MD, 17 g at 07/30/23 7891  •  senna (SENOKOT) tablet 17.2 mg, 2 tablet, Oral, HS, Barbra Rueda MD, 17.2 mg at 07/29/23 2121  •  thiamine tablet 100 mg, 100 mg, Oral, Daily, Barbra Rueda MD, 100 mg at 08/03/23 3316      Physical Exam:     GEN: alert and oriented x 3    RESP: breathing comfortably with no accessory muscle use    CV: RRR, pulses palpable  ABD: soft, non-tender, non-distended   EXT: no significant peripheral edema   : no suprapubic discomfort or distension, negative CVA tenderness    Assessment:   74 y/o male POD #1 left ureteroscopy and ureteral stent. He is voiding gross hematuria without any blood clots. Nurse reports hematuria looks slightly lighter in color compared to yesterday. Patient denies any difficulties urinating, suprapubic discomfort, flank pain, or dysuria. He did require placement of Sorensen catheter and CBI which has since been discontinued. Eliquis has been on hold. Patient is out of bed and ambulating. He is tolerating regular diet. Patient afebrile, VSS.   WBC 11.21, hemoglobin 12.9, and creatinine 1.28. Plan:   -Patient voiding bright red urine, but otherwise asymptomatic. This is likely secondary to discontinuation of recent anticoagulant and urologic procedure. Recommend checking PVR assessment. Would encourage patient to increase water intake. Hold anticoagulant until urine has remained clear for at least 24 to 48 hours. Patient refusing reinsertion of indwelling Sorensen catheter at this time. Monitor urine output.  -Plan to repeat CT scan in outpatient setting in 2 weeks, followed by cystoscopy and ureteral stent removal    No additional urologic intervention required at this time. Patient will be discharged by primary team.      RN participated in rounds with AP. Plan of care discussed with patient and RN.

## 2023-08-04 NOTE — RESTORATIVE TECHNICIAN NOTE
Restorative Technician Note      Patient Name: Santi Galan     Restorative Tech Visit Date: 08/04/23  Note Type: Mobility  Patient Position Upon Consult: Supine  Mobility / Activity Provided: ambulated with pt, with PT present pt did 20 steps, assisted pt in cleaning up, assisted pt in changing  Activity Performed: Ambulated  Patient Position at End of Consult: Seated edge of bed;  All needs within reach; Bed/Chair alarm activated

## 2023-08-04 NOTE — RESTORATIVE TECHNICIAN NOTE
Restorative Technician Note      Patient Name: Mandie Vital     Restorative Tech Visit Date: 08/04/23  Note Type: Mobility  Patient Position Upon Consult: Bedside chair  Mobility / Activity Provided: ambulated with pt, with PT present pt did 20 steps, assisted pt in cleaning up, assisted pt in changing  Activity Performed: Ambulated  Assistive Device: Roller walker  Patient Position at End of Consult: Supine;  All needs within reach

## 2023-08-04 NOTE — NURSING NOTE
Pt still with hematuria, this writer made on-call urology (Jennifer Engle.) made ware. Verbal order to hold a sample with time for urology. Will continue to monitor.

## 2023-08-04 NOTE — PROGRESS NOTES
[facility]  Progress Note  Name: Caty Sanderson I  MRN: [id number]  Unit/Bed#: E4 -01 I Date of Admission: 7/6/2023   Date of Service: 8/4/2023 I Hospital Day: 29    Assessment/Plan   Gross hematuria  Assessment & Plan  · Status post catheter placement. · Continue monitoring urine output and blood count  · Bladder irrigation per urology    8/1-patient placed on CBI again overnight; this morning noted to have possible clots. Ultrasound bladder ordered by urology. Pending results of ultrasound bladder, patient may need cystoscopy moving forward. --- Have held Eliquis at this time; urology following along    8/2-patient continues to have hematuria; discussed with urology today and likely to move forward with procedure tomorrow. Can you Sorensen catheter and holding anticoagulation; urine somewhat improved and hemoglobin labs stable. At issue is consent; will discuss with case management. 8/3-postop day 1 of urological procedure; bright red urine being produced. Instructed patient to notify nursing immediately if he could not urinate. Hold anticoagulation for 48 hours after the urine turns clear. Followed by urology, no further surgery scheduled at this time.     Atrial fibrillation (HCC)  Assessment & Plan  · Improved after successful SOLA cardioversion on 7/25/23  · Hold anticoagulation for recurrent hematuria  · Rhythm controlled on amiodarone 200 mg twice daily and Cardizem 120 mg daily  · We will transition to amiodarone 200 mg daily on 8/5    Impaired decision making  Assessment & Plan  This week repeat neuropsych evaluation was done  on 7/24/2023  He continues to have impaired decision-making capacity  Guardianship process initiated by case management     Daily consumption of alcohol  Assessment & Plan  He stated earlier that he drank beers on a daily basis  Clinically no withdrawal.  Continue thiamine and folic acid supplementation    Bacteremia  Assessment & Plan  · Aerococcus bacteremia due to complicated UTI   · Resolved   · Antibiotic course completed     Hydronephrosis with obstructing calculus  Assessment & Plan  · Admitted originally due to sepsis from UTI with obstruction  · Status post cystoscopy with difficult stent insertion on 7/6/2023  · Procedure was complicated by inadvertent left ureteral perforation  · CT showed previous ureteral calculus is now in the bladder  · Repeat cystoscopy on 8/3 for definitive treatment, monitor    Esophageal abnormality  Assessment & Plan  He had incidental finding on CT with diffuse esophageal thickening could be from esophagitis  Esophagitis probably from alcohol abuse  Continue empiric Protonix  Nonurgent GI follow-up    Elevated troponin  Assessment & Plan  · Non-MI troponin elevation secondary to tachycardia and sepsis  · Continue aspirin and Lipitor    COPD (chronic obstructive pulmonary disease) ([facility])  Assessment & Plan  · Stable. · Continue Breo 1 puff daily  · Continue albuterol as needed    Tobacco use  Assessment & Plan  · Smokes 6 cigarettes daily. Apparently plans to quit. · Nicotine TD patch  · Tobacco counseling provided     * Sepsis secondary to UTI [facility])  Assessment & Plan  · Sepsis secondary to Enterococcus bacteremia and complicated UTI  · Resolved  · Antibiotic course complete         VTE Pharmacologic Prophylaxis:   Pharmacologic: Pharmacologic VTE Prophylaxis contraindicated due to Hematuria  Mechanical VTE Prophylaxis in Place: Yes    Patient Centered Rounds: I have performed bedside rounds with nursing staff today. Discussions with Specialists or Other Care Team Provider:     Education and Discussions with Family / Patient: Care plan discussed with patient who voiced understanding and agrees with recommendations. Time Spent for Care: 30 minutes. More than 50% of total time spent on counseling and coordination of care as described above.     Current Length of Stay: 29 day(s)    Current Patient Status: Inpatient   Certification Statement: The patient will continue to require additional inpatient hospital stay due to Awaiting placement    Discharge Plan: To be determined    Code Status: Level 1 - Full Code      Subjective:   Patient seen and examined at bedside, no acute distress or discomfort noted. Patient still producing bright red urine. Followed by urology, monitor and reconsult urology as needed. Hemoglobin stable and patient appears comfortable. Still awaiting guardianship and placement. Objective:     Vitals:   Temp (24hrs), Av °F (36.7 °C), Min:97.7 °F (36.5 °C), Max:98.3 °F (36.8 °C)    Temp:  [97.7 °F (36.5 °C)-98.3 °F (36.8 °C)] 97.9 °F (36.6 °C)  HR:  [69-75] 75  Resp:  [18] 18  BP: (140-171)/(73-83) 140/73  SpO2:  [91 %-96 %] 95 %  Body mass index is 28 kg/m². Input and Output Summary (last 24 hours): Intake/Output Summary (Last 24 hours) at 2023 1709  Last data filed at 2023 1001  Gross per 24 hour   Intake --   Output 830 ml   Net -830 ml       Physical Exam:     Physical Exam  Vitals and nursing note reviewed. Constitutional:       General: He is not in acute distress. Appearance: He is well-developed. HENT:      Head: Normocephalic and atraumatic. Eyes:      Conjunctiva/sclera: Conjunctivae normal.   Cardiovascular:      Rate and Rhythm: Normal rate. Heart sounds: No murmur heard. Pulmonary:      Effort: Pulmonary effort is normal. No respiratory distress. Abdominal:      Palpations: Abdomen is soft. Tenderness: There is no abdominal tenderness. Musculoskeletal:         General: Tenderness (Tenderness to palpation right SI joint) present. No swelling. Cervical back: Neck supple. Skin:     General: Skin is warm and dry. Neurological:      Mental Status: He is alert.    Psychiatric:         Mood and Affect: Mood normal.           Additional Data:     Labs:    Results from last 7 days   Lab Units 23  0554   WBC Thousand/uL 11.21*   HEMOGLOBIN g/dL 12.9   HEMATOCRIT % 40.7   PLATELETS Thousands/uL 321   NEUTROS PCT % 84*   LYMPHS PCT % 10*   MONOS PCT % 5   EOS PCT % 0     Results from last 7 days   Lab Units 08/04/23  0554   SODIUM mmol/L 134*   POTASSIUM mmol/L 5.1   CHLORIDE mmol/L 101   CO2 mmol/L 24   BUN mg/dL 29*   CREATININE mg/dL 1.28   ANION GAP mmol/L 9   CALCIUM mg/dL 9.0   ALBUMIN g/dL 3.4*   TOTAL BILIRUBIN mg/dL 0.28   ALK PHOS U/L 85   ALT U/L 16   AST U/L 15   GLUCOSE RANDOM mg/dL 152*                           * I Have Reviewed All Lab Data Listed Above. * Additional Pertinent Lab Tests Reviewed:  All Labs Within Last 24 Hours Reviewed    Imaging:    Imaging Reports Reviewed Today Include:   Imaging Personally Reviewed by Myself Includes:      Recent Cultures (last 7 days):           Last 24 Hours Medication List:   Current Facility-Administered Medications   Medication Dose Route Frequency Provider Last Rate   • acetaminophen  975 mg Oral Q6H [facility] Christelle Gonsalves MD     • albuterol  2.5 mg Nebulization Q6H PRN Christelle Gonsalves MD     • aluminum-magnesium hydroxide-simethicone  30 mL Oral Q6H PRN Christelle Gonsalves MD     • amiodarone  200 mg Oral BID With Meals Christelle Gonsalves MD     • [START ON 8/6/2023] amiodarone  200 mg Oral Daily With Breakfast Donald Zuniga MD     • atorvastatin  40 mg Oral Daily With Michaela Ricci MD     • bisacodyl  10 mg Oral Daily PRN Christelle Gonsalves MD     • diltiazem  120 mg Oral Daily Christelle Gonsalves MD     • fluticasone-vilanterol  1 puff Inhalation Daily Christelle Gonsalves MD     • folic acid  1 mg Oral Daily Christelle Gonsalves MD     • guaiFENesin  200 mg Oral Q4H PRN Christelle Gonsalves MD     • hydrOXYzine HCL  50 mg Oral HS PRN Christelle Gonsalves MD     • lidocaine  1 patch Topical Daily Christelle Gonsalves MD     • melatonin  6 mg Oral HS Christelle Gonsalves MD     • methocarbamol  750 mg Oral [facility] Christelle Gonsalves MD     • multivitamin-minerals  1 tablet Oral Daily Tully Gosselin Fariba Paulion MD     • nicotine  1 patch Transdermal Daily Christelle Gonsalves MD     • pantoprazole  40 mg Oral Early Morning Christelle Gonsalves MD     • polyethylene glycol  17 g Oral Daily Christelle Gonsalves MD     • senna  2 tablet Oral HS Christelle Gonsalves MD     • thiamine  100 mg Oral Daily Christelle Gonsalves MD          Today, Patient Was Seen By: Yadira Dozier MD    ** Please Note: Dictation voice to text software may have been used in the creation of this document.  **

## 2023-08-04 NOTE — PLAN OF CARE
Problem: Potential for Falls  Goal: Patient will remain free of falls  Description: INTERVENTIONS:  - Educate patient/family on patient safety including physical limitations  - Instruct patient to call for assistance with activity   - Consult OT/PT to assist with strengthening/mobility   - Keep Call bell within reach  - Keep bed low and locked with side rails adjusted as appropriate  - Keep care items and personal belongings within reach  - Initiate and maintain comfort rounds  - Make Fall Risk Sign visible to staff  - Offer Toileting every shift Hours, in advance of need  - Initiate/Maintain bed alarm  - Obtain necessary fall risk management equipment: call bell  Problem: MOBILITY - ADULT  Goal: Maintain or return to baseline ADL function  Description: INTERVENTIONS:  -  Assess patient's ability to carry out ADLs; assess patient's baseline for ADL function and identify physical deficits which impact ability to perform ADLs (bathing, care of mouth/teeth, toileting, grooming, dressing, etc.)  - Assess/evaluate cause of self-care deficits   - Assess range of motion  - Assess patient's mobility; develop plan if impaired  - Assess patient's need for assistive devices and provide as appropriate  - Encourage maximum independence but intervene and supervise when necessary  - Involve family in performance of ADLs  - Assess for home care needs following discharge   - Consider OT consult to assist with ADL evaluation and planning for discharge  - Provide patient education as appropriate  8/4/2023 0010 by Shamir Telles RN  Outcome: Progressing  8/4/2023 0009 by Shamir Telles RN  Outcome: Progressing  Goal: Maintains/Returns to pre admission functional level  Description: INTERVENTIONS:  - Perform BMAT or MOVE assessment daily.   - Set and communicate daily mobility goal to care team and patient/family/caregiver.    - Collaborate with rehabilitation services on mobility goals if consulted  - Perform Range of Motion 3 times a day.  - Reposition patient every 2 hours.   - Dangle patient 3 times a day  - Stand patient 3 times a day  - Ambulate patient 3 times a day  - Out of bed to chair 3 times a day   - Out of bed for meals 3  Problem: PAIN - ADULT  Goal: Verbalizes/displays adequate comfort level or baseline comfort level  Description: Interventions:  - Encourage patient to monitor pain and request assistance  - Assess pain using appropriate pain scale  - Administer analgesics based on type and severity of pain and evaluate response  - Implement non-pharmacological measures as appropriate and evaluate response  - Consider cultural and social influences on pain and pain management  - Notify physician/advanced practitioner if interventions unsuccessful or patient reports new pain  8/4/2023 0010 by Smith Masterson RN  Outcome: Progressing  8/4/2023 0009 by Smith Masterson RN  Outcome: Progressing     Problem: INFECTION - ADULT  Goal: Absence or prevention of progression during hospitalization  Description: INTERVENTIONS:  - Assess and monitor for signs and symptoms of infection  - Monitor lab/diagnostic results  - Monitor all insertion sites, i.e. indwelling lines, tubes, and drains  - Monitor endotracheal if appropriate and nasal secretions for changes in amount and color  - [place] appropriate cooling/warming therapies per order  - Administer medications as ordered  - Instruct and encourage patient and family to use good hand hygiene technique  - Identify and instruct in appropriate isolation precautions for identified infection/condition  8/4/2023 0010 by Smith Masterson RN  Outcome: Progressing  8/4/2023 0009 by Smith Masterson RN  Outcome: Progressing  Goal: Absence of fever/infection during neutropenic period  Description: INTERVENTIONS:  - Monitor WBC    8/4/2023 0010 by Smith Masterson RN  Outcome: Progressing  8/4/2023 0009 by Smith Masterson RN  Outcome: Progressing     Problem: SAFETY ADULT  Goal: Patient will remain free of falls  Description: INTERVENTIONS:  - Educate patient/family on patient safety including physical limitations  - Instruct patient to call for assistance with activity   - Consult OT/PT to assist with strengthening/mobility   - Keep Call bell within reach  - Keep bed low and locked with side rails adjusted as appropriate  - Keep care items and personal belongings within reach  - Initiate and maintain comfort rounds  - Make Fall Risk Sign visible to staff  - Offer Toileting every 2 Hours, in advance of need  - Initiate/Maintain bed alarm  - Obtain necessary fall risk management equipment: call bell  - Apply yellow socks and bracelet for high fall risk patients  - Consider moving patient to room near nurses station  8/4/2023 0010 by Maine Weir RN  Outcome: Progressing  8/4/2023 0009 by Maine Weir RN  Outcome: Progressing  Goal: Maintain or return to baseline ADL function  Description: INTERVENTIONS:  -  Assess patient's ability to carry out ADLs; assess patient's baseline for ADL function and identify physical deficits which impact ability to perform ADLs (bathing, care of mouth/teeth, toileting, grooming, dressing, etc.)  - Assess/evaluate cause of self-care deficits   - Assess range of motion  - Assess patient's mobility; develop plan if impaired  - Assess patient's need for assistive devices and provide as appropriate  - Encourage maximum independence but intervene and supervise when necessary  - Involve family in performance of ADLs  - Assess for home care needs following discharge   - Consider OT consult to assist with ADL evaluation and planning for discharge  - Provide patient education as appropriate  8/4/2023 0010 by Maine Weir RN  Outcome: Progressing  8/4/2023 0009 by Maine Weir RN  Outcome: Progressing  Goal: Maintains/Returns to pre admission functional level  Description: INTERVENTIONS:  - Perform BMAT or MOVE assessment daily.   - Set and communicate daily mobility goal to care team and patient/family/caregiver.    - Collaborate with rehabilitation services on mobility goals if consulted  - Perform Range of Motion 3 times a day. - Reposition patient every 2 hours. - Dangle patient 3 times a day  - Stand patient 3 times a day  - Ambulate patient 3 times a day  - Out of bed to chair 3 times a day   - Out of bed for meals 3  Problem: DISCHARGE PLANNING  Goal: Discharge to home or other facility with appropriate resources  Description: INTERVENTIONS:  - Identify barriers to discharge w/patient and caregiver  - Arrange for needed discharge resources and transportation as appropriate  - Identify discharge learning needs (meds, wound care, etc.)  - Arrange for interpretive services to assist at discharge as needed  - Refer to Case Management Department for coordinating discharge planning if the patient needs post-hospital services based on physician/advanced practitioner order or complex needs related to functional status, cognitive ability, or social support system  8/4/2023 0010 by Jareth Emanuel RN  Outcome: Progressing  8/4/2023 0009 by Jareth Emanuel RN  Outcome: Progressing     Problem: Knowledge Deficit  Goal: Patient/family/caregiver demonstrates understanding of disease process, treatment plan, medications, and discharge instructions  Description: Complete learning assessment and assess knowledge base.   Interventions:  - Provide teaching at level of understanding  - Provide teaching via preferred learning methods  8/4/2023 0010 by Jareth Emanuel RN  Outcome: Progressing  8/4/2023 0009 by Jareth Emanuel RN  Outcome: Progressing     Problem: Prexisting or High Potential for Compromised Skin Integrity  Goal: Skin integrity is maintained or improved  Description: INTERVENTIONS:  - Identify patients at risk for skin breakdown  - Assess and monitor skin integrity  - Assess and monitor nutrition and hydration status  - Monitor labs   - Assess for incontinence   - Turn and reposition patient  - Assist with mobility/ambulation  - Relieve pressure over bony prominences  - Avoid friction and shearing  - Provide appropriate hygiene as needed including keeping skin clean and dry  - Evaluate need for skin moisturizer/barrier cream  - Collaborate with interdisciplinary team   - Patient/family teaching  - Consider wound care consult   8/4/2023 0010 by Marco Figueroa RN  Outcome: Progressing  8/4/2023 0009 by Marco Figueroa RN  Outcome: Progressing     Problem: GENITOURINARY - ADULT  Goal: Maintains or returns to baseline urinary function  Description: INTERVENTIONS:  - Assess urinary function  - Encourage oral fluids to ensure adequate hydration if ordered  - Administer IV fluids as ordered to ensure adequate hydration  - Administer ordered medications as needed  - Offer frequent toileting  - Follow urinary retention protocol if ordered  Outcome: Not Progressing     Problem: HEMATOLOGIC - ADULT  Goal: Maintains hematologic stability  Description: INTERVENTIONS  - Assess for signs and symptoms of bleeding or hemorrhage  - Monitor labs  - Administer supportive blood products/factors as ordered and appropriate  Outcome: Progressing    times a day  - Out of bed for toileting  - Record patient progress and toleration of activity level   8/4/2023 0010 by Marco Figueroa RN  Outcome: Progressing  8/4/2023 0009 by Marco Figueroa RN  Outcome: Progressing    times a day  - Out of bed for toileting  - Record patient progress and toleration of activity level   8/4/2023 0010 by Marco Figueroa RN  Outcome: Progressing  8/4/2023 0009 by Marco Figueroa RN  Outcome: Progressing     - Apply yellow socks and bracelet for high fall risk patients  - Consider moving patient to room near nurses station  8/4/2023 0010 by Marco Figueroa RN  Outcome: Progressing  8/4/2023 0009 by Marco Figueroa RN  Outcome: Progressing

## 2023-08-05 LAB
BASOPHILS # BLD AUTO: 0.05 THOUSANDS/ÂΜL (ref 0–0.1)
BASOPHILS NFR BLD AUTO: 0 % (ref 0–1)
EOSINOPHIL # BLD AUTO: 0.02 THOUSAND/ÂΜL (ref 0–0.61)
EOSINOPHIL NFR BLD AUTO: 0 % (ref 0–6)
ERYTHROCYTE [DISTWIDTH] IN BLOOD BY AUTOMATED COUNT: 13.2 % (ref 11.6–15.1)
HCT VFR BLD AUTO: 38 % (ref 36.5–49.3)
HGB BLD-MCNC: 12.4 G/DL (ref 12–17)
IMM GRANULOCYTES # BLD AUTO: 0.12 THOUSAND/UL (ref 0–0.2)
IMM GRANULOCYTES NFR BLD AUTO: 1 % (ref 0–2)
LYMPHOCYTES # BLD AUTO: 1.98 THOUSANDS/ÂΜL (ref 0.6–4.47)
LYMPHOCYTES NFR BLD AUTO: 13 % (ref 14–44)
MCH RBC QN AUTO: 32.2 PG (ref 26.8–34.3)
MCHC RBC AUTO-ENTMCNC: 32.6 G/DL (ref 31.4–37.4)
MCV RBC AUTO: 99 FL (ref 82–98)
MONOCYTES # BLD AUTO: 1.18 THOUSAND/ÂΜL (ref 0.17–1.22)
MONOCYTES NFR BLD AUTO: 8 % (ref 4–12)
NEUTROPHILS # BLD AUTO: 12.4 THOUSANDS/ÂΜL (ref 1.85–7.62)
NEUTS SEG NFR BLD AUTO: 78 % (ref 43–75)
NRBC BLD AUTO-RTO: 0 /100 WBCS
PLATELET # BLD AUTO: 327 THOUSANDS/UL (ref 149–390)
PMV BLD AUTO: 10.1 FL (ref 8.9–12.7)
RBC # BLD AUTO: 3.85 MILLION/UL (ref 3.88–5.62)
WBC # BLD AUTO: 15.75 THOUSAND/UL (ref 4.31–10.16)

## 2023-08-05 PROCEDURE — 99232 SBSQ HOSP IP/OBS MODERATE 35: CPT | Performed by: INTERNAL MEDICINE

## 2023-08-05 PROCEDURE — 85025 COMPLETE CBC W/AUTO DIFF WBC: CPT | Performed by: INTERNAL MEDICINE

## 2023-08-05 RX ADMIN — ACETAMINOPHEN 325MG 975 MG: 325 TABLET ORAL at 05:21

## 2023-08-05 RX ADMIN — ATORVASTATIN CALCIUM 40 MG: 40 TABLET, FILM COATED ORAL at 17:43

## 2023-08-05 RX ADMIN — FOLIC ACID 1 MG: 1 TABLET ORAL at 08:59

## 2023-08-05 RX ADMIN — METHOCARBAMOL TABLETS 750 MG: 500 TABLET, COATED ORAL at 05:22

## 2023-08-05 RX ADMIN — METHOCARBAMOL TABLETS 750 MG: 500 TABLET, COATED ORAL at 21:20

## 2023-08-05 RX ADMIN — MELATONIN 6 MG: at 21:20

## 2023-08-05 RX ADMIN — FLUTICASONE FUROATE AND VILANTEROL TRIFENATATE 1 PUFF: 200; 25 POWDER RESPIRATORY (INHALATION) at 08:59

## 2023-08-05 RX ADMIN — THIAMINE HCL TAB 100 MG 100 MG: 100 TAB at 08:59

## 2023-08-05 RX ADMIN — Medication 1 TABLET: at 08:59

## 2023-08-05 RX ADMIN — METHOCARBAMOL TABLETS 750 MG: 500 TABLET, COATED ORAL at 15:25

## 2023-08-05 RX ADMIN — ACETAMINOPHEN 325MG 975 MG: 325 TABLET ORAL at 17:43

## 2023-08-05 RX ADMIN — AMIODARONE HYDROCHLORIDE 200 MG: 200 TABLET ORAL at 09:00

## 2023-08-05 RX ADMIN — DILTIAZEM HYDROCHLORIDE 120 MG: 120 CAPSULE, COATED, EXTENDED RELEASE ORAL at 08:59

## 2023-08-05 RX ADMIN — PANTOPRAZOLE SODIUM 40 MG: 40 TABLET, DELAYED RELEASE ORAL at 05:22

## 2023-08-05 NOTE — PROGRESS NOTES
[facility]  Progress Note  Name: Suresh Espinal I  MRN: [id number]  Unit/Bed#: E4 -01 I Date of Admission: 7/6/2023   Date of Service: 8/5/2023 I Hospital Day: 30    Assessment/Plan   Gross hematuria  Assessment & Plan  · Status post catheter placement. · Continue monitoring urine output and blood count  · Bladder irrigation per urology    8/1-patient placed on CBI again overnight; this morning noted to have possible clots. Ultrasound bladder ordered by urology. Pending results of ultrasound bladder, patient may need cystoscopy moving forward. --- Have held Eliquis at this time; urology following along    8/2-patient continues to have hematuria; discussed with urology today and likely to move forward with procedure tomorrow. Can you Sorensen catheter and holding anticoagulation; urine somewhat improved and hemoglobin labs stable. At issue is consent; will discuss with case management. 8/3-postop day 1 of urological procedure; bright red urine being produced. Instructed patient to notify nursing immediately if he could not urinate. Hold anticoagulation for 48 hours after the urine turns clear. Followed by urology, no further surgery scheduled at this time.     Atrial fibrillation (HCC)  Assessment & Plan  · Improved after successful SOLA cardioversion on 7/25/23  · Hold anticoagulation for recurrent hematuria  · Rhythm controlled on amiodarone 200 mg twice daily and Cardizem 120 mg daily  · We will transition to amiodarone 200 mg daily on 8/5    Impaired decision making  Assessment & Plan  This week repeat neuropsych evaluation was done  on 7/24/2023  He continues to have impaired decision-making capacity  Guardianship process initiated by case management     Daily consumption of alcohol  Assessment & Plan  He stated earlier that he drank beers on a daily basis  Clinically no withdrawal.  Continue thiamine and folic acid supplementation    Bacteremia  Assessment & Plan  · Aerococcus bacteremia due to complicated UTI   · Resolved   · Antibiotic course completed     Hydronephrosis with obstructing calculus  Assessment & Plan  · Admitted originally due to sepsis from UTI with obstruction  · Status post cystoscopy with difficult stent insertion on 7/6/2023  · Procedure was complicated by inadvertent left ureteral perforation  · CT showed previous ureteral calculus is now in the bladder  · Repeat cystoscopy on 8/3 for definitive treatment, monitor    Esophageal abnormality  Assessment & Plan  He had incidental finding on CT with diffuse esophageal thickening could be from esophagitis  Esophagitis probably from alcohol abuse  Continue empiric Protonix  Nonurgent GI follow-up    Elevated troponin  Assessment & Plan  · Non-MI troponin elevation secondary to tachycardia and sepsis  · Continue aspirin and Lipitor    COPD (chronic obstructive pulmonary disease) ([facility])  Assessment & Plan  · Stable. · Continue Breo 1 puff daily  · Continue albuterol as needed    Tobacco use  Assessment & Plan  · Smokes 6 cigarettes daily. Apparently plans to quit. · Nicotine TD patch  · Tobacco counseling provided     * Sepsis secondary to UTI [facility])  Assessment & Plan  · Sepsis secondary to Enterococcus bacteremia and complicated UTI  · Resolved  · Antibiotic course complete           VTE Pharmacologic Prophylaxis:   Pharmacologic: Pharmacologic VTE Prophylaxis contraindicated due to Hematuria  Mechanical VTE Prophylaxis in Place: Yes    Patient Centered Rounds: I have performed bedside rounds with nursing staff today. Discussions with Specialists or Other Care Team Provider:     Education and Discussions with Family / Patient: Care plan discussed with patient who voiced understanding and agrees with recommendations. Time Spent for Care: 30 minutes. More than 50% of total time spent on counseling and coordination of care as described above.     Current Length of Stay: 30 day(s)    Current Patient Status: Inpatient   Certification Statement: The patient will continue to require additional inpatient hospital stay due to Awaiting guardianship and placement    Discharge Plan: To be determined    Code Status: Level 1 - Full Code      Subjective:   Patient seen and examined bedside, no acute distress or discomfort noted. Patient with yellow urine today which is an improvement; denies any urinary symptoms. White count was noted to be elevated; will monitor. At this point, likely secondary to acute phase reactant in the setting of recent surgery. Afebrile and will monitor on morning labs. Objective:     Vitals:   Temp (24hrs), Av.8 °F (36.6 °C), Min:97.4 °F (36.3 °C), Max:98 °F (36.7 °C)    Temp:  [97.4 °F (36.3 °C)-98 °F (36.7 °C)] 97.4 °F (36.3 °C)  HR:  [] 85  Resp:  [18] 18  BP: (140-148)/(70-81) 145/81  SpO2:  [95 %-97 %] 97 %  Body mass index is 28 kg/m². Input and Output Summary (last 24 hours): Intake/Output Summary (Last 24 hours) at 2023 1336  Last data filed at 2023 8310  Gross per 24 hour   Intake 600 ml   Output 550 ml   Net 50 ml       Physical Exam:     Physical Exam  Vitals and nursing note reviewed. Constitutional:       General: He is not in acute distress. Appearance: He is well-developed. HENT:      Head: Normocephalic and atraumatic. Eyes:      Conjunctiva/sclera: Conjunctivae normal.   Cardiovascular:      Rate and Rhythm: Normal rate. Heart sounds: No murmur heard. Pulmonary:      Effort: Pulmonary effort is normal. No respiratory distress. Abdominal:      Palpations: Abdomen is soft. Tenderness: There is no abdominal tenderness. Musculoskeletal:         General: Tenderness (Tenderness to palpation right SI joint) present. No swelling. Cervical back: Neck supple. Skin:     General: Skin is warm and dry. Neurological:      Mental Status: He is alert.    Psychiatric:         Mood and Affect: Mood normal.           Additional Data:     Labs:    Results from last 7 days   Lab Units 08/05/23  0439   WBC Thousand/uL 15.75*   HEMOGLOBIN g/dL 12.4   HEMATOCRIT % 38.0   PLATELETS Thousands/uL 327   NEUTROS PCT % 78*   LYMPHS PCT % 13*   MONOS PCT % 8   EOS PCT % 0     Results from last 7 days   Lab Units 08/04/23  0554   SODIUM mmol/L 134*   POTASSIUM mmol/L 5.1   CHLORIDE mmol/L 101   CO2 mmol/L 24   BUN mg/dL 29*   CREATININE mg/dL 1.28   ANION GAP mmol/L 9   CALCIUM mg/dL 9.0   ALBUMIN g/dL 3.4*   TOTAL BILIRUBIN mg/dL 0.28   ALK PHOS U/L 85   ALT U/L 16   AST U/L 15   GLUCOSE RANDOM mg/dL 152*                           * I Have Reviewed All Lab Data Listed Above. * Additional Pertinent Lab Tests Reviewed:  All Labs Within Last 24 Hours Reviewed    Imaging:    Imaging Reports Reviewed Today Include:   Imaging Personally Reviewed by Myself Includes:      Recent Cultures (last 7 days):           Last 24 Hours Medication List:   Current Facility-Administered Medications   Medication Dose Route Frequency Provider Last Rate   • acetaminophen  975 mg Oral Q6H [address] Kalpana Tomlin MD     • albuterol  2.5 mg Nebulization Q6H PRN Kalpana Tomlin MD     • aluminum-magnesium hydroxide-simethicone  30 mL Oral Q6H PRN Kalpana Tomlin MD     • [START ON 8/6/2023] amiodarone  200 mg Oral Daily With Breakfast Karin Fregoso MD     • atorvastatin  40 mg Oral Daily With Nicole Lynn MD     • bisacodyl  10 mg Oral Daily PRN Kalpana Tomlin MD     • diltiazem  120 mg Oral Daily Kalpana Tomlin MD     • fluticasone-vilanterol  1 puff Inhalation Daily Kalpana Tomlin MD     • folic acid  1 mg Oral Daily Kalpana Tomlin MD     • guaiFENesin  200 mg Oral Q4H PRN Kalpana Tomlin MD     • hydrOXYzine HCL  50 mg Oral HS PRN Kalpana Tomlin MD     • lidocaine  1 patch Topical Daily Kalpana Tomlin MD     • melatonin  6 mg Oral HS Kalpana Tomlin MD     • methocarbamol  750 mg Oral [facility] Kalpana Tomlin MD     • multivitamin-minerals  1 tablet Oral Daily Kalpana Tomlin MD     • nicotine  1 patch Transdermal Daily Kalpana Tomlin MD     • pantoprazole  40 mg Oral Early Morning Kalpana Tomlin MD     • polyethylene glycol  17 g Oral Daily Kalpana Tomlin MD     • senna  2 tablet Oral HS Kalpana Tomlin MD     • thiamine  100 mg Oral Daily Kalpana Tomlin MD          Today, Patient Was Seen By: Sary Bardales MD    ** Please Note: Dictation voice to text software may have been used in the creation of this document.  **

## 2023-08-05 NOTE — ASSESSMENT & PLAN NOTE
----- Message from [place] MA sent at 10/16/2020  1:31 PM CDT -----  Hey patient called stating she thought you were suppose to give her something for pain for after surgery. Did not know if you want to send it in. She is not coming in for preop because you already signed consents with her.      · Stable.   · Continue Breo 1 puff daily  · Continue albuterol as needed

## 2023-08-05 NOTE — PLAN OF CARE
Problem: Potential for Falls  Goal: Patient will remain free of falls  Description: INTERVENTIONS:  - Educate patient/family on patient safety including physical limitations  - Instruct patient to call for assistance with activity   - Consult OT/PT to assist with strengthening/mobility   - Keep Call bell within reach  - Keep bed low and locked with side rails adjusted as appropriate  - Keep care items and personal belongings within reach  - Initiate and maintain comfort rounds  - Make Fall Risk Sign visible to staff  - Offer Toileting every 2 Hours, in advance of need  - Initiate/Maintain bed alarm  - Obtain necessary fall risk management equipment:   - Apply yellow socks and bracelet for high fall risk patients  - Consider moving patient to room near nurses station  Outcome: Progressing     Problem: MOBILITY - ADULT  Goal: Maintain or return to baseline ADL function  Description: INTERVENTIONS:  -  Assess patient's ability to carry out ADLs; assess patient's baseline for ADL function and identify physical deficits which impact ability to perform ADLs (bathing, care of mouth/teeth, toileting, grooming, dressing, etc.)  - Assess/evaluate cause of self-care deficits   - Assess range of motion  - Assess patient's mobility; develop plan if impaired  - Assess patient's need for assistive devices and provide as appropriate  - Encourage maximum independence but intervene and supervise when necessary  - Involve family in performance of ADLs  - Assess for home care needs following discharge   - Consider OT consult to assist with ADL evaluation and planning for discharge  - Provide patient education as appropriate  Outcome: Progressing  Goal: Maintains/Returns to pre admission functional level  Description: INTERVENTIONS:  - Perform BMAT or MOVE assessment daily.   - Set and communicate daily mobility goal to care team and patient/family/caregiver.    - Collaborate with rehabilitation services on mobility goals if consulted  - Perform Range of Motion 3 times a day. - Reposition patient every 3 hours.   - Dangle patient 3 times a day  - Stand patient 3 times a day  - Ambulate patient 3 times a day  - Out of bed to chair 3 times a day   - Out of bed for meals 3 times a day  - Out of bed for toileting  - Record patient progress and toleration of activity level   Outcome: Progressing     Problem: PAIN - ADULT  Goal: Verbalizes/displays adequate comfort level or baseline comfort level  Description: Interventions:  - Encourage patient to monitor pain and request assistance  - Assess pain using appropriate pain scale  - Administer analgesics based on type and severity of pain and evaluate response  - Implement non-pharmacological measures as appropriate and evaluate response  - Consider cultural and social influences on pain and pain management  - Notify physician/advanced practitioner if interventions unsuccessful or patient reports new pain  Outcome: Progressing     Problem: INFECTION - ADULT  Goal: Absence or prevention of progression during hospitalization  Description: INTERVENTIONS:  - Assess and monitor for signs and symptoms of infection  - Monitor lab/diagnostic results  - Monitor all insertion sites, i.e. indwelling lines, tubes, and drains  - Monitor endotracheal if appropriate and nasal secretions for changes in amount and color  - [place] appropriate cooling/warming therapies per order  - Administer medications as ordered  - Instruct and encourage patient and family to use good hand hygiene technique  - Identify and instruct in appropriate isolation precautions for identified infection/condition  Outcome: Progressing  Goal: Absence of fever/infection during neutropenic period  Description: INTERVENTIONS:  - Monitor WBC    Outcome: Progressing     Problem: SAFETY ADULT  Goal: Patient will remain free of falls  Description: INTERVENTIONS:  - Educate patient/family on patient safety including physical limitations  - Instruct patient to call for assistance with activity   - Consult OT/PT to assist with strengthening/mobility   - Keep Call bell within reach  - Keep bed low and locked with side rails adjusted as appropriate  - Keep care items and personal belongings within reach  - Initiate and maintain comfort rounds  - Make Fall Risk Sign visible to staff  - Offer Toileting every 2 Hours, in advance of need  - Initiate/Maintain bed alarm  - Obtain necessary fall risk management equipment:   - Apply yellow socks and bracelet for high fall risk patients  - Consider moving patient to room near nurses station  Outcome: Progressing  Goal: Maintain or return to baseline ADL function  Description: INTERVENTIONS:  -  Assess patient's ability to carry out ADLs; assess patient's baseline for ADL function and identify physical deficits which impact ability to perform ADLs (bathing, care of mouth/teeth, toileting, grooming, dressing, etc.)  - Assess/evaluate cause of self-care deficits   - Assess range of motion  - Assess patient's mobility; develop plan if impaired  - Assess patient's need for assistive devices and provide as appropriate  - Encourage maximum independence but intervene and supervise when necessary  - Involve family in performance of ADLs  - Assess for home care needs following discharge   - Consider OT consult to assist with ADL evaluation and planning for discharge  - Provide patient education as appropriate  Outcome: Progressing  Goal: Maintains/Returns to pre admission functional level  Description: INTERVENTIONS:  - Perform BMAT or MOVE assessment daily.   - Set and communicate daily mobility goal to care team and patient/family/caregiver. - Collaborate with rehabilitation services on mobility goals if consulted  - Perform Range of Motion 3 times a day. - Reposition patient every 3 hours.   - Dangle patient 3 times a day  - Stand patient 3 times a day  - Ambulate patient 3 times a day  - Out of bed to chair 3 times a day   - Out of bed for meals 3 times a day  - Out of bed for toileting  - Record patient progress and toleration of activity level   Outcome: Progressing     Problem: DISCHARGE PLANNING  Goal: Discharge to home or other facility with appropriate resources  Description: INTERVENTIONS:  - Identify barriers to discharge w/patient and caregiver  - Arrange for needed discharge resources and transportation as appropriate  - Identify discharge learning needs (meds, wound care, etc.)  - Arrange for interpretive services to assist at discharge as needed  - Refer to Case Management Department for coordinating discharge planning if the patient needs post-hospital services based on physician/advanced practitioner order or complex needs related to functional status, cognitive ability, or social support system  Outcome: Progressing     Problem: Knowledge Deficit  Goal: Patient/family/caregiver demonstrates understanding of disease process, treatment plan, medications, and discharge instructions  Description: Complete learning assessment and assess knowledge base.   Interventions:  - Provide teaching at level of understanding  - Provide teaching via preferred learning methods  Outcome: Progressing     Problem: Prexisting or High Potential for Compromised Skin Integrity  Goal: Skin integrity is maintained or improved  Description: INTERVENTIONS:  - Identify patients at risk for skin breakdown  - Assess and monitor skin integrity  - Assess and monitor nutrition and hydration status  - Monitor labs   - Assess for incontinence   - Turn and reposition patient  - Assist with mobility/ambulation  - Relieve pressure over bony prominences  - Avoid friction and shearing  - Provide appropriate hygiene as needed including keeping skin clean and dry  - Evaluate need for skin moisturizer/barrier cream  - Collaborate with interdisciplinary team   - Patient/family teaching  - Consider wound care consult   Outcome: Progressing     Problem: GENITOURINARY - ADULT  Goal: Maintains or returns to baseline urinary function  Description: INTERVENTIONS:  - Assess urinary function  - Encourage oral fluids to ensure adequate hydration if ordered  - Administer IV fluids as ordered to ensure adequate hydration  - Administer ordered medications as needed  - Offer frequent toileting  - Follow urinary retention protocol if ordered  Outcome: Progressing     Problem: HEMATOLOGIC - ADULT  Goal: Maintains hematologic stability  Description: INTERVENTIONS  - Assess for signs and symptoms of bleeding or hemorrhage  - Monitor labs  - Administer supportive blood products/factors as ordered and appropriate  Outcome: Progressing

## 2023-08-06 PROBLEM — D72.829 LEUKOCYTOSIS: Status: ACTIVE | Noted: 2023-08-06

## 2023-08-06 LAB
BACTERIA UR CULT: ABNORMAL
BACTERIA UR QL AUTO: ABNORMAL /HPF
BASOPHILS # BLD AUTO: 0.06 THOUSANDS/ÂΜL (ref 0–0.1)
BASOPHILS NFR BLD AUTO: 0 % (ref 0–1)
BILIRUB UR QL STRIP: NEGATIVE
CLARITY UR: ABNORMAL
COLOR UR: ABNORMAL
EOSINOPHIL # BLD AUTO: 0.11 THOUSAND/ÂΜL (ref 0–0.61)
EOSINOPHIL NFR BLD AUTO: 1 % (ref 0–6)
ERYTHROCYTE [DISTWIDTH] IN BLOOD BY AUTOMATED COUNT: 13.3 % (ref 11.6–15.1)
GLUCOSE UR STRIP-MCNC: NEGATIVE MG/DL
HCT VFR BLD AUTO: 39.6 % (ref 36.5–49.3)
HGB BLD-MCNC: 12.6 G/DL (ref 12–17)
HGB UR QL STRIP.AUTO: ABNORMAL
IMM GRANULOCYTES # BLD AUTO: 0.07 THOUSAND/UL (ref 0–0.2)
IMM GRANULOCYTES NFR BLD AUTO: 1 % (ref 0–2)
KETONES UR STRIP-MCNC: NEGATIVE MG/DL
LEUKOCYTE ESTERASE UR QL STRIP: ABNORMAL
LYMPHOCYTES # BLD AUTO: 2.68 THOUSANDS/ÂΜL (ref 0.6–4.47)
LYMPHOCYTES NFR BLD AUTO: 18 % (ref 14–44)
MCH RBC QN AUTO: 32 PG (ref 26.8–34.3)
MCHC RBC AUTO-ENTMCNC: 31.8 G/DL (ref 31.4–37.4)
MCV RBC AUTO: 101 FL (ref 82–98)
MONOCYTES # BLD AUTO: 1.36 THOUSAND/ÂΜL (ref 0.17–1.22)
MONOCYTES NFR BLD AUTO: 9 % (ref 4–12)
NEUTROPHILS # BLD AUTO: 10.94 THOUSANDS/ÂΜL (ref 1.85–7.62)
NEUTS SEG NFR BLD AUTO: 71 % (ref 43–75)
NITRITE UR QL STRIP: NEGATIVE
NON-SQ EPI CELLS URNS QL MICRO: ABNORMAL /HPF
NRBC BLD AUTO-RTO: 0 /100 WBCS
PH UR STRIP.AUTO: 8 [PH]
PLATELET # BLD AUTO: 342 THOUSANDS/UL (ref 149–390)
PMV BLD AUTO: 9.4 FL (ref 8.9–12.7)
PROT UR STRIP-MCNC: ABNORMAL MG/DL
RBC # BLD AUTO: 3.94 MILLION/UL (ref 3.88–5.62)
RBC #/AREA URNS AUTO: ABNORMAL /HPF
SP GR UR STRIP.AUTO: 1.02 (ref 1–1.03)
TRI-PHOS CRY URNS QL MICRO: ABNORMAL /HPF
UROBILINOGEN UR STRIP-ACNC: <2 MG/DL
WBC # BLD AUTO: 15.22 THOUSAND/UL (ref 4.31–10.16)
WBC #/AREA URNS AUTO: ABNORMAL /HPF
WBC CLUMPS # UR AUTO: PRESENT /UL

## 2023-08-06 PROCEDURE — 85025 COMPLETE CBC W/AUTO DIFF WBC: CPT | Performed by: INTERNAL MEDICINE

## 2023-08-06 PROCEDURE — 81001 URINALYSIS AUTO W/SCOPE: CPT | Performed by: INTERNAL MEDICINE

## 2023-08-06 PROCEDURE — 99232 SBSQ HOSP IP/OBS MODERATE 35: CPT | Performed by: INTERNAL MEDICINE

## 2023-08-06 RX ADMIN — METHOCARBAMOL TABLETS 750 MG: 500 TABLET, COATED ORAL at 21:55

## 2023-08-06 RX ADMIN — ACETAMINOPHEN 325MG 975 MG: 325 TABLET ORAL at 05:46

## 2023-08-06 RX ADMIN — Medication 1 TABLET: at 08:44

## 2023-08-06 RX ADMIN — FLUTICASONE FUROATE AND VILANTEROL TRIFENATATE 1 PUFF: 200; 25 POWDER RESPIRATORY (INHALATION) at 08:47

## 2023-08-06 RX ADMIN — FOLIC ACID 1 MG: 1 TABLET ORAL at 08:44

## 2023-08-06 RX ADMIN — METHOCARBAMOL TABLETS 750 MG: 500 TABLET, COATED ORAL at 13:36

## 2023-08-06 RX ADMIN — ATORVASTATIN CALCIUM 40 MG: 40 TABLET, FILM COATED ORAL at 17:30

## 2023-08-06 RX ADMIN — ACETAMINOPHEN 325MG 975 MG: 325 TABLET ORAL at 13:36

## 2023-08-06 RX ADMIN — PANTOPRAZOLE SODIUM 40 MG: 40 TABLET, DELAYED RELEASE ORAL at 05:47

## 2023-08-06 RX ADMIN — METHOCARBAMOL TABLETS 750 MG: 500 TABLET, COATED ORAL at 05:47

## 2023-08-06 RX ADMIN — DILTIAZEM HYDROCHLORIDE 120 MG: 120 CAPSULE, COATED, EXTENDED RELEASE ORAL at 08:44

## 2023-08-06 RX ADMIN — THIAMINE HCL TAB 100 MG 100 MG: 100 TAB at 08:44

## 2023-08-06 RX ADMIN — MELATONIN 6 MG: at 21:55

## 2023-08-06 RX ADMIN — AMIODARONE HYDROCHLORIDE 200 MG: 200 TABLET ORAL at 08:48

## 2023-08-06 RX ADMIN — POLYETHYLENE GLYCOL 3350 17 G: 17 POWDER, FOR SOLUTION ORAL at 08:44

## 2023-08-06 NOTE — ASSESSMENT & PLAN NOTE
Patient with a leukocytosis for the last 2 days; unclear etiology  Afebrile and no other SIRS criteria; initially felt to be secondary to surgical procedure  Monitor off antibiotics; will obtain UA and procalcitonin  Appears to be trending down; monitor and consult hematology as needed unknown

## 2023-08-06 NOTE — PROGRESS NOTES
[facility]  Progress Note  Name: Yandy Mccoy I  MRN: [id number]  Unit/Bed#: E4 -01 I Date of Admission: 7/6/2023   Date of Service: 8/6/2023 I Hospital Day: 31    Assessment/Plan   Leukocytosis  Assessment & Plan  Patient with a leukocytosis for the last 2 days; unclear etiology  Afebrile and no other SIRS criteria; initially felt to be secondary to surgical procedure  Monitor off antibiotics; will obtain UA and procalcitonin  Appears to be trending down; monitor and consult hematology as needed    Gross hematuria  Assessment & Plan  · Status post catheter placement. · Continue monitoring urine output and blood count  · Bladder irrigation per urology    8/1-patient placed on CBI again overnight; this morning noted to have possible clots. Ultrasound bladder ordered by urology. Pending results of ultrasound bladder, patient may need cystoscopy moving forward. --- Have held Eliquis at this time; urology following along    8/2-patient continues to have hematuria; discussed with urology today and likely to move forward with procedure tomorrow. Can you Sorensen catheter and holding anticoagulation; urine somewhat improved and hemoglobin labs stable. At issue is consent; will discuss with case management. 8/3-postop day 1 of urological procedure; bright red urine being produced. Instructed patient to notify nursing immediately if he could not urinate. Hold anticoagulation for 48 hours after the urine turns clear. Followed by urology, no further surgery scheduled at this time.     Atrial fibrillation (HCC)  Assessment & Plan  · Improved after successful SOLA cardioversion on 7/25/23  · Hold anticoagulation for recurrent hematuria  · Rhythm controlled on amiodarone 200 mg twice daily and Cardizem 120 mg daily  · We will transition to amiodarone 200 mg daily on 8/5    Impaired decision making  Assessment & Plan  This week repeat neuropsych evaluation was done  on 7/24/2023  He continues to have impaired decision-making capacity  Guardianship process initiated by case management     Daily consumption of alcohol  Assessment & Plan  He stated earlier that he drank beers on a daily basis  Clinically no withdrawal.  Continue thiamine and folic acid supplementation    Bacteremia  Assessment & Plan  · Aerococcus bacteremia due to complicated UTI   · Resolved   · Antibiotic course completed     Hydronephrosis with obstructing calculus  Assessment & Plan  · Admitted originally due to sepsis from UTI with obstruction  · Status post cystoscopy with difficult stent insertion on 7/6/2023  · Procedure was complicated by inadvertent left ureteral perforation  · CT showed previous ureteral calculus is now in the bladder  · Repeat cystoscopy on 8/3 for definitive treatment, monitor    Esophageal abnormality  Assessment & Plan  He had incidental finding on CT with diffuse esophageal thickening could be from esophagitis  Esophagitis probably from alcohol abuse  Continue empiric Protonix  Nonurgent GI follow-up    Elevated troponin  Assessment & Plan  · Non-MI troponin elevation secondary to tachycardia and sepsis  · Continue aspirin and Lipitor    COPD (chronic obstructive pulmonary disease) ([facility])  Assessment & Plan  · Stable. · Continue Breo 1 puff daily  · Continue albuterol as needed    Tobacco use  Assessment & Plan  · Smokes 6 cigarettes daily. Apparently plans to quit. · Nicotine TD patch  · Tobacco counseling provided     * Sepsis secondary to UTI [facility])  Assessment & Plan  · Sepsis secondary to Enterococcus bacteremia and complicated UTI  · Resolved  · Antibiotic course complete           VTE Pharmacologic Prophylaxis:   Pharmacologic: Pharmacologic VTE Prophylaxis contraindicated due to Hematuria  Mechanical VTE Prophylaxis in Place: Yes    Patient Centered Rounds: I have performed bedside rounds with nursing staff today.     Discussions with Specialists or Other Care Team Provider:     Education and Discussions with Family / Patient: Care plan discussed with patient who voiced understanding and agrees with recommendations. Time Spent for Care: 30 minutes. More than 50% of total time spent on counseling and coordination of care as described above. Current Length of Stay: 31 day(s)    Current Patient Status: Inpatient   Certification Statement: The patient will continue to require additional inpatient hospital stay due to Awaiting placement    Discharge Plan: To be determined    Code Status: Level 1 - Full Code      Subjective:   Patient seen and examined walking in the halls, appears energetic and had no complaints. Urine now punch colored and improving. Awaiting placement. Objective:     Vitals:   Temp (24hrs), Av.5 °F (36.4 °C), Min:97.4 °F (36.3 °C), Max:97.7 °F (36.5 °C)    Temp:  [97.4 °F (36.3 °C)-97.7 °F (36.5 °C)] 97.7 °F (36.5 °C)  HR:  [63-68] 68  Resp:  [18] 18  BP: (138-156)/(64-77) 142/76  SpO2:  [94 %-96 %] 95 %  Body mass index is 28 kg/m². Input and Output Summary (last 24 hours): Intake/Output Summary (Last 24 hours) at 2023 1600  Last data filed at 2023 0800  Gross per 24 hour   Intake 120 ml   Output 500 ml   Net -380 ml       Physical Exam:     Physical Exam  Vitals and nursing note reviewed. Constitutional:       General: He is not in acute distress. Appearance: He is well-developed. HENT:      Head: Normocephalic and atraumatic. Eyes:      Conjunctiva/sclera: Conjunctivae normal.   Cardiovascular:      Rate and Rhythm: Normal rate. Heart sounds: No murmur heard. Pulmonary:      Effort: Pulmonary effort is normal. No respiratory distress. Abdominal:      Palpations: Abdomen is soft. Tenderness: There is no abdominal tenderness. Musculoskeletal:         General: Tenderness (Tenderness to palpation right SI joint) present. No swelling. Cervical back: Neck supple. Skin:     General: Skin is warm and dry.    Neurological: Mental Status: He is alert. Psychiatric:         Mood and Affect: Mood normal.           Additional Data:     Labs:    Results from last 7 days   Lab Units 08/06/23  0601   WBC Thousand/uL 15.22*   HEMOGLOBIN g/dL 12.6   HEMATOCRIT % 39.6   PLATELETS Thousands/uL 342   NEUTROS PCT % 71   LYMPHS PCT % 18   MONOS PCT % 9   EOS PCT % 1     Results from last 7 days   Lab Units 08/04/23  0554   SODIUM mmol/L 134*   POTASSIUM mmol/L 5.1   CHLORIDE mmol/L 101   CO2 mmol/L 24   BUN mg/dL 29*   CREATININE mg/dL 1.28   ANION GAP mmol/L 9   CALCIUM mg/dL 9.0   ALBUMIN g/dL 3.4*   TOTAL BILIRUBIN mg/dL 0.28   ALK PHOS U/L 85   ALT U/L 16   AST U/L 15   GLUCOSE RANDOM mg/dL 152*                           * I Have Reviewed All Lab Data Listed Above. * Additional Pertinent Lab Tests Reviewed:  All Labs Within Last 24 Hours Reviewed    Imaging:    Imaging Reports Reviewed Today Include:   Imaging Personally Reviewed by Myself Includes:      Recent Cultures (last 7 days):     Results from last 7 days   Lab Units 08/03/23  1252   URINE CULTURE  40,000-49,000 cfu/ml Staphylococcus coagulase negative*  <10,000 cfu/ml Enterococcus faecium*  <10,000 cfu/ml Candida sp. presumptively albicans*       Last 24 Hours Medication List:   Current Facility-Administered Medications   Medication Dose Route Frequency Provider Last Rate   • acetaminophen  975 mg Oral Q6H [facility] Ramesh Peralta MD     • albuterol  2.5 mg Nebulization Q6H PRN Ramesh Peralta MD     • aluminum-magnesium hydroxide-simethicone  30 mL Oral Q6H PRN Ramesh Peralta MD     • amiodarone  200 mg Oral Daily With Breakfast Joshua Sarabia MD     • atorvastatin  40 mg Oral Daily With Teresa Mendoza MD     • bisacodyl  10 mg Oral Daily PRN Ramesh Peralta MD     • diltiazem  120 mg Oral Daily Ramesh Peralta MD     • fluticasone-vilanterol  1 puff Inhalation Daily Ramesh Peralta MD     • folic acid  1 mg Oral Daily Ramesh Peralta MD     • guaiFENesin  200 mg Oral Q4H PRN Kimi Park MD     • hydrOXYzine HCL  50 mg Oral HS PRN Kimi Park MD     • lidocaine  1 patch Topical Daily Kimi Park MD     • melatonin  6 mg Oral HS Kimi Park MD     • methocarbamol  750 mg Oral [facility] Kimi Park MD     • multivitamin-minerals  1 tablet Oral Daily Kimi Park MD     • nicotine  1 patch Transdermal Daily Kimi Park MD     • pantoprazole  40 mg Oral Early Morning Kimi Park MD     • polyethylene glycol  17 g Oral Daily Kimi Park MD     • senna  2 tablet Oral HS Kimi Park MD     • thiamine  100 mg Oral Daily Kimi Park MD          Today, Patient Was Seen By: Aliya Reid MD    ** Please Note: Dictation voice to text software may have been used in the creation of this document.  **

## 2023-08-07 LAB
ANION GAP SERPL CALCULATED.3IONS-SCNC: 4 MMOL/L
BASOPHILS # BLD AUTO: 0.06 THOUSANDS/ÂΜL (ref 0–0.1)
BASOPHILS NFR BLD AUTO: 0 % (ref 0–1)
BUN SERPL-MCNC: 27 MG/DL (ref 5–25)
CALCIUM SERPL-MCNC: 8.9 MG/DL (ref 8.4–10.2)
CHLORIDE SERPL-SCNC: 104 MMOL/L (ref 96–108)
CO2 SERPL-SCNC: 32 MMOL/L (ref 21–32)
CREAT SERPL-MCNC: 1.14 MG/DL (ref 0.6–1.3)
EOSINOPHIL # BLD AUTO: 0.2 THOUSAND/ÂΜL (ref 0–0.61)
EOSINOPHIL NFR BLD AUTO: 2 % (ref 0–6)
ERYTHROCYTE [DISTWIDTH] IN BLOOD BY AUTOMATED COUNT: 13.6 % (ref 11.6–15.1)
GFR SERPL CREATININE-BSD FRML MDRD: 63 ML/MIN/1.73SQ M
GLUCOSE SERPL-MCNC: 88 MG/DL (ref 65–140)
HCT VFR BLD AUTO: 38.4 % (ref 36.5–49.3)
HGB BLD-MCNC: 12.1 G/DL (ref 12–17)
IMM GRANULOCYTES # BLD AUTO: 0.08 THOUSAND/UL (ref 0–0.2)
IMM GRANULOCYTES NFR BLD AUTO: 1 % (ref 0–2)
LYMPHOCYTES # BLD AUTO: 2.05 THOUSANDS/ÂΜL (ref 0.6–4.47)
LYMPHOCYTES NFR BLD AUTO: 15 % (ref 14–44)
MCH RBC QN AUTO: 31.8 PG (ref 26.8–34.3)
MCHC RBC AUTO-ENTMCNC: 31.5 G/DL (ref 31.4–37.4)
MCV RBC AUTO: 101 FL (ref 82–98)
MONOCYTES # BLD AUTO: 1.26 THOUSAND/ÂΜL (ref 0.17–1.22)
MONOCYTES NFR BLD AUTO: 9 % (ref 4–12)
NEUTROPHILS # BLD AUTO: 10.08 THOUSANDS/ÂΜL (ref 1.85–7.62)
NEUTS SEG NFR BLD AUTO: 73 % (ref 43–75)
NRBC BLD AUTO-RTO: 0 /100 WBCS
PLATELET # BLD AUTO: 336 THOUSANDS/UL (ref 149–390)
PMV BLD AUTO: 9.7 FL (ref 8.9–12.7)
POTASSIUM SERPL-SCNC: 5.2 MMOL/L (ref 3.5–5.3)
PROCALCITONIN SERPL-MCNC: 0.06 NG/ML
RBC # BLD AUTO: 3.8 MILLION/UL (ref 3.88–5.62)
SODIUM SERPL-SCNC: 140 MMOL/L (ref 135–147)
WBC # BLD AUTO: 13.73 THOUSAND/UL (ref 4.31–10.16)

## 2023-08-07 PROCEDURE — 80048 BASIC METABOLIC PNL TOTAL CA: CPT | Performed by: INTERNAL MEDICINE

## 2023-08-07 PROCEDURE — 84145 PROCALCITONIN (PCT): CPT | Performed by: INTERNAL MEDICINE

## 2023-08-07 PROCEDURE — 85025 COMPLETE CBC W/AUTO DIFF WBC: CPT | Performed by: INTERNAL MEDICINE

## 2023-08-07 PROCEDURE — 99232 SBSQ HOSP IP/OBS MODERATE 35: CPT | Performed by: INTERNAL MEDICINE

## 2023-08-07 RX ADMIN — MELATONIN 6 MG: at 21:57

## 2023-08-07 RX ADMIN — ACETAMINOPHEN 325MG 975 MG: 325 TABLET ORAL at 23:01

## 2023-08-07 RX ADMIN — FOLIC ACID 1 MG: 1 TABLET ORAL at 09:23

## 2023-08-07 RX ADMIN — METHOCARBAMOL TABLETS 750 MG: 500 TABLET, COATED ORAL at 06:02

## 2023-08-07 RX ADMIN — METHOCARBAMOL TABLETS 750 MG: 500 TABLET, COATED ORAL at 21:58

## 2023-08-07 RX ADMIN — AMIODARONE HYDROCHLORIDE 200 MG: 200 TABLET ORAL at 09:36

## 2023-08-07 RX ADMIN — THIAMINE HCL TAB 100 MG 100 MG: 100 TAB at 09:23

## 2023-08-07 RX ADMIN — ACETAMINOPHEN 325MG 975 MG: 325 TABLET ORAL at 06:02

## 2023-08-07 RX ADMIN — ATORVASTATIN CALCIUM 40 MG: 40 TABLET, FILM COATED ORAL at 16:53

## 2023-08-07 RX ADMIN — FLUTICASONE FUROATE AND VILANTEROL TRIFENATATE 1 PUFF: 200; 25 POWDER RESPIRATORY (INHALATION) at 09:23

## 2023-08-07 RX ADMIN — PANTOPRAZOLE SODIUM 40 MG: 40 TABLET, DELAYED RELEASE ORAL at 06:02

## 2023-08-07 RX ADMIN — Medication 1 TABLET: at 09:23

## 2023-08-07 RX ADMIN — DILTIAZEM HYDROCHLORIDE 120 MG: 120 CAPSULE, COATED, EXTENDED RELEASE ORAL at 09:23

## 2023-08-07 NOTE — RESTORATIVE TECHNICIAN NOTE
Restorative Technician Note      Patient Name: Bernarda Otero     Restorative Tech Visit Date: 08/07/23  Note Type: Mobility  Patient Position Upon Consult: Supine  Mobility / Activity Provided: pt requested I come back at 4  Activity Performed: Ambulated  Patient Position at End of Consult: Supine;  All needs within reach; Bed/Chair alarm activated

## 2023-08-07 NOTE — RESTORATIVE TECHNICIAN NOTE
Restorative Technician Note      Patient Name: Missy Macario     Restorative Tech Visit Date: 08/07/23  Note Type: Mobility  Patient Position Upon Consult: Supine  Activity Performed: Ambulated  Patient Position at End of Consult: Supine;  All needs within reach; Bed/Chair alarm activated

## 2023-08-07 NOTE — PLAN OF CARE
Problem: Potential for Falls  Goal: Patient will remain free of falls  Description: INTERVENTIONS:  - Educate patient/family on patient safety including physical limitations  - Instruct patient to call for assistance with activity   - Consult OT/PT to assist with strengthening/mobility   - Keep Call bell within reach  - Keep bed low and locked with side rails adjusted as appropriate  - Keep care items and personal belongings within reach  - Initiate and maintain comfort rounds  - Make Fall Risk Sign visible to staff  - Offer Toileting every 2 Hours, in advance of need  - Initiate/Maintain bed alarm  - Obtain necessary fall risk management equipment: call bell  Problem: MOBILITY - ADULT  Goal: Maintain or return to baseline ADL function  Description: INTERVENTIONS:  -  Assess patient's ability to carry out ADLs; assess patient's baseline for ADL function and identify physical deficits which impact ability to perform ADLs (bathing, care of mouth/teeth, toileting, grooming, dressing, etc.)  - Assess/evaluate cause of self-care deficits   - Assess range of motion  - Assess patient's mobility; develop plan if impaired  - Assess patient's need for assistive devices and provide as appropriate  - Encourage maximum independence but intervene and supervise when necessary  - Involve family in performance of ADLs  - Assess for home care needs following discharge   - Consider OT consult to assist with ADL evaluation and planning for discharge  - Provide patient education as appropriate  Outcome: Progressing  Goal: Maintains/Returns to pre admission functional level  Description: INTERVENTIONS:  - Perform BMAT or MOVE assessment daily.   - Set and communicate daily mobility goal to care team and patient/family/caregiver. - Collaborate with rehabilitation services on mobility goals if consulted  - Perform Range of Motion 3 times a day. - Reposition patient every 2 hours.   - Dangle patient 3 times a day  - Stand patient 3 times a day  - Ambulate patient 3 times a day  - Out of bed to chair 3 times a day   - Out of bed for meals 3  Problem: PAIN - ADULT  Goal: Verbalizes/displays adequate comfort level or baseline comfort level  Description: Interventions:  - Encourage patient to monitor pain and request assistance  - Assess pain using appropriate pain scale  - Administer analgesics based on type and severity of pain and evaluate response  - Implement non-pharmacological measures as appropriate and evaluate response  - Consider cultural and social influences on pain and pain management  - Notify physician/advanced practitioner if interventions unsuccessful or patient reports new pain  Outcome: Progressing     Problem: INFECTION - ADULT  Goal: Absence or prevention of progression during hospitalization  Description: INTERVENTIONS:  - Assess and monitor for signs and symptoms of infection  - Monitor lab/diagnostic results  - Monitor all insertion sites, i.e. indwelling lines, tubes, and drains  - Monitor endotracheal if appropriate and nasal secretions for changes in amount and color  - Crawford appropriate cooling/warming therapies per order  - Administer medications as ordered  - Instruct and encourage patient and family to use good hand hygiene technique  - Identify and instruct in appropriate isolation precautions for identified infection/condition  Outcome: Progressing  Goal: Absence of fever/infection during neutropenic period  Description: INTERVENTIONS:  - Monitor WBC    Outcome: Progressing     Problem: SAFETY ADULT  Goal: Patient will remain free of falls  Description: INTERVENTIONS:  - Educate patient/family on patient safety including physical limitations  - Instruct patient to call for assistance with activity   - Consult OT/PT to assist with strengthening/mobility   - Keep Call bell within reach  - Keep bed low and locked with side rails adjusted as appropriate  - Keep care items and personal belongings within reach  - Initiate and maintain comfort rounds  - Make Fall Risk Sign visible to staff  - Offer Toileting every 2 Hours, in advance of need  - Initiate/Maintain bed alarm  - Obtain necessary fall risk management equipment: call bell  - Apply yellow socks and bracelet for high fall risk patients  - Consider moving patient to room near nurses station  Outcome: Progressing  Goal: Maintain or return to baseline ADL function  Description: INTERVENTIONS:  -  Assess patient's ability to carry out ADLs; assess patient's baseline for ADL function and identify physical deficits which impact ability to perform ADLs (bathing, care of mouth/teeth, toileting, grooming, dressing, etc.)  - Assess/evaluate cause of self-care deficits   - Assess range of motion  - Assess patient's mobility; develop plan if impaired  - Assess patient's need for assistive devices and provide as appropriate  - Encourage maximum independence but intervene and supervise when necessary  - Involve family in performance of ADLs  - Assess for home care needs following discharge   - Consider OT consult to assist with ADL evaluation and planning for discharge  - Provide patient education as appropriate  Outcome: Progressing  Goal: Maintains/Returns to pre admission functional level  Description: INTERVENTIONS:  - Perform BMAT or MOVE assessment daily.   - Set and communicate daily mobility goal to care team and patient/family/caregiver. - Collaborate with rehabilitation services on mobility goals if consulted  - Perform Range of Motion 3 times a day. - Reposition patient every 2 hours.   - Dangle patient 3 times a day  - Stand patient 3 times a day  - Ambulate patient 3 times a day  - Out of bed to chair 3 times a day   - Out of bed for meals 3  Problem: DISCHARGE PLANNING  Goal: Discharge to home or other facility with appropriate resources  Description: INTERVENTIONS:  - Identify barriers to discharge w/patient and caregiver  - Arrange for needed discharge resources and transportation as appropriate  - Identify discharge learning needs (meds, wound care, etc.)  - Arrange for interpretive services to assist at discharge as needed  - Refer to Case Management Department for coordinating discharge planning if the patient needs post-hospital services based on physician/advanced practitioner order or complex needs related to functional status, cognitive ability, or social support system  Outcome: Progressing     Problem: Knowledge Deficit  Goal: Patient/family/caregiver demonstrates understanding of disease process, treatment plan, medications, and discharge instructions  Description: Complete learning assessment and assess knowledge base.   Interventions:  - Provide teaching at level of understanding  - Provide teaching via preferred learning methods  Outcome: Progressing     Problem: Prexisting or High Potential for Compromised Skin Integrity  Goal: Skin integrity is maintained or improved  Description: INTERVENTIONS:  - Identify patients at risk for skin breakdown  - Assess and monitor skin integrity  - Assess and monitor nutrition and hydration status  - Monitor labs   - Assess for incontinence   - Turn and reposition patient  - Assist with mobility/ambulation  - Relieve pressure over bony prominences  - Avoid friction and shearing  - Provide appropriate hygiene as needed including keeping skin clean and dry  - Evaluate need for skin moisturizer/barrier cream  - Collaborate with interdisciplinary team   - Patient/family teaching  - Consider wound care consult   Outcome: Progressing     Problem: GENITOURINARY - ADULT  Goal: Maintains or returns to baseline urinary function  Description: INTERVENTIONS:  - Assess urinary function  - Encourage oral fluids to ensure adequate hydration if ordered  - Administer IV fluids as ordered to ensure adequate hydration  - Administer ordered medications as needed  - Offer frequent toileting  - Follow urinary retention protocol if ordered  Outcome: Progressing     Problem: HEMATOLOGIC - ADULT  Goal: Maintains hematologic stability  Description: INTERVENTIONS  - Assess for signs and symptoms of bleeding or hemorrhage  - Monitor labs  - Administer supportive blood products/factors as ordered and appropriate  Outcome: Progressing    times a day  - Out of bed for toileting  - Record patient progress and toleration of activity level   Outcome: Progressing    times a day  - Out of bed for toileting  - Record patient progress and toleration of activity level   Outcome: Progressing     - Apply yellow socks and bracelet for high fall risk patients  - Consider moving patient to room near nurses station  Outcome: Progressing

## 2023-08-07 NOTE — PROGRESS NOTES
Progress Note - Phoebe Route 75 y.o. male MRN: [id number]    Unit/Bed#: E4 -01 Encounter: [id number]      Subjective: The patient feels pretty well. He denies chest pain, shortness of breath, abdominal pain, nausea, or vomiting. He says that his urine is less bloody than it has been. Physical Exam:   Temp:  [97.6 °F (36.4 °C)-97.9 °F (36.6 °C)] 97.6 °F (36.4 °C)  HR:  [64-69] 64  Resp:  [18] 18  BP: (142-172)/(76-96) 172/96    Gen: Well-developed, well-nourished, in no distress. Neck: Supple. No lymphadenopathy, goiter, or bruit. Heart: Regular rhythm. I heard no murmur, gallop, or rub. Lungs: Clear to auscultation and percussion. No wheezing, rales, or rhonchi. Abd: Soft with active bowel sounds. No mass, tenderness, organomegaly. Extremities: No clubbing, cyanosis, or edema. No calf tenderness. Neuro: Alert and oriented. No focal sign. Skin: Warm and dry. LABS:   CBC:   Lab Results   Component Value Date    WBC 13.73 (H) 08/07/2023    HGB 12.1 08/07/2023    HCT 38.4 08/07/2023     (H) 08/07/2023     08/07/2023    RBC 3.80 (L) 08/07/2023    MCH 31.8 08/07/2023    MCHC 31.5 08/07/2023    RDW 13.6 08/07/2023    MPV 9.7 08/07/2023    NRBC 0 08/07/2023   , CMP:   Lab Results   Component Value Date    SODIUM 140 08/07/2023    K 5.2 08/07/2023     08/07/2023    CO2 32 08/07/2023    BUN 27 (H) 08/07/2023    CREATININE 1.14 08/07/2023    CALCIUM 8.9 08/07/2023    EGFR 63 08/07/2023         Assessment/Plan:  1. Aerococcus sepsis secondary to urinary tract infection  2. Ureteral stone with hydronephrosis, status post stent  3. Paroxysmal atrial fibrillation, currently in sinus rhythm  4. Gross hematuria, improving  5. Impaired decision-making  6. Mild troponin elevation  7. COPD  8. Esophageal abnormality noted on CT scanning, likely esophagitis    The patient is doing pretty well. His hematuria has improved. His Sorensen catheter is out. He is in sinus rhythm. He is waiting the completion of guardianship proceedings. Arrangements for posthospital care have not yet been clarified.     VTE Pharmacologic Prophylaxis: Reason for no pharmacologic prophylaxis Hematuria  VTE Mechanical Prophylaxis: sequential compression device

## 2023-08-07 NOTE — CASE MANAGEMENT
Case Management Discharge Planning Note    Patient name Santi Galan  Location [facility] 4 /E4 95 Jessica Pozo-* MRN [id number]  : 1950 Date 2023       Current Admission Date: 2023  Current Admission Diagnosis:Sepsis secondary to UTI [facility])   Patient Active Problem List    Diagnosis Date Noted   • Leukocytosis 2023   • Gross hematuria 2023   • Atrial fibrillation ([address]) 2023   • Impaired decision making 2023   • Daily consumption of alcohol 2023   • Bacteremia 2023   • Sepsis secondary to UTI ([address]) 2023   • Elevated troponin 2023   • Elevated d-dimer 2023   • Esophageal abnormality 2023   • Hydronephrosis with obstructing calculus 2023   • Mycotic toenails 2023   • Angioedema 2018   • COPD (chronic obstructive pulmonary disease) ([address]) 2018   • Bradycardia 2018   • Tobacco use 2016   • Polycythemia 2016   • Weight loss 2016      LOS (days): 32  Geometric Mean LOS (GMLOS) (days): 9.60  Days to GMLOS:-22.2     OBJECTIVE:  Risk of Unplanned Readmission Score: 15.67         Current admission status: Inpatient   Preferred Pharmacy:   PATIENT/FAMILY REPORTS NO PREFERRED PHARMACY  No address on file      [address] [place] [address] - [address] [place] [address],  [address] [place] [address],  [address] [place] [address]  Phone: [phone] Fax: [phone]    Primary Care Provider: No primary care provider on file. Primary Insurance: MEDICARE  Secondary Insurance:     DISCHARGE DETAILS:     CM left a vm for Dr. Lorraine Morales asking for an update on pt's expert report. CM awaiting return call from Dr. Lorraine Morales. CM to continue to follow pt care & d/c.

## 2023-08-07 NOTE — RESTORATIVE TECHNICIAN NOTE
Restorative Technician Note      Patient Name: Nestor Lamb     Restorative Tech Visit Date: 08/07/23  Note Type: Mobility  Patient Position Upon Consult: Supine  Mobility / Activity Provided: pt requested I come back at 4  Activity Performed: Ambulated  Patient Position at End of Consult: Supine;  All needs within reach

## 2023-08-08 PROCEDURE — 99232 SBSQ HOSP IP/OBS MODERATE 35: CPT | Performed by: INTERNAL MEDICINE

## 2023-08-08 RX ADMIN — Medication 1 TABLET: at 08:39

## 2023-08-08 RX ADMIN — METHOCARBAMOL TABLETS 750 MG: 500 TABLET, COATED ORAL at 05:37

## 2023-08-08 RX ADMIN — PANTOPRAZOLE SODIUM 40 MG: 40 TABLET, DELAYED RELEASE ORAL at 05:37

## 2023-08-08 RX ADMIN — DILTIAZEM HYDROCHLORIDE 120 MG: 120 CAPSULE, COATED, EXTENDED RELEASE ORAL at 08:39

## 2023-08-08 RX ADMIN — FLUTICASONE FUROATE AND VILANTEROL TRIFENATATE 1 PUFF: 200; 25 POWDER RESPIRATORY (INHALATION) at 08:40

## 2023-08-08 RX ADMIN — METHOCARBAMOL TABLETS 750 MG: 500 TABLET, COATED ORAL at 22:11

## 2023-08-08 RX ADMIN — ATORVASTATIN CALCIUM 40 MG: 40 TABLET, FILM COATED ORAL at 17:40

## 2023-08-08 RX ADMIN — MELATONIN 6 MG: at 22:12

## 2023-08-08 RX ADMIN — AMIODARONE HYDROCHLORIDE 200 MG: 200 TABLET ORAL at 08:39

## 2023-08-08 RX ADMIN — ACETAMINOPHEN 325MG 975 MG: 325 TABLET ORAL at 05:29

## 2023-08-08 RX ADMIN — THIAMINE HCL TAB 100 MG 100 MG: 100 TAB at 08:39

## 2023-08-08 RX ADMIN — ACETAMINOPHEN 325MG 975 MG: 325 TABLET ORAL at 17:40

## 2023-08-08 RX ADMIN — FOLIC ACID 1 MG: 1 TABLET ORAL at 08:39

## 2023-08-08 NOTE — PROGRESS NOTES
Progress Note - Tori Villanueva 75 y.o. male MRN: [id number]    Unit/Bed#: E4 -01 Encounter: [id number]      Subjective:  Patient is feeling pretty well. He denies chest pain, shortness of breath, abdominal pain, nausea, or vomiting. He is eating well. He slept well. His urine has cleared. Physical Exam:   Temp:  [97.3 °F (36.3 °C)-97.7 °F (36.5 °C)] 97.3 °F (36.3 °C)  HR:  [64-68] 64  Resp:  [18] 18  BP: (118-171)/(60-81) 171/81    Gen: Well-developed, well-nourished, in no distress. Neck: Supple. No lymphadenopathy, goiter, or bruit. Heart: Regular rhythm. No murmur, gallop, or rub. Lungs: Clear to auscultation and percussion. No wheezing, rales, or rhonchi. Abd: Soft with active bowel sounds. No mass, tenderness, or organomegaly. Extremities: No clubbing, cyanosis, or edema. No calf tenderness. Neuro: Alert and conversant. No focal sign. Skin: Warm and dry. LABS: No new labs    Assessment/Plan:  1. Aerococcus sepsis secondary to urinary tract infection  2. Ureteral stone with hydronephrosis, status post stent  3. Proscar gross hematuria, resolving  4. Paroxysmal atrial fibrillation, currently in sinus rhythm  5. Impaired decision-making  6. Mild troponin elevation, unrelated to MI  7. COPD  8. Esophageal abnormality noted on CT, likely esophagitis    The patient is improving. His hematuria has almost resolved. He is in sinus rhythm. Low-dose apixaban will be resumed tomorrow if his urine has cleared. Guardianship proceedings are in progress. Efforts at arranging posthospital care are in progress.     VTE Pharmacologic Prophylaxis: Reason for no pharmacologic prophylaxis Hematuria  VTE Mechanical Prophylaxis: sequential compression device

## 2023-08-08 NOTE — RESTORATIVE TECHNICIAN NOTE
Restorative Technician Note      Patient Name: Eriberto Jason     Restorative Tech Visit Date: 08/08/23  Note Type: Mobility  Patient Position Upon Consult: Supine  Mobility / Activity Provided: assisted pt with ambulation, pt had right low leg pain, gave pt a band to help him stretch out  Activity Performed: Ambulated  Patient Position at End of Consult: Supine;  All needs within reach

## 2023-08-08 NOTE — RESTORATIVE TECHNICIAN NOTE
Restorative Technician Note      Patient Name: Dawn Wallace     Restorative Tech Visit Date: 08/08/23  Note Type: Mobility  Patient Position Upon Consult: Supine  Activity Performed: Ambulated  Patient Position at End of Consult: Supine;  All needs within reach

## 2023-08-08 NOTE — PLAN OF CARE
Problem: Potential for Falls  Goal: Patient will remain free of falls  Description: INTERVENTIONS:  - Educate patient/family on patient safety including physical limitations  - Instruct patient to call for assistance with activity   - Consult OT/PT to assist with strengthening/mobility   - Keep Call bell within reach  - Keep bed low and locked with side rails adjusted as appropriate  - Keep care items and personal belongings within reach  - Initiate and maintain comfort rounds  - Make Fall Risk Sign visible to staff  - Offer Toileting every 2 Hours, in advance of need  - Initiate/Maintain bed alarm  - Obtain necessary fall risk management equipment:   - Apply yellow socks and bracelet for high fall risk patients  - Consider moving patient to room near nurses station  Outcome: Progressing     Problem: MOBILITY - ADULT  Goal: Maintain or return to baseline ADL function  Description: INTERVENTIONS:  -  Assess patient's ability to carry out ADLs; assess patient's baseline for ADL function and identify physical deficits which impact ability to perform ADLs (bathing, care of mouth/teeth, toileting, grooming, dressing, etc.)  - Assess/evaluate cause of self-care deficits   - Assess range of motion  - Assess patient's mobility; develop plan if impaired  - Assess patient's need for assistive devices and provide as appropriate  - Encourage maximum independence but intervene and supervise when necessary  - Involve family in performance of ADLs  - Assess for home care needs following discharge   - Consider OT consult to assist with ADL evaluation and planning for discharge  - Provide patient education as appropriate  Outcome: Progressing  Goal: Maintains/Returns to pre admission functional level  Description: INTERVENTIONS:  - Perform BMAT or MOVE assessment daily.   - Set and communicate daily mobility goal to care team and patient/family/caregiver.    - Collaborate with rehabilitation services on mobility goals if consulted  - Perform Range of Motion 3 times a day. - Reposition patient every 3 hours.   - Dangle patient 3 times a day  - Stand patient 3 times a day  - Ambulate patient 3 times a day  - Out of bed to chair 3 times a day   - Out of bed for meals 3 times a day  - Out of bed for toileting  - Record patient progress and toleration of activity level   Outcome: Progressing     Problem: PAIN - ADULT  Goal: Verbalizes/displays adequate comfort level or baseline comfort level  Description: Interventions:  - Encourage patient to monitor pain and request assistance  - Assess pain using appropriate pain scale  - Administer analgesics based on type and severity of pain and evaluate response  - Implement non-pharmacological measures as appropriate and evaluate response  - Consider cultural and social influences on pain and pain management  - Notify physician/advanced practitioner if interventions unsuccessful or patient reports new pain  Outcome: Progressing     Problem: INFECTION - ADULT  Goal: Absence or prevention of progression during hospitalization  Description: INTERVENTIONS:  - Assess and monitor for signs and symptoms of infection  - Monitor lab/diagnostic results  - Monitor all insertion sites, i.e. indwelling lines, tubes, and drains  - Monitor endotracheal if appropriate and nasal secretions for changes in amount and color  - [place] appropriate cooling/warming therapies per order  - Administer medications as ordered  - Instruct and encourage patient and family to use good hand hygiene technique  - Identify and instruct in appropriate isolation precautions for identified infection/condition  Outcome: Progressing  Goal: Absence of fever/infection during neutropenic period  Description: INTERVENTIONS:  - Monitor WBC    Outcome: Progressing     Problem: SAFETY ADULT  Goal: Patient will remain free of falls  Description: INTERVENTIONS:  - Educate patient/family on patient safety including physical limitations  - Instruct patient to call for assistance with activity   - Consult OT/PT to assist with strengthening/mobility   - Keep Call bell within reach  - Keep bed low and locked with side rails adjusted as appropriate  - Keep care items and personal belongings within reach  - Initiate and maintain comfort rounds  - Make Fall Risk Sign visible to staff  - Offer Toileting every 2 Hours, in advance of need  - Initiate/Maintain bed alarm  - Obtain necessary fall risk management equipment:   - Apply yellow socks and bracelet for high fall risk patients  - Consider moving patient to room near nurses station  Outcome: Progressing  Goal: Maintain or return to baseline ADL function  Description: INTERVENTIONS:  -  Assess patient's ability to carry out ADLs; assess patient's baseline for ADL function and identify physical deficits which impact ability to perform ADLs (bathing, care of mouth/teeth, toileting, grooming, dressing, etc.)  - Assess/evaluate cause of self-care deficits   - Assess range of motion  - Assess patient's mobility; develop plan if impaired  - Assess patient's need for assistive devices and provide as appropriate  - Encourage maximum independence but intervene and supervise when necessary  - Involve family in performance of ADLs  - Assess for home care needs following discharge   - Consider OT consult to assist with ADL evaluation and planning for discharge  - Provide patient education as appropriate  Outcome: Progressing  Goal: Maintains/Returns to pre admission functional level  Description: INTERVENTIONS:  - Perform BMAT or MOVE assessment daily.   - Set and communicate daily mobility goal to care team and patient/family/caregiver. - Collaborate with rehabilitation services on mobility goals if consulted  - Perform Range of Motion 3 times a day. - Reposition patient every 3 hours.   - Dangle patient 3 times a day  - Stand patient 3 times a day  - Ambulate patient 3 times a day  - Out of bed to chair 3 times a day   - Out of bed for meals 3 times a day  - Out of bed for toileting  - Record patient progress and toleration of activity level   Outcome: Progressing     Problem: DISCHARGE PLANNING  Goal: Discharge to home or other facility with appropriate resources  Description: INTERVENTIONS:  - Identify barriers to discharge w/patient and caregiver  - Arrange for needed discharge resources and transportation as appropriate  - Identify discharge learning needs (meds, wound care, etc.)  - Arrange for interpretive services to assist at discharge as needed  - Refer to Case Management Department for coordinating discharge planning if the patient needs post-hospital services based on physician/advanced practitioner order or complex needs related to functional status, cognitive ability, or social support system  Outcome: Progressing     Problem: Knowledge Deficit  Goal: Patient/family/caregiver demonstrates understanding of disease process, treatment plan, medications, and discharge instructions  Description: Complete learning assessment and assess knowledge base.   Interventions:  - Provide teaching at level of understanding  - Provide teaching via preferred learning methods  Outcome: Progressing     Problem: Prexisting or High Potential for Compromised Skin Integrity  Goal: Skin integrity is maintained or improved  Description: INTERVENTIONS:  - Identify patients at risk for skin breakdown  - Assess and monitor skin integrity  - Assess and monitor nutrition and hydration status  - Monitor labs   - Assess for incontinence   - Turn and reposition patient  - Assist with mobility/ambulation  - Relieve pressure over bony prominences  - Avoid friction and shearing  - Provide appropriate hygiene as needed including keeping skin clean and dry  - Evaluate need for skin moisturizer/barrier cream  - Collaborate with interdisciplinary team   - Patient/family teaching  - Consider wound care consult   Outcome: Progressing     Problem: GENITOURINARY - ADULT  Goal: Maintains or returns to baseline urinary function  Description: INTERVENTIONS:  - Assess urinary function  - Encourage oral fluids to ensure adequate hydration if ordered  - Administer IV fluids as ordered to ensure adequate hydration  - Administer ordered medications as needed  - Offer frequent toileting  - Follow urinary retention protocol if ordered  Outcome: Progressing     Problem: HEMATOLOGIC - ADULT  Goal: Maintains hematologic stability  Description: INTERVENTIONS  - Assess for signs and symptoms of bleeding or hemorrhage  - Monitor labs  - Administer supportive blood products/factors as ordered and appropriate  Outcome: Progressing

## 2023-08-08 NOTE — RESTORATIVE TECHNICIAN NOTE
Restorative Technician Note      Patient Name: Shivani Chao     Restorative Tech Visit Date: 08/08/23  Note Type: Mobility  Patient Position Upon Consult: Supine  Activity Performed: Ambulated  Patient Position at End of Consult: Seated edge of bed;  All needs within reach

## 2023-08-09 PROCEDURE — 99232 SBSQ HOSP IP/OBS MODERATE 35: CPT | Performed by: INTERNAL MEDICINE

## 2023-08-09 RX ADMIN — METHOCARBAMOL TABLETS 750 MG: 500 TABLET, COATED ORAL at 05:31

## 2023-08-09 RX ADMIN — METHOCARBAMOL TABLETS 750 MG: 500 TABLET, COATED ORAL at 22:03

## 2023-08-09 RX ADMIN — APIXABAN 2.5 MG: 2.5 TABLET, FILM COATED ORAL at 20:19

## 2023-08-09 RX ADMIN — PANTOPRAZOLE SODIUM 40 MG: 40 TABLET, DELAYED RELEASE ORAL at 05:32

## 2023-08-09 RX ADMIN — MELATONIN 6 MG: at 22:03

## 2023-08-09 RX ADMIN — Medication 1 TABLET: at 09:33

## 2023-08-09 RX ADMIN — ATORVASTATIN CALCIUM 40 MG: 40 TABLET, FILM COATED ORAL at 16:32

## 2023-08-09 RX ADMIN — AMIODARONE HYDROCHLORIDE 200 MG: 200 TABLET ORAL at 09:33

## 2023-08-09 RX ADMIN — THIAMINE HCL TAB 100 MG 100 MG: 100 TAB at 09:33

## 2023-08-09 RX ADMIN — FLUTICASONE FUROATE AND VILANTEROL TRIFENATATE 1 PUFF: 200; 25 POWDER RESPIRATORY (INHALATION) at 09:34

## 2023-08-09 RX ADMIN — FOLIC ACID 1 MG: 1 TABLET ORAL at 09:33

## 2023-08-09 RX ADMIN — ACETAMINOPHEN 325MG 975 MG: 325 TABLET ORAL at 09:38

## 2023-08-09 RX ADMIN — DILTIAZEM HYDROCHLORIDE 120 MG: 120 CAPSULE, COATED, EXTENDED RELEASE ORAL at 09:33

## 2023-08-09 NOTE — RESTORATIVE TECHNICIAN NOTE
Restorative Technician Note      Patient Name: Michelle Robertson Tech Visit Date: 08/09/23  Note Type: Mobility  Patient Position Upon Consult: Bedside chair  Mobility / Activity Provided: pt has R leg pain  Activity Performed: Ambulated  Patient Position at End of Consult: Bedside chair;  All needs within reach

## 2023-08-09 NOTE — PROGRESS NOTES
Progress Note - Thad Rasheed 75 y.o. male MRN: [id number]    Unit/Bed#: E4 -01 Encounter: [id number]      Subjective:  Patient feels pretty well. He slept well. He is eating well. He has no chest pain, shortness of breath, abdominal pain, nausea, or vomiting. The patient was delighted with the appearance of his urine today. He made me going to the bathroom and look at it. Physical Exam:   Temp:  [97.7 °F (36.5 °C)-97.8 °F (36.6 °C)] 97.8 °F (36.6 °C)  HR:  [66-68] 66  Resp:  [18] 18  BP: (126-157)/(76-87) 137/87    Gen: Well-developed, well-nourished, in no distress. Neck: Supple. No lymphadenopathy, goiter, or bruit. Heart: Regular rhythm. No murmur, gallop, or rub. Lungs: Clear to auscultation and percussion. No wheezing, rales, or rhonchi. Abd: Soft. Bowel sounds are active. No mass, tenderness, or organomegaly. Extremities: No clubbing, cyanosis, or edema. No calf tenderness. Neuro: Alert and person. No focal sign. Skin: Warm and dry. LABS: No new labs. Assessment/Plan:  1. Enterococcus sepsis secondary to urinary tract infection, resolved  2. Ureteral stone with hydronephrosis, status post stent  3. Gross hematuria, resolved  4. Paroxysmal atrial fibrillation, currently in sinus rhythm  5. Impaired decision-making  6. Mildly elevated troponin unrelated to myocardial infarction  7. COPD  8. Esophageal abnormality noted on CT, likely esophagitis, asymptomatic    The patient continues to do well. His hematuria has resolved and low-dose apixaban will be started. Guardianship proceedings are in process. We we will await the resolution of this.     VTE Pharmacologic Prophylaxis: Apixaban  VTE Mechanical Prophylaxis: reason for no mechanical VTE prophylaxis Therapeutically anticoagulated

## 2023-08-09 NOTE — RESTORATIVE TECHNICIAN NOTE
Restorative Technician Note      Patient Name: Dianelys Oakley     Restorative Tech Visit Date: 08/09/23  Note Type: Mobility  Patient Position Upon Consult: Supine  Mobility / Activity Provided: pt has R leg pain  Activity Performed: Ambulated  Patient Position at End of Consult: Supine;  All needs within reach

## 2023-08-10 LAB
CA CARBONATE CRY STONE QL IR: 30 %
COLOR STONE: NORMAL
COMMENT-STONE3: NORMAL
COMPOSITION: NORMAL
LABORATORY COMMENT REPORT: NORMAL
PHOTO: NORMAL
SIZE STONE: NORMAL MM
SPEC SOURCE SUBJ: NORMAL
STONE ANALYSIS-IMP: NORMAL
TRI-PHOS MFR STONE: 70 %
WT STONE: 56 MG

## 2023-08-10 PROCEDURE — 99232 SBSQ HOSP IP/OBS MODERATE 35: CPT | Performed by: INTERNAL MEDICINE

## 2023-08-10 RX ADMIN — NICOTINE 7 MG/24 HR DAILY TRANSDERMAL PATCH 7 MG: at 10:33

## 2023-08-10 RX ADMIN — METHOCARBAMOL TABLETS 750 MG: 500 TABLET, COATED ORAL at 23:12

## 2023-08-10 RX ADMIN — METHOCARBAMOL TABLETS 750 MG: 500 TABLET, COATED ORAL at 05:39

## 2023-08-10 RX ADMIN — PANTOPRAZOLE SODIUM 40 MG: 40 TABLET, DELAYED RELEASE ORAL at 05:40

## 2023-08-10 RX ADMIN — AMIODARONE HYDROCHLORIDE 200 MG: 200 TABLET ORAL at 08:22

## 2023-08-10 RX ADMIN — ATORVASTATIN CALCIUM 40 MG: 40 TABLET, FILM COATED ORAL at 17:13

## 2023-08-10 RX ADMIN — ACETAMINOPHEN 325MG 975 MG: 325 TABLET ORAL at 13:08

## 2023-08-10 RX ADMIN — Medication 1 TABLET: at 08:22

## 2023-08-10 RX ADMIN — FLUTICASONE FUROATE AND VILANTEROL TRIFENATATE 1 PUFF: 200; 25 POWDER RESPIRATORY (INHALATION) at 08:24

## 2023-08-10 RX ADMIN — MELATONIN 6 MG: at 23:12

## 2023-08-10 RX ADMIN — APIXABAN 2.5 MG: 2.5 TABLET, FILM COATED ORAL at 08:22

## 2023-08-10 RX ADMIN — THIAMINE HCL TAB 100 MG 100 MG: 100 TAB at 08:22

## 2023-08-10 RX ADMIN — METHOCARBAMOL TABLETS 750 MG: 500 TABLET, COATED ORAL at 13:50

## 2023-08-10 RX ADMIN — DILTIAZEM HYDROCHLORIDE 120 MG: 120 CAPSULE, COATED, EXTENDED RELEASE ORAL at 08:22

## 2023-08-10 RX ADMIN — FOLIC ACID 1 MG: 1 TABLET ORAL at 08:22

## 2023-08-10 RX ADMIN — ACETAMINOPHEN 325MG 975 MG: 325 TABLET ORAL at 05:40

## 2023-08-10 NOTE — CASE MANAGEMENT
Case Management Discharge Planning Note    Patient name Rashid Molina [place] [facility] /E4 95 Jessica Pozo-* MRN [id number]  : 1950 Date 8/10/2023       Current Admission Date: 2023  Current Admission Diagnosis:Sepsis secondary to UTI [facility])   Patient Active Problem List    Diagnosis Date Noted   • Leukocytosis 2023   • Gross hematuria 2023   • Atrial fibrillation ([address]) 2023   • Impaired decision making 2023   • Daily consumption of alcohol 2023   • Bacteremia 2023   • Sepsis secondary to UTI ([address]) 2023   • Elevated troponin 2023   • Elevated d-dimer 2023   • Esophageal abnormality 2023   • Hydronephrosis with obstructing calculus 2023   • Mycotic toenails 2023   • Angioedema 2018   • COPD (chronic obstructive pulmonary disease) ([address]) 2018   • Bradycardia 2018   • Tobacco use 2016   • Polycythemia 2016   • Weight loss 2016      LOS (days): 35  Geometric Mean LOS (GMLOS) (days): 9.60  Days to GMLOS:-25.2     OBJECTIVE:  Risk of Unplanned Readmission Score: 13.87         Current admission status: Inpatient   Preferred Pharmacy:   PATIENT/FAMILY REPORTS NO PREFERRED PHARMACY  No address on file      [address] [place] [address],  [address] [place] [address],  [address] [place] [address]  Phone: [phone] Fax: [phone]    Primary Care Provider: No primary care provider on file. Primary Insurance: MEDICARE  Secondary Insurance:     DISCHARGE DETAILS:     CM received phone call from Dr. Sydney Forman stating he is finishing expert report today & will be sending it to [employer] office tomorrow. CM will follow-up [place]/ [employer] on next steps. CM to continue to follow pt care & d/c.

## 2023-08-10 NOTE — RESTORATIVE TECHNICIAN NOTE
Restorative Technician Note      Patient Name: Cecilia Campbell     Restorative Tech Visit Date: 08/10/23  Note Type: Mobility  Patient Position Upon Consult: Seated edge of bed  Activity Performed: Ambulated  Patient Position at End of Consult: Seated edge of bed;  All needs within reach

## 2023-08-10 NOTE — RESTORATIVE TECHNICIAN NOTE
Restorative Technician Note      Patient Name: Shivani Chao     Restorative Tech Visit Date: 08/10/23  Note Type: Mobility  Patient Position Upon Consult: Bedside chair  Activity Performed: Ambulated  Patient Position at End of Consult: Bedside chair;  All needs within reach

## 2023-08-10 NOTE — PLAN OF CARE
Problem: MOBILITY - ADULT  Goal: Maintain or return to baseline ADL function  Description: INTERVENTIONS:  -  Assess patient's ability to carry out ADLs; assess patient's baseline for ADL function and identify physical deficits which impact ability to perform ADLs (bathing, care of mouth/teeth, toileting, grooming, dressing, etc.)  - Assess/evaluate cause of self-care deficits   - Assess range of motion  - Assess patient's mobility; develop plan if impaired  - Assess patient's need for assistive devices and provide as appropriate  - Encourage maximum independence but intervene and supervise when necessary  - Involve family in performance of ADLs  - Assess for home care needs following discharge   - Consider OT consult to assist with ADL evaluation and planning for discharge  - Provide patient education as appropriate  Outcome: Progressing     Problem: SAFETY ADULT  Goal: Maintain or return to baseline ADL function  Description: INTERVENTIONS:  -  Assess patient's ability to carry out ADLs; assess patient's baseline for ADL function and identify physical deficits which impact ability to perform ADLs (bathing, care of mouth/teeth, toileting, grooming, dressing, etc.)  - Assess/evaluate cause of self-care deficits   - Assess range of motion  - Assess patient's mobility; develop plan if impaired  - Assess patient's need for assistive devices and provide as appropriate  - Encourage maximum independence but intervene and supervise when necessary  - Involve family in performance of ADLs  - Assess for home care needs following discharge   - Consider OT consult to assist with ADL evaluation and planning for discharge  - Provide patient education as appropriate  Outcome: Progressing     Problem: SAFETY ADULT  Goal: Maintains/Returns to pre admission functional level  Description: INTERVENTIONS:  - Perform BMAT or MOVE assessment daily.   - Set and communicate daily mobility goal to care team and patient/family/caregiver. - Collaborate with rehabilitation services on mobility goals if consulted  -  - Dangle patient 3 times a day  - Stand patient 3 times a day  - Ambulate patient 3 times a day  - Out of bed to chair 2  times a day   - Out of bed for meals 3 times a day  - Out of bed for toileting  - Record patient progress and toleration of activity level   Outcome: Progressing

## 2023-08-10 NOTE — PROGRESS NOTES
Progress Note - Missy Macario 75 y.o. male MRN: [id number]    Unit/Bed#: E4 -01 Encounter: [id number]      Subjective: The patient is feeling well. He slept reasonably well. He is eating well. He has no chest pain, shortness of breath, abdominal pain, nausea, or vomiting. His urine has become blood-tinged overnight. Physical Exam:   Temp:  [97.2 °F (36.2 °C)-98.1 °F (36.7 °C)] 97.2 °F (36.2 °C)  HR:  [66] 66  Resp:  [17-18] 17  BP: (133-155)/(66-87) 155/81    Gen: Well-developed, well-nourished, in no distress. Neck: Supple. No lymphadenopathy, goiter, or bruit. Heart: Regular rhythm. No murmur, gallop, or rub. Lungs: Clear to auscultation and percussion. No wheezing, rales, or rhonchi. Abd: Soft with active bowel sounds. No mass, tenderness, organomegaly. Extremities: No clubbing, cyanosis, or edema. No calf tenderness. Neuro: Alert and conversant. No focal sign. Skin: Warm and dry. LABS: No new labs. Assessment/Plan:  1. Aerococcus sepsis secondary to urinary tract infection  2. Ureteral stone with hydronephrosis, status post stent  3. Recurrent hematuria  4. Paroxysmal atrial fibrillation, currently in sinus rhythm  5. Impaired decision-making  6. Mildly elevated troponin at the time of admission, unrelated to myocardial infarction  7. COPD  8. Esophageal abnormality on CT, likely esophagitis, currently asymptomatic    The patient has remained generally stable. His sepsis has been treated and has resolved. He has a ureteral stent in place. I resumed apixaban yesterday for stroke prophylaxis in light of his recent atrial fibrillation. Unfortunately, he has developed recurrent hematuria so this will be stopped. The patient is on pantoprazole for presumed esophagitis. He is asymptomatic. The patient has been deemed unable to make informed medical decisions. Court appointed guardianship is being pursued.   Arrangements for posthospital care are being investigated.     VTE Pharmacologic Prophylaxis: Reason for no pharmacologic prophylaxis Hematuria  VTE Mechanical Prophylaxis: sequential compression device

## 2023-08-11 PROCEDURE — 99232 SBSQ HOSP IP/OBS MODERATE 35: CPT | Performed by: INTERNAL MEDICINE

## 2023-08-11 RX ADMIN — AMIODARONE HYDROCHLORIDE 200 MG: 200 TABLET ORAL at 08:58

## 2023-08-11 RX ADMIN — ATORVASTATIN CALCIUM 40 MG: 40 TABLET, FILM COATED ORAL at 16:27

## 2023-08-11 RX ADMIN — METHOCARBAMOL TABLETS 750 MG: 500 TABLET, COATED ORAL at 22:36

## 2023-08-11 RX ADMIN — THIAMINE HCL TAB 100 MG 100 MG: 100 TAB at 08:58

## 2023-08-11 RX ADMIN — ACETAMINOPHEN 325MG 975 MG: 325 TABLET ORAL at 06:57

## 2023-08-11 RX ADMIN — FOLIC ACID 1 MG: 1 TABLET ORAL at 08:58

## 2023-08-11 RX ADMIN — MELATONIN 6 MG: at 22:36

## 2023-08-11 RX ADMIN — Medication 1 TABLET: at 08:59

## 2023-08-11 RX ADMIN — DILTIAZEM HYDROCHLORIDE 120 MG: 120 CAPSULE, COATED, EXTENDED RELEASE ORAL at 08:58

## 2023-08-11 RX ADMIN — FLUTICASONE FUROATE AND VILANTEROL TRIFENATATE 1 PUFF: 200; 25 POWDER RESPIRATORY (INHALATION) at 08:58

## 2023-08-11 RX ADMIN — METHOCARBAMOL TABLETS 750 MG: 500 TABLET, COATED ORAL at 06:57

## 2023-08-11 RX ADMIN — ACETAMINOPHEN 325MG 975 MG: 325 TABLET ORAL at 22:40

## 2023-08-11 RX ADMIN — PANTOPRAZOLE SODIUM 40 MG: 40 TABLET, DELAYED RELEASE ORAL at 06:57

## 2023-08-11 NOTE — PROGRESS NOTES
[facility]  Progress Note  Name: Bernarda Otero I  MRN: [id number]  Unit/Bed#: E4 -01 I Date of Admission: 7/6/2023   Date of Service: 8/11/2023 I Hospital Day: 39    Assessment/Plan   * Sepsis secondary to UTI [facility])  Assessment & Plan  · Sepsis secondary to Enterococcus bacteremia and complicated UTI  · Completed a course of antibiotics     Gross hematuria  Assessment & Plan  Appreciate urology recommendations. Recurrent hematuria. He is s/p cysto with stent in which hematuria resolved and eliquis trial was held again but pt had recurrent hematuria once again  Will continue to hold eliquis  Perhaps trial of eliquis again in 1 week     Atrial fibrillation ([address])  Assessment & Plan  · Improved after successful SOLA cardioversion on 7/25/23  · Hold anticoagulation for recurrent hematuria  · Rhythm controlled on amiodarone 200 mg daily and Cardizem 120 mg daily      Impaired decision making  Assessment & Plan  This week repeat neuropsych evaluation was done  on 7/24/2023  He continues to have impaired decision-making capacity  Guardianship process initiated by case management     Bacteremia  Assessment & Plan  · Aerococcus bacteremia due to complicated UTI   · Completed full course of antibiotics     Hydronephrosis with obstructing calculus  Assessment & Plan  · Admitted originally due to sepsis from UTI with obstruction  · Status post cystoscopy with difficult stent insertion on 7/6/2023  · Procedure was complicated by inadvertent left ureteral perforation  · CT showed previous ureteral calculus is now in the bladder  · Repeat cystoscopy on 8/3 with stent placement. · He had hematuria with re initiation of eliquis this past week. eliquis is currently on hold.    · Urology recommending repeat ct in 2 weeks with outpt cysto/stent removal     Esophageal abnormality  Assessment & Plan  He had incidental finding on CT with diffuse esophageal thickening could be from esophagitis  Esophagitis probably from alcohol abuse  Continue empiric Protonix  Appreciate gi eval     Elevated troponin  Assessment & Plan  · Non-MI troponin elevation secondary to tachycardia and sepsis   · Appreciate cardiology recommendations. · Continue aspirin and Lipitor    COPD (chronic obstructive pulmonary disease) ([facility])  Assessment & Plan  · Stable without exacerbation  · Continue albuterol and breo     Tobacco use  Assessment & Plan  · Smokes 6 cigarettes daily. Apparently plans to quit. · Nicotine TD patch  · Tobacco counseling provided            VTE Pharmacologic Prophylaxis: contraindicated due to recurrent hematuria     Mechanical VTE Prophylaxis in Place: Yes    Education and Discussions with Family / Patient: patient    Current Length of Stay: 39 day(s)  Current Patient Status: Inpatient     Discharge Plan / Estimated Discharge Date:awaiting safe discharge plan with guardian. Code Status: Level 1 - Full Code      Subjective:   Pt seen and examined. His urine is clear again. No f/c no cp no sob no n/v/d no abd pain     Objective:     Vitals:   Temp (24hrs), Av.9 °F (36.6 °C), Min:97.3 °F (36.3 °C), Max:98.2 °F (36.8 °C)    Temp:  [97.3 °F (36.3 °C)-98.2 °F (36.8 °C)] 98.2 °F (36.8 °C)  HR:  [67-76] 67  Resp:  [18-20] 20  BP: (117-136)/(63-66) 133/64  SpO2:  [94 %-95 %] 94 %  Body mass index is 28 kg/m². Input and Output Summary (last 24 hours):     No intake or output data in the 24 hours ending [id number]    Physical Exam:   Physical Exam  Constitutional:       Appearance: Normal appearance. HENT:      Head: Normocephalic and atraumatic. Eyes:      Extraocular Movements: Extraocular movements intact. Pupils: Pupils are equal, round, and reactive to light. Cardiovascular:      Rate and Rhythm: Normal rate and regular rhythm. Heart sounds: No murmur heard. No friction rub. No gallop. Pulmonary:      Effort: Pulmonary effort is normal. No respiratory distress. Breath sounds: Normal breath sounds. No wheezing, rhonchi or rales. Abdominal:      General: Bowel sounds are normal. There is no distension. Palpations: Abdomen is soft. Tenderness: There is no abdominal tenderness. There is no guarding or rebound. Neurological:      Mental Status: He is alert and oriented to person, place, and time.           Additional Data:     Labs:  Results from last 7 days   Lab Units 08/07/23  0434   WBC Thousand/uL 13.73*   HEMOGLOBIN g/dL 12.1   HEMATOCRIT % 38.4   PLATELETS Thousands/uL 336   NEUTROS PCT % 73   LYMPHS PCT % 15   MONOS PCT % 9   EOS PCT % 2     Results from last 7 days   Lab Units 08/07/23  0434   SODIUM mmol/L 140   POTASSIUM mmol/L 5.2   CHLORIDE mmol/L 104   CO2 mmol/L 32   BUN mg/dL 27*   CREATININE mg/dL 1.14   ANION GAP mmol/L 4   CALCIUM mg/dL 8.9   GLUCOSE RANDOM mg/dL 88                 Results from last 7 days   Lab Units 08/07/23  0434   PROCALCITONIN ng/ml 0.06     Recent Cultures (last 7 days):           Lines/Drains:  Invasive Devices     Drain  Duration           Ureteral Internal Stent Left ureter 6 Fr. 35 days    Ureteral Internal Stent Left ureter 7 Fr. 8 days              Last 24 Hours Medication List:   Current Facility-Administered Medications   Medication Dose Route Frequency Provider Last Rate   • acetaminophen  975 mg Oral Q6H [address] Barbra Rueda MD     • albuterol  2.5 mg Nebulization Q6H PRN Barbra Rueda MD     • aluminum-magnesium hydroxide-simethicone  30 mL Oral Q6H PRN Barbra Rueda MD     • amiodarone  200 mg Oral Daily With Breakfast Rajendra Valdez MD     • atorvastatin  40 mg Oral Daily With Theo Santa MD     • bisacodyl  10 mg Oral Daily PRN Barbra Rueda MD     • diltiazem  120 mg Oral Daily Barbra Rueda MD     • fluticasone-vilanterol  1 puff Inhalation Daily Barbra Rueda MD     • folic acid  1 mg Oral Daily Barbra Rueda MD     • guaiFENesin  200 mg Oral Q4H PRN Barbra Rueda MD • hydrOXYzine HCL  50 mg Oral HS PRN Bonnie Antunez MD     • lidocaine  1 patch Topical Daily Bonnie Antunez MD     • melatonin  6 mg Oral HS Bonnie Antunez MD     • methocarbamol  750 mg Oral [facility] Bonnie Antunez MD     • multivitamin-minerals  1 tablet Oral Daily Bonnie Antunez MD     • nicotine  7 mg Transdermal Daily Emely Gallo MD     • pantoprazole  40 mg Oral Early Morning Bonnie Antunez MD     • polyethylene glycol  17 g Oral Daily Bonnie Antunez MD     • senna  2 tablet Oral HS Bonnie Antunez MD     • thiamine  100 mg Oral Daily Bonnie Antunez MD          Today, Patient Was Seen By: Jak Medrano

## 2023-08-11 NOTE — ASSESSMENT & PLAN NOTE
Appreciate urology recommendations. Recurrent hematuria.    He is s/p cysto with stent in which hematuria resolved and eliquis trial was held again but pt had recurrent hematuria once again  Will continue to hold eliquis  Perhaps trial of eliquis again in 1 week

## 2023-08-11 NOTE — ASSESSMENT & PLAN NOTE
· Sepsis secondary to Enterococcus bacteremia and complicated UTI  · Completed a course of antibiotics

## 2023-08-11 NOTE — ASSESSMENT & PLAN NOTE
He had incidental finding on CT with diffuse esophageal thickening could be from esophagitis  Esophagitis probably from alcohol abuse  Continue empiric Protonix  Appreciate gi eval

## 2023-08-11 NOTE — ASSESSMENT & PLAN NOTE
· Admitted originally due to sepsis from UTI with obstruction  · Status post cystoscopy with difficult stent insertion on 7/6/2023  · Procedure was complicated by inadvertent left ureteral perforation  · CT showed previous ureteral calculus is now in the bladder  · Repeat cystoscopy on 8/3 with stent placement. · He had hematuria with re initiation of eliquis this past week. eliquis is currently on hold.    · Urology recommending repeat ct in 2 weeks with outpt cysto/stent removal

## 2023-08-11 NOTE — RESTORATIVE TECHNICIAN NOTE
Restorative Technician Note      Patient Name: Suresh Espinal     Restorative Tech Visit Date: 08/11/23  Note Type: Mobility  Patient Position Upon Consult: Supine  Mobility / Activity Provided: pt ambulated the hallway twice, asked pt to go a different way to see if he could find his way back to his room  Activity Performed: Ambulated  Patient Position at End of Consult: Seated edge of bed;  All needs within reach 2020

## 2023-08-11 NOTE — ASSESSMENT & PLAN NOTE
· Improved after successful SOLA cardioversion on 7/25/23  · Hold anticoagulation for recurrent hematuria  · Rhythm controlled on amiodarone 200 mg daily and Cardizem 120 mg daily

## 2023-08-11 NOTE — PLAN OF CARE
Problem: Potential for Falls  Goal: Patient will remain free of falls  Description: INTERVENTIONS:  - Educate patient/family on patient safety including physical limitations  - Instruct patient to call for assistance with activity   - Consult OT/PT to assist with strengthening/mobility   - Keep Call bell within reach  - Keep bed low and locked with side rails adjusted as appropriate  - Keep care items and personal belongings within reach  - Initiate and maintain comfort rounds  - Make Fall Risk Sign visible to staff  - Offer Toileting every  Hours, in advance of need  - Initiate/Maintain alarm  - Obtain necessary fall risk management equipment:   - Apply yellow socks and bracelet for high fall risk patients  - Consider moving patient to room near nurses station  Outcome: Progressing     Problem: MOBILITY - ADULT  Goal: Maintain or return to baseline ADL function  Description: INTERVENTIONS:  -  Assess patient's ability to carry out ADLs; assess patient's baseline for ADL function and identify physical deficits which impact ability to perform ADLs (bathing, care of mouth/teeth, toileting, grooming, dressing, etc.)  - Assess/evaluate cause of self-care deficits   - Assess range of motion  - Assess patient's mobility; develop plan if impaired  - Assess patient's need for assistive devices and provide as appropriate  - Encourage maximum independence but intervene and supervise when necessary  - Involve family in performance of ADLs  - Assess for home care needs following discharge   - Consider OT consult to assist with ADL evaluation and planning for discharge  - Provide patient education as appropriate  Outcome: Progressing  Goal: Maintains/Returns to pre admission functional level  Description: INTERVENTIONS:  - Perform BMAT or MOVE assessment daily.   - Set and communicate daily mobility goal to care team and patient/family/caregiver.    - Collaborate with rehabilitation services on mobility goals if consulted  - Perform Range of Motion  times a day. - Reposition patient every  hours.   - Dangle patient times a day  - Stand patient times a day  - Ambulate patient  times a day  - Out of bed to chair  times a day   - Out of bed for meals  times a day  - Out of bed for toileting  - Record patient progress and toleration of activity level   Outcome: Progressing     Problem: PAIN - ADULT  Goal: Verbalizes/displays adequate comfort level or baseline comfort level  Description: Interventions:  - Encourage patient to monitor pain and request assistance  - Assess pain using appropriate pain scale  - Administer analgesics based on type and severity of pain and evaluate response  - Implement non-pharmacological measures as appropriate and evaluate response  - Consider cultural and social influences on pain and pain management  - Notify physician/advanced practitioner if interventions unsuccessful or patient reports new pain  Outcome: Progressing     Problem: INFECTION - ADULT  Goal: Absence or prevention of progression during hospitalization  Description: INTERVENTIONS:  - Assess and monitor for signs and symptoms of infection  - Monitor lab/diagnostic results  - Monitor all insertion sites, i.e. indwelling lines, tubes, and drains  - Monitor endotracheal if appropriate and nasal secretions for changes in amount and color  - [place] appropriate cooling/warming therapies per order  - Administer medications as ordered  - Instruct and encourage patient and family to use good hand hygiene technique  - Identify and instruct in appropriate isolation precautions for identified infection/condition  Outcome: Progressing  Goal: Absence of fever/infection during neutropenic period  Description: INTERVENTIONS:  - Monitor WBC    Outcome: Progressing     Problem: SAFETY ADULT  Goal: Patient will remain free of falls  Description: INTERVENTIONS:  - Educate patient/family on patient safety including physical limitations  - Instruct patient to call for assistance with activity   - Consult OT/PT to assist with strengthening/mobility   - Keep Call bell within reach  - Keep bed low and locked with side rails adjusted as appropriate  - Keep care items and personal belongings within reach  - Initiate and maintain comfort rounds  - Make Fall Risk Sign visible to staff  - Offer Toileting every  Hours, in advance of need  - Initiate/Maintain alarm  - Obtain necessary fall risk management equipment:   - Apply yellow socks and bracelet for high fall risk patients  - Consider moving patient to room near nurses station  Outcome: Progressing  Goal: Maintain or return to baseline ADL function  Description: INTERVENTIONS:  -  Assess patient's ability to carry out ADLs; assess patient's baseline for ADL function and identify physical deficits which impact ability to perform ADLs (bathing, care of mouth/teeth, toileting, grooming, dressing, etc.)  - Assess/evaluate cause of self-care deficits   - Assess range of motion  - Assess patient's mobility; develop plan if impaired  - Assess patient's need for assistive devices and provide as appropriate  - Encourage maximum independence but intervene and supervise when necessary  - Involve family in performance of ADLs  - Assess for home care needs following discharge   - Consider OT consult to assist with ADL evaluation and planning for discharge  - Provide patient education as appropriate  Outcome: Progressing  Goal: Maintains/Returns to pre admission functional level  Description: INTERVENTIONS:  - Perform BMAT or MOVE assessment daily.   - Set and communicate daily mobility goal to care team and patient/family/caregiver. - Collaborate with rehabilitation services on mobility goals if consulted  - Perform Range of Motion  times a day. - Reposition patient every  hours.   - Dangle patient times a day  - Stand patient  times a day  - Ambulate patient times a day  - Out of bed to chair times a day   - Out of bed for meals times a day  - Out of bed for toileting  - Record patient progress and toleration of activity level   Outcome: Progressing     Problem: DISCHARGE PLANNING  Goal: Discharge to home or other facility with appropriate resources  Description: INTERVENTIONS:  - Identify barriers to discharge w/patient and caregiver  - Arrange for needed discharge resources and transportation as appropriate  - Identify discharge learning needs (meds, wound care, etc.)  - Arrange for interpretive services to assist at discharge as needed  - Refer to Case Management Department for coordinating discharge planning if the patient needs post-hospital services based on physician/advanced practitioner order or complex needs related to functional status, cognitive ability, or social support system  Outcome: Progressing     Problem: Knowledge Deficit  Goal: Patient/family/caregiver demonstrates understanding of disease process, treatment plan, medications, and discharge instructions  Description: Complete learning assessment and assess knowledge base.   Interventions:  - Provide teaching at level of understanding  - Provide teaching via preferred learning methods  Outcome: Progressing     Problem: Prexisting or High Potential for Compromised Skin Integrity  Goal: Skin integrity is maintained or improved  Description: INTERVENTIONS:  - Identify patients at risk for skin breakdown  - Assess and monitor skin integrity  - Assess and monitor nutrition and hydration status  - Monitor labs   - Assess for incontinence   - Turn and reposition patient  - Assist with mobility/ambulation  - Relieve pressure over bony prominences  - Avoid friction and shearing  - Provide appropriate hygiene as needed including keeping skin clean and dry  - Evaluate need for skin moisturizer/barrier cream  - Collaborate with interdisciplinary team   - Patient/family teaching  - Consider wound care consult   Outcome: Progressing     Problem: GENITOURINARY - ADULT  Goal: Maintains or returns to baseline urinary function  Description: INTERVENTIONS:  - Assess urinary function  - Encourage oral fluids to ensure adequate hydration if ordered  - Administer IV fluids as ordered to ensure adequate hydration  - Administer ordered medications as needed  - Offer frequent toileting  - Follow urinary retention protocol if ordered  Outcome: Progressing     Problem: HEMATOLOGIC - ADULT  Goal: Maintains hematologic stability  Description: INTERVENTIONS  - Assess for signs and symptoms of bleeding or hemorrhage  - Monitor labs  - Administer supportive blood products/factors as ordered and appropriate  Outcome: Progressing

## 2023-08-11 NOTE — ASSESSMENT & PLAN NOTE
· Non-MI troponin elevation secondary to tachycardia and sepsis   · Appreciate cardiology recommendations.    · Continue aspirin and Lipitor

## 2023-08-11 NOTE — RESTORATIVE TECHNICIAN NOTE
Restorative Technician Note      Patient Name: Guillermo Bridges     Restorative Tech Visit Date: 08/11/23  Note Type: Mobility  Patient Position Upon Consult: Standing  Mobility / Activity Provided: pt ambulated the hallway twice, asked pt to go a different way to see if he could find his way back to his room  Activity Performed: Ambulated  Patient Position at End of Consult: Seated edge of bed;  All needs within reach

## 2023-08-11 NOTE — PLAN OF CARE
Problem: MOBILITY - ADULT  Goal: Maintain or return to baseline ADL function  Description: INTERVENTIONS:  -  Assess patient's ability to carry out ADLs; assess patient's baseline for ADL function and identify physical deficits which impact ability to perform ADLs (bathing, care of mouth/teeth, toileting, grooming, dressing, etc.)  - Assess/evaluate cause of self-care deficits   - Assess range of motion  - Assess patient's mobility; develop plan if impaired  - Assess patient's need for assistive devices and provide as appropriate  - Encourage maximum independence but intervene and supervise when necessary  - Involve family in performance of ADLs  - Assess for home care needs following discharge   - Consider OT consult to assist with ADL evaluation and planning for discharge  - Provide patient education as appropriate  Outcome: Progressing     Problem: MOBILITY - ADULT  Goal: Maintains/Returns to pre admission functional level  Description: INTERVENTIONS:  - Perform BMAT or MOVE assessment daily.   - Set and communicate daily mobility goal to care team and patient/family/caregiver.    - Collaborate with rehabilitation services on mobility goals if consulted  - Dangle patient 3x times a day  - Stand patient 3x times a day  - Ambulate patient 3 times a day  - Out of bed to chair 3 times a day   - Out of bed for meals 3 times a day  - Out of bed for toileting  - Record patient progress and toleration of activity level   Outcome: Progressing

## 2023-08-12 LAB
ANION GAP SERPL CALCULATED.3IONS-SCNC: 6 MMOL/L
BUN SERPL-MCNC: 24 MG/DL (ref 5–25)
CALCIUM SERPL-MCNC: 8.5 MG/DL (ref 8.4–10.2)
CHLORIDE SERPL-SCNC: 102 MMOL/L (ref 96–108)
CO2 SERPL-SCNC: 26 MMOL/L (ref 21–32)
CREAT SERPL-MCNC: 0.94 MG/DL (ref 0.6–1.3)
GFR SERPL CREATININE-BSD FRML MDRD: 79 ML/MIN/1.73SQ M
GLUCOSE SERPL-MCNC: 84 MG/DL (ref 65–140)
POTASSIUM SERPL-SCNC: 5.2 MMOL/L (ref 3.5–5.3)
SODIUM SERPL-SCNC: 134 MMOL/L (ref 135–147)

## 2023-08-12 PROCEDURE — 99232 SBSQ HOSP IP/OBS MODERATE 35: CPT | Performed by: INTERNAL MEDICINE

## 2023-08-12 PROCEDURE — 80048 BASIC METABOLIC PNL TOTAL CA: CPT | Performed by: INTERNAL MEDICINE

## 2023-08-12 RX ADMIN — METHOCARBAMOL TABLETS 750 MG: 500 TABLET, COATED ORAL at 22:09

## 2023-08-12 RX ADMIN — SENNOSIDES 17.2 MG: 8.6 TABLET, FILM COATED ORAL at 22:09

## 2023-08-12 RX ADMIN — PANTOPRAZOLE SODIUM 40 MG: 40 TABLET, DELAYED RELEASE ORAL at 07:03

## 2023-08-12 RX ADMIN — METHOCARBAMOL TABLETS 750 MG: 500 TABLET, COATED ORAL at 07:04

## 2023-08-12 RX ADMIN — ATORVASTATIN CALCIUM 40 MG: 40 TABLET, FILM COATED ORAL at 16:20

## 2023-08-12 RX ADMIN — ACETAMINOPHEN 325MG 975 MG: 325 TABLET ORAL at 17:10

## 2023-08-12 RX ADMIN — MELATONIN 6 MG: at 22:09

## 2023-08-12 RX ADMIN — FLUTICASONE FUROATE AND VILANTEROL TRIFENATATE 1 PUFF: 200; 25 POWDER RESPIRATORY (INHALATION) at 08:28

## 2023-08-12 RX ADMIN — DILTIAZEM HYDROCHLORIDE 120 MG: 120 CAPSULE, COATED, EXTENDED RELEASE ORAL at 08:27

## 2023-08-12 RX ADMIN — THIAMINE HCL TAB 100 MG 100 MG: 100 TAB at 08:27

## 2023-08-12 RX ADMIN — Medication 1 TABLET: at 08:28

## 2023-08-12 RX ADMIN — AMIODARONE HYDROCHLORIDE 200 MG: 200 TABLET ORAL at 08:27

## 2023-08-12 RX ADMIN — ACETAMINOPHEN 325MG 975 MG: 325 TABLET ORAL at 07:03

## 2023-08-12 RX ADMIN — FOLIC ACID 1 MG: 1 TABLET ORAL at 08:28

## 2023-08-12 NOTE — PROGRESS NOTES
[facility]  Progress Note  Name: Laz Diamond I  MRN: [id number]  Unit/Bed#: E4 -01 I Date of Admission: 7/6/2023   Date of Service: 8/12/2023  Hospital Day: 37    Assessment/Plan   * Sepsis secondary to UTI [facility])  Assessment & Plan  · Sepsis secondary to Enterococcus bacteremia and complicated UTI  · Completed a course of antibiotics     Gross hematuria  Assessment & Plan  Appreciate urology recommendations. Recurrent hematuria. He is s/p cysto with stent in which hematuria resolved and eliquis trial was held again but pt had recurrent hematuria once again  Urine currently clear   Will continue to hold eliquis  Perhaps trial of eliquis again in 1 week (day 2 of hold)     Atrial fibrillation ([address])  Assessment & Plan  · Improved after successful SOLA cardioversion on 7/25/23  · Hold anticoagulation for recurrent hematuria  · Rhythm controlled on amiodarone 200 mg daily and Cardizem 120 mg daily      Impaired decision making  Assessment & Plan  This week repeat neuropsych evaluation was done  on 7/24/2023  He continues to have impaired decision-making capacity  Guardianship process initiated by case management     Bacteremia  Assessment & Plan  · Aerococcus bacteremia due to complicated UTI   · Completed full course of antibiotics     Hydronephrosis with obstructing calculus  Assessment & Plan  · Admitted originally due to sepsis from UTI with obstruction  · Status post cystoscopy with difficult stent insertion on 7/6/2023  · Procedure was complicated by inadvertent left ureteral perforation  · CT showed previous ureteral calculus is now in the bladder  · Repeat cystoscopy on 8/3 with stent placement. · He had hematuria with re initiation of eliquis this past week. eliquis is currently on hold. · Urine has remained clear after holding eliquis. Will trial again after 1 week.  (day 2 of hold)  · Urology recommending repeat ct in 2 weeks with outpt cysto/stent removal Esophageal abnormality  Assessment & Plan  He had incidental finding on CT with diffuse esophageal thickening could be from esophagitis  Esophagitis probably from alcohol abuse  Continue empiric Protonix  Appreciate gi eval     Elevated troponin  Assessment & Plan  · Non-MI troponin elevation secondary to tachycardia and sepsis   · Appreciate cardiology recommendations. · Continue aspirin and Lipitor    COPD (chronic obstructive pulmonary disease) (HCC)  Assessment & Plan  · Stable without exacerbation  · Continue albuterol and breo     Tobacco use  Assessment & Plan  · Smokes 6 cigarettes daily. Apparently plans to quit. · Nicotine TD patch  · Tobacco counseling provided            VTE Pharmacologic Prophylaxis: contraindicated due to hematuria     Mechanical VTE Prophylaxis in Place: Yes    Education and Discussions with Family / Patient: patient    Current Length of Stay: 37 day(s)  Current Patient Status: Inpatient     Discharge Plan / Estimated Discharge Date: awaiting safe discharge plan     Code Status: Level 1 - Full Code      Subjective:   Pt seen and examined. He states he feels very full and was unable to eat his lunch as he has been eating too much. He had a bm today. No nausea. No f/c no cp no sob     Objective:     Vitals:   Temp (24hrs), Av.1 °F (36.7 °C), Min:97.6 °F (36.4 °C), Max:98.6 °F (37 °C)    Temp:  [97.6 °F (36.4 °C)-98.6 °F (37 °C)] 97.6 °F (36.4 °C)  HR:  [63-66] 66  Resp:  [18] 18  BP: (120-139)/(55-60) 139/60  SpO2:  [94 %-95 %] 95 %  Body mass index is 28 kg/m². Input and Output Summary (last 24 hours):     No intake or output data in the 24 hours ending 23 1350    Physical Exam:   Physical Exam  Constitutional:       Appearance: Normal appearance. HENT:      Head: Normocephalic and atraumatic. Eyes:      Extraocular Movements: Extraocular movements intact. Pupils: Pupils are equal, round, and reactive to light.    Cardiovascular:      Rate and Rhythm: Normal rate and regular rhythm. Heart sounds: No murmur heard. No friction rub. No gallop. Pulmonary:      Effort: Pulmonary effort is normal. No respiratory distress. Breath sounds: Normal breath sounds. No wheezing, rhonchi or rales. Abdominal:      General: Bowel sounds are normal. There is no distension. Palpations: Abdomen is soft. Tenderness: There is no abdominal tenderness. There is no guarding or rebound. Genitourinary:     Comments: Urine is yellow. No evidence of blood   Musculoskeletal:      Right lower leg: No edema. Left lower leg: No edema. Neurological:      Mental Status: He is alert and oriented to person, place, and time.           Additional Data:     Labs:  Results from last 7 days   Lab Units 08/07/23  0434   WBC Thousand/uL 13.73*   HEMOGLOBIN g/dL 12.1   HEMATOCRIT % 38.4   PLATELETS Thousands/uL 336   NEUTROS PCT % 73   LYMPHS PCT % 15   MONOS PCT % 9   EOS PCT % 2     Results from last 7 days   Lab Units 08/12/23  0615   SODIUM mmol/L 134*   POTASSIUM mmol/L 5.2   CHLORIDE mmol/L 102   CO2 mmol/L 26   BUN mg/dL 24   CREATININE mg/dL 0.94   ANION GAP mmol/L 6   CALCIUM mg/dL 8.5   GLUCOSE RANDOM mg/dL 84                 Results from last 7 days   Lab Units 08/07/23  0434   PROCALCITONIN ng/ml 0.06     Recent Cultures (last 7 days):           Lines/Drains:  Invasive Devices     Drain  Duration           Ureteral Internal Stent Left ureter 6 Fr. 36 days    Ureteral Internal Stent Left ureter 7 Fr. 8 days              Last 24 Hours Medication List:   Current Facility-Administered Medications   Medication Dose Route Frequency Provider Last Rate   • acetaminophen  975 mg Oral Q6H [address] Basilia Curling, MD     • albuterol  2.5 mg Nebulization Q6H PRN Basilia Curling, MD     • aluminum-magnesium hydroxide-simethicone  30 mL Oral Q6H PRN Basilia Curling, MD     • amiodarone  200 mg Oral Daily With Breakfast Krystian Abbasi MD     • atorvastatin  40 mg Oral Daily With Hailee Yancey MD     • bisacodyl  10 mg Oral Daily PRN Hitesh Joseph MD     • diltiazem  120 mg Oral Daily Hitesh Joseph MD     • fluticasone-vilanterol  1 puff Inhalation Daily Hitesh Joseph MD     • folic acid  1 mg Oral Daily Hitesh Joseph MD     • guaiFENesin  200 mg Oral Q4H PRN Hitesh Joseph MD     • hydrOXYzine HCL  50 mg Oral HS PRN Hitesh Joseph MD     • lidocaine  1 patch Topical Daily Hitesh Joseph MD     • melatonin  6 mg Oral HS Hitesh Joseph MD     • methocarbamol  750 mg Oral [facility] Hitesh Joseph MD     • multivitamin-minerals  1 tablet Oral Daily Hitesh Joseph MD     • nicotine  7 mg Transdermal Daily Susu Berger MD     • pantoprazole  40 mg Oral Early Morning Hitesh Joseph MD     • polyethylene glycol  17 g Oral Daily Hitesh Joseph MD     • senna  2 tablet Oral HS Hitesh Joseph MD     • thiamine  100 mg Oral Daily Hitesh Joseph MD          Today, Patient Was Seen By: Tena Roy DO

## 2023-08-12 NOTE — ASSESSMENT & PLAN NOTE
Appreciate urology recommendations. Recurrent hematuria.    He is s/p cysto with stent in which hematuria resolved and eliquis trial was held again but pt had recurrent hematuria once again  Urine currently clear   Will continue to hold eliquis  Perhaps trial of eliquis again in 1 week (day 2 of hold)

## 2023-08-12 NOTE — ASSESSMENT & PLAN NOTE
· Admitted originally due to sepsis from UTI with obstruction  · Status post cystoscopy with difficult stent insertion on 7/6/2023  · Procedure was complicated by inadvertent left ureteral perforation  · CT showed previous ureteral calculus is now in the bladder  · Repeat cystoscopy on 8/3 with stent placement. · He had hematuria with re initiation of eliquis this past week. eliquis is currently on hold. · Urine has remained clear after holding eliquis. Will trial again after 1 week.  (day 2 of hold)  · Urology recommending repeat ct in 2 weeks with outpt cysto/stent removal

## 2023-08-12 NOTE — PLAN OF CARE
Problem: Potential for Falls  Goal: Patient will remain free of falls  Description: INTERVENTIONS:  - Educate patient/family on patient safety including physical limitations  - Instruct patient to call for assistance with activity   - Consult OT/PT to assist with strengthening/mobility   - Keep Call bell within reach  - Keep bed low and locked with side rails adjusted as appropriate  - Keep care items and personal belongings within reach  - Initiate and maintain comfort rounds  - Make Fall Risk Sign visible to staff  - Offer Toileting every Hours, in advance of need  - Initiate/Maintain alarm  - Obtain necessary fall risk management equipment:   - Apply yellow socks and bracelet for high fall risk patients  - Consider moving patient to room near nurses station  Outcome: Progressing     Problem: MOBILITY - ADULT  Goal: Maintain or return to baseline ADL function  Description: INTERVENTIONS:  -  Assess patient's ability to carry out ADLs; assess patient's baseline for ADL function and identify physical deficits which impact ability to perform ADLs (bathing, care of mouth/teeth, toileting, grooming, dressing, etc.)  - Assess/evaluate cause of self-care deficits   - Assess range of motion  - Assess patient's mobility; develop plan if impaired  - Assess patient's need for assistive devices and provide as appropriate  - Encourage maximum independence but intervene and supervise when necessary  - Involve family in performance of ADLs  - Assess for home care needs following discharge   - Consider OT consult to assist with ADL evaluation and planning for discharge  - Provide patient education as appropriate  Outcome: Progressing  Goal: Maintains/Returns to pre admission functional level  Description: INTERVENTIONS:  - Perform BMAT or MOVE assessment daily.   - Set and communicate daily mobility goal to care team and patient/family/caregiver.    - Collaborate with rehabilitation services on mobility goals if consulted  - Perform Range of Motion  times a day. - Reposition patient every hours.   - Dangle patient  times a day  - Stand patient times a day  - Ambulate patient times a day  - Out of bed to chair  times a day   - Out of bed for meals times a day  - Out of bed for toileting  - Record patient progress and toleration of activity level   Outcome: Progressing     Problem: PAIN - ADULT  Goal: Verbalizes/displays adequate comfort level or baseline comfort level  Description: Interventions:  - Encourage patient to monitor pain and request assistance  - Assess pain using appropriate pain scale  - Administer analgesics based on type and severity of pain and evaluate response  - Implement non-pharmacological measures as appropriate and evaluate response  - Consider cultural and social influences on pain and pain management  - Notify physician/advanced practitioner if interventions unsuccessful or patient reports new pain  Outcome: Progressing     Problem: INFECTION - ADULT  Goal: Absence or prevention of progression during hospitalization  Description: INTERVENTIONS:  - Assess and monitor for signs and symptoms of infection  - Monitor lab/diagnostic results  - Monitor all insertion sites, i.e. indwelling lines, tubes, and drains  - Monitor endotracheal if appropriate and nasal secretions for changes in amount and color  - [place] appropriate cooling/warming therapies per order  - Administer medications as ordered  - Instruct and encourage patient and family to use good hand hygiene technique  - Identify and instruct in appropriate isolation precautions for identified infection/condition  Outcome: Progressing  Goal: Absence of fever/infection during neutropenic period  Description: INTERVENTIONS:  - Monitor WBC    Outcome: Progressing     Problem: SAFETY ADULT  Goal: Patient will remain free of falls  Description: INTERVENTIONS:  - Educate patient/family on patient safety including physical limitations  - Instruct patient to call for assistance with activity   - Consult OT/PT to assist with strengthening/mobility   - Keep Call bell within reach  - Keep bed low and locked with side rails adjusted as appropriate  - Keep care items and personal belongings within reach  - Initiate and maintain comfort rounds  - Make Fall Risk Sign visible to staff  - Offer Toileting every Hours, in advance of need  - Initiate/Maintain alarm  - Obtain necessary fall risk management equipment:   - Apply yellow socks and bracelet for high fall risk patients  - Consider moving patient to room near nurses station  Outcome: Progressing  Goal: Maintain or return to baseline ADL function  Description: INTERVENTIONS:  -  Assess patient's ability to carry out ADLs; assess patient's baseline for ADL function and identify physical deficits which impact ability to perform ADLs (bathing, care of mouth/teeth, toileting, grooming, dressing, etc.)  - Assess/evaluate cause of self-care deficits   - Assess range of motion  - Assess patient's mobility; develop plan if impaired  - Assess patient's need for assistive devices and provide as appropriate  - Encourage maximum independence but intervene and supervise when necessary  - Involve family in performance of ADLs  - Assess for home care needs following discharge   - Consider OT consult to assist with ADL evaluation and planning for discharge  - Provide patient education as appropriate  Outcome: Progressing  Goal: Maintains/Returns to pre admission functional level  Description: INTERVENTIONS:  - Perform BMAT or MOVE assessment daily.   - Set and communicate daily mobility goal to care team and patient/family/caregiver. - Collaborate with rehabilitation services on mobility goals if consulted  - Perform Range of Motion  times a day. - Reposition patient every hours.   - Dangle patient times a day  - Stand patient  times a day  - Ambulate patient times a day  - Out of bed to chair  times a day   - Out of bed for meals  times a day  - Out of bed for toileting  - Record patient progress and toleration of activity level   Outcome: Progressing     Problem: DISCHARGE PLANNING  Goal: Discharge to home or other facility with appropriate resources  Description: INTERVENTIONS:  - Identify barriers to discharge w/patient and caregiver  - Arrange for needed discharge resources and transportation as appropriate  - Identify discharge learning needs (meds, wound care, etc.)  - Arrange for interpretive services to assist at discharge as needed  - Refer to Case Management Department for coordinating discharge planning if the patient needs post-hospital services based on physician/advanced practitioner order or complex needs related to functional status, cognitive ability, or social support system  Outcome: Progressing     Problem: Knowledge Deficit  Goal: Patient/family/caregiver demonstrates understanding of disease process, treatment plan, medications, and discharge instructions  Description: Complete learning assessment and assess knowledge base.   Interventions:  - Provide teaching at level of understanding  - Provide teaching via preferred learning methods  Outcome: Progressing     Problem: Prexisting or High Potential for Compromised Skin Integrity  Goal: Skin integrity is maintained or improved  Description: INTERVENTIONS:  - Identify patients at risk for skin breakdown  - Assess and monitor skin integrity  - Assess and monitor nutrition and hydration status  - Monitor labs   - Assess for incontinence   - Turn and reposition patient  - Assist with mobility/ambulation  - Relieve pressure over bony prominences  - Avoid friction and shearing  - Provide appropriate hygiene as needed including keeping skin clean and dry  - Evaluate need for skin moisturizer/barrier cream  - Collaborate with interdisciplinary team   - Patient/family teaching  - Consider wound care consult   Outcome: Progressing     Problem: GENITOURINARY - ADULT  Goal: Maintains or returns to baseline urinary function  Description: INTERVENTIONS:  - Assess urinary function  - Encourage oral fluids to ensure adequate hydration if ordered  - Administer IV fluids as ordered to ensure adequate hydration  - Administer ordered medications as needed  - Offer frequent toileting  - Follow urinary retention protocol if ordered  Outcome: Progressing     Problem: HEMATOLOGIC - ADULT  Goal: Maintains hematologic stability  Description: INTERVENTIONS  - Assess for signs and symptoms of bleeding or hemorrhage  - Monitor labs  - Administer supportive blood products/factors as ordered and appropriate  Outcome: Progressing

## 2023-08-13 PROCEDURE — 99232 SBSQ HOSP IP/OBS MODERATE 35: CPT | Performed by: INTERNAL MEDICINE

## 2023-08-13 RX ADMIN — FOLIC ACID 1 MG: 1 TABLET ORAL at 09:28

## 2023-08-13 RX ADMIN — THIAMINE HCL TAB 100 MG 100 MG: 100 TAB at 09:28

## 2023-08-13 RX ADMIN — NICOTINE 7 MG/24 HR DAILY TRANSDERMAL PATCH 7 MG: at 17:34

## 2023-08-13 RX ADMIN — DILTIAZEM HYDROCHLORIDE 120 MG: 120 CAPSULE, COATED, EXTENDED RELEASE ORAL at 09:28

## 2023-08-13 RX ADMIN — FLUTICASONE FUROATE AND VILANTEROL TRIFENATATE 1 PUFF: 200; 25 POWDER RESPIRATORY (INHALATION) at 09:28

## 2023-08-13 RX ADMIN — AMIODARONE HYDROCHLORIDE 200 MG: 200 TABLET ORAL at 09:30

## 2023-08-13 RX ADMIN — METHOCARBAMOL TABLETS 750 MG: 500 TABLET, COATED ORAL at 05:21

## 2023-08-13 RX ADMIN — ACETAMINOPHEN 325MG 975 MG: 325 TABLET ORAL at 05:21

## 2023-08-13 RX ADMIN — METHOCARBAMOL TABLETS 750 MG: 500 TABLET, COATED ORAL at 22:18

## 2023-08-13 RX ADMIN — PANTOPRAZOLE SODIUM 40 MG: 40 TABLET, DELAYED RELEASE ORAL at 05:21

## 2023-08-13 RX ADMIN — ATORVASTATIN CALCIUM 40 MG: 40 TABLET, FILM COATED ORAL at 17:31

## 2023-08-13 RX ADMIN — MELATONIN 6 MG: at 22:18

## 2023-08-13 RX ADMIN — Medication 1 TABLET: at 09:28

## 2023-08-13 RX ADMIN — SENNOSIDES 17.2 MG: 8.6 TABLET, FILM COATED ORAL at 22:18

## 2023-08-13 NOTE — PLAN OF CARE
Problem: Potential for Falls  Goal: Patient will remain free of falls  Description: INTERVENTIONS:  - Educate patient/family on patient safety including physical limitations  - Instruct patient to call for assistance with activity   - Consult OT/PT to assist with strengthening/mobility   - Keep Call bell within reach  - Keep bed low and locked with side rails adjusted as appropriate  - Keep care items and personal belongings within reach  - Initiate and maintain comfort rounds  - Make Fall Risk Sign visible to staff  - Offer Toileting every  Hours, in advance of need  - Initiate/Maintain alarm  - Obtain necessary fall risk management equipment:   - Apply yellow socks and bracelet for high fall risk patients  - Consider moving patient to room near nurses station  Outcome: Progressing     Problem: MOBILITY - ADULT  Goal: Maintain or return to baseline ADL function  Description: INTERVENTIONS:  -  Assess patient's ability to carry out ADLs; assess patient's baseline for ADL function and identify physical deficits which impact ability to perform ADLs (bathing, care of mouth/teeth, toileting, grooming, dressing, etc.)  - Assess/evaluate cause of self-care deficits   - Assess range of motion  - Assess patient's mobility; develop plan if impaired  - Assess patient's need for assistive devices and provide as appropriate  - Encourage maximum independence but intervene and supervise when necessary  - Involve family in performance of ADLs  - Assess for home care needs following discharge   - Consider OT consult to assist with ADL evaluation and planning for discharge  - Provide patient education as appropriate  Outcome: Progressing  Goal: Maintains/Returns to pre admission functional level  Description: INTERVENTIONS:  - Perform BMAT or MOVE assessment daily.   - Set and communicate daily mobility goal to care team and patient/family/caregiver.    - Collaborate with rehabilitation services on mobility goals if consulted  - Perform Range of Motion  times a day. - Reposition patient every hours.   - Dangle patient  times a day  - Stand patient  times a day  - Ambulate patient times a day  - Out of bed to chair  times a day   - Out of bed for meals  times a day  - Out of bed for toileting  - Record patient progress and toleration of activity level   Outcome: Progressing     Problem: PAIN - ADULT  Goal: Verbalizes/displays adequate comfort level or baseline comfort level  Description: Interventions:  - Encourage patient to monitor pain and request assistance  - Assess pain using appropriate pain scale  - Administer analgesics based on type and severity of pain and evaluate response  - Implement non-pharmacological measures as appropriate and evaluate response  - Consider cultural and social influences on pain and pain management  - Notify physician/advanced practitioner if interventions unsuccessful or patient reports new pain  Outcome: Progressing     Problem: INFECTION - ADULT  Goal: Absence or prevention of progression during hospitalization  Description: INTERVENTIONS:  - Assess and monitor for signs and symptoms of infection  - Monitor lab/diagnostic results  - Monitor all insertion sites, i.e. indwelling lines, tubes, and drains  - Monitor endotracheal if appropriate and nasal secretions for changes in amount and color  - [place] appropriate cooling/warming therapies per order  - Administer medications as ordered  - Instruct and encourage patient and family to use good hand hygiene technique  - Identify and instruct in appropriate isolation precautions for identified infection/condition  Outcome: Progressing  Goal: Absence of fever/infection during neutropenic period  Description: INTERVENTIONS:  - Monitor WBC    Outcome: Progressing     Problem: SAFETY ADULT  Goal: Patient will remain free of falls  Description: INTERVENTIONS:  - Educate patient/family on patient safety including physical limitations  - Instruct patient to call for assistance with activity   - Consult OT/PT to assist with strengthening/mobility   - Keep Call bell within reach  - Keep bed low and locked with side rails adjusted as appropriate  - Keep care items and personal belongings within reach  - Initiate and maintain comfort rounds  - Make Fall Risk Sign visible to staff  - Offer Toileting every  Hours, in advance of need  - Initiate/Maintain alarm  - Obtain necessary fall risk management equipment:   - Apply yellow socks and bracelet for high fall risk patients  - Consider moving patient to room near nurses station  Outcome: Progressing  Goal: Maintain or return to baseline ADL function  Description: INTERVENTIONS:  -  Assess patient's ability to carry out ADLs; assess patient's baseline for ADL function and identify physical deficits which impact ability to perform ADLs (bathing, care of mouth/teeth, toileting, grooming, dressing, etc.)  - Assess/evaluate cause of self-care deficits   - Assess range of motion  - Assess patient's mobility; develop plan if impaired  - Assess patient's need for assistive devices and provide as appropriate  - Encourage maximum independence but intervene and supervise when necessary  - Involve family in performance of ADLs  - Assess for home care needs following discharge   - Consider OT consult to assist with ADL evaluation and planning for discharge  - Provide patient education as appropriate  Outcome: Progressing  Goal: Maintains/Returns to pre admission functional level  Description: INTERVENTIONS:  - Perform BMAT or MOVE assessment daily.   - Set and communicate daily mobility goal to care team and patient/family/caregiver. - Collaborate with rehabilitation services on mobility goals if consulted  - Perform Range of Motion  times a day. - Reposition patient every hours.   - Dangle patient times a day  - Stand patient  times a day  - Ambulate patient times a day  - Out of bed to chair  times a day   - Out of bed for meals  times a day  - Out of bed for toileting  - Record patient progress and toleration of activity level   Outcome: Progressing     Problem: DISCHARGE PLANNING  Goal: Discharge to home or other facility with appropriate resources  Description: INTERVENTIONS:  - Identify barriers to discharge w/patient and caregiver  - Arrange for needed discharge resources and transportation as appropriate  - Identify discharge learning needs (meds, wound care, etc.)  - Arrange for interpretive services to assist at discharge as needed  - Refer to Case Management Department for coordinating discharge planning if the patient needs post-hospital services based on physician/advanced practitioner order or complex needs related to functional status, cognitive ability, or social support system  Outcome: Progressing     Problem: Knowledge Deficit  Goal: Patient/family/caregiver demonstrates understanding of disease process, treatment plan, medications, and discharge instructions  Description: Complete learning assessment and assess knowledge base.   Interventions:  - Provide teaching at level of understanding  - Provide teaching via preferred learning methods  Outcome: Progressing     Problem: Prexisting or High Potential for Compromised Skin Integrity  Goal: Skin integrity is maintained or improved  Description: INTERVENTIONS:  - Identify patients at risk for skin breakdown  - Assess and monitor skin integrity  - Assess and monitor nutrition and hydration status  - Monitor labs   - Assess for incontinence   - Turn and reposition patient  - Assist with mobility/ambulation  - Relieve pressure over bony prominences  - Avoid friction and shearing  - Provide appropriate hygiene as needed including keeping skin clean and dry  - Evaluate need for skin moisturizer/barrier cream  - Collaborate with interdisciplinary team   - Patient/family teaching  - Consider wound care consult   Outcome: Progressing     Problem: GENITOURINARY - ADULT  Goal: Maintains or returns to baseline urinary function  Description: INTERVENTIONS:  - Assess urinary function  - Encourage oral fluids to ensure adequate hydration if ordered  - Administer IV fluids as ordered to ensure adequate hydration  - Administer ordered medications as needed  - Offer frequent toileting  - Follow urinary retention protocol if ordered  Outcome: Progressing     Problem: HEMATOLOGIC - ADULT  Goal: Maintains hematologic stability  Description: INTERVENTIONS  - Assess for signs and symptoms of bleeding or hemorrhage  - Monitor labs  - Administer supportive blood products/factors as ordered and appropriate  Outcome: Progressing

## 2023-08-13 NOTE — PLAN OF CARE
Problem: Potential for Falls  Goal: Patient will remain free of falls  Description: INTERVENTIONS:  - Educate patient/family on patient safety including physical limitations  - Instruct patient to call for assistance with activity   - Consult OT/PT to assist with strengthening/mobility   - Keep Call bell within reach  - Keep bed low and locked with side rails adjusted as appropriate  - Keep care items and personal belongings within reach  - Initiate and maintain comfort rounds  - Make Fall Risk Sign visible to staff  - Offer Toileting every  Hours, in advance of need  - Initiate/Maintain alarm  - Obtain necessary fall risk management equipment:   - Apply yellow socks and bracelet for high fall risk patients  - Consider moving patient to room near nurses station  Outcome: Progressing     Problem: MOBILITY - ADULT  Goal: Maintain or return to baseline ADL function  Description: INTERVENTIONS:  -  Assess patient's ability to carry out ADLs; assess patient's baseline for ADL function and identify physical deficits which impact ability to perform ADLs (bathing, care of mouth/teeth, toileting, grooming, dressing, etc.)  - Assess/evaluate cause of self-care deficits   - Assess range of motion  - Assess patient's mobility; develop plan if impaired  - Assess patient's need for assistive devices and provide as appropriate  - Encourage maximum independence but intervene and supervise when necessary  - Involve family in performance of ADLs  - Assess for home care needs following discharge   - Consider OT consult to assist with ADL evaluation and planning for discharge  - Provide patient education as appropriate  Outcome: Progressing  Goal: Maintains/Returns to pre admission functional level  Description: INTERVENTIONS:  - Perform BMAT or MOVE assessment daily.   - Set and communicate daily mobility goal to care team and patient/family/caregiver.    - Collaborate with rehabilitation services on mobility goals if consulted  - Perform Range of Motion times a day. - Reposition patient every  hours.   - Dangle patient  times a day  - Stand patient  times a day  - Ambulate patient  times a day  - Out of bed to chair  times a day   - Out of bed for meals  times a day  - Out of bed for toileting  - Record patient progress and toleration of activity level   Outcome: Progressing     Problem: PAIN - ADULT  Goal: Verbalizes/displays adequate comfort level or baseline comfort level  Description: Interventions:  - Encourage patient to monitor pain and request assistance  - Assess pain using appropriate pain scale  - Administer analgesics based on type and severity of pain and evaluate response  - Implement non-pharmacological measures as appropriate and evaluate response  - Consider cultural and social influences on pain and pain management  - Notify physician/advanced practitioner if interventions unsuccessful or patient reports new pain  Outcome: Progressing     Problem: INFECTION - ADULT  Goal: Absence or prevention of progression during hospitalization  Description: INTERVENTIONS:  - Assess and monitor for signs and symptoms of infection  - Monitor lab/diagnostic results  - Monitor all insertion sites, i.e. indwelling lines, tubes, and drains  - Monitor endotracheal if appropriate and nasal secretions for changes in amount and color  - [place] appropriate cooling/warming therapies per order  - Administer medications as ordered  - Instruct and encourage patient and family to use good hand hygiene technique  - Identify and instruct in appropriate isolation precautions for identified infection/condition  Outcome: Progressing  Goal: Absence of fever/infection during neutropenic period  Description: INTERVENTIONS:  - Monitor WBC    Outcome: Progressing     Problem: SAFETY ADULT  Goal: Patient will remain free of falls  Description: INTERVENTIONS:  - Educate patient/family on patient safety including physical limitations  - Instruct patient to call for assistance with activity   - Consult OT/PT to assist with strengthening/mobility   - Keep Call bell within reach  - Keep bed low and locked with side rails adjusted as appropriate  - Keep care items and personal belongings within reach  - Initiate and maintain comfort rounds  - Make Fall Risk Sign visible to staff  - Offer Toileting every  Hours, in advance of need  - Initiate/Maintain alarm  - Obtain necessary fall risk management equipment:   - Apply yellow socks and bracelet for high fall risk patients  - Consider moving patient to room near nurses station  Outcome: Progressing  Goal: Maintain or return to baseline ADL function  Description: INTERVENTIONS:  -  Assess patient's ability to carry out ADLs; assess patient's baseline for ADL function and identify physical deficits which impact ability to perform ADLs (bathing, care of mouth/teeth, toileting, grooming, dressing, etc.)  - Assess/evaluate cause of self-care deficits   - Assess range of motion  - Assess patient's mobility; develop plan if impaired  - Assess patient's need for assistive devices and provide as appropriate  - Encourage maximum independence but intervene and supervise when necessary  - Involve family in performance of ADLs  - Assess for home care needs following discharge   - Consider OT consult to assist with ADL evaluation and planning for discharge  - Provide patient education as appropriate  Outcome: Progressing  Goal: Maintains/Returns to pre admission functional level  Description: INTERVENTIONS:  - Perform BMAT or MOVE assessment daily.   - Set and communicate daily mobility goal to care team and patient/family/caregiver. - Collaborate with rehabilitation services on mobility goals if consulted  - Perform Range of Motion times a day. - Reposition patient every  hours.   - Dangle patient  times a day  - Stand patient  times a day  - Ambulate patient  times a day  - Out of bed to chair  times a day   - Out of bed for meals  times a day  - Out of bed for toileting  - Record patient progress and toleration of activity level   Outcome: Progressing     Problem: DISCHARGE PLANNING  Goal: Discharge to home or other facility with appropriate resources  Description: INTERVENTIONS:  - Identify barriers to discharge w/patient and caregiver  - Arrange for needed discharge resources and transportation as appropriate  - Identify discharge learning needs (meds, wound care, etc.)  - Arrange for interpretive services to assist at discharge as needed  - Refer to Case Management Department for coordinating discharge planning if the patient needs post-hospital services based on physician/advanced practitioner order or complex needs related to functional status, cognitive ability, or social support system  Outcome: Progressing     Problem: Knowledge Deficit  Goal: Patient/family/caregiver demonstrates understanding of disease process, treatment plan, medications, and discharge instructions  Description: Complete learning assessment and assess knowledge base.   Interventions:  - Provide teaching at level of understanding  - Provide teaching via preferred learning methods  Outcome: Progressing     Problem: Prexisting or High Potential for Compromised Skin Integrity  Goal: Skin integrity is maintained or improved  Description: INTERVENTIONS:  - Identify patients at risk for skin breakdown  - Assess and monitor skin integrity  - Assess and monitor nutrition and hydration status  - Monitor labs   - Assess for incontinence   - Turn and reposition patient  - Assist with mobility/ambulation  - Relieve pressure over bony prominences  - Avoid friction and shearing  - Provide appropriate hygiene as needed including keeping skin clean and dry  - Evaluate need for skin moisturizer/barrier cream  - Collaborate with interdisciplinary team   - Patient/family teaching  - Consider wound care consult   Outcome: Progressing     Problem: GENITOURINARY - ADULT  Goal: Maintains or returns to baseline urinary function  Description: INTERVENTIONS:  - Assess urinary function  - Encourage oral fluids to ensure adequate hydration if ordered  - Administer IV fluids as ordered to ensure adequate hydration  - Administer ordered medications as needed  - Offer frequent toileting  - Follow urinary retention protocol if ordered  Outcome: Progressing     Problem: HEMATOLOGIC - ADULT  Goal: Maintains hematologic stability  Description: INTERVENTIONS  - Assess for signs and symptoms of bleeding or hemorrhage  - Monitor labs  - Administer supportive blood products/factors as ordered and appropriate  Outcome: Progressing

## 2023-08-13 NOTE — PLAN OF CARE
Problem: Potential for Falls  Goal: Patient will remain free of falls  Description: INTERVENTIONS:  - Educate patient/family on patient safety including physical limitations  - Instruct patient to call for assistance with activity   - Consult OT/PT to assist with strengthening/mobility   - Keep Call bell within reach  - Keep bed low and locked with side rails adjusted as appropriate  - Keep care items and personal belongings within reach  - Initiate and maintain comfort rounds  - Make Fall Risk Sign visible to staff  - Offer Toileting every Hours, in advance of need  - Initiate/Maintain alarm  - Obtain necessary fall risk management equipment:   - Apply yellow socks and bracelet for high fall risk patients  - Consider moving patient to room near nurses station  Outcome: Progressing     Problem: MOBILITY - ADULT  Goal: Maintain or return to baseline ADL function  Description: INTERVENTIONS:  -  Assess patient's ability to carry out ADLs; assess patient's baseline for ADL function and identify physical deficits which impact ability to perform ADLs (bathing, care of mouth/teeth, toileting, grooming, dressing, etc.)  - Assess/evaluate cause of self-care deficits   - Assess range of motion  - Assess patient's mobility; develop plan if impaired  - Assess patient's need for assistive devices and provide as appropriate  - Encourage maximum independence but intervene and supervise when necessary  - Involve family in performance of ADLs  - Assess for home care needs following discharge   - Consider OT consult to assist with ADL evaluation and planning for discharge  - Provide patient education as appropriate  Outcome: Progressing  Goal: Maintains/Returns to pre admission functional level  Description: INTERVENTIONS:  - Perform BMAT or MOVE assessment daily.   - Set and communicate daily mobility goal to care team and patient/family/caregiver.    - Collaborate with rehabilitation services on mobility goals if consulted  - Perform Range of Motion  times a day. - Reposition patient every  hours.   - Dangle patient  times a day  - Stand patient  times a day  - Ambulate patient  times a day  - Out of bed to chair  times a day   - Out of bed for meals  times a day  - Out of bed for toileting  - Record patient progress and toleration of activity level   Outcome: Progressing     Problem: PAIN - ADULT  Goal: Verbalizes/displays adequate comfort level or baseline comfort level  Description: Interventions:  - Encourage patient to monitor pain and request assistance  - Assess pain using appropriate pain scale  - Administer analgesics based on type and severity of pain and evaluate response  - Implement non-pharmacological measures as appropriate and evaluate response  - Consider cultural and social influences on pain and pain management  - Notify physician/advanced practitioner if interventions unsuccessful or patient reports new pain  Outcome: Progressing     Problem: INFECTION - ADULT  Goal: Absence or prevention of progression during hospitalization  Description: INTERVENTIONS:  - Assess and monitor for signs and symptoms of infection  - Monitor lab/diagnostic results  - Monitor all insertion sites, i.e. indwelling lines, tubes, and drains  - Monitor endotracheal if appropriate and nasal secretions for changes in amount and color  - [place] appropriate cooling/warming therapies per order  - Administer medications as ordered  - Instruct and encourage patient and family to use good hand hygiene technique  - Identify and instruct in appropriate isolation precautions for identified infection/condition  Outcome: Progressing  Goal: Absence of fever/infection during neutropenic period  Description: INTERVENTIONS:  - Monitor WBC    Outcome: Progressing     Problem: SAFETY ADULT  Goal: Patient will remain free of falls  Description: INTERVENTIONS:  - Educate patient/family on patient safety including physical limitations  - Instruct patient to call for assistance with activity   - Consult OT/PT to assist with strengthening/mobility   - Keep Call bell within reach  - Keep bed low and locked with side rails adjusted as appropriate  - Keep care items and personal belongings within reach  - Initiate and maintain comfort rounds  - Make Fall Risk Sign visible to staff  - Offer Toileting every  Hours, in advance of need  - Initiate/Maintain alarm  - Obtain necessary fall risk management equipment: Apply yellow socks and bracelet for high fall risk patients  - Consider moving patient to room near nurses station  Outcome: Progressing  Goal: Maintain or return to baseline ADL function  Description: INTERVENTIONS:  -  Assess patient's ability to carry out ADLs; assess patient's baseline for ADL function and identify physical deficits which impact ability to perform ADLs (bathing, care of mouth/teeth, toileting, grooming, dressing, etc.)  - Assess/evaluate cause of self-care deficits   - Assess range of motion  - Assess patient's mobility; develop plan if impaired  - Assess patient's need for assistive devices and provide as appropriate  - Encourage maximum independence but intervene and supervise when necessary  - Involve family in performance of ADLs  - Assess for home care needs following discharge   - Consider OT consult to assist with ADL evaluation and planning for discharge  - Provide patient education as appropriate  Outcome: Progressing  Goal: Maintains/Returns to pre admission functional level  Description: INTERVENTIONS:  - Perform BMAT or MOVE assessment daily.   - Set and communicate daily mobility goal to care team and patient/family/caregiver. - Collaborate with rehabilitation services on mobility goals if consulted  - Perform Range of Motion times a day. - Reposition patient every  hours.   - Dangle patient  times a day  - Stand patient  times a day  - Ambulate patient  times a day  - Out of bed to chair  times a day   - Out of bed for meals  times a day  - Out of bed for toileting  - Record patient progress and toleration of activity level   Outcome: Progressing     Problem: DISCHARGE PLANNING  Goal: Discharge to home or other facility with appropriate resources  Description: INTERVENTIONS:  - Identify barriers to discharge w/patient and caregiver  - Arrange for needed discharge resources and transportation as appropriate  - Identify discharge learning needs (meds, wound care, etc.)  - Arrange for interpretive services to assist at discharge as needed  - Refer to Case Management Department for coordinating discharge planning if the patient needs post-hospital services based on physician/advanced practitioner order or complex needs related to functional status, cognitive ability, or social support system  Outcome: Progressing     Problem: Knowledge Deficit  Goal: Patient/family/caregiver demonstrates understanding of disease process, treatment plan, medications, and discharge instructions  Description: Complete learning assessment and assess knowledge base.   Interventions:  - Provide teaching at level of understanding  - Provide teaching via preferred learning methods  Outcome: Progressing     Problem: Prexisting or High Potential for Compromised Skin Integrity  Goal: Skin integrity is maintained or improved  Description: INTERVENTIONS:  - Identify patients at risk for skin breakdown  - Assess and monitor skin integrity  - Assess and monitor nutrition and hydration status  - Monitor labs   - Assess for incontinence   - Turn and reposition patient  - Assist with mobility/ambulation  - Relieve pressure over bony prominences  - Avoid friction and shearing  - Provide appropriate hygiene as needed including keeping skin clean and dry  - Evaluate need for skin moisturizer/barrier cream  - Collaborate with interdisciplinary team   - Patient/family teaching  - Consider wound care consult   Outcome: Progressing     Problem: GENITOURINARY - ADULT  Goal: Maintains or returns to baseline urinary function  Description: INTERVENTIONS:  - Assess urinary function  - Encourage oral fluids to ensure adequate hydration if ordered  - Administer IV fluids as ordered to ensure adequate hydration  - Administer ordered medications as needed  - Offer frequent toileting  - Follow urinary retention protocol if ordered  Outcome: Progressing     Problem: HEMATOLOGIC - ADULT  Goal: Maintains hematologic stability  Description: INTERVENTIONS  - Assess for signs and symptoms of bleeding or hemorrhage  - Monitor labs  - Administer supportive blood products/factors as ordered and appropriate  Outcome: Progressing

## 2023-08-13 NOTE — ASSESSMENT & PLAN NOTE
Appreciate urology recommendations. Recurrent hematuria.    He is s/p cysto with stent in which hematuria resolved and eliquis trial was held again but pt had recurrent hematuria once again  Urine currently clear   Will continue to hold eliquis  Perhaps trial of eliquis again in 1 week (day 3 of hold)

## 2023-08-13 NOTE — PROGRESS NOTES
[facility]  Progress Note  Name: Yandy Mccoy I  MRN: [id number]  Unit/Bed#: E4 -01 I Date of Admission: 7/6/2023   Date of Service: 8/13/2023  Hospital Day: 45    Assessment/Plan   * Sepsis secondary to UTI [facility])  Assessment & Plan  · Sepsis secondary to Enterococcus bacteremia and complicated UTI  · Completed a course of antibiotics     Gross hematuria  Assessment & Plan  Appreciate urology recommendations. Recurrent hematuria. He is s/p cysto with stent in which hematuria resolved and eliquis trial was held again but pt had recurrent hematuria once again  Urine currently clear   Will continue to hold eliquis  Perhaps trial of eliquis again in 1 week (day 3 of hold)     Atrial fibrillation ([address])  Assessment & Plan  · Improved after successful SOLA cardioversion on 7/25/23  · Hold anticoagulation for recurrent hematuria  · Rhythm controlled on amiodarone 200 mg daily and Cardizem 120 mg daily      Impaired decision making  Assessment & Plan  This week repeat neuropsych evaluation was done  on 7/24/2023  He continues to have impaired decision-making capacity  Guardianship process initiated by case management     Bacteremia  Assessment & Plan  · Aerococcus bacteremia due to complicated UTI   · Completed full course of antibiotics     Hydronephrosis with obstructing calculus  Assessment & Plan  · Admitted originally due to sepsis from UTI with obstruction  · Status post cystoscopy with difficult stent insertion on 7/6/2023  · Procedure was complicated by inadvertent left ureteral perforation  · CT showed previous ureteral calculus is now in the bladder  · Repeat cystoscopy on 8/3 with stent placement. · He had hematuria with re initiation of eliquis this past week. eliquis is currently on hold. · Urine has remained clear after holding eliquis. Will trial again after 1 week.  (day 2 of hold)  · Urology recommending repeat ct in 2 weeks with outpt cysto/stent removal Esophageal abnormality  Assessment & Plan  He had incidental finding on CT with diffuse esophageal thickening could be from esophagitis  Esophagitis probably from alcohol abuse  Continue empiric Protonix  Appreciate gi eval     Elevated troponin  Assessment & Plan  · Non-MI troponin elevation secondary to tachycardia and sepsis   · Appreciate cardiology recommendations. · Continue aspirin and Lipitor    COPD (chronic obstructive pulmonary disease) (HCC)  Assessment & Plan  · Stable without exacerbation  · Continue albuterol and breo     Tobacco use  Assessment & Plan  · Smokes 6 cigarettes daily. Apparently plans to quit. · Nicotine TD patch  · Tobacco counseling provided            VTE Pharmacologic Prophylaxis: contraindicated due to hematuria     Mechanical VTE Prophylaxis in Place: Yes    Education and Discussions with Family / Patient: patient    Current Length of Stay: 45 day(s)  Current Patient Status: Inpatient     Discharge Plan / Estimated Discharge Date awaiting safe discharge plan. Pt needs guardian     Code Status: Level 1 - Full Code      Subjective:   Pt seen and examined. He was sleeping but awakened. No f/c no cp no sob no n/v/d no abd pain     Objective:     Vitals:   Temp (24hrs), Av.1 °F (36.7 °C), Min:97.7 °F (36.5 °C), Max:98.5 °F (36.9 °C)    Temp:  [97.7 °F (36.5 °C)-98.5 °F (36.9 °C)] 97.7 °F (36.5 °C)  HR:  [71-87] 87  Resp:  [18] 18  BP: (134)/(67-78) 134/78  SpO2:  [90 %-94 %] 94 %  Body mass index is 28 kg/m². Input and Output Summary (last 24 hours):     No intake or output data in the 24 hours ending 23 1539    Physical Exam:   Physical Exam  Constitutional:       Appearance: Normal appearance. Comments: Sleeping but awakens    HENT:      Head: Normocephalic and atraumatic. Eyes:      Extraocular Movements: Extraocular movements intact. Pupils: Pupils are equal, round, and reactive to light.    Cardiovascular:      Rate and Rhythm: Normal rate and regular rhythm. Heart sounds: No murmur heard. No friction rub. No gallop. Pulmonary:      Effort: Pulmonary effort is normal. No respiratory distress. Breath sounds: Normal breath sounds. No wheezing, rhonchi or rales. Abdominal:      General: Bowel sounds are normal. There is no distension. Palpations: Abdomen is soft. Tenderness: There is no abdominal tenderness. There is no guarding or rebound. Genitourinary:     Comments: Urine clear   Musculoskeletal:      Right lower leg: No edema. Left lower leg: No edema. Neurological:      Mental Status: He is oriented to person, place, and time.           Additional Data:     Labs:  Results from last 7 days   Lab Units 08/07/23  0434   WBC Thousand/uL 13.73*   HEMOGLOBIN g/dL 12.1   HEMATOCRIT % 38.4   PLATELETS Thousands/uL 336   NEUTROS PCT % 73   LYMPHS PCT % 15   MONOS PCT % 9   EOS PCT % 2     Results from last 7 days   Lab Units 08/12/23  0615   SODIUM mmol/L 134*   POTASSIUM mmol/L 5.2   CHLORIDE mmol/L 102   CO2 mmol/L 26   BUN mg/dL 24   CREATININE mg/dL 0.94   ANION GAP mmol/L 6   CALCIUM mg/dL 8.5   GLUCOSE RANDOM mg/dL 84                 Results from last 7 days   Lab Units 08/07/23  0434   PROCALCITONIN ng/ml 0.06     Recent Cultures (last 7 days):           Lines/Drains:  Invasive Devices     Drain  Duration           Ureteral Internal Stent Left ureter 6 Fr. 37 days    Ureteral Internal Stent Left ureter 7 Fr. 10 days              Last 24 Hours Medication List:   Current Facility-Administered Medications   Medication Dose Route Frequency Provider Last Rate   • acetaminophen  975 mg Oral Q6H [address] Alexander Ruano MD     • albuterol  2.5 mg Nebulization Q6H PRN Alexander Ruano MD     • aluminum-magnesium hydroxide-simethicone  30 mL Oral Q6H PRN Alexander Ruano MD     • amiodarone  200 mg Oral Daily With Breakfast Kam Krishnan MD     • atorvastatin  40 mg Oral Daily With Debra Limon MD     • bisacodyl  10 mg Oral Daily PRN Barbra Rueda MD     • diltiazem  120 mg Oral Daily Barbra Rueda MD     • fluticasone-vilanterol  1 puff Inhalation Daily Barbra Rueda MD     • folic acid  1 mg Oral Daily Barbra Rueda MD     • guaiFENesin  200 mg Oral Q4H PRN Barbra Rueda MD     • hydrOXYzine HCL  50 mg Oral HS PRN Barbra Rueda MD     • lidocaine  1 patch Topical Daily Barbra Rueda MD     • melatonin  6 mg Oral HS Barbra Rueda MD     • methocarbamol  750 mg Oral [facility] Barbra Rueda MD     • multivitamin-minerals  1 tablet Oral Daily Barbra Rueda MD     • nicotine  7 mg Transdermal Daily Pedro Amaral MD     • pantoprazole  40 mg Oral Early Morning Barbra Rueda MD     • polyethylene glycol  17 g Oral Daily Barbra Rueda MD     • senna  2 tablet Oral HS Barbra Rueda MD     • thiamine  100 mg Oral Daily Barbra Rueda MD          Today, Patient Was Seen By: Drury Epley, DO

## 2023-08-14 PROCEDURE — 99232 SBSQ HOSP IP/OBS MODERATE 35: CPT | Performed by: INTERNAL MEDICINE

## 2023-08-14 RX ADMIN — ACETAMINOPHEN 325MG 975 MG: 325 TABLET ORAL at 12:21

## 2023-08-14 RX ADMIN — METHOCARBAMOL TABLETS 750 MG: 500 TABLET, COATED ORAL at 05:13

## 2023-08-14 RX ADMIN — THIAMINE HCL TAB 100 MG 100 MG: 100 TAB at 08:09

## 2023-08-14 RX ADMIN — ACETAMINOPHEN 325MG 975 MG: 325 TABLET ORAL at 17:58

## 2023-08-14 RX ADMIN — NICOTINE 7 MG/24 HR DAILY TRANSDERMAL PATCH 7 MG: at 08:10

## 2023-08-14 RX ADMIN — PANTOPRAZOLE SODIUM 40 MG: 40 TABLET, DELAYED RELEASE ORAL at 05:13

## 2023-08-14 RX ADMIN — MELATONIN 6 MG: at 21:37

## 2023-08-14 RX ADMIN — ATORVASTATIN CALCIUM 40 MG: 40 TABLET, FILM COATED ORAL at 17:58

## 2023-08-14 RX ADMIN — METHOCARBAMOL TABLETS 750 MG: 500 TABLET, COATED ORAL at 13:46

## 2023-08-14 RX ADMIN — ACETAMINOPHEN 325MG 975 MG: 325 TABLET ORAL at 05:13

## 2023-08-14 RX ADMIN — FOLIC ACID 1 MG: 1 TABLET ORAL at 08:09

## 2023-08-14 RX ADMIN — AMIODARONE HYDROCHLORIDE 200 MG: 200 TABLET ORAL at 08:09

## 2023-08-14 RX ADMIN — FLUTICASONE FUROATE AND VILANTEROL TRIFENATATE 1 PUFF: 200; 25 POWDER RESPIRATORY (INHALATION) at 08:09

## 2023-08-14 RX ADMIN — Medication 1 TABLET: at 08:09

## 2023-08-14 RX ADMIN — METHOCARBAMOL TABLETS 750 MG: 500 TABLET, COATED ORAL at 21:37

## 2023-08-14 RX ADMIN — SENNOSIDES 17.2 MG: 8.6 TABLET, FILM COATED ORAL at 21:37

## 2023-08-14 RX ADMIN — DILTIAZEM HYDROCHLORIDE 120 MG: 120 CAPSULE, COATED, EXTENDED RELEASE ORAL at 08:09

## 2023-08-14 NOTE — ASSESSMENT & PLAN NOTE
· Admitted originally due to sepsis from UTI with obstruction  · Status post cystoscopy with difficult stent insertion on 7/6/2023  · Procedure was complicated by inadvertent left ureteral perforation  · CT showed previous ureteral calculus is now in the bladder  · Repeat cystoscopy on 8/3 with stent placement. · He had hematuria with re initiation of eliquis this past week. eliquis is currently on hold. · Urine has remained clear after holding eliquis. Will trial again after 1 week.  (day 4 of hold)  · Urology recommending repeat ct in 2 weeks with outpt cysto/stent removal

## 2023-08-14 NOTE — RESTORATIVE TECHNICIAN NOTE
Restorative Technician Note      Patient Name: Dianelys Oakley     Restorative Tech Visit Date: 08/14/23  Note Type: Mobility  Patient Position Upon Consult: Standing  Activity Performed: Ambulated  Patient Position at End of Consult: Standing;  All needs within reach

## 2023-08-14 NOTE — PROGRESS NOTES
[facility]  Progress Note  Name: Samanta Montaño I  MRN: [id number]  Unit/Bed#: E4 -01 I Date of Admission: 7/6/2023   Date of Service: 8/14/2023  Hospital Day: 44    Assessment/Plan   * Sepsis secondary to UTI [facility])  Assessment & Plan  · Sepsis secondary to Enterococcus bacteremia and complicated UTI  · Completed a course of antibiotics     Gross hematuria  Assessment & Plan  Appreciate urology recommendations. Recurrent hematuria. He is s/p cysto with stent in which hematuria resolved and eliquis trial was held again but pt had recurrent hematuria once again  Urine currently clear   Will continue to hold eliquis  Perhaps trial of eliquis again in 1 week (day 4 of hold)     Atrial fibrillation ([address])  Assessment & Plan  · Improved after successful SOLA cardioversion on 7/25/23  · Hold anticoagulation for recurrent hematuria  · Rhythm controlled on amiodarone 200 mg daily and Cardizem 120 mg daily      Impaired decision making  Assessment & Plan  This week repeat neuropsych evaluation was done  on 7/24/2023  He continues to have impaired decision-making capacity  Guardianship process initiated by case management     Bacteremia  Assessment & Plan  · Aerococcus bacteremia due to complicated UTI   · Completed full course of antibiotics     Hydronephrosis with obstructing calculus  Assessment & Plan  · Admitted originally due to sepsis from UTI with obstruction  · Status post cystoscopy with difficult stent insertion on 7/6/2023  · Procedure was complicated by inadvertent left ureteral perforation  · CT showed previous ureteral calculus is now in the bladder  · Repeat cystoscopy on 8/3 with stent placement. · He had hematuria with re initiation of eliquis this past week. eliquis is currently on hold. · Urine has remained clear after holding eliquis. Will trial again after 1 week.  (day 4 of hold)  · Urology recommending repeat ct in 2 weeks with outpt cysto/stent removal Esophageal abnormality  Assessment & Plan  He had incidental finding on CT with diffuse esophageal thickening could be from esophagitis  Esophagitis probably from alcohol abuse  Continue empiric Protonix  Appreciate gi eval     Elevated troponin  Assessment & Plan  · Non-MI troponin elevation secondary to tachycardia and sepsis   · Appreciate cardiology recommendations. · Continue aspirin and Lipitor    COPD (chronic obstructive pulmonary disease) (HCC)  Assessment & Plan  · Stable without exacerbation  · Continue albuterol and breo     Tobacco use  Assessment & Plan  · Smokes 6 cigarettes daily. Apparently plans to quit. · Nicotine TD patch  · Tobacco counseling provided              VTE Pharmacologic Prophylaxis: contraindicated due to hematuria     Mechanical VTE Prophylaxis in Place: Yes    Education and Discussions with Family / Patient: patient    Current Length of Stay: 44 day(s)  Current Patient Status: Inpatient     Discharge Plan / Estimated Discharge Date: awaiting guardian. Code Status: Level 1 - Full Code      Subjective:   Pt seen and examined. Pt doing well. No further hematuria. No f/c no cp no sob no n/v/d no abd pain     Objective:     Vitals:   Temp (24hrs), Av.1 °F (36.2 °C), Min:96.6 °F (35.9 °C), Max:97.8 °F (36.6 °C)    Temp:  [96.6 °F (35.9 °C)-97.8 °F (36.6 °C)] 97 °F (36.1 °C)  HR:  [68-82] 77  Resp:  [18] 18  BP: (121-150)/(71-91) 150/91  SpO2:  [93 %-94 %] 93 %  Body mass index is 28 kg/m². Input and Output Summary (last 24 hours): Intake/Output Summary (Last 24 hours) at 2023 1416  Last data filed at 2023 0513  Gross per 24 hour   Intake 800 ml   Output --   Net 800 ml       Physical Exam:   Physical Exam  Constitutional:       Appearance: Normal appearance. HENT:      Head: Normocephalic and atraumatic. Eyes:      Extraocular Movements: Extraocular movements intact. Pupils: Pupils are equal, round, and reactive to light.    Cardiovascular: Rate and Rhythm: Normal rate and regular rhythm. Heart sounds: No murmur heard. No friction rub. No gallop. Pulmonary:      Effort: Pulmonary effort is normal. No respiratory distress. Breath sounds: Normal breath sounds. No wheezing, rhonchi or rales. Abdominal:      General: Bowel sounds are normal. There is no distension. Palpations: Abdomen is soft. Tenderness: There is no abdominal tenderness. There is no guarding or rebound. Genitourinary:     Comments: Urine clear. No hematuria   Musculoskeletal:      Right lower leg: No edema. Left lower leg: No edema. Neurological:      Mental Status: He is alert and oriented to person, place, and time.           Additional Data:     Labs:      Results from last 7 days   Lab Units 08/12/23  0615   SODIUM mmol/L 134*   POTASSIUM mmol/L 5.2   CHLORIDE mmol/L 102   CO2 mmol/L 26   BUN mg/dL 24   CREATININE mg/dL 0.94   ANION GAP mmol/L 6   CALCIUM mg/dL 8.5   GLUCOSE RANDOM mg/dL 84                       Recent Cultures (last 7 days):           Lines/Drains:  Invasive Devices     Drain  Duration           Ureteral Internal Stent Left ureter 6 Fr. 38 days    Ureteral Internal Stent Left ureter 7 Fr. 11 days              Last 24 Hours Medication List:   Current Facility-Administered Medications   Medication Dose Route Frequency Provider Last Rate   • acetaminophen  975 mg Oral Q6H [facility] Kalpana Tomlin MD     • albuterol  2.5 mg Nebulization Q6H PRN Kalpana Tomlin MD     • aluminum-magnesium hydroxide-simethicone  30 mL Oral Q6H PRN Kalpana Tomlin MD     • amiodarone  200 mg Oral Daily With Breakfast Karin Fregoso MD     • atorvastatin  40 mg Oral Daily With Nicole Lynn MD     • bisacodyl  10 mg Oral Daily PRN Kalpana Tomlin MD     • diltiazem  120 mg Oral Daily Kalpana Tomlin MD     • fluticasone-vilanterol  1 puff Inhalation Daily Kalpana Tomlin MD     • folic acid  1 mg Oral Daily Kalpana Tomlin MD     • guaiFENesin 200 mg Oral Q4H PRN Heidy Dubose MD     • hydrOXYzine HCL  50 mg Oral HS PRN Heidy Dubose MD     • lidocaine  1 patch Topical Daily Heidy Dubose MD     • melatonin  6 mg Oral HS Heidy Dubose MD     • methocarbamol  750 mg Oral [facility] Heidy Dubose MD     • multivitamin-minerals  1 tablet Oral Daily Heidy Dubose MD     • nicotine  7 mg Transdermal Daily Jus Barrow MD     • pantoprazole  40 mg Oral Early Morning Heidy Dubose MD     • polyethylene glycol  17 g Oral Daily Heidy Dubose MD     • senna  2 tablet Oral HS Heidy Dubose MD     • thiamine  100 mg Oral Daily Heidy Dubose MD          Today, Patient Was Seen By: Michelle Lafleur

## 2023-08-14 NOTE — ASSESSMENT & PLAN NOTE
Appreciate urology recommendations. Recurrent hematuria.    He is s/p cysto with stent in which hematuria resolved and eliquis trial was held again but pt had recurrent hematuria once again  Urine currently clear   Will continue to hold eliquis  Perhaps trial of eliquis again in 1 week (day 4 of hold)

## 2023-08-14 NOTE — PLAN OF CARE
Problem: Potential for Falls  Goal: Patient will remain free of falls  Description: INTERVENTIONS:  - Educate patient/family on patient safety including physical limitations  - Instruct patient to call for assistance with activity   - Consult OT/PT to assist with strengthening/mobility   - Keep Call bell within reach  - Keep bed low and locked with side rails adjusted as appropriate  - Keep care items and personal belongings within reach  - Initiate and maintain comfort rounds  - Make Fall Risk Sign visible to staff  - Offer Toileting every  Hours, in advance of need  - Initiate/Maintain alarm  - Obtain necessary fall risk management equipment:   - Apply yellow socks and bracelet for high fall risk patients  - Consider moving patient to room near nurses station  Outcome: Progressing     Problem: MOBILITY - ADULT  Goal: Maintain or return to baseline ADL function  Description: INTERVENTIONS:  -  Assess patient's ability to carry out ADLs; assess patient's baseline for ADL function and identify physical deficits which impact ability to perform ADLs (bathing, care of mouth/teeth, toileting, grooming, dressing, etc.)  - Assess/evaluate cause of self-care deficits   - Assess range of motion  - Assess patient's mobility; develop plan if impaired  - Assess patient's need for assistive devices and provide as appropriate  - Encourage maximum independence but intervene and supervise when necessary  - Involve family in performance of ADLs  - Assess for home care needs following discharge   - Consider OT consult to assist with ADL evaluation and planning for discharge  - Provide patient education as appropriate  Outcome: Progressing  Goal: Maintains/Returns to pre admission functional level  Description: INTERVENTIONS:  - Perform BMAT or MOVE assessment daily.   - Set and communicate daily mobility goal to care team and patient/family/caregiver.    - Collaborate with rehabilitation services on mobility goals if consulted  - Perform Range of Motion  times a day. - Reposition patient every  hours.   - Dangle patient  times a day  - Stand patient  times a day  - Ambulate patient  times a day  - Out of bed to chair  times a day   - Out of bed for meals  times a day  - Out of bed for toileting  - Record patient progress and toleration of activity level   Outcome: Progressing     Problem: PAIN - ADULT  Goal: Verbalizes/displays adequate comfort level or baseline comfort level  Description: Interventions:  - Encourage patient to monitor pain and request assistance  - Assess pain using appropriate pain scale  - Administer analgesics based on type and severity of pain and evaluate response  - Implement non-pharmacological measures as appropriate and evaluate response  - Consider cultural and social influences on pain and pain management  - Notify physician/advanced practitioner if interventions unsuccessful or patient reports new pain  Outcome: Progressing     Problem: INFECTION - ADULT  Goal: Absence or prevention of progression during hospitalization  Description: INTERVENTIONS:  - Assess and monitor for signs and symptoms of infection  - Monitor lab/diagnostic results  - Monitor all insertion sites, i.e. indwelling lines, tubes, and drains  - Monitor endotracheal if appropriate and nasal secretions for changes in amount and color  - [place] appropriate cooling/warming therapies per order  - Administer medications as ordered  - Instruct and encourage patient and family to use good hand hygiene technique  - Identify and instruct in appropriate isolation precautions for identified infection/condition  Outcome: Progressing  Goal: Absence of fever/infection during neutropenic period  Description: INTERVENTIONS:  - Monitor WBC    Outcome: Progressing     Problem: SAFETY ADULT  Goal: Patient will remain free of falls  Description: INTERVENTIONS:  - Educate patient/family on patient safety including physical limitations  - Instruct patient to call for assistance with activity   - Consult OT/PT to assist with strengthening/mobility   - Keep Call bell within reach  - Keep bed low and locked with side rails adjusted as appropriate  - Keep care items and personal belongings within reach  - Initiate and maintain comfort rounds  - Make Fall Risk Sign visible to staff  - Offer Toileting every  Hours, in advance of need  - Initiate/Maintain alarm  - Obtain necessary fall risk management equipment:   - Apply yellow socks and bracelet for high fall risk patients  - Consider moving patient to room near nurses station  Outcome: Progressing  Goal: Maintain or return to baseline ADL function  Description: INTERVENTIONS:  -  Assess patient's ability to carry out ADLs; assess patient's baseline for ADL function and identify physical deficits which impact ability to perform ADLs (bathing, care of mouth/teeth, toileting, grooming, dressing, etc.)  - Assess/evaluate cause of self-care deficits   - Assess range of motion  - Assess patient's mobility; develop plan if impaired  - Assess patient's need for assistive devices and provide as appropriate  - Encourage maximum independence but intervene and supervise when necessary  - Involve family in performance of ADLs  - Assess for home care needs following discharge   - Consider OT consult to assist with ADL evaluation and planning for discharge  - Provide patient education as appropriate  Outcome: Progressing  Goal: Maintains/Returns to pre admission functional level  Description: INTERVENTIONS:  - Perform BMAT or MOVE assessment daily.   - Set and communicate daily mobility goal to care team and patient/family/caregiver. - Collaborate with rehabilitation services on mobility goals if consulted  - Perform Range of Motion  times a day. - Reposition patient every  hours.   - Dangle patient  times a day  - Stand patient  times a day  - Ambulate patient  times a day  - Out of bed to chair  times a day   - Out of bed for meals  times a day  - Out of bed for toileting  - Record patient progress and toleration of activity level   Outcome: Progressing     Problem: DISCHARGE PLANNING  Goal: Discharge to home or other facility with appropriate resources  Description: INTERVENTIONS:  - Identify barriers to discharge w/patient and caregiver  - Arrange for needed discharge resources and transportation as appropriate  - Identify discharge learning needs (meds, wound care, etc.)  - Arrange for interpretive services to assist at discharge as needed  - Refer to Case Management Department for coordinating discharge planning if the patient needs post-hospital services based on physician/advanced practitioner order or complex needs related to functional status, cognitive ability, or social support system  Outcome: Progressing     Problem: Knowledge Deficit  Goal: Patient/family/caregiver demonstrates understanding of disease process, treatment plan, medications, and discharge instructions  Description: Complete learning assessment and assess knowledge base.   Interventions:  - Provide teaching at level of understanding  - Provide teaching via preferred learning methods  Outcome: Progressing     Problem: Prexisting or High Potential for Compromised Skin Integrity  Goal: Skin integrity is maintained or improved  Description: INTERVENTIONS:  - Identify patients at risk for skin breakdown  - Assess and monitor skin integrity  - Assess and monitor nutrition and hydration status  - Monitor labs   - Assess for incontinence   - Turn and reposition patient  - Assist with mobility/ambulation  - Relieve pressure over bony prominences  - Avoid friction and shearing  - Provide appropriate hygiene as needed including keeping skin clean and dry  - Evaluate need for skin moisturizer/barrier cream  - Collaborate with interdisciplinary team   - Patient/family teaching  - Consider wound care consult   Outcome: Progressing     Problem: GENITOURINARY - ADULT  Goal: Maintains or returns to baseline urinary function  Description: INTERVENTIONS:  - Assess urinary function  - Encourage oral fluids to ensure adequate hydration if ordered  - Administer IV fluids as ordered to ensure adequate hydration  - Administer ordered medications as needed  - Offer frequent toileting  - Follow urinary retention protocol if ordered  Outcome: Progressing     Problem: HEMATOLOGIC - ADULT  Goal: Maintains hematologic stability  Description: INTERVENTIONS  - Assess for signs and symptoms of bleeding or hemorrhage  - Monitor labs  - Administer supportive blood products/factors as ordered and appropriate  Outcome: Progressing

## 2023-08-14 NOTE — RESTORATIVE TECHNICIAN NOTE
Restorative Technician Note      Patient Name: Jovita Alejo     Restorative Tech Visit Date: 08/14/23  Note Type: Mobility  Patient Position Upon Consult: Supine  Activity Performed: Ambulated  Patient Position at End of Consult: All needs within reach;  Seated edge of bed

## 2023-08-15 PROCEDURE — 99232 SBSQ HOSP IP/OBS MODERATE 35: CPT | Performed by: INTERNAL MEDICINE

## 2023-08-15 RX ADMIN — ATORVASTATIN CALCIUM 40 MG: 40 TABLET, FILM COATED ORAL at 15:27

## 2023-08-15 RX ADMIN — MELATONIN 6 MG: at 22:58

## 2023-08-15 RX ADMIN — Medication 1 TABLET: at 08:43

## 2023-08-15 RX ADMIN — ACETAMINOPHEN 325MG 975 MG: 325 TABLET ORAL at 23:01

## 2023-08-15 RX ADMIN — DILTIAZEM HYDROCHLORIDE 120 MG: 120 CAPSULE, COATED, EXTENDED RELEASE ORAL at 08:43

## 2023-08-15 RX ADMIN — POLYETHYLENE GLYCOL 3350 17 G: 17 POWDER, FOR SOLUTION ORAL at 08:43

## 2023-08-15 RX ADMIN — METHOCARBAMOL TABLETS 750 MG: 500 TABLET, COATED ORAL at 05:21

## 2023-08-15 RX ADMIN — METHOCARBAMOL TABLETS 750 MG: 500 TABLET, COATED ORAL at 22:58

## 2023-08-15 RX ADMIN — ACETAMINOPHEN 325MG 975 MG: 325 TABLET ORAL at 05:21

## 2023-08-15 RX ADMIN — PANTOPRAZOLE SODIUM 40 MG: 40 TABLET, DELAYED RELEASE ORAL at 05:21

## 2023-08-15 RX ADMIN — FOLIC ACID 1 MG: 1 TABLET ORAL at 08:43

## 2023-08-15 RX ADMIN — METHOCARBAMOL TABLETS 750 MG: 500 TABLET, COATED ORAL at 15:26

## 2023-08-15 RX ADMIN — THIAMINE HCL TAB 100 MG 100 MG: 100 TAB at 08:43

## 2023-08-15 RX ADMIN — FLUTICASONE FUROATE AND VILANTEROL TRIFENATATE 1 PUFF: 200; 25 POWDER RESPIRATORY (INHALATION) at 08:44

## 2023-08-15 RX ADMIN — NICOTINE 7 MG/24 HR DAILY TRANSDERMAL PATCH 7 MG: at 08:43

## 2023-08-15 RX ADMIN — AMIODARONE HYDROCHLORIDE 200 MG: 200 TABLET ORAL at 08:43

## 2023-08-15 NOTE — RESTORATIVE TECHNICIAN NOTE
Restorative Technician Note      Patient Name: Derrick Yang     Restorative Tech Visit Date: 08/15/23  Note Type: Mobility  Patient Position Upon Consult: Supine  Activity Performed: Ambulated  Patient Position at End of Consult: Seated edge of bed;  All needs within reach

## 2023-08-15 NOTE — PLAN OF CARE
Problem: Potential for Falls  Goal: Patient will remain free of falls  Description: INTERVENTIONS:  - Educate patient/family on patient safety including physical limitations  - Instruct patient to call for assistance with activity   - Consult OT/PT to assist with strengthening/mobility   - Keep Call bell within reach  - Keep bed low and locked with side rails adjusted as appropriate  - Keep care items and personal belongings within reach  - Initiate and maintain comfort rounds  - Make Fall Risk Sign visible to staff  - Offer Toileting every  Hours, in advance of need  - Initiate/Maintain alarm  - Obtain necessary fall risk management equipment:   - Apply yellow socks and bracelet for high fall risk patients  - Consider moving patient to room near nurses station  Outcome: Progressing     Problem: MOBILITY - ADULT  Goal: Maintain or return to baseline ADL function  Description: INTERVENTIONS:  -  Assess patient's ability to carry out ADLs; assess patient's baseline for ADL function and identify physical deficits which impact ability to perform ADLs (bathing, care of mouth/teeth, toileting, grooming, dressing, etc.)  - Assess/evaluate cause of self-care deficits   - Assess range of motion  - Assess patient's mobility; develop plan if impaired  - Assess patient's need for assistive devices and provide as appropriate  - Encourage maximum independence but intervene and supervise when necessary  - Involve family in performance of ADLs  - Assess for home care needs following discharge   - Consider OT consult to assist with ADL evaluation and planning for discharge  - Provide patient education as appropriate  Outcome: Progressing  Goal: Maintains/Returns to pre admission functional level  Description: INTERVENTIONS:  - Perform BMAT or MOVE assessment daily.   - Set and communicate daily mobility goal to care team and patient/family/caregiver.    - Collaborate with rehabilitation services on mobility goals if consulted  - Perform Range of Motion  times a day. - Reposition patient every  hours.   - Dangle patient  times a day  - Stand patient  times a day  - Ambulate patient  times a day  - Out of bed to chair times a day   - Out of bed for meals  times a day  - Out of bed for toileting  - Record patient progress and toleration of activity level   Outcome: Progressing     Problem: PAIN - ADULT  Goal: Verbalizes/displays adequate comfort level or baseline comfort level  Description: Interventions:  - Encourage patient to monitor pain and request assistance  - Assess pain using appropriate pain scale  - Administer analgesics based on type and severity of pain and evaluate response  - Implement non-pharmacological measures as appropriate and evaluate response  - Consider cultural and social influences on pain and pain management  - Notify physician/advanced practitioner if interventions unsuccessful or patient reports new pain  Outcome: Progressing     Problem: INFECTION - ADULT  Goal: Absence or prevention of progression during hospitalization  Description: INTERVENTIONS:  - Assess and monitor for signs and symptoms of infection  - Monitor lab/diagnostic results  - Monitor all insertion sites, i.e. indwelling lines, tubes, and drains  - Monitor endotracheal if appropriate and nasal secretions for changes in amount and color  - [place] appropriate cooling/warming therapies per order  - Administer medications as ordered  - Instruct and encourage patient and family to use good hand hygiene technique  - Identify and instruct in appropriate isolation precautions for identified infection/condition  Outcome: Progressing  Goal: Absence of fever/infection during neutropenic period  Description: INTERVENTIONS:  - Monitor WBC    Outcome: Progressing     Problem: SAFETY ADULT  Goal: Patient will remain free of falls  Description: INTERVENTIONS:  - Educate patient/family on patient safety including physical limitations  - Instruct patient to call for assistance with activity   - Consult OT/PT to assist with strengthening/mobility   - Keep Call bell within reach  - Keep bed low and locked with side rails adjusted as appropriate  - Keep care items and personal belongings within reach  - Initiate and maintain comfort rounds  - Make Fall Risk Sign visible to staff  - Offer Toileting every  Hours, in advance of need  - Initiate/Maintain alarm  - Obtain necessary fall risk management equipment:   - Apply yellow socks and bracelet for high fall risk patients  - Consider moving patient to room near nurses station  Outcome: Progressing  Goal: Maintain or return to baseline ADL function  Description: INTERVENTIONS:  -  Assess patient's ability to carry out ADLs; assess patient's baseline for ADL function and identify physical deficits which impact ability to perform ADLs (bathing, care of mouth/teeth, toileting, grooming, dressing, etc.)  - Assess/evaluate cause of self-care deficits   - Assess range of motion  - Assess patient's mobility; develop plan if impaired  - Assess patient's need for assistive devices and provide as appropriate  - Encourage maximum independence but intervene and supervise when necessary  - Involve family in performance of ADLs  - Assess for home care needs following discharge   - Consider OT consult to assist with ADL evaluation and planning for discharge  - Provide patient education as appropriate  Outcome: Progressing  Goal: Maintains/Returns to pre admission functional level  Description: INTERVENTIONS:  - Perform BMAT or MOVE assessment daily.   - Set and communicate daily mobility goal to care team and patient/family/caregiver. - Collaborate with rehabilitation services on mobility goals if consulted  - Perform Range of Motion  times a day. - Reposition patient every  hours.   - Dangle patient  times a day  - Stand patient  times a day  - Ambulate patient mes a day  - Out of bed to chair  times a day   - Out of bed for meals  times a day  - Out of bed for toileting  - Record patient progress and toleration of activity level   Outcome: Progressing     Problem: DISCHARGE PLANNING  Goal: Discharge to home or other facility with appropriate resources  Description: INTERVENTIONS:  - Identify barriers to discharge w/patient and caregiver  - Arrange for needed discharge resources and transportation as appropriate  - Identify discharge learning needs (meds, wound care, etc.)  - Arrange for interpretive services to assist at discharge as needed  - Refer to Case Management Department for coordinating discharge planning if the patient needs post-hospital services based on physician/advanced practitioner order or complex needs related to functional status, cognitive ability, or social support system  Outcome: Progressing     Problem: Knowledge Deficit  Goal: Patient/family/caregiver demonstrates understanding of disease process, treatment plan, medications, and discharge instructions  Description: Complete learning assessment and assess knowledge base.   Interventions:  - Provide teaching at level of understanding  - Provide teaching via preferred learning methods  Outcome: Progressing     Problem: Prexisting or High Potential for Compromised Skin Integrity  Goal: Skin integrity is maintained or improved  Description: INTERVENTIONS:  - Identify patients at risk for skin breakdown  - Assess and monitor skin integrity  - Assess and monitor nutrition and hydration status  - Monitor labs   - Assess for incontinence   - Turn and reposition patient  - Assist with mobility/ambulation  - Relieve pressure over bony prominences  - Avoid friction and shearing  - Provide appropriate hygiene as needed including keeping skin clean and dry  - Evaluate need for skin moisturizer/barrier cream  - Collaborate with interdisciplinary team   - Patient/family teaching  - Consider wound care consult   Outcome: Progressing     Problem: GENITOURINARY - ADULT  Goal: Maintains or returns to baseline urinary function  Description: INTERVENTIONS:  - Assess urinary function  - Encourage oral fluids to ensure adequate hydration if ordered  - Administer IV fluids as ordered to ensure adequate hydration  - Administer ordered medications as needed  - Offer frequent toileting  - Follow urinary retention protocol if ordered  Outcome: Progressing     Problem: HEMATOLOGIC - ADULT  Goal: Maintains hematologic stability  Description: INTERVENTIONS  - Assess for signs and symptoms of bleeding or hemorrhage  - Monitor labs  - Administer supportive blood products/factors as ordered and appropriate  Outcome: Progressing

## 2023-08-15 NOTE — PROGRESS NOTES
[facility]  Progress Note  Name: Wong Cole I  MRN: [id number]  Unit/Bed#: E4 -01 I Date of Admission: 7/6/2023   Date of Service: 8/15/2023  Hospital Day: 40    Assessment/Plan   * Sepsis secondary to UTI [facility])  Assessment & Plan  · Sepsis secondary to Enterococcus bacteremia and complicated UTI  · Completed a course of antibiotics     Gross hematuria  Assessment & Plan  Appreciate urology recommendations. Recurrent hematuria. He is s/p cysto with stent in which hematuria resolved and eliquis trial was held again but pt had recurrent hematuria once again  Urine currently clear   Will continue to hold eliquis  Perhaps trial of eliquis again in 1 week (day 5 of hold)     Atrial fibrillation ([address])  Assessment & Plan  · Improved after successful SOLA cardioversion on 7/25/23  · Hold anticoagulation for recurrent hematuria  · Rhythm controlled on amiodarone 200 mg daily and Cardizem 120 mg daily      Impaired decision making  Assessment & Plan  This week repeat neuropsych evaluation was done  on 7/24/2023  He continues to have impaired decision-making capacity  Guardianship process initiated by case management     Bacteremia  Assessment & Plan  · Aerococcus bacteremia due to complicated UTI   · Completed full course of antibiotics     Hydronephrosis with obstructing calculus  Assessment & Plan  · Admitted originally due to sepsis from UTI with obstruction  · Status post cystoscopy with difficult stent insertion on 7/6/2023  · Procedure was complicated by inadvertent left ureteral perforation  · CT showed previous ureteral calculus is now in the bladder  · Repeat cystoscopy on 8/3 with stent placement. · He had hematuria with re initiation of eliquis this past week. eliquis is currently on hold. · Urine has remained clear after holding eliquis. Will trial again after 1 week.  (day 5 of hold)  · Urology recommending repeat ct in 2 weeks with outpt cysto/stent removal Esophageal abnormality  Assessment & Plan  He had incidental finding on CT with diffuse esophageal thickening could be from esophagitis  Esophagitis probably from alcohol abuse  Continue empiric Protonix  Appreciate gi eval     Elevated troponin  Assessment & Plan  · Non-MI troponin elevation secondary to tachycardia and sepsis   · Appreciate cardiology recommendations. · Continue aspirin and Lipitor    COPD (chronic obstructive pulmonary disease) (HCC)  Assessment & Plan  · Stable without exacerbation  · Continue albuterol and breo     Tobacco use  Assessment & Plan  · Smokes 6 cigarettes daily. Apparently plans to quit. · Nicotine TD patch  · Tobacco counseling provided            VTE Pharmacologic Prophylaxis: contraindicated due to hematuria     Mechanical VTE Prophylaxis in Place: Yes    Education and Discussions with Family / Patient: patient    Current Length of Stay: 40 day(s)  Current Patient Status: Inpatient     Discharge Plan / Estimated Discharge Date: awaiting guardianship     Code Status: Level 1 - Full Code      Subjective:   Pt seen and examined. He is doing well. No further blood in the urine. No f/c no cp no sob no n/vd no abd pain     Objective:     Vitals:   Temp (24hrs), Av.4 °F (36.3 °C), Min:97.2 °F (36.2 °C), Max:97.7 °F (36.5 °C)    Temp:  [97.2 °F (36.2 °C)-97.7 °F (36.5 °C)] 97.7 °F (36.5 °C)  HR:  [66-77] 77  Resp:  [18] 18  BP: (137-153)/(55-92) 153/76  SpO2:  [93 %-96 %] 96 %  Body mass index is 28 kg/m². Input and Output Summary (last 24 hours):     No intake or output data in the 24 hours ending 08/15/23 4049    Physical Exam:   Physical Exam  Constitutional:       Appearance: Normal appearance. HENT:      Head: Normocephalic and atraumatic. Eyes:      Extraocular Movements: Extraocular movements intact. Pupils: Pupils are equal, round, and reactive to light. Cardiovascular:      Rate and Rhythm: Normal rate and regular rhythm.       Heart sounds: No murmur heard.     No friction rub. No gallop. Pulmonary:      Effort: Pulmonary effort is normal. No respiratory distress. Breath sounds: Normal breath sounds. No wheezing, rhonchi or rales. Abdominal:      General: Bowel sounds are normal. There is no distension. Palpations: Abdomen is soft. Tenderness: There is no abdominal tenderness. There is no guarding or rebound. Musculoskeletal:      Right lower leg: No edema. Left lower leg: No edema. Neurological:      Mental Status: He is alert and oriented to person, place, and time.         Additional Data:     Labs:      Results from last 7 days   Lab Units 08/12/23  0615   SODIUM mmol/L 134*   POTASSIUM mmol/L 5.2   CHLORIDE mmol/L 102   CO2 mmol/L 26   BUN mg/dL 24   CREATININE mg/dL 0.94   ANION GAP mmol/L 6   CALCIUM mg/dL 8.5   GLUCOSE RANDOM mg/dL 84                       Recent Cultures (last 7 days):           Lines/Drains:  Invasive Devices     Drain  Duration           Ureteral Internal Stent Left ureter 6 Fr. 39 days    Ureteral Internal Stent Left ureter 7 Fr. 12 days              Last 24 Hours Medication List:   Current Facility-Administered Medications   Medication Dose Route Frequency Provider Last Rate   • acetaminophen  975 mg Oral Q6H [facility] Karla Cassidy MD     • albuterol  2.5 mg Nebulization Q6H PRN Karla Cassidy MD     • aluminum-magnesium hydroxide-simethicone  30 mL Oral Q6H PRN Karla Cassidy MD     • amiodarone  200 mg Oral Daily With Breakfast Brisa Gloria MD     • atorvastatin  40 mg Oral Daily With Ronna Yun MD     • bisacodyl  10 mg Oral Daily PRN Karla Cassidy MD     • diltiazem  120 mg Oral Daily Karla Cassidy MD     • fluticasone-vilanterol  1 puff Inhalation Daily aKrla Cassidy MD     • folic acid  1 mg Oral Daily Karla Cassidy MD     • guaiFENesin  200 mg Oral Q4H PRN Karla Cassidy MD     • hydrOXYzine HCL  50 mg Oral HS PRN Karla Cassidy MD     • lidocaine  1 patch Topical Daily Ubaldo Feliciano MD     • melatonin  6 mg Oral HS Ubaldo Feliciano MD     • methocarbamol  750 mg Oral [facility] Ubaldo Feliciano MD     • multivitamin-minerals  1 tablet Oral Daily Ubaldo Feliciano MD     • nicotine  7 mg Transdermal Daily Yajaira Salazar MD     • pantoprazole  40 mg Oral Early Morning Ubaldo Feliciano MD     • polyethylene glycol  17 g Oral Daily Ubaldo Feliciano MD     • senna  2 tablet Oral HS Ubaldo Feliciano MD     • thiamine  100 mg Oral Daily Ubaldo Feliciano MD          Today, Patient Was Seen By: Miley Guevara

## 2023-08-15 NOTE — RESTORATIVE TECHNICIAN NOTE
Restorative Technician Note      Patient Name: Eleazar Carter     [facility] Tech Visit Date: 08/15/23  Note Type: Mobility  Patient Position Upon Consult: Supine  Activity Performed: Ambulated  Patient Position at End of Consult: All needs within reach;  Seated edge of bed

## 2023-08-15 NOTE — PHYSICIAN ADVISOR
Current patient class: Inpatient  The patient is currently on Hospital Day: 39      The patient was admitted to the hospital at  8:46 PM on 7/6/23 for the following diagnosis:  COPD (chronic obstructive pulmonary disease) ([address]) [J44.9]  Ureterolithiasis [N20.1]  Pneumonia [J18.9]  Pyelonephritis [N12]  Elevated troponin [R77.8]  Ureteral stone with hydronephrosis [N13.2]     CMS OUTLIER STAY REVIEW    After review of the relevant documentation, labs, vital signs and test results, the patient is appropriate for CONTINUED INPATIENT ADMISSION. This review is conducted at 20 days, to help satisfy the requirements for significant outlier stay review as per CMS. Given the current condition of this patient, the patient satisfies this review was determination for continued inpatient stay. Rationale is as follows: The patient is a 75 yrs old Male who presented to the ED at 7/6/2023  4:44 PM with a chief complaint of Shortness of Breath (SOB & tremors post smoking cigarette yesterday. Hx of COPD) Patient treated for sepsis and bacteremia secondary to complicated uti. Also with afib requiring cardioversion. 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         The patient’s vitals on arrival were   ED Triage Vitals   Temperature Pulse Respirations Blood Pressure SpO2   07/06/23 1659 07/06/23 1650 07/06/23 1650 07/06/23 1650 07/06/23 1650   99 °F (37.2 °C) (!) 112 22 95/50 98 %      Temp Source Heart Rate Source Patient Position - Orthostatic VS BP Location FiO2 (%)   07/06/23 2315 07/06/23 1700 07/06/23 2315 07/06/23 2315 --   Temporal Monitor Lying Right arm       Pain Score       07/06/23 2150       No Pain           Past Medical History:   Diagnosis Date   • COPD (chronic obstructive pulmonary disease) ([address])      Past Surgical History:   Procedure Laterality Date   • FL RETROGRADE PYELOGRAM  7/6/2023   • FL RETROGRADE PYELOGRAM  8/3/2023   • OR CYSTO BLADDER W/URETERAL CATHETERIZATION Left 7/6/2023    Procedure: CYSTOSCOPY RETROGRADE PYELOGRAM WITH INSERTION STENT URETERAL;  Surgeon: Hulen Cooks, MD;  Location: [facility];  Service: Urology   • PA CYSTO/URETERO W/LITHOTRIPSY &INDWELL STENT INSRT Left 8/3/2023    Procedure: CYSTO USCOPE W/  LASER, & STENT;  Surgeon: Sinan Feliz MD;  Location: [facility];  Service: Urology           Consults have been placed to:   IP CONSULT TO UROLOGY  IP CONSULT TO CARDIOLOGY  IP CONSULT TO PODIATRY  IP CONSULT TO PHARMACY  IP CONSULT TO INFECTIOUS DISEASES  IP CONSULT TO PODIATRY  IP CONSULT TO GASTROENTEROLOGY    Vitals:    08/14/23 0711 08/14/23 1527 08/14/23 2324 08/15/23 0751   BP: 150/91 141/78 137/55 153/92   BP Location: Right arm Right arm Right arm Right arm   Pulse: 77 66 68 68   Resp: 18 18 18 18   Temp: (!) 97 °F (36.1 °C) (!) 97.3 °F (36.3 °C) (!) 97.2 °F (36.2 °C) (!) 97.3 °F (36.3 °C)   TempSrc: Temporal Temporal Temporal Temporal   SpO2: 93% 95% 93% 95%   Weight:       Height:           Most recent labs:    No results for input(s): "WBC", "HGB", "HCT", "PLT", "K", "NA", "CALCIUM", "BUN", "CREATININE", "LIPASE", "AMYLASE", "INR", "TROPONINI", "CKTOTAL", "AST", "ALT", "ALKPHOS", "BILITOT" in the last 72 hours.     Scheduled Meds:  Current Facility-Administered Medications   Medication Dose Route Frequency Provider Last Rate   • acetaminophen  975 mg Oral Q6H [address] Rosanna Kline MD     • albuterol  2.5 mg Nebulization Q6H PRN Rosanna Kline MD     • aluminum-magnesium hydroxide-simethicone  30 mL Oral Q6H PRN Rosanna Kline MD     • amiodarone  200 mg Oral Daily With Breakfast Faiza Joy MD     • atorvastatin  40 mg Oral Daily With Ike Carney MD     • bisacodyl  10 mg Oral Daily PRN Rosanna Kline MD     • diltiazem  120 mg Oral Daily Rosanna Kline MD     • fluticasone-vilanterol  1 puff Inhalation Daily Rosanna Kline MD     • folic acid  1 mg Oral Daily Rosanna Kline MD     • guaiFENesin  200 mg Oral Q4H PRN Marquis Mclaughlin MD     • hydrOXYzine HCL  50 mg Oral HS PRN Marquis Mclaughlin MD     • lidocaine  1 patch Topical Daily Marquis Mclaughlin MD     • melatonin  6 mg Oral HS Marquis Mclaughlin MD     • methocarbamol  750 mg Oral [facility] Marquis Mclaughlin MD     • multivitamin-minerals  1 tablet Oral Daily Marquis Mclaughlin MD     • nicotine  7 mg Transdermal Daily Kolby Dorsey MD     • pantoprazole  40 mg Oral Early Morning Marquis Mclaughlin MD     • polyethylene glycol  17 g Oral Daily Marquis Mclaughlin MD     • senna  2 tablet Oral HS Marquis Mclaughlin MD     • thiamine  100 mg Oral Daily Marquis Mclaughlin MD       Continuous Infusions:   PRN Meds:.•  albuterol  •  aluminum-magnesium hydroxide-simethicone  •  bisacodyl  •  guaiFENesin  •  hydrOXYzine HCL    Surgical procedures (if appropriate):  Procedure(s):  CYSTO USCOPE W/  LASER, & STENT

## 2023-08-15 NOTE — PLAN OF CARE
Problem: Potential for Falls  Goal: Patient will remain free of falls  Description: INTERVENTIONS:  - Educate patient/family on patient safety including physical limitations  - Instruct patient to call for assistance with activity   - Consult OT/PT to assist with strengthening/mobility   - Keep Call bell within reach  - Keep bed low and locked with side rails adjusted as appropriate  - Keep care items and personal belongings within reach  - Initiate and maintain comfort rounds  - Make Fall Risk Sign visible to staff  - Apply yellow socks and bracelet for high fall risk patients  - Consider moving patient to room near nurses station  Outcome: Progressing     Problem: MOBILITY - ADULT  Goal: Maintain or return to baseline ADL function  Description: INTERVENTIONS:  - Educate patient/family on patient safety including physical limitations  - Instruct patient to call for assistance with activity   - Consult OT/PT to assist with strengthening/mobility   - Keep Call bell within reach  - Keep bed low and locked with side rails adjusted as appropriate  - Keep care items and personal belongings within reach  - Initiate and maintain comfort rounds  - Make Fall Risk Sign visible to staff  - Apply yellow socks and bracelet for high fall risk patients  - Consider moving patient to room near nurses station  Outcome: Progressing  Goal: Maintains/Returns to pre admission functional level  Description: INTERVENTIONS:  - Perform BMAT or MOVE assessment daily.   - Set and communicate daily mobility goal to care team and patient/family/caregiver.    - Collaborate with rehabilitation services on mobility goals if consulted  - Out of bed for toileting  - Record patient progress and toleration of activity level   Outcome: Progressing     Problem: PAIN - ADULT  Goal: Verbalizes/displays adequate comfort level or baseline comfort level  Description: Interventions:  - Encourage patient to monitor pain and request assistance  - Assess pain using appropriate pain scale  - Administer analgesics based on type and severity of pain and evaluate response  - Implement non-pharmacological measures as appropriate and evaluate response  - Consider cultural and social influences on pain and pain management  - Notify physician/advanced practitioner if interventions unsuccessful or patient reports new pain  Outcome: Progressing     Problem: INFECTION - ADULT  Goal: Absence or prevention of progression during hospitalization  Description: INTERVENTIONS:  - Assess and monitor for signs and symptoms of infection  - Monitor lab/diagnostic results  - Monitor all insertion sites, i.e. indwelling lines, tubes, and drains  - Monitor endotracheal if appropriate and nasal secretions for changes in amount and color  - [place] appropriate cooling/warming therapies per order  - Administer medications as ordered  - Instruct and encourage patient and family to use good hand hygiene technique  - Identify and instruct in appropriate isolation precautions for identified infection/condition  Outcome: Progressing  Goal: Absence of fever/infection during neutropenic period  Description: INTERVENTIONS:  - Monitor WBC    Outcome: Progressing     Problem: SAFETY ADULT  Goal: Patient will remain free of falls  Description: INTERVENTIONS:  - Educate patient/family on patient safety including physical limitations  - Instruct patient to call for assistance with activity   - Consult OT/PT to assist with strengthening/mobility   - Keep Call bell within reach  - Keep bed low and locked with side rails adjusted as appropriate  - Keep care items and personal belongings within reach  - Initiate and maintain comfort rounds  - Make Fall Risk Sign visible to staff  - Apply yellow socks and bracelet for high fall risk patients  - Consider moving patient to room near nurses station  Outcome: Progressing  Goal: Maintain or return to baseline ADL function  Description: INTERVENTIONS:  - Educate patient/family on patient safety including physical limitations  - Instruct patient to call for assistance with activity   - Consult OT/PT to assist with strengthening/mobility   - Keep Call bell within reach  - Keep bed low and locked with side rails adjusted as appropriate  - Keep care items and personal belongings within reach  - Initiate and maintain comfort rounds  - Make Fall Risk Sign visible to staff  - Apply yellow socks and bracelet for high fall risk patients  - Consider moving patient to room near nurses station  Outcome: Progressing  Goal: Maintains/Returns to pre admission functional level  Description: INTERVENTIONS:  - Perform BMAT or MOVE assessment daily.   - Set and communicate daily mobility goal to care team and patient/family/caregiver. - Out of bed for toileting  - Record patient progress and toleration of activity level   Outcome: Progressing     Problem: DISCHARGE PLANNING  Goal: Discharge to home or other facility with appropriate resources  Description: INTERVENTIONS:  - Identify barriers to discharge w/patient and caregiver  - Arrange for needed discharge resources and transportation as appropriate  - Identify discharge learning needs (meds, wound care, etc.)  - Arrange for interpretive services to assist at discharge as needed  - Refer to Case Management Department for coordinating discharge planning if the patient needs post-hospital services based on physician/advanced practitioner order or complex needs related to functional status, cognitive ability, or social support system  Outcome: Progressing     Problem: Knowledge Deficit  Goal: Patient/family/caregiver demonstrates understanding of disease process, treatment plan, medications, and discharge instructions  Description: Complete learning assessment and assess knowledge base.   Interventions:  - Provide teaching at level of understanding  - Provide teaching via preferred learning methods  Outcome: Progressing     Problem: Prexisting or High Potential for Compromised Skin Integrity  Goal: Skin integrity is maintained or improved  Description: INTERVENTIONS:  - Identify patients at risk for skin breakdown  - Assess and monitor skin integrity  - Assess and monitor nutrition and hydration status  - Monitor labs   - Assess for incontinence   - Turn and reposition patient  - Assist with mobility/ambulation  - Relieve pressure over bony prominences  - Avoid friction and shearing  - Provide appropriate hygiene as needed including keeping skin clean and dry  - Evaluate need for skin moisturizer/barrier cream  - Collaborate with interdisciplinary team   - Patient/family teaching  - Consider wound care consult   Outcome: Progressing     Problem: GENITOURINARY - ADULT  Goal: Maintains or returns to baseline urinary function  Description: INTERVENTIONS:  - Assess urinary function  - Encourage oral fluids to ensure adequate hydration if ordered  - Administer IV fluids as ordered to ensure adequate hydration  - Administer ordered medications as needed  - Offer frequent toileting  - Follow urinary retention protocol if ordered  Outcome: Progressing     Problem: HEMATOLOGIC - ADULT  Goal: Maintains hematologic stability  Description: INTERVENTIONS  - Assess for signs and symptoms of bleeding or hemorrhage  - Monitor labs  - Administer supportive blood products/factors as ordered and appropriate  Outcome: Progressing

## 2023-08-15 NOTE — ASSESSMENT & PLAN NOTE
· Admitted originally due to sepsis from UTI with obstruction  · Status post cystoscopy with difficult stent insertion on 7/6/2023  · Procedure was complicated by inadvertent left ureteral perforation  · CT showed previous ureteral calculus is now in the bladder  · Repeat cystoscopy on 8/3 with stent placement. · He had hematuria with re initiation of eliquis this past week. eliquis is currently on hold. · Urine has remained clear after holding eliquis. Will trial again after 1 week.  (day 5 of hold)  · Urology recommending repeat ct in 2 weeks with outpt cysto/stent removal

## 2023-08-15 NOTE — ASSESSMENT & PLAN NOTE
Appreciate urology recommendations. Recurrent hematuria.    He is s/p cysto with stent in which hematuria resolved and eliquis trial was held again but pt had recurrent hematuria once again  Urine currently clear   Will continue to hold eliquis  Perhaps trial of eliquis again in 1 week (day 5 of hold)

## 2023-08-16 PROCEDURE — 99232 SBSQ HOSP IP/OBS MODERATE 35: CPT | Performed by: INTERNAL MEDICINE

## 2023-08-16 RX ADMIN — ACETAMINOPHEN 325MG 975 MG: 325 TABLET ORAL at 23:00

## 2023-08-16 RX ADMIN — ACETAMINOPHEN 325MG 975 MG: 325 TABLET ORAL at 17:29

## 2023-08-16 RX ADMIN — FOLIC ACID 1 MG: 1 TABLET ORAL at 08:44

## 2023-08-16 RX ADMIN — METHOCARBAMOL TABLETS 750 MG: 500 TABLET, COATED ORAL at 22:08

## 2023-08-16 RX ADMIN — ATORVASTATIN CALCIUM 40 MG: 40 TABLET, FILM COATED ORAL at 17:29

## 2023-08-16 RX ADMIN — ACETAMINOPHEN 325MG 975 MG: 325 TABLET ORAL at 06:53

## 2023-08-16 RX ADMIN — METHOCARBAMOL TABLETS 750 MG: 500 TABLET, COATED ORAL at 15:32

## 2023-08-16 RX ADMIN — Medication 1 TABLET: at 08:44

## 2023-08-16 RX ADMIN — THIAMINE HCL TAB 100 MG 100 MG: 100 TAB at 08:44

## 2023-08-16 RX ADMIN — AMIODARONE HYDROCHLORIDE 200 MG: 200 TABLET ORAL at 08:44

## 2023-08-16 RX ADMIN — PANTOPRAZOLE SODIUM 40 MG: 40 TABLET, DELAYED RELEASE ORAL at 06:53

## 2023-08-16 RX ADMIN — MELATONIN 6 MG: at 22:14

## 2023-08-16 RX ADMIN — METHOCARBAMOL TABLETS 750 MG: 500 TABLET, COATED ORAL at 06:53

## 2023-08-16 RX ADMIN — DILTIAZEM HYDROCHLORIDE 120 MG: 120 CAPSULE, COATED, EXTENDED RELEASE ORAL at 08:44

## 2023-08-16 NOTE — PLAN OF CARE
Problem: Potential for Falls  Goal: Patient will remain free of falls  Description: INTERVENTIONS:  - Educate patient/family on patient safety including physical limitations  - Instruct patient to call for assistance with activity   - Consult OT/PT to assist with strengthening/mobility   - Keep Call bell within reach  - Keep bed low and locked with side rails adjusted as appropriate  - Keep care items and personal belongings within reach  - Initiate and maintain comfort rounds  - Make Fall Risk Sign visible to staff  - Apply yellow socks and bracelet for high fall risk patients  - Consider moving patient to room near nurses station  Outcome: Progressing     Problem: MOBILITY - ADULT  Goal: Maintain or return to baseline ADL function  Description: INTERVENTIONS:  - Educate patient/family on patient safety including physical limitations  - Instruct patient to call for assistance with activity   - Consult OT/PT to assist with strengthening/mobility   - Keep Call bell within reach  - Keep bed low and locked with side rails adjusted as appropriate  - Keep care items and personal belongings within reach  - Initiate and maintain comfort rounds  - Make Fall Risk Sign visible to staff  - Apply yellow socks and bracelet for high fall risk patients  - Consider moving patient to room near nurses station  Outcome: Progressing  Goal: Maintains/Returns to pre admission functional level  Description: INTERVENTIONS:  - Perform BMAT or MOVE assessment daily.   - Set and communicate daily mobility goal to care team and patient/family/caregiver. - Collaborate with rehabilitation services on mobility goals if consulted  - Perform Range of Motion  times a day. - Reposition patient every  hours.   - Dangle patient  times a day  - Stand patient  times a day  - Ambulate patient  times a day  - Out of bed to chair  times a day   - Out of bed for meals  times a day  - Out of bed for toileting  - Record patient progress and toleration of activity level   Outcome: Progressing     Problem: PAIN - ADULT  Goal: Verbalizes/displays adequate comfort level or baseline comfort level  Description: Interventions:  - Encourage patient to monitor pain and request assistance  - Assess pain using appropriate pain scale  - Administer analgesics based on type and severity of pain and evaluate response  - Implement non-pharmacological measures as appropriate and evaluate response  - Consider cultural and social influences on pain and pain management  - Notify physician/advanced practitioner if interventions unsuccessful or patient reports new pain  Outcome: Progressing     Problem: INFECTION - ADULT  Goal: Absence or prevention of progression during hospitalization  Description: INTERVENTIONS:  - Assess and monitor for signs and symptoms of infection  - Monitor lab/diagnostic results  - Monitor all insertion sites, i.e. indwelling lines, tubes, and drains  - Monitor endotracheal if appropriate and nasal secretions for changes in amount and color  - [place] appropriate cooling/warming therapies per order  - Administer medications as ordered  - Instruct and encourage patient and family to use good hand hygiene technique  - Identify and instruct in appropriate isolation precautions for identified infection/condition  Outcome: Progressing  Goal: Absence of fever/infection during neutropenic period  Description: INTERVENTIONS:  - Monitor WBC    Outcome: Progressing     Problem: SAFETY ADULT  Goal: Patient will remain free of falls  Description: INTERVENTIONS:  - Educate patient/family on patient safety including physical limitations  - Instruct patient to call for assistance with activity   - Consult OT/PT to assist with strengthening/mobility   - Keep Call bell within reach  - Keep bed low and locked with side rails adjusted as appropriate  - Keep care items and personal belongings within reach  - Initiate and maintain comfort rounds  - Make Fall Risk Sign visible to staff  - Apply yellow socks and bracelet for high fall risk patients  - Consider moving patient to room near nurses station  Outcome: Progressing  Goal: Maintain or return to baseline ADL function  Description: INTERVENTIONS:  - Educate patient/family on patient safety including physical limitations  - Instruct patient to call for assistance with activity   - Consult OT/PT to assist with strengthening/mobility   - Keep Call bell within reach  - Keep bed low and locked with side rails adjusted as appropriate  - Keep care items and personal belongings within reach  - Initiate and maintain comfort rounds  - Make Fall Risk Sign visible to staff  - Apply yellow socks and bracelet for high fall risk patients  - Consider moving patient to room near nurses station  Outcome: Progressing  Goal: Maintains/Returns to pre admission functional level  Description: INTERVENTIONS:  - Perform BMAT or MOVE assessment daily.   - Set and communicate daily mobility goal to care team and patient/family/caregiver. - Collaborate with rehabilitation services on mobility goals if consulted  - Perform Range of Motion  times a day. - Reposition patient every  hours.   - Dangle patient  times a day  - Stand patient  times a day  - Ambulate patient  times a day  - Out of bed to chair  times a day   - Out of bed for meals  times a day  - Out of bed for toileting  - Record patient progress and toleration of activity level   Outcome: Progressing     Problem: DISCHARGE PLANNING  Goal: Discharge to home or other facility with appropriate resources  Description: INTERVENTIONS:  - Identify barriers to discharge w/patient and caregiver  - Arrange for needed discharge resources and transportation as appropriate  - Identify discharge learning needs (meds, wound care, etc.)  - Arrange for interpretive services to assist at discharge as needed  - Refer to Case Management Department for coordinating discharge planning if the patient needs post-hospital services based on physician/advanced practitioner order or complex needs related to functional status, cognitive ability, or social support system  Outcome: Progressing     Problem: Knowledge Deficit  Goal: Patient/family/caregiver demonstrates understanding of disease process, treatment plan, medications, and discharge instructions  Description: Complete learning assessment and assess knowledge base.   Interventions:  - Provide teaching at level of understanding  - Provide teaching via preferred learning methods  Outcome: Progressing     Problem: Prexisting or High Potential for Compromised Skin Integrity  Goal: Skin integrity is maintained or improved  Description: INTERVENTIONS:  - Identify patients at risk for skin breakdown  - Assess and monitor skin integrity  - Assess and monitor nutrition and hydration status  - Monitor labs   - Assess for incontinence   - Turn and reposition patient  - Assist with mobility/ambulation  - Relieve pressure over bony prominences  - Avoid friction and shearing  - Provide appropriate hygiene as needed including keeping skin clean and dry  - Evaluate need for skin moisturizer/barrier cream  - Collaborate with interdisciplinary team   - Patient/family teaching  - Consider wound care consult   Outcome: Progressing     Problem: GENITOURINARY - ADULT  Goal: Maintains or returns to baseline urinary function  Description: INTERVENTIONS:  - Assess urinary function  - Encourage oral fluids to ensure adequate hydration if ordered  - Administer IV fluids as ordered to ensure adequate hydration  - Administer ordered medications as needed  - Offer frequent toileting  - Follow urinary retention protocol if ordered  Outcome: Progressing     Problem: HEMATOLOGIC - ADULT  Goal: Maintains hematologic stability  Description: INTERVENTIONS  - Assess for signs and symptoms of bleeding or hemorrhage  - Monitor labs  - Administer supportive blood products/factors as ordered and appropriate  Outcome: Progressing

## 2023-08-16 NOTE — PROGRESS NOTES
[facility]  Progress Note  Name: Naeem Haile I  MRN: [id number]  Unit/Bed#: E4 -01 I Date of Admission: 7/6/2023   Date of Service: 8/16/2023  Hospital Day: 39    Assessment/Plan   * Sepsis secondary to UTI [facility])  Assessment & Plan  · Sepsis secondary to Enterococcus bacteremia and complicated UTI  · Completed a course of antibiotics     Gross hematuria  Assessment & Plan  Appreciate urology recommendations. Recurrent hematuria. He is s/p cysto with stent in which hematuria resolved and eliquis trial was held again but pt had recurrent hematuria once again  Urine currently clear   Will continue to hold eliquis  Perhaps trial of eliquis again in 1 week (day 6 of hold)     Atrial fibrillation ([address])  Assessment & Plan  · Improved after successful SOLA cardioversion on 7/25/23  · Hold anticoagulation for recurrent hematuria  · Rhythm controlled on amiodarone 200 mg daily and Cardizem 120 mg daily      Impaired decision making  Assessment & Plan  This week repeat neuropsych evaluation was done  on 7/24/2023  He continues to have impaired decision-making capacity  Guardianship process initiated by case management     Bacteremia  Assessment & Plan  · Aerococcus bacteremia due to complicated UTI   · Completed full course of antibiotics     Hydronephrosis with obstructing calculus  Assessment & Plan  · Admitted originally due to sepsis from UTI with obstruction  · Status post cystoscopy with difficult stent insertion on 7/6/2023  · Procedure was complicated by inadvertent left ureteral perforation  · CT showed previous ureteral calculus is now in the bladder  · Repeat cystoscopy on 8/3 with stent placement. · He had hematuria with re initiation of eliquis this past week. eliquis is currently on hold. · Urine has remained clear after holding eliquis. Will trial again after 1 week.  (day 6 of hold)  · Urology recommending repeat ct in 2 weeks with outpt cysto/stent removal Esophageal abnormality  Assessment & Plan  He had incidental finding on CT with diffuse esophageal thickening could be from esophagitis  Esophagitis probably from alcohol abuse  Continue empiric Protonix  Appreciate gi eval     Elevated troponin  Assessment & Plan  · Non-MI troponin elevation secondary to tachycardia and sepsis   · Appreciate cardiology recommendations. · Continue aspirin and Lipitor    COPD (chronic obstructive pulmonary disease) (HCC)  Assessment & Plan  · Stable without exacerbation  · Continue albuterol and breo     Tobacco use  Assessment & Plan  · Smokes 6 cigarettes daily. Apparently plans to quit. · Nicotine TD patch  · Tobacco counseling provided              VTE Pharmacologic Prophylaxis: contraindicated due to hematuria     Mechanical VTE Prophylaxis in Place: Yes    Education and Discussions with Family / Patient: patient    Current Length of Stay: 39 day(s)  Current Patient Status: Inpatient     Discharge Plan / Estimated Discharge Date: awaiting safe discharge plan     Code Status: Level 1 - Full Code      Subjective:   Pt seen and examined. Pt doing well. No further hematuria. No f/c no cp no sob no n/v/d no abd pain. Objective:     Vitals:   Temp (24hrs), Av.8 °F (36.6 °C), Min:97.4 °F (36.3 °C), Max:98.2 °F (36.8 °C)    Temp:  [97.4 °F (36.3 °C)-98.2 °F (36.8 °C)] 97.4 °F (36.3 °C)  HR:  [70-77] 70  Resp:  [18] 18  BP: (130-153)/(60-76) 130/60  SpO2:  [94 %-96 %] 94 %  Body mass index is 28 kg/m². Input and Output Summary (last 24 hours): Intake/Output Summary (Last 24 hours) at 2023 1509  Last data filed at 8/15/2023 2100  Gross per 24 hour   Intake 480 ml   Output --   Net 480 ml       Physical Exam:   Physical Exam  Constitutional:       Appearance: Normal appearance. HENT:      Head: Normocephalic and atraumatic. Eyes:      Extraocular Movements: Extraocular movements intact. Pupils: Pupils are equal, round, and reactive to light. Cardiovascular:      Rate and Rhythm: Normal rate and regular rhythm. Heart sounds: No murmur heard. No friction rub. No gallop. Pulmonary:      Effort: Pulmonary effort is normal. No respiratory distress. Breath sounds: Normal breath sounds. No wheezing, rhonchi or rales. Abdominal:      General: Bowel sounds are normal. There is no distension. Palpations: Abdomen is soft. Tenderness: There is no abdominal tenderness. There is no guarding or rebound. Neurological:      Mental Status: He is alert and oriented to person, place, and time.           Additional Data:     Labs:      Results from last 7 days   Lab Units 08/12/23  0615   SODIUM mmol/L 134*   POTASSIUM mmol/L 5.2   CHLORIDE mmol/L 102   CO2 mmol/L 26   BUN mg/dL 24   CREATININE mg/dL 0.94   ANION GAP mmol/L 6   CALCIUM mg/dL 8.5   GLUCOSE RANDOM mg/dL 84                       Recent Cultures (last 7 days):           Lines/Drains:  Invasive Devices     Drain  Duration           Ureteral Internal Stent Left ureter 6 Fr. 40 days    Ureteral Internal Stent Left ureter 7 Fr. 13 days                Last 24 Hours Medication List:   Current Facility-Administered Medications   Medication Dose Route Frequency Provider Last Rate   • acetaminophen  975 mg Oral Q6H [address] Hitesh Joseph MD     • albuterol  2.5 mg Nebulization Q6H PRN Hitesh Jospeh MD     • aluminum-magnesium hydroxide-simethicone  30 mL Oral Q6H PRN Hitesh Joseph MD     • amiodarone  200 mg Oral Daily With Breakfast Manuel Weiss MD     • atorvastatin  40 mg Oral Daily With Hailee Yancey MD     • bisacodyl  10 mg Oral Daily PRN Hitesh Joseph MD     • diltiazem  120 mg Oral Daily Hitesh Joseph MD     • fluticasone-vilanterol  1 puff Inhalation Daily Hitesh Joseph MD     • folic acid  1 mg Oral Daily Hitesh Joseph MD     • guaiFENesin  200 mg Oral Q4H PRN Hitesh Joseph MD     • hydrOXYzine HCL  50 mg Oral HS PRN Hitesh Joseph MD     • lidocaine  1 patch Topical Daily Bonnie Antunez MD     • melatonin  6 mg Oral HS Bonnie Antunez MD     • methocarbamol  750 mg Oral [facility] Bonnie Antunez MD     • multivitamin-minerals  1 tablet Oral Daily Bonnie Antunez MD     • nicotine  7 mg Transdermal Daily Emely Gallo MD     • pantoprazole  40 mg Oral Early Morning Bonnie Antunez MD     • polyethylene glycol  17 g Oral Daily Bonnie Antunez MD     • senna  2 tablet Oral HS Bonnie Antunez MD     • thiamine  100 mg Oral Daily Bonnie Antunez MD          Today, Patient Was Seen By: Jak Medrano

## 2023-08-16 NOTE — ASSESSMENT & PLAN NOTE
Appreciate urology recommendations. Recurrent hematuria.    He is s/p cysto with stent in which hematuria resolved and eliquis trial was held again but pt had recurrent hematuria once again  Urine currently clear   Will continue to hold eliquis  Perhaps trial of eliquis again in 1 week (day 6 of hold)

## 2023-08-16 NOTE — RESTORATIVE TECHNICIAN NOTE
Restorative Technician Note      Patient Name: Cecilia Campbell     Restorative Tech Visit Date: 08/16/23  Note Type: Mobility  Patient Position Upon Consult: Supine  Activity Performed: Ambulated  Patient Position at End of Consult: Supine;  All needs within reach

## 2023-08-16 NOTE — ASSESSMENT & PLAN NOTE
· Admitted originally due to sepsis from UTI with obstruction  · Status post cystoscopy with difficult stent insertion on 7/6/2023  · Procedure was complicated by inadvertent left ureteral perforation  · CT showed previous ureteral calculus is now in the bladder  · Repeat cystoscopy on 8/3 with stent placement. · He had hematuria with re initiation of eliquis this past week. eliquis is currently on hold. · Urine has remained clear after holding eliquis. Will trial again after 1 week.  (day 6 of hold)  · Urology recommending repeat ct in 2 weeks with outpt cysto/stent removal

## 2023-08-16 NOTE — RESTORATIVE TECHNICIAN NOTE
Restorative Technician Note      Patient Name: Clari Agustin     Restorative Tech Visit Date: 08/16/23  Note Type: Mobility  Patient Position Upon Consult: Standing  Activity Performed: Ambulated  Patient Position at End of Consult: Seated edge of bed;  All needs within reach

## 2023-08-16 NOTE — RESTORATIVE TECHNICIAN NOTE
Restorative Technician Note      Patient Name: Laz Diamond     Restorative Tech Visit Date: 08/16/23  Note Type: Mobility  Patient Position Upon Consult: Supine  Activity Performed: Ambulated  Patient Position at End of Consult: Seated edge of bed;  All needs within reach

## 2023-08-16 NOTE — CASE MANAGEMENT
Case Management Discharge Planning Note    Patient name Tammi Asher  Location East 4 /E4 [address]-* MRN [id number]  : 1950 Date 2023       Current Admission Date: 2023  Current Admission Diagnosis:Sepsis secondary to UTI [facility])   Patient Active Problem List    Diagnosis Date Noted   • Leukocytosis 2023   • Gross hematuria 2023   • Atrial fibrillation ([address]) 2023   • Impaired decision making 2023   • Daily consumption of alcohol 2023   • Bacteremia 2023   • Sepsis secondary to UTI ([address]) 2023   • Elevated troponin 2023   • Elevated d-dimer 2023   • Esophageal abnormality 2023   • Hydronephrosis with obstructing calculus 2023   • Mycotic toenails 2023   • Angioedema 2018   • COPD (chronic obstructive pulmonary disease) ([address]) 2018   • Bradycardia 2018   • Tobacco use 2016   • Polycythemia 2016   • Weight loss 2016      LOS (days): 41  Geometric Mean LOS (GMLOS) (days): 9.60  Days to GMLOS:-31     OBJECTIVE:  Risk of Unplanned Readmission Score: 13.87         Current admission status: Inpatient   Preferred Pharmacy:   PATIENT/FAMILY REPORTS NO PREFERRED PHARMACY  No address on file      [address] [place] [address],  [address] [place] [address]  Phone: [phone] Fax: [phone]    Primary Care Provider: No primary care provider on file. Primary Insurance: MEDICARE  Secondary Insurance:     DISCHARGE DETAILS:     Pt guardianship is still in process. Expert report was completed by Dr. Shavonne Owusu & submitted to Janna Tinsley office. We are now waiting on pt to get assigned temporary guardian for placement. [facility] is willing to accept pt w/ temporary guardian. CM to continue to follow pt care & d/c.

## 2023-08-17 PROBLEM — L72.3 SEBACEOUS CYST: Status: ACTIVE | Noted: 2023-08-17

## 2023-08-17 LAB
BACTERIA UR QL AUTO: ABNORMAL /HPF
BILIRUB UR QL STRIP: NEGATIVE
CLARITY UR: ABNORMAL
COLOR UR: ABNORMAL
ERYTHROCYTE [DISTWIDTH] IN BLOOD BY AUTOMATED COUNT: 13.4 % (ref 11.6–15.1)
GLUCOSE UR STRIP-MCNC: NEGATIVE MG/DL
HCT VFR BLD AUTO: 39.8 % (ref 36.5–49.3)
HGB BLD-MCNC: 12.5 G/DL (ref 12–17)
HGB UR QL STRIP.AUTO: ABNORMAL
KETONES UR STRIP-MCNC: NEGATIVE MG/DL
LEUKOCYTE ESTERASE UR QL STRIP: ABNORMAL
MCH RBC QN AUTO: 31.5 PG (ref 26.8–34.3)
MCHC RBC AUTO-ENTMCNC: 31.4 G/DL (ref 31.4–37.4)
MCV RBC AUTO: 100 FL (ref 82–98)
NITRITE UR QL STRIP: NEGATIVE
NON-SQ EPI CELLS URNS QL MICRO: ABNORMAL /HPF
PH UR STRIP.AUTO: 8 [PH]
PLATELET # BLD AUTO: 319 THOUSANDS/UL (ref 149–390)
PMV BLD AUTO: 8.9 FL (ref 8.9–12.7)
PROT UR STRIP-MCNC: ABNORMAL MG/DL
RBC # BLD AUTO: 3.97 MILLION/UL (ref 3.88–5.62)
RBC #/AREA URNS AUTO: ABNORMAL /HPF
SP GR UR STRIP.AUTO: 1.02 (ref 1–1.03)
TRI-PHOS CRY URNS QL MICRO: ABNORMAL /HPF
UROBILINOGEN UR STRIP-ACNC: <2 MG/DL
WBC # BLD AUTO: 9.43 THOUSAND/UL (ref 4.31–10.16)
WBC #/AREA URNS AUTO: ABNORMAL /HPF
WBC CLUMPS # UR AUTO: PRESENT /UL

## 2023-08-17 PROCEDURE — 87086 URINE CULTURE/COLONY COUNT: CPT | Performed by: INTERNAL MEDICINE

## 2023-08-17 PROCEDURE — 99232 SBSQ HOSP IP/OBS MODERATE 35: CPT | Performed by: INTERNAL MEDICINE

## 2023-08-17 PROCEDURE — 81001 URINALYSIS AUTO W/SCOPE: CPT | Performed by: INTERNAL MEDICINE

## 2023-08-17 PROCEDURE — 87077 CULTURE AEROBIC IDENTIFY: CPT | Performed by: INTERNAL MEDICINE

## 2023-08-17 PROCEDURE — 87186 SC STD MICRODIL/AGAR DIL: CPT | Performed by: INTERNAL MEDICINE

## 2023-08-17 PROCEDURE — 85027 COMPLETE CBC AUTOMATED: CPT | Performed by: INTERNAL MEDICINE

## 2023-08-17 PROCEDURE — 87147 CULTURE TYPE IMMUNOLOGIC: CPT | Performed by: INTERNAL MEDICINE

## 2023-08-17 RX ORDER — PHENAZOPYRIDINE HYDROCHLORIDE 100 MG/1
100 TABLET, FILM COATED ORAL
Status: DISCONTINUED | OUTPATIENT
Start: 2023-08-17 | End: 2023-08-21

## 2023-08-17 RX ADMIN — PHENAZOPYRIDINE 100 MG: 100 TABLET ORAL at 12:07

## 2023-08-17 RX ADMIN — Medication 1 TABLET: at 09:20

## 2023-08-17 RX ADMIN — METHOCARBAMOL TABLETS 750 MG: 500 TABLET, COATED ORAL at 06:30

## 2023-08-17 RX ADMIN — ACETAMINOPHEN 325MG 975 MG: 325 TABLET ORAL at 06:31

## 2023-08-17 RX ADMIN — ACETAMINOPHEN 325MG 975 MG: 325 TABLET ORAL at 23:04

## 2023-08-17 RX ADMIN — PANTOPRAZOLE SODIUM 40 MG: 40 TABLET, DELAYED RELEASE ORAL at 06:30

## 2023-08-17 RX ADMIN — FLUTICASONE FUROATE AND VILANTEROL TRIFENATATE 1 PUFF: 200; 25 POWDER RESPIRATORY (INHALATION) at 09:22

## 2023-08-17 RX ADMIN — ACETAMINOPHEN 325MG 975 MG: 325 TABLET ORAL at 17:45

## 2023-08-17 RX ADMIN — POLYETHYLENE GLYCOL 3350 17 G: 17 POWDER, FOR SOLUTION ORAL at 09:22

## 2023-08-17 RX ADMIN — AMIODARONE HYDROCHLORIDE 200 MG: 200 TABLET ORAL at 09:20

## 2023-08-17 RX ADMIN — DILTIAZEM HYDROCHLORIDE 120 MG: 120 CAPSULE, COATED, EXTENDED RELEASE ORAL at 09:20

## 2023-08-17 RX ADMIN — MELATONIN 6 MG: at 22:59

## 2023-08-17 RX ADMIN — FOLIC ACID 1 MG: 1 TABLET ORAL at 09:20

## 2023-08-17 RX ADMIN — METHOCARBAMOL TABLETS 750 MG: 500 TABLET, COATED ORAL at 14:17

## 2023-08-17 RX ADMIN — ACETAMINOPHEN 325MG 975 MG: 325 TABLET ORAL at 12:07

## 2023-08-17 RX ADMIN — PHENAZOPYRIDINE 100 MG: 100 TABLET ORAL at 17:45

## 2023-08-17 RX ADMIN — THIAMINE HCL TAB 100 MG 100 MG: 100 TAB at 09:20

## 2023-08-17 RX ADMIN — ATORVASTATIN CALCIUM 40 MG: 40 TABLET, FILM COATED ORAL at 17:45

## 2023-08-17 RX ADMIN — METHOCARBAMOL TABLETS 750 MG: 500 TABLET, COATED ORAL at 22:59

## 2023-08-17 NOTE — ASSESSMENT & PLAN NOTE
· Sepsis secondary to Enterococcus bacteremia and complicated UTI  · Completed a course of antibiotics   · Has recurrent dysuria  · Added pyridium  · No leukocytosis   · Will check ua

## 2023-08-17 NOTE — ASSESSMENT & PLAN NOTE
Appreciate urology recommendations. Recurrent hematuria.    He is s/p cysto with stent in which hematuria resolved and eliquis trial was held again but pt had recurrent hematuria once again  Urine currently clear   Will continue to hold eliquis  Perhaps trial of eliquis again in 1 week (day 7 of hold) - possibly trial of eliquis again tomorrow if ua negative for recurrent uti

## 2023-08-17 NOTE — PLAN OF CARE
Problem: PAIN - ADULT  Goal: Verbalizes/displays adequate comfort level or baseline comfort level  Description: Interventions:  - Encourage patient to monitor pain and request assistance  - Assess pain using appropriate pain scale  - Administer analgesics based on type and severity of pain and evaluate response  - Implement non-pharmacological measures as appropriate and evaluate response  - Consider cultural and social influences on pain and pain management  - Notify physician/advanced practitioner if interventions unsuccessful or patient reports new pain  Outcome: Progressing     Problem: INFECTION - ADULT  Goal: Absence or prevention of progression during hospitalization  Description: INTERVENTIONS:  - Assess and monitor for signs and symptoms of infection  - Monitor lab/diagnostic results  - Monitor all insertion sites, i.e. indwelling lines, tubes, and drains  - Monitor endotracheal if appropriate and nasal secretions for changes in amount and color  - [place] appropriate cooling/warming therapies per order  - Administer medications as ordered  - Instruct and encourage patient and family to use good hand hygiene technique  - Identify and instruct in appropriate isolation precautions for identified infection/condition  Outcome: Progressing  Goal: Absence of fever/infection during neutropenic period  Description: INTERVENTIONS:  - Monitor WBC    Outcome: Progressing

## 2023-08-17 NOTE — PLAN OF CARE
Problem: Potential for Falls  Goal: Patient will remain free of falls  Description: INTERVENTIONS:  - Educate patient/family on patient safety including physical limitations  - Instruct patient to call for assistance with activity   - Consult OT/PT to assist with strengthening/mobility   - Keep Call bell within reach  - Keep bed low and locked with side rails adjusted as appropriate  - Keep care items and personal belongings within reach  - Initiate and maintain comfort rounds  - Make Fall Risk Sign visible to staff  - Apply yellow socks and bracelet for high fall risk patients  - Consider moving patient to room near nurses station  Outcome: Progressing     Problem: MOBILITY - ADULT  Goal: Maintain or return to baseline ADL function  Description: INTERVENTIONS:  - Educate patient/family on patient safety including physical limitations  - Instruct patient to call for assistance with activity   - Consult OT/PT to assist with strengthening/mobility   - Keep Call bell within reach  - Keep bed low and locked with side rails adjusted as appropriate  - Keep care items and personal belongings within reach  - Initiate and maintain comfort rounds  - Make Fall Risk Sign visible to staff  - Apply yellow socks and bracelet for high fall risk patients  - Consider moving patient to room near nurses station  Outcome: Progressing  Goal: Maintains/Returns to pre admission functional level  Description: INTERVENTIONS:  - Perform BMAT or MOVE assessment daily.   - Set and communicate daily mobility goal to care team and patient/family/caregiver. - Collaborate with rehabilitation services on mobility goals if consulted  - Perform Range of Motion 4 times a day. - Reposition patient every 2 hours.   - Dangle patient 4 times a day  - Stand patient 4 times a day  - Ambulate patient 4 times a day  - Out of bed to chair 4 times a day   - Out of bed for meals 4 times a day  - Out of bed for toileting  - Record patient progress and toleration of activity level   Outcome: Progressing     Problem: PAIN - ADULT  Goal: Verbalizes/displays adequate comfort level or baseline comfort level  Description: Interventions:  - Encourage patient to monitor pain and request assistance  - Assess pain using appropriate pain scale  - Administer analgesics based on type and severity of pain and evaluate response  - Implement non-pharmacological measures as appropriate and evaluate response  - Consider cultural and social influences on pain and pain management  - Notify physician/advanced practitioner if interventions unsuccessful or patient reports new pain  Outcome: Progressing     Problem: INFECTION - ADULT  Goal: Absence or prevention of progression during hospitalization  Description: INTERVENTIONS:  - Assess and monitor for signs and symptoms of infection  - Monitor lab/diagnostic results  - Monitor all insertion sites, i.e. indwelling lines, tubes, and drains  - Monitor endotracheal if appropriate and nasal secretions for changes in amount and color  - [place] appropriate cooling/warming therapies per order  - Administer medications as ordered  - Instruct and encourage patient and family to use good hand hygiene technique  - Identify and instruct in appropriate isolation precautions for identified infection/condition  Outcome: Progressing  Goal: Absence of fever/infection during neutropenic period  Description: INTERVENTIONS:  - Monitor WBC    Outcome: Progressing     Problem: SAFETY ADULT  Goal: Patient will remain free of falls  Description: INTERVENTIONS:  - Educate patient/family on patient safety including physical limitations  - Instruct patient to call for assistance with activity   - Consult OT/PT to assist with strengthening/mobility   - Keep Call bell within reach  - Keep bed low and locked with side rails adjusted as appropriate  - Keep care items and personal belongings within reach  - Initiate and maintain comfort rounds  - Make Fall Risk Sign visible to staff  - Apply yellow socks and bracelet for high fall risk patients  - Consider moving patient to room near nurses station  Outcome: Progressing  Goal: Maintain or return to baseline ADL function  Description: INTERVENTIONS:  - Educate patient/family on patient safety including physical limitations  - Instruct patient to call for assistance with activity   - Consult OT/PT to assist with strengthening/mobility   - Keep Call bell within reach  - Keep bed low and locked with side rails adjusted as appropriate  - Keep care items and personal belongings within reach  - Initiate and maintain comfort rounds  - Make Fall Risk Sign visible to staff  - Apply yellow socks and bracelet for high fall risk patients  - Consider moving patient to room near nurses station  Outcome: Progressing  Goal: Maintains/Returns to pre admission functional level  Description: INTERVENTIONS:  - Perform BMAT or MOVE assessment daily.   - Set and communicate daily mobility goal to care team and patient/family/caregiver. - Collaborate with rehabilitation services on mobility goals if consulted  - Perform Range of Motion 4 times a day. - Reposition patient every 2 hours.   - Dangle patient 4 times a day  - Stand patient 4 times a day  - Ambulate patient 4 times a day  - Out of bed to chair 4 times a day   - Out of bed for meals 4 times a day  - Out of bed for toileting  - Record patient progress and toleration of activity level   Outcome: Progressing     Problem: DISCHARGE PLANNING  Goal: Discharge to home or other facility with appropriate resources  Description: INTERVENTIONS:  - Identify barriers to discharge w/patient and caregiver  - Arrange for needed discharge resources and transportation as appropriate  - Identify discharge learning needs (meds, wound care, etc.)  - Arrange for interpretive services to assist at discharge as needed  - Refer to Case Management Department for coordinating discharge planning if the patient needs post-hospital services based on physician/advanced practitioner order or complex needs related to functional status, cognitive ability, or social support system  Outcome: Progressing     Problem: Knowledge Deficit  Goal: Patient/family/caregiver demonstrates understanding of disease process, treatment plan, medications, and discharge instructions  Description: Complete learning assessment and assess knowledge base.   Interventions:  - Provide teaching at level of understanding  - Provide teaching via preferred learning methods  Outcome: Progressing     Problem: Prexisting or High Potential for Compromised Skin Integrity  Goal: Skin integrity is maintained or improved  Description: INTERVENTIONS:  - Identify patients at risk for skin breakdown  - Assess and monitor skin integrity  - Assess and monitor nutrition and hydration status  - Monitor labs   - Assess for incontinence   - Turn and reposition patient  - Assist with mobility/ambulation  - Relieve pressure over bony prominences  - Avoid friction and shearing  - Provide appropriate hygiene as needed including keeping skin clean and dry  - Evaluate need for skin moisturizer/barrier cream  - Collaborate with interdisciplinary team   - Patient/family teaching  - Consider wound care consult   Outcome: Progressing     Problem: GENITOURINARY - ADULT  Goal: Maintains or returns to baseline urinary function  Description: INTERVENTIONS:  - Assess urinary function  - Encourage oral fluids to ensure adequate hydration if ordered  - Administer IV fluids as ordered to ensure adequate hydration  - Administer ordered medications as needed  - Offer frequent toileting  - Follow urinary retention protocol if ordered  Outcome: Progressing     Problem: HEMATOLOGIC - ADULT  Goal: Maintains hematologic stability  Description: INTERVENTIONS  - Assess for signs and symptoms of bleeding or hemorrhage  - Monitor labs  - Administer supportive blood products/factors as ordered and appropriate  Outcome: Progressing

## 2023-08-17 NOTE — ASSESSMENT & PLAN NOTE
Had ct 7/28 that revealed "Aaliyah Arauz is a 2.9 x 2.2 cm hypodense lesion within the subcutaneous tissues of the mid back, overlying the spinous process of the T11 and T12 vertebral bodies, abutting the skin surface, likely a sebaceous cyst"  He reported that if surgery was involved he would not want it surgical removed  Supportive care at this time.

## 2023-08-17 NOTE — PROGRESS NOTES
[facility]  Progress Note  Name: Raoul Wick I  MRN: [id number]  Unit/Bed#: E4 -01 I Date of Admission: 7/6/2023   Date of Service: 8/17/2023  Hospital Day: 43    Assessment/Plan   * Sepsis secondary to UTI [facility])  Assessment & Plan  · Sepsis secondary to Enterococcus bacteremia and complicated UTI  · Completed a course of antibiotics   · Has recurrent dysuria  · Added pyridium  · No leukocytosis   · Will check ua     Sebaceous cyst  Assessment & Plan  Had ct 7/28 that revealed "Shady Rao is a 2.9 x 2.2 cm hypodense lesion within the subcutaneous tissues of the mid back, overlying the spinous process of the T11 and T12 vertebral bodies, abutting the skin surface, likely a sebaceous cyst"  He reported that if surgery was involved he would not want it surgical removed  Supportive care at this time. Gross hematuria  Assessment & Plan  Appreciate urology recommendations. Recurrent hematuria.    He is s/p cysto with stent in which hematuria resolved and eliquis trial was held again but pt had recurrent hematuria once again  Urine currently clear   Will continue to hold eliquis  Perhaps trial of eliquis again in 1 week (day 7 of hold) - possibly trial of eliquis again tomorrow if ua negative for recurrent uti     Atrial fibrillation ([address])  Assessment & Plan  · Improved after successful SOLA cardioversion on 7/25/23  · Hold anticoagulation for recurrent hematuria  · Rhythm controlled on amiodarone 200 mg daily and Cardizem 120 mg daily      Impaired decision making  Assessment & Plan  This week repeat neuropsych evaluation was done  on 7/24/2023  He continues to have impaired decision-making capacity  Guardianship process initiated by case management     Bacteremia  Assessment & Plan  · Aerococcus bacteremia due to complicated UTI   · Completed full course of antibiotics     Hydronephrosis with obstructing calculus  Assessment & Plan  · Admitted originally due to sepsis from UTI with obstruction  · Status post cystoscopy with difficult stent insertion on 2023  · Procedure was complicated by inadvertent left ureteral perforation  · CT showed previous ureteral calculus is now in the bladder  · Repeat cystoscopy on 8/3 with stent placement. · He had hematuria with re initiation of eliquis this past week. eliquis is currently on hold. · Urine has remained clear after holding eliquis. Will trial again after 1 week. (day 7 of hold). Consider trial of eliquis tomorrow if ua negative for repeat uti   · Urology recommending repeat ct in 2 weeks with outpt cysto/stent removal     Esophageal abnormality  Assessment & Plan  He had incidental finding on CT with diffuse esophageal thickening could be from esophagitis  Esophagitis probably from alcohol abuse  Continue empiric Protonix  Appreciate gi eval     Elevated troponin  Assessment & Plan  · Non-MI troponin elevation secondary to tachycardia and sepsis   · Appreciate cardiology recommendations. · Continue aspirin and Lipitor    COPD (chronic obstructive pulmonary disease) ([facility])  Assessment & Plan  · Stable without exacerbation  · Continue albuterol and breo     Tobacco use  Assessment & Plan  · Smokes 6 cigarettes daily. Apparently plans to quit. · Nicotine TD patch  · Tobacco counseling provided            VTE Pharmacologic Prophylaxis: contraindicated due to eliquis   Mechanical VTE Prophylaxis in Place: Yes    Education and Discussions with Family / Patient: patient    Current Length of Stay: 43 day(s)  Current Patient Status: Inpatient     Discharge Plan / Estimated Discharge Date: awaiting guardianship and safe discharge plan     Code Status: Level 1 - Full Code      Subjective:   Pt seen and examined. He is c/o burning with urination. He also has a lump on his back that he wants looked at.      Objective:     Vitals:   Temp (24hrs), Av.6 °F (36.4 °C), Min:97.5 °F (36.4 °C), Max:97.9 °F (36.6 °C)    Temp:  [97.5 °F (36.4 °C)-97.9 °F (36.6 °C)] 97.5 °F (36.4 °C)  HR:  [64-74] 65  Resp:  [18] 18  BP: (126-140)/(62-72) 126/62  SpO2:  [95 %-97 %] 95 %  Body mass index is 28 kg/m². Input and Output Summary (last 24 hours):     No intake or output data in the 24 hours ending 08/17/23 1601    Physical Exam:   Physical Exam  Constitutional:       Appearance: Normal appearance. HENT:      Head: Normocephalic and atraumatic. Eyes:      Extraocular Movements: Extraocular movements intact. Pupils: Pupils are equal, round, and reactive to light. Cardiovascular:      Rate and Rhythm: Normal rate and regular rhythm. Heart sounds: No murmur heard. No friction rub. No gallop. Pulmonary:      Effort: Pulmonary effort is normal. No respiratory distress. Breath sounds: Normal breath sounds. No wheezing, rhonchi or rales. Abdominal:      General: Bowel sounds are normal. There is no distension. Palpations: Abdomen is soft. Tenderness: There is no abdominal tenderness. There is no guarding or rebound. Skin:     Comments: Cyst over lumbar spine    Neurological:      Mental Status: He is alert and oriented to person, place, and time. Additional Data:     Labs:  Results from last 7 days   Lab Units 08/17/23  1046   WBC Thousand/uL 9.43   HEMOGLOBIN g/dL 12.5   HEMATOCRIT % 39.8   PLATELETS Thousands/uL 319     Results from last 7 days   Lab Units 08/12/23  0615   SODIUM mmol/L 134*   POTASSIUM mmol/L 5.2   CHLORIDE mmol/L 102   CO2 mmol/L 26   BUN mg/dL 24   CREATININE mg/dL 0.94   ANION GAP mmol/L 6   CALCIUM mg/dL 8.5   GLUCOSE RANDOM mg/dL 84                         Recent Cultures (last 7 days):           Lines/Drains:  Invasive Devices     Drain  Duration           Ureteral Internal Stent Left ureter 6 Fr. 41 days    Ureteral Internal Stent Left ureter 7 Fr.  14 days              Last 24 Hours Medication List:   Current Facility-Administered Medications   Medication Dose Route Frequency Provider Last Rate   • acetaminophen  975 mg Oral Q6H [address]  Basilia Curling, MD     • albuterol  2.5 mg Nebulization Q6H PRN Basilia Curling, MD     • aluminum-magnesium hydroxide-simethicone  30 mL Oral Q6H PRN Basilia Curling, MD     • amiodarone  200 mg Oral Daily With Breakfast Krystian Abbasi MD     • atorvastatin  40 mg Oral Daily With Graciela Guan MD     • bisacodyl  10 mg Oral Daily PRN Basilia Curling, MD     • diltiazem  120 mg Oral Daily Basilia Curling, MD     • fluticasone-vilanterol  1 puff Inhalation Daily Basilia Curling, MD     • folic acid  1 mg Oral Daily Basilia Curling, MD     • guaiFENesin  200 mg Oral Q4H PRN Basilia Curling, MD     • hydrOXYzine HCL  50 mg Oral HS PRN Basilia Curling, MD     • lidocaine  1 patch Topical Daily Basilia Curling, MD     • melatonin  6 mg Oral HS Basilia Curling, MD     • methocarbamol  750 mg Oral [facility] Basilia Curling, MD     • multivitamin-minerals  1 tablet Oral Daily Basilia Curling, MD     • nicotine  7 mg Transdermal Daily Rory Kapoor MD     • pantoprazole  40 mg Oral Early Morning Basilia Curling, MD     • phenazopyridine  100 mg Oral TID With Meals Amber Colvin DO     • polyethylene glycol  17 g Oral Daily Basilia Curling, MD     • senna  2 tablet Oral HS Basilia Curling, MD     • thiamine  100 mg Oral Daily Basilia Curling, MD          Today, Patient Was Seen By: Thad Mayo DO

## 2023-08-17 NOTE — ASSESSMENT & PLAN NOTE
· Admitted originally due to sepsis from UTI with obstruction  · Status post cystoscopy with difficult stent insertion on 7/6/2023  · Procedure was complicated by inadvertent left ureteral perforation  · CT showed previous ureteral calculus is now in the bladder  · Repeat cystoscopy on 8/3 with stent placement. · He had hematuria with re initiation of eliquis this past week. eliquis is currently on hold. · Urine has remained clear after holding eliquis. Will trial again after 1 week. (day 7 of hold).  Consider trial of eliquis tomorrow if ua negative for repeat uti   · Urology recommending repeat ct in 2 weeks with outpt cysto/stent removal

## 2023-08-17 NOTE — RESTORATIVE TECHNICIAN NOTE
Restorative Technician Note      Patient Name: Nestor Lamb     Restorative Tech Visit Date: 08/17/23  Note Type: Mobility  Patient Position Upon Consult: Bedside chair  Activity Performed: Ambulated  Patient Position at End of Consult: Seated edge of bed;  All needs within reach

## 2023-08-17 NOTE — RESTORATIVE TECHNICIAN NOTE
Restorative Technician Note      Patient Name: Thad Rasheed     Restorative Tech Visit Date: 08/17/23  Note Type: Mobility  Patient Position Upon Consult: Supine  Activity Performed: Ambulated  Patient Position at End of Consult: Seated edge of bed;  All needs within reach

## 2023-08-17 NOTE — RESTORATIVE TECHNICIAN NOTE
Restorative Technician Note      Patient Name: Phoebe Greene     Restorative Tech Visit Date: 08/17/23  Note Type: Mobility  Patient Position Upon Consult: Standing  Activity Performed: Ambulated  Patient Position at End of Consult: Seated edge of bed;  All needs within reach

## 2023-08-18 PROCEDURE — 99232 SBSQ HOSP IP/OBS MODERATE 35: CPT | Performed by: INTERNAL MEDICINE

## 2023-08-18 RX ADMIN — FLUTICASONE FUROATE AND VILANTEROL TRIFENATATE 1 PUFF: 200; 25 POWDER RESPIRATORY (INHALATION) at 09:07

## 2023-08-18 RX ADMIN — Medication 1 TABLET: at 09:06

## 2023-08-18 RX ADMIN — ATORVASTATIN CALCIUM 40 MG: 40 TABLET, FILM COATED ORAL at 17:02

## 2023-08-18 RX ADMIN — ACETAMINOPHEN 325MG 975 MG: 325 TABLET ORAL at 05:57

## 2023-08-18 RX ADMIN — FOLIC ACID 1 MG: 1 TABLET ORAL at 09:06

## 2023-08-18 RX ADMIN — PHENAZOPYRIDINE 100 MG: 100 TABLET ORAL at 17:02

## 2023-08-18 RX ADMIN — PHENAZOPYRIDINE 100 MG: 100 TABLET ORAL at 09:09

## 2023-08-18 RX ADMIN — DILTIAZEM HYDROCHLORIDE 120 MG: 120 CAPSULE, COATED, EXTENDED RELEASE ORAL at 09:06

## 2023-08-18 RX ADMIN — THIAMINE HCL TAB 100 MG 100 MG: 100 TAB at 09:06

## 2023-08-18 RX ADMIN — MELATONIN 6 MG: at 22:59

## 2023-08-18 RX ADMIN — ACETAMINOPHEN 325MG 975 MG: 325 TABLET ORAL at 23:03

## 2023-08-18 RX ADMIN — PHENAZOPYRIDINE 100 MG: 100 TABLET ORAL at 12:16

## 2023-08-18 RX ADMIN — METHOCARBAMOL TABLETS 750 MG: 500 TABLET, COATED ORAL at 05:58

## 2023-08-18 RX ADMIN — PANTOPRAZOLE SODIUM 40 MG: 40 TABLET, DELAYED RELEASE ORAL at 06:00

## 2023-08-18 RX ADMIN — METHOCARBAMOL TABLETS 750 MG: 500 TABLET, COATED ORAL at 22:59

## 2023-08-18 RX ADMIN — AMIODARONE HYDROCHLORIDE 200 MG: 200 TABLET ORAL at 09:09

## 2023-08-18 NOTE — PLAN OF CARE
Problem: Potential for Falls  Goal: Patient will remain free of falls  Description: INTERVENTIONS:  - Educate patient/family on patient safety including physical limitations  - Instruct patient to call for assistance with activity   - Consult OT/PT to assist with strengthening/mobility   - Keep Call bell within reach  - Keep bed low and locked with side rails adjusted as appropriate  - Keep care items and personal belongings within reach  - Initiate and maintain comfort rounds  - Make Fall Risk Sign visible to staff  - Apply yellow socks and bracelet for high fall risk patients  - Consider moving patient to room near nurses station  Outcome: Progressing     Problem: MOBILITY - ADULT  Goal: Maintain or return to baseline ADL function  Description: INTERVENTIONS:  - Educate patient/family on patient safety including physical limitations  - Instruct patient to call for assistance with activity   - Consult OT/PT to assist with strengthening/mobility   - Keep Call bell within reach  - Keep bed low and locked with side rails adjusted as appropriate  - Keep care items and personal belongings within reach  - Initiate and maintain comfort rounds  - Make Fall Risk Sign visible to staff  - Apply yellow socks and bracelet for high fall risk patients  - Consider moving patient to room near nurses station  Outcome: Progressing  Goal: Maintains/Returns to pre admission functional level  Description: INTERVENTIONS:  - Perform BMAT or MOVE assessment daily.   - Set and communicate daily mobility goal to care team and patient/family/caregiver. - Collaborate with rehabilitation services on mobility goals if consulted  - Perform Range of Motion times a day. - Reposition patient every  hours.   - Dangle patient  times a day  - Stand patient times a day  - Ambulate patient  times a day  - Out of bed to chair  times a day   - Out of bed for meals times a day  - Out of bed for toileting  - Record patient progress and toleration of activity level   Outcome: Progressing     Problem: PAIN - ADULT  Goal: Verbalizes/displays adequate comfort level or baseline comfort level  Description: Interventions:  - Encourage patient to monitor pain and request assistance  - Assess pain using appropriate pain scale  - Administer analgesics based on type and severity of pain and evaluate response  - Implement non-pharmacological measures as appropriate and evaluate response  - Consider cultural and social influences on pain and pain management  - Notify physician/advanced practitioner if interventions unsuccessful or patient reports new pain  Outcome: Progressing     Problem: INFECTION - ADULT  Goal: Absence or prevention of progression during hospitalization  Description: INTERVENTIONS:  - Assess and monitor for signs and symptoms of infection  - Monitor lab/diagnostic results  - Monitor all insertion sites, i.e. indwelling lines, tubes, and drains  - Monitor endotracheal if appropriate and nasal secretions for changes in amount and color  - [place] appropriate cooling/warming therapies per order  - Administer medications as ordered  - Instruct and encourage patient and family to use good hand hygiene technique  - Identify and instruct in appropriate isolation precautions for identified infection/condition  Outcome: Progressing  Goal: Absence of fever/infection during neutropenic period  Description: INTERVENTIONS:  - Monitor WBC    Outcome: Progressing     Problem: SAFETY ADULT  Goal: Patient will remain free of falls  Description: INTERVENTIONS:  - Educate patient/family on patient safety including physical limitations  - Instruct patient to call for assistance with activity   - Consult OT/PT to assist with strengthening/mobility   - Keep Call bell within reach  - Keep bed low and locked with side rails adjusted as appropriate  - Keep care items and personal belongings within reach  - Initiate and maintain comfort rounds  - Make Fall Risk Sign visible to staff  - Apply yellow socks and bracelet for high fall risk patients  - Consider moving patient to room near nurses station  Outcome: Progressing  Goal: Maintain or return to baseline ADL function  Description: INTERVENTIONS:  - Educate patient/family on patient safety including physical limitations  - Instruct patient to call for assistance with activity   - Consult OT/PT to assist with strengthening/mobility   - Keep Call bell within reach  - Keep bed low and locked with side rails adjusted as appropriate  - Keep care items and personal belongings within reach  - Initiate and maintain comfort rounds  - Make Fall Risk Sign visible to staff  - Apply yellow socks and bracelet for high fall risk patients  - Consider moving patient to room near nurses station  Outcome: Progressing  Goal: Maintains/Returns to pre admission functional level  Description: INTERVENTIONS:  - Perform BMAT or MOVE assessment daily.   - Set and communicate daily mobility goal to care team and patient/family/caregiver. - Collaborate with rehabilitation services on mobility goals if consulted  - Perform Range of Motion  times a day. - Reposition patient every  hours.   - Dangle patient times a day  - Stand patient times a day  - Ambulate patient  times a day  - Out of bed to chair times a day   - Out of bed for meals  times a day  - Out of bed for toileting  - Record patient progress and toleration of activity level   Outcome: Progressing     Problem: DISCHARGE PLANNING  Goal: Discharge to home or other facility with appropriate resources  Description: INTERVENTIONS:  - Identify barriers to discharge w/patient and caregiver  - Arrange for needed discharge resources and transportation as appropriate  - Identify discharge learning needs (meds, wound care, etc.)  - Arrange for interpretive services to assist at discharge as needed  - Refer to Case Management Department for coordinating discharge planning if the patient needs post-hospital services based on physician/advanced practitioner order or complex needs related to functional status, cognitive ability, or social support system  Outcome: Progressing     Problem: Knowledge Deficit  Goal: Patient/family/caregiver demonstrates understanding of disease process, treatment plan, medications, and discharge instructions  Description: Complete learning assessment and assess knowledge base.   Interventions:  - Provide teaching at level of understanding  - Provide teaching via preferred learning methods  Outcome: Progressing     Problem: Prexisting or High Potential for Compromised Skin Integrity  Goal: Skin integrity is maintained or improved  Description: INTERVENTIONS:  - Identify patients at risk for skin breakdown  - Assess and monitor skin integrity  - Assess and monitor nutrition and hydration status  - Monitor labs   - Assess for incontinence   - Turn and reposition patient  - Assist with mobility/ambulation  - Relieve pressure over bony prominences  - Avoid friction and shearing  - Provide appropriate hygiene as needed including keeping skin clean and dry  - Evaluate need for skin moisturizer/barrier cream  - Collaborate with interdisciplinary team   - Patient/family teaching  - Consider wound care consult   Outcome: Progressing     Problem: GENITOURINARY - ADULT  Goal: Maintains or returns to baseline urinary function  Description: INTERVENTIONS:  - Assess urinary function  - Encourage oral fluids to ensure adequate hydration if ordered  - Administer IV fluids as ordered to ensure adequate hydration  - Administer ordered medications as needed  - Offer frequent toileting  - Follow urinary retention protocol if ordered  Outcome: Progressing     Problem: HEMATOLOGIC - ADULT  Goal: Maintains hematologic stability  Description: INTERVENTIONS  - Assess for signs and symptoms of bleeding or hemorrhage  - Monitor labs  - Administer supportive blood products/factors as ordered and appropriate  Outcome: Progressing

## 2023-08-18 NOTE — RESTORATIVE TECHNICIAN NOTE
Restorative Technician Note      Patient Name: Eriberto Jason     Restorative Tech Visit Date: 08/18/23  Note Type: Mobility  Patient Position Upon Consult: Supine  Activity Performed: Ambulated  Patient Position at End of Consult: Supine;  All needs within reach

## 2023-08-18 NOTE — RESTORATIVE TECHNICIAN NOTE
Restorative Technician Note      Patient Name: Cecilia Campbell     Restorative Tech Visit Date: 08/18/23  Note Type: Mobility  Patient Position Upon Consult: Supine  Activity Performed: Ambulated  Patient Position at End of Consult: Supine;  All needs within reach

## 2023-08-18 NOTE — PLAN OF CARE
Problem: PAIN - ADULT  Goal: Verbalizes/displays adequate comfort level or baseline comfort level  Description: Interventions:  - Encourage patient to monitor pain and request assistance  - Assess pain using appropriate pain scale  - Administer analgesics based on type and severity of pain and evaluate response  - Implement non-pharmacological measures as appropriate and evaluate response  - Consider cultural and social influences on pain and pain management  - Notify physician/advanced practitioner if interventions unsuccessful or patient reports new pain  Outcome: Progressing     Problem: INFECTION - ADULT  Goal: Absence or prevention of progression during hospitalization  Description: INTERVENTIONS:  - Assess and monitor for signs and symptoms of infection  - Monitor lab/diagnostic results  - Monitor all insertion sites, i.e. indwelling lines, tubes, and drains  - Monitor endotracheal if appropriate and nasal secretions for changes in amount and color  - [place] appropriate cooling/warming therapies per order  - Administer medications as ordered  - Instruct and encourage patient and family to use good hand hygiene technique  - Identify and instruct in appropriate isolation precautions for identified infection/condition  Outcome: Progressing  Goal: Absence of fever/infection during neutropenic period  Description: INTERVENTIONS:  - Monitor WBC    Outcome: Progressing

## 2023-08-19 LAB
ANION GAP SERPL CALCULATED.3IONS-SCNC: 4 MMOL/L
BACTERIA UR CULT: ABNORMAL
BASOPHILS # BLD AUTO: 0.09 THOUSANDS/ÂΜL (ref 0–0.1)
BASOPHILS NFR BLD AUTO: 1 % (ref 0–1)
BUN SERPL-MCNC: 24 MG/DL (ref 5–25)
CALCIUM SERPL-MCNC: 8.8 MG/DL (ref 8.4–10.2)
CHLORIDE SERPL-SCNC: 103 MMOL/L (ref 96–108)
CO2 SERPL-SCNC: 28 MMOL/L (ref 21–32)
CREAT SERPL-MCNC: 1.1 MG/DL (ref 0.6–1.3)
EOSINOPHIL # BLD AUTO: 0.39 THOUSAND/ÂΜL (ref 0–0.61)
EOSINOPHIL NFR BLD AUTO: 3 % (ref 0–6)
ERYTHROCYTE [DISTWIDTH] IN BLOOD BY AUTOMATED COUNT: 13.6 % (ref 11.6–15.1)
GFR SERPL CREATININE-BSD FRML MDRD: 65 ML/MIN/1.73SQ M
GLUCOSE SERPL-MCNC: 85 MG/DL (ref 65–140)
HCT VFR BLD AUTO: 40 % (ref 36.5–49.3)
HGB BLD-MCNC: 12.7 G/DL (ref 12–17)
IMM GRANULOCYTES # BLD AUTO: 0.08 THOUSAND/UL (ref 0–0.2)
IMM GRANULOCYTES NFR BLD AUTO: 1 % (ref 0–2)
LYMPHOCYTES # BLD AUTO: 2.33 THOUSANDS/ÂΜL (ref 0.6–4.47)
LYMPHOCYTES NFR BLD AUTO: 20 % (ref 14–44)
MCH RBC QN AUTO: 31.2 PG (ref 26.8–34.3)
MCHC RBC AUTO-ENTMCNC: 31.8 G/DL (ref 31.4–37.4)
MCV RBC AUTO: 98 FL (ref 82–98)
MONOCYTES # BLD AUTO: 1.07 THOUSAND/ÂΜL (ref 0.17–1.22)
MONOCYTES NFR BLD AUTO: 9 % (ref 4–12)
NEUTROPHILS # BLD AUTO: 7.49 THOUSANDS/ÂΜL (ref 1.85–7.62)
NEUTS SEG NFR BLD AUTO: 66 % (ref 43–75)
NRBC BLD AUTO-RTO: 0 /100 WBCS
PLATELET # BLD AUTO: 350 THOUSANDS/UL (ref 149–390)
PMV BLD AUTO: 9.2 FL (ref 8.9–12.7)
POTASSIUM SERPL-SCNC: 4.5 MMOL/L (ref 3.5–5.3)
RBC # BLD AUTO: 4.07 MILLION/UL (ref 3.88–5.62)
SODIUM SERPL-SCNC: 135 MMOL/L (ref 135–147)
WBC # BLD AUTO: 11.45 THOUSAND/UL (ref 4.31–10.16)

## 2023-08-19 PROCEDURE — 99232 SBSQ HOSP IP/OBS MODERATE 35: CPT | Performed by: INTERNAL MEDICINE

## 2023-08-19 PROCEDURE — 80048 BASIC METABOLIC PNL TOTAL CA: CPT | Performed by: INTERNAL MEDICINE

## 2023-08-19 PROCEDURE — 85025 COMPLETE CBC W/AUTO DIFF WBC: CPT | Performed by: INTERNAL MEDICINE

## 2023-08-19 RX ADMIN — FOLIC ACID 1 MG: 1 TABLET ORAL at 09:43

## 2023-08-19 RX ADMIN — AMIODARONE HYDROCHLORIDE 200 MG: 200 TABLET ORAL at 09:41

## 2023-08-19 RX ADMIN — THIAMINE HCL TAB 100 MG 100 MG: 100 TAB at 09:43

## 2023-08-19 RX ADMIN — PHENAZOPYRIDINE 100 MG: 100 TABLET ORAL at 16:47

## 2023-08-19 RX ADMIN — ACETAMINOPHEN 325MG 975 MG: 325 TABLET ORAL at 06:26

## 2023-08-19 RX ADMIN — APIXABAN 5 MG: 5 TABLET, FILM COATED ORAL at 23:06

## 2023-08-19 RX ADMIN — PANTOPRAZOLE SODIUM 40 MG: 40 TABLET, DELAYED RELEASE ORAL at 06:26

## 2023-08-19 RX ADMIN — NICOTINE 7 MG/24 HR DAILY TRANSDERMAL PATCH 7 MG: at 09:42

## 2023-08-19 RX ADMIN — DILTIAZEM HYDROCHLORIDE 120 MG: 120 CAPSULE, COATED, EXTENDED RELEASE ORAL at 09:41

## 2023-08-19 RX ADMIN — PHENAZOPYRIDINE 100 MG: 100 TABLET ORAL at 13:30

## 2023-08-19 RX ADMIN — FLUTICASONE FUROATE AND VILANTEROL TRIFENATATE 1 PUFF: 200; 25 POWDER RESPIRATORY (INHALATION) at 09:43

## 2023-08-19 RX ADMIN — ATORVASTATIN CALCIUM 40 MG: 40 TABLET, FILM COATED ORAL at 16:47

## 2023-08-19 RX ADMIN — PHENAZOPYRIDINE 100 MG: 100 TABLET ORAL at 09:40

## 2023-08-19 RX ADMIN — ACETAMINOPHEN 325MG 975 MG: 325 TABLET ORAL at 23:09

## 2023-08-19 RX ADMIN — MELATONIN 6 MG: at 23:05

## 2023-08-19 RX ADMIN — METHOCARBAMOL TABLETS 750 MG: 500 TABLET, COATED ORAL at 06:25

## 2023-08-19 RX ADMIN — METHOCARBAMOL TABLETS 750 MG: 500 TABLET, COATED ORAL at 23:04

## 2023-08-19 RX ADMIN — Medication 1 TABLET: at 09:41

## 2023-08-19 NOTE — PROGRESS NOTES
[facility]  Progress Note  Name: Thad Rasheed I  MRN: [id number]  Unit/Bed#: E4 -01 I Date of Admission: 7/6/2023   Date of Service: 8/18/2023 I Hospital Day: 37    Assessment/Plan   * Sepsis secondary to UTI [facility])  Assessment & Plan  Patient is a 78-year-old male with past medical history significant for COPD, atrial fibrillation, and nephrolithiasis cystoscopy, and stent placement who presented with sepsis secondary to Enterococcus bacteremia and complicated UTI    · Completed a course of antibiotics   · Currently afebrile with a normal white blood cell count  · For complaint of recurrent dysuria: Repeat urinalysis was sent yesterday by previous provider: Similar to prior UA: Large blood, large leukocyte esterase, negative nitrate  · Urine culture was sent yesterday: Enterococcus: However due to recent instrumentation, prior Sorensen, concerns for possible asymptomatic bacteria versus infection:  We will discuss with infectious disease team further  · Pt denies any urinary symptoms;  Prior dysuria resolved with pyridium    Gross hematuria  Assessment & Plan  · Patient has had recurrent hematuria during his hospitalization requiring CBI  · He is s/p cysto with stent after which which hematuria resolved, however once Eliquis was restarted patient had recurrent hematuria  · Urine currently without visible hematuria however UA consistent with microscopic hematuria  · Eliquis currently on hold x1 week  · Possible trial Eliquis in the a.m: Will confer with urology prior    Atrial fibrillation ([address])  Assessment & Plan  · Evaluated by cardiology for atrial fibrillation with rapid ventricular response   · improved after successful SOLA/cardioversion on 7/25/23  · Rhythm controlled on amiodarone 200 mg daily and Cardizem 120 mg daily  · Patient had been on anticoagulation with Eliquis: On hold due to persistent hematuria      Hydronephrosis with obstructing calculus  Assessment & Plan  · Admitted originally due to sepsis from UTI with obstruction  · Status post cystoscopy with difficult stent insertion on 7/6/2023  · Procedure was complicated by inadvertent left ureteral perforation  · Follow-up CT showed previous ureteral calculus is now in the bladder  · Repeat cystoscopy on 8/3 with stent placement. · He had hematuria with re initiation of eliquis this past week. eliquis is currently on hold. · Urine has remained clear after holding eliquis. Will trial again after 1 week. (day 7 of hold).    · Will restart eliquis tomorrow, and monitor for recurrence of hematuria  · Urinalysis still with microscopic hematuria: No gross hematuria noted  · Urology recommending repeat ct in 2 weeks with outpt cysto/stent removal     Impaired decision making  Assessment & Plan  neuropsych evaluation was done on 7/24/2023  He continues to have impaired decision-making capacity  Guardianship process initiated by case management     Daily consumption of alcohol  Assessment & Plan  · Patient noted daily alcohol use on admission  · Clinically no withdrawal.  · Continue thiamine and folic acid supplementation    Bacteremia  Assessment & Plan  · Aerococcus bacteremia due to complicated UTI   · Completed full course of antibiotics under the guidance of the infectious disease team    Esophageal abnormality  Assessment & Plan  · incidental finding on CT with diffuse esophageal thickening could be from esophagitis  · Patient was evaluated by the GI team: Note that esophageal wall thickening could be secondary to reflux esophagitis, Bruno's, or hiatal hernia  · Continue empiric Protonix  · Outpatient follow-up: May require elective outpatient upper endoscopy      Elevated troponin  Assessment & Plan  · Non-MI troponin elevation secondary to tachycardia and sepsis   · Appreciate cardiology recommendations no additional work-up currently required  · Continue aspirin and Lipitor    COPD (chronic obstructive pulmonary disease) ([address])  Assessment & Plan  · Stable without exacerbation  · Continue albuterol and breo     Tobacco use  Assessment & Plan  · Smokes 6 cigarettes daily. · Nicotine TD patch  · Smoking cessation counseling provided                VTE Pharmacologic Prophylaxis: RX contraindicated due to: Recurrent hematuria  VTE Mechanical Prophylaxis: sequential compression device    Discussed with patient's nurse and     Certification Statement: The patient will continue to require additional inpatient hospital stay due to need for further acute intervention for safe discharge plan, guardian    Status: inpatient     ===================================================================    Subjective:  Denies any complaints. He notes he is urinating without any difficulty. He denies any pain or discomfort with urinating. He denies any bladder pain. He denies any pain anywhere at all. Denies any chest pain. He denies any shortness of breath. Notes he has a scant cough. He denies any abdominal pain, nausea, vomiting, diarrhea or constipation. Notes he is tolerating p.o. Denies any heartburn or indigestion. Physical Exam:   Temp:  [96.5 °F (35.8 °C)-98.1 °F (36.7 °C)] 98.1 °F (36.7 °C)  HR:  [61-81] 81  Resp:  [17-18] 18  BP: (116-137)/(57-74) 116/57    Gen:  Pleasant, non-tachypnic, non-dyspnic. Conversant. Heart: regular rate and rhythm, S1S2 present, no murmur, rub or gallop  Lungs: clear to ausculatation bilaterally. No wheezing, crackles, or rhonchi. No accessory muscle use or respiratory distress. Abd: soft, non-tender, non-distended. NABS, no guarding, rebound or peritoneal signs. Extremities: no clubbing, cyanosis or edema. 2+pedal pulses bilaterally. Neuro: awake, alert. Fluent speech. Interactive  Skin: warm and dry: no petechiae, purpura and rash.     LABS:   Results from last 7 days   Lab Units 08/17/23  1046   WBC Thousand/uL 9.43   HEMOGLOBIN g/dL 12.5   HEMATOCRIT % 39.8 PLATELETS Thousands/uL 319     Results from last 7 days   Lab Units 08/12/23  0615   POTASSIUM mmol/L 5.2   CHLORIDE mmol/L 102   CO2 mmol/L 26   BUN mg/dL 24   CREATININE mg/dL 0.94   CALCIUM mg/dL 8.5       Hospital Data:    8/1: Ultrasound bladder: Study limited demonstrating nondistended bladder with Sorensen balloon    7/28 CT abdomen/pelvis  Interval migration of a previously noted proximal left ureteral 7 mm calculus now in an intravesicular location. Left double-J ureteral stent in satisfactory position with decompression of the left renal collecting system. Stable nonobstructive large   left upper renal pole calculus    7/6: CT abdomen/pelvis  1. Left-sided hydronephrosis secondary to a 1 cm calculus in the proximal left ureter.     2.  Evidence of cystitis with circumferential irregular bladder wall thickening and perivesical stranding, though there is also evidence of underlying chronic outlet obstruction. Correlation with urinalysis recommended.     3.  Large nonobstructing left upper pole calyceal calculus    7/6 CTA chest  No pulmonary embolus. Mild right lower lobe bronchial wall thickening, endobronchial debris, and mild consolidation in the right lower lobe compatible with bronchitis and pneumonia.   Diffuse esophageal wall thickening which could be due to esophagitis\    Microbiology  8/17: Urine culture: Greater than 100,000 Enterococcus  8/3: Urine culture 40,000 staph coag negative, less than 10,000  Enterococcus, less than 10,000 Candida  7/18: Urine culture greater than 100,000 stenotrophomonas, greater  than 100,000 deltifia acidovorans  7/18 blood culture: Negative x2  7/8: Blood culture: Negative x1  7/7: Negative strep pneumoniae/Legionella antigen  7/6 urine culture: Aerococcus urinae  7/6 urine culture: 100,000 Aerococcus urinary  7/6 blood culture x2 positive for Aerococcus enriqueinae    ---------------------------------------------------------------------------------------------------------------  This note has been constructed using a voice recognition system.

## 2023-08-19 NOTE — PLAN OF CARE
Problem: Potential for Falls  Goal: Patient will remain free of falls  Description: INTERVENTIONS:  - Educate patient/family on patient safety including physical limitations  - Instruct patient to call for assistance with activity   - Consult OT/PT to assist with strengthening/mobility   - Keep Call bell within reach  - Keep bed low and locked with side rails adjusted as appropriate  - Keep care items and personal belongings within reach  - Initiate and maintain comfort rounds  - Make Fall Risk Sign visible to staff  - Apply yellow socks and bracelet for high fall risk patients  - Consider moving patient to room near nurses station  Outcome: Progressing     Problem: MOBILITY - ADULT  Goal: Maintain or return to baseline ADL function  Description: INTERVENTIONS:  - Educate patient/family on patient safety including physical limitations  - Instruct patient to call for assistance with activity   - Consult OT/PT to assist with strengthening/mobility   - Keep Call bell within reach  - Keep bed low and locked with side rails adjusted as appropriate  - Keep care items and personal belongings within reach  - Initiate and maintain comfort rounds  - Make Fall Risk Sign visible to staff  - Apply yellow socks and bracelet for high fall risk patients  - Consider moving patient to room near nurses station  Outcome: Progressing  Goal: Maintains/Returns to pre admission functional level  Description: INTERVENTIONS:  - Perform BMAT or MOVE assessment daily.   - Set and communicate daily mobility goal to care team and patient/family/caregiver. - Collaborate with rehabilitation services on mobility goals if consulted  - Perform Range of Motion  times a day. - Reposition patient every  hours.   - Dangle patient  times a day  - Stand patient  times a day  - Ambulate patient times a day  - Out of bed to chair times a day   - Out of bed for meals  times a day  - Out of bed for toileting  - Record patient progress and toleration of activity level   Outcome: Progressing     Problem: PAIN - ADULT  Goal: Verbalizes/displays adequate comfort level or baseline comfort level  Description: Interventions:  - Encourage patient to monitor pain and request assistance  - Assess pain using appropriate pain scale  - Administer analgesics based on type and severity of pain and evaluate response  - Implement non-pharmacological measures as appropriate and evaluate response  - Consider cultural and social influences on pain and pain management  - Notify physician/advanced practitioner if interventions unsuccessful or patient reports new pain  Outcome: Progressing     Problem: INFECTION - ADULT  Goal: Absence or prevention of progression during hospitalization  Description: INTERVENTIONS:  - Assess and monitor for signs and symptoms of infection  - Monitor lab/diagnostic results  - Monitor all insertion sites, i.e. indwelling lines, tubes, and drains  - Monitor endotracheal if appropriate and nasal secretions for changes in amount and color  - [place] appropriate cooling/warming therapies per order  - Administer medications as ordered  - Instruct and encourage patient and family to use good hand hygiene technique  - Identify and instruct in appropriate isolation precautions for identified infection/condition  Outcome: Progressing  Goal: Absence of fever/infection during neutropenic period  Description: INTERVENTIONS:  - Monitor WBC    Outcome: Progressing     Problem: SAFETY ADULT  Goal: Patient will remain free of falls  Description: INTERVENTIONS:  - Educate patient/family on patient safety including physical limitations  - Instruct patient to call for assistance with activity   - Consult OT/PT to assist with strengthening/mobility   - Keep Call bell within reach  - Keep bed low and locked with side rails adjusted as appropriate  - Keep care items and personal belongings within reach  - Initiate and maintain comfort rounds  - Make Fall Risk Sign visible to staff  - Apply yellow socks and bracelet for high fall risk patients  - Consider moving patient to room near nurses station  Outcome: Progressing  Goal: Maintain or return to baseline ADL function  Description: INTERVENTIONS:  - Educate patient/family on patient safety including physical limitations  - Instruct patient to call for assistance with activity   - Consult OT/PT to assist with strengthening/mobility   - Keep Call bell within reach  - Keep bed low and locked with side rails adjusted as appropriate  - Keep care items and personal belongings within reach  - Initiate and maintain comfort rounds  - Make Fall Risk Sign visible to staff  - Apply yellow socks and bracelet for high fall risk patients  - Consider moving patient to room near nurses station  Outcome: Progressing  Goal: Maintains/Returns to pre admission functional level  Description: INTERVENTIONS:  - Perform BMAT or MOVE assessment daily.   - Set and communicate daily mobility goal to care team and patient/family/caregiver. - Collaborate with rehabilitation services on mobility goals if consulted  - Perform Range of Motion  times a day. - Reposition patient every  hours.   - Dangle patient  times a day  - Stand patient times a day  - Ambulate patient times a day  - Out of bed to chair  times a day   - Out of bed for meals  times a day  - Out of bed for toileting  - Record patient progress and toleration of activity level   Outcome: Progressing     Problem: DISCHARGE PLANNING  Goal: Discharge to home or other facility with appropriate resources  Description: INTERVENTIONS:  - Identify barriers to discharge w/patient and caregiver  - Arrange for needed discharge resources and transportation as appropriate  - Identify discharge learning needs (meds, wound care, etc.)  - Arrange for interpretive services to assist at discharge as needed  - Refer to Case Management Department for coordinating discharge planning if the patient needs post-hospital services based on physician/advanced practitioner order or complex needs related to functional status, cognitive ability, or social support system  Outcome: Progressing     Problem: Knowledge Deficit  Goal: Patient/family/caregiver demonstrates understanding of disease process, treatment plan, medications, and discharge instructions  Description: Complete learning assessment and assess knowledge base.   Interventions:  - Provide teaching at level of understanding  - Provide teaching via preferred learning methods  Outcome: Progressing     Problem: Prexisting or High Potential for Compromised Skin Integrity  Goal: Skin integrity is maintained or improved  Description: INTERVENTIONS:  - Identify patients at risk for skin breakdown  - Assess and monitor skin integrity  - Assess and monitor nutrition and hydration status  - Monitor labs   - Assess for incontinence   - Turn and reposition patient  - Assist with mobility/ambulation  - Relieve pressure over bony prominences  - Avoid friction and shearing  - Provide appropriate hygiene as needed including keeping skin clean and dry  - Evaluate need for skin moisturizer/barrier cream  - Collaborate with interdisciplinary team   - Patient/family teaching  - Consider wound care consult   Outcome: Progressing     Problem: GENITOURINARY - ADULT  Goal: Maintains or returns to baseline urinary function  Description: INTERVENTIONS:  - Assess urinary function  - Encourage oral fluids to ensure adequate hydration if ordered  - Administer IV fluids as ordered to ensure adequate hydration  - Administer ordered medications as needed  - Offer frequent toileting  - Follow urinary retention protocol if ordered  Outcome: Progressing     Problem: HEMATOLOGIC - ADULT  Goal: Maintains hematologic stability  Description: INTERVENTIONS  - Assess for signs and symptoms of bleeding or hemorrhage  - Monitor labs  - Administer supportive blood products/factors as ordered and appropriate  Outcome: Progressing

## 2023-08-19 NOTE — ASSESSMENT & PLAN NOTE
· Admitted originally due to sepsis from UTI with obstruction  · Status post cystoscopy with difficult stent insertion on 7/6/2023  · Procedure was complicated by inadvertent left ureteral perforation  · Follow-up CT showed previous ureteral calculus is now in the bladder  · Repeat cystoscopy on 8/3 with stent placement. · He had hematuria with re initiation of eliquis this past week. eliquis is currently on hold. · Urine has remained clear after holding eliquis. Will trial again after 1 week. (day 7 of hold).    · Will restart eliquis tomorrow, and monitor for recurrence of hematuria  · Urinalysis still with microscopic hematuria: No gross hematuria noted  · Urology recommending repeat ct in 2 weeks with outpt cysto/stent removal

## 2023-08-19 NOTE — ASSESSMENT & PLAN NOTE
neuropsych evaluation was done on 7/24/2023  He continues to have impaired decision-making capacity  Guardianship process initiated by case management

## 2023-08-19 NOTE — ASSESSMENT & PLAN NOTE
· Evaluated by cardiology for atrial fibrillation with rapid ventricular response   · improved after successful SOLA/cardioversion on 7/25/23  · Rhythm controlled on amiodarone 200 mg daily and Cardizem 120 mg daily  · Patient had been on anticoagulation with Eliquis: held due to persistent hematuria-->restarted

## 2023-08-19 NOTE — ASSESSMENT & PLAN NOTE
Patient is a 62-year-old male with past medical history significant for COPD, atrial fibrillation, and nephrolithiasis cystoscopy, and stent placement who presented with sepsis secondary to Enterococcus bacteremia and complicated UTI    · Completed a course of antibiotics   · Currently afebrile with a normal white blood cell count  · For complaint of recurrent dysuria: Repeat urinalysis was sent yesterday by previous provider: Similar to prior UA: Large blood, large leukocyte esterase, negative nitrate  · Urine culture was sent earlier this week with enterococcus: Pt denies any urinary symptoms;  Prior dysuria resolved with pyridium  · dw ID team:  Monitor off abx at this time.   Likely colonization

## 2023-08-19 NOTE — ASSESSMENT & PLAN NOTE
· Patient has had recurrent hematuria during his hospitalization requiring CBI  · He is s/p cysto with stent after which which hematuria resolved, however once Eliquis was restarted patient had recurrent hematuria  · Urine currently without visible hematuria however UA consistent with microscopic hematuria  · Eliquis currently on hold x1 week  · Possible trial Eliquis in the a.m: Will confer with urology prior

## 2023-08-19 NOTE — PLAN OF CARE
Problem: PAIN - ADULT  Goal: Verbalizes/displays adequate comfort level or baseline comfort level  Description: Interventions:  - Encourage patient to monitor pain and request assistance  - Assess pain using appropriate pain scale  - Administer analgesics based on type and severity of pain and evaluate response  - Implement non-pharmacological measures as appropriate and evaluate response  - Consider cultural and social influences on pain and pain management  - Notify physician/advanced practitioner if interventions unsuccessful or patient reports new pain  Outcome: Progressing     Problem: INFECTION - ADULT  Goal: Absence or prevention of progression during hospitalization  Description: INTERVENTIONS:  - Assess and monitor for signs and symptoms of infection  - Monitor lab/diagnostic results  - Monitor all insertion sites, i.e. indwelling lines, tubes, and drains  - Monitor endotracheal if appropriate and nasal secretions for changes in amount and color  - [place] appropriate cooling/warming therapies per order  - Administer medications as ordered  - Instruct and encourage patient and family to use good hand hygiene technique  - Identify and instruct in appropriate isolation precautions for identified infection/condition  Outcome: Progressing  Goal: Absence of fever/infection during neutropenic period  Description: INTERVENTIONS:  - Monitor WBC    Outcome: Progressing

## 2023-08-19 NOTE — ASSESSMENT & PLAN NOTE
· Aerococcus bacteremia due to complicated UTI   · Completed full course of antibiotics under the guidance of the infectious disease team

## 2023-08-19 NOTE — ASSESSMENT & PLAN NOTE
· Non-MI troponin elevation secondary to tachycardia and sepsis   · Appreciate cardiology recommendations no additional work-up currently required  · Continue aspirin and Lipitor

## 2023-08-19 NOTE — ASSESSMENT & PLAN NOTE
· Patient has had recurrent hematuria during his hospitalization requiring CBI  · He is s/p cysto with stent after which which hematuria resolved, however once Eliquis was restarted patient had recurrent hematuria  · Urine currently without visible hematuria however UA consistent with microscopic hematuria  · Eliquis currently on hold x1 week  · D/w urology:  Restart eliquis today and monitor

## 2023-08-19 NOTE — ASSESSMENT & PLAN NOTE
· Patient noted daily alcohol use on admission  · Clinically no withdrawal.  · Continue thiamine and folic acid supplementation

## 2023-08-19 NOTE — ASSESSMENT & PLAN NOTE
· incidental finding on CT with diffuse esophageal thickening could be from esophagitis  · Patient was evaluated by the GI team: Note that esophageal wall thickening could be secondary to reflux esophagitis, Bruno's, or hiatal hernia  · Continue empiric Protonix  · Outpatient follow-up: May require elective outpatient upper endoscopy

## 2023-08-19 NOTE — ASSESSMENT & PLAN NOTE
· Evaluated by cardiology for atrial fibrillation with rapid ventricular response   · improved after successful SOLA/cardioversion on 7/25/23  · Rhythm controlled on amiodarone 200 mg daily and Cardizem 120 mg daily  · Patient had been on anticoagulation with Eliquis: On hold due to persistent hematuria

## 2023-08-19 NOTE — ASSESSMENT & PLAN NOTE
· Admitted originally due to sepsis from UTI with obstruction  · Status post cystoscopy with difficult stent insertion on 7/6/2023  · Procedure was complicated by inadvertent left ureteral perforation  · Follow-up CT showed previous ureteral calculus is now in the bladder  · Repeat cystoscopy on 8/3 with stent placement. · He had hematuria with re initiation of eliquis this past week. eliquis was then placed on hold for 7d. · Urine has remained clear after holding eliquis. Urinalysis still with microscopic hematuria: No gross hematuria noted  · D/w urology:   Will restart eliquis today, and monitor for recurrence of hematuria  · Urology recommending repeat ct in 2 weeks with outpt cysto/stent removal

## 2023-08-19 NOTE — ASSESSMENT & PLAN NOTE
Patient is a 69-year-old male with past medical history significant for COPD, atrial fibrillation, and nephrolithiasis cystoscopy, and stent placement who presented with sepsis secondary to Enterococcus bacteremia and complicated UTI    · Completed a course of antibiotics   · Currently afebrile with a normal white blood cell count  · For complaint of recurrent dysuria: Repeat urinalysis was sent yesterday by previous provider: Similar to prior UA: Large blood, large leukocyte esterase, negative nitrate  · Urine culture was sent yesterday: Enterococcus: However due to recent instrumentation, prior Sorensen, concerns for possible asymptomatic bacteria versus infection:  We will discuss with infectious disease team further  · Pt denies any urinary symptoms;  Prior dysuria resolved with pyridium

## 2023-08-19 NOTE — PROGRESS NOTES
[facility]  Progress Note  Name: Raoul Wick I  MRN: [id number]  Unit/Bed#: E4 -01 I Date of Admission: 7/6/2023   Date of Service: 8/19/2023 I Hospital Day: 40    Assessment/Plan   * Sepsis secondary to UTI [facility])  Assessment & Plan  Patient is a 65-year-old male with past medical history significant for COPD, atrial fibrillation, and nephrolithiasis cystoscopy, and stent placement who presented with sepsis secondary to Enterococcus bacteremia and complicated UTI    · Completed a course of antibiotics   · Currently afebrile with a normal white blood cell count  · For complaint of recurrent dysuria: Repeat urinalysis was sent yesterday by previous provider: Similar to prior UA: Large blood, large leukocyte esterase, negative nitrate  · Urine culture was sent earlier this week with enterococcus: Pt denies any urinary symptoms;  Prior dysuria resolved with pyridium  · dw ID team:  Monitor off abx at this time.   Likely colonization    Gross hematuria  Assessment & Plan  · Patient has had recurrent hematuria during his hospitalization requiring CBI  · He is s/p cysto with stent after which which hematuria resolved, however once Eliquis was restarted patient had recurrent hematuria  · Urine currently without visible hematuria however UA consistent with microscopic hematuria  · Eliquis currently on hold x1 week  · D/w urology:  Restart eliquis today and monitor    Atrial fibrillation [facility])  Assessment & Plan  · Evaluated by cardiology for atrial fibrillation with rapid ventricular response   · improved after successful SOLA/cardioversion on 7/25/23  · Rhythm controlled on amiodarone 200 mg daily and Cardizem 120 mg daily  · Patient had been on anticoagulation with Eliquis: held due to persistent hematuria-->restarted      Hydronephrosis with obstructing calculus  Assessment & Plan  · Admitted originally due to sepsis from UTI with obstruction  · Status post cystoscopy with difficult stent insertion on 7/6/2023  · Procedure was complicated by inadvertent left ureteral perforation  · Follow-up CT showed previous ureteral calculus is now in the bladder  · Repeat cystoscopy on 8/3 with stent placement. · He had hematuria with re initiation of eliquis this past week. eliquis was then placed on hold for 7d. · Urine has remained clear after holding eliquis. Urinalysis still with microscopic hematuria: No gross hematuria noted  · D/w urology:   Will restart eliquis today, and monitor for recurrence of hematuria  · Urology recommending repeat ct in 2 weeks with outpt cysto/stent removal     Impaired decision making  Assessment & Plan  neuropsych evaluation was done on 7/24/2023  He continues to have impaired decision-making capacity  Guardianship process initiated by case management     Daily consumption of alcohol  Assessment & Plan  · Patient noted daily alcohol use on admission  · Clinically no withdrawal.  · Continue thiamine and folic acid supplementation    Bacteremia  Assessment & Plan  · Aerococcus bacteremia due to complicated UTI   · Completed full course of antibiotics under the guidance of the infectious disease team    Esophageal abnormality  Assessment & Plan  · incidental finding on CT with diffuse esophageal thickening could be from esophagitis  · Patient was evaluated by the GI team: Note that esophageal wall thickening could be secondary to reflux esophagitis, Bruno's, or hiatal hernia  · Continue empiric Protonix  · Outpatient follow-up: May require elective outpatient upper endoscopy      Elevated troponin  Assessment & Plan  · Non-MI troponin elevation secondary to tachycardia and sepsis   · Appreciate cardiology recommendations no additional work-up currently required  · Continue aspirin and Lipitor    COPD (chronic obstructive pulmonary disease) ([address])  Assessment & Plan  · Stable without exacerbation  · Continue albuterol and breo     Tobacco use  Assessment & Plan  · Smokes 6 cigarettes daily. · Nicotine TD patch  · Smoking cessation counseling provided              VTE Pharmacologic Prophylaxis: RX contraindicated due to: eliquis  VTE Mechanical Prophylaxis: sequential compression device    dw pts nurse and CM    Certification Statement: The patient will continue to require additional inpatient hospital stay due to need for further acute intervention for guardianship    Status: inpatient     ===================================================================    Subjective:  Pt denies any complaints. Denies any pain. Denies any dysuria. Notes he is passing his urine without any discomfort, burning, or difficulty. He denies any pain or discomfort anywhere else. Notes he is ambulating. No dizziness or lightheadedness. He denies any chest pain. Denies any shortness of breath or cough. Notes he is tolerating p.o. Denies any nausea, vomiting, diarrhea or constipation. Physical Exam:   Temp:  [97.3 °F (36.3 °C)-98.2 °F (36.8 °C)] 97.9 °F (36.6 °C)  HR:  [63-67] 63  Resp:  [18] 18  BP: (133-158)/(58-79) 133/63    Gen:  Pleasant, non-tachypnic, non-dyspnic. Conversant. Heart: regular rate and rhythm, S1S2 present, no murmur, rub or gallop  Lungs: clear to ausculatation bilaterally. No wheezing, crackles, or rhonchi. No accessory muscle use or respiratory distress. Abd: soft, non-tender, non-distended. NABS, no guarding, rebound or peritoneal signs. Extremities: no clubbing, cyanosis or edema. 2+pedal pulses bilaterally. Neuro: awake, alert. Fluent speech  Skin: warm and dry: no petechiae, purpura and rash.     LABS:   Results from last 7 days   Lab Units 08/19/23  0553 08/17/23  1046   WBC Thousand/uL 11.45* 9.43   HEMOGLOBIN g/dL 12.7 12.5   HEMATOCRIT % 40.0 39.8   PLATELETS Thousands/uL 350 319     Results from last 7 days   Lab Units 08/19/23  0553   POTASSIUM mmol/L 4.5   CHLORIDE mmol/L 103   CO2 mmol/L 28   BUN mg/dL 24   CREATININE mg/dL 1.10   CALCIUM mg/dL 8.8 Hospital Data:    8/1: Ultrasound bladder: Study limited demonstrating nondistended bladder with Sorensen balloon     7/28 CT abdomen/pelvis  Interval migration of a previously noted proximal left ureteral 7 mm calculus now in an intravesicular location. Left double-J ureteral stent in satisfactory position with decompression of the left renal collecting system. Stable nonobstructive large   left upper renal pole calculus     7/6: CT abdomen/pelvis  1.  Left-sided hydronephrosis secondary to a 1 cm calculus in the proximal left ureter.     2.  Evidence of cystitis with circumferential irregular bladder wall thickening and perivesical stranding, though there is also evidence of underlying chronic outlet obstruction. Correlation with urinalysis recommended.     3.  Large nonobstructing left upper pole calyceal calculus     7/6 CTA chest  No pulmonary embolus. Mild right lower lobe bronchial wall thickening, endobronchial debris, and mild consolidation in the right lower lobe compatible with bronchitis and pneumonia. Diffuse esophageal wall thickening which could be due to esophagitis\     Microbiology  8/17: Urine culture: Greater than 100,000 Enterococcus  8/3: Urine culture 40,000 staph coag negative, less than 10,000          Enterococcus, less than 10,000 Candida  7/18: Urine culture greater than 100,000 stenotrophomonas, greater  than 100,000 deltifia acidovorans  7/18 blood culture: Negative x2  7/8: Blood culture: Negative x1  7/7: Negative strep pneumoniae/Legionella antigen  7/6 urine culture: Aerococcus urinae  7/6 urine culture: 100,000 Aerococcus urinary  7/6 blood culture x2 positive for Aerococcus urinae        ---------------------------------------------------------------------------------------------------------------  This note has been constructed using a voice recognition system.

## 2023-08-20 LAB — GLUCOSE SERPL-MCNC: 147 MG/DL (ref 65–140)

## 2023-08-20 PROCEDURE — 99232 SBSQ HOSP IP/OBS MODERATE 35: CPT | Performed by: INTERNAL MEDICINE

## 2023-08-20 PROCEDURE — 82948 REAGENT STRIP/BLOOD GLUCOSE: CPT

## 2023-08-20 RX ORDER — DOCUSATE SODIUM 100 MG/1
100 CAPSULE, LIQUID FILLED ORAL 2 TIMES DAILY PRN
Status: DISCONTINUED | OUTPATIENT
Start: 2023-08-20 | End: 2023-09-01 | Stop reason: HOSPADM

## 2023-08-20 RX ORDER — POLYETHYLENE GLYCOL 3350 17 G/17G
17 POWDER, FOR SOLUTION ORAL DAILY PRN
Status: DISCONTINUED | OUTPATIENT
Start: 2023-08-20 | End: 2023-09-01 | Stop reason: HOSPADM

## 2023-08-20 RX ADMIN — MELATONIN 6 MG: at 22:59

## 2023-08-20 RX ADMIN — DILTIAZEM HYDROCHLORIDE 120 MG: 120 CAPSULE, COATED, EXTENDED RELEASE ORAL at 08:45

## 2023-08-20 RX ADMIN — ACETAMINOPHEN 325MG 975 MG: 325 TABLET ORAL at 23:09

## 2023-08-20 RX ADMIN — PANTOPRAZOLE SODIUM 40 MG: 40 TABLET, DELAYED RELEASE ORAL at 06:02

## 2023-08-20 RX ADMIN — ACETAMINOPHEN 325MG 975 MG: 325 TABLET ORAL at 06:03

## 2023-08-20 RX ADMIN — METHOCARBAMOL TABLETS 750 MG: 500 TABLET, COATED ORAL at 22:59

## 2023-08-20 RX ADMIN — PHENAZOPYRIDINE 100 MG: 100 TABLET ORAL at 15:50

## 2023-08-20 RX ADMIN — AMIODARONE HYDROCHLORIDE 200 MG: 200 TABLET ORAL at 08:44

## 2023-08-20 RX ADMIN — Medication 1 TABLET: at 08:45

## 2023-08-20 RX ADMIN — ATORVASTATIN CALCIUM 40 MG: 40 TABLET, FILM COATED ORAL at 15:50

## 2023-08-20 RX ADMIN — FOLIC ACID 1 MG: 1 TABLET ORAL at 08:45

## 2023-08-20 RX ADMIN — PHENAZOPYRIDINE 100 MG: 100 TABLET ORAL at 08:45

## 2023-08-20 RX ADMIN — APIXABAN 5 MG: 5 TABLET, FILM COATED ORAL at 08:44

## 2023-08-20 RX ADMIN — THIAMINE HCL TAB 100 MG 100 MG: 100 TAB at 08:45

## 2023-08-20 RX ADMIN — FLUTICASONE FUROATE AND VILANTEROL TRIFENATATE 1 PUFF: 200; 25 POWDER RESPIRATORY (INHALATION) at 08:45

## 2023-08-20 RX ADMIN — METHOCARBAMOL TABLETS 750 MG: 500 TABLET, COATED ORAL at 06:01

## 2023-08-20 RX ADMIN — APIXABAN 5 MG: 5 TABLET, FILM COATED ORAL at 20:19

## 2023-08-20 NOTE — RESTORATIVE TECHNICIAN NOTE
Restorative Technician Note      Patient Name: Elisa Ervin     Restorative Tech Visit Date: 08/20/23  Note Type: Mobility  Patient Position Upon Consult: Supine  Activity Performed: Ambulated  Patient Position at End of Consult: Bedside chair;  All needs within reach

## 2023-08-20 NOTE — ASSESSMENT & PLAN NOTE
· Admitted originally due to sepsis from UTI with obstruction  · Status post cystoscopy with difficult stent insertion on 7/6/2023  · Procedure was complicated by inadvertent left ureteral perforation  · Follow-up CT showed previous ureteral calculus is now in the bladder  · Repeat cystoscopy on 8/3 with stent placement. · He had hematuria with re initiation of eliquis this past week. eliquis was then placed on hold for 7d. · Urine has remained clear after holding eliquis.  Urinalysis still with microscopic hematuria: No gross hematuria noted  · After discussion with urology, restarted eliquis 8/19: monitor for recurrence of hematuria  · Urology recommending repeat ct in 2 weeks with outpt cysto/stent removal

## 2023-08-20 NOTE — RESTORATIVE TECHNICIAN NOTE
Restorative Technician Note      Patient Name: Santi Galan     Restorative Tech Visit Date: 08/20/23  Note Type: Mobility  Patient Position Upon Consult: Bedside chair  Activity Performed: Ambulated  Patient Position at End of Consult: Supine;  All needs within reach

## 2023-08-20 NOTE — PROGRESS NOTES
[address]  Progress Note  Name: Eriberto Jason I  MRN: [id number]  Unit/Bed#: E4 -01 I Date of Admission: 7/6/2023   Date of Service: 8/20/2023 I Hospital Day: 39    Assessment/Plan   * Sepsis secondary to UTI [facility])  Assessment & Plan  Patient is a 74-year-old male with past medical history significant for COPD, atrial fibrillation, and nephrolithiasis cystoscopy, and stent placement who presented with sepsis secondary to Enterococcus bacteremia and complicated UTI    · Completed a course of antibiotics   · Pt complained of recurrent dysuria earlier this week: Repeat urinalysis was sent: Similar to prior UA: Large blood, large leukocyte esterase, negative nitrate  · Associated urine culture was sent: growing enterococcus: Pt denies any urinary symptoms;  Prior dysuria resolved with pyridium  · dw ID team as pt has no symptoms off abx, and has multiple prior tx courses:  Wbc 11. Afebrile. Per ID, will cont to monitor off abx at this time.   Likely colonization    Gross hematuria  Assessment & Plan  · Patient has had recurrent hematuria during his hospitalization requiring CBI  · He is s/p cysto with stent after which which hematuria resolved, however once Eliquis was restarted patient had recurrent hematuria  · Urine currently without visible hematuria however UA consistent with microscopic hematuria  · Eliquis currently on hold x1 week, and then restarted  · Cont to monitor for recurrence      Atrial fibrillation ([address])  Assessment & Plan  · Evaluated by cardiology for atrial fibrillation with rapid ventricular response   · improved after successful SOLA/cardioversion on 7/25/23  · Rhythm controlled on amiodarone 200 mg daily and Cardizem 120 mg daily  · Patient had been on anticoagulation with Eliquis: held due to persistent hematuria-->restarted      Impaired decision making  Assessment & Plan  neuropsych evaluation was done on 7/24/2023  He continues to have impaired decision-making capacity  Guardianship process initiated by case management     Hydronephrosis with obstructing calculus  Assessment & Plan  · Admitted originally due to sepsis from UTI with obstruction  · Status post cystoscopy with difficult stent insertion on 7/6/2023  · Procedure was complicated by inadvertent left ureteral perforation  · Follow-up CT showed previous ureteral calculus is now in the bladder  · Repeat cystoscopy on 8/3 with stent placement. · He had hematuria with re initiation of eliquis this past week. eliquis was then placed on hold for 7d. · Urine has remained clear after holding eliquis.  Urinalysis still with microscopic hematuria: No gross hematuria noted  · After discussion with urology, restarted eliquis 8/19: monitor for recurrence of hematuria  · Urology recommending repeat ct in 2 weeks with outpt cysto/stent removal     Daily consumption of alcohol  Assessment & Plan  · Patient noted daily alcohol use on admission  · Clinically no withdrawal.  · Continue thiamine and folic acid supplementation    Bacteremia  Assessment & Plan  · Aerococcus bacteremia due to complicated UTI   · Completed full course of antibiotics under the guidance of the infectious disease team    Esophageal abnormality  Assessment & Plan  · incidental finding on CT with diffuse esophageal thickening could be from esophagitis  · Patient was evaluated by the GI team: Note that esophageal wall thickening could be secondary to reflux esophagitis, Bruno's, or hiatal hernia  · Continue empiric Protonix  · Outpatient follow-up: May require elective outpatient upper endoscopy      Elevated troponin  Assessment & Plan  · Non-MI troponin elevation secondary to tachycardia and sepsis   · Appreciate cardiology recommendations no additional work-up currently required  · Continue aspirin and Lipitor    COPD (chronic obstructive pulmonary disease) ([address])  Assessment & Plan  · Stable without exacerbation  · Continue albuterol and breo     Tobacco use  Assessment & Plan  · Smokes 6 cigarettes daily. · Nicotine TD patch  · Smoking cessation counseling provided              VTE Pharmacologic Prophylaxis: RX contraindicated due to: Eliquis  VTE Mechanical Prophylaxis: sequential compression device    Discussed with patient's nurse and case management    Certification Statement: The patient will continue to require additional inpatient hospital stay due to need for further acute intervention for guardianship, safe discharge plan    Status: inpatient     ===================================================================    Subjective:  Patient denies any complaints. Notes he is passing urine without any difficulty. Notes his urine is bright orange once he started taking the Pyridium. He denies any pain anywhere else. Denies any suprapubic discomfort. Denies any chest pain. Denies any shortness of breath. Denies any cough. Denies any nausea or vomiting. Notes he has been passing loose bowels and request that the stool softeners be stopped. Physical Exam:   Temp:  [97.5 °F (36.4 °C)-97.6 °F (36.4 °C)] 97.5 °F (36.4 °C)  HR:  [62-64] 64  Resp:  [18] 18  BP: (124-141)/(66-68) 124/68    Gen:  Pleasant, non-tachypnic, non-dyspnic. Conversant. Heart: regular rate and rhythm, S1S2 present, no murmur, rub or gallop  Lungs: clear to ausculatation bilaterally. No wheezing, crackles, or rhonchi. No accessory muscle use or respiratory distress. Abd: soft, non-tender, non-distended. NABS, no guarding, rebound or peritoneal signs. Extremities: no clubbing, cyanosis or edema. 2+pedal pulses bilaterally. Neuro: awake, alert. Fluent speech. Interactive.   No lethargy      LABS:   Results from last 7 days   Lab Units 08/19/23  0553 08/17/23  1046   WBC Thousand/uL 11.45* 9.43   HEMOGLOBIN g/dL 12.7 12.5   HEMATOCRIT % 40.0 39.8   PLATELETS Thousands/uL 350 319     Results from last 7 days   Lab Units 08/19/23  0553   POTASSIUM mmol/L 4. 5   CHLORIDE mmol/L 103   CO2 mmol/L 28   BUN mg/dL 24   CREATININE mg/dL 1.10   CALCIUM mg/dL 8.8       Hospital Data:    8/1: Ultrasound bladder: Study limited demonstrating nondistended bladder with Sorensen balloon     7/28 CT abdomen/pelvis  Interval migration of a previously noted proximal left ureteral 7 mm calculus now in an intravesicular location. Left double-J ureteral stent in satisfactory position with decompression of the left renal collecting system. Stable nonobstructive large   left upper renal pole calculus     7/6: CT abdomen/pelvis  1.  Left-sided hydronephrosis secondary to a 1 cm calculus in the proximal left ureter.     2.  Evidence of cystitis with circumferential irregular bladder wall thickening and perivesical stranding, though there is also evidence of underlying chronic outlet obstruction. Correlation with urinalysis recommended.     3.  Large nonobstructing left upper pole calyceal calculus     7/6 CTA chest  No pulmonary embolus. Mild right lower lobe bronchial wall thickening, endobronchial debris, and mild consolidation in the right lower lobe compatible with bronchitis and pneumonia. Diffuse esophageal wall thickening which could be due to esophagitis\     Microbiology  8/17: Urine culture: Greater than 100,000 Enterococcus  8/3: Urine culture 40,000 staph coag negative, less than 10,000          Enterococcus, less than 10,000 Candida  7/18: Urine culture greater than 100,000 stenotrophomonas, greater  than 100,000 deltifia acidovorans  7/18 blood culture: Negative x2  7/8: Blood culture: Negative x1  7/7: Negative strep pneumoniae/Legionella antigen  7/6 urine culture: Aerococcus urinae  7/6 urine culture: 100,000 Aerococcus urinary  7/6 blood culture x2 positive for Aerococcus urinae        ---------------------------------------------------------------------------------------------------------------  This note has been constructed using a voice recognition system.

## 2023-08-20 NOTE — PLAN OF CARE
Problem: Potential for Falls  Goal: Patient will remain free of falls  Description: INTERVENTIONS:  - Educate patient/family on patient safety including physical limitations  - Instruct patient to call for assistance with activity   - Consult OT/PT to assist with strengthening/mobility   - Keep Call bell within reach  - Keep bed low and locked with side rails adjusted as appropriate  - Keep care items and personal belongings within reach  - Initiate and maintain comfort rounds  - Make Fall Risk Sign visible to staff  - Apply yellow socks and bracelet for high fall risk patients  - Consider moving patient to room near nurses station  Outcome: Progressing     Problem: MOBILITY - ADULT  Goal: Maintain or return to baseline ADL function  Description: INTERVENTIONS:  - Educate patient/family on patient safety including physical limitations  - Instruct patient to call for assistance with activity   - Consult OT/PT to assist with strengthening/mobility   - Keep Call bell within reach  - Keep bed low and locked with side rails adjusted as appropriate  - Keep care items and personal belongings within reach  - Initiate and maintain comfort rounds  - Make Fall Risk Sign visible to staff  - Apply yellow socks and bracelet for high fall risk patients  - Consider moving patient to room near nurses station  Outcome: Progressing  Goal: Maintains/Returns to pre admission functional level  Description: INTERVENTIONS:  - Perform BMAT or MOVE assessment daily.   - Set and communicate daily mobility goal to care team and patient/family/caregiver. - Collaborate with rehabilitation services on mobility goals if consulted  - Perform Range of Motion times a day. - Reposition patient every  hours.   - Dangle patient  times a day  - Stand patient times a day  - Ambulate patient  times a day  - Out of bed to chair times a day   - Out of bed for meals  times a day  - Out of bed for toileting  - Record patient progress and toleration of activity level   Outcome: Progressing     Problem: PAIN - ADULT  Goal: Verbalizes/displays adequate comfort level or baseline comfort level  Description: Interventions:  - Encourage patient to monitor pain and request assistance  - Assess pain using appropriate pain scale  - Administer analgesics based on type and severity of pain and evaluate response  - Implement non-pharmacological measures as appropriate and evaluate response  - Consider cultural and social influences on pain and pain management  - Notify physician/advanced practitioner if interventions unsuccessful or patient reports new pain  Outcome: Progressing     Problem: INFECTION - ADULT  Goal: Absence or prevention of progression during hospitalization  Description: INTERVENTIONS:  - Assess and monitor for signs and symptoms of infection  - Monitor lab/diagnostic results  - Monitor all insertion sites, i.e. indwelling lines, tubes, and drains  - Monitor endotracheal if appropriate and nasal secretions for changes in amount and color  - [place] appropriate cooling/warming therapies per order  - Administer medications as ordered  - Instruct and encourage patient and family to use good hand hygiene technique  - Identify and instruct in appropriate isolation precautions for identified infection/condition  Outcome: Progressing  Goal: Absence of fever/infection during neutropenic period  Description: INTERVENTIONS:  - Monitor WBC    Outcome: Progressing     Problem: SAFETY ADULT  Goal: Patient will remain free of falls  Description: INTERVENTIONS:  - Educate patient/family on patient safety including physical limitations  - Instruct patient to call for assistance with activity   - Consult OT/PT to assist with strengthening/mobility   - Keep Call bell within reach  - Keep bed low and locked with side rails adjusted as appropriate  - Keep care items and personal belongings within reach  - Initiate and maintain comfort rounds  - Make Fall Risk Sign visible to staff  - Apply yellow socks and bracelet for high fall risk patients  - Consider moving patient to room near nurses station  Outcome: Progressing  Goal: Maintain or return to baseline ADL function  Description: INTERVENTIONS:  - Educate patient/family on patient safety including physical limitations  - Instruct patient to call for assistance with activity   - Consult OT/PT to assist with strengthening/mobility   - Keep Call bell within reach  - Keep bed low and locked with side rails adjusted as appropriate  - Keep care items and personal belongings within reach  - Initiate and maintain comfort rounds  - Make Fall Risk Sign visible to staff  - Apply yellow socks and bracelet for high fall risk patients  - Consider moving patient to room near nurses station  Outcome: Progressing  Goal: Maintains/Returns to pre admission functional level  Description: INTERVENTIONS:  - Perform BMAT or MOVE assessment daily.   - Set and communicate daily mobility goal to care team and patient/family/caregiver. - Collaborate with rehabilitation services on mobility goals if consulted  - Perform Range of Motion  times a day. - Reposition patient every  hours.   - Dangle patient  times a day  - Stand patient  times a day  - Ambulate patient  times a day  - Out of bed to chair  times a day   - Out of bed for meals  times a day  - Out of bed for toileting  - Record patient progress and toleration of activity level   Outcome: Progressing     Problem: DISCHARGE PLANNING  Goal: Discharge to home or other facility with appropriate resources  Description: INTERVENTIONS:  - Identify barriers to discharge w/patient and caregiver  - Arrange for needed discharge resources and transportation as appropriate  - Identify discharge learning needs (meds, wound care, etc.)  - Arrange for interpretive services to assist at discharge as needed  - Refer to Case Management Department for coordinating discharge planning if the patient needs post-hospital services based on physician/advanced practitioner order or complex needs related to functional status, cognitive ability, or social support system  Outcome: Progressing     Problem: Knowledge Deficit  Goal: Patient/family/caregiver demonstrates understanding of disease process, treatment plan, medications, and discharge instructions  Description: Complete learning assessment and assess knowledge base.   Interventions:  - Provide teaching at level of understanding  - Provide teaching via preferred learning methods  Outcome: Progressing     Problem: Prexisting or High Potential for Compromised Skin Integrity  Goal: Skin integrity is maintained or improved  Description: INTERVENTIONS:  - Identify patients at risk for skin breakdown  - Assess and monitor skin integrity  - Assess and monitor nutrition and hydration status  - Monitor labs   - Assess for incontinence   - Turn and reposition patient  - Assist with mobility/ambulation  - Relieve pressure over bony prominences  - Avoid friction and shearing  - Provide appropriate hygiene as needed including keeping skin clean and dry  - Evaluate need for skin moisturizer/barrier cream  - Collaborate with interdisciplinary team   - Patient/family teaching  - Consider wound care consult   Outcome: Progressing     Problem: GENITOURINARY - ADULT  Goal: Maintains or returns to baseline urinary function  Description: INTERVENTIONS:  - Assess urinary function  - Encourage oral fluids to ensure adequate hydration if ordered  - Administer IV fluids as ordered to ensure adequate hydration  - Administer ordered medications as needed  - Offer frequent toileting  - Follow urinary retention protocol if ordered  Outcome: Progressing     Problem: HEMATOLOGIC - ADULT  Goal: Maintains hematologic stability  Description: INTERVENTIONS  - Assess for signs and symptoms of bleeding or hemorrhage  - Monitor labs  - Administer supportive blood products/factors as ordered and appropriate  Outcome: Progressing

## 2023-08-20 NOTE — ASSESSMENT & PLAN NOTE
· Patient has had recurrent hematuria during his hospitalization requiring CBI  · He is s/p cysto with stent after which which hematuria resolved, however once Eliquis was restarted patient had recurrent hematuria  · Urine currently without visible hematuria however UA consistent with microscopic hematuria  · Eliquis currently on hold x1 week, and then restarted  · Cont to monitor for recurrence

## 2023-08-20 NOTE — RESTORATIVE TECHNICIAN NOTE
Restorative Technician Note      Patient Name: Dianelys Oakley     Restorative Tech Visit Date: 08/19/23  Note Type: Mobility  Patient Position Upon Consult: Supine  Activity Performed: Ambulated  Patient Position at End of Consult: Supine;  All needs within reach

## 2023-08-20 NOTE — ASSESSMENT & PLAN NOTE
Patient is a 71-year-old male with past medical history significant for COPD, atrial fibrillation, and nephrolithiasis cystoscopy, and stent placement who presented with sepsis secondary to Enterococcus bacteremia and complicated UTI    · Completed a course of antibiotics   · Pt complained of recurrent dysuria earlier this week: Repeat urinalysis was sent: Similar to prior UA: Large blood, large leukocyte esterase, negative nitrate  · Associated urine culture was sent: growing enterococcus: Pt denies any urinary symptoms;  Prior dysuria resolved with pyridium  · dw ID team as pt has no symptoms off abx, and has multiple prior tx courses:  Wbc 11. Afebrile. Per ID, will cont to monitor off abx at this time.   Likely colonization

## 2023-08-21 PROCEDURE — 99232 SBSQ HOSP IP/OBS MODERATE 35: CPT | Performed by: INTERNAL MEDICINE

## 2023-08-21 RX ADMIN — PANTOPRAZOLE SODIUM 40 MG: 40 TABLET, DELAYED RELEASE ORAL at 06:11

## 2023-08-21 RX ADMIN — DILTIAZEM HYDROCHLORIDE 120 MG: 120 CAPSULE, COATED, EXTENDED RELEASE ORAL at 08:33

## 2023-08-21 RX ADMIN — ACETAMINOPHEN 325MG 975 MG: 325 TABLET ORAL at 13:09

## 2023-08-21 RX ADMIN — ATORVASTATIN CALCIUM 40 MG: 40 TABLET, FILM COATED ORAL at 16:53

## 2023-08-21 RX ADMIN — FOLIC ACID 1 MG: 1 TABLET ORAL at 08:34

## 2023-08-21 RX ADMIN — ACETAMINOPHEN 325MG 975 MG: 325 TABLET ORAL at 18:24

## 2023-08-21 RX ADMIN — APIXABAN 5 MG: 5 TABLET, FILM COATED ORAL at 08:34

## 2023-08-21 RX ADMIN — METHOCARBAMOL TABLETS 750 MG: 500 TABLET, COATED ORAL at 22:59

## 2023-08-21 RX ADMIN — MELATONIN 6 MG: at 22:59

## 2023-08-21 RX ADMIN — APIXABAN 5 MG: 5 TABLET, FILM COATED ORAL at 20:11

## 2023-08-21 RX ADMIN — METHOCARBAMOL TABLETS 750 MG: 500 TABLET, COATED ORAL at 06:10

## 2023-08-21 RX ADMIN — ACETAMINOPHEN 325MG 975 MG: 325 TABLET ORAL at 06:11

## 2023-08-21 RX ADMIN — ACETAMINOPHEN 325MG 975 MG: 325 TABLET ORAL at 23:10

## 2023-08-21 RX ADMIN — AMIODARONE HYDROCHLORIDE 200 MG: 200 TABLET ORAL at 08:33

## 2023-08-21 RX ADMIN — FLUTICASONE FUROATE AND VILANTEROL TRIFENATATE 1 PUFF: 200; 25 POWDER RESPIRATORY (INHALATION) at 08:34

## 2023-08-21 RX ADMIN — METHOCARBAMOL TABLETS 750 MG: 500 TABLET, COATED ORAL at 13:09

## 2023-08-21 RX ADMIN — THIAMINE HCL TAB 100 MG 100 MG: 100 TAB at 08:34

## 2023-08-21 RX ADMIN — Medication 1 TABLET: at 08:33

## 2023-08-21 NOTE — ASSESSMENT & PLAN NOTE
· Evaluated by cardiology for atrial fibrillation with rapid ventricular response   · improved after successful SOLA/cardioversion on 7/25/23  · Rhythm controlled on amiodarone 200 mg daily and Cardizem 120 mg daily  · Patient had been on anticoagulation with Eliquis

## 2023-08-21 NOTE — ASSESSMENT & PLAN NOTE
· Patient has had recurrent hematuria during his hospitalization requiring CBI  · He is s/p cysto with stent after which which hematuria resolved, however once Eliquis was restarted patient had recurrent hematuria  · Urine currently without visible hematuria however UA consistent with microscopic hematuria  · Eliquis was briefly held then restarted  · Cont to monitor for recurrence

## 2023-08-21 NOTE — PLAN OF CARE
Problem: INFECTION - ADULT  Goal: Absence or prevention of progression during hospitalization  Description: INTERVENTIONS:  - Assess and monitor for signs and symptoms of infection  - Monitor lab/diagnostic results  - Monitor all insertion sites, i.e. indwelling lines, tubes, and drains  - Monitor endotracheal if appropriate and nasal secretions for changes in amount and color  - [place] appropriate cooling/warming therapies per order  - Administer medications as ordered  - Instruct and encourage patient and family to use good hand hygiene technique  - Identify and instruct in appropriate isolation precautions for identified infection/condition  Outcome: Progressing  Goal: Absence of fever/infection during neutropenic period  Description: INTERVENTIONS:  - Monitor WBC    Outcome: Progressing

## 2023-08-21 NOTE — PLAN OF CARE
Problem: Potential for Falls  Goal: Patient will remain free of falls  Description: INTERVENTIONS:  - Educate patient/family on patient safety including physical limitations  - Instruct patient to call for assistance with activity   - Consult OT/PT to assist with strengthening/mobility   - Keep Call bell within reach  - Keep bed low and locked with side rails adjusted as appropriate  - Keep care items and personal belongings within reach  - Initiate and maintain comfort rounds  - Make Fall Risk Sign visible to staff  - Apply yellow socks and bracelet for high fall risk patients  - Consider moving patient to room near nurses station  Outcome: Progressing     Problem: MOBILITY - ADULT  Goal: Maintain or return to baseline ADL function  Description: INTERVENTIONS:  - Educate patient/family on patient safety including physical limitations  - Instruct patient to call for assistance with activity   - Consult OT/PT to assist with strengthening/mobility   - Keep Call bell within reach  - Keep bed low and locked with side rails adjusted as appropriate  - Keep care items and personal belongings within reach  - Initiate and maintain comfort rounds  - Make Fall Risk Sign visible to staff  - Apply yellow socks and bracelet for high fall risk patients  - Consider moving patient to room near nurses station  Outcome: Progressing  Goal: Maintains/Returns to pre admission functional level  Description: INTERVENTIONS:  - Perform BMAT or MOVE assessment daily.   - Set and communicate daily mobility goal to care team and patient/family/caregiver. - Collaborate with rehabilitation services on mobility goals if consulted  - Perform Range of Motion 3 times a day. - Reposition patient every 2 hours.   - Dangle patient 3 times a day  - Stand patient 3 times a day  - Ambulate patient 3 times a day  - Out of bed to chair 3 times a day   - Out of bed for meals 3 times a day  - Out of bed for toileting  - Record patient progress and toleration of activity level   Outcome: Progressing     Problem: PAIN - ADULT  Goal: Verbalizes/displays adequate comfort level or baseline comfort level  Description: Interventions:  - Encourage patient to monitor pain and request assistance  - Assess pain using appropriate pain scale  - Administer analgesics based on type and severity of pain and evaluate response  - Implement non-pharmacological measures as appropriate and evaluate response  - Consider cultural and social influences on pain and pain management  - Notify physician/advanced practitioner if interventions unsuccessful or patient reports new pain  Outcome: Progressing     Problem: INFECTION - ADULT  Goal: Absence or prevention of progression during hospitalization  Description: INTERVENTIONS:  - Assess and monitor for signs and symptoms of infection  - Monitor lab/diagnostic results  - Monitor all insertion sites, i.e. indwelling lines, tubes, and drains  - Monitor endotracheal if appropriate and nasal secretions for changes in amount and color  - [place] appropriate cooling/warming therapies per order  - Administer medications as ordered  - Instruct and encourage patient and family to use good hand hygiene technique  - Identify and instruct in appropriate isolation precautions for identified infection/condition  Outcome: Progressing  Goal: Absence of fever/infection during neutropenic period  Description: INTERVENTIONS:  - Monitor WBC    Outcome: Progressing     Problem: SAFETY ADULT  Goal: Patient will remain free of falls  Description: INTERVENTIONS:  - Educate patient/family on patient safety including physical limitations  - Instruct patient to call for assistance with activity   - Consult OT/PT to assist with strengthening/mobility   - Keep Call bell within reach  - Keep bed low and locked with side rails adjusted as appropriate  - Keep care items and personal belongings within reach  - Initiate and maintain comfort rounds  - Make Fall Risk Sign visible to staff  - Apply yellow socks and bracelet for high fall risk patients  - Consider moving patient to room near nurses station  Outcome: Progressing  Goal: Maintain or return to baseline ADL function  Description: INTERVENTIONS:  - Educate patient/family on patient safety including physical limitations  - Instruct patient to call for assistance with activity   - Consult OT/PT to assist with strengthening/mobility   - Keep Call bell within reach  - Keep bed low and locked with side rails adjusted as appropriate  - Keep care items and personal belongings within reach  - Initiate and maintain comfort rounds  - Make Fall Risk Sign visible to staff  - Apply yellow socks and bracelet for high fall risk patients  - Consider moving patient to room near nurses station  Outcome: Progressing  Goal: Maintains/Returns to pre admission functional level  Description: INTERVENTIONS:  - Perform BMAT or MOVE assessment daily.   - Set and communicate daily mobility goal to care team and patient/family/caregiver. - Collaborate with rehabilitation services on mobility goals if consulted  - Perform Range of Motion 3 times a day. - Reposition patient every 2 hours.   - Dangle patient 3 times a day  - Stand patient 3 times a day  - Ambulate patient 3 times a day  - Out of bed to chair 3 times a day   - Out of bed for meals 3 times a day  - Out of bed for toileting  - Record patient progress and toleration of activity level   Outcome: Progressing     Problem: DISCHARGE PLANNING  Goal: Discharge to home or other facility with appropriate resources  Description: INTERVENTIONS:  - Identify barriers to discharge w/patient and caregiver  - Arrange for needed discharge resources and transportation as appropriate  - Identify discharge learning needs (meds, wound care, etc.)  - Arrange for interpretive services to assist at discharge as needed  - Refer to Case Management Department for coordinating discharge planning if the patient needs post-hospital services based on physician/advanced practitioner order or complex needs related to functional status, cognitive ability, or social support system  Outcome: Progressing     Problem: Knowledge Deficit  Goal: Patient/family/caregiver demonstrates understanding of disease process, treatment plan, medications, and discharge instructions  Description: Complete learning assessment and assess knowledge base.   Interventions:  - Provide teaching at level of understanding  - Provide teaching via preferred learning methods  Outcome: Progressing     Problem: Prexisting or High Potential for Compromised Skin Integrity  Goal: Skin integrity is maintained or improved  Description: INTERVENTIONS:  - Identify patients at risk for skin breakdown  - Assess and monitor skin integrity  - Assess and monitor nutrition and hydration status  - Monitor labs   - Assess for incontinence   - Turn and reposition patient  - Assist with mobility/ambulation  - Relieve pressure over bony prominences  - Avoid friction and shearing  - Provide appropriate hygiene as needed including keeping skin clean and dry  - Evaluate need for skin moisturizer/barrier cream  - Collaborate with interdisciplinary team   - Patient/family teaching  - Consider wound care consult   Outcome: Progressing     Problem: GENITOURINARY - ADULT  Goal: Maintains or returns to baseline urinary function  Description: INTERVENTIONS:  - Assess urinary function  - Encourage oral fluids to ensure adequate hydration if ordered  - Administer IV fluids as ordered to ensure adequate hydration  - Administer ordered medications as needed  - Offer frequent toileting  - Follow urinary retention protocol if ordered  Outcome: Progressing     Problem: HEMATOLOGIC - ADULT  Goal: Maintains hematologic stability  Description: INTERVENTIONS  - Assess for signs and symptoms of bleeding or hemorrhage  - Monitor labs  - Administer supportive blood products/factors as ordered and appropriate  Outcome: Progressing

## 2023-08-21 NOTE — NURSING NOTE
Assumed care of patient at 0700. Patient awake and alert, not reporting any needs at this time. Agree with previous RN assessment.

## 2023-08-21 NOTE — ASSESSMENT & PLAN NOTE
· Patient with a leukocytosis for the last 2 days; unclear etiology  · Afebrile and no other SIRS criteria; initially felt to be secondary to surgical procedure  · Monitor off antibiotics; will obtain UA and procalcitonin  · Appears to be trending down; monitor

## 2023-08-21 NOTE — ASSESSMENT & PLAN NOTE
· Admitted originally due to sepsis from UTI with obstruction  · Status post cystoscopy with difficult stent insertion on 7/6/2023  · Procedure was complicated by inadvertent left ureteral perforation  · Follow-up CT showed previous ureteral calculus is now in the bladder  · Repeat cystoscopy on 8/3 with stent placement.    · As per urology ok for restarted eliquis 8/19: monitor for recurrence of hematuria  · Urology recommending repeat ct in 2 weeks with outpt cysto/stent removal

## 2023-08-21 NOTE — ASSESSMENT & PLAN NOTE
· Had ct 7/28 that revealed "Bharath Jordan is a 2.9 x 2.2 cm hypodense lesion within the subcutaneous tissues of the mid back, overlying the spinous process of the T11 and T12 vertebral bodies, abutting the skin surface, likely a sebaceous cyst"  · He reported that if surgery was involved he would not want it surgical removed  · Supportive care at this time.

## 2023-08-21 NOTE — ASSESSMENT & PLAN NOTE
Patient is a 50-year-old male with past medical history significant for COPD, atrial fibrillation, and nephrolithiasis cystoscopy, and stent placement who presented with sepsis secondary to Enterococcus bacteremia and complicated UTI    · Completed a course of antibiotics   · Pt complained of recurrent dysuria earlier this week: Repeat urinalysis was sent: Similar to prior UA: Large blood, large leukocyte esterase, negative nitrate  · Associated urine culture was sent: growing enterococcus: Pt denies any urinary symptoms;  Prior dysuria resolved with pyridium  · As per prior provider discussion with ID team as pt has no symptoms off abx, and has multiple prior tx courses: Likely colonization

## 2023-08-21 NOTE — PROGRESS NOTES
[address] [facility]  Progress Note  Name: Santi Galan I  MRN: [id number]  Unit/Bed#: E4 -01 I Date of Admission: 7/6/2023   Date of Service: 8/21/2023 I Hospital Day: 55    Assessment/Plan   * Sepsis secondary to UTI [facility])  Assessment & Plan  Patient is a 28-year-old male with past medical history significant for COPD, atrial fibrillation, and nephrolithiasis cystoscopy, and stent placement who presented with sepsis secondary to Enterococcus bacteremia and complicated UTI    · Completed a course of antibiotics   · Pt complained of recurrent dysuria earlier this week: Repeat urinalysis was sent: Similar to prior UA: Large blood, large leukocyte esterase, negative nitrate  · Associated urine culture was sent: growing enterococcus: Pt denies any urinary symptoms;  Prior dysuria resolved with pyridium  · dw ID team as pt has no symptoms off abx, and has multiple prior tx courses:  Wbc 11. Afebrile. Per ID, will cont to monitor off abx at this time. Likely colonization    Sebaceous cyst  Assessment & Plan  Had ct 7/28 that revealed "Ross Keys is a 2.9 x 2.2 cm hypodense lesion within the subcutaneous tissues of the mid back, overlying the spinous process of the T11 and T12 vertebral bodies, abutting the skin surface, likely a sebaceous cyst"  He reported that if surgery was involved he would not want it surgical removed  Supportive care at this time.      Leukocytosis  Assessment & Plan  Patient with a leukocytosis for the last 2 days; unclear etiology  Afebrile and no other SIRS criteria; initially felt to be secondary to surgical procedure  Monitor off antibiotics; will obtain UA and procalcitonin  Appears to be trending down; monitor and consult hematology as needed    Gross hematuria  Assessment & Plan  · Patient has had recurrent hematuria during his hospitalization requiring CBI  · He is s/p cysto with stent after which which hematuria resolved, however once Eliquis was restarted patient had recurrent hematuria  · Urine currently without visible hematuria however UA consistent with microscopic hematuria  · Eliquis currently on hold x1 week, and then restarted  · Cont to monitor for recurrence      Atrial fibrillation [facility])  Assessment & Plan  · Evaluated by cardiology for atrial fibrillation with rapid ventricular response   · improved after successful SOLA/cardioversion on 7/25/23  · Rhythm controlled on amiodarone 200 mg daily and Cardizem 120 mg daily  · Patient had been on anticoagulation with Eliquis: held due to persistent hematuria-->restarted      Impaired decision making  Assessment & Plan  neuropsych evaluation was done on 7/24/2023  He continues to have impaired decision-making capacity  Guardianship process initiated by case management     Daily consumption of alcohol  Assessment & Plan  · Patient noted daily alcohol use on admission  · Clinically no withdrawal.  · Continue thiamine and folic acid supplementation    Bacteremia  Assessment & Plan  · Aerococcus bacteremia due to complicated UTI   · Completed full course of antibiotics under the guidance of the infectious disease team    Hydronephrosis with obstructing calculus  Assessment & Plan  · Admitted originally due to sepsis from UTI with obstruction  · Status post cystoscopy with difficult stent insertion on 7/6/2023  · Procedure was complicated by inadvertent left ureteral perforation  · Follow-up CT showed previous ureteral calculus is now in the bladder  · Repeat cystoscopy on 8/3 with stent placement. · He had hematuria with re initiation of eliquis this past week. eliquis was then placed on hold for 7d. · Urine has remained clear after holding eliquis.  Urinalysis still with microscopic hematuria: No gross hematuria noted  · After discussion with urology, restarted eliquis 8/19: monitor for recurrence of hematuria  · Urology recommending repeat ct in 2 weeks with outpt cysto/stent removal     Esophageal abnormality  Assessment & Plan  · incidental finding on CT with diffuse esophageal thickening could be from esophagitis  · Patient was evaluated by the GI team: Note that esophageal wall thickening could be secondary to reflux esophagitis, Bruno's, or hiatal hernia  · Continue empiric Protonix  · Outpatient follow-up: May require elective outpatient upper endoscopy      Elevated troponin  Assessment & Plan  · Non-MI troponin elevation secondary to tachycardia and sepsis   · Appreciate cardiology recommendations no additional work-up currently required  · Continue aspirin and Lipitor    COPD (chronic obstructive pulmonary disease) ([address])  Assessment & Plan  · Stable without exacerbation  · Continue albuterol and breo     Tobacco use  Assessment & Plan  · Smokes 6 cigarettes daily. · Nicotine TD patch  · Smoking cessation counseling provided                  VTE Pharmacologic Prophylaxis:   Pharmacologic:  eliquis    Patient Centered Rounds: I have performed bedside rounds with nursing staff today. Education and Discussions with Family / Patient: patient    Time Spent for Care: More than 50% of total time spent on counseling and coordination of care as described above. Current Length of Stay: 46 day(s)    Current Patient Status: Inpatient   Certification Statement: The patient will continue to require additional inpatient hospital stay due to Awaiting guardianship    Discharge Plan / Estimated Discharge Date: TBD    Code Status: Level 1 - Full Code      Subjective:   Patient seen and examined at bedside, sitting upright comfortable, denies any complaints no further episodes of hematuria. Patient does notice urine is bright orange since he started taking Pyridium and requesting to have that discontinued.     Objective:     Vitals:   Temp (24hrs), Av.2 °F (36.8 °C), Min:97.9 °F (36.6 °C), Max:98.6 °F (37 °C)    Temp:  [97.9 °F (36.6 °C)-98.6 °F (37 °C)] 97.9 °F (36.6 °C)  HR:  [60-69] 60  Resp:  [18-19] 18  BP: (142-159)/(63-90) 159/90  SpO2:  [93 %-95 %] 95 %  Body mass index is 28 kg/m². Input and Output Summary (last 24 hours):     No intake or output data in the 24 hours ending 08/21/23 5950    Physical Exam:    Constitutional: Patient is in no acute distress  HEENT:  Normocephalic, atraumatic  Cardiovascular: Normal S1S2, RRR, No murmurs/rubs/gallops appreciated. Pulmonary:  Bilateral air entry, No rhonchi/rales/wheezing appreciated  Abdominal: Soft, Bowel sounds present, Non-tender, Non-distended  Extremities:  No cyanosis, clubbing or edema. Neurological: Awake, alert  Skin:  Warm, dry    Additional Data:     Labs:    Results from last 7 days   Lab Units 08/19/23  0553   WBC Thousand/uL 11.45*   HEMOGLOBIN g/dL 12.7   HEMATOCRIT % 40.0   PLATELETS Thousands/uL 350   NEUTROS PCT % 66   LYMPHS PCT % 20   MONOS PCT % 9   EOS PCT % 3     Results from last 7 days   Lab Units 08/19/23  0553   POTASSIUM mmol/L 4.5   CHLORIDE mmol/L 103   CO2 mmol/L 28   BUN mg/dL 24   CREATININE mg/dL 1.10   CALCIUM mg/dL 8.8            I Have Reviewed All Lab Data Listed Above. Invasive Devices     Drain  Duration           Ureteral Internal Stent Left ureter 6 Fr. 45 days    Ureteral Internal Stent Left ureter 7 Fr.  17 days                     Recent Cultures (last 7 days):     Results from last 7 days   Lab Units 08/17/23  1745   URINE CULTURE  >100,000 cfu/ml Enterococcus faecium*       Last 24 Hours Medication List:   Current Facility-Administered Medications   Medication Dose Route Frequency Provider Last Rate   • acetaminophen  975 mg Oral Q6H [address] MD     • albuterol  2.5 mg Nebulization Q6H PRN Roshni St MD     • aluminum-magnesium hydroxide-simethicone  30 mL Oral Q6H PRN Roshni St MD     • amiodarone  200 mg Oral Daily With Breakfast Karey Jon MD     • apixaban  5 mg Oral BID Lori Cabrera MD     • atorvastatin  40 mg Oral Daily With Jonelle Salas MD     • diltiazem 120 mg Oral Daily Kenisha Casillas MD     • docusate sodium  100 mg Oral BID PRN Scarlet Voss MD     • fluticasone-vilanterol  1 puff Inhalation Daily Kenisha Casillas MD     • folic acid  1 mg Oral Daily Kenisha Casillas MD     • guaiFENesin  200 mg Oral Q4H PRN Kenisha Casillas MD     • hydrOXYzine HCL  50 mg Oral HS PRN Kenisha Casillas MD     • lidocaine  1 patch Topical Daily Kenisha Casillas MD     • melatonin  6 mg Oral HS Kenisha Casillas MD     • methocarbamol  750 mg Oral [facility] Kenisha Casillas MD     • multivitamin-minerals  1 tablet Oral Daily Kenisha Casillas MD     • nicotine  7 mg Transdermal Daily Dennise Fleming MD     • pantoprazole  40 mg Oral Early Morning Kenisha Casillas MD     • phenazopyridine  100 mg Oral TID With Meals Amber Colvin DO     • polyethylene glycol  17 g Oral Daily PRN Scarlet Voss MD     • thiamine  100 mg Oral Daily Kenisha Casillas MD          Today, Patient Was Seen By: Sameer Parr MD

## 2023-08-21 NOTE — RESTORATIVE TECHNICIAN NOTE
Restorative Technician Note      Patient Name: Adolfo Wood     Restorative Tech Visit Date: 08/21/23  Note Type: Mobility  Patient Position Upon Consult: Supine  Activity Performed: Ambulated  Patient Position at End of Consult: Supine;  All needs within reach

## 2023-08-21 NOTE — RESTORATIVE TECHNICIAN NOTE
Restorative Technician Note      Patient Name: Tammi Asher     Restorative Tech Visit Date: 08/21/23  Note Type: Mobility  Patient Position Upon Consult: Supine  Activity Performed: Ambulated  Patient Position at End of Consult: Supine;  All needs within reach;

## 2023-08-21 NOTE — NURSING NOTE
Assumed care of this patient at 1100. Patient awake and alert, resting comfortably with call bell in reach, no needs at this time. See flowsheet for assessment. Will continue to monitor.

## 2023-08-22 PROCEDURE — 99232 SBSQ HOSP IP/OBS MODERATE 35: CPT | Performed by: INTERNAL MEDICINE

## 2023-08-22 RX ADMIN — Medication 1 TABLET: at 08:41

## 2023-08-22 RX ADMIN — METHOCARBAMOL TABLETS 750 MG: 500 TABLET, COATED ORAL at 05:38

## 2023-08-22 RX ADMIN — METHOCARBAMOL TABLETS 750 MG: 500 TABLET, COATED ORAL at 17:25

## 2023-08-22 RX ADMIN — ACETAMINOPHEN 325MG 975 MG: 325 TABLET ORAL at 05:38

## 2023-08-22 RX ADMIN — MELATONIN 6 MG: at 22:59

## 2023-08-22 RX ADMIN — FLUTICASONE FUROATE AND VILANTEROL TRIFENATATE 1 PUFF: 200; 25 POWDER RESPIRATORY (INHALATION) at 08:41

## 2023-08-22 RX ADMIN — ATORVASTATIN CALCIUM 40 MG: 40 TABLET, FILM COATED ORAL at 17:25

## 2023-08-22 RX ADMIN — DILTIAZEM HYDROCHLORIDE 120 MG: 120 CAPSULE, COATED, EXTENDED RELEASE ORAL at 08:41

## 2023-08-22 RX ADMIN — AMIODARONE HYDROCHLORIDE 200 MG: 200 TABLET ORAL at 08:41

## 2023-08-22 RX ADMIN — THIAMINE HCL TAB 100 MG 100 MG: 100 TAB at 08:41

## 2023-08-22 RX ADMIN — PANTOPRAZOLE SODIUM 40 MG: 40 TABLET, DELAYED RELEASE ORAL at 05:38

## 2023-08-22 RX ADMIN — ACETAMINOPHEN 325MG 975 MG: 325 TABLET ORAL at 23:05

## 2023-08-22 RX ADMIN — METHOCARBAMOL TABLETS 750 MG: 500 TABLET, COATED ORAL at 22:59

## 2023-08-22 RX ADMIN — APIXABAN 5 MG: 5 TABLET, FILM COATED ORAL at 20:05

## 2023-08-22 RX ADMIN — APIXABAN 5 MG: 5 TABLET, FILM COATED ORAL at 08:41

## 2023-08-22 RX ADMIN — FOLIC ACID 1 MG: 1 TABLET ORAL at 08:41

## 2023-08-22 NOTE — ASSESSMENT & PLAN NOTE
Patient is a 76-year-old male with past medical history significant for COPD, atrial fibrillation, and nephrolithiasis cystoscopy, and stent placement who presented with sepsis secondary to Enterococcus bacteremia and complicated UTI    · Completed a course of antibiotics   · Pt complained of recurrent dysuria earlier this week: Repeat urinalysis was sent: Similar to prior UA: Large blood, large leukocyte esterase, negative nitrate  · Associated urine culture was sent: growing enterococcus: Pt denies any urinary symptoms;  Prior dysuria resolved with pyridium  · As per prior provider discussion with ID team as pt has no symptoms off abx, and has multiple prior tx courses: Likely colonization

## 2023-08-22 NOTE — PLAN OF CARE
Problem: Potential for Falls  Goal: Patient will remain free of falls  Description: INTERVENTIONS:  - Educate patient/family on patient safety including physical limitations  - Instruct patient to call for assistance with activity   - Consult OT/PT to assist with strengthening/mobility   - Keep Call bell within reach  - Keep bed low and locked with side rails adjusted as appropriate  - Keep care items and personal belongings within reach  - Initiate and maintain comfort rounds  - Make Fall Risk Sign visible to staff  - Apply yellow socks and bracelet for high fall risk patients  - Consider moving patient to room near nurses station  Outcome: Progressing     Problem: MOBILITY - ADULT  Goal: Maintain or return to baseline ADL function  Description: INTERVENTIONS:  - Educate patient/family on patient safety including physical limitations  - Instruct patient to call for assistance with activity   - Consult OT/PT to assist with strengthening/mobility   - Keep Call bell within reach  - Keep bed low and locked with side rails adjusted as appropriate  - Keep care items and personal belongings within reach  - Initiate and maintain comfort rounds  - Make Fall Risk Sign visible to staff  - Apply yellow socks and bracelet for high fall risk patients  - Consider moving patient to room near nurses station  Outcome: Progressing  Goal: Maintains/Returns to pre admission functional level  Description: INTERVENTIONS:  - Perform BMAT or MOVE assessment daily.   - Set and communicate daily mobility goal to care team and patient/family/caregiver. - Collaborate with rehabilitation services on mobility goals if consulted  - Perform Range of Motion 4 times a day. - Reposition patient every 2 hours.   - Dangle patient 4 times a day  - Stand patient 4 times a day  - Ambulate patient 4 times a day  - Out of bed to chair 4 times a day   - Out of bed for meals 4 times a day  - Out of bed for toileting  - Record patient progress and toleration of activity level   Outcome: Progressing     Problem: PAIN - ADULT  Goal: Verbalizes/displays adequate comfort level or baseline comfort level  Description: Interventions:  - Encourage patient to monitor pain and request assistance  - Assess pain using appropriate pain scale  - Administer analgesics based on type and severity of pain and evaluate response  - Implement non-pharmacological measures as appropriate and evaluate response  - Consider cultural and social influences on pain and pain management  - Notify physician/advanced practitioner if interventions unsuccessful or patient reports new pain  Outcome: Progressing     Problem: INFECTION - ADULT  Goal: Absence or prevention of progression during hospitalization  Description: INTERVENTIONS:  - Assess and monitor for signs and symptoms of infection  - Monitor lab/diagnostic results  - Monitor all insertion sites, i.e. indwelling lines, tubes, and drains  - Monitor endotracheal if appropriate and nasal secretions for changes in amount and color  - [place] appropriate cooling/warming therapies per order  - Administer medications as ordered  - Instruct and encourage patient and family to use good hand hygiene technique  - Identify and instruct in appropriate isolation precautions for identified infection/condition  Outcome: Progressing  Goal: Absence of fever/infection during neutropenic period  Description: INTERVENTIONS:  - Monitor WBC    Outcome: Progressing     Problem: SAFETY ADULT  Goal: Patient will remain free of falls  Description: INTERVENTIONS:  - Educate patient/family on patient safety including physical limitations  - Instruct patient to call for assistance with activity   - Consult OT/PT to assist with strengthening/mobility   - Keep Call bell within reach  - Keep bed low and locked with side rails adjusted as appropriate  - Keep care items and personal belongings within reach  - Initiate and maintain comfort rounds  - Make Fall Risk Sign visible to staff  - Apply yellow socks and bracelet for high fall risk patients  - Consider moving patient to room near nurses station  Outcome: Progressing  Goal: Maintain or return to baseline ADL function  Description: INTERVENTIONS:  - Educate patient/family on patient safety including physical limitations  - Instruct patient to call for assistance with activity   - Consult OT/PT to assist with strengthening/mobility   - Keep Call bell within reach  - Keep bed low and locked with side rails adjusted as appropriate  - Keep care items and personal belongings within reach  - Initiate and maintain comfort rounds  - Make Fall Risk Sign visible to staff  - Apply yellow socks and bracelet for high fall risk patients  - Consider moving patient to room near nurses station  Outcome: Progressing  Goal: Maintains/Returns to pre admission functional level  Description: INTERVENTIONS:  - Perform BMAT or MOVE assessment daily.   - Set and communicate daily mobility goal to care team and patient/family/caregiver. - Collaborate with rehabilitation services on mobility goals if consulted  - Perform Range of Motion 4 times a day. - Reposition patient every 2 hours.   - Dangle patient 4 times a day  - Stand patient 4 times a day  - Ambulate patient 4 times a day  - Out of bed to chair 4 times a day   - Out of bed for meals 4 times a day  - Out of bed for toileting  - Record patient progress and toleration of activity level   Outcome: Progressing     Problem: DISCHARGE PLANNING  Goal: Discharge to home or other facility with appropriate resources  Description: INTERVENTIONS:  - Identify barriers to discharge w/patient and caregiver  - Arrange for needed discharge resources and transportation as appropriate  - Identify discharge learning needs (meds, wound care, etc.)  - Arrange for interpretive services to assist at discharge as needed  - Refer to Case Management Department for coordinating discharge planning if the patient needs post-hospital services based on physician/advanced practitioner order or complex needs related to functional status, cognitive ability, or social support system  Outcome: Progressing     Problem: Knowledge Deficit  Goal: Patient/family/caregiver demonstrates understanding of disease process, treatment plan, medications, and discharge instructions  Description: Complete learning assessment and assess knowledge base.   Interventions:  - Provide teaching at level of understanding  - Provide teaching via preferred learning methods  Outcome: Progressing     Problem: Prexisting or High Potential for Compromised Skin Integrity  Goal: Skin integrity is maintained or improved  Description: INTERVENTIONS:  - Identify patients at risk for skin breakdown  - Assess and monitor skin integrity  - Assess and monitor nutrition and hydration status  - Monitor labs   - Assess for incontinence   - Turn and reposition patient  - Assist with mobility/ambulation  - Relieve pressure over bony prominences  - Avoid friction and shearing  - Provide appropriate hygiene as needed including keeping skin clean and dry  - Evaluate need for skin moisturizer/barrier cream  - Collaborate with interdisciplinary team   - Patient/family teaching  - Consider wound care consult   Outcome: Progressing     Problem: GENITOURINARY - ADULT  Goal: Maintains or returns to baseline urinary function  Description: INTERVENTIONS:  - Assess urinary function  - Encourage oral fluids to ensure adequate hydration if ordered  - Administer IV fluids as ordered to ensure adequate hydration  - Administer ordered medications as needed  - Offer frequent toileting  - Follow urinary retention protocol if ordered  Outcome: Progressing     Problem: HEMATOLOGIC - ADULT  Goal: Maintains hematologic stability  Description: INTERVENTIONS  - Assess for signs and symptoms of bleeding or hemorrhage  - Monitor labs  - Administer supportive blood products/factors as ordered and appropriate  Outcome: Progressing

## 2023-08-22 NOTE — PLAN OF CARE
Problem: PAIN - ADULT  Goal: Verbalizes/displays adequate comfort level or baseline comfort level  Description: Interventions:  - Encourage patient to monitor pain and request assistance  - Assess pain using appropriate pain scale  - Administer analgesics based on type and severity of pain and evaluate response  - Implement non-pharmacological measures as appropriate and evaluate response  - Consider cultural and social influences on pain and pain management  - Notify physician/advanced practitioner if interventions unsuccessful or patient reports new pain  Outcome: Progressing     Problem: INFECTION - ADULT  Goal: Absence or prevention of progression during hospitalization  Description: INTERVENTIONS:  - Assess and monitor for signs and symptoms of infection  - Monitor lab/diagnostic results  - Monitor all insertion sites, i.e. indwelling lines, tubes, and drains  - Monitor endotracheal if appropriate and nasal secretions for changes in amount and color  - [place] appropriate cooling/warming therapies per order  - Administer medications as ordered  - Instruct and encourage patient and family to use good hand hygiene technique  - Identify and instruct in appropriate isolation precautions for identified infection/condition  Outcome: Progressing  Goal: Absence of fever/infection during neutropenic period  Description: INTERVENTIONS:  - Monitor WBC    Outcome: Progressing

## 2023-08-22 NOTE — ASSESSMENT & PLAN NOTE
· Had ct 7/28 that revealed "Caleb Birmingham is a 2.9 x 2.2 cm hypodense lesion within the subcutaneous tissues of the mid back, overlying the spinous process of the T11 and T12 vertebral bodies, abutting the skin surface, likely a sebaceous cyst"  · He reported that if surgery was involved he would not want it surgical removed  · Supportive care at this time.

## 2023-08-22 NOTE — PROGRESS NOTES
[facility]  Progress Note  Name: Dianelys Oakley I  MRN: [id number]  Unit/Bed#: E4 -01 I Date of Admission: 7/6/2023   Date of Service: 8/22/2023 I Hospital Day: 52    Assessment/Plan   * Sepsis secondary to UTI [facility])  Assessment & Plan  Patient is a 23-year-old male with past medical history significant for COPD, atrial fibrillation, and nephrolithiasis cystoscopy, and stent placement who presented with sepsis secondary to Enterococcus bacteremia and complicated UTI    · Completed a course of antibiotics   · Pt complained of recurrent dysuria earlier this week: Repeat urinalysis was sent: Similar to prior UA: Large blood, large leukocyte esterase, negative nitrate  · Associated urine culture was sent: growing enterococcus: Pt denies any urinary symptoms;  Prior dysuria resolved with pyridium  · As per prior provider discussion with ID team as pt has no symptoms off abx, and has multiple prior tx courses: Likely colonization    Sebaceous cyst  Assessment & Plan  · Had ct 7/28 that revealed "Terrall Later is a 2.9 x 2.2 cm hypodense lesion within the subcutaneous tissues of the mid back, overlying the spinous process of the T11 and T12 vertebral bodies, abutting the skin surface, likely a sebaceous cyst"  · He reported that if surgery was involved he would not want it surgical removed  · Supportive care at this time.      Leukocytosis  Assessment & Plan  · Patient with a leukocytosis for the last 2 days; unclear etiology  · Afebrile and no other SIRS criteria; initially felt to be secondary to surgical procedure  · Monitor off antibiotics; will obtain UA and procalcitonin  · Appears to be trending down; monitor    Gross hematuria  Assessment & Plan  · Patient has had recurrent hematuria during his hospitalization requiring CBI  · He is s/p cysto with stent after which which hematuria resolved, however once Eliquis was restarted patient had recurrent hematuria  · Urine currently without visible hematuria however UA consistent with microscopic hematuria  · Eliquis was briefly held then restarted  · Cont to monitor for recurrence      Atrial fibrillation [facility])  Assessment & Plan  · Evaluated by cardiology for atrial fibrillation with rapid ventricular response   · improved after successful SOLA/cardioversion on 7/25/23  · Rhythm controlled on amiodarone 200 mg daily and Cardizem 120 mg daily  · Patient had been on anticoagulation with Eliquis      Impaired decision making  Assessment & Plan  neuropsych evaluation was done on 7/24/2023  He continues to have impaired decision-making capacity  Guardianship process initiated by case management     Daily consumption of alcohol  Assessment & Plan  · Patient noted daily alcohol use on admission  · Clinically no withdrawal.  · Continue thiamine and folic acid supplementation    Bacteremia  Assessment & Plan  · Aerococcus bacteremia due to complicated UTI   · Completed full course of antibiotics under the guidance of the infectious disease team    Hydronephrosis with obstructing calculus  Assessment & Plan  · Admitted originally due to sepsis from UTI with obstruction  · Status post cystoscopy with difficult stent insertion on 7/6/2023  · Procedure was complicated by inadvertent left ureteral perforation  · Follow-up CT showed previous ureteral calculus is now in the bladder  · Repeat cystoscopy on 8/3 with stent placement.    · As per urology ok for restarted eliquis 8/19: monitor for recurrence of hematuria  · Urology recommending repeat ct in 2 weeks with outpt cysto/stent removal     Esophageal abnormality  Assessment & Plan  · incidental finding on CT with diffuse esophageal thickening could be from esophagitis  · Patient was evaluated by the GI team: Note that esophageal wall thickening could be secondary to reflux esophagitis, Bruno's, or hiatal hernia  · Continue empiric Protonix  · Outpatient follow-up: May require elective outpatient upper endoscopy      Elevated troponin  Assessment & Plan  · Non-MI troponin elevation secondary to tachycardia and sepsis   · Appreciate cardiology recommendations no additional work-up currently required  · Continue aspirin and Lipitor    COPD (chronic obstructive pulmonary disease) (HCC)  Assessment & Plan  · Stable without exacerbation  · Continue albuterol and breo     Tobacco use  Assessment & Plan  · Smokes 6 cigarettes daily. · Nicotine TD patch  · Smoking cessation counseling provided              VTE Pharmacologic Prophylaxis:   Pharmacologic: eliquis    Patient Centered Rounds: I have performed bedside rounds with nursing staff today. Education and Discussions with Family / Patient: patient    Time Spent for Care: More than 50% of total time spent on counseling and coordination of care as described above. Current Length of Stay: 47 day(s)    Current Patient Status: Inpatient   Certification Statement: The patient will continue to require additional inpatient hospital stay due to Awaiting guardianship    Discharge Plan / Estimated Discharge Date: TBD    Code Status: Level 1 - Full Code      Subjective:   Patient seen and examined at bedside, comfortable was also seen walking in the halls without any difficulty. Denies any abdominal pain, nausea or vomiting    Objective:     Vitals:   Temp (24hrs), Av.1 °F (36.7 °C), Min:98 °F (36.7 °C), Max:98.2 °F (36.8 °C)    Temp:  [98 °F (36.7 °C)-98.2 °F (36.8 °C)] 98 °F (36.7 °C)  HR:  [62-70] 62  Resp:  [18] 18  BP: (133-160)/(80-86) 160/80  SpO2:  [94 %-95 %] 94 %  Body mass index is 28 kg/m². Input and Output Summary (last 24 hours):     No intake or output data in the 24 hours ending 23    Physical Exam:    Constitutional: Patient is in no acute distress  HEENT:  Normocephalic, atraumatic  Cardiovascular: Normal S1S2, RRR, No murmurs/rubs/gallops appreciated.   Pulmonary:  Bilateral air entry, No rhonchi/rales/wheezing appreciated  Abdominal: Soft, Bowel sounds present, Non-tender, Non-distended  Extremities:  No cyanosis, clubbing or edema. Neurological: Wake, alert  Skin:  Warm, dry    Additional Data:     Labs:    Results from last 7 days   Lab Units 08/19/23  0553   WBC Thousand/uL 11.45*   HEMOGLOBIN g/dL 12.7   HEMATOCRIT % 40.0   PLATELETS Thousands/uL 350   NEUTROS PCT % 66   LYMPHS PCT % 20   MONOS PCT % 9   EOS PCT % 3     Results from last 7 days   Lab Units 08/19/23  0553   POTASSIUM mmol/L 4.5   CHLORIDE mmol/L 103   CO2 mmol/L 28   BUN mg/dL 24   CREATININE mg/dL 1.10   CALCIUM mg/dL 8.8            I Have Reviewed All Lab Data Listed Above. Invasive Devices     Drain  Duration           Ureteral Internal Stent Left ureter 6 Fr. 46 days    Ureteral Internal Stent Left ureter 7 Fr.  18 days                     Recent Cultures (last 7 days):     Results from last 7 days   Lab Units 08/17/23  1745   URINE CULTURE  >100,000 cfu/ml Enterococcus faecium*       Last 24 Hours Medication List:   Current Facility-Administered Medications   Medication Dose Route Frequency Provider Last Rate   • acetaminophen  975 mg Oral Q6H [address] Christian Lopes MD     • albuterol  2.5 mg Nebulization Q6H PRN Christian Lopes MD     • aluminum-magnesium hydroxide-simethicone  30 mL Oral Q6H PRN Christian Lopes MD     • amiodarone  200 mg Oral Daily With Breakfast Bubba Morrow MD     • apixaban  5 mg Oral BID Hilario Dawson MD     • atorvastatin  40 mg Oral Daily With Mustapha Gibson MD     • diltiazem  120 mg Oral Daily Christian Lopes MD     • docusate sodium  100 mg Oral BID PRN Hilario Dawson MD     • fluticasone-vilanterol  1 puff Inhalation Daily Christian Lopes MD     • folic acid  1 mg Oral Daily Christian Lopes MD     • guaiFENesin  200 mg Oral Q4H PRN Christian Lopes MD     • hydrOXYzine HCL  50 mg Oral HS PRN Christian Lopes MD     • lidocaine  1 patch Topical Daily Christian Lopes MD     • melatonin  6 mg Oral HS Ramesh Peralta MD     • methocarbamol  750 mg Oral [facility] Ramesh Peralta MD     • multivitamin-minerals  1 tablet Oral Daily Ramesh Peralta MD     • nicotine  7 mg Transdermal Daily Ishmael Segovia MD     • pantoprazole  40 mg Oral Early Morning Ramesh Peralta MD     • polyethylene glycol  17 g Oral Daily PRN Torey Cortes MD     • thiamine  100 mg Oral Daily Ramesh Peralta MD          Today, Patient Was Seen By: Jes Fink MD

## 2023-08-22 NOTE — RESTORATIVE TECHNICIAN NOTE
Restorative Technician Note      Patient Name: Raoul [facility] Visit Date: 08/22/23  Note Type: Mobility  Patient Position Upon Consult: Bedside chair  Activity Performed: Ambulated  Patient Position at End of Consult: Supine;  All needs within reach; Bed/Chair alarm activated

## 2023-08-23 PROCEDURE — 99232 SBSQ HOSP IP/OBS MODERATE 35: CPT | Performed by: INTERNAL MEDICINE

## 2023-08-23 RX ADMIN — FLUTICASONE FUROATE AND VILANTEROL TRIFENATATE 1 PUFF: 200; 25 POWDER RESPIRATORY (INHALATION) at 08:59

## 2023-08-23 RX ADMIN — APIXABAN 5 MG: 5 TABLET, FILM COATED ORAL at 08:54

## 2023-08-23 RX ADMIN — AMIODARONE HYDROCHLORIDE 200 MG: 200 TABLET ORAL at 08:54

## 2023-08-23 RX ADMIN — ACETAMINOPHEN 325MG 975 MG: 325 TABLET ORAL at 11:50

## 2023-08-23 RX ADMIN — METHOCARBAMOL TABLETS 750 MG: 500 TABLET, COATED ORAL at 21:57

## 2023-08-23 RX ADMIN — Medication 1 TABLET: at 08:54

## 2023-08-23 RX ADMIN — THIAMINE HCL TAB 100 MG 100 MG: 100 TAB at 08:54

## 2023-08-23 RX ADMIN — PANTOPRAZOLE SODIUM 40 MG: 40 TABLET, DELAYED RELEASE ORAL at 05:18

## 2023-08-23 RX ADMIN — ATORVASTATIN CALCIUM 40 MG: 40 TABLET, FILM COATED ORAL at 17:04

## 2023-08-23 RX ADMIN — DILTIAZEM HYDROCHLORIDE 120 MG: 120 CAPSULE, COATED, EXTENDED RELEASE ORAL at 08:54

## 2023-08-23 RX ADMIN — METHOCARBAMOL TABLETS 750 MG: 500 TABLET, COATED ORAL at 05:18

## 2023-08-23 RX ADMIN — METHOCARBAMOL TABLETS 750 MG: 500 TABLET, COATED ORAL at 13:53

## 2023-08-23 RX ADMIN — FOLIC ACID 1 MG: 1 TABLET ORAL at 08:54

## 2023-08-23 RX ADMIN — APIXABAN 5 MG: 5 TABLET, FILM COATED ORAL at 21:57

## 2023-08-23 RX ADMIN — ACETAMINOPHEN 325MG 975 MG: 325 TABLET ORAL at 17:04

## 2023-08-23 NOTE — RESTORATIVE TECHNICIAN NOTE
Restorative Technician Note      Patient Name: Elisa Evrin     Restorative Tech Visit Date: 08/23/23  Note Type: Mobility  Patient Position Upon Consult: Supine  Activity Performed: Ambulated  Patient Position at End of Consult: Supine;  All needs within reach

## 2023-08-23 NOTE — RESTORATIVE TECHNICIAN NOTE
Restorative Technician Note      Patient Name: Yandy Mccoy     Restorative Tech Visit Date: 08/23/23  Note Type: Mobility  Patient Position Upon Consult: Supine  Activity Performed: Ambulated  Patient Position at End of Consult: Standing;  All needs within reach

## 2023-08-23 NOTE — ASSESSMENT & PLAN NOTE
· Admitted originally due to sepsis from UTI with obstruction  · Status post cystoscopy with difficult stent insertion on 7/6/2023  · Procedure was complicated by inadvertent left ureteral perforation  · Follow-up CT showed previous ureteral calculus is now in the bladder  · Repeat cystoscopy on 8/3 with stent placement.    · As per urology okay to restart Eliquis which was restarted on 8/19  · Monitor closely for any recurrence of hematuria  · Urology recommended repeat CT in 2 weeks with outpatient cystoscopy/stent removal

## 2023-08-23 NOTE — ASSESSMENT & PLAN NOTE
· Had ct 7/28 that revealed "Tricia Myrick is a 2.9 x 2.2 cm hypodense lesion within the subcutaneous tissues of the mid back, overlying the spinous process of the T11 and T12 vertebral bodies, abutting the skin surface, likely a sebaceous cyst"  · He reported that if surgery was involved he would not want it surgical removed  · Supportive care at this time.

## 2023-08-23 NOTE — ASSESSMENT & PLAN NOTE
Patient is a 54-year-old male with past medical history significant for COPD, atrial fibrillation, and nephrolithiasis cystoscopy, and stent placement who presented with sepsis secondary to Enterococcus bacteremia and complicated UTI    · Completed a course of antibiotics   · Pt complained of recurrent dysuria earlier this week: Repeat urinalysis was sent: Similar to prior UA: Large blood, large leukocyte esterase, negative nitrate  · Associated urine culture was sent: growing enterococcus: Pt denies any urinary symptoms;  Prior dysuria resolved with pyridium  · As per prior provider discussion with ID team as pt has no symptoms off abx, and has multiple prior tx courses: Likely colonization

## 2023-08-23 NOTE — PROGRESS NOTES
[facility]  Progress Note  Name: Lorie Andrew I  MRN: [id number]  Unit/Bed#: E4 -01 I Date of Admission: 7/6/2023   Date of Service: 8/23/2023 I Hospital Day: 50    Assessment/Plan   * Sepsis secondary to UTI [facility])  Assessment & Plan  Patient is a 69-year-old male with past medical history significant for COPD, atrial fibrillation, and nephrolithiasis cystoscopy, and stent placement who presented with sepsis secondary to Enterococcus bacteremia and complicated UTI    · Completed a course of antibiotics   · Pt complained of recurrent dysuria earlier this week: Repeat urinalysis was sent: Similar to prior UA: Large blood, large leukocyte esterase, negative nitrate  · Associated urine culture was sent: growing enterococcus: Pt denies any urinary symptoms;  Prior dysuria resolved with pyridium  · As per prior provider discussion with ID team as pt has no symptoms off abx, and has multiple prior tx courses: Likely colonization    Sebaceous cyst  Assessment & Plan  · Had ct 7/28 that revealed "Gallo Poster is a 2.9 x 2.2 cm hypodense lesion within the subcutaneous tissues of the mid back, overlying the spinous process of the T11 and T12 vertebral bodies, abutting the skin surface, likely a sebaceous cyst"  · He reported that if surgery was involved he would not want it surgical removed  · Supportive care at this time.      Leukocytosis  Assessment & Plan  · Patient with a leukocytosis for the last 2 days; unclear etiology  · Afebrile and no other SIRS criteria; initially felt to be secondary to surgical procedure  · Monitor off antibiotics; will obtain UA and procalcitonin  · Appears to be trending down; monitor    Gross hematuria  Assessment & Plan  · Patient has had recurrent hematuria during his hospitalization requiring CBI  · He is s/p cysto with stent after which which hematuria resolved, however once Eliquis was restarted patient had recurrent hematuria  · Urine currently without visible hematuria however UA consistent with microscopic hematuria  · Eliquis was briefly held then restarted  · Cont to monitor for recurrence      Atrial fibrillation [facility])  Assessment & Plan  · Evaluated by cardiology for atrial fibrillation with rapid ventricular response   · improved after successful SOLA/cardioversion on 7/25/23  · Rhythm controlled on amiodarone 200 mg daily and Cardizem 120 mg daily  · Patient had been on anticoagulation with Eliquis      Impaired decision making  Assessment & Plan  neuropsych evaluation was done on 7/24/2023  He continues to have impaired decision-making capacity  Guardianship process initiated by case management     Daily consumption of alcohol  Assessment & Plan  · Patient noted daily alcohol use on admission  · Clinically no withdrawal.  · Continue thiamine and folic acid supplementation    Bacteremia  Assessment & Plan  · Aerococcus bacteremia due to complicated UTI   · Completed full course of antibiotics under the guidance of the infectious disease team    Hydronephrosis with obstructing calculus  Assessment & Plan  · Admitted originally due to sepsis from UTI with obstruction  · Status post cystoscopy with difficult stent insertion on 7/6/2023  · Procedure was complicated by inadvertent left ureteral perforation  · Follow-up CT showed previous ureteral calculus is now in the bladder  · Repeat cystoscopy on 8/3 with stent placement.    · As per urology okay to restart Eliquis which was restarted on 8/19  · Monitor closely for any recurrence of hematuria  · Urology recommended repeat CT in 2 weeks with outpatient cystoscopy/stent removal    Esophageal abnormality  Assessment & Plan  · incidental finding on CT with diffuse esophageal thickening could be from esophagitis  · Patient was evaluated by the GI team: Note that esophageal wall thickening could be secondary to reflux esophagitis, Bruno's, or hiatal hernia  · Continue empiric Protonix  · Outpatient follow-up: May require elective outpatient upper endoscopy      Elevated troponin  Assessment & Plan  · Non-MI troponin elevation secondary to tachycardia and sepsis   · Appreciate cardiology recommendations no additional work-up currently required  · Continue aspirin and Lipitor    COPD (chronic obstructive pulmonary disease) (HCC)  Assessment & Plan  · Stable without exacerbation  · Continue albuterol and breo     Tobacco use  Assessment & Plan  · Smokes 6 cigarettes daily. · Nicotine TD patch  · Smoking cessation counseling provided                VTE Pharmacologic Prophylaxis:   Pharmacologic: Eliquis    Patient Centered Rounds: I have performed bedside rounds with nursing staff today. Education and Discussions with Family / Patient: Patient    Time Spent for Care: More than 50% of total time spent on counseling and coordination of care as described above. Current Length of Stay: 48 day(s)    Current Patient Status: Inpatient   Certification Statement: The patient will continue to require additional inpatient hospital stay due to Awaiting guardianship    Discharge Plan / Estimated Discharge Date: TBD    Code Status: Level 1 - Full Code      Subjective:   Patient seen and examined at bedside, comfortable, denies any abdominal pain, nausea, vomiting. Denies any dysuria    Objective:     Vitals:   Temp (24hrs), Av °F (36.7 °C), Min:97.7 °F (36.5 °C), Max:98.3 °F (36.8 °C)    Temp:  [97.7 °F (36.5 °C)-98.3 °F (36.8 °C)] 98.3 °F (36.8 °C)  HR:  [60-71] 71  Resp:  [18] 18  BP: (116-147)/(65-72) 118/65  SpO2:  [94 %] 94 %  Body mass index is 28 kg/m². Input and Output Summary (last 24 hours):     No intake or output data in the 24 hours ending 23 1510    Physical Exam:    Constitutional: Patient is in, no acute distress  HEENT:  Normocephalic, atraumatic  Cardiovascular: Normal S1S2, RRR, No murmurs/rubs/gallops appreciated.   Pulmonary:  Bilateral air entry, No rhonchi/rales/wheezing appreciated  Abdominal: Soft, Bowel sounds present, Non-tender, Non-distended  Extremities:  No cyanosis, clubbing or edema. Neurological: Wake, alert  Skin:  Warm, dry    Additional Data:     Labs:    Results from last 7 days   Lab Units 08/19/23  0553   WBC Thousand/uL 11.45*   HEMOGLOBIN g/dL 12.7   HEMATOCRIT % 40.0   PLATELETS Thousands/uL 350   NEUTROS PCT % 66   LYMPHS PCT % 20   MONOS PCT % 9   EOS PCT % 3     Results from last 7 days   Lab Units 08/19/23  0553   POTASSIUM mmol/L 4.5   CHLORIDE mmol/L 103   CO2 mmol/L 28   BUN mg/dL 24   CREATININE mg/dL 1.10   CALCIUM mg/dL 8.8            I Have Reviewed All Lab Data Listed Above.     Invasive Devices     Drain  Duration           Ureteral Internal Stent Left ureter 6 Fr. 47 days    Ureteral Internal Stent Left ureter 7 Fr. 20 days                     Recent Cultures (last 7 days):     Results from last 7 days   Lab Units 08/17/23  1745   URINE CULTURE  >100,000 cfu/ml Enterococcus faecium*       Last 24 Hours Medication List:   Current Facility-Administered Medications   Medication Dose Route Frequency Provider Last Rate   • acetaminophen  975 mg Oral Q6H [address] Christian Lopes MD     • albuterol  2.5 mg Nebulization Q6H PRN Christian Lopes MD     • aluminum-magnesium hydroxide-simethicone  30 mL Oral Q6H PRN Christian Lopes MD     • amiodarone  200 mg Oral Daily With Breakfast Bubba Morrow MD     • apixaban  5 mg Oral BID Hilario Dawson MD     • atorvastatin  40 mg Oral Daily With Mustapha Gibson MD     • diltiazem  120 mg Oral Daily Christian Lopes MD     • docusate sodium  100 mg Oral BID PRN Hilario Dawson MD     • fluticasone-vilanterol  1 puff Inhalation Daily Christian Lopes MD     • folic acid  1 mg Oral Daily Christian Lopes MD     • guaiFENesin  200 mg Oral Q4H PRN Christian Lopes MD     • hydrOXYzine HCL  50 mg Oral HS PRN Christian Lopes MD     • lidocaine  1 patch Topical Daily Christian Lopes MD     • melatonin  6 mg Oral HS Crhistian Lopes MD • methocarbamol  750 mg Oral [facility] Basilia Curling, MD     • multivitamin-minerals  1 tablet Oral Daily Basilia Curling, MD     • nicotine  7 mg Transdermal Daily Rory Kapoor MD     • pantoprazole  40 mg Oral Early Morning Basilia Curling, MD     • polyethylene glycol  17 g Oral Daily PRN Karl Cordero MD     • thiamine  100 mg Oral Daily Basilia Curling, MD          Today, Patient Was Seen By: Delbert Casper MD

## 2023-08-23 NOTE — PLAN OF CARE
Problem: PAIN - ADULT  Goal: Verbalizes/displays adequate comfort level or baseline comfort level  Description: Interventions:  - Encourage patient to monitor pain and request assistance  - Assess pain using appropriate pain scale  - Administer analgesics based on type and severity of pain and evaluate response  - Implement non-pharmacological measures as appropriate and evaluate response  - Consider cultural and social influences on pain and pain management  - Notify physician/advanced practitioner if interventions unsuccessful or patient reports new pain  Outcome: Progressing     Problem: INFECTION - ADULT  Goal: Absence or prevention of progression during hospitalization  Description: INTERVENTIONS:  - Assess and monitor for signs and symptoms of infection  - Monitor lab/diagnostic results  - Monitor all insertion sites, i.e. indwelling lines, tubes, and drains  - Monitor endotracheal if appropriate and nasal secretions for changes in amount and color  - [place] appropriate cooling/warming therapies per order  - Administer medications as ordered  - Instruct and encourage patient and family to use good hand hygiene technique  - Identify and instruct in appropriate isolation precautions for identified infection/condition  Outcome: Progressing  Goal: Absence of fever/infection during neutropenic period  Description: INTERVENTIONS:  - Monitor WBC    Outcome: Progressing

## 2023-08-23 NOTE — CASE MANAGEMENT
Case Management Discharge Planning Note    Patient name Kimberly Caro  Location [facility] 4 /E4 [address]-* MRN [id number]  : 1950 Date 2023       Current Admission Date: 2023  Current Admission Diagnosis:Sepsis secondary to UTI [facility])   Patient Active Problem List    Diagnosis Date Noted   • Sebaceous cyst 2023   • Leukocytosis 2023   • Gross hematuria 2023   • Atrial fibrillation ([address]) 2023   • Impaired decision making 2023   • Daily consumption of alcohol 2023   • Bacteremia 2023   • Sepsis secondary to UTI ([address]) 2023   • Elevated troponin 2023   • Elevated d-dimer 2023   • Esophageal abnormality 2023   • Hydronephrosis with obstructing calculus 2023   • Mycotic toenails 2023   • Angioedema 2018   • COPD (chronic obstructive pulmonary disease) ([address]) 2018   • Bradycardia 2018   • Tobacco use 2016   • Polycythemia 2016   • Weight loss 2016      LOS (days): 48  Geometric Mean LOS (GMLOS) (days): 9.60  Days to GMLOS:-37.9     OBJECTIVE:  Risk of Unplanned Readmission Score: 14.84         Current admission status: Inpatient   Preferred Pharmacy:   PATIENT/FAMILY REPORTS NO PREFERRED PHARMACY  No address on file      [address] [place] [address],  [address] [place] [address]  Phone: [phone] Fax: [phone]    Primary Care Provider: No primary care provider on file. Primary Insurance: MEDICARE  Secondary Insurance:     DISCHARGE DETAILS:     We are still waiting for pt to get assigned temporary guardian. Has not been assigned yet. CM to continue to follow pt care & d/c.

## 2023-08-23 NOTE — PLAN OF CARE
Problem: Potential for Falls  Goal: Patient will remain free of falls  Description: INTERVENTIONS:  - Educate patient/family on patient safety including physical limitations  - Instruct patient to call for assistance with activity   - Consult OT/PT to assist with strengthening/mobility   - Keep Call bell within reach  - Keep bed low and locked with side rails adjusted as appropriate  - Keep care items and personal belongings within reach  - Initiate and maintain comfort rounds  - Make Fall Risk Sign visible to staff  - Apply yellow socks and bracelet for high fall risk patients  - Consider moving patient to room near nurses station  Outcome: Progressing     Problem: MOBILITY - ADULT  Goal: Maintain or return to baseline ADL function  Description: INTERVENTIONS:  - Educate patient/family on patient safety including physical limitations  - Instruct patient to call for assistance with activity   - Consult OT/PT to assist with strengthening/mobility   - Keep Call bell within reach  - Keep bed low and locked with side rails adjusted as appropriate  - Keep care items and personal belongings within reach  - Initiate and maintain comfort rounds  - Make Fall Risk Sign visible to staff  - Apply yellow socks and bracelet for high fall risk patients  - Consider moving patient to room near nurses station  Outcome: Progressing  Goal: Maintains/Returns to pre admission functional level  Description: INTERVENTIONS:  - Perform BMAT or MOVE assessment daily.   - Set and communicate daily mobility goal to care team and patient/family/caregiver. - Collaborate with rehabilitation services on mobility goals if consulted  - Perform Range of Motion 4 times a day. - Reposition patient every 2 hours.   - Dangle patient 4 times a day  - Stand patient 4 times a day  - Ambulate patient 4 times a day  - Out of bed to chair 4 times a day   - Out of bed for meals 4 times a day  - Out of bed for toileting  - Record patient progress and toleration of activity level   Outcome: Progressing     Problem: PAIN - ADULT  Goal: Verbalizes/displays adequate comfort level or baseline comfort level  Description: Interventions:  - Encourage patient to monitor pain and request assistance  - Assess pain using appropriate pain scale  - Administer analgesics based on type and severity of pain and evaluate response  - Implement non-pharmacological measures as appropriate and evaluate response  - Consider cultural and social influences on pain and pain management  - Notify physician/advanced practitioner if interventions unsuccessful or patient reports new pain  Outcome: Progressing     Problem: INFECTION - ADULT  Goal: Absence or prevention of progression during hospitalization  Description: INTERVENTIONS:  - Assess and monitor for signs and symptoms of infection  - Monitor lab/diagnostic results  - Monitor all insertion sites, i.e. indwelling lines, tubes, and drains  - Monitor endotracheal if appropriate and nasal secretions for changes in amount and color  - [place] appropriate cooling/warming therapies per order  - Administer medications as ordered  - Instruct and encourage patient and family to use good hand hygiene technique  - Identify and instruct in appropriate isolation precautions for identified infection/condition  Outcome: Progressing  Goal: Absence of fever/infection during neutropenic period  Description: INTERVENTIONS:  - Monitor WBC    Outcome: Progressing     Problem: SAFETY ADULT  Goal: Patient will remain free of falls  Description: INTERVENTIONS:  - Educate patient/family on patient safety including physical limitations  - Instruct patient to call for assistance with activity   - Consult OT/PT to assist with strengthening/mobility   - Keep Call bell within reach  - Keep bed low and locked with side rails adjusted as appropriate  - Keep care items and personal belongings within reach  - Initiate and maintain comfort rounds  - Make Fall Risk Sign visible to staff  - Apply yellow socks and bracelet for high fall risk patients  - Consider moving patient to room near nurses station  Outcome: Progressing  Goal: Maintain or return to baseline ADL function  Description: INTERVENTIONS:  - Educate patient/family on patient safety including physical limitations  - Instruct patient to call for assistance with activity   - Consult OT/PT to assist with strengthening/mobility   - Keep Call bell within reach  - Keep bed low and locked with side rails adjusted as appropriate  - Keep care items and personal belongings within reach  - Initiate and maintain comfort rounds  - Make Fall Risk Sign visible to staff  - Apply yellow socks and bracelet for high fall risk patients  - Consider moving patient to room near nurses station  Outcome: Progressing  Goal: Maintains/Returns to pre admission functional level  Description: INTERVENTIONS:  - Perform BMAT or MOVE assessment daily.   - Set and communicate daily mobility goal to care team and patient/family/caregiver. - Collaborate with rehabilitation services on mobility goals if consulted  - Perform Range of Motion 4 times a day. - Reposition patient every 2 hours.   - Dangle patient 4 times a day  - Stand patient 4 times a day  - Ambulate patient 4 times a day  - Out of bed to chair 4 times a day   - Out of bed for meals 4 times a day  - Out of bed for toileting  - Record patient progress and toleration of activity level   Outcome: Progressing     Problem: DISCHARGE PLANNING  Goal: Discharge to home or other facility with appropriate resources  Description: INTERVENTIONS:  - Identify barriers to discharge w/patient and caregiver  - Arrange for needed discharge resources and transportation as appropriate  - Identify discharge learning needs (meds, wound care, etc.)  - Arrange for interpretive services to assist at discharge as needed  - Refer to Case Management Department for coordinating discharge planning if the patient needs post-hospital services based on physician/advanced practitioner order or complex needs related to functional status, cognitive ability, or social support system  Outcome: Progressing     Problem: Knowledge Deficit  Goal: Patient/family/caregiver demonstrates understanding of disease process, treatment plan, medications, and discharge instructions  Description: Complete learning assessment and assess knowledge base.   Interventions:  - Provide teaching at level of understanding  - Provide teaching via preferred learning methods  Outcome: Progressing     Problem: Prexisting or High Potential for Compromised Skin Integrity  Goal: Skin integrity is maintained or improved  Description: INTERVENTIONS:  - Identify patients at risk for skin breakdown  - Assess and monitor skin integrity  - Assess and monitor nutrition and hydration status  - Monitor labs   - Assess for incontinence   - Turn and reposition patient  - Assist with mobility/ambulation  - Relieve pressure over bony prominences  - Avoid friction and shearing  - Provide appropriate hygiene as needed including keeping skin clean and dry  - Evaluate need for skin moisturizer/barrier cream  - Collaborate with interdisciplinary team   - Patient/family teaching  - Consider wound care consult   Outcome: Progressing     Problem: GENITOURINARY - ADULT  Goal: Maintains or returns to baseline urinary function  Description: INTERVENTIONS:  - Assess urinary function  - Encourage oral fluids to ensure adequate hydration if ordered  - Administer IV fluids as ordered to ensure adequate hydration  - Administer ordered medications as needed  - Offer frequent toileting  - Follow urinary retention protocol if ordered  Outcome: Progressing     Problem: HEMATOLOGIC - ADULT  Goal: Maintains hematologic stability  Description: INTERVENTIONS  - Assess for signs and symptoms of bleeding or hemorrhage  - Monitor labs  - Administer supportive blood products/factors as ordered and appropriate  Outcome: Progressing

## 2023-08-24 PROCEDURE — 99232 SBSQ HOSP IP/OBS MODERATE 35: CPT | Performed by: INTERNAL MEDICINE

## 2023-08-24 RX ADMIN — PANTOPRAZOLE SODIUM 40 MG: 40 TABLET, DELAYED RELEASE ORAL at 05:30

## 2023-08-24 RX ADMIN — ACETAMINOPHEN 325MG 975 MG: 325 TABLET ORAL at 12:09

## 2023-08-24 RX ADMIN — ATORVASTATIN CALCIUM 40 MG: 40 TABLET, FILM COATED ORAL at 17:22

## 2023-08-24 RX ADMIN — DILTIAZEM HYDROCHLORIDE 120 MG: 120 CAPSULE, COATED, EXTENDED RELEASE ORAL at 08:52

## 2023-08-24 RX ADMIN — APIXABAN 5 MG: 5 TABLET, FILM COATED ORAL at 08:52

## 2023-08-24 RX ADMIN — THIAMINE HCL TAB 100 MG 100 MG: 100 TAB at 08:56

## 2023-08-24 RX ADMIN — FOLIC ACID 1 MG: 1 TABLET ORAL at 08:52

## 2023-08-24 RX ADMIN — METHOCARBAMOL TABLETS 750 MG: 500 TABLET, COATED ORAL at 05:30

## 2023-08-24 RX ADMIN — Medication 1 TABLET: at 08:52

## 2023-08-24 RX ADMIN — ACETAMINOPHEN 325MG 975 MG: 325 TABLET ORAL at 05:29

## 2023-08-24 RX ADMIN — ACETAMINOPHEN 325MG 975 MG: 325 TABLET ORAL at 17:22

## 2023-08-24 RX ADMIN — FLUTICASONE FUROATE AND VILANTEROL TRIFENATATE 1 PUFF: 200; 25 POWDER RESPIRATORY (INHALATION) at 08:53

## 2023-08-24 RX ADMIN — MELATONIN 6 MG: at 22:24

## 2023-08-24 RX ADMIN — METHOCARBAMOL TABLETS 750 MG: 500 TABLET, COATED ORAL at 13:13

## 2023-08-24 RX ADMIN — APIXABAN 5 MG: 5 TABLET, FILM COATED ORAL at 20:11

## 2023-08-24 RX ADMIN — AMIODARONE HYDROCHLORIDE 200 MG: 200 TABLET ORAL at 08:52

## 2023-08-24 RX ADMIN — METHOCARBAMOL TABLETS 750 MG: 500 TABLET, COATED ORAL at 22:24

## 2023-08-24 NOTE — ASSESSMENT & PLAN NOTE
· Evaluated by cardiology for atrial fibrillation with rapid ventricular response   · improved after successful SOLA/cardioversion on 7/25/23  · Rhythm control with amiodarone 20 mg daily, Cardizem 120 mg daily  · Eliquis for anticoagulation

## 2023-08-24 NOTE — RESTORATIVE TECHNICIAN NOTE
Restorative Technician Note      Patient Name: Wong Cole     Restorative Tech Visit Date: 08/24/23  Note Type: Mobility  Patient Position Upon Consult: Supine  Activity Performed: Ambulated  Patient Position at End of Consult: Supine;  All needs within reach

## 2023-08-24 NOTE — ASSESSMENT & PLAN NOTE
Patient is a 77-year-old male with past medical history significant for COPD, atrial fibrillation, and nephrolithiasis cystoscopy, and stent placement who presented with sepsis secondary to Enterococcus bacteremia and complicated UTI    · Completed a course of antibiotics   · Pt complained of recurrent dysuria earlier this week: Repeat urinalysis was sent: Similar to prior UA: Large blood, large leukocyte esterase, negative nitrate  · Associated urine culture was sent: growing enterococcus: Pt denies any urinary symptoms;  Prior dysuria resolved with pyridium  · As per prior provider discussion with ID team as pt has no symptoms off abx, and has multiple prior tx courses: Likely colonization

## 2023-08-24 NOTE — PROGRESS NOTES
[facility]  Progress Note  Name: Derrick Yang I  MRN: [id number]  Unit/Bed#: E4 -01 I Date of Admission: 7/6/2023   Date of Service: 8/24/2023 I Hospital Day: 52    Assessment/Plan   * Sepsis secondary to UTI [facility])  Assessment & Plan  Patient is a 69-year-old male with past medical history significant for COPD, atrial fibrillation, and nephrolithiasis cystoscopy, and stent placement who presented with sepsis secondary to Enterococcus bacteremia and complicated UTI    · Completed a course of antibiotics   · Pt complained of recurrent dysuria earlier this week: Repeat urinalysis was sent: Similar to prior UA: Large blood, large leukocyte esterase, negative nitrate  · Associated urine culture was sent: growing enterococcus: Pt denies any urinary symptoms;  Prior dysuria resolved with pyridium  · As per prior provider discussion with ID team as pt has no symptoms off abx, and has multiple prior tx courses: Likely colonization    Sebaceous cyst  Assessment & Plan  · Had ct 7/28 that revealed "[place] is a 2.9 x 2.2 cm hypodense lesion within the subcutaneous tissues of the mid back, overlying the spinous process of the T11 and T12 vertebral bodies, abutting the skin surface, likely a sebaceous cyst"  · He reported that if surgery was involved he would not want it surgical removed  · Supportive care at this time.      Leukocytosis  Assessment & Plan  · Patient with a leukocytosis for the last 2 days; unclear etiology  · Afebrile and no other SIRS criteria; initially felt to be secondary to surgical procedure  · Monitor off antibiotics; will obtain UA and procalcitonin  · Appears to be trending down; monitor    Gross hematuria  Assessment & Plan  · Patient has had recurrent hematuria during his hospitalization requiring CBI  · He is s/p cysto with stent after which which hematuria resolved, however once Eliquis was restarted patient had recurrent hematuria  · Urine currently without visible hematuria however UA consistent with microscopic hematuria  · Eliquis was briefly held then restarted  · Cont to monitor for recurrence      Atrial fibrillation [facility])  Assessment & Plan  · Evaluated by cardiology for atrial fibrillation with rapid ventricular response   · improved after successful SOLA/cardioversion on 7/25/23  · Rhythm control with amiodarone 20 mg daily, Cardizem 120 mg daily  · Eliquis for anticoagulation      Impaired decision making  Assessment & Plan  neuropsych evaluation was done on 7/24/2023  He continues to have impaired decision-making capacity  Guardianship process initiated by case management     Daily consumption of alcohol  Assessment & Plan  · Patient noted daily alcohol use on admission  · Clinically no withdrawal.  · Continue thiamine and folic acid supplementation    Bacteremia  Assessment & Plan  · Aerococcus bacteremia due to complicated UTI   · Completed full course of antibiotics under the guidance of the infectious disease team    Hydronephrosis with obstructing calculus  Assessment & Plan  · Admitted originally due to sepsis from UTI with obstruction  · Status post cystoscopy with difficult stent insertion on 7/6/2023  · Procedure was complicated by inadvertent left ureteral perforation  · Follow-up CT showed previous ureteral calculus is now in the bladder  · Repeat cystoscopy on 8/3 with stent placement.    · As per urology okay to restart Eliquis which was restarted on 8/19  · Monitor closely for any recurrence of hematuria  · Urology recommended repeat CT in 2 weeks with outpatient cystoscopy/stent removal    Esophageal abnormality  Assessment & Plan  · incidental finding on CT with diffuse esophageal thickening could be from esophagitis  · Patient was evaluated by the GI team: Note that esophageal wall thickening could be secondary to reflux esophagitis, Bruno's, or hiatal hernia  · Continue empiric Protonix  · Outpatient follow-up: May require elective outpatient upper endoscopy      Elevated troponin  Assessment & Plan  · Non-MI troponin elevation secondary to tachycardia and sepsis   · Appreciate cardiology recommendations no additional work-up currently required  · Continue aspirin and Lipitor    COPD (chronic obstructive pulmonary disease) (HCC)  Assessment & Plan  · Stable without exacerbation  · Continue albuterol and breo     Tobacco use  Assessment & Plan  · Smokes 6 cigarettes daily. · Nicotine TD patch  · Smoking cessation counseling provided            VTE Pharmacologic Prophylaxis:   Pharmacologic: Eliquis    Patient Centered Rounds: I have performed bedside rounds with nursing staff today. Education and Discussions with Family / Patient: Patient    Time Spent for Care: More than 50% of total time spent on counseling and coordination of care as described above. Current Length of Stay: 49 day(s)    Current Patient Status: Inpatient   Certification Statement: The patient will continue to require additional inpatient hospital stay due to Awaiting guardianship    Discharge Plan / Estimated Discharge Date: TBD    Code Status: Level 1 - Full Code      Subjective:   Patient seen and examined at bedside, comfortable, denies any abdominal pain, nausea or vomiting. Objective:     Vitals:   Temp (24hrs), Av.1 °F (36.7 °C), Min:97.7 °F (36.5 °C), Max:98.3 °F (36.8 °C)    Temp:  [97.7 °F (36.5 °C)-98.3 °F (36.8 °C)] 97.7 °F (36.5 °C)  HR:  [61-75] 61  Resp:  [18] 18  BP: (118-159)/(65-76) 159/76  SpO2:  [94 %-96 %] 95 %  Body mass index is 28 kg/m². Input and Output Summary (last 24 hours):     No intake or output data in the 24 hours ending 23 [id number]    Physical Exam:    Constitutional: Patient is oriented to person, place and time, no acute distress  HEENT:  Normocephalic, atraumatic  Cardiovascular: Normal S1S2, RRR, No murmurs/rubs/gallops appreciated.   Pulmonary:  Bilateral air entry, No rhonchi/rales/wheezing appreciated  Abdominal: Soft, Bowel sounds present, Non-tender, Non-distended  Extremities:  No cyanosis, clubbing or edema. Neurological: awake, alert  Skin:  Warm, dry    Additional Data:     Labs:    Results from last 7 days   Lab Units 08/19/23  0553   WBC Thousand/uL 11.45*   HEMOGLOBIN g/dL 12.7   HEMATOCRIT % 40.0   PLATELETS Thousands/uL 350   NEUTROS PCT % 66   LYMPHS PCT % 20   MONOS PCT % 9   EOS PCT % 3     Results from last 7 days   Lab Units 08/19/23  0553   POTASSIUM mmol/L 4.5   CHLORIDE mmol/L 103   CO2 mmol/L 28   BUN mg/dL 24   CREATININE mg/dL 1.10   CALCIUM mg/dL 8.8            I Have Reviewed All Lab Data Listed Above.     Invasive Devices     Drain  Duration           Ureteral Internal Stent Left ureter 6 Fr. 48 days    Ureteral Internal Stent Left ureter 7 Fr. 21 days                     Recent Cultures (last 7 days):     Results from last 7 days   Lab Units 08/17/23  1745   URINE CULTURE  >100,000 cfu/ml Enterococcus faecium*       Last 24 Hours Medication List:   Current Facility-Administered Medications   Medication Dose Route Frequency Provider Last Rate   • acetaminophen  975 mg Oral Q6H [address] Ramesh Peralta MD     • albuterol  2.5 mg Nebulization Q6H PRN Ramesh Peralta MD     • aluminum-magnesium hydroxide-simethicone  30 mL Oral Q6H PRN Ramesh Peralta MD     • amiodarone  200 mg Oral Daily With Breakfast Joshua Sarabia MD     • apixaban  5 mg Oral BID Torey Cortes MD     • atorvastatin  40 mg Oral Daily With Teresa Mendoza MD     • diltiazem  120 mg Oral Daily Ramesh Peralta MD     • docusate sodium  100 mg Oral BID PRN Torey Cortes MD     • fluticasone-vilanterol  1 puff Inhalation Daily Ramesh Peralta MD     • folic acid  1 mg Oral Daily Ramesh Peralta MD     • guaiFENesin  200 mg Oral Q4H PRN Ramesh Peralta MD     • hydrOXYzine HCL  50 mg Oral HS PRN Ramesh Peralta MD     • lidocaine  1 patch Topical Daily Ramesh Peralta MD     • melatonin  6 mg Oral HS Ramesh Peralta MD • methocarbamol  750 mg Oral [facility] Basilia Curling, MD     • multivitamin-minerals  1 tablet Oral Daily Basilia Curling, MD     • nicotine  7 mg Transdermal Daily Rory Kapoor MD     • pantoprazole  40 mg Oral Early Morning Basilia Curling, MD     • polyethylene glycol  17 g Oral Daily PRN Karl Cordero MD     • thiamine  100 mg Oral Daily Basilia Curling, MD          Today, Patient Was Seen By: Delbert Casper MD

## 2023-08-24 NOTE — PLAN OF CARE
Problem: Potential for Falls  Goal: Patient will remain free of falls  Description: INTERVENTIONS:  - Educate patient/family on patient safety including physical limitations  - Instruct patient to call for assistance with activity   - Consult OT/PT to assist with strengthening/mobility   - Keep Call bell within reach  - Keep bed low and locked with side rails adjusted as appropriate  - Keep care items and personal belongings within reach  - Initiate and maintain comfort rounds  - Make Fall Risk Sign visible to staff  - Apply yellow socks and bracelet for high fall risk patients  - Consider moving patient to room near nurses station  Outcome: Progressing     Problem: MOBILITY - ADULT  Goal: Maintain or return to baseline ADL function  Description: INTERVENTIONS:  - Educate patient/family on patient safety including physical limitations  - Instruct patient to call for assistance with activity   - Consult OT/PT to assist with strengthening/mobility   - Keep Call bell within reach  - Keep bed low and locked with side rails adjusted as appropriate  - Keep care items and personal belongings within reach  - Initiate and maintain comfort rounds  - Make Fall Risk Sign visible to staff  - Apply yellow socks and bracelet for high fall risk patients  - Consider moving patient to room near nurses station  Outcome: Progressing  Goal: Maintains/Returns to pre admission functional level  Description: INTERVENTIONS:  - Perform BMAT or MOVE assessment daily.   - Set and communicate daily mobility goal to care team and patient/family/caregiver. - Collaborate with rehabilitation services on mobility goals if consulted  - Perform Range of Motion 4 times a day. - Reposition patient every 2 hours.   - Dangle patient 4 times a day  - Stand patient 4 times a day  - Ambulate patient 4 times a day  - Out of bed to chair 4 times a day   - Out of bed for meals 4 times a day  - Out of bed for toileting  - Record patient progress and toleration of activity level   Outcome: Progressing     Problem: PAIN - ADULT  Goal: Verbalizes/displays adequate comfort level or baseline comfort level  Description: Interventions:  - Encourage patient to monitor pain and request assistance  - Assess pain using appropriate pain scale  - Administer analgesics based on type and severity of pain and evaluate response  - Implement non-pharmacological measures as appropriate and evaluate response  - Consider cultural and social influences on pain and pain management  - Notify physician/advanced practitioner if interventions unsuccessful or patient reports new pain  Outcome: Progressing     Problem: INFECTION - ADULT  Goal: Absence or prevention of progression during hospitalization  Description: INTERVENTIONS:  - Assess and monitor for signs and symptoms of infection  - Monitor lab/diagnostic results  - Monitor all insertion sites, i.e. indwelling lines, tubes, and drains  - Monitor endotracheal if appropriate and nasal secretions for changes in amount and color  - [place] appropriate cooling/warming therapies per order  - Administer medications as ordered  - Instruct and encourage patient and family to use good hand hygiene technique  - Identify and instruct in appropriate isolation precautions for identified infection/condition  Outcome: Progressing  Goal: Absence of fever/infection during neutropenic period  Description: INTERVENTIONS:  - Monitor WBC    Outcome: Progressing     Problem: SAFETY ADULT  Goal: Patient will remain free of falls  Description: INTERVENTIONS:  - Educate patient/family on patient safety including physical limitations  - Instruct patient to call for assistance with activity   - Consult OT/PT to assist with strengthening/mobility   - Keep Call bell within reach  - Keep bed low and locked with side rails adjusted as appropriate  - Keep care items and personal belongings within reach  - Initiate and maintain comfort rounds  - Make Fall Risk Sign visible to staff  - Apply yellow socks and bracelet for high fall risk patients  - Consider moving patient to room near nurses station  Outcome: Progressing  Goal: Maintain or return to baseline ADL function  Description: INTERVENTIONS:  - Educate patient/family on patient safety including physical limitations  - Instruct patient to call for assistance with activity   - Consult OT/PT to assist with strengthening/mobility   - Keep Call bell within reach  - Keep bed low and locked with side rails adjusted as appropriate  - Keep care items and personal belongings within reach  - Initiate and maintain comfort rounds  - Make Fall Risk Sign visible to staff  - Apply yellow socks and bracelet for high fall risk patients  - Consider moving patient to room near nurses station  Outcome: Progressing  Goal: Maintains/Returns to pre admission functional level  Description: INTERVENTIONS:  - Perform BMAT or MOVE assessment daily.   - Set and communicate daily mobility goal to care team and patient/family/caregiver. - Collaborate with rehabilitation services on mobility goals if consulted  - Perform Range of Motion 4 times a day. - Reposition patient every 4 hours.   - Dangle patient 4 times a day  - Stand patient 4 times a day  - Ambulate patient 4 times a day  - Out of bed to chair 4 times a day   - Out of bed for meals 4 times a day  - Out of bed for toileting  - Record patient progress and toleration of activity level   Outcome: Progressing     Problem: DISCHARGE PLANNING  Goal: Discharge to home or other facility with appropriate resources  Description: INTERVENTIONS:  - Identify barriers to discharge w/patient and caregiver  - Arrange for needed discharge resources and transportation as appropriate  - Identify discharge learning needs (meds, wound care, etc.)  - Arrange for interpretive services to assist at discharge as needed  - Refer to Case Management Department for coordinating discharge planning if the patient needs post-hospital services based on physician/advanced practitioner order or complex needs related to functional status, cognitive ability, or social support system  Outcome: Progressing     Problem: Knowledge Deficit  Goal: Patient/family/caregiver demonstrates understanding of disease process, treatment plan, medications, and discharge instructions  Description: Complete learning assessment and assess knowledge base.   Interventions:  - Provide teaching at level of understanding  - Provide teaching via preferred learning methods  Outcome: Progressing     Problem: Prexisting or High Potential for Compromised Skin Integrity  Goal: Skin integrity is maintained or improved  Description: INTERVENTIONS:  - Identify patients at risk for skin breakdown  - Assess and monitor skin integrity  - Assess and monitor nutrition and hydration status  - Monitor labs   - Assess for incontinence   - Turn and reposition patient  - Assist with mobility/ambulation  - Relieve pressure over bony prominences  - Avoid friction and shearing  - Provide appropriate hygiene as needed including keeping skin clean and dry  - Evaluate need for skin moisturizer/barrier cream  - Collaborate with interdisciplinary team   - Patient/family teaching  - Consider wound care consult   Outcome: Progressing     Problem: GENITOURINARY - ADULT  Goal: Maintains or returns to baseline urinary function  Description: INTERVENTIONS:  - Assess urinary function  - Encourage oral fluids to ensure adequate hydration if ordered  - Administer IV fluids as ordered to ensure adequate hydration  - Administer ordered medications as needed  - Offer frequent toileting  - Follow urinary retention protocol if ordered  Outcome: Progressing     Problem: HEMATOLOGIC - ADULT  Goal: Maintains hematologic stability  Description: INTERVENTIONS  - Assess for signs and symptoms of bleeding or hemorrhage  - Monitor labs  - Administer supportive blood products/factors as ordered and appropriate  Outcome: Progressing

## 2023-08-24 NOTE — PLAN OF CARE
Problem: Potential for Falls  Goal: Patient will remain free of falls  Description: INTERVENTIONS:  - Educate patient/family on patient safety including physical limitations  - Instruct patient to call for assistance with activity   - Consult OT/PT to assist with strengthening/mobility   - Keep Call bell within reach  - Keep bed low and locked with side rails adjusted as appropriate  - Keep care items and personal belongings within reach  - Initiate and maintain comfort rounds  - Make Fall Risk Sign visible to staff  - Apply yellow socks and bracelet for high fall risk patients  - Consider moving patient to room near nurses station  Outcome: Progressing     Problem: MOBILITY - ADULT  Goal: Maintain or return to baseline ADL function  Description: INTERVENTIONS:  - Educate patient/family on patient safety including physical limitations  - Instruct patient to call for assistance with activity   - Consult OT/PT to assist with strengthening/mobility   - Keep Call bell within reach  - Keep bed low and locked with side rails adjusted as appropriate  - Keep care items and personal belongings within reach  - Initiate and maintain comfort rounds  - Make Fall Risk Sign visible to staff  - Apply yellow socks and bracelet for high fall risk patients  - Consider moving patient to room near nurses station  Outcome: Progressing  Goal: Maintains/Returns to pre admission functional level  Description: INTERVENTIONS:  - Perform BMAT or MOVE assessment daily.   - Set and communicate daily mobility goal to care team and patient/family/caregiver. - Collaborate with rehabilitation services on mobility goals if consulted  - Perform Range of Motion  times a day. - Reposition patient every  hours.   - Dangle patient  times a day  - Stand patient  times a day  - Ambulate patient  times a day  - Out of bed to chair  times a day   - Out of bed for meals times a day  - Out of bed for toileting  - Record patient progress and toleration of activity level   Outcome: Progressing     Problem: PAIN - ADULT  Goal: Verbalizes/displays adequate comfort level or baseline comfort level  Description: Interventions:  - Encourage patient to monitor pain and request assistance  - Assess pain using appropriate pain scale  - Administer analgesics based on type and severity of pain and evaluate response  - Implement non-pharmacological measures as appropriate and evaluate response  - Consider cultural and social influences on pain and pain management  - Notify physician/advanced practitioner if interventions unsuccessful or patient reports new pain  Outcome: Progressing     Problem: INFECTION - ADULT  Goal: Absence or prevention of progression during hospitalization  Description: INTERVENTIONS:  - Assess and monitor for signs and symptoms of infection  - Monitor lab/diagnostic results  - Monitor all insertion sites, i.e. indwelling lines, tubes, and drains  - Monitor endotracheal if appropriate and nasal secretions for changes in amount and color  - [place] appropriate cooling/warming therapies per order  - Administer medications as ordered  - Instruct and encourage patient and family to use good hand hygiene technique  - Identify and instruct in appropriate isolation precautions for identified infection/condition  Outcome: Progressing  Goal: Absence of fever/infection during neutropenic period  Description: INTERVENTIONS:  - Monitor WBC    Outcome: Progressing     Problem: SAFETY ADULT  Goal: Patient will remain free of falls  Description: INTERVENTIONS:  - Educate patient/family on patient safety including physical limitations  - Instruct patient to call for assistance with activity   - Consult OT/PT to assist with strengthening/mobility   - Keep Call bell within reach  - Keep bed low and locked with side rails adjusted as appropriate  - Keep care items and personal belongings within reach  - Initiate and maintain comfort rounds  - Make Fall Risk Sign visible to staff  - Apply yellow socks and bracelet for high fall risk patients  - Consider moving patient to room near nurses station  Outcome: Progressing  Goal: Maintain or return to baseline ADL function  Description: INTERVENTIONS:  - Educate patient/family on patient safety including physical limitations  - Instruct patient to call for assistance with activity   - Consult OT/PT to assist with strengthening/mobility   - Keep Call bell within reach  - Keep bed low and locked with side rails adjusted as appropriate  - Keep care items and personal belongings within reach  - Initiate and maintain comfort rounds  - Make Fall Risk Sign visible to staff  - Apply yellow socks and bracelet for high fall risk patients  - Consider moving patient to room near nurses station  Outcome: Progressing  Goal: Maintains/Returns to pre admission functional level  Description: INTERVENTIONS:  - Perform BMAT or MOVE assessment daily.   - Set and communicate daily mobility goal to care team and patient/family/caregiver. - Collaborate with rehabilitation services on mobility goals if consulted  - Perform Range of Motion  times a day. - Reposition patient every  hours.   - Dangle patient  times a day  - Stand patient  times a day  - Ambulate patient  times a day  - Out of bed to chair  times a day   - Out of bed for meals times a day  - Out of bed for toileting  - Record patient progress and toleration of activity level   Outcome: Progressing     Problem: DISCHARGE PLANNING  Goal: Discharge to home or other facility with appropriate resources  Description: INTERVENTIONS:  - Identify barriers to discharge w/patient and caregiver  - Arrange for needed discharge resources and transportation as appropriate  - Identify discharge learning needs (meds, wound care, etc.)  - Arrange for interpretive services to assist at discharge as needed  - Refer to Case Management Department for coordinating discharge planning if the patient needs post-hospital services based on physician/advanced practitioner order or complex needs related to functional status, cognitive ability, or social support system  Outcome: Progressing     Problem: Knowledge Deficit  Goal: Patient/family/caregiver demonstrates understanding of disease process, treatment plan, medications, and discharge instructions  Description: Complete learning assessment and assess knowledge base.   Interventions:  - Provide teaching at level of understanding  - Provide teaching via preferred learning methods  Outcome: Progressing     Problem: Prexisting or High Potential for Compromised Skin Integrity  Goal: Skin integrity is maintained or improved  Description: INTERVENTIONS:  - Identify patients at risk for skin breakdown  - Assess and monitor skin integrity  - Assess and monitor nutrition and hydration status  - Monitor labs   - Assess for incontinence   - Turn and reposition patient  - Assist with mobility/ambulation  - Relieve pressure over bony prominences  - Avoid friction and shearing  - Provide appropriate hygiene as needed including keeping skin clean and dry  - Evaluate need for skin moisturizer/barrier cream  - Collaborate with interdisciplinary team   - Patient/family teaching  - Consider wound care consult   Outcome: Progressing     Problem: GENITOURINARY - ADULT  Goal: Maintains or returns to baseline urinary function  Description: INTERVENTIONS:  - Assess urinary function  - Encourage oral fluids to ensure adequate hydration if ordered  - Administer IV fluids as ordered to ensure adequate hydration  - Administer ordered medications as needed  - Offer frequent toileting  - Follow urinary retention protocol if ordered  Outcome: Progressing     Problem: HEMATOLOGIC - ADULT  Goal: Maintains hematologic stability  Description: INTERVENTIONS  - Assess for signs and symptoms of bleeding or hemorrhage  - Monitor labs  - Administer supportive blood products/factors as ordered and appropriate  Outcome: Progressing

## 2023-08-24 NOTE — RESTORATIVE TECHNICIAN NOTE
Restorative Technician Note      Patient Name: Yandy Mccoy     Restorative Tech Visit Date: 08/24/23  Note Type: Mobility  Patient Position Upon Consult: Supine  Activity Performed: Ambulated  Patient Position at End of Consult: Supine;  All needs within reach

## 2023-08-24 NOTE — ASSESSMENT & PLAN NOTE
· Had ct 7/28 that revealed "Missy Schwartz is a 2.9 x 2.2 cm hypodense lesion within the subcutaneous tissues of the mid back, overlying the spinous process of the T11 and T12 vertebral bodies, abutting the skin surface, likely a sebaceous cyst"  · He reported that if surgery was involved he would not want it surgical removed  · Supportive care at this time.

## 2023-08-25 PROCEDURE — 99232 SBSQ HOSP IP/OBS MODERATE 35: CPT | Performed by: INTERNAL MEDICINE

## 2023-08-25 RX ADMIN — THIAMINE HCL TAB 100 MG 100 MG: 100 TAB at 08:25

## 2023-08-25 RX ADMIN — MELATONIN 6 MG: at 21:13

## 2023-08-25 RX ADMIN — PANTOPRAZOLE SODIUM 40 MG: 40 TABLET, DELAYED RELEASE ORAL at 06:23

## 2023-08-25 RX ADMIN — ACETAMINOPHEN 325MG 975 MG: 325 TABLET ORAL at 06:23

## 2023-08-25 RX ADMIN — METHOCARBAMOL TABLETS 750 MG: 500 TABLET, COATED ORAL at 06:22

## 2023-08-25 RX ADMIN — APIXABAN 5 MG: 5 TABLET, FILM COATED ORAL at 21:13

## 2023-08-25 RX ADMIN — ATORVASTATIN CALCIUM 40 MG: 40 TABLET, FILM COATED ORAL at 17:05

## 2023-08-25 RX ADMIN — FLUTICASONE FUROATE AND VILANTEROL TRIFENATATE 1 PUFF: 200; 25 POWDER RESPIRATORY (INHALATION) at 08:25

## 2023-08-25 RX ADMIN — FOLIC ACID 1 MG: 1 TABLET ORAL at 08:25

## 2023-08-25 RX ADMIN — METHOCARBAMOL TABLETS 750 MG: 500 TABLET, COATED ORAL at 13:58

## 2023-08-25 RX ADMIN — Medication 1 TABLET: at 08:25

## 2023-08-25 RX ADMIN — AMIODARONE HYDROCHLORIDE 200 MG: 200 TABLET ORAL at 08:25

## 2023-08-25 RX ADMIN — METHOCARBAMOL TABLETS 750 MG: 500 TABLET, COATED ORAL at 21:13

## 2023-08-25 RX ADMIN — DILTIAZEM HYDROCHLORIDE 120 MG: 120 CAPSULE, COATED, EXTENDED RELEASE ORAL at 08:25

## 2023-08-25 RX ADMIN — APIXABAN 5 MG: 5 TABLET, FILM COATED ORAL at 08:25

## 2023-08-25 NOTE — PLAN OF CARE
Problem: MOBILITY - ADULT  Goal: Maintain or return to baseline ADL function  Description: INTERVENTIONS:  - Educate patient/family on patient safety including physical limitations  - Instruct patient to call for assistance with activity   - Consult OT/PT to assist with strengthening/mobility   - Keep Call bell within reach  - Keep bed low and locked with side rails adjusted as appropriate  - Keep care items and personal belongings within reach  - Initiate and maintain comfort rounds  - Make Fall Risk Sign visible to staff  - Apply yellow socks and bracelet for high fall risk patients  - Consider moving patient to room near nurses station  Outcome: Progressing

## 2023-08-25 NOTE — ASSESSMENT & PLAN NOTE
· Patient with a leukocytosis for the last 2 days; unclear etiology  · Afebrile and no other SIRS criteria; initially felt to be secondary to surgical procedure  · will check CBC tomorrow

## 2023-08-25 NOTE — ASSESSMENT & PLAN NOTE
Patient is a 39-year-old male with past medical history significant for COPD, atrial fibrillation, and nephrolithiasis cystoscopy, and stent placement who presented with sepsis secondary to Enterococcus bacteremia and complicated UTI    · Completed a course of antibiotics   · Pt complained of recurrent dysuria earlier this week: Repeat urinalysis was sent: Similar to prior UA: Large blood, large leukocyte esterase, negative nitrate  · Associated urine culture was sent: growing enterococcus: Pt denies any urinary symptoms;  Prior dysuria resolved with pyridium  · As per prior provider discussion with ID team as pt has no symptoms off abx, and has multiple prior tx courses: Likely colonization

## 2023-08-25 NOTE — ASSESSMENT & PLAN NOTE
· Had ct 7/28 that revealed "Bhupinder Michael is a 2.9 x 2.2 cm hypodense lesion within the subcutaneous tissues of the mid back, overlying the spinous process of the T11 and T12 vertebral bodies, abutting the skin surface, likely a sebaceous cyst"  · He reported that if surgery was involved he would not want it surgical removed  · Supportive care at this time.

## 2023-08-25 NOTE — RESTORATIVE TECHNICIAN NOTE
Restorative Technician Note      Patient Name: Yandy Mccoy     Restorative Tech Visit Date: 08/25/23  Note Type: Mobility  Patient Position Upon Consult: Supine  Activity Performed: Ambulated  Patient Position at End of Consult: Seated edge of bed;  All needs within reach

## 2023-08-25 NOTE — RESTORATIVE TECHNICIAN NOTE
Restorative Technician Note      Patient Name: Eriberto Jason     Restorative Tech Visit Date: 08/25/23  Note Type: Mobility  Patient Position Upon Consult: Supine  Activity Performed: Ambulated  Patient Position at End of Consult: Supine;  All needs within reach

## 2023-08-25 NOTE — PROGRESS NOTES
[facility] [employer]  Progress Note  Name: Clari Agustin I  MRN: [id number]  Unit/Bed#: E4 -01 I Date of Admission: 7/6/2023   Date of Service: 8/25/2023 I Hospital Day: 48    Assessment/Plan   * Sepsis secondary to UTI [facility])  Assessment & Plan  Patient is a 71-year-old male with past medical history significant for COPD, atrial fibrillation, and nephrolithiasis cystoscopy, and stent placement who presented with sepsis secondary to Enterococcus bacteremia and complicated UTI    · Completed a course of antibiotics   · Pt complained of recurrent dysuria earlier this week: Repeat urinalysis was sent: Similar to prior UA: Large blood, large leukocyte esterase, negative nitrate  · Associated urine culture was sent: growing enterococcus: Pt denies any urinary symptoms;  Prior dysuria resolved with pyridium  · As per prior provider discussion with ID team as pt has no symptoms off abx, and has multiple prior tx courses: Likely colonization    Sebaceous cyst  Assessment & Plan  · Had ct 7/28 that revealed "Sunshine Boothe is a 2.9 x 2.2 cm hypodense lesion within the subcutaneous tissues of the mid back, overlying the spinous process of the T11 and T12 vertebral bodies, abutting the skin surface, likely a sebaceous cyst"  · He reported that if surgery was involved he would not want it surgical removed  · Supportive care at this time.      Leukocytosis  Assessment & Plan  · Patient with a leukocytosis for the last 2 days; unclear etiology  · Afebrile and no other SIRS criteria; initially felt to be secondary to surgical procedure  · will check CBC tomorrow    Gross hematuria  Assessment & Plan  · Patient has had recurrent hematuria during his hospitalization requiring CBI  · He is s/p cysto with stent after which which hematuria resolved, however once Eliquis was restarted patient had recurrent hematuria  · Urine currently without visible hematuria however UA consistent with microscopic hematuria  · Eliquis was briefly held then restarted  · Cont to monitor for recurrence      Atrial fibrillation [facility])  Assessment & Plan  · Evaluated by cardiology for atrial fibrillation with rapid ventricular response   · improved after successful SOLA/cardioversion on 7/25/23  · Rhythm control with amiodarone 20 mg daily, Cardizem 120 mg daily  · Eliquis for anticoagulation      Impaired decision making  Assessment & Plan  neuropsych evaluation was done on 7/24/2023  He continues to have impaired decision-making capacity  Guardianship process initiated by case management     Daily consumption of alcohol  Assessment & Plan  · Patient noted daily alcohol use on admission  · Clinically no withdrawal.  · Continue thiamine and folic acid supplementation    Bacteremia  Assessment & Plan  · Aerococcus bacteremia due to complicated UTI   · Completed full course of antibiotics under the guidance of the infectious disease team    Hydronephrosis with obstructing calculus  Assessment & Plan  · Admitted originally due to sepsis from UTI with obstruction  · Status post cystoscopy with difficult stent insertion on 7/6/2023  · Procedure was complicated by inadvertent left ureteral perforation  · Follow-up CT showed previous ureteral calculus is now in the bladder  · Repeat cystoscopy on 8/3 with stent placement.    · As per urology okay to restart Eliquis which was restarted on 8/19  · Monitor closely for any recurrence of hematuria  · Urology recommended repeat CT in 2 weeks with outpatient cystoscopy/stent removal    Esophageal abnormality  Assessment & Plan  · incidental finding on CT with diffuse esophageal thickening could be from esophagitis  · Patient was evaluated by the GI team: Note that esophageal wall thickening could be secondary to reflux esophagitis, Bruno's, or hiatal hernia  · Continue empiric Protonix  · Outpatient follow-up: May require elective outpatient upper endoscopy      Elevated troponin  Assessment & Plan  · Non-MI troponin elevation secondary to tachycardia and sepsis   · Appreciate cardiology recommendations no additional work-up currently required  · Continue aspirin and Lipitor    COPD (chronic obstructive pulmonary disease) (HCC)  Assessment & Plan  · Stable without exacerbation  · Continue albuterol and breo     Tobacco use  Assessment & Plan  · Smokes 6 cigarettes daily. · Nicotine TD patch  · Smoking cessation counseling provided                VTE Pharmacologic Prophylaxis:   Pharmacologic: Eliquis    Patient Centered Rounds: I have performed bedside rounds with nursing staff today. Education and Discussions with Family / Patient: Patient    Time Spent for Care: More than 50% of total time spent on counseling and coordination of care as described above. Current Length of Stay: 50 day(s)    Current Patient Status: Inpatient   Certification Statement: The patient will continue to require additional inpatient hospital stay due to Awaiting guardianship    Discharge Plan / Estimated Discharge Date: TBD    Code Status: Level 1 - Full Code      Subjective:   Patient seen and examined at bedside, states he feels his urine was darker yesterday but is more clear today. Denies any dysuria, urinary urgency or frequency. Objective:     Vitals:   Temp (24hrs), Av.7 °F (36.5 °C), Min:97.4 °F (36.3 °C), Max:97.9 °F (36.6 °C)    Temp:  [97.4 °F (36.3 °C)-97.9 °F (36.6 °C)] 97.4 °F (36.3 °C)  HR:  [67-85] 85  Resp:  [16-18] 18  BP: (135-139)/(63-72) 135/63  SpO2:  [93 %-94 %] 93 %  Body mass index is 28 kg/m². Input and Output Summary (last 24 hours):     No intake or output data in the 24 hours ending 23 1132    Physical Exam:    Constitutional: Patient is oriented to person, place and time, no acute distress  HEENT:  Normocephalic, atraumatic  Cardiovascular: Normal S1S2, RRR, No murmurs/rubs/gallops appreciated.   Pulmonary:  Bilateral air entry, No rhonchi/rales/wheezing appreciated  Abdominal: Soft, Bowel sounds present, Non-tender, Non-distended  Extremities:  No cyanosis, clubbing or edema. Neurological: awake, alert  Skin:  Warm, dry    Additional Data:     Labs:    Results from last 7 days   Lab Units 08/19/23  0553   WBC Thousand/uL 11.45*   HEMOGLOBIN g/dL 12.7   HEMATOCRIT % 40.0   PLATELETS Thousands/uL 350   NEUTROS PCT % 66   LYMPHS PCT % 20   MONOS PCT % 9   EOS PCT % 3     Results from last 7 days   Lab Units 08/19/23  0553   POTASSIUM mmol/L 4.5   CHLORIDE mmol/L 103   CO2 mmol/L 28   BUN mg/dL 24   CREATININE mg/dL 1.10   CALCIUM mg/dL 8.8            I Have Reviewed All Lab Data Listed Above.     Invasive Devices     Drain  Duration           Ureteral Internal Stent Left ureter 6 Fr. 49 days    Ureteral Internal Stent Left ureter 7 Fr. 21 days                     Recent Cultures (last 7 days):           Last 24 Hours Medication List:   Current Facility-Administered Medications   Medication Dose Route Frequency Provider Last Rate   • acetaminophen  975 mg Oral Q6H [facility] Christian Lopes MD     • albuterol  2.5 mg Nebulization Q6H PRN Christian Lopes MD     • aluminum-magnesium hydroxide-simethicone  30 mL Oral Q6H PRN Christian Lopes MD     • amiodarone  200 mg Oral Daily With Breakfast Bubba Morrow MD     • apixaban  5 mg Oral BID Hilario Dawson MD     • atorvastatin  40 mg Oral Daily With Mustapha Gibson MD     • diltiazem  120 mg Oral Daily Christian Lopes MD     • docusate sodium  100 mg Oral BID PRN Hilario Dawson MD     • fluticasone-vilanterol  1 puff Inhalation Daily Christian Lopes MD     • folic acid  1 mg Oral Daily Christian Lopes MD     • guaiFENesin  200 mg Oral Q4H PRN Christian Lopes MD     • hydrOXYzine HCL  50 mg Oral HS PRN Christian Lopes MD     • lidocaine  1 patch Topical Daily Christian Lopes MD     • melatonin  6 mg Oral HS Christian Lopes MD     • methocarbamol  750 mg Oral [facility] Christian Lopes MD     • multivitamin-minerals  1 tablet Oral Daily Basilia Curling, MD     • nicotine  7 mg Transdermal Daily Rory Kapoor MD     • pantoprazole  40 mg Oral Early Morning Basilia Curling, MD     • polyethylene glycol  17 g Oral Daily PRN Karl Cordero MD     • thiamine  100 mg Oral Daily Basilia Curling, MD          Today, Patient Was Seen By: Delbert Casper MD

## 2023-08-25 NOTE — PLAN OF CARE
Problem: Potential for Falls  Goal: Patient will remain free of falls  Description: INTERVENTIONS:  - Educate patient/family on patient safety including physical limitations  - Instruct patient to call for assistance with activity   - Consult OT/PT to assist with strengthening/mobility   - Keep Call bell within reach  - Keep bed low and locked with side rails adjusted as appropriate  - Keep care items and personal belongings within reach  - Initiate and maintain comfort rounds  - Make Fall Risk Sign visible to staff  - Apply yellow socks and bracelet for high fall risk patients  - Consider moving patient to room near nurses station  Outcome: Progressing     Problem: MOBILITY - ADULT  Goal: Maintain or return to baseline ADL function  Description: INTERVENTIONS:  - Educate patient/family on patient safety including physical limitations  - Instruct patient to call for assistance with activity   - Consult OT/PT to assist with strengthening/mobility   - Keep Call bell within reach  - Keep bed low and locked with side rails adjusted as appropriate  - Keep care items and personal belongings within reach  - Initiate and maintain comfort rounds  - Make Fall Risk Sign visible to staff  - Apply yellow socks and bracelet for high fall risk patients  - Consider moving patient to room near nurses station  Outcome: Progressing  Goal: Maintains/Returns to pre admission functional level  Description: INTERVENTIONS:  - Perform BMAT or MOVE assessment daily.   - Set and communicate daily mobility goal to care team and patient/family/caregiver. - Collaborate with rehabilitation services on mobility goals if consulted  - Perform Range of Motion 4 times a day. - Reposition patient every 2 hours.   - Dangle patient 4 times a day  - Stand patient 4 times a day  - Ambulate patient 4 times a day  - Out of bed to chair 4 times a day   - Out of bed for meals 4 times a day  - Out of bed for toileting  - Record patient progress and toleration of activity level   Outcome: Progressing     Problem: PAIN - ADULT  Goal: Verbalizes/displays adequate comfort level or baseline comfort level  Description: Interventions:  - Encourage patient to monitor pain and request assistance  - Assess pain using appropriate pain scale  - Administer analgesics based on type and severity of pain and evaluate response  - Implement non-pharmacological measures as appropriate and evaluate response  - Consider cultural and social influences on pain and pain management  - Notify physician/advanced practitioner if interventions unsuccessful or patient reports new pain  Outcome: Progressing     Problem: INFECTION - ADULT  Goal: Absence or prevention of progression during hospitalization  Description: INTERVENTIONS:  - Assess and monitor for signs and symptoms of infection  - Monitor lab/diagnostic results  - Monitor all insertion sites, i.e. indwelling lines, tubes, and drains  - Monitor endotracheal if appropriate and nasal secretions for changes in amount and color  - [place] appropriate cooling/warming therapies per order  - Administer medications as ordered  - Instruct and encourage patient and family to use good hand hygiene technique  - Identify and instruct in appropriate isolation precautions for identified infection/condition  Outcome: Progressing  Goal: Absence of fever/infection during neutropenic period  Description: INTERVENTIONS:  - Monitor WBC    Outcome: Progressing     Problem: SAFETY ADULT  Goal: Patient will remain free of falls  Description: INTERVENTIONS:  - Educate patient/family on patient safety including physical limitations  - Instruct patient to call for assistance with activity   - Consult OT/PT to assist with strengthening/mobility   - Keep Call bell within reach  - Keep bed low and locked with side rails adjusted as appropriate  - Keep care items and personal belongings within reach  - Initiate and maintain comfort rounds  - Make Fall Risk Sign visible to staff  - Apply yellow socks and bracelet for high fall risk patients  - Consider moving patient to room near nurses station  Outcome: Progressing  Goal: Maintain or return to baseline ADL function  Description: INTERVENTIONS:  - Educate patient/family on patient safety including physical limitations  - Instruct patient to call for assistance with activity   - Consult OT/PT to assist with strengthening/mobility   - Keep Call bell within reach  - Keep bed low and locked with side rails adjusted as appropriate  - Keep care items and personal belongings within reach  - Initiate and maintain comfort rounds  - Make Fall Risk Sign visible to staff  - Apply yellow socks and bracelet for high fall risk patients  - Consider moving patient to room near nurses station  Outcome: Progressing  Goal: Maintains/Returns to pre admission functional level  Description: INTERVENTIONS:  - Perform BMAT or MOVE assessment daily.   - Set and communicate daily mobility goal to care team and patient/family/caregiver. - Collaborate with rehabilitation services on mobility goals if consulted  - Perform Range of Motion 4 times a day. - Reposition patient every 2 hours.   - Dangle patient 4 times a day  - Stand patient 4 times a day  - Ambulate patient 4 times a day  - Out of bed to chair 4 times a day   - Out of bed for meals 4 times a day  - Out of bed for toileting  - Record patient progress and toleration of activity level   Outcome: Progressing     Problem: DISCHARGE PLANNING  Goal: Discharge to home or other facility with appropriate resources  Description: INTERVENTIONS:  - Identify barriers to discharge w/patient and caregiver  - Arrange for needed discharge resources and transportation as appropriate  - Identify discharge learning needs (meds, wound care, etc.)  - Arrange for interpretive services to assist at discharge as needed  - Refer to Case Management Department for coordinating discharge planning if the patient needs post-hospital services based on physician/advanced practitioner order or complex needs related to functional status, cognitive ability, or social support system  Outcome: Progressing     Problem: Knowledge Deficit  Goal: Patient/family/caregiver demonstrates understanding of disease process, treatment plan, medications, and discharge instructions  Description: Complete learning assessment and assess knowledge base.   Interventions:  - Provide teaching at level of understanding  - Provide teaching via preferred learning methods  Outcome: Progressing     Problem: Prexisting or High Potential for Compromised Skin Integrity  Goal: Skin integrity is maintained or improved  Description: INTERVENTIONS:  - Identify patients at risk for skin breakdown  - Assess and monitor skin integrity  - Assess and monitor nutrition and hydration status  - Monitor labs   - Assess for incontinence   - Turn and reposition patient  - Assist with mobility/ambulation  - Relieve pressure over bony prominences  - Avoid friction and shearing  - Provide appropriate hygiene as needed including keeping skin clean and dry  - Evaluate need for skin moisturizer/barrier cream  - Collaborate with interdisciplinary team   - Patient/family teaching  - Consider wound care consult   Outcome: Progressing     Problem: GENITOURINARY - ADULT  Goal: Maintains or returns to baseline urinary function  Description: INTERVENTIONS:  - Assess urinary function  - Encourage oral fluids to ensure adequate hydration if ordered  - Administer IV fluids as ordered to ensure adequate hydration  - Administer ordered medications as needed  - Offer frequent toileting  - Follow urinary retention protocol if ordered  Outcome: Progressing     Problem: HEMATOLOGIC - ADULT  Goal: Maintains hematologic stability  Description: INTERVENTIONS  - Assess for signs and symptoms of bleeding or hemorrhage  - Monitor labs  - Administer supportive blood products/factors as ordered and appropriate  Outcome: Progressing

## 2023-08-26 ENCOUNTER — APPOINTMENT (INPATIENT)
Dept: CT IMAGING | Facility: HOSPITAL | Age: 73
DRG: 853 | End: 2023-08-26
Payer: MEDICARE

## 2023-08-26 PROBLEM — D72.829 LEUKOCYTOSIS: Status: RESOLVED | Noted: 2023-08-06 | Resolved: 2023-08-26

## 2023-08-26 LAB
ANION GAP SERPL CALCULATED.3IONS-SCNC: 2 MMOL/L
BACTERIA UR QL AUTO: ABNORMAL /HPF
BASOPHILS # BLD AUTO: 0.06 THOUSANDS/ÂΜL (ref 0–0.1)
BASOPHILS NFR BLD AUTO: 1 % (ref 0–1)
BILIRUB UR QL STRIP: NEGATIVE
BUN SERPL-MCNC: 28 MG/DL (ref 5–25)
CALCIUM SERPL-MCNC: 9.1 MG/DL (ref 8.4–10.2)
CHLORIDE SERPL-SCNC: 104 MMOL/L (ref 96–108)
CLARITY UR: ABNORMAL
CO2 SERPL-SCNC: 33 MMOL/L (ref 21–32)
COLOR UR: ABNORMAL
CREAT SERPL-MCNC: 0.98 MG/DL (ref 0.6–1.3)
EOSINOPHIL # BLD AUTO: 0.33 THOUSAND/ÂΜL (ref 0–0.61)
EOSINOPHIL NFR BLD AUTO: 3 % (ref 0–6)
ERYTHROCYTE [DISTWIDTH] IN BLOOD BY AUTOMATED COUNT: 13.8 % (ref 11.6–15.1)
GFR SERPL CREATININE-BSD FRML MDRD: 75 ML/MIN/1.73SQ M
GLUCOSE SERPL-MCNC: 90 MG/DL (ref 65–140)
GLUCOSE UR STRIP-MCNC: NEGATIVE MG/DL
HCT VFR BLD AUTO: 38.4 % (ref 36.5–49.3)
HGB BLD-MCNC: 11.9 G/DL (ref 12–17)
HGB UR QL STRIP.AUTO: ABNORMAL
IMM GRANULOCYTES # BLD AUTO: 0.04 THOUSAND/UL (ref 0–0.2)
IMM GRANULOCYTES NFR BLD AUTO: 0 % (ref 0–2)
KETONES UR STRIP-MCNC: NEGATIVE MG/DL
LEUKOCYTE ESTERASE UR QL STRIP: ABNORMAL
LYMPHOCYTES # BLD AUTO: 2.07 THOUSANDS/ÂΜL (ref 0.6–4.47)
LYMPHOCYTES NFR BLD AUTO: 21 % (ref 14–44)
MCH RBC QN AUTO: 31 PG (ref 26.8–34.3)
MCHC RBC AUTO-ENTMCNC: 31 G/DL (ref 31.4–37.4)
MCV RBC AUTO: 100 FL (ref 82–98)
MONOCYTES # BLD AUTO: 1.25 THOUSAND/ÂΜL (ref 0.17–1.22)
MONOCYTES NFR BLD AUTO: 12 % (ref 4–12)
NEUTROPHILS # BLD AUTO: 6.3 THOUSANDS/ÂΜL (ref 1.85–7.62)
NEUTS SEG NFR BLD AUTO: 63 % (ref 43–75)
NITRITE UR QL STRIP: NEGATIVE
NON-SQ EPI CELLS URNS QL MICRO: ABNORMAL /HPF
NRBC BLD AUTO-RTO: 0 /100 WBCS
PH UR STRIP.AUTO: 8 [PH]
PLATELET # BLD AUTO: 344 THOUSANDS/UL (ref 149–390)
PMV BLD AUTO: 8.9 FL (ref 8.9–12.7)
POTASSIUM SERPL-SCNC: 4.3 MMOL/L (ref 3.5–5.3)
POTASSIUM SERPL-SCNC: 5.6 MMOL/L (ref 3.5–5.3)
PROT UR STRIP-MCNC: ABNORMAL MG/DL
RBC # BLD AUTO: 3.84 MILLION/UL (ref 3.88–5.62)
RBC #/AREA URNS AUTO: ABNORMAL /HPF
SODIUM SERPL-SCNC: 139 MMOL/L (ref 135–147)
SP GR UR STRIP.AUTO: 1.02 (ref 1–1.03)
UROBILINOGEN UR QL STRIP.AUTO: 0.2 E.U./DL
WBC # BLD AUTO: 10.05 THOUSAND/UL (ref 4.31–10.16)
WBC #/AREA URNS AUTO: ABNORMAL /HPF

## 2023-08-26 PROCEDURE — 87077 CULTURE AEROBIC IDENTIFY: CPT | Performed by: STUDENT IN AN ORGANIZED HEALTH CARE EDUCATION/TRAINING PROGRAM

## 2023-08-26 PROCEDURE — 84132 ASSAY OF SERUM POTASSIUM: CPT | Performed by: INTERNAL MEDICINE

## 2023-08-26 PROCEDURE — 74176 CT ABD & PELVIS W/O CONTRAST: CPT

## 2023-08-26 PROCEDURE — 87186 SC STD MICRODIL/AGAR DIL: CPT | Performed by: STUDENT IN AN ORGANIZED HEALTH CARE EDUCATION/TRAINING PROGRAM

## 2023-08-26 PROCEDURE — 85025 COMPLETE CBC W/AUTO DIFF WBC: CPT | Performed by: INTERNAL MEDICINE

## 2023-08-26 PROCEDURE — 99232 SBSQ HOSP IP/OBS MODERATE 35: CPT | Performed by: INTERNAL MEDICINE

## 2023-08-26 PROCEDURE — 87086 URINE CULTURE/COLONY COUNT: CPT | Performed by: STUDENT IN AN ORGANIZED HEALTH CARE EDUCATION/TRAINING PROGRAM

## 2023-08-26 PROCEDURE — 81001 URINALYSIS AUTO W/SCOPE: CPT | Performed by: STUDENT IN AN ORGANIZED HEALTH CARE EDUCATION/TRAINING PROGRAM

## 2023-08-26 PROCEDURE — 80048 BASIC METABOLIC PNL TOTAL CA: CPT | Performed by: INTERNAL MEDICINE

## 2023-08-26 PROCEDURE — G1004 CDSM NDSC: HCPCS

## 2023-08-26 RX ADMIN — THIAMINE HCL TAB 100 MG 100 MG: 100 TAB at 08:22

## 2023-08-26 RX ADMIN — MELATONIN 6 MG: at 21:20

## 2023-08-26 RX ADMIN — METHOCARBAMOL TABLETS 750 MG: 500 TABLET, COATED ORAL at 14:23

## 2023-08-26 RX ADMIN — ACETAMINOPHEN 325MG 975 MG: 325 TABLET ORAL at 06:35

## 2023-08-26 RX ADMIN — FOLIC ACID 1 MG: 1 TABLET ORAL at 08:22

## 2023-08-26 RX ADMIN — ATORVASTATIN CALCIUM 40 MG: 40 TABLET, FILM COATED ORAL at 17:13

## 2023-08-26 RX ADMIN — METHOCARBAMOL TABLETS 750 MG: 500 TABLET, COATED ORAL at 21:20

## 2023-08-26 RX ADMIN — APIXABAN 5 MG: 5 TABLET, FILM COATED ORAL at 08:22

## 2023-08-26 RX ADMIN — METHOCARBAMOL TABLETS 750 MG: 500 TABLET, COATED ORAL at 06:35

## 2023-08-26 RX ADMIN — AMIODARONE HYDROCHLORIDE 200 MG: 200 TABLET ORAL at 09:11

## 2023-08-26 RX ADMIN — PANTOPRAZOLE SODIUM 40 MG: 40 TABLET, DELAYED RELEASE ORAL at 06:35

## 2023-08-26 RX ADMIN — APIXABAN 5 MG: 5 TABLET, FILM COATED ORAL at 21:20

## 2023-08-26 RX ADMIN — ACETAMINOPHEN 325MG 975 MG: 325 TABLET ORAL at 17:16

## 2023-08-26 RX ADMIN — Medication 1 TABLET: at 08:22

## 2023-08-26 RX ADMIN — FLUTICASONE FUROATE AND VILANTEROL TRIFENATATE 1 PUFF: 200; 25 POWDER RESPIRATORY (INHALATION) at 08:21

## 2023-08-26 RX ADMIN — DILTIAZEM HYDROCHLORIDE 120 MG: 120 CAPSULE, COATED, EXTENDED RELEASE ORAL at 09:11

## 2023-08-26 NOTE — ASSESSMENT & PLAN NOTE
· Patient with a leukocytosis for the last 2 days; unclear etiology  · Afebrile and no other SIRS criteria; initially felt to be secondary to surgical procedure  ·

## 2023-08-26 NOTE — ASSESSMENT & PLAN NOTE
· Had ct 7/28 that revealed "Geovani Setting is a 2.9 x 2.2 cm hypodense lesion within the subcutaneous tissues of the mid back, overlying the spinous process of the T11 and T12 vertebral bodies, abutting the skin surface, likely a sebaceous cyst"  · He reported that if surgery was involved he would not want it surgical removed  · Supportive care at this time.

## 2023-08-26 NOTE — PROGRESS NOTES
[address] [facility]  Progress Note  Name: Rashid Denton I  MRN: [id number]  Unit/Bed#: E4 -01 I Date of Admission: 7/6/2023   Date of Service: 8/26/2023 I Hospital Day: 46    Assessment/Plan   * Sepsis secondary to UTI [facility])  Assessment & Plan  Patient is a 78-year-old male with past medical history significant for COPD, atrial fibrillation, and nephrolithiasis cystoscopy, and stent placement who presented with sepsis secondary to Enterococcus bacteremia and complicated UTI    · Completed a course of antibiotics   · Pt complained of recurrent dysuria earlier this week: Repeat urinalysis was sent: Similar to prior UA: Large blood, large leukocyte esterase, negative nitrate  · Associated urine culture was sent: growing enterococcus: Pt denies any urinary symptoms;  Prior dysuria resolved with pyridium  · As per prior provider discussion with ID team as pt has no symptoms off abx, and has multiple prior tx courses: Likely colonization    Sebaceous cyst  Assessment & Plan  · Had ct 7/28 that revealed "Peyman Primmer is a 2.9 x 2.2 cm hypodense lesion within the subcutaneous tissues of the mid back, overlying the spinous process of the T11 and T12 vertebral bodies, abutting the skin surface, likely a sebaceous cyst"  · He reported that if surgery was involved he would not want it surgical removed  · Supportive care at this time.      Gross hematuria  Assessment & Plan  · Patient has had recurrent hematuria during his hospitalization requiring CBI  · He is s/p cysto with stent after which which hematuria resolved, however once Eliquis was restarted patient had recurrent hematuria  · Urine currently without visible hematuria however UA consistent with microscopic hematuria  · Eliquis was briefly held then restarted  · Cont to monitor for recurrence      Atrial fibrillation ([address])  Assessment & Plan  · Evaluated by cardiology for atrial fibrillation with rapid ventricular response   · improved after successful SOLA/cardioversion on 7/25/23  · Rhythm control with amiodarone 20 mg daily, Cardizem 120 mg daily  · Eliquis for anticoagulation      Impaired decision making  Assessment & Plan  neuropsych evaluation was done on 7/24/2023  He continues to have impaired decision-making capacity  Guardianship process initiated by case management     Daily consumption of alcohol  Assessment & Plan  · Patient noted daily alcohol use on admission  · Clinically no withdrawal.  · Continue thiamine and folic acid supplementation    Bacteremia  Assessment & Plan  · Aerococcus bacteremia due to complicated UTI   · Completed full course of antibiotics under the guidance of the infectious disease team    Hydronephrosis with obstructing calculus  Assessment & Plan  · Admitted originally due to sepsis from UTI with obstruction  · Status post cystoscopy with difficult stent insertion on 7/6/2023  · Procedure was complicated by inadvertent left ureteral perforation  · Follow-up CT showed previous ureteral calculus is now in the bladder  · Repeat cystoscopy on 8/3 with stent placement.    · As per urology okay to restart Eliquis which was restarted on 8/19  · Monitor closely for any recurrence of hematuria  · Urology recommended repeat CT in 2 weeks with outpatient cystoscopy/stent removal    Esophageal abnormality  Assessment & Plan  · incidental finding on CT with diffuse esophageal thickening could be from esophagitis  · Patient was evaluated by the GI team: Note that esophageal wall thickening could be secondary to reflux esophagitis, Bruno's, or hiatal hernia  · Continue empiric Protonix  · Outpatient follow-up: May require elective outpatient upper endoscopy      Elevated troponin  Assessment & Plan  · Non-MI troponin elevation secondary to tachycardia and sepsis   · Appreciate cardiology recommendations no additional work-up currently required  · Continue aspirin and Lipitor    COPD (chronic obstructive pulmonary disease) [facility])  Assessment & Plan  · Stable without exacerbation  · Continue albuterol and breo     Tobacco use  Assessment & Plan  · Smokes 6 cigarettes daily. · Nicotine TD patch  · Smoking cessation counseling provided     Leukocytosis-resolved as of 2023  Assessment & Plan  · Patient with a leukocytosis for the last 2 days; unclear etiology  · Afebrile and no other SIRS criteria; initially felt to be secondary to surgical procedure  ·            VTE Pharmacologic Prophylaxis:   Pharmacologic: eliquis    Patient Centered Rounds: I have performed bedside rounds with nursing staff today. Education and Discussions with Family / Patient: patient    Time Spent for Care: More than 50% of total time spent on counseling and coordination of care as described above. Current Length of Stay: 51 day(s)    Current Patient Status: Inpatient   Certification Statement: The patient will continue to require additional inpatient hospital stay due to Awaiting guardianship    Discharge Plan / Estimated Discharge Date: TBD    Code Status: Level 1 - Full Code      Subjective:   Patient seen and examined at bedside, comfortable, denies any abdominal pain, nausea or vomiting    Objective:     Vitals:   Temp (24hrs), Av.3 °F (36.8 °C), Min:98 °F (36.7 °C), Max:98.4 °F (36.9 °C)    Temp:  [98 °F (36.7 °C)-98.4 °F (36.9 °C)] 98.4 °F (36.9 °C)  HR:  [65-80] 80  Resp:  [18] 18  BP: (130-142)/(65-77) 130/65  SpO2:  [94 %-96 %] 94 %  Body mass index is 28 kg/m². Input and Output Summary (last 24 hours):     No intake or output data in the 24 hours ending 23 1108    Physical Exam:    Constitutional: Patient is in no acute distress  HEENT:  Normocephalic, atraumatic  Cardiovascular: Normal S1S2, RRR, No murmurs/rubs/gallops appreciated. Pulmonary:  Bilateral air entry, No rhonchi/rales/wheezing appreciated  Abdominal: Soft, Bowel sounds present, Non-tender, Non-distended  Extremities:  No cyanosis, clubbing or edema. Neurological: awake, alert  Skin:  Warm, dry    Additional Data:     Labs:    Results from last 7 days   Lab Units 08/26/23  0608   WBC Thousand/uL 10.05   HEMOGLOBIN g/dL 11.9*   HEMATOCRIT % 38.4   PLATELETS Thousands/uL 344   NEUTROS PCT % 63   LYMPHS PCT % 21   MONOS PCT % 12   EOS PCT % 3     Results from last 7 days   Lab Units 08/26/23  0919 08/26/23  0608   POTASSIUM mmol/L 4.3 5.6*   CHLORIDE mmol/L  --  104   CO2 mmol/L  --  33*   BUN mg/dL  --  28*   CREATININE mg/dL  --  0.98   CALCIUM mg/dL  --  9.1            I Have Reviewed All Lab Data Listed Above.     Invasive Devices     Drain  Duration           Ureteral Internal Stent Left ureter 6 Fr. 50 days    Ureteral Internal Stent Left ureter 7 Fr. 22 days                     Recent Cultures (last 7 days):           Last 24 Hours Medication List:   Current Facility-Administered Medications   Medication Dose Route Frequency Provider Last Rate   • acetaminophen  975 mg Oral Q6H [facility] Macey Portillo MD     • albuterol  2.5 mg Nebulization Q6H PRN Macey Portillo MD     • aluminum-magnesium hydroxide-simethicone  30 mL Oral Q6H PRN Macey Portillo MD     • amiodarone  200 mg Oral Daily With Breakfast Javon Kou MD     • apixaban  5 mg Oral BID Pete Fuentes MD     • atorvastatin  40 mg Oral Daily With Cain Brown MD     • diltiazem  120 mg Oral Daily Macey Portillo MD     • docusate sodium  100 mg Oral BID PRN Pete Fuentes MD     • fluticasone-vilanterol  1 puff Inhalation Daily Macey Portillo MD     • folic acid  1 mg Oral Daily Macey Portillo MD     • guaiFENesin  200 mg Oral Q4H PRN Macey Portillo MD     • hydrOXYzine HCL  50 mg Oral HS PRN Macey Portillo MD     • lidocaine  1 patch Topical Daily Macey Portillo MD     • melatonin  6 mg Oral HS Macey Portillo MD     • methocarbamol  750 mg Oral [facility] Macey Portillo MD     • multivitamin-minerals  1 tablet Oral Daily Macey Portillo MD     • nicotine  7 mg Transdermal Daily Tommy Rose MD     • pantoprazole  40 mg Oral Early Morning Priyanka Sung MD     • polyethylene glycol  17 g Oral Daily PRN Lilliam Martinez MD     • thiamine  100 mg Oral Daily Priyanka Sung MD          Today, Patient Was Seen By: Erle Meckel, MD

## 2023-08-26 NOTE — ASSESSMENT & PLAN NOTE
Patient is a 59-year-old male with past medical history significant for COPD, atrial fibrillation, and nephrolithiasis cystoscopy, and stent placement who presented with sepsis secondary to Enterococcus bacteremia and complicated UTI    · Completed a course of antibiotics   · Pt complained of recurrent dysuria earlier this week: Repeat urinalysis was sent: Similar to prior UA: Large blood, large leukocyte esterase, negative nitrate  · Associated urine culture was sent: growing enterococcus: Pt denies any urinary symptoms;  Prior dysuria resolved with pyridium  · As per prior provider discussion with ID team as pt has no symptoms off abx, and has multiple prior tx courses: Likely colonization

## 2023-08-27 PROCEDURE — 99232 SBSQ HOSP IP/OBS MODERATE 35: CPT | Performed by: INTERNAL MEDICINE

## 2023-08-27 PROCEDURE — 99232 SBSQ HOSP IP/OBS MODERATE 35: CPT | Performed by: STUDENT IN AN ORGANIZED HEALTH CARE EDUCATION/TRAINING PROGRAM

## 2023-08-27 PROCEDURE — NC001 PR NO CHARGE: Performed by: NURSE PRACTITIONER

## 2023-08-27 RX ORDER — VANCOMYCIN HYDROCHLORIDE 1 G/200ML
10 INJECTION, SOLUTION INTRAVENOUS EVERY 12 HOURS
Status: DISCONTINUED | OUTPATIENT
Start: 2023-08-27 | End: 2023-08-28

## 2023-08-27 RX ORDER — CEPHALEXIN 500 MG/1
500 CAPSULE ORAL EVERY 6 HOURS SCHEDULED
Status: COMPLETED | OUTPATIENT
Start: 2023-08-28 | End: 2023-08-27

## 2023-08-27 RX ORDER — CEFTRIAXONE 1 G/50ML
1000 INJECTION, SOLUTION INTRAVENOUS EVERY 24 HOURS
Status: DISCONTINUED | OUTPATIENT
Start: 2023-08-28 | End: 2023-08-28

## 2023-08-27 RX ORDER — CEPHALEXIN 500 MG/1
500 CAPSULE ORAL EVERY 6 HOURS SCHEDULED
Status: DISCONTINUED | OUTPATIENT
Start: 2023-08-27 | End: 2023-08-27

## 2023-08-27 RX ADMIN — DILTIAZEM HYDROCHLORIDE 120 MG: 120 CAPSULE, COATED, EXTENDED RELEASE ORAL at 08:46

## 2023-08-27 RX ADMIN — FOLIC ACID 1 MG: 1 TABLET ORAL at 08:46

## 2023-08-27 RX ADMIN — APIXABAN 5 MG: 5 TABLET, FILM COATED ORAL at 22:45

## 2023-08-27 RX ADMIN — CEPHALEXIN 500 MG: 500 CAPSULE ORAL at 23:25

## 2023-08-27 RX ADMIN — THIAMINE HCL TAB 100 MG 100 MG: 100 TAB at 08:46

## 2023-08-27 RX ADMIN — MELATONIN 6 MG: at 22:45

## 2023-08-27 RX ADMIN — ACETAMINOPHEN 325MG 975 MG: 325 TABLET ORAL at 06:11

## 2023-08-27 RX ADMIN — PANTOPRAZOLE SODIUM 40 MG: 40 TABLET, DELAYED RELEASE ORAL at 06:12

## 2023-08-27 RX ADMIN — APIXABAN 5 MG: 5 TABLET, FILM COATED ORAL at 08:46

## 2023-08-27 RX ADMIN — FLUTICASONE FUROATE AND VILANTEROL TRIFENATATE 1 PUFF: 200; 25 POWDER RESPIRATORY (INHALATION) at 08:45

## 2023-08-27 RX ADMIN — VANCOMYCIN HYDROCHLORIDE 1000 MG: 1 INJECTION, SOLUTION INTRAVENOUS at 16:58

## 2023-08-27 RX ADMIN — ATORVASTATIN CALCIUM 40 MG: 40 TABLET, FILM COATED ORAL at 16:58

## 2023-08-27 RX ADMIN — AMIODARONE HYDROCHLORIDE 200 MG: 200 TABLET ORAL at 08:46

## 2023-08-27 RX ADMIN — METHOCARBAMOL TABLETS 750 MG: 500 TABLET, COATED ORAL at 22:45

## 2023-08-27 RX ADMIN — ACETAMINOPHEN 325MG 975 MG: 325 TABLET ORAL at 23:25

## 2023-08-27 RX ADMIN — METHOCARBAMOL TABLETS 750 MG: 500 TABLET, COATED ORAL at 06:11

## 2023-08-27 RX ADMIN — ACETAMINOPHEN 325MG 975 MG: 325 TABLET ORAL at 18:06

## 2023-08-27 RX ADMIN — CEPHALEXIN 500 MG: 500 CAPSULE ORAL at 18:06

## 2023-08-27 RX ADMIN — Medication 1 TABLET: at 08:46

## 2023-08-27 RX ADMIN — METHOCARBAMOL TABLETS 750 MG: 500 TABLET, COATED ORAL at 15:24

## 2023-08-27 NOTE — PROGRESS NOTES
[facility]  Progress Note  Name: Laz Diamond I  MRN: [id number]  Unit/Bed#: E4 -01 I Date of Admission: 7/6/2023   Date of Service: 8/27/2023 I Hospital Day: 46    Assessment/Plan   * UTI (urinary tract infection)  Assessment & Plan  Patient is a 67-year-old male with past medical history significant for COPD, atrial fibrillation, and nephrolithiasis cystoscopy, and stent placement who presented with sepsis secondary to Enterococcus bacteremia and complicated UTI    · Completed a course of antibiotics   · Urinalysis 8/26 revealed large leukocytes, large blood, 30-50 WBC, moderate bacteria  · Start vancomycin and Keflex, pharmacy consultation  · Prior urine culture 8/18 revealed Enterococcus sensitive to vancomycin  · Culture/3 revealed Staphylococcus coag negative, Enterococcus, Candida, sensitive to vancomycin  · Infectious disease input will be appreciated    Sebaceous cyst  Assessment & Plan  · Had ct 7/28 that revealed "Maddison Keys is a 2.9 x 2.2 cm hypodense lesion within the subcutaneous tissues of the mid back, overlying the spinous process of the T11 and T12 vertebral bodies, abutting the skin surface, likely a sebaceous cyst"  · He reported that if surgery was involved he would not want it surgical removed  · Supportive care at this time.      Gross hematuria  Assessment & Plan  · Patient has had recurrent hematuria during his hospitalization requiring CBI  · He is s/p cysto with stent after which which hematuria resolved, however once Eliquis was restarted patient had recurrent hematuria  · Eliquis was briefly held then restarted  · She did have some pink-tinged urine, urology reevaluation appreciated      Atrial fibrillation [facility])  Assessment & Plan  · Evaluated by cardiology for atrial fibrillation with rapid ventricular response   · improved after successful SOLA/cardioversion on 7/25/23  · Rhythm control with amiodarone 20 mg daily, Cardizem 120 mg daily  · Eliquis for anticoagulation      Impaired decision making  Assessment & Plan  neuropsych evaluation was done on 7/24/2023  He continues to have impaired decision-making capacity  Guardianship process initiated by case management     Daily consumption of alcohol  Assessment & Plan  · Patient noted daily alcohol use on admission  · Clinically no withdrawal.  · Continue thiamine and folic acid supplementation    Bacteremia  Assessment & Plan  · Aerococcus bacteremia due to complicated UTI   · Completed full course of antibiotics under the guidance of the infectious disease team    Hydronephrosis with obstructing calculus  Assessment & Plan  · Admitted originally due to sepsis from UTI with obstruction  · Status post cystoscopy with difficult stent insertion on 7/6/2023  · Procedure was complicated by inadvertent left ureteral perforation  · Follow-up CT showed previous ureteral calculus is now in the bladder  · Repeat cystoscopy on 8/3 with stent placement.    · As per urology okay to restart Eliquis which was restarted on 8/19  · Monitor closely for any recurrence of hematuria  · Urology reevaluation appreciated we will try to schedule cystoscopy and stent removal later this week    Esophageal abnormality  Assessment & Plan  · incidental finding on CT with diffuse esophageal thickening could be from esophagitis  · Patient was evaluated by the GI team: Note that esophageal wall thickening could be secondary to reflux esophagitis, Bruno's, or hiatal hernia  · Continue empiric Protonix  · Outpatient follow-up: May require elective outpatient upper endoscopy      Elevated troponin  Assessment & Plan  · Non-MI troponin elevation secondary to tachycardia and sepsis   · Appreciate cardiology recommendations no additional work-up currently required  · Continue aspirin and Lipitor    COPD (chronic obstructive pulmonary disease) ([address])  Assessment & Plan  · Stable without exacerbation  · Continue albuterol and breo     Tobacco use  Assessment & Plan  · Smokes 6 cigarettes daily. · Nicotine TD patch  · Smoking cessation counseling provided                VTE Pharmacologic Prophylaxis:   Pharmacologic: Eliquis    Patient Centered Rounds: I have performed bedside rounds with nursing staff today. Discussions with Specialists or Other Care Team Provider: Urology team    Education and Discussions with Family / Patient: Patient    Time Spent for Care: More than 50% of total time spent on counseling and coordination of care as described above. Current Length of Stay: 52 day(s)    Current Patient Status: Inpatient   Certification Statement: The patient will continue to require additional inpatient hospital stay due to Awaiting guardianship, UTI    Discharge Plan / Estimated Discharge Date: TBD    Code Status: Level 1 - Full Code      Subjective:   Patient seen and examined at bedside, denies any abdominal pain, nausea, vomiting. Denies any dysuria, urinary urgency or frequency. Does note change in color of urine. Objective:     Vitals:   Temp (24hrs), Av.2 °F (36.8 °C), Min:97.9 °F (36.6 °C), Max:98.4 °F (36.9 °C)    Temp:  [97.9 °F (36.6 °C)-98.4 °F (36.9 °C)] 98.4 °F (36.9 °C)  HR:  [59-73] 73  Resp:  [18-19] 19  BP: (111-138)/(58-66) 123/66  SpO2:  [94 %-95 %] 94 %  Body mass index is 28 kg/m². Input and Output Summary (last 24 hours): Intake/Output Summary (Last 24 hours) at 2023 1503  Last data filed at 2023 1030  Gross per 24 hour   Intake 560 ml   Output 730 ml   Net -170 ml       Physical Exam:    Constitutional: Patient is in no acute distress  HEENT:  Normocephalic, atraumatic  Cardiovascular: Normal S1S2, RRR, No murmurs/rubs/gallops appreciated. Pulmonary:  Bilateral air entry, No rhonchi/rales/wheezing appreciated  Abdominal: Soft, Bowel sounds present, Non-tender, Non-distended  Extremities:  No cyanosis, clubbing or edema.    Neurological: awake, alert  Skin:  Warm, dry    Additional Data: Labs:    Results from last 7 days   Lab Units 08/26/23  0608   WBC Thousand/uL 10.05   HEMOGLOBIN g/dL 11.9*   HEMATOCRIT % 38.4   PLATELETS Thousands/uL 344   NEUTROS PCT % 63   LYMPHS PCT % 21   MONOS PCT % 12   EOS PCT % 3     Results from last 7 days   Lab Units 08/26/23  0919 08/26/23  0608   POTASSIUM mmol/L 4.3 5.6*   CHLORIDE mmol/L  --  104   CO2 mmol/L  --  33*   BUN mg/dL  --  28*   CREATININE mg/dL  --  0.98   CALCIUM mg/dL  --  9.1            I Have Reviewed All Lab Data Listed Above.     Invasive Devices     Drain  Duration           Ureteral Internal Stent Left ureter 6 Fr. 51 days    Ureteral Internal Stent Left ureter 7 Fr. 24 days                     Recent Cultures (last 7 days):           Last 24 Hours Medication List:   Current Facility-Administered Medications   Medication Dose Route Frequency Provider Last Rate   • acetaminophen  975 mg Oral Q6H [address] Eusebio Mohan MD     • albuterol  2.5 mg Nebulization Q6H PRN Eusebio Mohan MD     • aluminum-magnesium hydroxide-simethicone  30 mL Oral Q6H PRN Eusebio Mohan MD     • amiodarone  200 mg Oral Daily With Breakfast Angel Olmstead MD     • apixaban  5 mg Oral BID Ivanna Cadet MD     • atorvastatin  40 mg Oral Daily With Jarett Rush MD     • diltiazem  120 mg Oral Daily Eusebio Mohan MD     • docusate sodium  100 mg Oral BID PRN Ivanna Cadet MD     • fluticasone-vilanterol  1 puff Inhalation Daily Eusebio Mohan MD     • folic acid  1 mg Oral Daily Eusebio Mohan MD     • guaiFENesin  200 mg Oral Q4H PRN Eusebio Mohan MD     • hydrOXYzine HCL  50 mg Oral HS PRN Eusebio Mohan MD     • lidocaine  1 patch Topical Daily Eusebio Mohan MD     • melatonin  6 mg Oral HS Eusebio Mohan MD     • methocarbamol  750 mg Oral [facility] Eusebio Mohan MD     • multivitamin-minerals  1 tablet Oral Daily Eusebio Mohan MD     • nicotine  7 mg Transdermal Daily Jose Agnel Rasmussen MD     • pantoprazole  40 mg Oral Early Morning Jaswinder Rendon MD     • polyethylene glycol  17 g Oral Daily PRN Na Herr MD     • thiamine  100 mg Oral Daily Jaswinder Rendon MD     • vancomycin  15 mg/kg Intravenous Q12H Justin Huerta MD          Today, Patient Was Seen By: Justin Huerta MD

## 2023-08-27 NOTE — ASSESSMENT & PLAN NOTE
· Patient has had recurrent hematuria during his hospitalization requiring CBI  · He is s/p cysto with stent after which which hematuria resolved, however once Eliquis was restarted patient had recurrent hematuria  · Eliquis was briefly held then restarted  · She did have some pink-tinged urine, urology reevaluation appreciated

## 2023-08-27 NOTE — PLAN OF CARE
Problem: Potential for Falls  Goal: Patient will remain free of falls  Description: INTERVENTIONS:  - Educate patient/family on patient safety including physical limitations  - Instruct patient to call for assistance with activity   - Consult OT/PT to assist with strengthening/mobility   - Keep Call bell within reach  - Keep bed low and locked with side rails adjusted as appropriate  - Keep care items and personal belongings within reach  - Initiate and maintain comfort rounds  - Make Fall Risk Sign visible to staff  - Apply yellow socks and bracelet for high fall risk patients  - Consider moving patient to room near nurses station  Outcome: Progressing     Problem: MOBILITY - ADULT  Goal: Maintain or return to baseline ADL function  Description: INTERVENTIONS:  - Educate patient/family on patient safety including physical limitations  - Instruct patient to call for assistance with activity   - Consult OT/PT to assist with strengthening/mobility   - Keep Call bell within reach  - Keep bed low and locked with side rails adjusted as appropriate  - Keep care items and personal belongings within reach  - Initiate and maintain comfort rounds  - Make Fall Risk Sign visible to staff  - Apply yellow socks and bracelet for high fall risk patients  - Consider moving patient to room near nurses station  Outcome: Progressing  Goal: Maintains/Returns to pre admission functional level  Description: INTERVENTIONS:  - Perform BMAT or MOVE assessment daily.   - Set and communicate daily mobility goal to care team and patient/family/caregiver. - Collaborate with rehabilitation services on mobility goals if consulted  - Perform Range of Motion times a day. - Reposition patient every  hours.   - Dangle patient  times a day  - Stand patient  times a day  - Ambulate patient  times a day  - Out of bed to chair  times a day   - Out of bed for meals mes a day  - Out of bed for toileting  - Record patient progress and toleration of activity level   Outcome: Progressing     Problem: PAIN - ADULT  Goal: Verbalizes/displays adequate comfort level or baseline comfort level  Description: Interventions:  - Encourage patient to monitor pain and request assistance  - Assess pain using appropriate pain scale  - Administer analgesics based on type and severity of pain and evaluate response  - Implement non-pharmacological measures as appropriate and evaluate response  - Consider cultural and social influences on pain and pain management  - Notify physician/advanced practitioner if interventions unsuccessful or patient reports new pain  Outcome: Progressing     Problem: INFECTION - ADULT  Goal: Absence or prevention of progression during hospitalization  Description: INTERVENTIONS:  - Assess and monitor for signs and symptoms of infection  - Monitor lab/diagnostic results  - Monitor all insertion sites, i.e. indwelling lines, tubes, and drains  - Monitor endotracheal if appropriate and nasal secretions for changes in amount and color  - [place] appropriate cooling/warming therapies per order  - Administer medications as ordered  - Instruct and encourage patient and family to use good hand hygiene technique  - Identify and instruct in appropriate isolation precautions for identified infection/condition  Outcome: Progressing  Goal: Absence of fever/infection during neutropenic period  Description: INTERVENTIONS:  - Monitor WBC    Outcome: Progressing     Problem: SAFETY ADULT  Goal: Patient will remain free of falls  Description: INTERVENTIONS:  - Educate patient/family on patient safety including physical limitations  - Instruct patient to call for assistance with activity   - Consult OT/PT to assist with strengthening/mobility   - Keep Call bell within reach  - Keep bed low and locked with side rails adjusted as appropriate  - Keep care items and personal belongings within reach  - Initiate and maintain comfort rounds  - Make Fall Risk Sign visible to staff  - Apply yellow socks and bracelet for high fall risk patients  - Consider moving patient to room near nurses station  Outcome: Progressing  Goal: Maintain or return to baseline ADL function  Description: INTERVENTIONS:  - Educate patient/family on patient safety including physical limitations  - Instruct patient to call for assistance with activity   - Consult OT/PT to assist with strengthening/mobility   - Keep Call bell within reach  - Keep bed low and locked with side rails adjusted as appropriate  - Keep care items and personal belongings within reach  - Initiate and maintain comfort rounds  - Make Fall Risk Sign visible to staff  - Apply yellow socks and bracelet for high fall risk patients  - Consider moving patient to room near nurses station  Outcome: Progressing  Goal: Maintains/Returns to pre admission functional level  Description: INTERVENTIONS:  - Perform BMAT or MOVE assessment daily.   - Set and communicate daily mobility goal to care team and patient/family/caregiver. - Collaborate with rehabilitation services on mobility goals if consulted  - Perform Range of Motion  times a day. - Reposition patient every  hours.   - Dangle patient  times a day  - Stand patient  times a day  - Ambulate patient  times a day  - Out of bed to chair  times a day   - Out of bed for meals  times a day  - Out of bed for toileting  - Record patient progress and toleration of activity level   Outcome: Progressing     Problem: DISCHARGE PLANNING  Goal: Discharge to home or other facility with appropriate resources  Description: INTERVENTIONS:  - Identify barriers to discharge w/patient and caregiver  - Arrange for needed discharge resources and transportation as appropriate  - Identify discharge learning needs (meds, wound care, etc.)  - Arrange for interpretive services to assist at discharge as needed  - Refer to Case Management Department for coordinating discharge planning if the patient needs post-hospital services based on physician/advanced practitioner order or complex needs related to functional status, cognitive ability, or social support system  Outcome: Progressing     Problem: Knowledge Deficit  Goal: Patient/family/caregiver demonstrates understanding of disease process, treatment plan, medications, and discharge instructions  Description: Complete learning assessment and assess knowledge base.   Interventions:  - Provide teaching at level of understanding  - Provide teaching via preferred learning methods  Outcome: Progressing     Problem: Prexisting or High Potential for Compromised Skin Integrity  Goal: Skin integrity is maintained or improved  Description: INTERVENTIONS:  - Identify patients at risk for skin breakdown  - Assess and monitor skin integrity  - Assess and monitor nutrition and hydration status  - Monitor labs   - Assess for incontinence   - Turn and reposition patient  - Assist with mobility/ambulation  - Relieve pressure over bony prominences  - Avoid friction and shearing  - Provide appropriate hygiene as needed including keeping skin clean and dry  - Evaluate need for skin moisturizer/barrier cream  - Collaborate with interdisciplinary team   - Patient/family teaching  - Consider wound care consult   Outcome: Progressing     Problem: GENITOURINARY - ADULT  Goal: Maintains or returns to baseline urinary function  Description: INTERVENTIONS:  - Assess urinary function  - Encourage oral fluids to ensure adequate hydration if ordered  - Administer IV fluids as ordered to ensure adequate hydration  - Administer ordered medications as needed  - Offer frequent toileting  - Follow urinary retention protocol if ordered  Outcome: Progressing     Problem: HEMATOLOGIC - ADULT  Goal: Maintains hematologic stability  Description: INTERVENTIONS  - Assess for signs and symptoms of bleeding or hemorrhage  - Monitor labs  - Administer supportive blood products/factors as ordered and appropriate  Outcome: Progressing

## 2023-08-27 NOTE — PLAN OF CARE
Problem: Potential for Falls  Goal: Patient will remain free of falls  Description: INTERVENTIONS:  - Educate patient/family on patient safety including physical limitations  - Instruct patient to call for assistance with activity   - Consult OT/PT to assist with strengthening/mobility   - Keep Call bell within reach  - Keep bed low and locked with side rails adjusted as appropriate  - Keep care items and personal belongings within reach  - Initiate and maintain comfort rounds  - Make Fall Risk Sign visible to staff  - Apply yellow socks and bracelet for high fall risk patients  - Consider moving patient to room near nurses station  Outcome: Progressing     Problem: MOBILITY - ADULT  Goal: Maintain or return to baseline ADL function  Description: INTERVENTIONS:  - Educate patient/family on patient safety including physical limitations  - Instruct patient to call for assistance with activity   - Consult OT/PT to assist with strengthening/mobility   - Keep Call bell within reach  - Keep bed low and locked with side rails adjusted as appropriate  - Keep care items and personal belongings within reach  - Initiate and maintain comfort rounds  - Make Fall Risk Sign visible to staff  - Apply yellow socks and bracelet for high fall risk patients  - Consider moving patient to room near nurses station  Outcome: Progressing  Goal: Maintains/Returns to pre admission functional level  Description: INTERVENTIONS:  - Perform BMAT or MOVE assessment daily.   - Set and communicate daily mobility goal to care team and patient/family/caregiver. - Collaborate with rehabilitation services on mobility goals if consulted  - Perform Range of Motion  times a day. - Reposition patient every hours.   - Dangle patient  times a day  - Stand patient  times a day  - Ambulate patient  times a day  - Out of bed to chair  times a day   - Out of bed for meals  times a day  - Out of bed for toileting  - Record patient progress and toleration of activity level   Outcome: Progressing     Problem: PAIN - ADULT  Goal: Verbalizes/displays adequate comfort level or baseline comfort level  Description: Interventions:  - Encourage patient to monitor pain and request assistance  - Assess pain using appropriate pain scale  - Administer analgesics based on type and severity of pain and evaluate response  - Implement non-pharmacological measures as appropriate and evaluate response  - Consider cultural and social influences on pain and pain management  - Notify physician/advanced practitioner if interventions unsuccessful or patient reports new pain  Outcome: Progressing     Problem: INFECTION - ADULT  Goal: Absence or prevention of progression during hospitalization  Description: INTERVENTIONS:  - Assess and monitor for signs and symptoms of infection  - Monitor lab/diagnostic results  - Monitor all insertion sites, i.e. indwelling lines, tubes, and drains  - Monitor endotracheal if appropriate and nasal secretions for changes in amount and color  - [place] appropriate cooling/warming therapies per order  - Administer medications as ordered  - Instruct and encourage patient and family to use good hand hygiene technique  - Identify and instruct in appropriate isolation precautions for identified infection/condition  Outcome: Progressing  Goal: Absence of fever/infection during neutropenic period  Description: INTERVENTIONS:  - Monitor WBC    Outcome: Progressing     Problem: SAFETY ADULT  Goal: Patient will remain free of falls  Description: INTERVENTIONS:  - Educate patient/family on patient safety including physical limitations  - Instruct patient to call for assistance with activity   - Consult OT/PT to assist with strengthening/mobility   - Keep Call bell within reach  - Keep bed low and locked with side rails adjusted as appropriate  - Keep care items and personal belongings within reach  - Initiate and maintain comfort rounds  - Make Fall Risk Sign visible to staff  - Apply yellow socks and bracelet for high fall risk patients  - Consider moving patient to room near nurses station  Outcome: Progressing  Goal: Maintain or return to baseline ADL function  Description: INTERVENTIONS:  - Educate patient/family on patient safety including physical limitations  - Instruct patient to call for assistance with activity   - Consult OT/PT to assist with strengthening/mobility   - Keep Call bell within reach  - Keep bed low and locked with side rails adjusted as appropriate  - Keep care items and personal belongings within reach  - Initiate and maintain comfort rounds  - Make Fall Risk Sign visible to staff  - Apply yellow socks and bracelet for high fall risk patients  - Consider moving patient to room near nurses station  Outcome: Progressing  Goal: Maintains/Returns to pre admission functional level  Description: INTERVENTIONS:  - Perform BMAT or MOVE assessment daily.   - Set and communicate daily mobility goal to care team and patient/family/caregiver. - Collaborate with rehabilitation services on mobility goals if consulted  - Perform Range of Motion  times a day. - Reposition patient every  hours.   - Dangle patient  times a day  - Stand patient  times a day  - Ambulate patient  times a day  - Out of bed to chair  times a day   - Out of bed for mealstimes a day  - Out of bed for toileting  - Record patient progress and toleration of activity level   Outcome: Progressing     Problem: DISCHARGE PLANNING  Goal: Discharge to home or other facility with appropriate resources  Description: INTERVENTIONS:  - Identify barriers to discharge w/patient and caregiver  - Arrange for needed discharge resources and transportation as appropriate  - Identify discharge learning needs (meds, wound care, etc.)  - Arrange for interpretive services to assist at discharge as needed  - Refer to Case Management Department for coordinating discharge planning if the patient needs post-hospital services based on physician/advanced practitioner order or complex needs related to functional status, cognitive ability, or social support system  Outcome: Progressing     Problem: Knowledge Deficit  Goal: Patient/family/caregiver demonstrates understanding of disease process, treatment plan, medications, and discharge instructions  Description: Complete learning assessment and assess knowledge base.   Interventions:  - Provide teaching at level of understanding  - Provide teaching via preferred learning methods  Outcome: Progressing     Problem: Prexisting or High Potential for Compromised Skin Integrity  Goal: Skin integrity is maintained or improved  Description: INTERVENTIONS:  - Identify patients at risk for skin breakdown  - Assess and monitor skin integrity  - Assess and monitor nutrition and hydration status  - Monitor labs   - Assess for incontinence   - Turn and reposition patient  - Assist with mobility/ambulation  - Relieve pressure over bony prominences  - Avoid friction and shearing  - Provide appropriate hygiene as needed including keeping skin clean and dry  - Evaluate need for skin moisturizer/barrier cream  - Collaborate with interdisciplinary team   - Patient/family teaching  - Consider wound care consult   Outcome: Progressing     Problem: GENITOURINARY - ADULT  Goal: Maintains or returns to baseline urinary function  Description: INTERVENTIONS:  - Assess urinary function  - Encourage oral fluids to ensure adequate hydration if ordered  - Administer IV fluids as ordered to ensure adequate hydration  - Administer ordered medications as needed  - Offer frequent toileting  - Follow urinary retention protocol if ordered  Outcome: Progressing     Problem: HEMATOLOGIC - ADULT  Goal: Maintains hematologic stability  Description: INTERVENTIONS  - Assess for signs and symptoms of bleeding or hemorrhage  - Monitor labs  - Administer supportive blood products/factors as ordered and appropriate  Outcome: Progressing

## 2023-08-27 NOTE — PROGRESS NOTES
Progress Note - Jovita Alejo 75 y.o. male MRN: [id number]    Unit/Bed#: E4 -01 Encounter: [id number]      Assessment:  Jovita Alejo is a 67-year-old male with history of nephrolithiasis, left ureteral stent insertion 7/6, with recurrent UTI and eventual definitive stone treatment by Dr. Sinan Glover 8/3/2023, prolonged hospitalization due to procurement of guardianship, with retained stent. He is anticoagulated, on Eliquis and has had bouts of intermittent hematuria. We were asked to reevaluate patient after long duration of clear urine due to the development of refractory mild bleeding. Patient is afebrile, hemodynamically stable without complaints of flank, abdominal or suprapubic pain. He is voiding volitionally in quantity sufficient volumes. Hemoglobin is stable. Patient is nontoxic without leukocytosis or acute kidney injury. Most recent hemoglobin 11.9. Yesterday's urine analysis positive for moderate amounts of bacteria and pyuria. He has had positive Enterococcus on recent culture from 8/17. Plan:  · Continue medical optimization. · Begin several day course of Keflex. · Serial urine collection. · We will coordinate cystoscopy and stent removal after several days of antibiosis later this week. · High risk for recurrent UTI and hematuria while stent remains in situ. Subjective:   No complaints of pain    Objective:     Vitals: Blood pressure 123/66, pulse 73, temperature 98.4 °F (36.9 °C), temperature source Temporal, resp. rate 19, height 6' 3" (1.905 m), weight 102 kg (224 lb), SpO2 94 %. ,Body mass index is 28 kg/m².       Intake/Output Summary (Last 24 hours) at 8/27/2023 1536  Last data filed at 8/27/2023 1030  Gross per 24 hour   Intake 560 ml   Output 730 ml   Net -170 ml       Physical Exam: General appearance: alert and oriented, in no acute distress, appears stated age, cooperative and no distress  Head: Normocephalic, without obvious abnormality, atraumatic  Neck: no JVD and supple, symmetrical, trachea midline  Lungs: diminished breath sounds  Heart: regular rate and rhythm, S1, S2 normal, no murmur, click, rub or gallop  Abdomen: soft, non-tender; bowel sounds normal; no masses,  no organomegaly  Extremities: extremities normal, warm and well-perfused; no cyanosis, clubbing, or edema  Pulses: 2+ and symmetric  Neurologic: Grossly normal  No external urinary drains     Invasive Devices     Peripheral Intravenous Line  Duration           Peripheral IV 08/27/23 Left Forearm <1 day          Drain  Duration           Ureteral Internal Stent Left ureter 6 Fr. 51 days    Ureteral Internal Stent Left ureter 7 Fr. 24 days              Lab Results   Component Value Date    WBC 10.05 08/26/2023    HGB 11.9 (L) 08/26/2023    HCT 38.4 08/26/2023     (H) 08/26/2023     08/26/2023     Lab Results   Component Value Date    SODIUM 139 08/26/2023    K 4.3 08/26/2023     08/26/2023    CO2 33 (H) 08/26/2023    BUN 28 (H) 08/26/2023    CREATININE 0.98 08/26/2023    GLUC 90 08/26/2023    CALCIUM 9.1 08/26/2023       Lab, Imaging and other studies: I have personally reviewed pertinent reports.

## 2023-08-27 NOTE — ASSESSMENT & PLAN NOTE
· Admitted originally due to sepsis from UTI with obstruction  · Status post cystoscopy with difficult stent insertion on 7/6/2023  · Procedure was complicated by inadvertent left ureteral perforation  · Follow-up CT showed previous ureteral calculus is now in the bladder  · Repeat cystoscopy on 8/3 with stent placement.    · As per urology okay to restart Eliquis which was restarted on 8/19  · Monitor closely for any recurrence of hematuria  · Urology reevaluation appreciated we will try to schedule cystoscopy and stent removal later this week

## 2023-08-27 NOTE — ASSESSMENT & PLAN NOTE
Patient is a 78-year-old male with past medical history significant for COPD, atrial fibrillation, and nephrolithiasis cystoscopy, and stent placement who presented with sepsis secondary to Enterococcus bacteremia and complicated UTI    · Completed a course of antibiotics   · Urinalysis 8/26 revealed large leukocytes, large blood, 30-50 WBC, moderate bacteria  · Start vancomycin, pharmacy consultation  · Prior urine culture 8/18 revealed Enterococcus sensitive to vancomycin  · Culture/3 revealed Staphylococcus coag negative, Enterococcus, Candida, sensitive to vancomycin  · Infectious disease input will be appreciated

## 2023-08-27 NOTE — ASSESSMENT & PLAN NOTE
· Had ct 7/28 that revealed "Aaliyah Arauz is a 2.9 x 2.2 cm hypodense lesion within the subcutaneous tissues of the mid back, overlying the spinous process of the T11 and T12 vertebral bodies, abutting the skin surface, likely a sebaceous cyst"  · He reported that if surgery was involved he would not want it surgical removed  · Supportive care at this time.

## 2023-08-28 PROBLEM — L72.3 SEBACEOUS CYST: Status: RESOLVED | Noted: 2023-08-17 | Resolved: 2023-08-28

## 2023-08-28 PROBLEM — Z78.9 DAILY CONSUMPTION OF ALCOHOL: Status: RESOLVED | Noted: 2023-07-09 | Resolved: 2023-08-28

## 2023-08-28 PROBLEM — R79.89 ELEVATED TROPONIN: Status: RESOLVED | Noted: 2023-07-06 | Resolved: 2023-08-28

## 2023-08-28 PROBLEM — R77.8 ELEVATED TROPONIN: Status: RESOLVED | Noted: 2023-07-06 | Resolved: 2023-08-28

## 2023-08-28 PROBLEM — R31.0 GROSS HEMATURIA: Status: RESOLVED | Noted: 2023-07-28 | Resolved: 2023-08-28

## 2023-08-28 LAB
ANION GAP SERPL CALCULATED.3IONS-SCNC: 4 MMOL/L
BASOPHILS # BLD AUTO: 0.06 THOUSANDS/ÂΜL (ref 0–0.1)
BASOPHILS NFR BLD AUTO: 1 % (ref 0–1)
BUN SERPL-MCNC: 28 MG/DL (ref 5–25)
CALCIUM SERPL-MCNC: 8.5 MG/DL (ref 8.4–10.2)
CHLORIDE SERPL-SCNC: 102 MMOL/L (ref 96–108)
CO2 SERPL-SCNC: 29 MMOL/L (ref 21–32)
CREAT SERPL-MCNC: 1.12 MG/DL (ref 0.6–1.3)
EOSINOPHIL # BLD AUTO: 0.36 THOUSAND/ÂΜL (ref 0–0.61)
EOSINOPHIL NFR BLD AUTO: 5 % (ref 0–6)
ERYTHROCYTE [DISTWIDTH] IN BLOOD BY AUTOMATED COUNT: 13.6 % (ref 11.6–15.1)
GFR SERPL CREATININE-BSD FRML MDRD: 64 ML/MIN/1.73SQ M
GLUCOSE SERPL-MCNC: 142 MG/DL (ref 65–140)
HCT VFR BLD AUTO: 37 % (ref 36.5–49.3)
HGB BLD-MCNC: 11.7 G/DL (ref 12–17)
IMM GRANULOCYTES # BLD AUTO: 0.02 THOUSAND/UL (ref 0–0.2)
IMM GRANULOCYTES NFR BLD AUTO: 0 % (ref 0–2)
LYMPHOCYTES # BLD AUTO: 1.39 THOUSANDS/ÂΜL (ref 0.6–4.47)
LYMPHOCYTES NFR BLD AUTO: 18 % (ref 14–44)
MCH RBC QN AUTO: 31.2 PG (ref 26.8–34.3)
MCHC RBC AUTO-ENTMCNC: 31.6 G/DL (ref 31.4–37.4)
MCV RBC AUTO: 99 FL (ref 82–98)
MONOCYTES # BLD AUTO: 0.88 THOUSAND/ÂΜL (ref 0.17–1.22)
MONOCYTES NFR BLD AUTO: 11 % (ref 4–12)
NEUTROPHILS # BLD AUTO: 4.99 THOUSANDS/ÂΜL (ref 1.85–7.62)
NEUTS SEG NFR BLD AUTO: 65 % (ref 43–75)
NRBC BLD AUTO-RTO: 0 /100 WBCS
PLATELET # BLD AUTO: 352 THOUSANDS/UL (ref 149–390)
PMV BLD AUTO: 9.2 FL (ref 8.9–12.7)
POTASSIUM SERPL-SCNC: 3.9 MMOL/L (ref 3.5–5.3)
RBC # BLD AUTO: 3.75 MILLION/UL (ref 3.88–5.62)
SODIUM SERPL-SCNC: 135 MMOL/L (ref 135–147)
WBC # BLD AUTO: 7.7 THOUSAND/UL (ref 4.31–10.16)

## 2023-08-28 PROCEDURE — 99233 SBSQ HOSP IP/OBS HIGH 50: CPT | Performed by: INTERNAL MEDICINE

## 2023-08-28 PROCEDURE — 99232 SBSQ HOSP IP/OBS MODERATE 35: CPT

## 2023-08-28 PROCEDURE — 85025 COMPLETE CBC W/AUTO DIFF WBC: CPT

## 2023-08-28 PROCEDURE — 80048 BASIC METABOLIC PNL TOTAL CA: CPT | Performed by: INTERNAL MEDICINE

## 2023-08-28 RX ADMIN — Medication 1 TABLET: at 10:06

## 2023-08-28 RX ADMIN — VANCOMYCIN HYDROCHLORIDE 750 MG: 750 INJECTION, SOLUTION INTRAVENOUS at 17:43

## 2023-08-28 RX ADMIN — THIAMINE HCL TAB 100 MG 100 MG: 100 TAB at 10:06

## 2023-08-28 RX ADMIN — APIXABAN 5 MG: 5 TABLET, FILM COATED ORAL at 10:06

## 2023-08-28 RX ADMIN — FOLIC ACID 1 MG: 1 TABLET ORAL at 10:06

## 2023-08-28 RX ADMIN — MELATONIN 6 MG: at 21:28

## 2023-08-28 RX ADMIN — VANCOMYCIN HYDROCHLORIDE 1000 MG: 1 INJECTION, SOLUTION INTRAVENOUS at 04:22

## 2023-08-28 RX ADMIN — CEFTRIAXONE 1000 MG: 1 INJECTION, SOLUTION INTRAVENOUS at 05:26

## 2023-08-28 RX ADMIN — METHOCARBAMOL TABLETS 750 MG: 500 TABLET, COATED ORAL at 05:24

## 2023-08-28 RX ADMIN — ACETAMINOPHEN 325MG 975 MG: 325 TABLET ORAL at 17:42

## 2023-08-28 RX ADMIN — ATORVASTATIN CALCIUM 40 MG: 40 TABLET, FILM COATED ORAL at 17:42

## 2023-08-28 RX ADMIN — METHOCARBAMOL TABLETS 750 MG: 500 TABLET, COATED ORAL at 21:28

## 2023-08-28 RX ADMIN — METHOCARBAMOL TABLETS 750 MG: 500 TABLET, COATED ORAL at 12:53

## 2023-08-28 RX ADMIN — PANTOPRAZOLE SODIUM 40 MG: 40 TABLET, DELAYED RELEASE ORAL at 05:26

## 2023-08-28 RX ADMIN — DILTIAZEM HYDROCHLORIDE 120 MG: 120 CAPSULE, COATED, EXTENDED RELEASE ORAL at 10:06

## 2023-08-28 RX ADMIN — APIXABAN 5 MG: 5 TABLET, FILM COATED ORAL at 21:28

## 2023-08-28 RX ADMIN — ACETAMINOPHEN 325MG 975 MG: 325 TABLET ORAL at 05:25

## 2023-08-28 RX ADMIN — AMIODARONE HYDROCHLORIDE 200 MG: 200 TABLET ORAL at 10:09

## 2023-08-28 RX ADMIN — FLUTICASONE FUROATE AND VILANTEROL TRIFENATATE 1 PUFF: 200; 25 POWDER RESPIRATORY (INHALATION) at 10:10

## 2023-08-28 RX ADMIN — ACETAMINOPHEN 325MG 975 MG: 325 TABLET ORAL at 12:53

## 2023-08-28 NOTE — RESTORATIVE TECHNICIAN NOTE
Restorative Technician Note      Patient Name: Nestor Lamb     Restorative Tech Visit Date: 08/28/23  Note Type: Mobility  Patient Position Upon Consult: Supine  Activity Performed: Ambulated  Patient Position at End of Consult: Seated edge of bed;  All needs within reach

## 2023-08-28 NOTE — PROGRESS NOTES
[facility]  Progress Note  Name: Thad Rasheed I  MRN: [id number]  Unit/Bed#: E4 -01 I Date of Admission: 7/6/2023   Date of Service: 8/28/2023 I Hospital Day: 48    Assessment/Plan   * UTI (urinary tract infection)  Assessment & Plan  · Now with Enterococcus faecium UTI x 2  · Continue vancomycin, Day #2/5  · Discontinue ceftriaxone  · Patient appears nontoxic, afebrile    Atrial fibrillation [facility])  Assessment & Plan  · Evaluated by cardiology for atrial fibrillation with rapid ventricular response   · improved after successful SOLA/cardioversion on 7/25/23  · Rhythm control with amiodarone 20 mg daily, Cardizem 120 mg daily  · Eliquis for anticoagulation      Impaired decision making  Assessment & Plan  neuropsych evaluation was done on 7/24/2023  He continues to have impaired decision-making capacity  Guardianship process initiated by case management     Bacteremia  Assessment & Plan  · Aerococcus bacteremia due to complicated UTI, resolved  · Completed full course of antibiotics under the guidance of the infectious disease team    Hydronephrosis with obstructing calculus  Assessment & Plan  · Admitted originally due to sepsis from UTI with obstruction  · Status post cystoscopy with difficult stent insertion on 7/6/2023  · Procedure was complicated by inadvertent left ureteral perforation  · Follow-up CT showed previous ureteral calculus is now in the bladder  · Repeat cystoscopy on 8/3 with stent placement.    Will need close urologic follow-up    Esophageal abnormality  Assessment & Plan  · incidental finding on CT with diffuse esophageal thickening could be from esophagitis  · Patient was evaluated by the GI team: Note that esophageal wall thickening could be secondary to reflux esophagitis, Bruno's, or hiatal hernia  · Continue empiric Protonix  · Outpatient upper endoscopy      COPD (chronic obstructive pulmonary disease) ([address])  Assessment & Plan  · Stable without exacerbation  · Continue albuterol and breo     Gross hematuria-resolved as of 8/28/2023  Assessment & Plan  · Patient has had recurrent hematuria during his hospitalization requiring CBI  · He is s/p cysto with stent after which which hematuria resolved, however once Eliquis was restarted patient had recurrent hematuria  · Eliquis was briefly held then restarted  · She did have some pink-tinged urine, urology reevaluation appreciated      Daily consumption of alcohol-resolved as of 8/28/2023  Assessment & Plan  · Patient noted daily alcohol use on admission  · Clinically no withdrawal.  · Continue thiamine and folic acid supplementation    Elevated troponin-resolved as of 8/28/2023  Assessment & Plan  · Non-MI troponin elevation secondary to tachycardia and sepsis   · Appreciate cardiology recommendations no additional work-up currently required  · Continue aspirin and Lipitor                   Hospital Course:     Patient is a 50-year-old male with past medical history significant for COPD, atrial fibrillation, and nephrolithiasis cystoscopy, and stent placement who presented with sepsis secondary to Enterococcus bacteremia and complicated UTI    Patient is now awaiting guardianship-today is hospital day #53      Assessment:      Principal Problem:    UTI (urinary tract infection)  Active Problems:    COPD (chronic obstructive pulmonary disease) ([address])    Esophageal abnormality    Hydronephrosis with obstructing calculus    Bacteremia    Impaired decision making    Atrial fibrillation ([address])      Plan:    · Continue vancomycin       VTE Pharmacologic Prophylaxis:   Pharmacologic: Apixaban (Eliquis)  Mechanical VTE Prophylaxis in Place: Yes    Patient Centered Rounds: I have performed bedside rounds with nursing staff today. Discussions with Specialists or Other Care Team Provider: Discussed with neurology    Education and Discussions with Family / Patient: No family to update    Time Spent for Care: 1 hour.   More than 50% of total time spent on counseling and coordination of care as described above. Current Length of Stay: 53 day(s)    Current Patient Status: Inpatient   Certification Statement: The patient will continue to require additional inpatient hospital stay due to Guardianship, IV antibiotic    Discharge Plan / Estimated Discharge Date: Tomorrow    Code Status: Level 1 - Full Code      Subjective:   Seen and examined, no acute complaints ambulating in hallway    A complete and comprehensive 14 point organ system review has been performed and all other systems are negative other than stated above. Objective:     Vitals:   Temp (24hrs), Av.7 °F (36.5 °C), Min:97.3 °F (36.3 °C), Max:98 °F (36.7 °C)    Temp:  [97.3 °F (36.3 °C)-98 °F (36.7 °C)] 97.8 °F (36.6 °C)  HR:  [56-69] 69  Resp:  [20] 20  BP: (118-163)/(59-70) 135/69  SpO2:  [94 %-96 %] 94 %  Body mass index is 28 kg/m². Input and Output Summary (last 24 hours):        Intake/Output Summary (Last 24 hours) at 2023 1709  Last data filed at 2023 1401  Gross per 24 hour   Intake 200 ml   Output 615 ml   Net -415 ml       Physical Exam:     General: ill appearing, no acute distress  HEENT: atraumatic, PERRLA, moist mucosa, normal pharynx, normal tonsils and adenoids, normal tongue, no fluid in sinuses  Neck: Trachea midline, no carotid bruit, no masses  Respiratory: normal chest wall expansion, CTA B, no r/r/w, no rubs  Cardiovascular: RRR, no m/r/g, Normal S1 and S2  Abdomen: Soft, non-tender, non-distended, normal bowel sounds in all quadrants, no hepatosplenomegaly, no tympany  Rectal: deferred  Musculoskeletal: normal ROM in upper and lower extremities  Integumentary: warm, dry, and pink, with no rash, purpura, or petechia  Heme/Lymph: no lymphadenopathy, no bruises  Neurological: Cranial Nerves II-XII grossly intact  Psychiatric: cooperative with normal mood, affect, and cognition      Additional Data:     Labs:    Results from last 7 days Lab Units 08/28/23  1042   WBC Thousand/uL 7.70   HEMOGLOBIN g/dL 11.7*   HEMATOCRIT % 37.0   PLATELETS Thousands/uL 352   NEUTROS PCT % 65   LYMPHS PCT % 18   MONOS PCT % 11   EOS PCT % 5     Results from last 7 days   Lab Units 08/28/23  0829   POTASSIUM mmol/L 3.9   CHLORIDE mmol/L 102   CO2 mmol/L 29   BUN mg/dL 28*   CREATININE mg/dL 1.12   CALCIUM mg/dL 8.5           * I Have Reviewed All Lab Data Listed Above. * Additional Pertinent Lab Tests Reviewed:  [address] Admission Reviewed    Imaging:    Imaging Reports Reviewed Today Include: No new imaging  Imaging Personally Reviewed by Myself Includes: No new imaging    Recent Cultures (last 7 days):     Results from last 7 days   Lab Units 08/26/23  2123   URINE CULTURE  >100,000 cfu/ml Enterococcus faecium*       Last 24 Hours Medication List:   Current Facility-Administered Medications   Medication Dose Route Frequency Provider Last Rate   • acetaminophen  975 mg Oral Q6H [address] Basilia Curling, MD     • albuterol  2.5 mg Nebulization Q6H PRN Basilia Curling, MD     • aluminum-magnesium hydroxide-simethicone  30 mL Oral Q6H PRN Basilia Curling, MD     • amiodarone  200 mg Oral Daily With Breakfast Krystian Abbasi MD     • apixaban  5 mg Oral BID Karl Cordero MD     • atorvastatin  40 mg Oral Daily With Graciela Guan MD     • diltiazem  120 mg Oral Daily Basilia Curling, MD     • docusate sodium  100 mg Oral BID PRN Karl Cordero MD     • fluticasone-vilanterol  1 puff Inhalation Daily Basilia Curling, MD     • folic acid  1 mg Oral Daily Basilia Curling, MD     • guaiFENesin  200 mg Oral Q4H PRN Basilia Curling, MD     • hydrOXYzine HCL  50 mg Oral HS PRN Basilia Curling, MD     • lidocaine  1 patch Topical Daily Basilia Curling, MD     • melatonin  6 mg Oral HS Basilia Curling, MD     • methocarbamol  750 mg Oral [facility] Basilia Curling, MD     • multivitamin-minerals  1 tablet Oral Daily Basilia Curling, MD     • nicotine  7 mg Transdermal Daily Rory Kapoor MD     • pantoprazole  40 mg Oral Early Morning Basilia Curling, MD     • polyethylene glycol  17 g Oral Daily PRN Karl Cordero MD     • thiamine  100 mg Oral Daily Basilia Curling, MD     • vancomycin  750 mg Intravenous Q12H Ric Patterson DO          Today, Patient Was Seen By: Nader Russell DO    ** Please Note: This note was completed in part utilizing [employer] Software. Grammatical errors, random word insertions, spelling mistakes, and incomplete sentences may be an occasional consequence of this system secondary to software limitations, ambient noise, and hardware issues. If you have any questions or concerns about the content, text, or information contained within the body of this dictation, please contact the provider for clarification.  **

## 2023-08-28 NOTE — ASSESSMENT & PLAN NOTE
· incidental finding on CT with diffuse esophageal thickening could be from esophagitis  · Patient was evaluated by the GI team: Note that esophageal wall thickening could be secondary to reflux esophagitis, Bruno's, or hiatal hernia  · Continue empiric Protonix  · Outpatient upper endoscopy

## 2023-08-28 NOTE — PROGRESS NOTES
Progress Note - Urology  Cealbaro Wallace 1950, 75 y.o. male MRN: [id number]    Unit/Bed#: E4 -01 Encounter: [id number]    Gross hematuria  Assessment & Plan  · Hematuria on/off past 1 week since starting eliquis for afib  · Repeat CT imagine from 8/26 showed no residual left urolithiasis. There is a nonobstructive 2 mm right distal ureteral stone no significantly changed. Left stent in good position. · Preliminary Urine culture from 8/26 positive for Alpha hemolytic strep  · Empirically on Rocephin and Vanco  · Afebrile with stable vital signs  · Last Hgb 11.9 (8/26)  · Normal renal function  · Serial urine collection as below. Plan:  · Has left ureteral stent from 7/6 insertion at time for uti and ureteral stone. Plan for cystoscopy, left ureteral stent removal, right ureteroscopy, stone extraction, with or without stent placement later this week. · Continue Rocephin and Vanco and adjust based on final C&S. · Continue proscar  · Recommend holding Eliquis due to ongoing bleeding  · Continue serial urine collection and PVRs  · PVR 61 mL, no indication for 3 way michel catheter at this time. Consider replacing 3 way michel catheter should hematuria worsen or patient develop urinary retention. · CBC ordered for updated Hgb. Subjective: Patient lying in bed resting comfortably in no acute distress. He reports hematuria persisted overnight and seems to be made worse when drinking apple juice instead of water. Denies difficulty voiding or passage of clots. No CP,SOB, dizziness, weakness, abdominal pain, or flank pain. Review of Systems   Constitutional: Negative for chills and fever. HENT: Negative for congestion and sore throat. Respiratory: Negative for cough and shortness of breath. Cardiovascular: Negative for chest pain and leg swelling. Gastrointestinal: Negative for abdominal pain, constipation, diarrhea, nausea and vomiting.    Genitourinary: Positive for hematuria. Negative for difficulty urinating, dysuria, flank pain, frequency and urgency. Musculoskeletal: Negative for back pain and gait problem. Skin: Negative for wound. Allergic/Immunologic: Negative for immunocompromised state. Neurological: Negative for dizziness, weakness and numbness. Hematological: Does not bruise/bleed easily. Objective:  Nursing Rounds:   Vitals: Blood pressure 163/70, pulse 58, temperature (!) 97.3 °F (36.3 °C), temperature source Temporal, resp. rate 20, height 6' 3" (1.905 m), weight 102 kg (224 lb), SpO2 96 %. ,Body mass index is 28 kg/m². Physical Exam  Vitals reviewed. Constitutional:       General: He is not in acute distress. Appearance: Normal appearance. He is not ill-appearing or toxic-appearing. HENT:      Head: Normocephalic and atraumatic. Eyes:      General: No scleral icterus. Conjunctiva/sclera: Conjunctivae normal.   Cardiovascular:      Rate and Rhythm: Normal rate and regular rhythm. Pulses: Normal pulses. Heart sounds: Normal heart sounds. Pulmonary:      Effort: Pulmonary effort is normal. No respiratory distress. Breath sounds: Normal breath sounds. Abdominal:      General: Abdomen is flat. Palpations: Abdomen is soft. Tenderness: There is no abdominal tenderness. There is no right CVA tenderness or left CVA tenderness. Hernia: No hernia is present. Musculoskeletal:      Cervical back: Normal range of motion. Right lower leg: No edema. Left lower leg: No edema. Skin:     General: Skin is warm and dry. Coloration: Skin is not jaundiced or pale. Neurological:      General: No focal deficit present. Mental Status: He is alert and oriented to person, place, and time. Mental status is at baseline. Gait: Gait normal.   Psychiatric:         Mood and Affect: Mood normal.         Behavior: Behavior normal.         Thought Content:  Thought content normal.         Judgment: Judgment normal.         Imagin2023  CT ABDOMEN AND PELVIS WITHOUT IV CONTRAST - LOW DOSE RENAL STONE     INDICATION:   Nephrolithiasis, symptomatic/complicated  eval for residual stones s/p ureteroscopy.        COMPARISON:  None.     TECHNIQUE:  Low radiation dose thin section CT examination of the abdomen and pelvis was performed without intravenous or oral contrast according to a protocol specifically designed to evaluate for urinary tract calculus. Axial, sagittal, and coronal 2D   reformatted images were created from the source data and submitted for interpretation. Evaluation for pathology in the abdomen and pelvis that is unrelated to urinary tract calculi is limited.     Radiation dose length product (DLP) for this visit:  694 mGy-cm . This examination, like all CT scans performed in the [facility], was performed utilizing techniques to minimize radiation dose exposure, including the use of iterative   reconstruction and automated exposure control.     URINARY TRACT FINDINGS:     RIGHT KIDNEY AND URETER: 2 mm nonobstructive calculus in the distal right ureter, 1 cm above the UVJ. No hydronephrosis or hydroureter. Probable small coalescing parapelvic cysts, unchanged.     LEFT KIDNEY AND URETER: Left ureteral JJ stent in expected position. The previously demonstrated nonobstructing left renal calculi are no longer visualized. There is moderate left pelviectasis with only mild prominence of the calyces.     URINARY BLADDER: At least 5 punctate densities in the dependent aspect, possibly excreted residual stone fragments. Trabeculated wall.        ADDITIONAL FINDINGS:     LOWER CHEST: Motion artifact.  Nonspecific interstitial thickening in the dependent lower lobes likely hypoventilatory but correlate with symptoms.     SOLID VISCERA: Limited low radiation dose noncontrast CT evaluation demonstrates no clinically significant abnormality of the imaged portions of the liver, spleen, pancreas. Redemonstrated 1.5 cm x 3 cm thickening of the lateral limb of the right adrenal   gland, likely a lipid rich adenoma. Normal left adrenal gland.        GALLBLADDER/BILIARY TREE:  No calcified gallstones. No pericholecystic inflammatory change. No biliary dilatation.     STOMACH AND BOWEL: No evidence of bowel obstruction. Mild diffuse fecal retention in the colon.     APPENDIX: A normal appendix was visualized.     ABDOMINOPELVIC CAVITY:  No ascites. No pneumoperitoneum. No lymphadenopathy.     VESSELS: Unremarkable for patient's age.     REPRODUCTIVE ORGANS:  Unremarkable for patient's age.     ABDOMINAL WALL/INGUINAL REGIONS:  Unremarkable.     OSSEOUS STRUCTURES:  No acute fracture or destructive osseous lesion. Partial ankylosis of the sacroiliac joints. Multilevel degenerative changes in the visible spine.     IMPRESSION:     No residual left urolithiasis. Nonobstructive 2 mm right distal ureteral stone, not significantly changed. At least 5 punctate densities in the bladder, likely residual stone fragments. Left ureteral JJ stent in expected position. Moderate pelviectasis with only mild prominence of the calyces. Trabeculated bladder wall, not significantly changed. Chronic findings, as per the body of the report.              Labs:  Recent Labs     08/26/23  0608   WBC 10.05       Recent Labs     08/26/23  0608   HGB 11.9*     Recent Labs     08/26/23  0608   HCT 38.4     Recent Labs     08/26/23  0608 08/28/23  0829   CREATININE 0.98 1.12         History:    Past Medical History:   Diagnosis Date   • COPD (chronic obstructive pulmonary disease) ([address] [facility])      Social History     Socioeconomic History   • Marital status: Single     Spouse name: None   • Number of children: None   • Years of education: None   • Highest education level: None   Occupational History   • None   Tobacco Use   • Smoking status: Every Day     Packs/day: 0.50     Types: Cigarettes   • Smokeless tobacco: None   Vaping Use • Vaping Use: Never used   Substance and Sexual Activity   • Alcohol use: Never   • Drug use: No   • Sexual activity: None   Other Topics Concern   • None   Social History Narrative   • None     Social Determinants of Health     Financial Resource Strain: Not on file   Food Insecurity: No Food Insecurity (7/17/2023)    Hunger Vital Sign    • Worried About Running Out of Food in the Last Year: Never true    • Ran Out of Food in the Last Year: Never true   Transportation Needs: No Transportation Needs (7/17/2023)    PRAPARE - Transportation    • Lack of Transportation (Medical): No    • Lack of Transportation (Non-Medical): No   Physical Activity: Not on file   Stress: Not on file   Social Connections: Not on file   Intimate Partner Violence: Not on file   Housing Stability: Low Risk  (7/17/2023)    Housing Stability Vital Sign    • Unable to Pay for Housing in the Last Year: No    • Number of Places Lived in the Last Year: 1    • Unstable Housing in the Last Year: No     Past Surgical History:   Procedure Laterality Date   • FL RETROGRADE PYELOGRAM  7/6/2023   • FL RETROGRADE PYELOGRAM  8/3/2023   • CT CYSTO BLADDER W/URETERAL CATHETERIZATION Left 7/6/2023    [address]cedure: [address];  Surgeon: Markell Gibbs MD;  Location: [facility];  Service: Urology   • CT CYSTO/URETERO W/LITHOTRIPSY &INDWELL STENT INSRT Left 8/3/2023    Procedure: Bee Kwame, & STENT;  Surgeon: Katherine Freed MD;  Location: [facility];  Service: Urology     History reviewed. No pertinent family history.     Hannah Grief, CRNP  Date: 8/28/2023 Time: 10:32 AM

## 2023-08-28 NOTE — PLAN OF CARE
Problem: Potential for Falls  Goal: Patient will remain free of falls  Description: INTERVENTIONS:  - Educate patient/family on patient safety including physical limitations  - Instruct patient to call for assistance with activity   - Consult OT/PT to assist with strengthening/mobility   - Keep Call bell within reach  - Keep bed low and locked with side rails adjusted as appropriate  - Keep care items and personal belongings within reach  - Initiate and maintain comfort rounds  - Make Fall Risk Sign visible to staff  - Apply yellow socks and bracelet for high fall risk patients  - Consider moving patient to room near nurses station  Outcome: Progressing     Problem: MOBILITY - ADULT  Goal: Maintain or return to baseline ADL function  Description: INTERVENTIONS:  - Educate patient/family on patient safety including physical limitations  - Instruct patient to call for assistance with activity   - Consult OT/PT to assist with strengthening/mobility   - Keep Call bell within reach  - Keep bed low and locked with side rails adjusted as appropriate  - Keep care items and personal belongings within reach  - Initiate and maintain comfort rounds  - Make Fall Risk Sign visible to staff  - Apply yellow socks and bracelet for high fall risk patients  - Consider moving patient to room near nurses station  Outcome: Progressing  Goal: Maintains/Returns to pre admission functional level  Description: INTERVENTIONS:  - Perform BMAT or MOVE assessment daily.   - Set and communicate daily mobility goal to care team and patient/family/caregiver. - Collaborate with rehabilitation services on mobility goals if consulted  - Perform Range of Motion  times a day. - Reposition patient every hours.   - Dangle patient  times a day  - Stand patient  times a day  - Ambulate patient  times a day  - Out of bed to chair  times a day   - Out of bed for meals imes a day  - Out of bed for toileting  - Record patient progress and toleration of activity level   Outcome: Progressing     Problem: PAIN - ADULT  Goal: Verbalizes/displays adequate comfort level or baseline comfort level  Description: Interventions:  - Encourage patient to monitor pain and request assistance  - Assess pain using appropriate pain scale  - Administer analgesics based on type and severity of pain and evaluate response  - Implement non-pharmacological measures as appropriate and evaluate response  - Consider cultural and social influences on pain and pain management  - Notify physician/advanced practitioner if interventions unsuccessful or patient reports new pain  Outcome: Progressing     Problem: INFECTION - ADULT  Goal: Absence or prevention of progression during hospitalization  Description: INTERVENTIONS:  - Assess and monitor for signs and symptoms of infection  - Monitor lab/diagnostic results  - Monitor all insertion sites, i.e. indwelling lines, tubes, and drains  - Monitor endotracheal if appropriate and nasal secretions for changes in amount and color  - Olla appropriate cooling/warming therapies per order  - Administer medications as ordered  - Instruct and encourage patient and family to use good hand hygiene technique  - Identify and instruct in appropriate isolation precautions for identified infection/condition  Outcome: Progressing  Goal: Absence of fever/infection during neutropenic period  Description: INTERVENTIONS:  - Monitor WBC    Outcome: Progressing     Problem: SAFETY ADULT  Goal: Patient will remain free of falls  Description: INTERVENTIONS:  - Educate patient/family on patient safety including physical limitations  - Instruct patient to call for assistance with activity   - Consult OT/PT to assist with strengthening/mobility   - Keep Call bell within reach  - Keep bed low and locked with side rails adjusted as appropriate  - Keep care items and personal belongings within reach  - Initiate and maintain comfort rounds  - Make Fall Risk Sign visible to staff  - Apply yellow socks and bracelet for high fall risk patients  - Consider moving patient to room near nurses station  Outcome: Progressing  Goal: Maintain or return to baseline ADL function  Description: INTERVENTIONS:  - Educate patient/family on patient safety including physical limitations  - Instruct patient to call for assistance with activity   - Consult OT/PT to assist with strengthening/mobility   - Keep Call bell within reach  - Keep bed low and locked with side rails adjusted as appropriate  - Keep care items and personal belongings within reach  - Initiate and maintain comfort rounds  - Make Fall Risk Sign visible to staff  - Apply yellow socks and bracelet for high fall risk patients  - Consider moving patient to room near nurses station  Outcome: Progressing  Goal: Maintains/Returns to pre admission functional level  Description: INTERVENTIONS:  - Perform BMAT or MOVE assessment daily.   - Set and communicate daily mobility goal to care team and patient/family/caregiver. - Collaborate with rehabilitation services on mobility goals if consulted  - Perform Range of Motion  times a day. - Reposition patient every  hours.   - Dangle patient  times a day  - Stand patient  times a day  - Ambulate patient  times a day  - Out of bed to chair times a day   - Out of bed for meals  times a day  - Out of bed for toileting  - Record patient progress and toleration of activity level   Outcome: Progressing     Problem: DISCHARGE PLANNING  Goal: Discharge to home or other facility with appropriate resources  Description: INTERVENTIONS:  - Identify barriers to discharge w/patient and caregiver  - Arrange for needed discharge resources and transportation as appropriate  - Identify discharge learning needs (meds, wound care, etc.)  - Arrange for interpretive services to assist at discharge as needed  - Refer to Case Management Department for coordinating discharge planning if the patient needs post-hospital services based on physician/advanced practitioner order or complex needs related to functional status, cognitive ability, or social support system  Outcome: Progressing     Problem: Knowledge Deficit  Goal: Patient/family/caregiver demonstrates understanding of disease process, treatment plan, medications, and discharge instructions  Description: Complete learning assessment and assess knowledge base.   Interventions:  - Provide teaching at level of understanding  - Provide teaching via preferred learning methods  Outcome: Progressing     Problem: Prexisting or High Potential for Compromised Skin Integrity  Goal: Skin integrity is maintained or improved  Description: INTERVENTIONS:  - Identify patients at risk for skin breakdown  - Assess and monitor skin integrity  - Assess and monitor nutrition and hydration status  - Monitor labs   - Assess for incontinence   - Turn and reposition patient  - Assist with mobility/ambulation  - Relieve pressure over bony prominences  - Avoid friction and shearing  - Provide appropriate hygiene as needed including keeping skin clean and dry  - Evaluate need for skin moisturizer/barrier cream  - Collaborate with interdisciplinary team   - Patient/family teaching  - Consider wound care consult   Outcome: Progressing     Problem: GENITOURINARY - ADULT  Goal: Maintains or returns to baseline urinary function  Description: INTERVENTIONS:  - Assess urinary function  - Encourage oral fluids to ensure adequate hydration if ordered  - Administer IV fluids as ordered to ensure adequate hydration  - Administer ordered medications as needed  - Offer frequent toileting  - Follow urinary retention protocol if ordered  Outcome: Progressing     Problem: HEMATOLOGIC - ADULT  Goal: Maintains hematologic stability  Description: INTERVENTIONS  - Assess for signs and symptoms of bleeding or hemorrhage  - Monitor labs  - Administer supportive blood products/factors as ordered and appropriate  Outcome: Progressing

## 2023-08-28 NOTE — RESTORATIVE TECHNICIAN NOTE
Restorative Technician Note      Patient Name: Lety Uribe     Restorative Tech Visit Date: 08/28/23  Note Type: Mobility  Patient Position Upon Consult: Supine  Activity Performed: Ambulated  Patient Position at End of Consult: Supine;  All needs within reach

## 2023-08-28 NOTE — ASSESSMENT & PLAN NOTE
· Evaluated by cardiology for atrial fibrillation with rapid ventricular response   · improved after successful SOLA/cardioversion on 7/25/23  · Rhythm control with amiodarone 20 mg daily, Cardizem 120 mg daily  · Eliquis for anticoagulation Alcira Brush(Attending)

## 2023-08-28 NOTE — PLAN OF CARE
Problem: Potential for Falls  Goal: Patient will remain free of falls  Description: INTERVENTIONS:  - Educate patient/family on patient safety including physical limitations  - Instruct patient to call for assistance with activity   - Consult OT/PT to assist with strengthening/mobility   - Keep Call bell within reach  - Keep bed low and locked with side rails adjusted as appropriate  - Keep care items and personal belongings within reach  - Initiate and maintain comfort rounds  - Make Fall Risk Sign visible to staff  - Apply yellow socks and bracelet for high fall risk patients  - Consider moving patient to room near nurses station  Outcome: Progressing     Problem: MOBILITY - ADULT  Goal: Maintain or return to baseline ADL function  Description: INTERVENTIONS:  - Educate patient/family on patient safety including physical limitations  - Instruct patient to call for assistance with activity   - Consult OT/PT to assist with strengthening/mobility   - Keep Call bell within reach  - Keep bed low and locked with side rails adjusted as appropriate  - Keep care items and personal belongings within reach  - Initiate and maintain comfort rounds  - Make Fall Risk Sign visible to staff  - Apply yellow socks and bracelet for high fall risk patients  - Consider moving patient to room near nurses station  Outcome: Progressing  Goal: Maintains/Returns to pre admission functional level  Description: INTERVENTIONS:  - Perform BMAT or MOVE assessment daily.   - Set and communicate daily mobility goal to care team and patient/family/caregiver. - Collaborate with rehabilitation services on mobility goals if consulted  - Perform Range of Motion 3 times a day. - Reposition patient every 2 hours.   - Dangle patient 3 times a day  - Stand patient 3 times a day  - Ambulate patient 3 times a day  - Out of bed to chair 3 times a day   - Out of bed for meals 3 times a day  - Out of bed for toileting  - Record patient progress and toleration of activity level   Outcome: Progressing     Problem: PAIN - ADULT  Goal: Verbalizes/displays adequate comfort level or baseline comfort level  Description: Interventions:  - Encourage patient to monitor pain and request assistance  - Assess pain using appropriate pain scale  - Administer analgesics based on type and severity of pain and evaluate response  - Implement non-pharmacological measures as appropriate and evaluate response  - Consider cultural and social influences on pain and pain management  - Notify physician/advanced practitioner if interventions unsuccessful or patient reports new pain  Outcome: Progressing     Problem: INFECTION - ADULT  Goal: Absence or prevention of progression during hospitalization  Description: INTERVENTIONS:  - Assess and monitor for signs and symptoms of infection  - Monitor lab/diagnostic results  - Monitor all insertion sites, i.e. indwelling lines, tubes, and drains  - Monitor endotracheal if appropriate and nasal secretions for changes in amount and color  - Imogene appropriate cooling/warming therapies per order  - Administer medications as ordered  - Instruct and encourage patient and family to use good hand hygiene technique  - Identify and instruct in appropriate isolation precautions for identified infection/condition  Outcome: Progressing  Goal: Absence of fever/infection during neutropenic period  Description: INTERVENTIONS:  - Monitor WBC    Outcome: Progressing     Problem: SAFETY ADULT  Goal: Patient will remain free of falls  Description: INTERVENTIONS:  - Educate patient/family on patient safety including physical limitations  - Instruct patient to call for assistance with activity   - Consult OT/PT to assist with strengthening/mobility   - Keep Call bell within reach  - Keep bed low and locked with side rails adjusted as appropriate  - Keep care items and personal belongings within reach  - Initiate and maintain comfort rounds  - Make Fall Risk Sign visible to staff  - Apply yellow socks and bracelet for high fall risk patients  - Consider moving patient to room near nurses station  Outcome: Progressing  Goal: Maintain or return to baseline ADL function  Description: INTERVENTIONS:  - Educate patient/family on patient safety including physical limitations  - Instruct patient to call for assistance with activity   - Consult OT/PT to assist with strengthening/mobility   - Keep Call bell within reach  - Keep bed low and locked with side rails adjusted as appropriate  - Keep care items and personal belongings within reach  - Initiate and maintain comfort rounds  - Make Fall Risk Sign visible to staff  - Apply yellow socks and bracelet for high fall risk patients  - Consider moving patient to room near nurses station  Outcome: Progressing  Goal: Maintains/Returns to pre admission functional level  Description: INTERVENTIONS:  - Perform BMAT or MOVE assessment daily.   - Set and communicate daily mobility goal to care team and patient/family/caregiver. - Collaborate with rehabilitation services on mobility goals if consulted  - Perform Range of Motion 3 times a day. - Reposition patient every 2 hours.   - Dangle patient 3 times a day  - Stand patient 3 times a day  - Ambulate patient 3 times a day  - Out of bed to chair 3 times a day   - Out of bed for meals 3 times a day  - Out of bed for toileting  - Record patient progress and toleration of activity level   Outcome: Progressing     Problem: DISCHARGE PLANNING  Goal: Discharge to home or other facility with appropriate resources  Description: INTERVENTIONS:  - Identify barriers to discharge w/patient and caregiver  - Arrange for needed discharge resources and transportation as appropriate  - Identify discharge learning needs (meds, wound care, etc.)  - Arrange for interpretive services to assist at discharge as needed  - Refer to Case Management Department for coordinating discharge planning if the patient needs post-hospital services based on physician/advanced practitioner order or complex needs related to functional status, cognitive ability, or social support system  Outcome: Progressing     Problem: Knowledge Deficit  Goal: Patient/family/caregiver demonstrates understanding of disease process, treatment plan, medications, and discharge instructions  Description: Complete learning assessment and assess knowledge base.   Interventions:  - Provide teaching at level of understanding  - Provide teaching via preferred learning methods  Outcome: Progressing     Problem: Prexisting or High Potential for Compromised Skin Integrity  Goal: Skin integrity is maintained or improved  Description: INTERVENTIONS:  - Identify patients at risk for skin breakdown  - Assess and monitor skin integrity  - Assess and monitor nutrition and hydration status  - Monitor labs   - Assess for incontinence   - Turn and reposition patient  - Assist with mobility/ambulation  - Relieve pressure over bony prominences  - Avoid friction and shearing  - Provide appropriate hygiene as needed including keeping skin clean and dry  - Evaluate need for skin moisturizer/barrier cream  - Collaborate with interdisciplinary team   - Patient/family teaching  - Consider wound care consult   Outcome: Progressing     Problem: GENITOURINARY - ADULT  Goal: Maintains or returns to baseline urinary function  Description: INTERVENTIONS:  - Assess urinary function  - Encourage oral fluids to ensure adequate hydration if ordered  - Administer IV fluids as ordered to ensure adequate hydration  - Administer ordered medications as needed  - Offer frequent toileting  - Follow urinary retention protocol if ordered  Outcome: Progressing     Problem: HEMATOLOGIC - ADULT  Goal: Maintains hematologic stability  Description: INTERVENTIONS  - Assess for signs and symptoms of bleeding or hemorrhage  - Monitor labs  - Administer supportive blood products/factors as ordered and appropriate  Outcome: Progressing

## 2023-08-28 NOTE — ASSESSMENT & PLAN NOTE
· Now with Enterococcus faecium UTI x 2  · Continue vancomycin, Day #2/5  · Discontinue ceftriaxone  · Patient appears nontoxic, afebrile

## 2023-08-28 NOTE — ASSESSMENT & PLAN NOTE
· Admitted originally due to sepsis from UTI with obstruction  · Status post cystoscopy with difficult stent insertion on 7/6/2023  · Procedure was complicated by inadvertent left ureteral perforation  · Follow-up CT showed previous ureteral calculus is now in the bladder  · Repeat cystoscopy on 8/3 with stent placement.    Will need close urologic follow-up

## 2023-08-28 NOTE — ASSESSMENT & PLAN NOTE
· Aerococcus bacteremia due to complicated UTI, resolved  · Completed full course of antibiotics under the guidance of the infectious disease team

## 2023-08-29 ENCOUNTER — ANESTHESIA EVENT (INPATIENT)
Dept: PERIOP | Facility: HOSPITAL | Age: 73
DRG: 853 | End: 2023-08-29
Payer: MEDICARE

## 2023-08-29 LAB
ANION GAP SERPL CALCULATED.3IONS-SCNC: 5 MMOL/L
BACTERIA UR CULT: ABNORMAL
BUN SERPL-MCNC: 25 MG/DL (ref 5–25)
CALCIUM SERPL-MCNC: 8.8 MG/DL (ref 8.4–10.2)
CHLORIDE SERPL-SCNC: 103 MMOL/L (ref 96–108)
CO2 SERPL-SCNC: 30 MMOL/L (ref 21–32)
CREAT SERPL-MCNC: 1.04 MG/DL (ref 0.6–1.3)
GFR SERPL CREATININE-BSD FRML MDRD: 70 ML/MIN/1.73SQ M
GLUCOSE SERPL-MCNC: 86 MG/DL (ref 65–140)
POTASSIUM SERPL-SCNC: 4.5 MMOL/L (ref 3.5–5.3)
SODIUM SERPL-SCNC: 138 MMOL/L (ref 135–147)
VANCOMYCIN TROUGH SERPL-MCNC: 8.6 UG/ML (ref 10–20)

## 2023-08-29 PROCEDURE — 99232 SBSQ HOSP IP/OBS MODERATE 35: CPT | Performed by: STUDENT IN AN ORGANIZED HEALTH CARE EDUCATION/TRAINING PROGRAM

## 2023-08-29 PROCEDURE — 80048 BASIC METABOLIC PNL TOTAL CA: CPT | Performed by: INTERNAL MEDICINE

## 2023-08-29 PROCEDURE — 80202 ASSAY OF VANCOMYCIN: CPT | Performed by: INTERNAL MEDICINE

## 2023-08-29 PROCEDURE — 99233 SBSQ HOSP IP/OBS HIGH 50: CPT | Performed by: INTERNAL MEDICINE

## 2023-08-29 RX ORDER — HEPARIN SODIUM 5000 [USP'U]/ML
5000 INJECTION, SOLUTION INTRAVENOUS; SUBCUTANEOUS EVERY 8 HOURS SCHEDULED
Status: DISCONTINUED | OUTPATIENT
Start: 2023-08-29 | End: 2023-09-01 | Stop reason: HOSPADM

## 2023-08-29 RX ORDER — VANCOMYCIN HYDROCHLORIDE 1 G/200ML
1000 INJECTION, SOLUTION INTRAVENOUS EVERY 12 HOURS
Status: DISCONTINUED | OUTPATIENT
Start: 2023-08-29 | End: 2023-08-31

## 2023-08-29 RX ADMIN — FLUTICASONE FUROATE AND VILANTEROL TRIFENATATE 1 PUFF: 200; 25 POWDER RESPIRATORY (INHALATION) at 09:19

## 2023-08-29 RX ADMIN — PANTOPRAZOLE SODIUM 40 MG: 40 TABLET, DELAYED RELEASE ORAL at 05:47

## 2023-08-29 RX ADMIN — APIXABAN 5 MG: 5 TABLET, FILM COATED ORAL at 09:17

## 2023-08-29 RX ADMIN — VANCOMYCIN HYDROCHLORIDE 1000 MG: 1 INJECTION, SOLUTION INTRAVENOUS at 17:32

## 2023-08-29 RX ADMIN — HEPARIN SODIUM 5000 UNITS: 5000 INJECTION INTRAVENOUS; SUBCUTANEOUS at 21:33

## 2023-08-29 RX ADMIN — DILTIAZEM HYDROCHLORIDE 120 MG: 120 CAPSULE, COATED, EXTENDED RELEASE ORAL at 09:18

## 2023-08-29 RX ADMIN — METHOCARBAMOL TABLETS 750 MG: 500 TABLET, COATED ORAL at 05:45

## 2023-08-29 RX ADMIN — HEPARIN SODIUM 5000 UNITS: 5000 INJECTION INTRAVENOUS; SUBCUTANEOUS at 17:23

## 2023-08-29 RX ADMIN — METHOCARBAMOL TABLETS 750 MG: 500 TABLET, COATED ORAL at 14:00

## 2023-08-29 RX ADMIN — FOLIC ACID 1 MG: 1 TABLET ORAL at 09:18

## 2023-08-29 RX ADMIN — ATORVASTATIN CALCIUM 40 MG: 40 TABLET, FILM COATED ORAL at 17:23

## 2023-08-29 RX ADMIN — ACETAMINOPHEN 325MG 975 MG: 325 TABLET ORAL at 17:23

## 2023-08-29 RX ADMIN — MELATONIN 6 MG: at 21:33

## 2023-08-29 RX ADMIN — METHOCARBAMOL TABLETS 750 MG: 500 TABLET, COATED ORAL at 21:33

## 2023-08-29 RX ADMIN — AMIODARONE HYDROCHLORIDE 200 MG: 200 TABLET ORAL at 09:18

## 2023-08-29 RX ADMIN — ACETAMINOPHEN 325MG 975 MG: 325 TABLET ORAL at 05:46

## 2023-08-29 RX ADMIN — THIAMINE HCL TAB 100 MG 100 MG: 100 TAB at 09:18

## 2023-08-29 RX ADMIN — ACETAMINOPHEN 325MG 975 MG: 325 TABLET ORAL at 14:00

## 2023-08-29 RX ADMIN — Medication 1 TABLET: at 09:17

## 2023-08-29 RX ADMIN — VANCOMYCIN HYDROCHLORIDE 750 MG: 750 INJECTION, SOLUTION INTRAVENOUS at 05:51

## 2023-08-29 NOTE — ASSESSMENT & PLAN NOTE
· Now with Enterococcus faecium UTI x 2  · Continue vancomycin, Day #3/5  · Patient appears nontoxic, afebrile  · Patient with concern for recurrent UTI due to infected stone, will go for retrieval after a few days of antibiotic therapy  · Possible intervention later this week  · NPO after midnight

## 2023-08-29 NOTE — PROGRESS NOTES
[facility]  Progress Note  Name: Elisa Ervin I  MRN: [id number]  Unit/Bed#: E4 -01 I Date of Admission: 7/6/2023   Date of Service: 8/29/2023 I Hospital Day: 47    Assessment/Plan   * UTI (urinary tract infection)  Assessment & Plan  · Now with Enterococcus faecium UTI x 2  · Continue vancomycin, Day #3/5  · Patient appears nontoxic, afebrile  · Patient with concern for recurrent UTI due to infected stone, will go for retrieval after a few days of antibiotic therapy  · Possible intervention later this week  · NPO after midnight    Atrial fibrillation ([address])  Assessment & Plan  · Evaluated by cardiology for atrial fibrillation with rapid ventricular response   · improved after successful SOLA/cardioversion on 7/25/23  · Rhythm control with amiodarone , Cardizem 120 mg daily  · Eliquis for anticoagulation , on hold for possible intervention  · CHADS2 Vascor of 1      Impaired decision making  Assessment & Plan  Neuropsych evaluation was done on 7/24/2023  He continues to have impaired decision-making capacity  Guardianship pending    Bacteremia  Assessment & Plan  · Aerococcus bacteremia due to complicated UTI, resolved  · Completed full course of antibiotics under the guidance of the infectious disease team    Hydronephrosis with obstructing calculus  Assessment & Plan  · Admitted originally due to sepsis from UTI with obstruction  · Status post cystoscopy with difficult stent insertion on 7/6/2023  · Procedure was complicated by inadvertent left ureteral perforation  · Follow-up CT showed previous ureteral calculus is now in the bladder  · Repeat cystoscopy on 8/3 with stent placement.    • High risk for recurrent UTI and hematuria while stent remains in situ    Esophageal abnormality  Assessment & Plan  · incidental finding on CT with diffuse esophageal thickening could be from esophagitis  · Patient was evaluated by the GI team: Note that esophageal wall thickening could be secondary to reflux esophagitis, Bruno's, or hiatal hernia  · Continue empiric Protonix  · Outpatient upper endoscopy      COPD (chronic obstructive pulmonary disease) (Formerly [facility])  Assessment & Plan  · Stable without exacerbation  · Continue albuterol and breo                  Hospital Course:     Patient is a 59-year-old male with past medical history significant for COPD, atrial fibrillation, and nephrolithiasis cystoscopy, and stent placement who presented with sepsis secondary to Enterococcus bacteremia and complicated UTI     Patient is now awaiting guardianship-today is hospital day #54      Assessment:      Principal Problem:    UTI (urinary tract infection)  Active Problems:    COPD (chronic obstructive pulmonary disease) (Former[facility])    Esophageal abnormality    Hydronephrosis with obstructing calculus    Bacteremia    Impaired decision making    Atrial fibrillation ([address])      Plan:    · N.p.o. after midnight for possible cystoscopy and stone extraction  · Awaiting guardianship       VTE Pharmacologic Prophylaxis:   Pharmacologic: Heparin  Mechanical VTE Prophylaxis in Place: Yes    Patient Centered Rounds: I have performed bedside rounds with nursing staff today. Discussions with Specialists or Other Care Team Provider:  urology    Education and Discussions with Family / Patient: Discussed with case management    Time Spent for Care: 1 hour. More than 50% of total time spent on counseling and coordination of care as described above. Current Length of Stay: 54 day(s)    Current Patient Status: Inpatient   Certification Statement: The patient will continue to require additional inpatient hospital stay due to Cystoscopy    Discharge Plan / Estimated Discharge Date:  To be determined    Code Status: Level 1 - Full Code      Subjective:   Seen and examined, no acute complaints no nausea no vomiting, no abdominal pain    A complete and comprehensive 14 point organ system review has been performed and all other systems are negative other than stated above. Objective:     Vitals:   Temp (24hrs), Av.9 °F (36.6 °C), Min:97.8 °F (36.6 °C), Max:98 °F (36.7 °C)    Temp:  [97.8 °F (36.6 °C)-98 °F (36.7 °C)] 97.8 °F (36.6 °C)  HR:  [67-81] 81  Resp:  [18] 18  BP: (118-141)/(66-80) 141/66  SpO2:  [94 %] 94 %  Body mass index is 28 kg/m². Input and Output Summary (last 24 hours):     No intake or output data in the 24 hours ending 23 1610    Physical Exam:     General: well appearing, no acute distress  HEENT: atraumatic, PERRLA, moist mucosa, normal pharynx, normal tonsils and adenoids, normal tongue, no fluid in sinuses  Neck: Trachea midline, no carotid bruit, no masses  Respiratory: normal chest wall expansion, CTA B, no r/r/w, no rubs  Cardiovascular: RRR, no m/r/g, Normal S1 and S2  Abdomen: Soft, non-tender, non-distended, normal bowel sounds in all quadrants, no hepatosplenomegaly, no tympany  Rectal: deferred  Musculoskeletal: normal ROM in upper and lower extremities  Integumentary: warm, dry, and pink, with no rash, purpura, or petechia  Heme/Lymph: no lymphadenopathy, no bruises  Neurological: Cranial Nerves II-XII grossly intact  Psychiatric: cooperative with normal mood, affect, and cognition      Additional Data:     Labs:    Results from last 7 days   Lab Units 23  1042   WBC Thousand/uL 7.70   HEMOGLOBIN g/dL 11.7*   HEMATOCRIT % 37.0   PLATELETS Thousands/uL 352   NEUTROS PCT % 65   LYMPHS PCT % 18   MONOS PCT % 11   EOS PCT % 5     Results from last 7 days   Lab Units 23  0548   POTASSIUM mmol/L 4.5   CHLORIDE mmol/L 103   CO2 mmol/L 30   BUN mg/dL 25   CREATININE mg/dL 1.04   CALCIUM mg/dL 8.8           * I Have Reviewed All Lab Data Listed Above. * Additional Pertinent Lab Tests Reviewed:  [address] [place] [address] Admission Reviewed    Imaging:    Imaging Reports Reviewed Today Include: No new imaging  Imaging Personally Reviewed by Myself Includes: No new imaging    Recent Cultures (last 7 days):     Results from last 7 days   Lab Units 08/26/23 2123   URINE CULTURE  >100,000 cfu/ml Enterococcus faecium*       Last 24 Hours Medication List:   Current Facility-Administered Medications   Medication Dose Route Frequency Provider Last Rate   • acetaminophen  975 mg Oral Q6H Heaven Villalta MD     • albuterol  2.5 mg Nebulization Q6H PRN Farhan Bailey MD     • aluminum-magnesium hydroxide-simethicone  30 mL Oral Q6H PRN Farhan Bailey MD     • amiodarone  200 mg Oral Daily With Breakfast Blas Pettit MD     • atorvastatin  40 mg Oral Daily With Martina De Jesus MD     • diltiazem  120 mg Oral Daily Farhan Bailey MD     • docusate sodium  100 mg Oral BID PRN Freddie Land MD     • fluticasone-vilanterol  1 puff Inhalation Daily Farhan Bailey MD     • folic acid  1 mg Oral Daily Farhan Bailey MD     • guaiFENesin  200 mg Oral Q4H PRN Farhan Bailey MD     • heparin (porcine)  5,000 Units Subcutaneous [facility] Ric Handley DO     • hydrOXYzine HCL  50 mg Oral HS PRN Farhan Bailey MD     • lidocaine  1 patch Topical Daily Farhan Bailey MD     • melatonin  6 mg Oral HS Farhan Bailey MD     • methocarbamol  750 mg Oral [facility] Farhan Bailey MD     • multivitamin-minerals  1 tablet Oral Daily Farhan Bailey MD     • nicotine  7 mg Transdermal Daily Delaney Marquez MD     • pantoprazole  40 mg Oral Early Morning Farhan Bailey MD     • polyethylene glycol  17 g Oral Daily PRN Freddie Land MD     • thiamine  100 mg Oral Daily Farhan Bailey MD     • vancomycin  1,000 mg Intravenous Q12H Ric Handley DO          Today, Patient Was Seen By: Rod Morrison DO    ** Please Note: This note was completed in part utilizing [email or web address] Direct Software.   Grammatical errors, random word insertions, spelling mistakes, and incomplete sentences may be an occasional consequence of this system secondary to software limitations, ambient noise, and hardware issues. If you have any questions or concerns about the content, text, or information contained within the body of this dictation, please contact the provider for clarification.  **

## 2023-08-29 NOTE — ASSESSMENT & PLAN NOTE
Neuropsych evaluation was done on 7/24/2023  He continues to have impaired decision-making capacity  Guardianship pending

## 2023-08-29 NOTE — ASSESSMENT & PLAN NOTE
· Admitted originally due to sepsis from UTI with obstruction  · Status post cystoscopy with difficult stent insertion on 7/6/2023  · Procedure was complicated by inadvertent left ureteral perforation  · Follow-up CT showed previous ureteral calculus is now in the bladder  · Repeat cystoscopy on 8/3 with stent placement.    • High risk for recurrent UTI and hematuria while stent remains in situ

## 2023-08-29 NOTE — RESTORATIVE TECHNICIAN NOTE
Restorative Technician Note      Patient Name: Dawn Wallace     Restorative Tech Visit Date: 08/29/23  Note Type: Mobility  Patient Position Upon Consult: Supine  Activity Performed: Ambulated  Patient Position at End of Consult: Seated edge of bed;  All needs within reach

## 2023-08-29 NOTE — ASSESSMENT & PLAN NOTE
· Evaluated by cardiology for atrial fibrillation with rapid ventricular response   · improved after successful SOLA/cardioversion on 7/25/23  · Rhythm control with amiodarone , Cardizem 120 mg daily  · Eliquis for anticoagulation , on hold for possible intervention  · CHADS2 Vascor of 1

## 2023-08-29 NOTE — PLAN OF CARE
Problem: Potential for Falls  Goal: Patient will remain free of falls  Description: INTERVENTIONS:  - Educate patient/family on patient safety including physical limitations  - Instruct patient to call for assistance with activity   - Consult OT/PT to assist with strengthening/mobility   - Keep Call bell within reach  - Keep bed low and locked with side rails adjusted as appropriate  - Keep care items and personal belongings within reach  - Initiate and maintain comfort rounds  - Make Fall Risk Sign visible to staff  - Apply yellow socks and bracelet for high fall risk patients  - Consider moving patient to room near nurses station  Outcome: Progressing     Problem: MOBILITY - ADULT  Goal: Maintain or return to baseline ADL function  Description: INTERVENTIONS:  - Educate patient/family on patient safety including physical limitations  - Instruct patient to call for assistance with activity   - Consult OT/PT to assist with strengthening/mobility   - Keep Call bell within reach  - Keep bed low and locked with side rails adjusted as appropriate  - Keep care items and personal belongings within reach  - Initiate and maintain comfort rounds  - Make Fall Risk Sign visible to staff  - Apply yellow socks and bracelet for high fall risk patients  - Consider moving patient to room near nurses station  Outcome: Progressing  Goal: Maintains/Returns to pre admission functional level  Description: INTERVENTIONS:  - Perform BMAT or MOVE assessment daily.   - Set and communicate daily mobility goal to care team and patient/family/caregiver. - Collaborate with rehabilitation services on mobility goals if consulted  - Perform Range of Motion 3 times a day. - Reposition patient every 2 hours.   - Dangle patient 3 times a day  - Stand patient 3 times a day  - Ambulate patient 3 times a day  - Out of bed to chair 3 times a day   - Out of bed for meals 3 times a day  - Out of bed for toileting  - Record patient progress and toleration of activity level   Outcome: Progressing     Problem: PAIN - ADULT  Goal: Verbalizes/displays adequate comfort level or baseline comfort level  Description: Interventions:  - Encourage patient to monitor pain and request assistance  - Assess pain using appropriate pain scale  - Administer analgesics based on type and severity of pain and evaluate response  - Implement non-pharmacological measures as appropriate and evaluate response  - Consider cultural and social influences on pain and pain management  - Notify physician/advanced practitioner if interventions unsuccessful or patient reports new pain  Outcome: Progressing     Problem: INFECTION - ADULT  Goal: Absence or prevention of progression during hospitalization  Description: INTERVENTIONS:  - Assess and monitor for signs and symptoms of infection  - Monitor lab/diagnostic results  - Monitor all insertion sites, i.e. indwelling lines, tubes, and drains  - Monitor endotracheal if appropriate and nasal secretions for changes in amount and color  - [place] appropriate cooling/warming therapies per order  - Administer medications as ordered  - Instruct and encourage patient and family to use good hand hygiene technique  - Identify and instruct in appropriate isolation precautions for identified infection/condition  Outcome: Progressing  Goal: Absence of fever/infection during neutropenic period  Description: INTERVENTIONS:  - Monitor WBC    Outcome: Progressing     Problem: SAFETY ADULT  Goal: Patient will remain free of falls  Description: INTERVENTIONS:  - Educate patient/family on patient safety including physical limitations  - Instruct patient to call for assistance with activity   - Consult OT/PT to assist with strengthening/mobility   - Keep Call bell within reach  - Keep bed low and locked with side rails adjusted as appropriate  - Keep care items and personal belongings within reach  - Initiate and maintain comfort rounds  - Make Fall Risk Sign visible to staff  - Apply yellow socks and bracelet for high fall risk patients  - Consider moving patient to room near nurses station  Outcome: Progressing  Goal: Maintain or return to baseline ADL function  Description: INTERVENTIONS:  - Educate patient/family on patient safety including physical limitations  - Instruct patient to call for assistance with activity   - Consult OT/PT to assist with strengthening/mobility   - Keep Call bell within reach  - Keep bed low and locked with side rails adjusted as appropriate  - Keep care items and personal belongings within reach  - Initiate and maintain comfort rounds  - Make Fall Risk Sign visible to staff  - Apply yellow socks and bracelet for high fall risk patients  - Consider moving patient to room near nurses station  Outcome: Progressing  Goal: Maintains/Returns to pre admission functional level  Description: INTERVENTIONS:  - Perform BMAT or MOVE assessment daily.   - Set and communicate daily mobility goal to care team and patient/family/caregiver. - Collaborate with rehabilitation services on mobility goals if consulted  - Perform Range of Motion 3 times a day. - Reposition patient every 2 hours.   - Dangle patient 3 times a day  - Stand patient 3 times a day  - Ambulate patient 3 times a day  - Out of bed to chair 3 times a day   - Out of bed for meals 3 times a day  - Out of bed for toileting  - Record patient progress and toleration of activity level   Outcome: Progressing     Problem: DISCHARGE PLANNING  Goal: Discharge to home or other facility with appropriate resources  Description: INTERVENTIONS:  - Identify barriers to discharge w/patient and caregiver  - Arrange for needed discharge resources and transportation as appropriate  - Identify discharge learning needs (meds, wound care, etc.)  - Arrange for interpretive services to assist at discharge as needed  - Refer to Case Management Department for coordinating discharge planning if the patient needs post-hospital services based on physician/advanced practitioner order or complex needs related to functional status, cognitive ability, or social support system  Outcome: Progressing     Problem: Knowledge Deficit  Goal: Patient/family/caregiver demonstrates understanding of disease process, treatment plan, medications, and discharge instructions  Description: Complete learning assessment and assess knowledge base.   Interventions:  - Provide teaching at level of understanding  - Provide teaching via preferred learning methods  Outcome: Progressing     Problem: Prexisting or High Potential for Compromised Skin Integrity  Goal: Skin integrity is maintained or improved  Description: INTERVENTIONS:  - Identify patients at risk for skin breakdown  - Assess and monitor skin integrity  - Assess and monitor nutrition and hydration status  - Monitor labs   - Assess for incontinence   - Turn and reposition patient  - Assist with mobility/ambulation  - Relieve pressure over bony prominences  - Avoid friction and shearing  - Provide appropriate hygiene as needed including keeping skin clean and dry  - Evaluate need for skin moisturizer/barrier cream  - Collaborate with interdisciplinary team   - Patient/family teaching  - Consider wound care consult   Outcome: Progressing     Problem: GENITOURINARY - ADULT  Goal: Maintains or returns to baseline urinary function  Description: INTERVENTIONS:  - Assess urinary function  - Encourage oral fluids to ensure adequate hydration if ordered  - Administer IV fluids as ordered to ensure adequate hydration  - Administer ordered medications as needed  - Offer frequent toileting  - Follow urinary retention protocol if ordered  Outcome: Progressing     Problem: HEMATOLOGIC - ADULT  Goal: Maintains hematologic stability  Description: INTERVENTIONS  - Assess for signs and symptoms of bleeding or hemorrhage  - Monitor labs  - Administer supportive blood products/factors as ordered and appropriate  Outcome: Progressing

## 2023-08-29 NOTE — RESTORATIVE TECHNICIAN NOTE
Restorative Technician Note      Patient Name: Wong Cole     Restorative Tech Visit Date: 08/29/23  Note Type: Mobility  Patient Position Upon Consult: Supine  Activity Performed: Ambulated  Patient Position at End of Consult: Supine;  All needs within reach

## 2023-08-29 NOTE — ANESTHESIA PREPROCEDURE EVALUATION
Procedure:  REMOVAL STENT URETERAL (Left: Ureter)  CYSTOSCOPY URETEROSCOPY WITH LITHOTRIPSY HOLMIUM LASER, RETROGRADE PYELOGRAM AND INSERTION STENT URETERAL (Right: Bladder)    Relevant Problems   CARDIO   (+) Atrial fibrillation (HCC)      /RENAL   (+) Hydronephrosis with obstructing calculus      PULMONARY  SOB with minimal exertion   (+) COPD (chronic obstructive pulmonary disease) (HCC)      Other   (+) Daily consumption of alcohol (Resolved)   (+) Tobacco use (Resolved)        Physical Exam    Airway    Mallampati score: II  TM Distance: >3 FB  Neck ROM: full     Dental   upper dentures,     Cardiovascular  Rhythm: irregular, Rate: normal,     Pulmonary  Breath sounds clear to auscultation,     Other Findings        Anesthesia Plan  ASA Score- 3     Anesthesia Type- general with ASA Monitors. Additional Monitors:   Airway Plan:     Comment: •  Left Ventricle: Left ventricular cavity size is normal. Wall thickness is mildly increased. The left ventricular ejection fraction is 60%. Systolic function is normal. Wall motion is normal.  •  Left Atrium: The atrium is mildly dilated. •  Right Atrium: The atrium is mildly dilated. •  Atrial Septum: No patent foramen ovale detected using color flow Doppler at rest. 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 It has free espirophasic mobility between the right and left atrium. •  Left Atrial Appendage: There is normal function. There is no thrombus. There is no mass. •  Aortic Valve: There is aortic valve sclerosis. .       Plan Factors-Exercise tolerance (METS): <4 METS. Chart reviewed. EKG reviewed. Existing labs reviewed. Patient summary reviewed. Patient is a current smoker. Patient instructed to abstain from smoking on day of procedure. Patient did not smoke on day of surgery. Induction- intravenous. Postoperative Plan-     Informed Consent- Anesthetic plan and risks discussed with patient.

## 2023-08-29 NOTE — PROGRESS NOTES
Progress Note - Shivani Chao 75 y.o. male MRN: [id number]    Unit/Bed#: E4 -01 Encounter: [id number]      Assessment:  Shivani Chao is a 67-year-old male with history of nephrolithiasis, left ureteral stent insertion 7/6, with recurrent UTI and eventual definitive stone treatment by Dr. David Steve 8/3/2023, prolonged hospitalization due to procurement of guardianship, with retained stent. He is anticoagulated, on Eliquis (currently held) and has had bouts of intermittent hematuria. We were asked to reevaluate patient after long duration of clear urine due to the development of refractory mild bleeding. Patient is afebrile, hemodynamically stable without complaints of flank, abdominal or suprapubic pain. He is voiding volitionally in quantity sufficient volumes. Hemoglobin is stable. Patient is nontoxic without leukocytosis or acute kidney injury. Most recent hemoglobin 11.7--11.9. Stable. He has recurrent Enterococcus UTI. Was initially on Keflex. Changed to vancomycin given no culture results. Plan:  · Continue medical optimization. · +Vancomycin. · Serial urine collection. · We will coordinate cystoscopy and stent removal after several days of antibiosis later this week or early next week. · High risk for recurrent UTI and hematuria while stent remains in situ. Subjective:   No complaints of pain    Objective:     Vitals: Blood pressure 118/80, pulse 67, temperature 98 °F (36.7 °C), temperature source Temporal, resp. rate 18, height 6' 3" (1.905 m), weight 102 kg (224 lb), SpO2 94 %. ,Body mass index is 28 kg/m².       Intake/Output Summary (Last 24 hours) at 8/29/2023 1108  Last data filed at 8/28/2023 1401  Gross per 24 hour   Intake --   Output 340 ml   Net -340 ml       Physical Exam: General appearance: alert and oriented, in no acute distress, appears stated age, cooperative and no distress  Head: Normocephalic, without obvious abnormality, atraumatic  Neck: no JVD and supple, symmetrical, trachea midline  Lungs: diminished breath sounds  Heart: regular rate and rhythm, S1, S2 normal, no murmur, click, rub or gallop  Abdomen: soft, non-tender; bowel sounds normal; no masses,  no organomegaly  Extremities: extremities normal, warm and well-perfused; no cyanosis, clubbing, or edema  Pulses: 2+ and symmetric  Neurologic: Grossly normal  No external urinary drains                 Invasive Devices     Peripheral Intravenous Line  Duration           Peripheral IV 08/29/23 Right;Ventral (anterior) Forearm <1 day          Drain  Duration           Ureteral Internal Stent Left ureter 6 Fr. 53 days    Ureteral Internal Stent Left ureter 7 Fr. 25 days              Lab Results   Component Value Date    WBC 7.70 08/28/2023    HGB 11.7 (L) 08/28/2023    HCT 37.0 08/28/2023    MCV 99 (H) 08/28/2023     08/28/2023     Lab Results   Component Value Date    SODIUM 138 08/29/2023    K 4.5 08/29/2023     08/29/2023    CO2 30 08/29/2023    BUN 25 08/29/2023    CREATININE 1.04 08/29/2023    GLUC 86 08/29/2023    CALCIUM 8.8 08/29/2023       Lab, Imaging and other studies: I have personally reviewed pertinent reports.

## 2023-08-30 ENCOUNTER — ANESTHESIA (INPATIENT)
Dept: PERIOP | Facility: HOSPITAL | Age: 73
DRG: 853 | End: 2023-08-30
Payer: MEDICARE

## 2023-08-30 ENCOUNTER — APPOINTMENT (INPATIENT)
Dept: RADIOLOGY | Facility: HOSPITAL | Age: 73
DRG: 853 | End: 2023-08-30
Payer: MEDICARE

## 2023-08-30 LAB
ANION GAP SERPL CALCULATED.3IONS-SCNC: 4 MMOL/L
BUN SERPL-MCNC: 24 MG/DL (ref 5–25)
CALCIUM SERPL-MCNC: 8.8 MG/DL (ref 8.4–10.2)
CHLORIDE SERPL-SCNC: 102 MMOL/L (ref 96–108)
CO2 SERPL-SCNC: 30 MMOL/L (ref 21–32)
CREAT SERPL-MCNC: 1.12 MG/DL (ref 0.6–1.3)
GFR SERPL CREATININE-BSD FRML MDRD: 64 ML/MIN/1.73SQ M
GLUCOSE SERPL-MCNC: 85 MG/DL (ref 65–140)
POTASSIUM SERPL-SCNC: 4.7 MMOL/L (ref 3.5–5.3)
SODIUM SERPL-SCNC: 136 MMOL/L (ref 135–147)

## 2023-08-30 PROCEDURE — 74018 RADEX ABDOMEN 1 VIEW: CPT

## 2023-08-30 PROCEDURE — 52352 CYSTOURETERO W/STONE REMOVE: CPT | Performed by: STUDENT IN AN ORGANIZED HEALTH CARE EDUCATION/TRAINING PROGRAM

## 2023-08-30 PROCEDURE — 99232 SBSQ HOSP IP/OBS MODERATE 35: CPT | Performed by: STUDENT IN AN ORGANIZED HEALTH CARE EDUCATION/TRAINING PROGRAM

## 2023-08-30 PROCEDURE — 80048 BASIC METABOLIC PNL TOTAL CA: CPT | Performed by: INTERNAL MEDICINE

## 2023-08-30 PROCEDURE — 87077 CULTURE AEROBIC IDENTIFY: CPT | Performed by: STUDENT IN AN ORGANIZED HEALTH CARE EDUCATION/TRAINING PROGRAM

## 2023-08-30 PROCEDURE — 99233 SBSQ HOSP IP/OBS HIGH 50: CPT | Performed by: INTERNAL MEDICINE

## 2023-08-30 PROCEDURE — C1769 GUIDE WIRE: HCPCS | Performed by: STUDENT IN AN ORGANIZED HEALTH CARE EDUCATION/TRAINING PROGRAM

## 2023-08-30 PROCEDURE — 82360 CALCULUS ASSAY QUANT: CPT | Performed by: STUDENT IN AN ORGANIZED HEALTH CARE EDUCATION/TRAINING PROGRAM

## 2023-08-30 PROCEDURE — 87086 URINE CULTURE/COLONY COUNT: CPT | Performed by: STUDENT IN AN ORGANIZED HEALTH CARE EDUCATION/TRAINING PROGRAM

## 2023-08-30 RX ORDER — SODIUM CHLORIDE 9 MG/ML
INJECTION, SOLUTION INTRAVENOUS CONTINUOUS PRN
Status: DISCONTINUED | OUTPATIENT
Start: 2023-08-30 | End: 2023-08-30

## 2023-08-30 RX ORDER — EPHEDRINE SULFATE 50 MG/ML
INJECTION INTRAVENOUS AS NEEDED
Status: DISCONTINUED | OUTPATIENT
Start: 2023-08-30 | End: 2023-08-30

## 2023-08-30 RX ORDER — SODIUM CHLORIDE 9 MG/ML
125 INJECTION, SOLUTION INTRAVENOUS CONTINUOUS
Status: CANCELLED | OUTPATIENT
Start: 2023-08-30

## 2023-08-30 RX ORDER — ONDANSETRON 2 MG/ML
INJECTION INTRAMUSCULAR; INTRAVENOUS AS NEEDED
Status: DISCONTINUED | OUTPATIENT
Start: 2023-08-30 | End: 2023-08-30

## 2023-08-30 RX ORDER — DEXAMETHASONE SODIUM PHOSPHATE 10 MG/ML
INJECTION, SOLUTION INTRAMUSCULAR; INTRAVENOUS AS NEEDED
Status: DISCONTINUED | OUTPATIENT
Start: 2023-08-30 | End: 2023-08-30

## 2023-08-30 RX ORDER — MAGNESIUM HYDROXIDE 1200 MG/15ML
LIQUID ORAL AS NEEDED
Status: DISCONTINUED | OUTPATIENT
Start: 2023-08-30 | End: 2023-08-30 | Stop reason: HOSPADM

## 2023-08-30 RX ORDER — MIDAZOLAM HYDROCHLORIDE 2 MG/2ML
INJECTION, SOLUTION INTRAMUSCULAR; INTRAVENOUS AS NEEDED
Status: DISCONTINUED | OUTPATIENT
Start: 2023-08-30 | End: 2023-08-30

## 2023-08-30 RX ORDER — PROPOFOL 10 MG/ML
INJECTION, EMULSION INTRAVENOUS AS NEEDED
Status: DISCONTINUED | OUTPATIENT
Start: 2023-08-30 | End: 2023-08-30

## 2023-08-30 RX ORDER — GLYCOPYRROLATE 0.2 MG/ML
INJECTION INTRAMUSCULAR; INTRAVENOUS AS NEEDED
Status: DISCONTINUED | OUTPATIENT
Start: 2023-08-30 | End: 2023-08-30

## 2023-08-30 RX ORDER — VANCOMYCIN HYDROCHLORIDE 1 G/20ML
INJECTION, POWDER, LYOPHILIZED, FOR SOLUTION INTRAVENOUS AS NEEDED
Status: DISCONTINUED | OUTPATIENT
Start: 2023-08-30 | End: 2023-08-30

## 2023-08-30 RX ORDER — LIDOCAINE HYDROCHLORIDE 20 MG/ML
INJECTION, SOLUTION EPIDURAL; INFILTRATION; INTRACAUDAL; PERINEURAL AS NEEDED
Status: DISCONTINUED | OUTPATIENT
Start: 2023-08-30 | End: 2023-08-30

## 2023-08-30 RX ORDER — CEFTRIAXONE 1 G/50ML
1000 INJECTION, SOLUTION INTRAVENOUS
Status: COMPLETED | OUTPATIENT
Start: 2023-08-30 | End: 2023-08-30

## 2023-08-30 RX ORDER — SULFAMETHOXAZOLE AND TRIMETHOPRIM 800; 160 MG/1; MG/1
1 TABLET ORAL EVERY 12 HOURS SCHEDULED
Status: DISCONTINUED | OUTPATIENT
Start: 2023-08-31 | End: 2023-09-01 | Stop reason: HOSPADM

## 2023-08-30 RX ADMIN — HEPARIN SODIUM 5000 UNITS: 5000 INJECTION INTRAVENOUS; SUBCUTANEOUS at 13:07

## 2023-08-30 RX ADMIN — DILTIAZEM HYDROCHLORIDE 120 MG: 120 CAPSULE, COATED, EXTENDED RELEASE ORAL at 08:40

## 2023-08-30 RX ADMIN — MELATONIN 6 MG: at 21:43

## 2023-08-30 RX ADMIN — DEXAMETHASONE SODIUM PHOSPHATE 5 MG: 10 INJECTION INTRAMUSCULAR; INTRAVENOUS at 14:37

## 2023-08-30 RX ADMIN — Medication 1 TABLET: at 08:40

## 2023-08-30 RX ADMIN — ONDANSETRON 4 MG: 2 INJECTION INTRAMUSCULAR; INTRAVENOUS at 14:37

## 2023-08-30 RX ADMIN — METHOCARBAMOL TABLETS 750 MG: 500 TABLET, COATED ORAL at 21:43

## 2023-08-30 RX ADMIN — ACETAMINOPHEN 325MG 975 MG: 325 TABLET ORAL at 05:55

## 2023-08-30 RX ADMIN — METHOCARBAMOL TABLETS 750 MG: 500 TABLET, COATED ORAL at 13:07

## 2023-08-30 RX ADMIN — ATORVASTATIN CALCIUM 40 MG: 40 TABLET, FILM COATED ORAL at 17:27

## 2023-08-30 RX ADMIN — PROPOFOL 200 MG: 10 INJECTION, EMULSION INTRAVENOUS at 14:37

## 2023-08-30 RX ADMIN — FLUTICASONE FUROATE AND VILANTEROL TRIFENATATE 1 PUFF: 200; 25 POWDER RESPIRATORY (INHALATION) at 08:43

## 2023-08-30 RX ADMIN — PROPOFOL 80 MCG/KG/MIN: 10 INJECTION, EMULSION INTRAVENOUS at 14:37

## 2023-08-30 RX ADMIN — HEPARIN SODIUM 5000 UNITS: 5000 INJECTION INTRAVENOUS; SUBCUTANEOUS at 21:43

## 2023-08-30 RX ADMIN — METHOCARBAMOL TABLETS 750 MG: 500 TABLET, COATED ORAL at 05:55

## 2023-08-30 RX ADMIN — MIDAZOLAM 2 MG: 1 INJECTION INTRAMUSCULAR; INTRAVENOUS at 14:36

## 2023-08-30 RX ADMIN — CEFTRIAXONE 1000 MG: 1 INJECTION, SOLUTION INTRAVENOUS at 14:57

## 2023-08-30 RX ADMIN — FOLIC ACID 1 MG: 1 TABLET ORAL at 08:40

## 2023-08-30 RX ADMIN — PANTOPRAZOLE SODIUM 40 MG: 40 TABLET, DELAYED RELEASE ORAL at 05:55

## 2023-08-30 RX ADMIN — EPHEDRINE SULFATE 10 MG: 50 INJECTION, SOLUTION INTRAVENOUS at 15:17

## 2023-08-30 RX ADMIN — THIAMINE HCL TAB 100 MG 100 MG: 100 TAB at 08:40

## 2023-08-30 RX ADMIN — LIDOCAINE HYDROCHLORIDE 100 MG: 20 INJECTION, SOLUTION EPIDURAL; INFILTRATION; INTRACAUDAL at 14:37

## 2023-08-30 RX ADMIN — VANCOMYCIN HYDROCHLORIDE 1000 MG: 1 INJECTION, SOLUTION INTRAVENOUS at 17:27

## 2023-08-30 RX ADMIN — SODIUM CHLORIDE: 0.9 INJECTION, SOLUTION INTRAVENOUS at 14:25

## 2023-08-30 RX ADMIN — GLYCOPYRROLATE 0.2 MG: 0.2 INJECTION INTRAMUSCULAR; INTRAVENOUS at 15:13

## 2023-08-30 RX ADMIN — HEPARIN SODIUM 5000 UNITS: 5000 INJECTION INTRAVENOUS; SUBCUTANEOUS at 05:55

## 2023-08-30 RX ADMIN — AMIODARONE HYDROCHLORIDE 200 MG: 200 TABLET ORAL at 08:43

## 2023-08-30 NOTE — PLAN OF CARE
Problem: Potential for Falls  Goal: Patient will remain free of falls  Description: INTERVENTIONS:  - Educate patient/family on patient safety including physical limitations  - Instruct patient to call for assistance with activity   - Consult OT/PT to assist with strengthening/mobility   - Keep Call bell within reach  - Keep bed low and locked with side rails adjusted as appropriate  - Keep care items and personal belongings within reach  - Initiate and maintain comfort rounds  - Make Fall Risk Sign visible to staff  - Apply yellow socks and bracelet for high fall risk patients  - Consider moving patient to room near nurses station  Outcome: Progressing     Problem: MOBILITY - ADULT  Goal: Maintain or return to baseline ADL function  Description: INTERVENTIONS:  - Educate patient/family on patient safety including physical limitations  - Instruct patient to call for assistance with activity   - Consult OT/PT to assist with strengthening/mobility   - Keep Call bell within reach  - Keep bed low and locked with side rails adjusted as appropriate  - Keep care items and personal belongings within reach  - Initiate and maintain comfort rounds  - Make Fall Risk Sign visible to staff  - Apply yellow socks and bracelet for high fall risk patients  - Consider moving patient to room near nurses station  Outcome: Progressing  Goal: Maintains/Returns to pre admission functional level  Description: INTERVENTIONS:  - Perform BMAT or MOVE assessment daily.   - Set and communicate daily mobility goal to care team and patient/family/caregiver. - Collaborate with rehabilitation services on mobility goals if consulted  - Perform Range of Motion 2 times a day. - Reposition patient every 2 hours.   - Dangle patient 2 times a day  - Stand patient 2 times a day  - Ambulate patient 2 times a day  - Out of bed to chair 2 times a day   - Out of bed for meals 2 times a day  - Out of bed for toileting  - Record patient progress and toleration of activity level   Outcome: Progressing     Problem: PAIN - ADULT  Goal: Verbalizes/displays adequate comfort level or baseline comfort level  Description: Interventions:  - Encourage patient to monitor pain and request assistance  - Assess pain using appropriate pain scale  - Administer analgesics based on type and severity of pain and evaluate response  - Implement non-pharmacological measures as appropriate and evaluate response  - Consider cultural and social influences on pain and pain management  - Notify physician/advanced practitioner if interventions unsuccessful or patient reports new pain  Outcome: Progressing     Problem: INFECTION - ADULT  Goal: Absence or prevention of progression during hospitalization  Description: INTERVENTIONS:  - Assess and monitor for signs and symptoms of infection  - Monitor lab/diagnostic results  - Monitor all insertion sites, i.e. indwelling lines, tubes, and drains  - Monitor endotracheal if appropriate and nasal secretions for changes in amount and color  - [place] appropriate cooling/warming therapies per order  - Administer medications as ordered  - Instruct and encourage patient and family to use good hand hygiene technique  - Identify and instruct in appropriate isolation precautions for identified infection/condition  Outcome: Progressing  Goal: Absence of fever/infection during neutropenic period  Description: INTERVENTIONS:  - Monitor WBC    Outcome: Progressing     Problem: SAFETY ADULT  Goal: Patient will remain free of falls  Description: INTERVENTIONS:  - Educate patient/family on patient safety including physical limitations  - Instruct patient to call for assistance with activity   - Consult OT/PT to assist with strengthening/mobility   - Keep Call bell within reach  - Keep bed low and locked with side rails adjusted as appropriate  - Keep care items and personal belongings within reach  - Initiate and maintain comfort rounds  - Make Fall Risk Sign visible to staff  - Apply yellow socks and bracelet for high fall risk patients  - Consider moving patient to room near nurses station  Outcome: Progressing  Goal: Maintain or return to baseline ADL function  Description: INTERVENTIONS:  - Educate patient/family on patient safety including physical limitations  - Instruct patient to call for assistance with activity   - Consult OT/PT to assist with strengthening/mobility   - Keep Call bell within reach  - Keep bed low and locked with side rails adjusted as appropriate  - Keep care items and personal belongings within reach  - Initiate and maintain comfort rounds  - Make Fall Risk Sign visible to staff  - Apply yellow socks and bracelet for high fall risk patients  - Consider moving patient to room near nurses station  Outcome: Progressing  Goal: Maintains/Returns to pre admission functional level  Description: INTERVENTIONS:  - Perform BMAT or MOVE assessment daily.   - Set and communicate daily mobility goal to care team and patient/family/caregiver. - Collaborate with rehabilitation services on mobility goals if consulted  - Perform Range of Motion 2 times a day. - Reposition patient every 2 hours.   - Dangle patient 2 times a day  - Stand patient 2 times a day  - Ambulate patient 2 times a day  - Out of bed to chair 2 times a day   - Out of bed for meals 2 times a day  - Out of bed for toileting  - Record patient progress and toleration of activity level   Outcome: Progressing     Problem: DISCHARGE PLANNING  Goal: Discharge to home or other facility with appropriate resources  Description: INTERVENTIONS:  - Identify barriers to discharge w/patient and caregiver  - Arrange for needed discharge resources and transportation as appropriate  - Identify discharge learning needs (meds, wound care, etc.)  - Arrange for interpretive services to assist at discharge as needed  - Refer to Case Management Department for coordinating discharge planning if the patient needs post-hospital services based on physician/advanced practitioner order or complex needs related to functional status, cognitive ability, or social support system  Outcome: Progressing     Problem: Knowledge Deficit  Goal: Patient/family/caregiver demonstrates understanding of disease process, treatment plan, medications, and discharge instructions  Description: Complete learning assessment and assess knowledge base.   Interventions:  - Provide teaching at level of understanding  - Provide teaching via preferred learning methods  Outcome: Progressing     Problem: Prexisting or High Potential for Compromised Skin Integrity  Goal: Skin integrity is maintained or improved  Description: INTERVENTIONS:  - Identify patients at risk for skin breakdown  - Assess and monitor skin integrity  - Assess and monitor nutrition and hydration status  - Monitor labs   - Assess for incontinence   - Turn and reposition patient  - Assist with mobility/ambulation  - Relieve pressure over bony prominences  - Avoid friction and shearing  - Provide appropriate hygiene as needed including keeping skin clean and dry  - Evaluate need for skin moisturizer/barrier cream  - Collaborate with interdisciplinary team   - Patient/family teaching  - Consider wound care consult   Outcome: Progressing     Problem: GENITOURINARY - ADULT  Goal: Maintains or returns to baseline urinary function  Description: INTERVENTIONS:  - Assess urinary function  - Encourage oral fluids to ensure adequate hydration if ordered  - Administer IV fluids as ordered to ensure adequate hydration  - Administer ordered medications as needed  - Offer frequent toileting  - Follow urinary retention protocol if ordered  Outcome: Progressing     Problem: HEMATOLOGIC - ADULT  Goal: Maintains hematologic stability  Description: INTERVENTIONS  - Assess for signs and symptoms of bleeding or hemorrhage  - Monitor labs  - Administer supportive blood products/factors as ordered and appropriate  Outcome: Progressing

## 2023-08-30 NOTE — OP NOTE
OPERATIVE REPORT  PATIENT NAME: Tori Villanueva    :  1950  MRN: [id number]  Pt Location: [facility] OR ROOM 08    SURGERY DATE: 2023    Surgeon(s) and Role: * Sheron Patel MD - Primary    Preop Diagnosis:  Ureterolithiasis [N20.1]  Ureteral stent present [Z96.0]  Right ureteral stone [N20.1]    Post-Op Diagnosis Codes:     * Ureterolithiasis [N20.1]     * Ureteral stent present [Z96.0]     * Right ureteral stone [N20.1]    Procedure(s):  Left - REMOVAL STENT URETERAL  Right - CYSTO USCOPE  W/  STONE BASKET EXTRACTION      Specimen(s):  ID Type Source Tests Collected by Time Destination   A : right ureteral stone  Calculus Ureter, Right STONE ANALYSIS Sheron Patel MD 2023 1520    B :  Urine Urine, Cystoscopic URINE CULTURE Sheron Patel MD 2023 1525        Estimated Blood Loss:   Minimal    Drains:  None    Anesthesia Type:   General    Operative Indications:  Ureterolithiasis [N20.1]  Ureteral stent present [Z96.0]  Right ureteral stone [N20.1]      Operative Findings:  • Mild encrustation of the distal left stent curl. Somewhat cloudy urine which could be secondary to stone debris, hematuria or infection. Culture was sent. Since patient has been on culture specific antibiotics the decision was made to proceed. • A single distal right ureteral calculus was basket extracted with minimal trauma to the ureter. • Left ureteral stent was successfully removed. Complications:   None    Procedure and Technique:  The patient was brought to the operating room and general anesthesia was initiated. The patient was prepped and draped in dorsolithotomy position. A rigid cystoscope was passed per urethra into the bladder revealing the aforementioned findings. A Solo plus wire was used to intubate the right ureteral orifice and passed up to the renal pelvis under fluoroscopic guidance.   A semirigid ureteroscope was used to navigate into the distal right ureter where a single calculus was identified. A 1.9 CorMedix basket was used to extract the stone and this was sent for analysis. Given a single pass in the distal ureter and minimal trauma the decision was made to not leave a stent. The Solo plus wire was removed. The rigid cystoscope was again passed into the bladder and using an alligator grasper the left ureteral stent was removed. The bladder was emptied. The patient was awoken from anesthesia and transferred to recovery in stable condition. I was present for the entire procedure. Patient Disposition:  PACU     PLAN:  • Continue vancomycin for 3 more days. Bactrim added twice daily x3 days for gram-negative coverage especially given cloudy urine. • We will obtain renal ultrasound in 6 to 8 weeks.     SIGNATURE: Axel Mabry MD  DATE: August 30, 2023  TIME: 7:04 PM

## 2023-08-30 NOTE — RESTORATIVE TECHNICIAN NOTE
Restorative Technician Note      Patient Name: Clari Agustin     Restorative Tech Visit Date: 08/30/23  Note Type: Mobility  Patient Position Upon Consult: Supine  Activity Performed: Ambulated  Patient Position at End of Consult: Seated edge of bed;  All needs within reach

## 2023-08-30 NOTE — PROGRESS NOTES
Progress Note - Urology  Elisa Ervin 1950, 75 y.o. male MRN: [id number]    Unit/Bed#: E4 -01 Encounter: [id number]    Assessment and Plan:  1. Gross hematuria  2. Ureteral stent  - Continue medical optimization and antibiosis per primary team  - Maintain NPO  - Plan for OR today for cystoscopy and stent removal, right sided ureteroscopy. Procedure and risks discussed      Subjective:   HPI: No complaints today. NPO for cystoscopy and stent removal today    Review of Systems   Constitutional: Negative for activity change, appetite change, chills and fever. HENT: Negative for congestion and trouble swallowing. Respiratory: Negative for cough and shortness of breath. Cardiovascular: Negative for chest pain, palpitations and leg swelling. Gastrointestinal: Negative for abdominal pain, constipation, diarrhea, nausea and vomiting. Genitourinary: Negative for difficulty urinating, dysuria, flank pain, frequency, hematuria and urgency. Musculoskeletal: Negative for back pain and gait problem. Skin: Negative for wound. Allergic/Immunologic: Negative for immunocompromised state. Neurological: Negative for dizziness and syncope. Hematological: Does not bruise/bleed easily. Psychiatric/Behavioral: Negative for confusion. All other systems reviewed and are negative. Objective:  Nursing Rounds:   Vitals: Blood pressure (!) 172/81, pulse 61, temperature 97.6 °F (36.4 °C), temperature source Temporal, resp. rate 18, height 6' 3" (1.905 m), weight 102 kg (224 lb), SpO2 95 %. ,Body mass index is 28 kg/m². Physical Exam  Constitutional:       General: He is not in acute distress. Appearance: Normal appearance. He is not ill-appearing, toxic-appearing or diaphoretic. HENT:      Head: Normocephalic. Nose: No congestion. Eyes:      General: No scleral icterus. Right eye: No discharge. Left eye: No discharge.       Conjunctiva/sclera: Conjunctivae normal. Pupils: Pupils are equal, round, and reactive to light. Pulmonary:      Effort: Pulmonary effort is normal.   Musculoskeletal:      Cervical back: Normal range of motion. Skin:     General: Skin is warm and dry. Coloration: Skin is not jaundiced or pale. Findings: No bruising, erythema, lesion or rash. Neurological:      General: No focal deficit present. Mental Status: He is alert and oriented to person, place, and time. Mental status is at baseline. Gait: Gait normal.   Psychiatric:         Mood and Affect: Mood normal.         Behavior: Behavior normal.         Thought Content: Thought content normal.         Judgment: Judgment normal.         Imaging:    Imaging reviewed - both report and images personally reviewed.      Labs:  Recent Labs     08/28/23  1042   WBC 7.70       Recent Labs     08/28/23  1042   HGB 11.7*     Recent Labs     08/28/23  1042   HCT 37.0     Recent Labs     08/28/23  0829 08/29/23  0548 08/30/23  0602   CREATININE 1.12 1.04 1.12       History:    Past Medical History:   Diagnosis Date   • COPD (chronic obstructive pulmonary disease) ([address] [facility])      Social History     Socioeconomic History   • Marital status: Single     Spouse name: None   • Number of children: None   • Years of education: None   • Highest education level: None   Occupational History   • None   Tobacco Use   • Smoking status: Every Day     Packs/day: 0.50     Types: Cigarettes   • Smokeless tobacco: None   Vaping Use   • Vaping Use: Never used   Substance and Sexual Activity   • Alcohol use: Never   • Drug use: No   • Sexual activity: None   Other Topics Concern   • None   Social History Narrative   • None     Social Determinants of Health     Financial Resource Strain: Not on file   Food Insecurity: No Food Insecurity (7/17/2023)    Hunger Vital Sign    • Worried About Running Out of Food in the Last Year: Never true    • Ran Out of Food in the Last Year: Never true   Transportation Needs: No Transportation Needs (7/17/2023)    PRAPARE - Transportation    • Lack of Transportation (Medical): No    • Lack of Transportation (Non-Medical): No   Physical Activity: Not on file   Stress: Not on file   Social Connections: Not on file   Intimate Partner Violence: Not on file   Housing Stability: Low Risk  (7/17/2023)    Housing Stability Vital Sign    • Unable to Pay for Housing in the Last Year: No    • Number of Places Lived in the Last Year: 1    • Unstable Housing in the Last Year: No     Past Surgical History:   Procedure Laterality Date   • FL RETROGRADE PYELOGRAM  7/6/2023   • FL RETROGRADE PYELOGRAM  8/3/2023   • [place] CYSTO BLADDER W/URETERAL CATHETERIZATION Left 7/6/2023    Procedure: [address];  Surgeon: Remberto Felix MD;  Location: [facility];  Service: Urology   • IL CYSTO/URETERO W/LITHOTRIPSY &INDWELL STENT INSRT Left 8/3/2023    Procedure: Nataliia Marcial, & STENT;  Surgeon: Jaya Maldonado MD;  Location: [facility];  Service: Urology     History reviewed. No pertinent family history.     Yasmin Arauz PA-C  Date: 8/30/2023 Time: 9:59 AM

## 2023-08-30 NOTE — ASSESSMENT & PLAN NOTE
· Now with Enterococcus faecium UTI x 2  · Continue vancomycin, Day #4/5  · Patient appears nontoxic, afebrile  · Patient with concern for recurrent UTI due to infected stone, will go for retrieval after a few days of antibiotic therapy  · Patient presented with sepsis on admission with tachycardia and leukocytosis secondary to pyelonephritis. -This is ongoing and improving  · Underwent operative management today with stent removal and stone extraction

## 2023-08-30 NOTE — PROGRESS NOTES
[facility]  Progress Note  Name: Tammi Asher I  MRN: [id number]  Unit/Bed#: E4 -01 I Date of Admission: 7/6/2023   Date of Service: 8/30/2023 I Hospital Day: 54    Assessment/Plan   * UTI (urinary tract infection)  Assessment & Plan  · Now with Enterococcus faecium UTI x 2  · Continue vancomycin, Day #4/5  · Patient appears nontoxic, afebrile  · Patient with concern for recurrent UTI due to infected stone, will go for retrieval after a few days of antibiotic therapy  · Patient presented with sepsis on admission with tachycardia and leukocytosis secondary to pyelonephritis. -This is ongoing and improving  · Underwent operative management today with stent removal and stone extraction    Atrial fibrillation [facility])  Assessment & Plan  · Evaluated by cardiology for atrial fibrillation with rapid ventricular response   · improved after successful SOLA/cardioversion on 7/25/23  · Rhythm control with amiodarone , Cardizem 120 mg daily  · Eliquis for anticoagulation , on hold for possible intervention  · CHADS2 Vascor of 1      Impaired decision making  Assessment & Plan  Neuropsych evaluation was done on 7/24/2023  He continues to have impaired decision-making capacity  Guardianship pending    Bacteremia  Assessment & Plan  · Aerococcus bacteremia due to complicated UTI, resolved  · Completed full course of antibiotics under the guidance of the infectious disease team    Hydronephrosis with obstructing calculus  Assessment & Plan  · Admitted originally due to sepsis from UTI with obstruction  · Status post cystoscopy with difficult stent insertion on 7/6/2023  · Procedure was complicated by inadvertent left ureteral perforation  · Follow-up CT showed previous ureteral calculus is now in the bladder  · Repeat cystoscopy on 8/3 with stent placement.    • High risk for recurrent UTI and hematuria while stent remains in situ    Esophageal abnormality  Assessment & Plan  · incidental finding on CT with diffuse esophageal thickening could be from esophagitis  · Patient was evaluated by the GI team: Note that esophageal wall thickening could be secondary to reflux esophagitis, Bruno's, or hiatal hernia  · Continue empiric Protonix  · Outpatient upper endoscopy      COPD (chronic obstructive pulmonary disease) ([address])  Assessment & Plan  · Stable without exacerbation  · Continue albuterol and breo                  Hospital Course:     27-year-old male patient admitted with urinary tract infection and bacteremia he now is a clinically improving but pending guardianship at this time    Assessment:      Principal Problem:    UTI (urinary tract infection)  Active Problems:    COPD (chronic obstructive pulmonary disease) (HCC)    Esophageal abnormality    Hydronephrosis with obstructing calculus    Bacteremia    Impaired decision making    Atrial fibrillation ([address])      Plan:    · Postoperative care       VTE Pharmacologic Prophylaxis:   Pharmacologic: Heparin  Mechanical VTE Prophylaxis in Place: Yes    Patient Centered Rounds: I have performed bedside rounds with nursing staff today. Discussions with Specialists or Other Care Team Provider: Discussed with urology and case management    Education and Discussions with Family / Patient: No family to update    Time Spent for Care: 1 hour. More than 50% of total time spent on counseling and coordination of care as described above. Current Length of Stay: 55 day(s)    Current Patient Status: Inpatient   Certification Statement: The patient will continue to require additional inpatient hospital stay due to Placement    Discharge Plan / Estimated Discharge Date: To be determined    Code Status: Level 1 - Full Code      Subjective:   Seen and examined, no acute complaints. Otherwise feels well    A complete and comprehensive 14 point organ system review has been performed and all other systems are negative other than stated above.     Objective:     Vitals: Temp (24hrs), Av.4 °F (36.3 °C), Min:97.1 °F (36.2 °C), Max:97.8 °F (36.6 °C)    Temp:  [97.1 °F (36.2 °C)-97.8 °F (36.6 °C)] 97.3 °F (36.3 °C)  HR:  [60-80] 72  Resp:  [18] 18  BP: (110-172)/(58-81) 151/76  SpO2:  [93 %-97 %] 93 %  Body mass index is 28 kg/m². Input and Output Summary (last 24 hours): Intake/Output Summary (Last 24 hours) at 2023 1824  Last data filed at 2023 1523  Gross per 24 hour   Intake 600 ml   Output --   Net 600 ml       Physical Exam:     General: well appearing, no acute distress  HEENT: atraumatic, PERRLA, moist mucosa, normal pharynx, normal tonsils and adenoids, normal tongue, no fluid in sinuses  Neck: Trachea midline, no carotid bruit, no masses  Respiratory: normal chest wall expansion, CTA B, no r/r/w, no rubs  Cardiovascular: RRR, no m/r/g, Normal S1 and S2  Abdomen: Soft, non-tender, non-distended, normal bowel sounds in all quadrants, no hepatosplenomegaly, no tympany  Rectal: deferred  Musculoskeletal: normal ROM in upper and lower extremities  Integumentary: warm, dry, and pink, with no rash, purpura, or petechia  Heme/Lymph: no lymphadenopathy, no bruises  Neurological: Cranial Nerves II-XII grossly intact  Psychiatric: cooperative with normal mood, affect, and cognition      Additional Data:     Labs:    Results from last 7 days   Lab Units 23  1042   WBC Thousand/uL 7.70   HEMOGLOBIN g/dL 11.7*   HEMATOCRIT % 37.0   PLATELETS Thousands/uL 352   NEUTROS PCT % 65   LYMPHS PCT % 18   MONOS PCT % 11   EOS PCT % 5     Results from last 7 days   Lab Units 23  0602   POTASSIUM mmol/L 4.7   CHLORIDE mmol/L 102   CO2 mmol/L 30   BUN mg/dL 24   CREATININE mg/dL 1.12   CALCIUM mg/dL 8.8           * I Have Reviewed All Lab Data Listed Above. * Additional Pertinent Lab Tests Reviewed:  [address] Admission Reviewed    Imaging:    Imaging Reports Reviewed Today Include: No new imaging  Imaging Personally Reviewed by Myself Includes: No new imaging    Recent Cultures (last 7 days):     Results from last 7 days   Lab Units 08/26/23 2123   URINE CULTURE  >100,000 cfu/ml Enterococcus faecium*       Last 24 Hours Medication List:   Current Facility-Administered Medications   Medication Dose Route Frequency Provider Last Rate   • acetaminophen  975 mg Oral Q6H [address] Andre Weathers MD     • albuterol  2.5 mg Nebulization Q6H PRN Andre Weathers MD     • aluminum-magnesium hydroxide-simethicone  30 mL Oral Q6H PRN Andre Weathers MD     • amiodarone  200 mg Oral Daily With Breakfast Andre Weathers MD     • atorvastatin  40 mg Oral Daily With Breanne Becerril MD     • diltiazem  120 mg Oral Daily Andre Weathers MD     • docusate sodium  100 mg Oral BID PRN Andre Weathers MD     • fluticasone-vilanterol  1 puff Inhalation Daily Andre Weathers MD     • folic acid  1 mg Oral Daily Andre Weathers MD     • guaiFENesin  200 mg Oral Q4H PRN Andre Weathers MD     • heparin (porcine)  5,000 Units Subcutaneous [facility] Andre Weathers MD     • hydrOXYzine HCL  50 mg Oral HS PRN Andre Weathers MD     • lactated ringers  1,000 mL Intravenous Once PRN Andre Weathers MD      And   • lactated ringers  1,000 mL Intravenous Once PRN Andre Weathers MD     • lidocaine  1 patch Topical Daily Andre Weathers MD     • melatonin  6 mg Oral HS Andre Weathers MD     • methocarbamol  750 mg Oral [facility] Andre Weathers MD     • multivitamin-minerals  1 tablet Oral Daily Andre Weathers MD     • nicotine  7 mg Transdermal Daily Andre Weathers MD     • pantoprazole  40 mg Oral Early Morning Andre Weathers MD     • polyethylene glycol  17 g Oral Daily PRN Andre Weathers MD     • sodium chloride  1,000 mL Intravenous Once PRN Andre Weathers MD      And   • sodium chloride  1,000 mL Intravenous Once PRN Andre Weathers MD     • thiamine  100 mg Oral Daily Andre Weathers MD     • vancomycin  1,000 mg Intravenous Q12H Susan Hammer Lukasz Noble MD 1,000 mg (08/30/23 3689)        Today, Patient Was Seen By: Agus Guzman DO    ** Please Note: This note was completed in part utilizing M-Modal Fluency Direct Software. Grammatical errors, random word insertions, spelling mistakes, and incomplete sentences may be an occasional consequence of this system secondary to software limitations, ambient noise, and hardware issues. If you have any questions or concerns about the content, text, or information contained within the body of this dictation, please contact the provider for clarification.  **

## 2023-08-30 NOTE — PLAN OF CARE
Problem: Potential for Falls  Goal: Patient will remain free of falls  Description: INTERVENTIONS:  - Educate patient/family on patient safety including physical limitations  - Instruct patient to call for assistance with activity   - Consult OT/PT to assist with strengthening/mobility   - Keep Call bell within reach  - Keep bed low and locked with side rails adjusted as appropriate  - Keep care items and personal belongings within reach  - Initiate and maintain comfort rounds  - Make Fall Risk Sign visible to staff  - Apply yellow socks and bracelet for high fall risk patients  - Consider moving patient to room near nurses station  Outcome: Progressing     Problem: PAIN - ADULT  Goal: Verbalizes/displays adequate comfort level or baseline comfort level  Description: Interventions:  - Encourage patient to monitor pain and request assistance  - Assess pain using appropriate pain scale  - Administer analgesics based on type and severity of pain and evaluate response  - Implement non-pharmacological measures as appropriate and evaluate response  - Consider cultural and social influences on pain and pain management  - Notify physician/advanced practitioner if interventions unsuccessful or patient reports new pain  Outcome: Progressing     Problem: INFECTION - ADULT  Goal: Absence or prevention of progression during hospitalization  Description: INTERVENTIONS:  - Assess and monitor for signs and symptoms of infection  - Monitor lab/diagnostic results  - Monitor all insertion sites, i.e. indwelling lines, tubes, and drains  - Monitor endotracheal if appropriate and nasal secretions for changes in amount and color  - [place] appropriate cooling/warming therapies per order  - Administer medications as ordered  - Instruct and encourage patient and family to use good hand hygiene technique  - Identify and instruct in appropriate isolation precautions for identified infection/condition  Outcome: Progressing  Goal: Absence of fever/infection during neutropenic period  Description: INTERVENTIONS:  - Monitor WBC    Outcome: Progressing     Problem: Prexisting or High Potential for Compromised Skin Integrity  Goal: Skin integrity is maintained or improved  Description: INTERVENTIONS:  - Identify patients at risk for skin breakdown  - Assess and monitor skin integrity  - Assess and monitor nutrition and hydration status  - Monitor labs   - Assess for incontinence   - Turn and reposition patient  - Assist with mobility/ambulation  - Relieve pressure over bony prominences  - Avoid friction and shearing  - Provide appropriate hygiene as needed including keeping skin clean and dry  - Evaluate need for skin moisturizer/barrier cream  - Collaborate with interdisciplinary team   - Patient/family teaching  - Consider wound care consult   Outcome: Progressing

## 2023-08-30 NOTE — ANESTHESIA POSTPROCEDURE EVALUATION
Post-Op Assessment Note    CV Status:  Stable  Pain Score: 3    Pain management: adequate     Mental Status:  Alert and awake   Hydration Status:  Euvolemic and stable   PONV Controlled:  Controlled   Airway Patency:  Patent      Post Op Vitals Reviewed: Yes      Staff: Anesthesiologist         No notable events documented.     /72 (08/30/23 1551)    Temp     Pulse 76 (08/30/23 1551)   Resp 18 (08/30/23 1551)    SpO2 95 % (08/30/23 1551)

## 2023-08-31 LAB
ANION GAP SERPL CALCULATED.3IONS-SCNC: 5 MMOL/L
BUN SERPL-MCNC: 26 MG/DL (ref 5–25)
CALCIUM SERPL-MCNC: 8.8 MG/DL (ref 8.4–10.2)
CHLORIDE SERPL-SCNC: 101 MMOL/L (ref 96–108)
CO2 SERPL-SCNC: 30 MMOL/L (ref 21–32)
CREAT SERPL-MCNC: 1.21 MG/DL (ref 0.6–1.3)
GFR SERPL CREATININE-BSD FRML MDRD: 58 ML/MIN/1.73SQ M
GLUCOSE SERPL-MCNC: 146 MG/DL (ref 65–140)
POTASSIUM SERPL-SCNC: 4.9 MMOL/L (ref 3.5–5.3)
SODIUM SERPL-SCNC: 136 MMOL/L (ref 135–147)
VANCOMYCIN SERPL-MCNC: 29.9 UG/ML (ref 10–20)

## 2023-08-31 PROCEDURE — 80202 ASSAY OF VANCOMYCIN: CPT | Performed by: STUDENT IN AN ORGANIZED HEALTH CARE EDUCATION/TRAINING PROGRAM

## 2023-08-31 PROCEDURE — 99232 SBSQ HOSP IP/OBS MODERATE 35: CPT | Performed by: STUDENT IN AN ORGANIZED HEALTH CARE EDUCATION/TRAINING PROGRAM

## 2023-08-31 PROCEDURE — 99233 SBSQ HOSP IP/OBS HIGH 50: CPT | Performed by: INTERNAL MEDICINE

## 2023-08-31 PROCEDURE — 80048 BASIC METABOLIC PNL TOTAL CA: CPT | Performed by: STUDENT IN AN ORGANIZED HEALTH CARE EDUCATION/TRAINING PROGRAM

## 2023-08-31 RX ADMIN — AMIODARONE HYDROCHLORIDE 200 MG: 200 TABLET ORAL at 08:11

## 2023-08-31 RX ADMIN — HEPARIN SODIUM 5000 UNITS: 5000 INJECTION INTRAVENOUS; SUBCUTANEOUS at 05:26

## 2023-08-31 RX ADMIN — MELATONIN 6 MG: at 21:21

## 2023-08-31 RX ADMIN — METHOCARBAMOL TABLETS 750 MG: 500 TABLET, COATED ORAL at 21:21

## 2023-08-31 RX ADMIN — HEPARIN SODIUM 5000 UNITS: 5000 INJECTION INTRAVENOUS; SUBCUTANEOUS at 13:34

## 2023-08-31 RX ADMIN — THIAMINE HCL TAB 100 MG 100 MG: 100 TAB at 08:11

## 2023-08-31 RX ADMIN — HEPARIN SODIUM 5000 UNITS: 5000 INJECTION INTRAVENOUS; SUBCUTANEOUS at 21:23

## 2023-08-31 RX ADMIN — METHOCARBAMOL TABLETS 750 MG: 500 TABLET, COATED ORAL at 05:26

## 2023-08-31 RX ADMIN — ATORVASTATIN CALCIUM 40 MG: 40 TABLET, FILM COATED ORAL at 15:51

## 2023-08-31 RX ADMIN — PANTOPRAZOLE SODIUM 40 MG: 40 TABLET, DELAYED RELEASE ORAL at 05:27

## 2023-08-31 RX ADMIN — FOLIC ACID 1 MG: 1 TABLET ORAL at 08:11

## 2023-08-31 RX ADMIN — DILTIAZEM HYDROCHLORIDE 120 MG: 120 CAPSULE, COATED, EXTENDED RELEASE ORAL at 08:11

## 2023-08-31 RX ADMIN — VANCOMYCIN HYDROCHLORIDE 750 MG: 750 INJECTION, SOLUTION INTRAVENOUS at 21:19

## 2023-08-31 RX ADMIN — VANCOMYCIN HYDROCHLORIDE 1000 MG: 1 INJECTION, SOLUTION INTRAVENOUS at 05:26

## 2023-08-31 RX ADMIN — SULFAMETHOXAZOLE AND TRIMETHOPRIM 1 TABLET: 800; 160 TABLET ORAL at 08:11

## 2023-08-31 RX ADMIN — Medication 1 TABLET: at 08:11

## 2023-08-31 RX ADMIN — METHOCARBAMOL TABLETS 750 MG: 500 TABLET, COATED ORAL at 13:34

## 2023-08-31 RX ADMIN — FLUTICASONE FUROATE AND VILANTEROL TRIFENATATE 1 PUFF: 200; 25 POWDER RESPIRATORY (INHALATION) at 08:12

## 2023-08-31 RX ADMIN — SULFAMETHOXAZOLE AND TRIMETHOPRIM 1 TABLET: 800; 160 TABLET ORAL at 21:21

## 2023-08-31 NOTE — PLAN OF CARE
Problem: Potential for Falls  Goal: Patient will remain free of falls  Description: INTERVENTIONS:  - Educate patient/family on patient safety including physical limitations  - Instruct patient to call for assistance with activity   - Consult OT/PT to assist with strengthening/mobility   - Keep Call bell within reach  - Keep bed low and locked with side rails adjusted as appropriate  - Keep care items and personal belongings within reach  - Initiate and maintain comfort rounds  - Make Fall Risk Sign visible to staff  - Apply yellow socks and bracelet for high fall risk patients  - Consider moving patient to room near nurses station  Outcome: Progressing     Problem: MOBILITY - ADULT  Goal: Maintain or return to baseline ADL function  Description: INTERVENTIONS:  - Educate patient/family on patient safety including physical limitations  - Instruct patient to call for assistance with activity   - Consult OT/PT to assist with strengthening/mobility   - Keep Call bell within reach  - Keep bed low and locked with side rails adjusted as appropriate  - Keep care items and personal belongings within reach  - Initiate and maintain comfort rounds  - Make Fall Risk Sign visible to staff  - Apply yellow socks and bracelet for high fall risk patients  - Consider moving patient to room near nurses station  Outcome: Progressing  Goal: Maintains/Returns to pre admission functional level  Description: INTERVENTIONS:  - Perform BMAT or MOVE assessment daily.   - Set and communicate daily mobility goal to care team and patient/family/caregiver. - Collaborate with rehabilitation services on mobility goals if consulted  - Perform Range of Motion 3 times a day. - Reposition patient every 2 hours.   - Dangle patient 3 times a day  - Stand patient 3 times a day  - Ambulate patient 3 times a day  - Out of bed to chair 3 times a day   - Out of bed for meals 3 times a day  - Out of bed for toileting  - Record patient progress and toleration of activity level   Outcome: Progressing     Problem: PAIN - ADULT  Goal: Verbalizes/displays adequate comfort level or baseline comfort level  Description: Interventions:  - Encourage patient to monitor pain and request assistance  - Assess pain using appropriate pain scale  - Administer analgesics based on type and severity of pain and evaluate response  - Implement non-pharmacological measures as appropriate and evaluate response  - Consider cultural and social influences on pain and pain management  - Notify physician/advanced practitioner if interventions unsuccessful or patient reports new pain  Outcome: Progressing     Problem: INFECTION - ADULT  Goal: Absence or prevention of progression during hospitalization  Description: INTERVENTIONS:  - Assess and monitor for signs and symptoms of infection  - Monitor lab/diagnostic results  - Monitor all insertion sites, i.e. indwelling lines, tubes, and drains  - Monitor endotracheal if appropriate and nasal secretions for changes in amount and color  - [place] appropriate cooling/warming therapies per order  - Administer medications as ordered  - Instruct and encourage patient and family to use good hand hygiene technique  - Identify and instruct in appropriate isolation precautions for identified infection/condition  Outcome: Progressing  Goal: Absence of fever/infection during neutropenic period  Description: INTERVENTIONS:  - Monitor WBC    Outcome: Progressing

## 2023-08-31 NOTE — PROGRESS NOTES
Thad Rasheed is a 75 y.o. male who is currently ordered Vancomycin IV with management by the Pharmacy Consult service. Relevant clinical data and objective / subjective history reviewed. Vancomycin Assessment:  Indication and Goal AUC/Trough: Urinary tract infection (goal -600, trough >10)  Clinical Status: stable, trough level 29.9 on                  Micro:     Renal Function:stable  Renal Function:  SCr: 1.21 mg/dL  CrCl: 69.3  mL/min  Renal replacement: Not on dialysis  Days of Therapy: 5  Current Dose: 1000 Q12H                                      Vancomycin Plan:  New Dosin MG EVERY 12 HOURS                  Estimated AUC: 476 mcg*hr/mL  Estimated Trough: 15.2 mcg/mL  Next Level: / with am labs  Renal Function Monitoring: Daily BMP and UOP  Pharmacy will continue to follow closely for s/sx of nephrotoxicity, infusion reactions and appropriateness of therapy. BMP and CBC will be ordered per protocol. We will continue to follow the patient’s culture results and clinical progress daily.     Jethro Cassidy, Pharmacist

## 2023-08-31 NOTE — PROGRESS NOTES
[facility]  Progress Note  Name: Tori Villanueva I  MRN: [id number]  Unit/Bed#: E4 -01 I Date of Admission: 7/6/2023   Date of Service: 8/31/2023 I Hospital Day: 64    Assessment/Plan   * UTI (urinary tract infection)  Assessment & Plan  · Now with Enterococcus faecium UTI x 2  · Continue vancomycin, Day #5/5  · Patient appears nontoxic, afebrile  · Patient with concern for recurrent UTI due to infected stone, will go for retrieval after a few days of antibiotic therapy  · Patient presented with sepsis on admission with tachycardia and leukocytosis secondary to pyelonephritis. -This is ongoing and improving  · Underwent operative management today with stent removal and stone extraction    Atrial fibrillation [facility])  Assessment & Plan  · Evaluated by cardiology for atrial fibrillation with rapid ventricular response   · improved after successful SOLA/cardioversion on 7/25/23  · Rhythm control with amiodarone , Cardizem 120 mg daily  · Eliquis for anticoagulation , on hold for possible intervention  · CHADS2 Vasc of 1      Impaired decision making  Assessment & Plan  Neuropsych evaluation was done on 7/24/2023  He continues to have impaired decision-making capacity  Guardianship pending    Bacteremia  Assessment & Plan  · Aerococcus bacteremia due to complicated UTI, resolved  · Completed full course of antibiotics under the guidance of the infectious disease team    Hydronephrosis with obstructing calculus  Assessment & Plan  · Admitted originally due to sepsis from UTI with obstruction  · Status post cystoscopy with difficult stent insertion on 7/6/2023  · Procedure was complicated by inadvertent left ureteral perforation  · Follow-up CT showed previous ureteral calculus is now in the bladder  · Repeat cystoscopy on 8/3 with stent placement.    • High risk for recurrent UTI and hematuria while stent remains in situ    Esophageal abnormality  Assessment & Plan  · incidental finding on CT with diffuse esophageal thickening could be from esophagitis  · Patient was evaluated by the GI team: Note that esophageal wall thickening could be secondary to reflux esophagitis, Bruno's, or hiatal hernia  · Continue empiric Protonix  · Outpatient upper endoscopy      COPD (chronic obstructive pulmonary disease) ([address] )  Assessment & Plan  · Stable without exacerbation  · Continue albuterol and breo                  Hospital Course:   50-year-old male patient mated with UTI and bacteremia, found to have Enterococcus remains on vancomycin with plan for IV treatment course. Patient will require placement due to inability to make medical decisions he is pending guardianship      Assessment:      Principal Problem:    UTI (urinary tract infection)  Active Problems:    COPD (chronic obstructive pulmonary disease) ([facility])    Esophageal abnormality    Hydronephrosis with obstructing calculus    Bacteremia    Impaired decision making    Atrial fibrillation ([facility])      Plan:    · Continue vancomycin for the next 24 hours  · Guardianship pending       VTE Pharmacologic Prophylaxis:   Pharmacologic: Heparin  Mechanical VTE Prophylaxis in Place: Yes    Patient Centered Rounds: I have performed bedside rounds with nursing staff today. Discussions with Specialists or Other Care Team Provider: Discussed with urology    Education and Discussions with Family / Patient: Patient has no family to update    Time Spent for Care: 1 hour. More than 50% of total time spent on counseling and coordination of care as described above. Current Length of Stay: 56 day(s)    Current Patient Status: Inpatient   Certification Statement: The patient will continue to require additional inpatient hospital stay due to IV antibiotic    Discharge Plan / Estimated Discharge Date: Pending case management    Code Status: Level 1 - Full Code      Subjective:   Seen and examined, patient has collected his urine. They are in collection containers. His urine has become less dark since urologic intervention. He denies any current hematuria. He otherwise feels well he has no pain    A complete and comprehensive 14 point organ system review has been performed and all other systems are negative other than stated above. Objective:     Vitals:   Temp (24hrs), Av.6 °F (36.4 °C), Min:97.1 °F (36.2 °C), Max:98.3 °F (36.8 °C)    Temp:  [97.1 °F (36.2 °C)-98.3 °F (36.8 °C)] 98 °F (36.7 °C)  HR:  [60-80] 78  Resp:  [18] 18  BP: (110-159)/(58-82) 143/75  SpO2:  [92 %-97 %] 92 %  Body mass index is 28 kg/m². Input and Output Summary (last 24 hours):        Intake/Output Summary (Last 24 hours) at 2023 1504  Last data filed at 2023 1523  Gross per 24 hour   Intake 600 ml   Output --   Net 600 ml       Physical Exam:     General: well appearing, no acute distress  HEENT: atraumatic, PERRLA, moist mucosa, normal pharynx, normal tonsils and adenoids, normal tongue, no fluid in sinuses  Neck: Trachea midline, no carotid bruit, no masses  Respiratory: normal chest wall expansion, CTA B, no r/r/w, no rubs  Cardiovascular: RRR, no m/r/g, Normal S1 and S2  Abdomen: Soft, non-tender, non-distended, normal bowel sounds in all quadrants, no hepatosplenomegaly, no tympany  Rectal: deferred  Musculoskeletal: normal ROM in upper and lower extremities  Integumentary: warm, dry, and pink, with no rash, purpura, or petechia  Heme/Lymph: no lymphadenopathy, no bruises  Neurological: Cranial Nerves II-XII grossly intact  Psychiatric: cooperative with normal mood, affect, and cognition      Additional Data:     Labs:    Results from last 7 days   Lab Units 23  1042   WBC Thousand/uL 7.70   HEMOGLOBIN g/dL 11.7*   HEMATOCRIT % 37.0   PLATELETS Thousands/uL 352   NEUTROS PCT % 65   LYMPHS PCT % 18   MONOS PCT % 11   EOS PCT % 5     Results from last 7 days   Lab Units 23  0518   POTASSIUM mmol/L 4.9   CHLORIDE mmol/L 101   CO2 mmol/L 30   BUN mg/dL 26* CREATININE mg/dL 1.21   CALCIUM mg/dL 8.8           * I Have Reviewed All Lab Data Listed Above. * Additional Pertinent Lab Tests Reviewed:  [address] Admission Reviewed    Imaging:    Imaging Reports Reviewed Today Include: No new imaging  Imaging Personally Reviewed by Myself Includes: No new imaging    Recent Cultures (last 7 days):     Results from last 7 days   Lab Units 08/26/23  2123   URINE CULTURE  >100,000 cfu/ml Enterococcus faecium*       Last 24 Hours Medication List:   Current Facility-Administered Medications   Medication Dose Route Frequency Provider Last Rate   • albuterol  2.5 mg Nebulization Q6H PRN Hazel Quezada MD     • aluminum-magnesium hydroxide-simethicone  30 mL Oral Q6H PRN Hazel Quezada MD     • amiodarone  200 mg Oral Daily With Breakfast Hazel Quezada MD     • atorvastatin  40 mg Oral Daily With Cindy Narvaez MD     • diltiazem  120 mg Oral Daily Hazel Quezada MD     • docusate sodium  100 mg Oral BID PRN Hazel Quezada MD     • fluticasone-vilanterol  1 puff Inhalation Daily Hazel Quezada MD     • folic acid  1 mg Oral Daily Hazel Quezada MD     • guaiFENesin  200 mg Oral Q4H PRN Hazel Quezada MD     • heparin (porcine)  5,000 Units Subcutaneous [facility] Hazel Quezada MD     • hydrOXYzine HCL  50 mg Oral HS PRN Hazel Quezada MD     • lactated ringers  1,000 mL Intravenous Once PRN Hazel Quezada MD      And   • lactated ringers  1,000 mL Intravenous Once PRN Hazel Quezada MD     • lidocaine  1 patch Topical Daily Hazel Quezada MD     • melatonin  6 mg Oral HS Hazel Quezada MD     • methocarbamol  750 mg Oral [facility] Hazel Quezada MD     • multivitamin-minerals  1 tablet Oral Daily Hazel Quezada MD     • nicotine  7 mg Transdermal Daily Hazel Quezada MD     • pantoprazole  40 mg Oral Early Morning Hazel Quezada MD     • polyethylene glycol  17 g Oral Daily PRN Hazel Quezada MD     • sodium chloride 1,000 mL Intravenous Once PRN Kane Quinonez MD      And   • sodium chloride  1,000 mL Intravenous Once PRN Kane Quinonez MD     • sulfamethoxazole-trimethoprim  1 tablet Oral Q12H Genesis Waldron MD     • thiamine  100 mg Oral Daily Kane Quinonez MD     • vancomycin  750 mg Intravenous Q12H Ric Harvey DO          Today, Patient Was Seen By: Rashad Santillan DO    ** Please Note: This note was completed in part utilizing [employer] Software. Grammatical errors, random word insertions, spelling mistakes, and incomplete sentences may be an occasional consequence of this system secondary to software limitations, ambient noise, and hardware issues. If you have any questions or concerns about the content, text, or information contained within the body of this dictation, please contact the provider for clarification.  **

## 2023-08-31 NOTE — PLAN OF CARE
Problem: Potential for Falls  Goal: Patient will remain free of falls  Description: INTERVENTIONS:  - Educate patient/family on patient safety including physical limitations  - Instruct patient to call for assistance with activity   - Consult OT/PT to assist with strengthening/mobility   - Keep Call bell within reach  - Keep bed low and locked with side rails adjusted as appropriate  - Keep care items and personal belongings within reach  - Initiate and maintain comfort rounds  - Make Fall Risk Sign visible to staff  - Apply yellow socks and bracelet for high fall risk patients  - Consider moving patient to room near nurses station  Outcome: Progressing     Problem: MOBILITY - ADULT  Goal: Maintain or return to baseline ADL function  Description: INTERVENTIONS:  - Educate patient/family on patient safety including physical limitations  - Instruct patient to call for assistance with activity   - Consult OT/PT to assist with strengthening/mobility   - Keep Call bell within reach  - Keep bed low and locked with side rails adjusted as appropriate  - Keep care items and personal belongings within reach  - Initiate and maintain comfort rounds  - Make Fall Risk Sign visible to staff  - Apply yellow socks and bracelet for high fall risk patients  - Consider moving patient to room near nurses station  Outcome: Progressing  Goal: Maintains/Returns to pre admission functional level  Description: INTERVENTIONS:  - Perform BMAT or MOVE assessment daily.   - Set and communicate daily mobility goal to care team and patient/family/caregiver.    - Collaborate with rehabilitation services on mobility goals if consulted  - Out of bed for toileting  - Record patient progress and toleration of activity level   Outcome: Progressing     Problem: PAIN - ADULT  Goal: Verbalizes/displays adequate comfort level or baseline comfort level  Description: Interventions:  - Encourage patient to monitor pain and request assistance  - Assess pain using appropriate pain scale  - Administer analgesics based on type and severity of pain and evaluate response  - Implement non-pharmacological measures as appropriate and evaluate response  - Consider cultural and social influences on pain and pain management  - Notify physician/advanced practitioner if interventions unsuccessful or patient reports new pain  Outcome: Progressing     Problem: INFECTION - ADULT  Goal: Absence or prevention of progression during hospitalization  Description: INTERVENTIONS:  - Assess and monitor for signs and symptoms of infection  - Monitor lab/diagnostic results  - Monitor all insertion sites, i.e. indwelling lines, tubes, and drains  - Monitor endotracheal if appropriate and nasal secretions for changes in amount and color  - [place] appropriate cooling/warming therapies per order  - Administer medications as ordered  - Instruct and encourage patient and family to use good hand hygiene technique  - Identify and instruct in appropriate isolation precautions for identified infection/condition  Outcome: Progressing  Goal: Absence of fever/infection during neutropenic period  Description: INTERVENTIONS:  - Monitor WBC    Outcome: Progressing     Problem: SAFETY ADULT  Goal: Patient will remain free of falls  Description: INTERVENTIONS:  - Educate patient/family on patient safety including physical limitations  - Instruct patient to call for assistance with activity   - Consult OT/PT to assist with strengthening/mobility   - Keep Call bell within reach  - Keep bed low and locked with side rails adjusted as appropriate  - Keep care items and personal belongings within reach  - Initiate and maintain comfort rounds  - Make Fall Risk Sign visible to staff  - Apply yellow socks and bracelet for high fall risk patients  - Consider moving patient to room near nurses station  Outcome: Progressing  Goal: Maintain or return to baseline ADL function  Description: INTERVENTIONS:  - Educate patient/family on patient safety including physical limitations  - Instruct patient to call for assistance with activity   - Consult OT/PT to assist with strengthening/mobility   - Keep Call bell within reach  - Keep bed low and locked with side rails adjusted as appropriate  - Keep care items and personal belongings within reach  - Initiate and maintain comfort rounds  - Make Fall Risk Sign visible to staff  - Apply yellow socks and bracelet for high fall risk patients  - Consider moving patient to room near nurses station  Outcome: Progressing  Goal: Maintains/Returns to pre admission functional level  Description: INTERVENTIONS:  - Perform BMAT or MOVE assessment daily.   - Set and communicate daily mobility goal to care team and patient/family/caregiver. - Collaborate with rehabilitation services on mobility goals if consulted  - Out of bed for toileting  - Record patient progress and toleration of activity level   Outcome: Progressing     Problem: DISCHARGE PLANNING  Goal: Discharge to home or other facility with appropriate resources  Description: INTERVENTIONS:  - Identify barriers to discharge w/patient and caregiver  - Arrange for needed discharge resources and transportation as appropriate  - Identify discharge learning needs (meds, wound care, etc.)  - Arrange for interpretive services to assist at discharge as needed  - Refer to Case Management Department for coordinating discharge planning if the patient needs post-hospital services based on physician/advanced practitioner order or complex needs related to functional status, cognitive ability, or social support system  Outcome: Progressing     Problem: Knowledge Deficit  Goal: Patient/family/caregiver demonstrates understanding of disease process, treatment plan, medications, and discharge instructions  Description: Complete learning assessment and assess knowledge base.   Interventions:  - Provide teaching at level of understanding  - Provide teaching via preferred learning methods  Outcome: Progressing     Problem: Prexisting or High Potential for Compromised Skin Integrity  Goal: Skin integrity is maintained or improved  Description: INTERVENTIONS:  - Identify patients at risk for skin breakdown  - Assess and monitor skin integrity  - Assess and monitor nutrition and hydration status  - Monitor labs   - Assess for incontinence   - Turn and reposition patient  - Assist with mobility/ambulation  - Relieve pressure over bony prominences  - Avoid friction and shearing  - Provide appropriate hygiene as needed including keeping skin clean and dry  - Evaluate need for skin moisturizer/barrier cream  - Collaborate with interdisciplinary team   - Patient/family teaching  - Consider wound care consult   Outcome: Progressing     Problem: GENITOURINARY - ADULT  Goal: Maintains or returns to baseline urinary function  Description: INTERVENTIONS:  - Assess urinary function  - Encourage oral fluids to ensure adequate hydration if ordered  - Administer IV fluids as ordered to ensure adequate hydration  - Administer ordered medications as needed  - Offer frequent toileting  - Follow urinary retention protocol if ordered  Outcome: Progressing     Problem: HEMATOLOGIC - ADULT  Goal: Maintains hematologic stability  Description: INTERVENTIONS  - Assess for signs and symptoms of bleeding or hemorrhage  - Monitor labs  - Administer supportive blood products/factors as ordered and appropriate  Outcome: Progressing

## 2023-08-31 NOTE — ASSESSMENT & PLAN NOTE
· Evaluated by cardiology for atrial fibrillation with rapid ventricular response   · improved after successful SOLA/cardioversion on 7/25/23  · Rhythm control with amiodarone , Cardizem 120 mg daily  · Eliquis for anticoagulation , on hold for possible intervention  · CHADS2 Vasc of 1

## 2023-08-31 NOTE — RESTORATIVE TECHNICIAN NOTE
Restorative Technician Note      Patient Name: Dianelys Oakley     Restorative Tech Visit Date: 08/31/23  Note Type: Mobility  Patient Position Upon Consult: Supine  Activity Performed: Ambulated  Patient Position at End of Consult: Seated edge of bed;  All needs within reach

## 2023-08-31 NOTE — CASE MANAGEMENT
Case Management Discharge Planning Note    Patient name Tori Villanueva  [place] [address] /E[address]-* MRN [id number]  : 1950 Date 2023       Current Admission Date: 2023  Current Admission Diagnosis:UTI (urinary tract infection)   Patient Active Problem List    Diagnosis Date Noted   • Atrial fibrillation ([address]) 2023   • Impaired decision making 2023   • Bacteremia 2023   • UTI (urinary tract infection) 2023   • Elevated d-dimer 2023   • Esophageal abnormality 2023   • Hydronephrosis with obstructing calculus 2023   • Mycotic toenails 2023   • Angioedema 2018   • COPD (chronic obstructive pulmonary disease) ([address]) 2018   • Bradycardia 2018   • Polycythemia 2016   • Weight loss 2016      LOS (days): 56  Geometric Mean LOS (GMLOS) (days): 9.60  Days to GMLOS:-45.9     OBJECTIVE:  Risk of Unplanned Readmission Score: 19.47         Current admission status: Inpatient   Preferred Pharmacy:   PATIENT/FAMILY REPORTS NO PREFERRED PHARMACY  No address on file      [address] [place] [address],  [address] [place] [address]  Phone: [phone] Fax: [phone]    Primary Care Provider: No primary care provider on file. Primary Insurance: MEDICARE  Secondary Insurance:     DISCHARGE DETAILS:      We are still waiting for pt to get assigned temporary guardian. Has not been assigned yet. CM to continue to follow pt care & d/c.

## 2023-08-31 NOTE — ASSESSMENT & PLAN NOTE
· Now with Enterococcus faecium UTI x 2  · Continue vancomycin, Day #5/5  · Patient appears nontoxic, afebrile  · Patient with concern for recurrent UTI due to infected stone, will go for retrieval after a few days of antibiotic therapy  · Patient presented with sepsis on admission with tachycardia and leukocytosis secondary to pyelonephritis. -This is ongoing and improving  · Underwent operative management today with stent removal and stone extraction

## 2023-08-31 NOTE — PROGRESS NOTES
Progress Note - Urology  Elisa Ervin 1950, 75 y.o. male MRN: [id number]    Unit/Bed#: E4 -01 Encounter: [id number]    Gross hematuria-resolved as of 8/28/2023  Assessment & Plan  · Postop day 1 cystoscopy, left ureteral stent removal, right distal ureteral basket stone extraction with no new stents placed. He is now stent free and stone free. · Hematuria improving yellow/brown/cranberry, expect continued improvement after operation yesterday. · No Sorensen catheter needed  · He is okay for Eliquis   · Complete remaining antibiotics for alphahemolytic strep bacteriuria   · Continue finasteride 5 mg daily for BPH    Urology will sign off but remain available for any further inpatient needs. Please feel free to contact the provider currently covering the [facility] role for this campus with questions or concerns. Subjective: No complaints, no pain. Urine on and off yellow versus red versus brown. Flow is good. Review of Systems   Constitutional: Negative for activity change, appetite change, chills, fever and unexpected weight change. HENT: Negative. Respiratory: Negative. Negative for shortness of breath. Cardiovascular: Negative. Negative for chest pain and leg swelling. Gastrointestinal: Negative for abdominal pain, diarrhea, nausea and vomiting. Endocrine: Negative. Genitourinary: Positive for hematuria. Negative for decreased urine volume, difficulty urinating, dysuria, flank pain, frequency and urgency. Musculoskeletal: Negative for back pain and gait problem. Skin: Negative. Allergic/Immunologic: Negative. Neurological: Negative. Hematological: Negative for adenopathy. Does not bruise/bleed easily. Objective:  Vitals: Blood pressure 159/82, pulse 72, temperature 97.5 °F (36.4 °C), temperature source Temporal, resp. rate 18, height 6' 3" (1.905 m), weight 102 kg (224 lb), SpO2 94 %. ,Body mass index is 28 kg/m².     Physical Exam  Vitals and nursing note reviewed. Constitutional:       General: He is not in acute distress. Appearance: He is well-developed. He is not diaphoretic. HENT:      Head: Normocephalic and atraumatic. Pulmonary:      Effort: Pulmonary effort is normal.   Abdominal:      General: There is no distension. Tenderness: There is no abdominal tenderness. Musculoskeletal:      Right lower leg: No edema. Left lower leg: No edema. Skin:     General: Skin is warm and dry. Neurological:      Mental Status: He is alert and oriented to person, place, and time.       Gait: Gait normal.   Psychiatric:         Speech: Speech normal.         Behavior: Behavior normal.         Labs:  Recent Labs     08/28/23  1042   WBC 7.70       Recent Labs     08/28/23  1042   HGB 11.7*     Recent Labs     08/28/23  1042   HCT 37.0     Recent Labs     08/29/23  0548 08/30/23  0602 08/31/23  0518   CREATININE 1.04 1.12 1.21         History:    Past Medical History:   Diagnosis Date   • COPD (chronic obstructive pulmonary disease) ([address] [facility])      Social History     Socioeconomic History   • Marital status: Single     Spouse name: None   • Number of children: None   • Years of education: None   • Highest education level: None   Occupational History   • None   Tobacco Use   • Smoking status: Every Day     Packs/day: 0.50     Types: Cigarettes   • Smokeless tobacco: None   Vaping Use   • Vaping Use: Never used   Substance and Sexual Activity   • Alcohol use: Never   • Drug use: No   • Sexual activity: None   Other Topics Concern   • None   Social History Narrative   • None     Social Determinants of Health     Financial Resource Strain: Not on file   Food Insecurity: No Food Insecurity (7/17/2023)    Hunger Vital Sign    • Worried About Running Out of Food in the Last Year: Never true    • Ran Out of Food in the Last Year: Never true   Transportation Needs: No Transportation Needs (7/17/2023)    PRAPARE - Transportation    • Lack of Transportation (Medical): No    • Lack of Transportation (Non-Medical): No   Physical Activity: Not on file   Stress: Not on file   Social Connections: Not on file   Intimate Partner Violence: Not on file   Housing Stability: Low Risk  (7/17/2023)    Housing Stability Vital Sign    • Unable to Pay for Housing in the Last Year: No    • Number of Places Lived in the Last Year: 1    • Unstable Housing in the Last Year: No     Past Surgical History:   Procedure Laterality Date   • FL RETROGRADE PYELOGRAM  7/6/2023   • FL RETROGRADE PYELOGRAM  8/3/2023   • OH CYSTO BLADDER W/URETERAL CATHETERIZATION Left 7/6/2023    Procedure: CYSTOSCOPY RETROGRADE PYELOGRAM WITH INSERTION STENT URETERAL;  Surgeon: Latricia Kaur MD;  [facility];  Service: Urology   • OH CYSTO/URETERO W/LITHOTRIPSY &INDWELL STENT INSRT Left 8/3/2023    Procedure: Migdalia Goad, & STENT;  Surgeon: Laz Rowan MD;  [facility];  Service: Urology   • OH CYSTO/URETERO W/LITHOTRIPSY &INDWELL STENT INSRT Right 8/30/2023    Procedure: [place]  W/  STONE BASKET EXTRACTION;  Surgeon: Latricia Kaur MD;  [facility];  Service: Urology   • REMOVAL URETERAL STENT Left 8/30/2023    Procedure: REMOVAL STENT URETERAL;  Surgeon: Latricia Kaur MD;  [facility];  Service: Urology     History reviewed. No pertinent family history.     Tracey Nolen PA-C  Date: 8/31/2023 Time: 9:59 AM

## 2023-08-31 NOTE — RESTORATIVE TECHNICIAN NOTE
Restorative Technician Note      Patient Name: Caty Sanderson     Restorative Tech Visit Date: 08/31/23  Note Type: Mobility  Patient Position Upon Consult: Supine  Activity Performed: Ambulated  Patient Position at End of Consult: Supine;  All needs within reach

## 2023-09-01 VITALS
RESPIRATION RATE: 18 BRPM | SYSTOLIC BLOOD PRESSURE: 149 MMHG | TEMPERATURE: 98.7 F | BODY MASS INDEX: 27.85 KG/M2 | DIASTOLIC BLOOD PRESSURE: 77 MMHG | OXYGEN SATURATION: 95 % | WEIGHT: 224 LBS | HEART RATE: 71 BPM | HEIGHT: 75 IN

## 2023-09-01 PROCEDURE — 99239 HOSP IP/OBS DSCHRG MGMT >30: CPT | Performed by: INTERNAL MEDICINE

## 2023-09-01 RX ORDER — METHOCARBAMOL 750 MG/1
750 TABLET, FILM COATED ORAL EVERY 8 HOURS SCHEDULED
Refills: 0
Start: 2023-09-01

## 2023-09-01 RX ORDER — AMIODARONE HYDROCHLORIDE 200 MG/1
200 TABLET ORAL
Qty: 30 TABLET | Refills: 0
Start: 2023-09-02 | End: 2023-10-02

## 2023-09-01 RX ORDER — LIDOCAINE 50 MG/G
1 PATCH TOPICAL DAILY
Refills: 0
Start: 2023-09-02

## 2023-09-01 RX ORDER — ASPIRIN 81 MG/1
81 TABLET, CHEWABLE ORAL DAILY
Qty: 30 TABLET | Refills: 0
Start: 2023-09-01 | End: 2023-10-01

## 2023-09-01 RX ORDER — SULFAMETHOXAZOLE AND TRIMETHOPRIM 800; 160 MG/1; MG/1
1 TABLET ORAL EVERY 12 HOURS SCHEDULED
Qty: 3 TABLET | Refills: 0
Start: 2023-09-01 | End: 2023-09-03

## 2023-09-01 RX ORDER — ACETAMINOPHEN 325 MG/1
975 TABLET ORAL EVERY 8 HOURS
Status: DISCONTINUED | OUTPATIENT
Start: 2023-09-01 | End: 2023-09-01 | Stop reason: HOSPADM

## 2023-09-01 RX ORDER — ACETAMINOPHEN 325 MG/1
975 TABLET ORAL EVERY 8 HOURS
Qty: 270 TABLET | Refills: 0
Start: 2023-09-01 | End: 2023-10-01

## 2023-09-01 RX ORDER — DILTIAZEM HYDROCHLORIDE 120 MG/1
120 CAPSULE, COATED, EXTENDED RELEASE ORAL DAILY
Qty: 30 CAPSULE | Refills: 0
Start: 2023-09-02 | End: 2023-10-02

## 2023-09-01 RX ADMIN — Medication 1 TABLET: at 08:08

## 2023-09-01 RX ADMIN — ACETAMINOPHEN 975 MG: 325 TABLET ORAL at 12:11

## 2023-09-01 RX ADMIN — THIAMINE HCL TAB 100 MG 100 MG: 100 TAB at 08:08

## 2023-09-01 RX ADMIN — VANCOMYCIN HYDROCHLORIDE 750 MG: 750 INJECTION, SOLUTION INTRAVENOUS at 08:11

## 2023-09-01 RX ADMIN — FOLIC ACID 1 MG: 1 TABLET ORAL at 08:08

## 2023-09-01 RX ADMIN — AMIODARONE HYDROCHLORIDE 200 MG: 200 TABLET ORAL at 08:08

## 2023-09-01 RX ADMIN — FLUTICASONE FUROATE AND VILANTEROL TRIFENATATE 1 PUFF: 200; 25 POWDER RESPIRATORY (INHALATION) at 08:09

## 2023-09-01 RX ADMIN — HEPARIN SODIUM 5000 UNITS: 5000 INJECTION INTRAVENOUS; SUBCUTANEOUS at 14:12

## 2023-09-01 RX ADMIN — METHOCARBAMOL TABLETS 750 MG: 500 TABLET, COATED ORAL at 14:12

## 2023-09-01 RX ADMIN — SULFAMETHOXAZOLE AND TRIMETHOPRIM 1 TABLET: 800; 160 TABLET ORAL at 08:08

## 2023-09-01 RX ADMIN — PANTOPRAZOLE SODIUM 40 MG: 40 TABLET, DELAYED RELEASE ORAL at 05:42

## 2023-09-01 RX ADMIN — METHOCARBAMOL TABLETS 750 MG: 500 TABLET, COATED ORAL at 05:41

## 2023-09-01 RX ADMIN — HEPARIN SODIUM 5000 UNITS: 5000 INJECTION INTRAVENOUS; SUBCUTANEOUS at 05:42

## 2023-09-01 RX ADMIN — DILTIAZEM HYDROCHLORIDE 120 MG: 120 CAPSULE, COATED, EXTENDED RELEASE ORAL at 08:08

## 2023-09-01 NOTE — PLAN OF CARE
Problem: DISCHARGE PLANNING  Goal: Discharge to home or other facility with appropriate resources  Description: INTERVENTIONS:  - Identify barriers to discharge w/patient and caregiver  - Arrange for needed discharge resources and transportation as appropriate  - Identify discharge learning needs (meds, wound care, etc.)  - Arrange for interpretive services to assist at discharge as needed  - Refer to Case Management Department for coordinating discharge planning if the patient needs post-hospital services based on physician/advanced practitioner order or complex needs related to functional status, cognitive ability, or social support system  9/1/2023 1424 by Florina Rainey RN  Outcome: Adequate for Discharge  9/1/2023 1213 by Florina Rainey, RN  Outcome: Progressing

## 2023-09-01 NOTE — ASSESSMENT & PLAN NOTE
· Admitted originally due to sepsis from UTI with obstruction  · Status post cystoscopy with difficult stent insertion on 7/6/2023  · Procedure was complicated by inadvertent left ureteral perforation  · Follow-up CT showed previous ureteral calculus is now in the bladder

## 2023-09-01 NOTE — PLAN OF CARE
Problem: Potential for Falls  Goal: Patient will remain free of falls  Description: INTERVENTIONS:  - Educate patient/family on patient safety including physical limitations  - Instruct patient to call for assistance with activity   - Consult OT/PT to assist with strengthening/mobility   - Keep Call bell within reach  - Keep bed low and locked with side rails adjusted as appropriate  - Keep care items and personal belongings within reach  - Initiate and maintain comfort rounds  - Make Fall Risk Sign visible to staff  - Apply yellow socks and bracelet for high fall risk patients  - Consider moving patient to room near nurses station  Outcome: Progressing     Problem: MOBILITY - ADULT  Goal: Maintain or return to baseline ADL function  Description: INTERVENTIONS:  - Educate patient/family on patient safety including physical limitations  - Instruct patient to call for assistance with activity   - Consult OT/PT to assist with strengthening/mobility   - Keep Call bell within reach  - Keep bed low and locked with side rails adjusted as appropriate  - Keep care items and personal belongings within reach  - Initiate and maintain comfort rounds  - Make Fall Risk Sign visible to staff  - Apply yellow socks and bracelet for high fall risk patients  - Consider moving patient to room near nurses station  Outcome: Progressing  Goal: Maintains/Returns to pre admission functional level  Description: INTERVENTIONS:  - Perform BMAT or MOVE assessment daily.   - Set and communicate daily mobility goal to care team and patient/family/caregiver. - Collaborate with rehabilitation services on mobility goals if consulted  - Perform Range of Motion 3 times a day. - Reposition patient every 2 hours.   - Dangle patient 3 times a day  - Stand patient 3 times a day  - Ambulate patient 3 times a day  - Out of bed to chair 3 times a day   - Out of bed for meals 3 times a day  - Out of bed for toileting  - Record patient progress and toleration of activity level   Outcome: Progressing     Problem: PAIN - ADULT  Goal: Verbalizes/displays adequate comfort level or baseline comfort level  Description: Interventions:  - Encourage patient to monitor pain and request assistance  - Assess pain using appropriate pain scale  - Administer analgesics based on type and severity of pain and evaluate response  - Implement non-pharmacological measures as appropriate and evaluate response  - Consider cultural and social influences on pain and pain management  - Notify physician/advanced practitioner if interventions unsuccessful or patient reports new pain  Outcome: Progressing     Problem: INFECTION - ADULT  Goal: Absence or prevention of progression during hospitalization  Description: INTERVENTIONS:  - Assess and monitor for signs and symptoms of infection  - Monitor lab/diagnostic results  - Monitor all insertion sites, i.e. indwelling lines, tubes, and drains  - Monitor endotracheal if appropriate and nasal secretions for changes in amount and color  - [place] appropriate cooling/warming therapies per order  - Administer medications as ordered  - Instruct and encourage patient and family to use good hand hygiene technique  - Identify and instruct in appropriate isolation precautions for identified infection/condition  Outcome: Progressing  Goal: Absence of fever/infection during neutropenic period  Description: INTERVENTIONS:  - Monitor WBC    Outcome: Progressing     Problem: SAFETY ADULT  Goal: Patient will remain free of falls  Description: INTERVENTIONS:  - Educate patient/family on patient safety including physical limitations  - Instruct patient to call for assistance with activity   - Consult OT/PT to assist with strengthening/mobility   - Keep Call bell within reach  - Keep bed low and locked with side rails adjusted as appropriate  - Keep care items and personal belongings within reach  - Initiate and maintain comfort rounds  - Make Fall Risk Sign visible to staff  - Apply yellow socks and bracelet for high fall risk patients  - Consider moving patient to room near nurses station  Outcome: Progressing  Goal: Maintain or return to baseline ADL function  Description: INTERVENTIONS:  - Educate patient/family on patient safety including physical limitations  - Instruct patient to call for assistance with activity   - Consult OT/PT to assist with strengthening/mobility   - Keep Call bell within reach  - Keep bed low and locked with side rails adjusted as appropriate  - Keep care items and personal belongings within reach  - Initiate and maintain comfort rounds  - Make Fall Risk Sign visible to staff  - Apply yellow socks and bracelet for high fall risk patients  - Consider moving patient to room near nurses station  Outcome: Progressing  Goal: Maintains/Returns to pre admission functional level  Description: INTERVENTIONS:  - Perform BMAT or MOVE assessment daily.   - Set and communicate daily mobility goal to care team and patient/family/caregiver. - Collaborate with rehabilitation services on mobility goals if consulted  - Perform Range of Motion 3 times a day. - Reposition patient every 2 hours.   - Dangle patient 3 times a day  - Stand patient 3 times a day  - Ambulate patient 3 times a day  - Out of bed to chair 3 times a day   - Out of bed for meals 3 times a day  - Out of bed for toileting  - Record patient progress and toleration of activity level   Outcome: Progressing

## 2023-09-01 NOTE — PROGRESS NOTES
Phoebe Greene is a 75 y.o. male who is currently ordered Vancomycin IV with management by the Pharmacy Consult service. Relevant clinical data and objective / subjective history reviewed. Vancomycin Assessment:  Indication and Goal AUC/Trough: Urinary tract infection (goal -600, trough >10)  Clinical Status: stable  Micro:     Renal Function:  SCr: 1.21 mg/dL  CrCl: 69.3 mL/min  Renal replacement: Not on dialysis  Days of Therapy: 6  Current Dose: 750mg IV every 12 hours  Random Level: 29.9mcg/ml ( drawn ~2.5hrs after [id number] administration). Will repeat random level tomorrow to assure appropriate dosing  Vancomycin Plan:  New Dosing: continue with current dosing  Estimated AUC: 476 mcg*hr/mL  Estimated Trough: 15.2 mcg/mL  Next Level: Random level 9/2 with AM labs  Renal Function Monitoring: Daily BMP and [place] will continue to follow closely for s/sx of nephrotoxicity, infusion reactions and appropriateness of therapy. BMP and CBC will be ordered per protocol. We will continue to follow the patient’s culture results and clinical progress daily.     Naga Cordero, Pharmacist

## 2023-09-01 NOTE — PLAN OF CARE
Problem: Potential for Falls  Goal: Patient will remain free of falls  Description: INTERVENTIONS:  - Educate patient/family on patient safety including physical limitations  - Instruct patient to call for assistance with activity   - Consult OT/PT to assist with strengthening/mobility   - Keep Call bell within reach  - Keep bed low and locked with side rails adjusted as appropriate  - Keep care items and personal belongings within reach  - Initiate and maintain comfort rounds  - Make Fall Risk Sign visible to staff  - Apply yellow socks and bracelet for high fall risk patients  - Consider moving patient to room near nurses station  Outcome: Progressing     Problem: MOBILITY - ADULT  Goal: Maintain or return to baseline ADL function  Description: INTERVENTIONS:  - Educate patient/family on patient safety including physical limitations  - Instruct patient to call for assistance with activity   - Consult OT/PT to assist with strengthening/mobility   - Keep Call bell within reach  - Keep bed low and locked with side rails adjusted as appropriate  - Keep care items and personal belongings within reach  - Initiate and maintain comfort rounds  - Make Fall Risk Sign visible to staff  - Apply yellow socks and bracelet for high fall risk patients  - Consider moving patient to room near nurses station  Outcome: Progressing  Goal: Maintains/Returns to pre admission functional level  Description: INTERVENTIONS:  - Perform BMAT or MOVE assessment daily.   - Set and communicate daily mobility goal to care team and patient/family/caregiver. - Collaborate with rehabilitation services on mobility goals if consulted  - Perform Range of Motion 2 times a day. - Reposition patient every 2 hours.   - Dangle patient 2 times a day  - Stand patient 2 times a day  - Ambulate patient 2 times a day  - Out of bed to chair 2 times a day   - Out of bed for meals 2 times a day  - Out of bed for toileting  - Record patient progress and toleration of activity level   Outcome: Progressing     Problem: PAIN - ADULT  Goal: Verbalizes/displays adequate comfort level or baseline comfort level  Description: Interventions:  - Encourage patient to monitor pain and request assistance  - Assess pain using appropriate pain scale  - Administer analgesics based on type and severity of pain and evaluate response  - Implement non-pharmacological measures as appropriate and evaluate response  - Consider cultural and social influences on pain and pain management  - Notify physician/advanced practitioner if interventions unsuccessful or patient reports new pain  Outcome: Progressing     Problem: INFECTION - ADULT  Goal: Absence or prevention of progression during hospitalization  Description: INTERVENTIONS:  - Assess and monitor for signs and symptoms of infection  - Monitor lab/diagnostic results  - Monitor all insertion sites, i.e. indwelling lines, tubes, and drains  - Monitor endotracheal if appropriate and nasal secretions for changes in amount and color  - [place] appropriate cooling/warming therapies per order  - Administer medications as ordered  - Instruct and encourage patient and family to use good hand hygiene technique  - Identify and instruct in appropriate isolation precautions for identified infection/condition  Outcome: Progressing  Goal: Absence of fever/infection during neutropenic period  Description: INTERVENTIONS:  - Monitor WBC    Outcome: Progressing     Problem: SAFETY ADULT  Goal: Patient will remain free of falls  Description: INTERVENTIONS:  - Educate patient/family on patient safety including physical limitations  - Instruct patient to call for assistance with activity   - Consult OT/PT to assist with strengthening/mobility   - Keep Call bell within reach  - Keep bed low and locked with side rails adjusted as appropriate  - Keep care items and personal belongings within reach  - Initiate and maintain comfort rounds  - Make Fall Risk Sign visible to staff  - Apply yellow socks and bracelet for high fall risk patients  - Consider moving patient to room near nurses station  Outcome: Progressing  Goal: Maintain or return to baseline ADL function  Description: INTERVENTIONS:  - Educate patient/family on patient safety including physical limitations  - Instruct patient to call for assistance with activity   - Consult OT/PT to assist with strengthening/mobility   - Keep Call bell within reach  - Keep bed low and locked with side rails adjusted as appropriate  - Keep care items and personal belongings within reach  - Initiate and maintain comfort rounds  - Make Fall Risk Sign visible to staff  - Apply yellow socks and bracelet for high fall risk patients  - Consider moving patient to room near nurses station  Outcome: Progressing  Goal: Maintains/Returns to pre admission functional level  Description: INTERVENTIONS:  - Perform BMAT or MOVE assessment daily.   - Set and communicate daily mobility goal to care team and patient/family/caregiver. - Collaborate with rehabilitation services on mobility goals if consulted  - Perform Range of Motion 2 times a day. - Reposition patient every 2 hours.   - Dangle patient 2 times a day  - Stand patient 2 times a day  - Ambulate patient 2 times a day  - Out of bed to chair 2 times a day   - Out of bed for meals 2 times a day  - Out of bed for toileting  - Record patient progress and toleration of activity level   Outcome: Progressing     Problem: DISCHARGE PLANNING  Goal: Discharge to home or other facility with appropriate resources  Description: INTERVENTIONS:  - Identify barriers to discharge w/patient and caregiver  - Arrange for needed discharge resources and transportation as appropriate  - Identify discharge learning needs (meds, wound care, etc.)  - Arrange for interpretive services to assist at discharge as needed  - Refer to Case Management Department for coordinating discharge planning if the patient needs post-hospital services based on physician/advanced practitioner order or complex needs related to functional status, cognitive ability, or social support system  Outcome: Progressing     Problem: Knowledge Deficit  Goal: Patient/family/caregiver demonstrates understanding of disease process, treatment plan, medications, and discharge instructions  Description: Complete learning assessment and assess knowledge base.   Interventions:  - Provide teaching at level of understanding  - Provide teaching via preferred learning methods  Outcome: Progressing     Problem: Prexisting or High Potential for Compromised Skin Integrity  Goal: Skin integrity is maintained or improved  Description: INTERVENTIONS:  - Identify patients at risk for skin breakdown  - Assess and monitor skin integrity  - Assess and monitor nutrition and hydration status  - Monitor labs   - Assess for incontinence   - Turn and reposition patient  - Assist with mobility/ambulation  - Relieve pressure over bony prominences  - Avoid friction and shearing  - Provide appropriate hygiene as needed including keeping skin clean and dry  - Evaluate need for skin moisturizer/barrier cream  - Collaborate with interdisciplinary team   - Patient/family teaching  - Consider wound care consult   Outcome: Progressing     Problem: GENITOURINARY - ADULT  Goal: Maintains or returns to baseline urinary function  Description: INTERVENTIONS:  - Assess urinary function  - Encourage oral fluids to ensure adequate hydration if ordered  - Administer IV fluids as ordered to ensure adequate hydration  - Administer ordered medications as needed  - Offer frequent toileting  - Follow urinary retention protocol if ordered  Outcome: Progressing     Problem: HEMATOLOGIC - ADULT  Goal: Maintains hematologic stability  Description: INTERVENTIONS  - Assess for signs and symptoms of bleeding or hemorrhage  - Monitor labs  - Administer supportive blood products/factors as ordered and appropriate  Outcome: Progressing

## 2023-09-01 NOTE — RESTORATIVE TECHNICIAN NOTE
Restorative Technician Note      Patient Name: Cecilia Campbell     Restorative Tech Visit Date: 09/01/23  Note Type: Mobility  Patient Position Upon Consult: Supine  Mobility / Activity Provided: assisted pt with ambulation, assisted pt in getting chair due to leg pain, made RN aware of leg pain and assisted pt back to bed  Activity Performed: Ambulated  Patient Position at End of Consult: Seated edge of bed;  All needs within reach

## 2023-09-01 NOTE — CASE MANAGEMENT
Case Management Discharge Planning Note    Patient name Wong Cole  Location [address] [place] [address] /E4 [address]ozo-* MRN [id number]  : 1950 Date 2023       Current Admission Date: 2023  Current Admission Diagnosis:UTI (urinary tract infection)   Patient Active Problem List    Diagnosis Date Noted   • Atrial fibrillation ([address]) 2023   • Impaired decision making 2023   • Bacteremia 2023   • UTI (urinary tract infection) 2023   • Elevated d-dimer 2023   • Esophageal abnormality 2023   • Hydronephrosis with obstructing calculus 2023   • Mycotic toenails 2023   • Angioedema 2018   • COPD (chronic obstructive pulmonary disease) ([address]) 2018   • Bradycardia 2018   • Polycythemia 2016   • Weight loss 2016      LOS (days): 57  Geometric Mean LOS (GMLOS) (days): 9.60  Days to GMLOS:-47.1     OBJECTIVE:  Risk of Unplanned Readmission Score: 17.82         Current admission status: Inpatient   Preferred Pharmacy:   PATIENT/FAMILY REPORTS NO PREFERRED PHARMACY  No address on file      [address] [place] [address]  Phone: [phone] Fax: [phone]    Primary Care Provider: No primary care provider on file.     Primary Insurance: MEDICARE  Secondary Insurance:     DISCHARGE DETAILS:    Discharge planning discussed with[de-identified] Pt, guardianship agency & [facility]  Freedom of Choice: No  Comments - Freedom of Choice: Pt deemed to have no capacity  CM contacted family/caregiver?: No- see comments (No EC)  Were Treatment Team discharge recommendations reviewed with patient/caregiver?: Yes  Did patient/caregiver verbalize understanding of patient care needs?: N/A- going to facility  Were patient/caregiver advised of the risks associated with not following Treatment Team discharge recommendations?: Yes    Contacts  Patient Contacts: Patient  Relationship to Patient[de-identified] Family  Contact Method: In Person  Reason/Outcome: Continuity of Care, Discharge [address]         Is the patient interested in [facility] at discharge?: No                   Treatment Team Recommendation: SNF  Discharge Destination Plan[de-identified] SNF  Transport at Discharge : [address]     Number/Name of Dispatcher: Liana Martinez by [employer] and Unit #): [place] @ 1700  ETA of Transport (Date): 09/01/23  ETA of Transport (Time): 1700                       Additional Comments: CM was notified that guardianship agency in [facility] is pt's guardian ad-litem until his scheduled court date on November 16th. CM reached out to [facility] to see if they are still able to accept him; they do not have  LTC bed and wont for a while. CM re-opened referral & [facility] [address] is willing to accept & they have a bed today. Pt is medically cleared for d/c today. CM set up [address] [place] [address],[address] transport for p/u @ 1700. Facility & liaison made aware. Pt denied having any questions or concerns at this time. CM to continue to follow pt care & d/c.     Accepting Facility Name, [address] [place] [address] : [address] [place] [address]  Receiving Facility/Agency Phone Number: [phone]  Facility/Agency Fax Number: [phone]

## 2023-09-01 NOTE — DISCHARGE SUMMARY
[facility]  Discharge- Mandie Vital 1950, 75 y.o. male MRN: [id number]  Unit/Bed#: E4 -01 Encounter: [id number]  Primary Care Provider: No primary care provider on file. Date and time admitted to hospital: 7/6/2023  4:44 PM      Admitting Provider:  Fer Shafer DO  Discharge Provider:  Marla Damon DO  Admission Date: 7/6/2023       Discharge Date: 09/01/23   LOS: 62  Primary Care Physician at Discharge: No primary care provider on file. None    HOSPITAL COURSE:  Mandie Vital is a 75 y.o. male who presented with sepsis of a urinary etiology. He was found to have complicated urinary tract infection as well as Enterococcus bacteremia. The patient has a past medical history of chronic cardiopulmonary disease, atrial fibrillation. He was found to have obstructing nephrolithiasis and underwent cystoscopy and stent placement. The patient remained in the hospital due to inability to make medical decisions. Neuropsychiatry consultation was obtained and they felt that the patient was not safe to live alone. The patient was evaluated in consultation by the nephrology and infectious disease services. He completed a course of antibiotics. The patient was felt to have recurrent infections secondary to stent placement and stone and stent were both removed. Patient remained in the hospital for an extended period of time pending guardianship. He is status post cystoscopy with stent insertion on 7/6/2023 followed by repeat cystoscopy on 8/3/2023 followed by stent removal and stone extraction. At the time of discharge the patient was tolerating oral diet they were without acute complaint and they were medically cleared for discharge. All questions were answered the patient's satisfaction and they were in agreement with the discharge plan.     DISCHARGE DIAGNOSES  * UTI (urinary tract infection)  Assessment & Plan  · Now with Enterococcus faecium UTI x 2  · Continue vancomycin, Day #5/5  · Patient appears nontoxic, afebrile  · Patient with concern for recurrent UTI due to infected stone, will go for retrieval after a few days of antibiotic therapy  · Patient presented with sepsis on admission with tachycardia and leukocytosis secondary to pyelonephritis. -This is ongoing and improving  · Underwent operative management today with stent removal and stone extraction    Atrial fibrillation [facility])  Assessment & Plan  · Evaluated by cardiology for atrial fibrillation with rapid ventricular response   · improved after successful SOLA/cardioversion on 7/25/23  · Rhythm control with amiodarone , Cardizem 120 mg daily  · Eliquis for anticoagulation , on hold for possible intervention  · CHADS2 Vasc of 1      Impaired decision making  Assessment & Plan  Neuropsych evaluation was done on 7/24/2023  He continues to have impaired decision-making capacity  Guardianship pending    Bacteremia  Assessment & Plan  · Aerococcus bacteremia due to complicated UTI, resolved  · Completed full course of antibiotics under the guidance of the infectious disease team    Hydronephrosis with obstructing calculus  Assessment & Plan  · Admitted originally due to sepsis from UTI with obstruction  · Status post cystoscopy with difficult stent insertion on 7/6/2023  · Procedure was complicated by inadvertent left ureteral perforation  · Follow-up CT showed previous ureteral calculus is now in the bladder    Esophageal abnormality  Assessment & Plan  · incidental finding on CT with diffuse esophageal thickening could be from esophagitis  · Patient was evaluated by the GI team: Note that esophageal wall thickening could be secondary to reflux esophagitis, Bruno's, or hiatal hernia  · Continue empiric Protonix  · Outpatient upper endoscopy      COPD (chronic obstructive pulmonary disease) ([address])  Assessment & Plan  · Stable without exacerbation  · Continue albuterol and breo       CONSULTING PROVIDERS IP CONSULT TO UROLOGY  IP CONSULT TO CARDIOLOGY  IP CONSULT TO PODIATRY  IP CONSULT TO PHARMACY  IP CONSULT TO INFECTIOUS DISEASES  IP CONSULT TO PODIATRY  IP CONSULT TO GASTROENTEROLOGY  IP CONSULT TO UROLOGY  IP CONSULT TO PHARMACY    PROCEDURES PERFORMED  Procedure(s) (LRB):  REMOVAL STENT URETERAL (Left)  CYSTO USCOPE  W/  STONE BASKET EXTRACTION (Right)    RADIOLOGY RESULTS  XR abdomen 1 view kub    Result Date: 8/30/2023    Impression: Fluoroscopic guidance provided for procedure guidance. Please refer to the separate procedure notes for additional details. CT renal stone study abdomen pelvis wo contrast    Result Date: 8/27/2023    Impression: No residual left urolithiasis. Nonobstructive 2 mm right distal ureteral stone, not significantly changed. At least 5 punctate densities in the bladder, likely residual stone fragments. Left ureteral JJ stent in expected position. Moderate pelviectasis with only mild prominence of the calyces. Trabeculated bladder wall, not significantly changed. Chronic findings, as per the body of the report. FL retrograde pyelogram    Result Date: 8/4/2023  Narrative: LEFT RETROGRADE PYELOGRAM INDICATION:   N20.1: Calculus of ureter. COMPARISON: CT abdomen pelvis 7/28/2023. IMAGES:  30 FLUOROSCOPY TIME:   2 minutes 56 seconds CONTRAST:  30 mL of iohexol (OMNIPAQUE) FINDINGS: Fluoroscopic guidance provided for retrograde pyelogram and stent exchange. Opacified portions of the left ureter demonstrate normal course and caliber without evidence of filling defects. There is a moderate hydronephrosis. Initial images demonstrate filling defects within a prominent upper pole calyx compatible with calculi. Osseous and soft tissue detail limited by technique. Impression: Fluoroscopic guidance provided for left retrograde pyelogram and stent exchange. Please see procedure report for further details.        LABS  Results from last 7 days   Lab Units 08/28/23  1042 08/26/23  9321 WBC Thousand/uL 7.70 10.05   HEMOGLOBIN g/dL 11.7* 11.9*   HEMATOCRIT % 37.0 38.4   MCV fL 99* 100*   PLATELETS Thousands/uL 352 344     Results from last 7 days   Lab Units 08/31/23  0518 08/30/23  0602 08/29/23  0548 08/28/23  0829 08/26/23  0919 08/26/23  0608   SODIUM mmol/L 136 136 138 135  --  139   POTASSIUM mmol/L 4.9 4.7 4.5 3.9 4.3 5.6*   CHLORIDE mmol/L 101 102 103 102  --  104   CO2 mmol/L 30 30 30 29  --  33*   BUN mg/dL 26* 24 25 28*  --  28*   CREATININE mg/dL 1.21 1.12 1.04 1.12  --  0.98   CALCIUM mg/dL 8.8 8.8 8.8 8.5  --  9.1   EGFR ml/min/1.73sq m 58 64 70 64  --  75   GLUCOSE RANDOM mg/dL 146* 85 86 142*  --  90                                        Cultures:   Results from last 7 days   Lab Units 08/26/23 2126   COLOR UA  Red   CLARITY UA  Turbid   SPEC GRAV UA  1.020   PH UA  8.0   LEUKOCYTES UA  Large*   NITRITE UA  Negative   GLUCOSE UA mg/dl Negative   KETONES UA mg/dl Negative   BILIRUBIN UA  Negative   BLOOD UA  Large*      Results from last 7 days   Lab Units 08/26/23 2126   RBC UA /hpf Innumerable*   WBC UA /hpf 30-50*   EPITHELIAL CELLS WET PREP /hpf Occasional   BACTERIA UA /hpf Moderate*      Results from last 7 days   Lab Units 08/30/23  1525 08/26/23 2123   URINE CULTURE  Culture too young- will reincubate >100,000 cfu/ml Enterococcus faecium*       PHYSICAL EXAM:  Vitals:   Blood Pressure: 159/95 (09/01/23 0727)  Pulse: 63 (09/01/23 0727)  Temperature: 98.1 °F (36.7 °C) (09/01/23 0727)  Temp Source: Temporal (09/01/23 0727)  Respirations: 18 (09/01/23 0727)  Height: 6' 3" (190.5 cm) (07/25/23 1315)  Weight - Scale: 102 kg (224 lb) (07/25/23 1315)  SpO2: 95 % (09/01/23 4627)      General: well appearing, no acute distress  HEENT: atraumatic, PERRLA, moist mucosa, normal pharynx, normal tonsils and adenoids, normal tongue, no fluid in sinuses  Neck: Trachea midline, no carotid bruit, no masses  Respiratory: normal chest wall expansion, CTA B, no r/r/w, no rubs  Cardiovascular: RRR, no m/r/g, Normal S1 and S2  Abdomen: Soft, non-tender, non-distended, normal bowel sounds in all quadrants, no hepatosplenomegaly, no tympany  Rectal: deferred  Musculoskeletal: normal ROM in upper and lower extremities  Integumentary: warm, dry, and pink, with no rash, purpura, or petechia  Heme/Lymph: no lymphadenopathy, no bruises  Neurological: Cranial Nerves II-XII grossly intact, no tics, normal sensation to pressure and light touch  Psychiatric: cooperative with normal mood, affect, and cognition      Discharge Disposition: Home/Self Care      Test Results Pending at Discharge:   Pending Labs     Order Current Status    Stone analysis In process    Urine culture Preliminary result              Medications   · Summary of Medication Adjustments made as a result of this hospitalization: See discharge list  · Medication Dosing Tapers - Please refer to Discharge Medication List for details on any medication dosing tapers (if applicable to patient). · Discharge Medication List: See after visit summary for reconciled discharge medications. Diet restrictions:         Diet Orders   (From admission, onward)             Start     Ordered    08/30/23 1710  Diet Regular; Regular House  Diet effective now        References:    Adult Nutrition Support Algorithm    RD Therapeutic Diet Order Protocol   Question Answer Comment   Diet Type Regular    Regular Regular House    RD to adjust diet per protocol? Yes        08/30/23 1709              Activity restrictions: No strenuous activity  Discharge Condition: good    Outpatient Follow-Up and Discharge Instructions  See after visit summary section titled Discharge Instructions for information provided to patient and family. Code Status: Level 1 - Full Code  Discharge Statement   I spent 35 minutes discharging the patient. This time was spent on the day of discharge.  Greater than 50% of total time was spent with the patient and / or family counseling and / or coordination of care. ** Please Note: This note was completed in part utilizing [employer] Software. Grammatical errors, random word insertions, spelling mistakes, and incomplete sentences may be an occasional consequence of this system secondary to software limitations, ambient noise, and hardware issues. If you have any questions or concerns about the content, text, or information contained within the body of this dictation, please contact the provider for clarification. **

## 2023-09-02 LAB — BACTERIA UR CULT: ABNORMAL

## 2023-09-08 ENCOUNTER — TELEPHONE (OUTPATIENT)
Dept: GASTROENTEROLOGY | Facility: MEDICAL CENTER | Age: 73
End: 2023-09-08

## 2023-09-08 NOTE — TELEPHONE ENCOUNTER
Called to offer soonest appt in [facility] since that is where patient resides.

## 2023-09-08 NOTE — TELEPHONE ENCOUNTER
----- Message from Lise Cole sent at 7/27/2023  1:30 PM EDT -----  Regarding: FW: Follow-up    ----- Message -----  From: Mine Lindsey DO  Sent: 7/27/2023   1:29 PM EDT  To: [facility] [place] [facility]  Subject: Follow-up                                        Schedule patient for outpatient GI follow-up to discuss rectal bleeding.

## 2023-09-11 LAB
CA CARBONATE CRY STONE QL IR: 100 %
COLOR STONE: NORMAL
COMMENT-STONE3: NORMAL
COMPOSITION: NORMAL
LABORATORY COMMENT REPORT: NORMAL
PHOTO: NORMAL
SIZE STONE: NORMAL MM
SPEC SOURCE SUBJ: NORMAL
STONE ANALYSIS-IMP: NORMAL
STONE ANALYSIS-IMP: NORMAL
WT STONE: 12 MG

## 2023-10-27 PROBLEM — N39.0 UTI (URINARY TRACT INFECTION): Status: RESOLVED | Noted: 2023-07-06 | Resolved: 2023-10-27

## 2023-11-09 ENCOUNTER — OFFICE VISIT (OUTPATIENT)
Dept: UROLOGY | Facility: CLINIC | Age: 73
End: 2023-11-09
Payer: MEDICARE

## 2023-11-09 VITALS
WEIGHT: 245 LBS | BODY MASS INDEX: 30.46 KG/M2 | OXYGEN SATURATION: 94 % | HEART RATE: 70 BPM | HEIGHT: 75 IN | SYSTOLIC BLOOD PRESSURE: 120 MMHG | DIASTOLIC BLOOD PRESSURE: 70 MMHG

## 2023-11-09 DIAGNOSIS — N13.2 HYDRONEPHROSIS WITH OBSTRUCTING CALCULUS: Primary | ICD-10-CM

## 2023-11-09 PROCEDURE — 99213 OFFICE O/P EST LOW 20 MIN: CPT | Performed by: PHYSICIAN ASSISTANT

## 2023-11-09 NOTE — PROGRESS NOTES
1. Hydronephrosis with obstructing calculus  US kidney and bladder with pvr            Assessment and plan:       1. Nephrolithiasis  -Status post staged bilateral ureteroscopy  -I advised patient to increase his hydration  -We will follow-up with a renal ultrasound to ensure appropriate renal drainage  -Patient is asymptomatic at this time       Patricia Garcia PA-C      Chief Complaint     Chief Complaint   Patient presents with    Nephrolithiasis     NEW PT         History of Present Illness     Eriberto Jason is a 75 y.o. male presenting today for follow up. CT 7/6/23 with a 1cm left ureteral stone. S/p left stent 7/6/23  S/p left ureteroscopy 8/3/23 with Dr. Cassidy Rasmussen  S/p left stent removal and right ureteroscopy 8/30/23 with Dr. Rinku Thomas    Patient is present in the office today by himself. He denies any dysuria, gross hematuria, back pain, or voiding dysfunction. Unfortunately he has not had any follow-up imaging. Patient is unable to provide urine specimen in the office today    Laboratory     Lab Results   Component Value Date    CREATININE 1.21 08/31/2023       Review of Systems     Review of Systems   Constitutional:  Negative for activity change, appetite change, chills, diaphoresis, fatigue, fever and unexpected weight change. Respiratory:  Negative for chest tightness and shortness of breath. Cardiovascular:  Negative for chest pain, palpitations and leg swelling. Gastrointestinal:  Negative for abdominal distention, abdominal pain, constipation, diarrhea, nausea and vomiting. Genitourinary:  Negative for decreased urine volume, difficulty urinating, dysuria, enuresis, flank pain, frequency, genital sores, hematuria and urgency. Musculoskeletal:  Negative for back pain, gait problem and myalgias. Skin:  Negative for color change, pallor, rash and wound. Psychiatric/Behavioral:  Negative for behavioral problems. The patient is not nervous/anxious.               Allergies No Known Allergies    Physical Exam     Physical Exam  Constitutional:       General: He is not in acute distress. Appearance: Normal appearance. He is normal weight. He is not ill-appearing, toxic-appearing or diaphoretic. HENT:      Head: Normocephalic and atraumatic. Eyes:      General:         Right eye: No discharge. Left eye: No discharge. Conjunctiva/sclera: Conjunctivae normal.   Pulmonary:      Effort: Pulmonary effort is normal. No respiratory distress. Musculoskeletal:         General: No swelling or tenderness. Normal range of motion. Skin:     General: Skin is warm and dry. Coloration: Skin is not jaundiced or pale. Neurological:      General: No focal deficit present. Mental Status: He is alert and oriented to person, place, and time. Psychiatric:         Mood and Affect: Mood normal.         Behavior: Behavior normal.         Thought Content:  Thought content normal.           Vital Signs     Vitals:    11/09/23 1411   BP: 120/70   BP Location: Left arm   Patient Position: Sitting   Cuff Size: Standard   Pulse: 70   SpO2: 94%   Weight: 111 kg (245 lb)   Height: 6' 3" (1.905 m)         Current Medications       Current Outpatient Medications:     albuterol (ProAir HFA) 90 mcg/act inhaler, Inhale 2 puffs every 6 (six) hours as needed for wheezing, Disp: 8.5 g, Rfl: 0    amiodarone 200 mg tablet, Take 1 tablet (200 mg total) by mouth daily with breakfast Do not start before September 2, 2023., Disp: 30 tablet, Rfl: 0    aspirin 81 mg chewable tablet, Chew 1 tablet (81 mg total) daily, Disp: 30 tablet, Rfl: 0    atorvastatin (LIPITOR) 40 mg tablet, Take 1 tablet (40 mg total) by mouth daily with dinner, Disp: 30 tablet, Rfl: 0    diltiazem (CARDIZEM CD) 120 mg 24 hr capsule, Take 1 capsule (120 mg total) by mouth daily Do not start before September 2, 2023., Disp: 30 capsule, Rfl: 0    fluticasone-vilanterol 200-25 mcg/actuation inhaler, Inhale 1 puff daily Rinse mouth after use.  Do not start before July 15, 2023., Disp: 60 blister, Rfl: 0    lidocaine (LIDODERM) 5 %, Apply 1 patch topically over 12 hours daily Remove & Discard patch within 12 hours or as directed by MD Do not start before September 2, 2023., Disp: , Rfl: 0    methocarbamol (ROBAXIN) 750 mg tablet, Take 1 tablet (750 mg total) by mouth every 8 (eight) hours, Disp: , Rfl: 0    Multiple Vitamin (multivitamin) tablet, Take 1 tablet by mouth daily, Disp: 30 tablet, Rfl: 0    nicotine (NICODERM CQ) 7 mg/24hr TD 24 hr patch, Place 1 patch on the skin over 24 hours daily Do not start before September 2, 2023., Disp: 28 patch, Rfl: 0    pantoprazole (PROTONIX) 40 mg tablet, Take 1 tablet (40 mg total) by mouth daily in the early morning Do not start before July 15, 2023., Disp: 30 tablet, Rfl: 0      Active Problems     Patient Active Problem List   Diagnosis    Polycythemia    Weight loss    Angioedema    COPD (chronic obstructive pulmonary disease) (HCC)    Bradycardia    Elevated d-dimer    Esophageal abnormality    Hydronephrosis with obstructing calculus    Mycotic toenails    Bacteremia    Impaired decision making    Atrial fibrillation [facility])         Past Medical History     Past Medical History:   Diagnosis Date    COPD (chronic obstructive pulmonary disease) ([address] [facility])          Surgical History     Past Surgical History:   Procedure Laterality Date    FL RETROGRADE PYELOGRAM  7/6/2023    FL RETROGRADE PYELOGRAM  8/3/2023    MS CYSTO BLADDER W/URETERAL CATHETERIZATION Left 7/6/2023    Procedure: CYSTOSCOPY RETROGRADE PYELOGRAM WITH INSERTION STENT URETERAL;  Surgeon: Hazel Quezada MD;  Location: [facility];  Service: Urology    MS CYSTO/URETERO W/LITHOTRIPSY &INDWELL STENT INSRT Left 8/3/2023    Procedure: Ghislaine Gonzalez;  Surgeon: Bennett Zuñiga MD;  Location[facility];  Service: Urology    MS CYSTO/URETERO W/LITHOTRIPSY &INDWELL STENT INSRT Right 8/30/2023    Procedure: Quinten Jansen  W/ STONE BASKET EXTRACTION;  Surgeon: Judy Shelley MD;  [facility];  Service: Urology    REMOVAL URETERAL STENT Left 8/30/2023    Procedure: REMOVAL STENT URETERAL;  Surgeon: Judy Shelley MD;  [facility];  Service: Urology         Family History     History reviewed. No pertinent family history.       Social History     Social History       Radiology

## 2023-11-21 ENCOUNTER — OFFICE VISIT (OUTPATIENT)
Dept: CARDIOLOGY CLINIC | Facility: CLINIC | Age: 73
End: 2023-11-21
Payer: MEDICARE

## 2023-11-21 VITALS
BODY MASS INDEX: 28.35 KG/M2 | DIASTOLIC BLOOD PRESSURE: 74 MMHG | WEIGHT: 228 LBS | SYSTOLIC BLOOD PRESSURE: 128 MMHG | HEIGHT: 75 IN | HEART RATE: 69 BPM

## 2023-11-21 DIAGNOSIS — I48.91 ATRIAL FIBRILLATION, UNSPECIFIED TYPE (HCC): Primary | ICD-10-CM

## 2023-11-21 DIAGNOSIS — N18.30 STAGE 3 CHRONIC KIDNEY DISEASE, UNSPECIFIED WHETHER STAGE 3A OR 3B CKD (HCC): ICD-10-CM

## 2023-11-21 PROCEDURE — 93000 ELECTROCARDIOGRAM COMPLETE: CPT | Performed by: INTERNAL MEDICINE

## 2023-11-21 PROCEDURE — 99205 OFFICE O/P NEW HI 60 MIN: CPT | Performed by: INTERNAL MEDICINE

## 2023-11-21 RX ORDER — DOCUSATE SODIUM 100 MG/1
100 CAPSULE, LIQUID FILLED ORAL 2 TIMES DAILY
COMMUNITY

## 2023-11-21 RX ORDER — FINASTERIDE 5 MG/1
5 TABLET, FILM COATED ORAL DAILY
COMMUNITY

## 2023-11-21 RX ORDER — MAGNESIUM HYDROXIDE 1200 MG/15ML
SUSPENSION ORAL DAILY PRN
COMMUNITY

## 2023-11-21 RX ORDER — CHOLECALCIFEROL (VITAMIN D3) 125 MCG
CAPSULE ORAL
COMMUNITY

## 2023-11-21 RX ORDER — ACETAMINOPHEN 325 MG/1
325 TABLET ORAL EVERY 4 HOURS PRN
COMMUNITY

## 2023-11-21 NOTE — PROGRESS NOTES
Consultation - Cardiology   [facility] 75 y.o. male MRN: [id number]  Unit/Bed#:  Encounter: [id number]      Assessment/Plan:Tobi Lafleur is a 75y.o. year old male past medical history of kidney stones and urosepsis complicated by atrial fibrillation and flutter, COPD, past tobacco use history, alcohol use history, cognitive impairment presenting for cardiology follow-up. Atrial flutters 2-1 and probably atrial fibrillation: His atrial arrhythmias were most likely in the setting of urosepsis. He subsequently underwent SOLA cardioversion with restoration of sinus rhythm and was placed on calcium channel blocker and amiodarone.  -Atrial flutter appears most likely typical.  All the notes say he had atrial fibrillation however on his EKGs I do not see any atrial fibrillation but maybe had some on telemetry.  -Sounds like the patient was asymptomatic while in atrial flutter however he is not the best historian as a belief he probably has cognitive impairment  -Considering think this was most likely prompted all in the setting of sepsis I would stop his amiodarone considering the toxicity of long-term.  -I would recheck a Zio patch for 2 weeks in February with washout of the amiodarone for monitoring of atrial arrhythmias. If he has recurrence of atrial flutter I would consider treating this especially since it could cause a tachycardia induced cardiomyopathy. I would opt for less aggressive measures and be prone to restart just antiarrhythmic therapy instead of ablation. If he just has atrial fibrillation I would probably just leave it alone and let him be rate controlled with calcium channel blockers and beta-blockers.  -I would continue the current dose of his diltiazem calcium channel blocker  -Considering troponin elevation in the setting of demand I would consider that this is possibly a point for his [id number] and him going to be 74 and 2 points. His [id number] is really 2-3 and not 1.   Not sure why his anticoagulation was stopped. - I would continue Eliquis 5 mg twice a day for now. Myocardial injury troponin elevation most likely in the setting of sepsis and flutter: Patient most likely has underlying coronary artery disease.  -Negative pharmacological stress while in the hospital  -Do not think the patient needs baby aspirin on top of Eliquis if we are considering this stable coronary artery disease        Recommendations:  -Stop amiodarone and continue diltiazem  -Restart Eliquis 5 mg twice a day and stop baby aspirin  -Zio patch in February and follow-up in 3 months after Zio patch          History of Present Illness   Physician Requesting Consult: No att. providers found  Reason for Consult / Principal Problem: flutter fib   HPI: Clari Agustin is a 75y.o. year old male past medical history of kidney stones and urosepsis complicated by atrial fibrillation and flutter, COPD, past tobacco use history, alcohol use history, cognitive impairment presenting for cardiology follow-up. Patient was hospitalized after having obstructive renal stone and urosepsis. This hospital course was complicated by atrial fibrillation and what appears to be 2-1 flutter. He ended up having a SOLA cardioversion and was started on diltiazem and amiodarone. He was also started on Eliquis for a month which seems to be subsequently stopped. He also had a troponin elevation of around 2000 with no symptoms most likely in the setting of demand. He was started on baby aspirin because of this also. He was discharged from the hospital and has been doing well from a urological standpoint. He states he has no cardiopulmonary symptoms except occasionally has dyspnea on exertion that improves with rest.  This has been going on for years and has not gotten any better or worse. Patient states when he was in the abnormal rhythms did not have any symptoms.   However, it is hard to elicit some information from the patient as I get a sense he has at least mild cognitive impairment and/or dementia. Consults    Review of Systems:  Review of Systems   All other systems reviewed and are negative. 14 systems reviewed and negative with the exception of the above and the following    Historical Information   Past Medical History:   Diagnosis Date    COPD (chronic obstructive pulmonary disease) ([address])      Past Surgical History:   Procedure Laterality Date    FL RETROGRADE PYELOGRAM  7/6/2023    FL RETROGRADE PYELOGRAM  8/3/2023    DC CYSTO BLADDER W/URETERAL CATHETERIZATION Left 7/6/2023    Procedure: CYSTOSCOPY RETROGRADE PYELOGRAM WITH INSERTION STENT URETERAL;  Surgeon: Moon Gupta MD;  [facility];  Service: Urology    DC CYSTO/URETERO W/LITHOTRIPSY &INDWELL STENT INSRT Left 8/3/2023    Procedure: Marda Even, & STENT;  Surgeon: Soraida Jefferson MD;  [facility];  Service: Urology    DC CYSTO/URETERO W/LITHOTRIPSY &INDWELL STENT INSRT Right 8/30/2023    Procedure: Nikunj Bang  W/  STONE BASKET EXTRACTION;  Surgeon: Moon Gupta MD;  [facility];  Service: Urology    REMOVAL URETERAL STENT Left 8/30/2023    Procedure: REMOVAL STENT URETERAL;  Surgeon: Moon Gupta MD;  [facility];  Service: Urology     Social History     Substance and Sexual Activity   Alcohol Use Never     Social History     Substance and Sexual Activity   Drug Use No     Social History     Tobacco Use   Smoking Status Every Day    Packs/day: 0.50    Types: Cigarettes   Smokeless Tobacco Not on file     Family History: non-contributory    Meds/Allergies   all current active meds have been reviewed  No Known Allergies    Objective   Vitals: Blood pressure 128/74, pulse 69, height 6' 3" (1.905 m), weight 103 kg (228 lb). , Body mass index is 28.5 kg/m². ,     [unfilled]    Invasive Devices       None                       Physical Exam:  Physical Exam  Constitutional:       Appearance: Normal appearance. HENT:      Head: Normocephalic. Eyes:      Pupils: Pupils are equal, round, and reactive to light. Cardiovascular:      Rate and Rhythm: Normal rate and regular rhythm. Pulmonary:      Effort: Pulmonary effort is normal.      Breath sounds: Normal breath sounds. Abdominal:      General: Abdomen is flat. Palpations: Abdomen is soft. Musculoskeletal:         General: Normal range of motion. Neurological:      General: No focal deficit present. Mental Status: He is alert and oriented to person, place, and time. Psychiatric:         Mood and Affect: Mood normal.         Behavior: Behavior normal.      Comments: Possible cognitive impairment          Gen: No acute distress  HEENT: anicteric, mucous membranes moist  Neck: supple, no jugular venous distention, or carotid bruit  Heart: regular, normal s1 and s2, no murmur/rub or gallop  Lungs :clear to auscultation bilaterally, no rales/rhonchi or wheeze  Abdomen: soft nontender, normoactive bowel sounds, no organomegaly  Ext: warm and perfused, normal femoral pulses, no edema, clubbing  Skin: warm, no rashes  Neuro: AAO x 3, no focal findings  Psychiatric: normal affect  Musculoskeletal: no obvious joint deformities.     Lab Results:     Lab Results   Component Value Date    TROPONINI <0.02 05/08/2018       Lab Results   Component Value Date    CALCIUM 8.8 08/31/2023    K 4.9 08/31/2023    CO2 30 08/31/2023     08/31/2023    BUN 26 (H) 08/31/2023    CREATININE 1.21 08/31/2023       Lab Results   Component Value Date    WBC 7.70 08/28/2023    HGB 11.7 (L) 08/28/2023    HCT 37.0 08/28/2023    MCV 99 (H) 08/28/2023     08/28/2023       No results found for: "CHOL"  Lab Results   Component Value Date    HDL 49 07/07/2023     Lab Results   Component Value Date    LDLCALC 80 07/07/2023     Lab Results   Component Value Date    TRIG 67 07/07/2023       Lab Results   Component Value Date    ALT 16 08/04/2023    AST 15 08/04/2023    ALKPHOS 85 08/04/2023               Imaging: I have personally reviewed pertinent reports.       EKG: NSR

## 2023-11-21 NOTE — PATIENT INSTRUCTIONS
-stop amiodarone   -continue diltizem at current dose   -get zio patch done in February once amio is washed out and before next appointment   -start eliquis 5 mg BID and stop baby aspirin 81 mg    -follow-up in 3 months

## 2023-11-27 ENCOUNTER — HOSPITAL ENCOUNTER (OUTPATIENT)
Dept: ULTRASOUND IMAGING | Facility: HOSPITAL | Age: 73
Discharge: HOME | End: 2023-11-27
Payer: MEDICARE

## 2023-11-27 DIAGNOSIS — N13.2 HYDRONEPHROSIS WITH OBSTRUCTING CALCULUS: ICD-10-CM

## 2023-11-27 PROCEDURE — 76770 US EXAM ABDO BACK WALL COMP: CPT

## 2024-01-15 ENCOUNTER — OFFICE VISIT (OUTPATIENT)
Dept: GASTROENTEROLOGY | Facility: CLINIC | Age: 74
End: 2024-01-15
Payer: MEDICARE

## 2024-01-15 ENCOUNTER — TELEPHONE (OUTPATIENT)
Dept: GASTROENTEROLOGY | Facility: CLINIC | Age: 74
End: 2024-01-15

## 2024-01-15 ENCOUNTER — TELEPHONE (OUTPATIENT)
Dept: CARDIOLOGY CLINIC | Facility: CLINIC | Age: 74
End: 2024-01-15

## 2024-01-15 VITALS
HEIGHT: 75 IN | SYSTOLIC BLOOD PRESSURE: 142 MMHG | BODY MASS INDEX: 28.85 KG/M2 | WEIGHT: 232 LBS | DIASTOLIC BLOOD PRESSURE: 90 MMHG

## 2024-01-15 DIAGNOSIS — K62.5 RECTAL BLEEDING: Primary | ICD-10-CM

## 2024-01-15 DIAGNOSIS — K59.09 OTHER CONSTIPATION: ICD-10-CM

## 2024-01-15 PROCEDURE — 99204 OFFICE O/P NEW MOD 45 MIN: CPT | Performed by: NURSE PRACTITIONER

## 2024-01-15 RX ORDER — POLYETHYLENE GLYCOL 3350 17 G/17G
17 POWDER, FOR SOLUTION ORAL DAILY
Qty: 30 EACH | Refills: 11 | Status: SHIPPED | OUTPATIENT
Start: 2024-01-15

## 2024-01-15 NOTE — H&P (VIEW-ONLY)
[facility] - Outpatient Consultation  Tobi Son 74 y.o. male MRN: [id number]  Encounter: [id number]    ASSESSMENT AND PLAN:      1. Rectal bleeding  2. Other constipation  Patient presents with 2-week history of intermittent rectal bleeding-bright red blood noticed with wiping after bowel movement.  Denies melena or hematochezia  Admits to constipation and straining to defecate  No abdominal pain, no alarm symptoms  Hemoglobin 11/2023 -12.1, normal MCV  Suspect hemorrhoidal bleeding but would proceed with colonoscopy to rule out colon mass or large polyp  -Scheduled for colonoscopy at [facility]  -Repeat CBC    3.  History of ureteral calculi with obstruction  Recent prolonged hospitalization July and August 2023due to large left ureteral calculus and pyelonephritis.  He underwent cystoscopy and ureteral stent insertion with complication of left ureteral perforation managed with indwelling ureteral stent  Noted to have significant hematuria during hospitalization with subsequent anemia.  Hemoglobin 10-11 at that time    Followup Appointment: 3 months  ______________________________________________________________________    Chief Complaint   Patient presents with    Black or Bloody Stool     Bright red color, no diarrhea, no constipation, no pain       HPI:   Tobi Son is a 74 y.o. year old male who presents with 1 to 2-week history of bright red blood noted with wiping.   Patient is currently long-term resident at [facility].  He is a poor historian due to mild dementia.  Patient denies recent melena or hematochezia.  He denies prior rectal bleeding.   Review of records reveals recent GI consult for rectal bleeding during recent prolonged hospitalization in July and August 2023 due to large left ureteral calculus and pyelonephritis.  He underwent cystoscopy and ureteral stent insertion with complication of left ureteral perforation managed with  indwelling ureteral stent  He was anemic during that hospitalization with hemoglobin ranging 10-11 due to hematuria  Outpatient colonoscopy was recommended at once patient was stable from urologic standpoint    Patient denies prior colonoscopy  He denies recent abdominal or rectal pain  Reports constipation with hard bowel movements and straining to defecate.  BM every 2 to 3 days  Denies melena but has noticed bright red blood with wiping  He reports that his appetite is good and his weight has been stable.  Denies recent nausea or vomiting    Recent blood work November 8, 2023 showed hemoglobin 12.0, normal MCV    Historical Information   Past Medical History:   Diagnosis Date    COPD (chronic obstructive pulmonary disease) (H[facility])      Past Surgical History:   Procedure Laterality Date    FL RETROGRADE PYELOGRAM  7/6/2023    FL RETROGRADE PYELOGRAM  8/3/2023    FL CYSTO BLADDER W/URETERAL CATHETERIZATION Left 7/6/2023    Procedure: CYSTOSCOPY RETROGRADE PYELOGRAM WITH INSERTION STENT URETERAL;  Surgeon: Libertad Julio MD;  [facility];  Service: Urology    FL CYSTO/URETERO W/LITHOTRIPSY &INDWELL STENT INSRT Left 8/3/2023    Procedure: CYSTO USCOPE W/  LASER, & STENT;  Surgeon: Agapito Kim MD;  [facility];  Service: Urology    FL CYSTO/URETERO W/LITHOTRIPSY &INDWELL STENT INSRT Right 8/30/2023    Procedure: CYSTO USCOPE  W/  STONE BASKET EXTRACTION;  Surgeon: Libertad Julio MD;  [facility];  Service: Urology    REMOVAL URETERAL STENT Left 8/30/2023    Procedure: REMOVAL STENT URETERAL;  Surgeon: Libertad Julio MD;  [facility];  Service: Urology     Social History     Substance and Sexual Activity   Alcohol Use Never     Social History     Substance and Sexual Activity   Drug Use No     Social History     Tobacco Use   Smoking Status Every Day    Current packs/day: 0.50    Types: Cigarettes   Smokeless Tobacco Not on file     History reviewed. No pertinent family  "history.    Meds/Allergies     Current Outpatient Medications:     acetaminophen (TYLENOL) 325 mg tablet    albuterol (ProAir HFA) 90 mcg/act inhaler    apixaban (Eliquis) 5 mg    docusate sodium (COLACE) 100 mg capsule    finasteride (PROSCAR) 5 mg tablet    fluticasone-vilanterol 200-25 mcg/actuation inhaler    magnesium hydroxide (Milk of Magnesia) 400 mg/5 mL oral suspension    Melatonin 5 MG TABS    methocarbamol (ROBAXIN) 750 mg tablet    nicotine (NICODERM CQ) 7 mg/24hr TD 24 hr patch    atorvastatin (LIPITOR) 40 mg tablet    diltiazem (CARDIZEM CD) 120 mg 24 hr capsule    lidocaine (LIDODERM) 5 %    Multiple Vitamin (multivitamin) tablet    pantoprazole (PROTONIX) 40 mg tablet    No Known Allergies    PHYSICAL EXAM:    Blood pressure 142/90, height 6' 3\" (1.905 m), weight 105 kg (232 lb). Body mass index is 29 kg/m².  General Appearance: NAD, cooperative, alert  Eyes: Anicteric  ENT:  Normocephalic, atraumatic, normal mucosa.    Neck:  Supple, symmetrical, trachea midline,   Resp:  Clear to auscultation bilaterally; no rales, rhonchi or wheezing; respirations unlabored   CV:  S1 S2, Regular rate and rhythm; no murmur, rub, or gallop.  GI:  Soft, non-tender, non-distended; normal bowel sounds; no masses, no organomegaly   Rectal: Deferred  Musculoskeletal: No cyanosis, clubbing or edema. Normal ROM.  Skin:  No jaundice, rashes, or lesions   Psych: Normal affect, good eye contact  Neuro: No gross deficits, AAOx3    Lab Results:   Lab Results   Component Value Date    WBC 7.70 08/28/2023    HGB 11.7 (L) 08/28/2023    HCT 37.0 08/28/2023    MCV 99 (H) 08/28/2023     08/28/2023     Lab Results   Component Value Date    K 4.9 08/31/2023     08/31/2023    CO2 30 08/31/2023    BUN 26 (H) 08/31/2023    CREATININE 1.21 08/31/2023    CALCIUM 8.8 08/31/2023    CORRECTEDCA 9.5 08/04/2023    AST 15 08/04/2023    ALT 16 08/04/2023    ALKPHOS 85 08/04/2023    EGFR 58 08/31/2023           REVIEW OF " SYSTEMS:    CONSTITUTIONAL: Denies any fever, chills, rigors, and weight loss.  HEENT: No earache or tinnitus. Denies hearing loss or visual disturbances.  CARDIOVASCULAR: No chest pain or palpitations.   RESPIRATORY: Denies any cough, hemoptysis, shortness of breath or dyspnea on exertion.  GASTROINTESTINAL: As noted in the History of Present Illness.   GENITOURINARY: No problems with urination. Denies any hematuria or dysuria.  NEUROLOGIC: No dizziness or vertigo, denies headaches.   MUSCULOSKELETAL: Denies any muscle or joint pain.   SKIN: Denies skin rashes or itching.   ENDOCRINE: Denies excessive thirst. Denies intolerance to heat or cold.  PSYCHOSOCIAL: Denies depression or anxiety. Denies any recent memory loss.

## 2024-01-15 NOTE — TELEPHONE ENCOUNTER
Scheduled date of colonoscopy (as of today):1-  Physician performing colonoscopy:Amy  Location of colonoscopy:[facility]  Bowel prep reviewed with patient:Sae  Instructions reviewed with patient by:LISA  Clearances: cardiac clearance - Eliquis

## 2024-01-15 NOTE — TELEPHONE ENCOUNTER
r mutual patient is scheduled for procedure: Colonoscopy     On: __1__/_23    _/_ 24   _       With: Dr. Hinojosa at [facility]     He/She is taking the following blood thinner:       Eliquis                                         Can this be stopped ___2___ days prior to the procedure?       Physician Approving clearance: ________________________

## 2024-01-15 NOTE — PROGRESS NOTES
[facility] - Outpatient Consultation  Tobi Son 74 y.o. male MRN: [id number]  Encounter: [id number]    ASSESSMENT AND PLAN:      1. Rectal bleeding  2. Other constipation  Patient presents with 2-week history of intermittent rectal bleeding-bright red blood noticed with wiping after bowel movement.  Denies melena or hematochezia  Admits to constipation and straining to defecate  No abdominal pain, no alarm symptoms  Hemoglobin 11/2023 -12.1, normal MCV  Suspect hemorrhoidal bleeding but would proceed with colonoscopy to rule out colon mass or large polyp  -Scheduled for colonoscopy at [facility]  -Repeat CBC    3.  History of ureteral calculi with obstruction  Recent prolonged hospitalization July and August 2023due to large left ureteral calculus and pyelonephritis.  He underwent cystoscopy and ureteral stent insertion with complication of left ureteral perforation managed with indwelling ureteral stent  Noted to have significant hematuria during hospitalization with subsequent anemia.  Hemoglobin 10-11 at that time    Followup Appointment: 3 months  ______________________________________________________________________    Chief Complaint   Patient presents with    Black or Bloody Stool     Bright red color, no diarrhea, no constipation, no pain       HPI:   Tobi Son is a 74 y.o. year old male who presents with 1 to 2-week history of bright red blood noted with wiping.   Patient is currently long-term resident at [facility].  He is a poor historian due to mild dementia.  Patient denies recent melena or hematochezia.  He denies prior rectal bleeding.   Review of records reveals recent GI consult for rectal bleeding during recent prolonged hospitalization in July and August 2023 due to large left ureteral calculus and pyelonephritis.  He underwent cystoscopy and ureteral stent insertion with complication of left ureteral perforation managed with  indwelling ureteral stent  He was anemic during that hospitalization with hemoglobin ranging 10-11 due to hematuria  Outpatient colonoscopy was recommended at once patient was stable from urologic standpoint    Patient denies prior colonoscopy  He denies recent abdominal or rectal pain  Reports constipation with hard bowel movements and straining to defecate.  BM every 2 to 3 days  Denies melena but has noticed bright red blood with wiping  He reports that his appetite is good and his weight has been stable.  Denies recent nausea or vomiting    Recent blood work November 8, 2023 showed hemoglobin 12.0, normal MCV    Historical Information   Past Medical History:   Diagnosis Date    COPD (chronic obstructive pulmonary disease) (HCC)      Past Surgical History:   Procedure Laterality Date    FL RETROGRADE PYELOGRAM  7/6/2023    FL RETROGRADE PYELOGRAM  8/3/2023    CO CYSTO BLADDER W/URETERAL CATHETERIZATION Left 7/6/2023    Procedure: CYSTOSCOPY RETROGRADE PYELOGRAM WITH INSERTION STENT URETERAL;  Surgeon: Libertad Julio MD;  [facility];  Service: Urology    CO CYSTO/URETERO W/LITHOTRIPSY &INDWELL STENT INSRT Left 8/3/2023    Procedure: CYSTO USCOPE W/  LASER, & STENT;  Surgeon: Agapito Kim MD;  [facility];  Service: Urology    CO CYSTO/URETERO W/LITHOTRIPSY &INDWELL STENT INSRT Right 8/30/2023    Procedure: CYSTO USCOPE  W/  STONE BASKET EXTRACTION;  Surgeon: Libertad Julio MD;  [facility];  Service: Urology    REMOVAL URETERAL STENT Left 8/30/2023    Procedure: REMOVAL STENT URETERAL;  Surgeon: Libertad Julio MD;  [facility];  Service: Urology     Social History     Substance and Sexual Activity   Alcohol Use Never     Social History     Substance and Sexual Activity   Drug Use No     Social History     Tobacco Use   Smoking Status Every Day    Current packs/day: 0.50    Types: Cigarettes   Smokeless Tobacco Not on file     History reviewed. No pertinent family  "history.    Meds/Allergies     Current Outpatient Medications:     acetaminophen (TYLENOL) 325 mg tablet    albuterol (ProAir HFA) 90 mcg/act inhaler    apixaban (Eliquis) 5 mg    docusate sodium (COLACE) 100 mg capsule    finasteride (PROSCAR) 5 mg tablet    fluticasone-vilanterol 200-25 mcg/actuation inhaler    magnesium hydroxide (Milk of Magnesia) 400 mg/5 mL oral suspension    Melatonin 5 MG TABS    methocarbamol (ROBAXIN) 750 mg tablet    nicotine (NICODERM CQ) 7 mg/24hr TD 24 hr patch    atorvastatin (LIPITOR) 40 mg tablet    diltiazem (CARDIZEM CD) 120 mg 24 hr capsule    lidocaine (LIDODERM) 5 %    Multiple Vitamin (multivitamin) tablet    pantoprazole (PROTONIX) 40 mg tablet    No Known Allergies    PHYSICAL EXAM:    Blood pressure 142/90, height 6' 3\" (1.905 m), weight 105 kg (232 lb). Body mass index is 29 kg/m².  General Appearance: NAD, cooperative, alert  Eyes: Anicteric  ENT:  Normocephalic, atraumatic, normal mucosa.    Neck:  Supple, symmetrical, trachea midline,   Resp:  Clear to auscultation bilaterally; no rales, rhonchi or wheezing; respirations unlabored   CV:  S1 S2, Regular rate and rhythm; no murmur, rub, or gallop.  GI:  Soft, non-tender, non-distended; normal bowel sounds; no masses, no organomegaly   Rectal: Deferred  Musculoskeletal: No cyanosis, clubbing or edema. Normal ROM.  Skin:  No jaundice, rashes, or lesions   Psych: Normal affect, good eye contact  Neuro: No gross deficits, AAOx3    Lab Results:   Lab Results   Component Value Date    WBC 7.70 08/28/2023    HGB 11.7 (L) 08/28/2023    HCT 37.0 08/28/2023    MCV 99 (H) 08/28/2023     08/28/2023     Lab Results   Component Value Date    K 4.9 08/31/2023     08/31/2023    CO2 30 08/31/2023    BUN 26 (H) 08/31/2023    CREATININE 1.21 08/31/2023    CALCIUM 8.8 08/31/2023    CORRECTEDCA 9.5 08/04/2023    AST 15 08/04/2023    ALT 16 08/04/2023    ALKPHOS 85 08/04/2023    EGFR 58 08/31/2023           REVIEW OF " SYSTEMS:    CONSTITUTIONAL: Denies any fever, chills, rigors, and weight loss.  HEENT: No earache or tinnitus. Denies hearing loss or visual disturbances.  CARDIOVASCULAR: No chest pain or palpitations.   RESPIRATORY: Denies any cough, hemoptysis, shortness of breath or dyspnea on exertion.  GASTROINTESTINAL: As noted in the History of Present Illness.   GENITOURINARY: No problems with urination. Denies any hematuria or dysuria.  NEUROLOGIC: No dizziness or vertigo, denies headaches.   MUSCULOSKELETAL: Denies any muscle or joint pain.   SKIN: Denies skin rashes or itching.   ENDOCRINE: Denies excessive thirst. Denies intolerance to heat or cold.  PSYCHOSOCIAL: Denies depression or anxiety. Denies any recent memory loss.

## 2024-01-15 NOTE — TELEPHONE ENCOUNTER
Our mutual patient is scheduled for procedure: Colonoscopy    On: __1__/_23    _/_ 24   _      With: Dr. Hinojosa at [facility]________    He/She is taking the following blood thinner:   Eliquis       Can this be stopped ___2___ days prior to the procedure?      Physician Approving clearance: ________________________    01/15/24    Tobi YEPEZ Longeshukri  [address]  [place] [id number]    Thank you,  Jenny MARY    [facility]   [phone]

## 2024-01-16 NOTE — TELEPHONE ENCOUNTER
Called the facility and spoke to staff member will fax over medication hold for Eliquis.   Faxed attention to Jamee [phone]

## 2024-01-19 ENCOUNTER — TELEPHONE (OUTPATIENT)
Dept: GASTROENTEROLOGY | Facility: CLINIC | Age: 74
End: 2024-01-19

## 2024-01-19 NOTE — TELEPHONE ENCOUNTER
Procedure confirmed  Colonoscopy     Via: Spoke with patient.  (Nurse at [facility])    Instructions given: Given to Patient at Visit     Prep Given: Golytely    Call the office if there are any questions.    They are aware of 2 day Eliquis hold. Last dose 1-20-24

## 2024-01-23 ENCOUNTER — ANESTHESIA EVENT (OUTPATIENT)
Dept: GASTROENTEROLOGY | Facility: HOSPITAL | Age: 74
End: 2024-01-23

## 2024-01-23 ENCOUNTER — ANESTHESIA (OUTPATIENT)
Dept: GASTROENTEROLOGY | Facility: HOSPITAL | Age: 74
End: 2024-01-23

## 2024-01-23 ENCOUNTER — HOSPITAL ENCOUNTER (OUTPATIENT)
Dept: GASTROENTEROLOGY | Facility: HOSPITAL | Age: 74
Setting detail: OUTPATIENT SURGERY
Discharge: HOME | End: 2024-01-23
Admitting: INTERNAL MEDICINE
Payer: MEDICARE

## 2024-01-23 VITALS
HEIGHT: 75 IN | BODY MASS INDEX: 28.6 KG/M2 | OXYGEN SATURATION: 94 % | WEIGHT: 230 LBS | SYSTOLIC BLOOD PRESSURE: 143 MMHG | HEART RATE: 75 BPM | RESPIRATION RATE: 15 BRPM | DIASTOLIC BLOOD PRESSURE: 60 MMHG

## 2024-01-23 DIAGNOSIS — K62.5 RECTAL BLEEDING: ICD-10-CM

## 2024-01-23 DIAGNOSIS — K59.09 OTHER CONSTIPATION: ICD-10-CM

## 2024-01-23 PROBLEM — F17.200 SMOKING: Status: ACTIVE | Noted: 2024-01-23

## 2024-01-23 PROCEDURE — 45378 DIAGNOSTIC COLONOSCOPY: CPT | Performed by: INTERNAL MEDICINE

## 2024-01-23 RX ORDER — LIDOCAINE HYDROCHLORIDE 10 MG/ML
INJECTION, SOLUTION EPIDURAL; INFILTRATION; INTRACAUDAL; PERINEURAL AS NEEDED
Status: DISCONTINUED | OUTPATIENT
Start: 2024-01-23 | End: 2024-01-23

## 2024-01-23 RX ORDER — SODIUM CHLORIDE 9 MG/ML
INJECTION, SOLUTION INTRAVENOUS CONTINUOUS PRN
Status: DISCONTINUED | OUTPATIENT
Start: 2024-01-23 | End: 2024-01-23

## 2024-01-23 RX ORDER — PROPOFOL 10 MG/ML
INJECTION, EMULSION INTRAVENOUS AS NEEDED
Status: DISCONTINUED | OUTPATIENT
Start: 2024-01-23 | End: 2024-01-23

## 2024-01-23 RX ORDER — PROPOFOL 10 MG/ML
INJECTION, EMULSION INTRAVENOUS CONTINUOUS PRN
Status: DISCONTINUED | OUTPATIENT
Start: 2024-01-23 | End: 2024-01-23

## 2024-01-23 RX ADMIN — SODIUM CHLORIDE: 9 INJECTION, SOLUTION INTRAVENOUS at 09:20

## 2024-01-23 RX ADMIN — PROPOFOL 120 MCG/KG/MIN: 10 INJECTION, EMULSION INTRAVENOUS at 09:28

## 2024-01-23 RX ADMIN — PROPOFOL 100 MG: 10 INJECTION, EMULSION INTRAVENOUS at 09:28

## 2024-01-23 RX ADMIN — LIDOCAINE HYDROCHLORIDE 50 MG: 10 INJECTION, SOLUTION EPIDURAL; INFILTRATION; INTRACAUDAL; PERINEURAL at 09:28

## 2024-01-23 NOTE — INTERVAL H&P NOTE
H&P reviewed. After examining the patient I find no changes in the patients condition since the H&P had been written.    Vitals:    01/23/24 0909   BP: 162/81   Pulse: 86   Resp: 20   SpO2: 93%

## 2024-01-23 NOTE — ANESTHESIA POSTPROCEDURE EVALUATION
Post-Op Assessment Note    CV Status:  Stable  Pain Score: 0    Pain management: adequate       Mental Status:  Alert and awake   Hydration Status:  Euvolemic   PONV Controlled:  Controlled   Airway Patency:  Patent     Post Op Vitals Reviewed: Yes    No anethesia notable event occurred.    Staff: CRNA               /81 (01/23/24 0945)    Temp      Pulse 73 (01/23/24 0945)   Resp 20 (01/23/24 0945)    SpO2 95 % (01/23/24 0945)

## 2024-01-23 NOTE — ANESTHESIA PREPROCEDURE EVALUATION
Procedure:  COLONOSCOPY    Relevant Problems   ANESTHESIA (within normal limits)      CARDIO   (+) Atrial fibrillation (HCC)      GI/HEPATIC   (+) Rectal bleeding      /RENAL   (+) Stage 3 chronic kidney disease, unspecified whether stage 3a or 3b CKD (HCC)      PULMONARY  Chronic bronchitis/cough, using daily inhaler   (+) COPD (chronic obstructive pulmonary disease) (HCC)   (+) Smoking   (-) URI (upper respiratory infection)      Physical Exam    Airway    Mallampati score: II  TM Distance: >3 FB  Neck ROM: full     Dental    upper dentures    Cardiovascular      Pulmonary      Other Findings      Lab Results   Component Value Date    WBC 7.70 08/28/2023    HGB 11.7 (L) 08/28/2023     08/28/2023     Lab Results   Component Value Date    SODIUM 136 08/31/2023    K 4.9 08/31/2023    BUN 26 (H) 08/31/2023    CREATININE 1.21 08/31/2023    EGFR 58 08/31/2023     Lab Results   Component Value Date    HGBA1C 5.5 07/07/2023     Anesthesia Plan  ASA Score- 3     Anesthesia Type- IV sedation with anesthesia with ASA Monitors.         Additional Monitors:     Airway Plan:            Plan Factors-Exercise tolerance (METS): >4 METS.    Chart reviewed.   Existing labs reviewed. Patient summary reviewed.    Patient is not a current smoker.              Induction- intravenous.    Postoperative Plan-     Informed Consent- Anesthetic plan and risks discussed with patient.  I personally reviewed this patient with the CRNA. Discussed and agreed on the Anesthesia Plan with the CRNA..

## 2024-02-14 NOTE — ASSESSMENT & PLAN NOTE
Addended by: JESSIE ORTEGA on: 2/14/2024 11:12 AM     Modules accepted: Orders     · Stable without exacerbation  · Continue albuterol and breo

## 2024-03-21 ENCOUNTER — OFFICE VISIT (OUTPATIENT)
Dept: GASTROENTEROLOGY | Facility: CLINIC | Age: 74
End: 2024-03-21
Payer: MEDICARE

## 2024-03-21 VITALS — DIASTOLIC BLOOD PRESSURE: 90 MMHG | SYSTOLIC BLOOD PRESSURE: 140 MMHG

## 2024-03-21 DIAGNOSIS — D50.8 OTHER IRON DEFICIENCY ANEMIA: Primary | ICD-10-CM

## 2024-03-21 DIAGNOSIS — K59.09 OTHER CONSTIPATION: ICD-10-CM

## 2024-03-21 PROCEDURE — 99214 OFFICE O/P EST MOD 30 MIN: CPT | Performed by: NURSE PRACTITIONER

## 2024-03-21 RX ORDER — DOCUSATE SODIUM 100 MG/1
100 CAPSULE, LIQUID FILLED ORAL 2 TIMES DAILY
Qty: 60 CAPSULE | Refills: 11 | Status: SHIPPED | OUTPATIENT
Start: 2024-03-21

## 2024-03-21 NOTE — PROGRESS NOTES
[facility] - Outpatient Follow-up Note  Tobi YEPEZ Longenour 74 y.o. male MRN: [id number]  Encounter: [id number]    ASSESSMENT AND PLAN:        1.  Intermittent rectal bleeding  2. Other constipation  Incomplete colonoscopy 1/23/2024 due to poor prep  Noted to have small grade 1 internal hemorrhoids, no bleeding  Patient continues to report occasional bright red blood noted with wiping after BM  Occasional straining to defecate, reports mild constipation  No alarm symptoms  Suspect occasional rectal bleeding due to hemorrhoids.  Will plan to treat mild constipation and continue to monitor for rectal bleeding  Will defer repeat attempt at colonoscopy for now as patient is on anticoagulation and will require interruption for procedure  -Increase Colace to 100 mg twice daily  -MiraLAX 17 g daily if no BM after 1 day.  Patient does not want to take on a daily basis  -Check CBC    3.  Anemia  History of hematuria related to pyelonephritis and large left ureteral calculus 7/2023 with resultant anemia  CBC 11/2023 showed hemoglobin increased to 12.0  -Will repeat CBC  -Check iron panel if patient remains anemic    Followup Appointment: 4 months  ______________________________________________________________________    Chief Complaint   Patient presents with    Follow-up     When pt wipes at times there is blood, pt does strain when he has bowel movement     HPI: Presents in follow-up for rectal bleeding.  Attempted colonoscopy 1/23/2024 however prep was incomplete.  Noted to have small grade 1 internal hemorrhoids, no bleeding identified    Overall, patient is feeling well.  Continues to experience occasional bright red blood with wiping, usually with straining to defecate.  Reports formed stool every 1 to 2 days  Denies melena or hematochezia  No abdominal or rectal pain  Appetite is good and his weight has been stable    Historical Information   Past Medical History:   Diagnosis Date     COPD (chronic obstructive pulmonary disease) ([facility])      Past Surgical History:   Procedure Laterality Date    FL RETROGRADE PYELOGRAM  7/6/2023    FL RETROGRADE PYELOGRAM  8/3/2023    UT CYSTO BLADDER W/URETERAL CATHETERIZATION Left 7/6/2023    Procedure: CYSTOSCOPY RETROGRADE PYELOGRAM WITH INSERTION STENT URETERAL;  Surgeon: Libertad Julio MD;  [facility];  Service: Urology    UT CYSTO/URETERO W/LITHOTRIPSY &INDWELL STENT INSRT Left 8/3/2023    Procedure: CYSTO USCOPE W/  LASER, & STENT;  Surgeon: Agapito Kim MD;  [facility];  Service: Urology    UT CYSTO/URETERO W/LITHOTRIPSY &INDWELL STENT INSRT Right 8/30/2023    Procedure: CYSTO USCOPE  W/  STONE BASKET EXTRACTION;  Surgeon: Libertad Julio MD;  [facility];  Service: Urology    REMOVAL URETERAL STENT Left 8/30/2023    Procedure: REMOVAL STENT URETERAL;  Surgeon: Libertad Julio MD;  [facility];  Service: Urology     Social History     Substance and Sexual Activity   Alcohol Use Never     Social History     Substance and Sexual Activity   Drug Use No     Social History     Tobacco Use   Smoking Status Every Day    Current packs/day: 0.50    Types: Cigarettes   Smokeless Tobacco Not on file     History reviewed. No pertinent family history.      Current Outpatient Medications:     acetaminophen (TYLENOL) 325 mg tablet    albuterol (ProAir HFA) 90 mcg/act inhaler    apixaban (Eliquis) 5 mg    docusate sodium (COLACE) 100 mg capsule    docusate sodium (COLACE) 100 mg capsule    finasteride (PROSCAR) 5 mg tablet    fluticasone-vilanterol 200-25 mcg/actuation inhaler    magnesium hydroxide (Milk of Magnesia) 400 mg/5 mL oral suspension    Melatonin 5 MG TABS    methocarbamol (ROBAXIN) 750 mg tablet    nicotine (NICODERM CQ) 7 mg/24hr TD 24 hr patch    polyethylene glycol (GOLYTELY) 4000 mL solution    polyethylene glycol (MIRALAX) 17 g packet    atorvastatin (LIPITOR) 40 mg tablet    diltiazem (CARDIZEM CD) 120 mg 24  hr capsule    lidocaine (LIDODERM) 5 %    Multiple Vitamin (multivitamin) tablet    pantoprazole (PROTONIX) 40 mg tablet  No Known Allergies  Reviewed medications and allergies and updated as indicated    PHYSICAL EXAM:    Blood pressure 140/90. There is no height or weight on file to calculate BMI.  General Appearance: NAD, cooperative, alert  Eyes: Anicteric  ENT:  Normocephalic, atraumatic, normal mucosa.    Neck:  Supple, symmetrical, trachea midline  Resp:  Clear to auscultation bilaterally; no rales, rhonchi or wheezing; respirations unlabored   CV:  S1 S2, Regular rate and rhythm; no murmur, rub, or gallop.  GI:  Soft, non-tender, non-distended; normal bowel sounds; no masses, no organomegaly   Rectal: Deferred  Musculoskeletal: No cyanosis, clubbing or edema. Normal ROM.  Skin:  No jaundice, rashes, or lesions   Psych: Normal affect, good eye contact  Neuro: No gross deficits, AAOx3    Lab Results:   Lab Results   Component Value Date    WBC 7.70 08/28/2023    HGB 11.7 (L) 08/28/2023    HCT 37.0 08/28/2023    MCV 99 (H) 08/28/2023     08/28/2023     Lab Results   Component Value Date    K 4.9 08/31/2023     08/31/2023    CO2 30 08/31/2023    BUN 26 (H) 08/31/2023    CREATININE 1.21 08/31/2023    CALCIUM 8.8 08/31/2023    CORRECTEDCA 9.5 08/04/2023    AST 15 08/04/2023    ALT 16 08/04/2023    ALKPHOS 85 08/04/2023    EGFR 58 08/31/2023

## 2024-07-24 ENCOUNTER — OFFICE VISIT (OUTPATIENT)
Dept: GASTROENTEROLOGY | Facility: CLINIC | Age: 74
End: 2024-07-24
Payer: MEDICARE

## 2024-07-24 VITALS
WEIGHT: 244.4 LBS | HEIGHT: 75 IN | DIASTOLIC BLOOD PRESSURE: 60 MMHG | BODY MASS INDEX: 30.39 KG/M2 | SYSTOLIC BLOOD PRESSURE: 96 MMHG

## 2024-07-24 DIAGNOSIS — K62.5 RECTAL BLEEDING: ICD-10-CM

## 2024-07-24 DIAGNOSIS — K59.04 CHRONIC IDIOPATHIC CONSTIPATION: Primary | ICD-10-CM

## 2024-07-24 PROCEDURE — 99214 OFFICE O/P EST MOD 30 MIN: CPT | Performed by: NURSE PRACTITIONER

## 2024-07-24 RX ORDER — AMMONIUM LACTATE 12 G/100G
CREAM TOPICAL AS NEEDED
COMMUNITY

## 2024-07-24 RX ORDER — LOPERAMIDE HYDROCHLORIDE 2 MG/1
2 CAPSULE ORAL 4 TIMES DAILY PRN
COMMUNITY

## 2024-07-24 RX ORDER — BISACODYL 5 MG/1
10 TABLET, DELAYED RELEASE ORAL DAILY PRN
COMMUNITY

## 2024-07-24 RX ORDER — FAMOTIDINE 20 MG/1
TABLET, FILM COATED ORAL
COMMUNITY
Start: 2024-06-27

## 2024-07-24 RX ORDER — DOXAZOSIN MESYLATE 1 MG/1
TABLET ORAL
COMMUNITY
Start: 2024-07-01

## 2024-07-24 RX ORDER — SODIUM PHOSPHATE, DIBASIC AND SODIUM PHOSPHATE, MONOBASIC 3.5; 9.5 G/66ML; G/66ML
1 ENEMA RECTAL ONCE
COMMUNITY

## 2024-07-24 RX ORDER — FLUTICASONE PROPIONATE AND SALMETEROL 500; 50 UG/1; UG/1
POWDER RESPIRATORY (INHALATION)
COMMUNITY
Start: 2024-06-25

## 2024-07-24 NOTE — PROGRESS NOTES
[facility] - Outpatient Follow-up Note  Tobi YEPEZ Longenour 74 y.o. male MRN: [id number]  Encounter: [id number]    ASSESSMENT AND PLAN:      1.  Chronic constipation  Improved with Colace twice daily.  Patient currently refusing daily MiraLAX.  Reports formed brown stool every 1 to 2 days, occasional straining to defecate  -Continue Colace twice daily  -Encourage patient to use MiraLAX if needed for hard stools in order to avoid straining  -Discussed increasing daily fluid intake and dietary fiber which is difficult as patient currently at rehab facility    2.  Intermittent rectal bleeding  Continues to experience occasional scant bright red blood with wiping after BM, occurs 1-2 times per week  No melena or hematochezia  Noted to have small internal hemorrhoids on colonoscopy 1/2024  -Recommend avoid constipation/straining as above    3.  Anemia  Resolved  Recent CBC 6/14 revealed hemoglobin 13.0    4.  Screening for colon cancer  Last colonoscopy 1/2024 was incomplete due to poor bowel prep  No further screening colonoscopy recommended due to age    Followup Appointment: As needed  ______________________________________________________________________    No chief complaint on file.    HPI: Patient presents in follow-up for history of chronic constipation and rectal bleeding  He is currently a resident at [facility]    He reports constipation improved with Colace twice daily.  Nurse at [facility] reports he refuses daily MiraLAX  Having formed brown stool every 1 to 2 days.  Continues to experience intermittent bright red blood with wiping after bowel movement, occurs 1-2 times per week  Denies melena or hematochezia  No abdominal pain or rectal pain    Appetite good.  He denies dysphagia or GERD symptoms    Historical Information   Past Medical History:   Diagnosis Date    COPD (chronic obstructive pulmonary disease) (HCC)      Past Surgical History:   Procedure  Laterality Date    FL RETROGRADE PYELOGRAM  7/6/2023    FL RETROGRADE PYELOGRAM  8/3/2023    CO CYSTO BLADDER W/URETERAL CATHETERIZATION Left 7/6/2023    Procedure: CYSTOSCOPY RETROGRADE PYELOGRAM WITH INSERTION STENT URETERAL;  Surgeon: Libertad Julio MD;  [facility];  Service: Urology    CO CYSTO/URETERO W/LITHOTRIPSY &INDWELL STENT INSRT Left 8/3/2023    Procedure: CYSTO USCOPE W/  LASER, & STENT;  Surgeon: Agapito Kim MD;  [facility];  Service: Urology    CO CYSTO/URETERO W/LITHOTRIPSY &INDWELL STENT INSRT Right 8/30/2023    Procedure: CYSTO USCOPE  W/  STONE BASKET EXTRACTION;  Surgeon: Libertad Julio MD;  [facility];  Service: Urology    REMOVAL URETERAL STENT Left 8/30/2023    Procedure: REMOVAL STENT URETERAL;  Surgeon: Libertad Julio MD;  [facility];  Service: Urology     Social History     Substance and Sexual Activity   Alcohol Use Never     Social History     Substance and Sexual Activity   Drug Use No     Social History     Tobacco Use   Smoking Status Every Day    Current packs/day: 0.50    Types: Cigarettes   Smokeless Tobacco Not on file     No family history on file.      Current Outpatient Medications:     acetaminophen (TYLENOL) 325 mg tablet    albuterol (ProAir HFA) 90 mcg/act inhaler    apixaban (Eliquis) 5 mg    atorvastatin (LIPITOR) 40 mg tablet    diltiazem (CARDIZEM CD) 120 mg 24 hr capsule    docusate sodium (COLACE) 100 mg capsule    docusate sodium (COLACE) 100 mg capsule    finasteride (PROSCAR) 5 mg tablet    fluticasone-vilanterol 200-25 mcg/actuation inhaler    lidocaine (LIDODERM) 5 %    magnesium hydroxide (Milk of Magnesia) 400 mg/5 mL oral suspension    Melatonin 5 MG TABS    methocarbamol (ROBAXIN) 750 mg tablet    Multiple Vitamin (multivitamin) tablet    nicotine (NICODERM CQ) 7 mg/24hr TD 24 hr patch    pantoprazole (PROTONIX) 40 mg tablet    polyethylene glycol (GOLYTELY) 4000 mL solution    polyethylene glycol (MIRALAX) 17  g packet  No Known Allergies  Reviewed medications and allergies and updated as indicated    PHYSICAL EXAM:    There were no vitals taken for this visit. There is no height or weight on file to calculate BMI.  General Appearance: NAD, cooperative, alert  Eyes: Anicteric  ENT:  Normocephalic, atraumatic, normal mucosa.    Neck:  Supple, symmetrical, trachea midline  Resp:  Clear to auscultation bilaterally; no rales, rhonchi or wheezing; respirations unlabored   CV:  S1 S2, Regular rate and rhythm; no murmur, rub, or gallop.  GI:  Soft, non-tender, non-distended; normal bowel sounds; no masses, no organomegaly   Rectal: Deferred  Musculoskeletal: No cyanosis, clubbing or edema. Normal ROM.  Skin:  No jaundice, rashes, or lesions   Psych: Normal affect, good eye contact  Neuro: No gross deficits, AAOx3    Lab Results:   Lab Results   Component Value Date    WBC 7.70 08/28/2023    HGB 11.7 (L) 08/28/2023    HCT 37.0 08/28/2023    MCV 99 (H) 08/28/2023     08/28/2023     Lab Results   Component Value Date    K 4.9 08/31/2023     08/31/2023    CO2 30 08/31/2023    BUN 26 (H) 08/31/2023    CREATININE 1.21 08/31/2023    CALCIUM 8.8 08/31/2023    CORRECTEDCA 9.5 08/04/2023    AST 15 08/04/2023    ALT 16 08/04/2023    ALKPHOS 85 08/04/2023    EGFR 58 08/31/2023

## 2024-09-03 NOTE — PLAN OF CARE
Problem: Potential for Falls  Goal: Patient will remain free of falls  Description: INTERVENTIONS:  - Educate patient/family on patient safety including physical limitations  - Instruct patient to call for assistance with activity   - Consult OT/PT to assist with strengthening/mobility   - Keep Call bell within reach  - Keep bed low and locked with side rails adjusted as appropriate  - Keep care items and personal belongings within reach  - Initiate and maintain comfort rounds  - Make Fall Risk Sign visible to staff  - Offer Toileting every 2 Hours, in advance of need  - Initiate/Maintain bed alarm  - Obtain necessary fall risk management equipment: bed alarm   - Apply yellow socks and bracelet for high fall risk patients  - Consider moving patient to room near nurses station  Outcome: Progressing     Problem: MOBILITY - ADULT  Goal: Maintain or return to baseline ADL function  Description: INTERVENTIONS:  -  Assess patient's ability to carry out ADLs; assess patient's baseline for ADL function and identify physical deficits which impact ability to perform ADLs (bathing, care of mouth/teeth, toileting, grooming, dressing, etc.)  - Assess/evaluate cause of self-care deficits   - Assess range of motion  - Assess patient's mobility; develop plan if impaired  - Assess patient's need for assistive devices and provide as appropriate  - Encourage maximum independence but intervene and supervise when necessary  - Involve family in performance of ADLs  - Assess for home care needs following discharge   - Consider OT consult to assist with ADL evaluation and planning for discharge  - Provide patient education as appropriate  Outcome: Progressing  Goal: Maintains/Returns to pre admission functional level  Description: INTERVENTIONS:  - Perform BMAT or MOVE assessment daily.   - Set and communicate daily mobility goal to care team and patient/family/caregiver.    - Collaborate with rehabilitation services on mobility goals if consulted  - Perform Range of Motion 4 times a day. - Reposition patient every 2 hours.   - Dangle patient 4 times a day  - Stand patient 4 times a day  - Ambulate patient 4 times a day  - Out of bed to chair 4 times a day   - Out of bed for meals 4 times a day  - Out of bed for toileting  - Record patient progress and toleration of activity level   Outcome: Progressing     Problem: PAIN - ADULT  Goal: Verbalizes/displays adequate comfort level or baseline comfort level  Description: Interventions:  - Encourage patient to monitor pain and request assistance  - Assess pain using appropriate pain scale  - Administer analgesics based on type and severity of pain and evaluate response  - Implement non-pharmacological measures as appropriate and evaluate response  - Consider cultural and social influences on pain and pain management  - Notify physician/advanced practitioner if interventions unsuccessful or patient reports new pain  Outcome: Progressing     Problem: INFECTION - ADULT  Goal: Absence or prevention of progression during hospitalization  Description: INTERVENTIONS:  - Assess and monitor for signs and symptoms of infection  - Monitor lab/diagnostic results  - Monitor all insertion sites, i.e. indwelling lines, tubes, and drains  - Monitor endotracheal if appropriate and nasal secretions for changes in amount and color  - [place] appropriate cooling/warming therapies per order  - Administer medications as ordered  - Instruct and encourage patient and family to use good hand hygiene technique  - Identify and instruct in appropriate isolation precautions for identified infection/condition  Outcome: Progressing  Goal: Absence of fever/infection during neutropenic period  Description: INTERVENTIONS:  - Monitor WBC    Outcome: Progressing     Problem: SAFETY ADULT  Goal: Patient will remain free of falls  Description: INTERVENTIONS:  - Educate patient/family on patient safety including physical limitations  - Instruct patient to call for assistance with activity   - Consult OT/PT to assist with strengthening/mobility   - Keep Call bell within reach  - Keep bed low and locked with side rails adjusted as appropriate  - Keep care items and personal belongings within reach  - Initiate and maintain comfort rounds  - Make Fall Risk Sign visible to staff  - Offer Toileting every 2 Hours, in advance of need  - Initiate/Maintain bed alarm  - Obtain necessary fall risk management equipment: bed alarm   - Apply yellow socks and bracelet for high fall risk patients  - Consider moving patient to room near nurses station  Outcome: Progressing  Goal: Maintain or return to baseline ADL function  Description: INTERVENTIONS:  -  Assess patient's ability to carry out ADLs; assess patient's baseline for ADL function and identify physical deficits which impact ability to perform ADLs (bathing, care of mouth/teeth, toileting, grooming, dressing, etc.)  - Assess/evaluate cause of self-care deficits   - Assess range of motion  - Assess patient's mobility; develop plan if impaired  - Assess patient's need for assistive devices and provide as appropriate  - Encourage maximum independence but intervene and supervise when necessary  - Involve family in performance of ADLs  - Assess for home care needs following discharge   - Consider OT consult to assist with ADL evaluation and planning for discharge  - Provide patient education as appropriate  Outcome: Progressing  Goal: Maintains/Returns to pre admission functional level  Description: INTERVENTIONS:  - Perform BMAT or MOVE assessment daily.   - Set and communicate daily mobility goal to care team and patient/family/caregiver. - Collaborate with rehabilitation services on mobility goals if consulted  - Perform Range of Motion 4 times a day. - Reposition patient every 2 hours.   - Dangle patient 4 times a day  - Stand patient 4 times a day  - Ambulate patient 4 times a day  - Out of bed to chair 4 times a day   - Out of bed for meals 4 times a day  - Out of bed for toileting  - Record patient progress and toleration of activity level   Outcome: Progressing     Problem: DISCHARGE PLANNING  Goal: Discharge to home or other facility with appropriate resources  Description: INTERVENTIONS:  - Identify barriers to discharge w/patient and caregiver  - Arrange for needed discharge resources and transportation as appropriate  - Identify discharge learning needs (meds, wound care, etc.)  - Arrange for interpretive services to assist at discharge as needed  - Refer to Case Management Department for coordinating discharge planning if the patient needs post-hospital services based on physician/advanced practitioner order or complex needs related to functional status, cognitive ability, or social support system  Outcome: Progressing     Problem: Knowledge Deficit  Goal: Patient/family/caregiver demonstrates understanding of disease process, treatment plan, medications, and discharge instructions  Description: Complete learning assessment and assess knowledge base.   Interventions:  - Provide teaching at level of understanding  - Provide teaching via preferred learning methods  Outcome: Progressing I have seen and examined this patient and fully participated in the care of this patient as the teaching attending. I personally made/approved the management plan and take responsibility for the patient management.     71-year-old male past medical history of A-fib on Xarelto, hypertension, previous liver cancer previously [place] presents with sudden onset severe epigastric pain constant nonradiating beginning around 2 AM.  Associated with nausea and 1 episode of emesis.  Patient had normal stool yesterday, no stool yet today but is passing flatus.  Has not yet  eaten today.  No associated shortness of breath or diaphoresis.  Contrary to triage note.  Patient does not have any chest pain. daughter eula translating per patient preference    PHYSICAL EXAM:   General: appears uncomfortable but nontoxic  HEENT: NC/AT,  airway patent  Cardiovascular: regular rate and rhythm, + S1/S2, no murmurs, rubs, gallops appreciated. DP and radial pulses present and strong throughout  Respiratory: clear to auscultation bilaterally, good aeration bilaterally, nonlabored respirations  Abdominal: soft, epigastric/RUQ TTP with +murphys, nondistended, no rebound, guarding or rigidity  Back: no costovertebral tenderness, no rashes noted,   Extremities: no LE edema b/l.   Psychiatric: appropriate mood and affect.   -Eliane Sheppard MD Attending Physician     History and physical as noted above.  Possible biliary/pancreatic etiology of symptoms versus ACS.  EKg as noted above. Given sudden onset of pain and hypertension will get dissection study.  However patient has equal pulses throughout and did not yet take his home metoprolol dose. Consider mesenteric ischemia - can only protocol for dissection or mesenteric ischemia in the abdomen - expect to see vessel patency on dissection study as well. dispo and further interventions pending.

## 2024-12-16 ENCOUNTER — TELEPHONE (OUTPATIENT)
Age: 74
End: 2024-12-16

## 2024-12-16 NOTE — TELEPHONE ENCOUNTER
[facility] called to schedule an appointment for the pt, he has been having burning and incontinence episodes but has been negative for a UTI. Pt would like to be seen at the [place] office moving forward. Appointment scheduled for 12/19/24 with SHERYL.

## 2024-12-17 NOTE — PROGRESS NOTES
Name: Tobi Son      : 1950      MRN: [id number]  Encounter Provider: KIKI Gibbs  Encounter Date: 2024   Encounter department: [facility] [place]  :  Assessment & Plan  BPH with lower urinary tract symptoms without urinary obstruction  Patient reports urinary leakage only at night, a weak urinary stream and intermittent straining to void   Patient manages incontinence episodes w/ depends   He is not bothered by his voiding pattern   He reports taking sleeping pills at night which may contribute to urinary leakage   He denies urgency, frequency, dysuria, burning, flank/abdominal pain, feelings of incomplete emptying, or gross hematuria   UA today w/ large blood  Will send out for urine micro  PVR 7ml  He is currently taking Finasteride 5mg and Tamsulosin 0.4mg  Patient would not be interested in bladder outlet surgery   Recommend increasing hydration and decreasing bladder irritants, and continue stool softeners to prevent constipation   Refills provided for Finasteride 5mg and Tamsulosin 0.4mg   Follow up in 1 year   Orders:    finasteride (PROSCAR) 5 mg tablet; Take 1 tablet (5 mg total) by mouth daily    tamsulosin (FLOMAX) 0.4 mg; Take 1 capsule (0.4 mg total) by mouth daily with dinner    Nephrolithiasis  History of kidney stones s/p surgical intervention in 2023  He denies flank/abdominal pain or gross hematuria   He reports urinary incontinence and burning   Will order US kidney/bladder       History of Present Illness   Tobi Son is a 74 y.o. male who presents for evaluation of urinary incontinence. He resides at [facility]. Patient reports some urinary leakage overnight which he manages with depends. He also reports a weak urinary stream but denies burning, dysuria, frequency, urgency, feelings of incomplete bladder emptying, gross hematuria, or flank/abdominal pain. He reports that the weak stream is worse when he has not had a bowel  "movement. He is currently taking stool softeners to help with constipation. He reports that after having a bowel movement, his urinary stream is much stronger and flows much better.   He was last seen in our office in 11/2023 s/p cystoscopy, ureteroscopy w/ left stent insertion d/t a 1cm left ureteral stone in 8/2023.     Review of Systems   Constitutional:  Negative for chills, diaphoresis, fatigue and fever.   Respiratory:  Negative for cough and shortness of breath.    Gastrointestinal:  Positive for constipation. Negative for abdominal pain, diarrhea, nausea and vomiting.   Genitourinary:  Negative for decreased urine volume, difficulty urinating, dysuria, flank pain, frequency, hematuria and urgency.        Intermittent leakage, weak stream   Musculoskeletal:  Negative for back pain and myalgias.   Skin:  Negative for pallor and wound.   Neurological:  Negative for dizziness, light-headedness and numbness.     Objective   /84 (BP Location: Left arm, Patient Position: Sitting, Cuff Size: Adult)   Pulse 96   Ht 6' 3\" (1.905 m)   Wt 121 kg (267 lb 9.6 oz)   SpO2 99%   BMI 33.45 kg/m²     Physical Exam  Constitutional:       Appearance: Normal appearance.   HENT:      Head: Normocephalic and atraumatic.   Eyes:      Conjunctiva/sclera: Conjunctivae normal.   Pulmonary:      Effort: Pulmonary effort is normal. No respiratory distress.   Musculoskeletal:         General: Normal range of motion.      Cervical back: Normal range of motion.   Skin:     General: Skin is warm and dry.   Neurological:      General: No focal deficit present.      Mental Status: He is alert and oriented to person, place, and time.   Psychiatric:         Mood and Affect: Mood normal.         Behavior: Behavior normal.        Results  Lab Results   Component Value Date    CALCIUM 8.8 08/31/2023    K 4.9 08/31/2023    CO2 30 08/31/2023     08/31/2023    BUN 26 (H) 08/31/2023    CREATININE 1.21 08/31/2023     Lab Results "   Component Value Date    WBC 7.70 08/28/2023    HGB 11.7 (L) 08/28/2023    HCT 37.0 08/28/2023    MCV 99 (H) 08/28/2023     08/28/2023       Office Urine Dip  Recent Results (from the past hour)   POCT urine dip    Collection Time: 12/19/24 10:25 AM   Result Value Ref Range    LEUKOCYTE ESTERASE,UA trace     NITRITE,UA neg     SL AMB POCT UROBILINOGEN 0.2     POCT URINE PROTEIN neg      PH,UA 5.0     BLOOD,UA large     SPECIFIC GRAVITY,UA 1.015     KETONES,UA neg     BILIRUBIN,UA neg     GLUCOSE, UA neg      COLOR,UA yellow     CLARITY,UA cloudy    POCT Measure PVR    Collection Time: 12/19/24 10:31 AM   Result Value Ref Range    POST-VOID RESIDUAL VOLUME, ML POC 7 mL

## 2024-12-19 ENCOUNTER — OFFICE VISIT (OUTPATIENT)
Dept: UROLOGY | Facility: HOSPITAL | Age: 74
End: 2024-12-19
Payer: MEDICARE

## 2024-12-19 VITALS
OXYGEN SATURATION: 99 % | WEIGHT: 267.6 LBS | HEART RATE: 96 BPM | HEIGHT: 75 IN | BODY MASS INDEX: 33.27 KG/M2 | SYSTOLIC BLOOD PRESSURE: 144 MMHG | DIASTOLIC BLOOD PRESSURE: 84 MMHG

## 2024-12-19 DIAGNOSIS — N40.1 BPH WITH LOWER URINARY TRACT SYMPTOMS WITHOUT URINARY OBSTRUCTION: Primary | ICD-10-CM

## 2024-12-19 DIAGNOSIS — N20.0 NEPHROLITHIASIS: ICD-10-CM

## 2024-12-19 PROBLEM — R32 URINARY INCONTINENCE: Status: ACTIVE | Noted: 2024-12-19

## 2024-12-19 LAB
BACTERIA UR QL AUTO: ABNORMAL /HPF
BILIRUB UR QL STRIP: NEGATIVE
BUDDING YEAST: PRESENT
CLARITY UR: ABNORMAL
COLOR UR: YELLOW
GLUCOSE UR STRIP-MCNC: NEGATIVE MG/DL
HGB UR QL STRIP.AUTO: ABNORMAL
HYALINE CASTS #/AREA URNS LPF: ABNORMAL /LPF
KETONES UR STRIP-MCNC: NEGATIVE MG/DL
LEUKOCYTE ESTERASE UR QL STRIP: ABNORMAL
MUCOUS THREADS UR QL AUTO: ABNORMAL
NITRITE UR QL STRIP: NEGATIVE
NON-SQ EPI CELLS URNS QL MICRO: ABNORMAL /HPF
PH UR STRIP.AUTO: 6.5 [PH]
POST-VOID RESIDUAL VOLUME, ML POC: 7 ML
PROT UR STRIP-MCNC: ABNORMAL MG/DL
RBC #/AREA URNS AUTO: ABNORMAL /HPF
SL AMB  POCT GLUCOSE, UA: NORMAL
SL AMB LEUKOCYTE ESTERASE,UA: NORMAL
SL AMB POCT BILIRUBIN,UA: NORMAL
SL AMB POCT BLOOD,UA: NORMAL
SL AMB POCT CLARITY,UA: NORMAL
SL AMB POCT COLOR,UA: YELLOW
SL AMB POCT KETONES,UA: NORMAL
SL AMB POCT NITRITE,UA: NORMAL
SL AMB POCT PH,UA: 5
SL AMB POCT SPECIFIC GRAVITY,UA: 1.01
SL AMB POCT URINE PROTEIN: NORMAL
SL AMB POCT UROBILINOGEN: 0.2
SP GR UR STRIP.AUTO: 1.02 (ref 1–1.03)
UROBILINOGEN UR STRIP-ACNC: <2 MG/DL
WBC #/AREA URNS AUTO: ABNORMAL /HPF
WBC CLUMPS # UR AUTO: PRESENT /UL

## 2024-12-19 PROCEDURE — 81001 URINALYSIS AUTO W/SCOPE: CPT

## 2024-12-19 PROCEDURE — 51798 US URINE CAPACITY MEASURE: CPT

## 2024-12-19 PROCEDURE — 99214 OFFICE O/P EST MOD 30 MIN: CPT

## 2024-12-19 PROCEDURE — 81002 URINALYSIS NONAUTO W/O SCOPE: CPT

## 2024-12-19 RX ORDER — TRAZODONE HYDROCHLORIDE 50 MG/1
TABLET, FILM COATED ORAL
COMMUNITY
Start: 2024-12-15

## 2024-12-19 RX ORDER — FINASTERIDE 5 MG/1
5 TABLET, FILM COATED ORAL DAILY
Qty: 90 TABLET | Refills: 3 | Status: SHIPPED | OUTPATIENT
Start: 2024-12-19

## 2024-12-19 RX ORDER — TAMSULOSIN HYDROCHLORIDE 0.4 MG/1
CAPSULE ORAL
COMMUNITY
Start: 2024-11-25 | End: 2024-12-19 | Stop reason: SDUPTHER

## 2024-12-19 RX ORDER — MOMETASONE FUROATE AND FORMOTEROL FUMARATE DIHYDRATE 200; 5 UG/1; UG/1
AEROSOL RESPIRATORY (INHALATION)
COMMUNITY
Start: 2024-11-29

## 2024-12-19 RX ORDER — TAMSULOSIN HYDROCHLORIDE 0.4 MG/1
0.4 CAPSULE ORAL
Qty: 90 CAPSULE | Refills: 3 | Status: SHIPPED | OUTPATIENT
Start: 2024-12-19

## 2024-12-19 NOTE — ASSESSMENT & PLAN NOTE
History of kidney stones s/p surgical intervention in 8/2023  He denies flank/abdominal pain or gross hematuria   He reports urinary incontinence and burning   Will order US kidney/bladder

## 2024-12-19 NOTE — ASSESSMENT & PLAN NOTE
Patient reports urinary leakage only at night, a weak urinary stream and intermittent straining to void   Patient manages incontinence episodes w/ depends   He is not bothered by his voiding pattern   He reports taking sleeping pills at night which may contribute to urinary leakage   He denies urgency, frequency, dysuria, burning, flank/abdominal pain, feelings of incomplete emptying, or gross hematuria   UA today w/ large blood  Will send out for urine micro  PVR 7ml  He is currently taking Finasteride 5mg and Tamsulosin 0.4mg  Patient would not be interested in bladder outlet surgery   Recommend increasing hydration and decreasing bladder irritants, and continue stool softeners to prevent constipation   Refills provided for Finasteride 5mg and Tamsulosin 0.4mg   Follow up in 1 year   Orders:    finasteride (PROSCAR) 5 mg tablet; Take 1 tablet (5 mg total) by mouth daily    tamsulosin (FLOMAX) 0.4 mg; Take 1 capsule (0.4 mg total) by mouth daily with dinner

## 2024-12-19 NOTE — ASSESSMENT & PLAN NOTE
Patient reports urinary leakage only at night, but he is most bothered by a weak urinary stream and straining to void  Patient manages incontinence episodes w/ depends   He denies dysuria, burning, flank/abdominal pain, or gross hematuria   UA today w/ large blood  Will send out for urine micro  PVR 7ml  He is currently taking Finasteride 5mg and Tamsulosin 0.4mg  Recommend increasing hydration and decreasing bladder irritants, and continue stool softeners to prevent constipation   Continue Finasteride 5mg and Tamsulosin 0.4mg   Orders:    POCT urine dip    POCT Measure PVR

## 2025-01-02 NOTE — PROGRESS NOTES
Cardiology Progress Note   MD Elvira Perdue MD, Juan Diego Almaraz DO, [facility]  MD Angelito Etienne DO, Maxime Hanks DO, [facility]  ----------------------------------------------------------------  [address] [employer] [facility]  [address] [facility], [address] 75 y.o. male MRN: [id number]  Unit/Bed#: E4 -01 Encounter: [id number]      ASSESSMENT:   • Paroxysmal atrial fibrillation on Eliquis  o s/p SOLA cardioversion, July 25, 2023  • Sepsis secondary to urinary source  • Nonischemic troponin elevation secondary to atrial fibrillation  o Pharmacologic nuclear stress test appears negative for myocardial ischemia, July 2023  o LVEF 55%, mild LV dilatation, grade 1 diastolic dysfunction, mild RV dilatation, mild biatrial dilatation, mild TR with PASP 27 mmHg, July 2023  • Pyelonephritis  • Acute kidney injury  • COPD  • Alcohol withdrawal  • Hydronephrosis with obstructing renal calculi    PLAN:  Patient remains in sinus rhythm this morning with occasional PVCs  Continue Eliquis for thromboembolic prophylaxis; cannot discontinue medication for the next 4 weeks post cardioversion; risks of discontinuing anticoagulation during this timeframe have been discussed  Patient now admits to some blood in stool; get GI evaluation  Continue high intensity statin  Recommend monitor following discharge to evaluate for any further atrial fibrillation  Would decrease diltiazem to 120 mg daily and initiate amiodarone 200 mg twice daily for 10 days and then 200 mg daily thereafter  Outpatient follow-up within 2 weeks of discharge for additional CV testing as clinically indicated with Dr. Anuksh Campos    Signed: Branden Nicholas DO, [facility], JOSUE SANCHEZ      History of Present Illness:  Patient seen and examined. Denies chest pain, pressure, tightness or squeezing. Denies lightheadedness, dizziness or palpitations.   Denies lower extremity swelling, orthopnea or paroxysmal To Dr LARSON,    Referral pended for your review    I called and spoke with patient spouse/Shanice. HIPAA verified    She stated referral is for loss of appetite   nocturnal dyspnea. Admits to some blood in stool. Review of Systems:  Review of Systems   Constitutional: Negative for decreased appetite, fever, weight gain and weight loss. HENT: Negative for congestion and sore throat. Eyes: Negative for visual disturbance. Cardiovascular: Negative for chest pain, dyspnea on exertion, leg swelling, near-syncope and palpitations. Respiratory: Negative for cough and shortness of breath. Hematologic/Lymphatic: Negative for bleeding problem. Skin: Negative for rash. Musculoskeletal: Negative for myalgias and neck pain. Gastrointestinal: Negative for abdominal pain and nausea. Neurological: Negative for light-headedness and weakness. Psychiatric/Behavioral: Negative for depression. No Known Allergies    No current facility-administered medications on file prior to encounter.      Current Outpatient Medications on File Prior to Encounter   Medication Sig   • EPINEPHrine (EPIPEN) 0.3 mg/0.3 mL SOAJ Inject 0.3 mL (0.3 mg total) into the shoulder, thigh, or buttocks once as needed for anaphylaxis (call 911 when you use your epipen) for up to 1 dose (Patient not taking: Reported on 7/6/2023)        Current Facility-Administered Medications   Medication Dose Route Frequency Provider Last Rate   • acetaminophen  650 mg Oral Q6H PRN KIKI Michael     • albuterol  2.5 mg Nebulization Q6H PRN KIKI Michael     • aluminum-magnesium hydroxide-simethicone  30 mL Oral Q6H PRN KIKI Michael     • apixaban  5 mg Oral BID Abel Mehta MD     • aspirin  81 mg Oral Daily Bethany Logan, DO     • atorvastatin  40 mg Oral Daily With Rebeca Guillaume DO     • bisacodyl  10 mg Oral Daily PRN KIKI Michael     • diltiazem  240 mg Oral Daily Abel Mehta MD     • fluticasone-vilanterol  1 puff Inhalation Daily KIKI Michael     • folic acid  1 mg Oral Daily Sondra Vasquez PA-C     • hydrOXYzine HCL  50 mg Oral HS PRN KIKI Garcia     • melatonin  6 mg Oral HS LUISITO GarciaNP     • multivitamin-minerals  1 tablet Oral Daily Carl Ching PA-C     • nicotine  1 patch Transdermal Daily KIKI Garcia     • ondansetron  4 mg Intravenous Q6H PRN KIKI Garcia     • pantoprazole  40 mg Oral Early Morning LUISITO GarciaNP     • senna  2 tablet Oral HS KIKI Garcia     • simethicone  80 mg Oral 4x Daily PRN KIKI Garcia     • thiamine  100 mg Oral Daily Carl Ching PA-C              Vitals:    07/25/23 1922 07/26/23 0101 07/26/23 0346 07/26/23 0732   BP: 119/66 98/54 128/50 144/71   BP Location: Right arm Right arm Right arm Left arm   Pulse: 67 72 72 57   Resp: 18 18 18 18   Temp:  97.5 °F (36.4 °C) 98 °F (36.7 °C) 98 °F (36.7 °C)   TempSrc:  Temporal Temporal Temporal   SpO2: 94% 93% 93% 95%   Weight:       Height:         Body mass index is 28 kg/m². Intake/Output Summary (Last 24 hours) at 7/26/2023 1049  Last data filed at 7/25/2023 1653  Gross per 24 hour   Intake 940 ml   Output 600 ml   Net 340 ml       Weight change:     PHYSICAL EXAMINATION:  Gen: Awake, Alert, NAD  Head/eyes: AT/NC, pupils equal and round, Anicteric  ENT: mmm  Neck: Supple, No elevated JVP, trachea midline  Resp: CTA bilaterally no w/r/r  CV: RRR +S1, S2, No m/r/g  Abd: Soft, NT/ND + BS  Ext: no LE edema bilaterally  Neuro:  Follows commands, moves all extermities  Psych: Appropriate affect, normal mood, pleasant attitude, non-combative  Skin: warm; no rash, erythema or venous stasis changes on exposed skin    Lab Results:  Results from last 7 days   Lab Units 07/25/23  0452   WBC Thousand/uL 9.72   HEMOGLOBIN g/dL 14.2   HEMATOCRIT % 44.6   PLATELETS Thousands/uL 420*     Results from last 7 days   Lab Units 07/25/23  0452 07/20/23  0858   POTASSIUM mmol/L 4.2 4.4   CHLORIDE mmol/L 105 105   CO2 mmol/L 25 29   BUN mg/dL 23 19   CREATININE mg/dL 1. 01 1.02   CALCIUM mg/dL 9.0 9.4   ALK PHOS U/L  --  72   ALT U/L  --  15   AST U/L  --  16     No results found for: "TROPONINT"                Tele: SR w/ PVCs    This note was completed in part utilizing [employer] Fluency Direct Software. Grammatical errors, random word insertions, spelling mistakes, and incomplete sentences may be an occasional consequence of this system secondary to software limitations, ambient noise, and hardware issues. If you have any questions or concerns about the content, text, or information contained within the body of this dictation, please contact the provider for clarification.

## 2025-01-23 NOTE — ASSESSMENT & PLAN NOTE
2/2 sets Aerococcus urinae due to stone which resulted in pyelonephritis  · Management per infectious disease No

## 2025-01-28 ENCOUNTER — HOSPITAL ENCOUNTER (OUTPATIENT)
Dept: CT IMAGING | Facility: HOSPITAL | Age: 75
Discharge: HOME | End: 2025-01-28
Payer: MEDICARE

## 2025-01-28 DIAGNOSIS — R31.29 MICROHEMATURIA: ICD-10-CM

## 2025-01-28 PROCEDURE — 74178 CT ABD&PLV WO CNTR FLWD CNTR: CPT

## 2025-01-28 RX ADMIN — IOHEXOL 100 ML: 350 INJECTION, SOLUTION INTRAVENOUS at 12:31

## (undated) DEVICE — GLOVE SRG BIOGEL 7.5

## (undated) DEVICE — PACK TUR

## (undated) DEVICE — ENDOSCOPIC VALVE WITH ADAPTER.: Brand: SURSEAL® II

## (undated) DEVICE — CATH URETERAL 5FR X 70 CM FLEX TIP POLYUR BARD

## (undated) DEVICE — SINGLE-USE DIGITAL FLEXIBLE URETEROSCOPE: Brand: APTRA

## (undated) DEVICE — GLOVE INDICATOR PI UNDERGLOVE SZ 7 BLUE

## (undated) DEVICE — INVIEW CLEAR LEGGINGS: Brand: CONVERTORS

## (undated) DEVICE — SHEATH URETERAL ACCESS 12/14FR 35CM PROXIS

## (undated) DEVICE — SPECIMEN CONTAINER STERILE PEEL PACK

## (undated) DEVICE — CATH FOLEY COUDE 18FR 5ML 2 WAY CARSON LUBRICATH

## (undated) DEVICE — SCD SEQUENTIAL COMPRESSION COMFORT SLEEVE MEDIUM KNEE LENGTH: Brand: KENDALL SCD

## (undated) DEVICE — GW URO .035IN 150CM NTNL STRL BNTSN TIP REG SHFT LF

## (undated) DEVICE — FIBER LASER HOLMIUM 272MICRON HOL1020F

## (undated) DEVICE — PREMIUM DRY TRAY LF: Brand: MEDLINE INDUSTRIES, INC.

## (undated) DEVICE — GLOVE INDICATOR PI UNDERGLOVE SZ 8 BLUE

## (undated) DEVICE — GUIDEWIRE STRGHT TIP 0.035 IN  SOLO PLUS

## (undated) DEVICE — EXIDINE 4 PCT

## (undated) DEVICE — DRAPE C-ARM X-RAY

## (undated) DEVICE — BASKET SPECIMEN RETRIVAL 1.9FR 120CM

## (undated) DEVICE — CATH URET .038 10FR 50CM DUAL LUMEN

## (undated) DEVICE — 3M™ STERI-STRIP™ COMPOUND BENZOIN TINCTURE 40 BAGS/CARTON 4 CARTONS/CASE C1544: Brand: 3M™ STERI-STRIP™

## (undated) DEVICE — STERILE SURGICAL LUBRICANT,  TUBE: Brand: SURGILUBE

## (undated) DEVICE — TUBING SUCTION 5MM X 12 FT

## (undated) DEVICE — UROLOGIC DRAIN BAG: Brand: UNBRANDED